# Patient Record
Sex: MALE | Race: WHITE | Employment: OTHER | URBAN - METROPOLITAN AREA
[De-identification: names, ages, dates, MRNs, and addresses within clinical notes are randomized per-mention and may not be internally consistent; named-entity substitution may affect disease eponyms.]

---

## 2018-01-23 PROBLEM — C20 RECTAL CANCER (HCC): Status: ACTIVE | Noted: 2018-01-23

## 2018-01-29 PROBLEM — K56.7 ILEUS (HCC): Status: ACTIVE | Noted: 2018-01-29

## 2018-03-16 ENCOUNTER — HOSPITAL ENCOUNTER (OUTPATIENT)
Dept: PREADMISSION TESTING | Age: 63
Discharge: HOME | End: 2018-03-16
Payer: MEDICARE

## 2018-03-16 VITALS
OXYGEN SATURATION: 100 % | HEIGHT: 74 IN | HEART RATE: 61 BPM | DIASTOLIC BLOOD PRESSURE: 67 MMHG | TEMPERATURE: 98.2 F | SYSTOLIC BLOOD PRESSURE: 127 MMHG | RESPIRATION RATE: 20 BRPM | BODY MASS INDEX: 23.1 KG/M2 | WEIGHT: 180 LBS

## 2018-03-16 DIAGNOSIS — Z01.818 PREOP TESTING: Primary | ICD-10-CM

## 2018-03-16 PROCEDURE — 87081 CULTURE SCREEN ONLY: CPT

## 2018-03-16 NOTE — PROGRESS NOTES
[facility] PRE-ADMISSION TESTING INSTRUCTIONS  Surgery Date 3-20-18      Arrival 8:30 a.m. The Preadmission Testing patient is instructed accordingly using the following criteria (check applicable):    ARRIVAL INSTRUCTIONS:  [x] Parking the day of Surgery is located in the Main Entrance lot. Upon entering the door, make an immediate right to the surgery reception desk    [x] Complimentary [address] Parking is available Monday through Friday 6 am to 6 pm    [x] Bring photo ID and insurance card    [] Bring in a copy of Living will or Durable Power of  papers. [] Please be sure to arrange transportation to and from the hospital    [] Please arrange for someone to be with you for the 24 hour period post procedure due to having anesthesia      GENERAL INSTRUCTIONS:    [x] Nothing by mouth after midnight, including gum, candy, mints or water    [x] You may brush your teeth, but do not swallow any water    [x] Take medications as instructed with 1-2 oz of water    [x] Stop herbal supplements and vitamins 5 days prior to procedure    [x] Follow preop dosing of blood thinners per physician instructions    [] Do not take insulin or oral diabetic medications    [] If diabetic and have low blood sugar or feel symptomatic, take 1-2oz apple juice or glucose tablets    [] Bring inhalers day of surgery    [] Bring C-PAP/ Bi-Pap day of surgery    [] Bring urine specimen day of surgery    [] Shower or bath with soap, lather and rinse well, AM of Surgery, no lotion, powders or creams to surgical site    [] Follow bowel prep as instructed per surgeon    [x] No tobacco products within 24 hours of surgery     [x] No alcohol or illegal drug use within 24 hours of surgery.     [x] Jewelry, body piercing's, eyeglasses, contact lenses and dentures are not permitted into surgery (bring cases)      [] Please do not wear any nail polish, make up or hair products on the day of surgery    [x] If not already done, you can expect a call from registration    [x] You can expect a call the business day prior to procedure to notify you of your arrival time    [x] If you receive a survey after surgery we would greatly appreciate your comments    [] Parent/guardian of a minor must accompany their child and remain on the premises  the entire time they are under our care     [] Pediatric patients may bring favorite toy, blanket or comfort item with them    [] A caregiver or family member must remain with the patient during their stay if they are mentally handicapped, have dementia, disoriented or unable to use a call light or would be a safety concern if left unattended    [x] Please notify surgeon if you develop any illness between now and time of surgery (cold, cough, sore throat, fever, nausea, vomiting) or any signs of infections  including skin, wounds, and dental.    [x] Other instructions: Wear comfortable clothing    EDUCATIONAL MATERIALS PROVIDED:    [x] PAT Preoperative Education Packet/Booklet     [x] Medication List    [] Fluoroscopy Information Pamphlet    [] Transfusion bracelet applied with instructions    [] Joint replacement video reviewed    [x] Shower with soap, lather and rinse well, and use CHG wipes provided the evening before surgery as instructed

## 2018-03-17 LAB — MRSA CULTURE ONLY: NORMAL

## 2018-03-19 NOTE — H&P
Date:   [facility]  Name: Shun Gonsalves             : 1955 Sex: M  Age: 57 yrs  Acct#:  [id number]           Patient was referred by Kary Mcneill MD.  Patient's primary care provider is Kary Mcneill MD.  CC:  Follow-up    HPI: Status post low anterior resection for rectal cancer. Seen by oncology. Further treatment to be determined based on genetic testing. Doing well. Gastrografin enema reviewed. Anastomosis without leak.     Meds Prior to Visit:  Erythromycin Base  250 mg  4 tablets @1p,2p and 11p day prior to surgery  Neomycin Sulfate  500 mg  2 by mouth 1pm,2pm ,11pm day prior to surgery  Lopressor     Hydrazine Sulfate     Aspir-81  81 mg      Allergies:  Penicillin, Cefaclor        Wt Prior: 187lb as of 17    Exam:  Heart :  RRR  Lungs CTA bilaterally  Abdomen: Soft and nondistended, right lower quadrant ostomy healthy         Assessment #1: Hx C20 Malignant neoplasm of rectum   Care Plan:              Comments       :  Schedule reversal of ileostomy        Seen by:

## 2018-03-20 ENCOUNTER — ANESTHESIA EVENT (OUTPATIENT)
Dept: OPERATING ROOM | Age: 63
DRG: 331 | End: 2018-03-20
Payer: MEDICARE

## 2018-03-20 ENCOUNTER — HOSPITAL ENCOUNTER (INPATIENT)
Age: 63
LOS: 2 days | Discharge: HOME | DRG: 331 | End: 2018-03-22
Attending: SURGERY | Admitting: SURGERY
Payer: MEDICARE

## 2018-03-20 ENCOUNTER — ANESTHESIA (OUTPATIENT)
Dept: OPERATING ROOM | Age: 63
DRG: 331 | End: 2018-03-20
Payer: MEDICARE

## 2018-03-20 VITALS
OXYGEN SATURATION: 97 % | DIASTOLIC BLOOD PRESSURE: 80 MMHG | SYSTOLIC BLOOD PRESSURE: 159 MMHG | TEMPERATURE: 95.4 F | RESPIRATION RATE: 5 BRPM

## 2018-03-20 DIAGNOSIS — C20 RECTAL CANCER (HCC): Primary | ICD-10-CM

## 2018-03-20 PROCEDURE — 2500000003 HC RX 250 WO HCPCS: Performed by: SURGERY

## 2018-03-20 PROCEDURE — 3600000014 HC SURGERY LEVEL 4 ADDTL 15MIN: Performed by: SURGERY

## 2018-03-20 PROCEDURE — 0DBB0ZZ EXCISION OF ILEUM, OPEN APPROACH: ICD-10-PCS | Performed by: SURGERY

## 2018-03-20 PROCEDURE — 2060000000 HC ICU INTERMEDIATE R&B

## 2018-03-20 PROCEDURE — 2580000003 HC RX 258: Performed by: NURSE ANESTHETIST, CERTIFIED REGISTERED

## 2018-03-20 PROCEDURE — 2500000003 HC RX 250 WO HCPCS: Performed by: STUDENT IN AN ORGANIZED HEALTH CARE EDUCATION/TRAINING PROGRAM

## 2018-03-20 PROCEDURE — 3600000004 HC SURGERY LEVEL 4 BASE: Performed by: SURGERY

## 2018-03-20 PROCEDURE — 3700000000 HC ANESTHESIA ATTENDED CARE: Performed by: SURGERY

## 2018-03-20 PROCEDURE — 2720000010 HC SURG SUPPLY STERILE: Performed by: SURGERY

## 2018-03-20 PROCEDURE — 2500000003 HC RX 250 WO HCPCS: Performed by: NURSE ANESTHETIST, CERTIFIED REGISTERED

## 2018-03-20 PROCEDURE — 2780000010 HC IMPLANT OTHER: Performed by: SURGERY

## 2018-03-20 PROCEDURE — 6360000002 HC RX W HCPCS: Performed by: NURSE ANESTHETIST, CERTIFIED REGISTERED

## 2018-03-20 PROCEDURE — 7100000001 HC PACU RECOVERY - ADDTL 15 MIN: Performed by: SURGERY

## 2018-03-20 PROCEDURE — 7100000000 HC PACU RECOVERY - FIRST 15 MIN: Performed by: SURGERY

## 2018-03-20 PROCEDURE — 6360000002 HC RX W HCPCS: Performed by: SURGERY

## 2018-03-20 PROCEDURE — 6360000002 HC RX W HCPCS: Performed by: ANESTHESIOLOGY

## 2018-03-20 PROCEDURE — 3700000001 HC ADD 15 MINUTES (ANESTHESIA): Performed by: SURGERY

## 2018-03-20 PROCEDURE — 88304 TISSUE EXAM BY PATHOLOGIST: CPT

## 2018-03-20 DEVICE — RELOAD STAPLER 55MM PROXIMATE TITAN: Type: IMPLANTABLE DEVICE | Status: FUNCTIONAL

## 2018-03-20 RX ORDER — SODIUM CHLORIDE, SODIUM LACTATE, POTASSIUM CHLORIDE, CALCIUM CHLORIDE 600; 310; 30; 20 MG/100ML; MG/100ML; MG/100ML; MG/100ML
INJECTION, SOLUTION INTRAVENOUS CONTINUOUS
Status: DISCONTINUED | OUTPATIENT
Start: 2018-03-20 | End: 2018-03-20

## 2018-03-20 RX ORDER — OXYCODONE HYDROCHLORIDE AND ACETAMINOPHEN 5; 325 MG/1; MG/1
2 TABLET ORAL EVERY 4 HOURS PRN
Status: DISCONTINUED | OUTPATIENT
Start: 2018-03-20 | End: 2018-03-22 | Stop reason: HOSPADM

## 2018-03-20 RX ORDER — HYDROMORPHONE HCL 110MG/55ML
PATIENT CONTROLLED ANALGESIA SYRINGE INTRAVENOUS
Status: DISPENSED
Start: 2018-03-20 | End: 2018-03-21

## 2018-03-20 RX ORDER — SODIUM CHLORIDE 0.9 % (FLUSH) 0.9 %
10 SYRINGE (ML) INJECTION EVERY 12 HOURS SCHEDULED
Status: DISCONTINUED | OUTPATIENT
Start: 2018-03-20 | End: 2018-03-22 | Stop reason: HOSPADM

## 2018-03-20 RX ORDER — LIDOCAINE HYDROCHLORIDE 20 MG/ML
INJECTION, SOLUTION EPIDURAL; INFILTRATION; INTRACAUDAL; PERINEURAL PRN
Status: DISCONTINUED | OUTPATIENT
Start: 2018-03-20 | End: 2018-03-20 | Stop reason: SDUPTHER

## 2018-03-20 RX ORDER — ONDANSETRON 2 MG/ML
4 INJECTION INTRAMUSCULAR; INTRAVENOUS
Status: DISCONTINUED | OUTPATIENT
Start: 2018-03-20 | End: 2018-03-20 | Stop reason: HOSPADM

## 2018-03-20 RX ORDER — ROCURONIUM BROMIDE 10 MG/ML
INJECTION, SOLUTION INTRAVENOUS PRN
Status: DISCONTINUED | OUTPATIENT
Start: 2018-03-20 | End: 2018-03-20 | Stop reason: SDUPTHER

## 2018-03-20 RX ORDER — NEOSTIGMINE METHYLSULFATE 1 MG/ML
INJECTION, SOLUTION INTRAVENOUS PRN
Status: DISCONTINUED | OUTPATIENT
Start: 2018-03-20 | End: 2018-03-20 | Stop reason: SDUPTHER

## 2018-03-20 RX ORDER — CIPROFLOXACIN 2 MG/ML
400 INJECTION, SOLUTION INTRAVENOUS
Status: COMPLETED | OUTPATIENT
Start: 2018-03-20 | End: 2018-03-20

## 2018-03-20 RX ORDER — LIDOCAINE HYDROCHLORIDE 20 MG/ML
INJECTION, SOLUTION EPIDURAL; INFILTRATION; INTRACAUDAL; PERINEURAL PRN
Status: DISCONTINUED | OUTPATIENT
Start: 2018-03-20 | End: 2018-03-20

## 2018-03-20 RX ORDER — MIDAZOLAM HYDROCHLORIDE 1 MG/ML
INJECTION INTRAMUSCULAR; INTRAVENOUS PRN
Status: DISCONTINUED | OUTPATIENT
Start: 2018-03-20 | End: 2018-03-20

## 2018-03-20 RX ORDER — FENTANYL CITRATE 50 UG/ML
INJECTION, SOLUTION INTRAMUSCULAR; INTRAVENOUS PRN
Status: DISCONTINUED | OUTPATIENT
Start: 2018-03-20 | End: 2018-03-20 | Stop reason: SDUPTHER

## 2018-03-20 RX ORDER — GLYCOPYRROLATE 0.2 MG/ML
INJECTION INTRAMUSCULAR; INTRAVENOUS PRN
Status: DISCONTINUED | OUTPATIENT
Start: 2018-03-20 | End: 2018-03-20 | Stop reason: SDUPTHER

## 2018-03-20 RX ORDER — ACETAMINOPHEN 325 MG/1
650 TABLET ORAL EVERY 4 HOURS PRN
Status: DISCONTINUED | OUTPATIENT
Start: 2018-03-20 | End: 2018-03-22 | Stop reason: HOSPADM

## 2018-03-20 RX ORDER — MIDAZOLAM HYDROCHLORIDE 1 MG/ML
INJECTION INTRAMUSCULAR; INTRAVENOUS PRN
Status: DISCONTINUED | OUTPATIENT
Start: 2018-03-20 | End: 2018-03-20 | Stop reason: SDUPTHER

## 2018-03-20 RX ORDER — FENTANYL CITRATE 50 UG/ML
INJECTION, SOLUTION INTRAMUSCULAR; INTRAVENOUS PRN
Status: DISCONTINUED | OUTPATIENT
Start: 2018-03-20 | End: 2018-03-20

## 2018-03-20 RX ORDER — SODIUM CHLORIDE 0.9 % (FLUSH) 0.9 %
10 SYRINGE (ML) INJECTION EVERY 12 HOURS SCHEDULED
Status: DISCONTINUED | OUTPATIENT
Start: 2018-03-20 | End: 2018-03-20 | Stop reason: HOSPADM

## 2018-03-20 RX ORDER — ONDANSETRON 2 MG/ML
INJECTION INTRAMUSCULAR; INTRAVENOUS PRN
Status: DISCONTINUED | OUTPATIENT
Start: 2018-03-20 | End: 2018-03-20 | Stop reason: SDUPTHER

## 2018-03-20 RX ORDER — SODIUM CHLORIDE 0.9 % (FLUSH) 0.9 %
10 SYRINGE (ML) INJECTION PRN
Status: DISCONTINUED | OUTPATIENT
Start: 2018-03-20 | End: 2018-03-22 | Stop reason: HOSPADM

## 2018-03-20 RX ORDER — ONDANSETRON 2 MG/ML
4 INJECTION INTRAMUSCULAR; INTRAVENOUS EVERY 6 HOURS PRN
Status: DISCONTINUED | OUTPATIENT
Start: 2018-03-20 | End: 2018-03-22 | Stop reason: HOSPADM

## 2018-03-20 RX ORDER — PROPOFOL 10 MG/ML
INJECTION, EMULSION INTRAVENOUS PRN
Status: DISCONTINUED | OUTPATIENT
Start: 2018-03-20 | End: 2018-03-20 | Stop reason: SDUPTHER

## 2018-03-20 RX ORDER — OXYCODONE HYDROCHLORIDE AND ACETAMINOPHEN 5; 325 MG/1; MG/1
1 TABLET ORAL EVERY 4 HOURS PRN
Status: DISCONTINUED | OUTPATIENT
Start: 2018-03-20 | End: 2018-03-22 | Stop reason: HOSPADM

## 2018-03-20 RX ORDER — HYDROMORPHONE HCL 110MG/55ML
0.25 PATIENT CONTROLLED ANALGESIA SYRINGE INTRAVENOUS
Status: DISCONTINUED | OUTPATIENT
Start: 2018-03-20 | End: 2018-03-21 | Stop reason: ALTCHOICE

## 2018-03-20 RX ORDER — DEXTROSE, SODIUM CHLORIDE, AND POTASSIUM CHLORIDE 5; .45; .15 G/100ML; G/100ML; G/100ML
INJECTION INTRAVENOUS CONTINUOUS
Status: DISCONTINUED | OUTPATIENT
Start: 2018-03-20 | End: 2018-03-22

## 2018-03-20 RX ORDER — HYDRALAZINE HYDROCHLORIDE 25 MG/1
25 TABLET, FILM COATED ORAL 3 TIMES DAILY
Status: DISCONTINUED | OUTPATIENT
Start: 2018-03-20 | End: 2018-03-22 | Stop reason: HOSPADM

## 2018-03-20 RX ORDER — SODIUM CHLORIDE, SODIUM LACTATE, POTASSIUM CHLORIDE, CALCIUM CHLORIDE 600; 310; 30; 20 MG/100ML; MG/100ML; MG/100ML; MG/100ML
INJECTION, SOLUTION INTRAVENOUS CONTINUOUS PRN
Status: DISCONTINUED | OUTPATIENT
Start: 2018-03-20 | End: 2018-03-20 | Stop reason: SDUPTHER

## 2018-03-20 RX ORDER — DEXAMETHASONE SODIUM PHOSPHATE 4 MG/ML
INJECTION, SOLUTION INTRA-ARTICULAR; INTRALESIONAL; INTRAMUSCULAR; INTRAVENOUS; SOFT TISSUE PRN
Status: DISCONTINUED | OUTPATIENT
Start: 2018-03-20 | End: 2018-03-20 | Stop reason: SDUPTHER

## 2018-03-20 RX ADMIN — CIPROFLOXACIN 400 MG: 2 INJECTION, SOLUTION INTRAVENOUS at 09:24

## 2018-03-20 RX ADMIN — ROCURONIUM BROMIDE 30 MG: 10 SOLUTION INTRAVENOUS at 10:25

## 2018-03-20 RX ADMIN — METRONIDAZOLE 500 MG: 500 INJECTION, SOLUTION INTRAVENOUS at 10:30

## 2018-03-20 RX ADMIN — FENTANYL CITRATE 100 MCG: 50 INJECTION, SOLUTION INTRAMUSCULAR; INTRAVENOUS at 10:25

## 2018-03-20 RX ADMIN — SODIUM CHLORIDE, POTASSIUM CHLORIDE, SODIUM LACTATE AND CALCIUM CHLORIDE: 600; 310; 30; 20 INJECTION, SOLUTION INTRAVENOUS at 11:00

## 2018-03-20 RX ADMIN — HYDROMORPHONE HYDROCHLORIDE 0.25 MG: 1 INJECTION, SOLUTION INTRAMUSCULAR; INTRAVENOUS; SUBCUTANEOUS at 12:53

## 2018-03-20 RX ADMIN — PROPOFOL 200 MG: 10 INJECTION, EMULSION INTRAVENOUS at 10:30

## 2018-03-20 RX ADMIN — DEXAMETHASONE SODIUM PHOSPHATE 8 MG: 4 INJECTION, SOLUTION INTRA-ARTICULAR; INTRALESIONAL; INTRAMUSCULAR; INTRAVENOUS; SOFT TISSUE at 10:30

## 2018-03-20 RX ADMIN — Medication 0.6 MG: at 11:39

## 2018-03-20 RX ADMIN — LIDOCAINE HYDROCHLORIDE 100 MG: 20 INJECTION, SOLUTION EPIDURAL; INFILTRATION; INTRACAUDAL; PERINEURAL at 10:25

## 2018-03-20 RX ADMIN — ONDANSETRON 4 MG: 2 INJECTION, SOLUTION INTRAMUSCULAR; INTRAVENOUS at 10:30

## 2018-03-20 RX ADMIN — PROPOFOL 100 MG: 10 INJECTION, EMULSION INTRAVENOUS at 11:23

## 2018-03-20 RX ADMIN — POTASSIUM CHLORIDE, DEXTROSE MONOHYDRATE AND SODIUM CHLORIDE: 150; 5; 450 INJECTION, SOLUTION INTRAVENOUS at 23:14

## 2018-03-20 RX ADMIN — HYDROMORPHONE HYDROCHLORIDE 0.25 MG: 1 INJECTION, SOLUTION INTRAMUSCULAR; INTRAVENOUS; SUBCUTANEOUS at 12:58

## 2018-03-20 RX ADMIN — POTASSIUM CHLORIDE, DEXTROSE MONOHYDRATE AND SODIUM CHLORIDE: 150; 5; 450 INJECTION, SOLUTION INTRAVENOUS at 13:07

## 2018-03-20 RX ADMIN — SODIUM CHLORIDE, POTASSIUM CHLORIDE, SODIUM LACTATE AND CALCIUM CHLORIDE: 600; 310; 30; 20 INJECTION, SOLUTION INTRAVENOUS at 10:15

## 2018-03-20 RX ADMIN — Medication 3 MG: at 11:39

## 2018-03-20 RX ADMIN — MIDAZOLAM HYDROCHLORIDE 2 MG: 1 INJECTION, SOLUTION INTRAMUSCULAR; INTRAVENOUS at 10:20

## 2018-03-20 RX ADMIN — ENOXAPARIN SODIUM 40 MG: 40 INJECTION, SOLUTION INTRAVENOUS; SUBCUTANEOUS at 09:24

## 2018-03-20 ASSESSMENT — PULMONARY FUNCTION TESTS
PIF_VALUE: 14
PIF_VALUE: 14
PIF_VALUE: 3
PIF_VALUE: 15
PIF_VALUE: 17
PIF_VALUE: 15
PIF_VALUE: 0
PIF_VALUE: 15
PIF_VALUE: 3
PIF_VALUE: 15
PIF_VALUE: 14
PIF_VALUE: 18
PIF_VALUE: 1
PIF_VALUE: 15
PIF_VALUE: 14
PIF_VALUE: 15
PIF_VALUE: 14
PIF_VALUE: 14
PIF_VALUE: 15
PIF_VALUE: 0
PIF_VALUE: 14
PIF_VALUE: 23
PIF_VALUE: 0
PIF_VALUE: 0
PIF_VALUE: 14
PIF_VALUE: 15
PIF_VALUE: 14
PIF_VALUE: 13
PIF_VALUE: 15
PIF_VALUE: 14
PIF_VALUE: 2
PIF_VALUE: 14
PIF_VALUE: 14
PIF_VALUE: 15
PIF_VALUE: 14
PIF_VALUE: 14
PIF_VALUE: 15
PIF_VALUE: 10
PIF_VALUE: 39
PIF_VALUE: 10
PIF_VALUE: 14
PIF_VALUE: 15
PIF_VALUE: 14
PIF_VALUE: 15
PIF_VALUE: 15
PIF_VALUE: 14
PIF_VALUE: 15
PIF_VALUE: 17
PIF_VALUE: 3
PIF_VALUE: 15
PIF_VALUE: 2
PIF_VALUE: 3
PIF_VALUE: 13
PIF_VALUE: 14
PIF_VALUE: 15
PIF_VALUE: 0
PIF_VALUE: 15
PIF_VALUE: 10
PIF_VALUE: 20
PIF_VALUE: 14
PIF_VALUE: 15
PIF_VALUE: 14
PIF_VALUE: 15
PIF_VALUE: 13
PIF_VALUE: 14
PIF_VALUE: 0
PIF_VALUE: 14
PIF_VALUE: 15
PIF_VALUE: 6
PIF_VALUE: 10
PIF_VALUE: 4
PIF_VALUE: 15
PIF_VALUE: 14
PIF_VALUE: 15
PIF_VALUE: 10
PIF_VALUE: 15
PIF_VALUE: 14
PIF_VALUE: 14
PIF_VALUE: 26
PIF_VALUE: 14
PIF_VALUE: 15
PIF_VALUE: 14

## 2018-03-20 ASSESSMENT — PAIN SCALES - GENERAL
PAINLEVEL_OUTOF10: 0
PAINLEVEL_OUTOF10: 0
PAINLEVEL_OUTOF10: 4
PAINLEVEL_OUTOF10: 0
PAINLEVEL_OUTOF10: 2
PAINLEVEL_OUTOF10: 3
PAINLEVEL_OUTOF10: 0
PAINLEVEL_OUTOF10: 3

## 2018-03-20 ASSESSMENT — PAIN DESCRIPTION - LOCATION
LOCATION: ABDOMEN

## 2018-03-20 ASSESSMENT — PAIN DESCRIPTION - PAIN TYPE
TYPE: SURGICAL PAIN

## 2018-03-20 ASSESSMENT — PAIN DESCRIPTION - PROGRESSION
CLINICAL_PROGRESSION: GRADUALLY IMPROVING
CLINICAL_PROGRESSION: GRADUALLY IMPROVING

## 2018-03-20 ASSESSMENT — PAIN DESCRIPTION - FREQUENCY
FREQUENCY: CONTINUOUS

## 2018-03-20 ASSESSMENT — PAIN DESCRIPTION - DESCRIPTORS
DESCRIPTORS: DISCOMFORT

## 2018-03-20 ASSESSMENT — PAIN DESCRIPTION - ORIENTATION
ORIENTATION: ANTERIOR

## 2018-03-20 ASSESSMENT — PAIN DESCRIPTION - ONSET
ONSET: GRADUAL

## 2018-03-20 ASSESSMENT — PAIN - FUNCTIONAL ASSESSMENT: PAIN_FUNCTIONAL_ASSESSMENT: 0-10

## 2018-03-20 NOTE — ANESTHESIA PRE PROCEDURE
02/02/2018    CO2 21 02/02/2018    BUN 56 02/14/2018    CREATININE 2.0 02/14/2018    GFRAA 41 02/14/2018    LABGLOM 34 02/14/2018    GLUCOSE 113 02/02/2018    GLUCOSE 106 03/24/2011    PROT 7.1 01/29/2018    CALCIUM 8.3 02/02/2018    BILITOT 0.8 01/29/2018    ALKPHOS 84 01/29/2018    AST 35 01/29/2018    ALT 43 01/29/2018       POC Tests: No results for input(s): POCGLU, POCNA, POCK, POCCL, POCBUN, POCHEMO, POCHCT in the last 72 hours. Coags:   Lab Results   Component Value Date    PROTIME 12.2 03/18/2011    INR 1.2 03/18/2011    APTT 29.7 03/18/2011       HCG (If Applicable): No results found for: PREGTESTUR, PREGSERUM, HCG, HCGQUANT     ABGs:   Lab Results   Component Value Date    Z4NJWGFU 95.3 01/10/2011        Type & Screen (If Applicable):  No results found for: [facility]    Anesthesia Evaluation  Patient summary reviewed and Nursing notes reviewed  Airway: Mallampati: II  TM distance: >3 FB   Neck ROM: full  Mouth opening: > = 3 FB Dental:      Comment: Only 2 teeth remaining- poor dentition    Pulmonary:Negative Pulmonary ROS and normal exam                              ROS comment: Former smoker- quit 7 years ago   Cardiovascular:  Exercise tolerance: good (>4 METS),   (+) hypertension:,       ECG reviewed                        Neuro/Psych:   Negative Neuro/Psych ROS              GI/Hepatic/Renal: Neg GI/Hepatic/Renal ROS            Endo/Other:                      ROS comment: Left lower extremity amputation Abdominal:           Vascular: negative vascular ROS. Anesthesia Plan      general     ASA 2       Induction: intravenous. MIPS: Postoperative opioids intended. Anesthetic plan and risks discussed with patient.       Plan discussed with CRNA and surgical team.    Attending anesthesiologist reviewed and agrees with Zuleima Mcneal MD   3/20/2018

## 2018-03-21 LAB
ANION GAP SERPL CALCULATED.3IONS-SCNC: 12 MMOL/L (ref 7–16)
BASOPHILS ABSOLUTE: 0.01 E9/L (ref 0–0.2)
BASOPHILS RELATIVE PERCENT: 0.1 % (ref 0–2)
BUN BLDV-MCNC: 19 MG/DL (ref 8–23)
CALCIUM SERPL-MCNC: 8.3 MG/DL (ref 8.6–10.2)
CHLORIDE BLD-SCNC: 103 MMOL/L (ref 98–107)
CO2: 23 MMOL/L (ref 22–29)
CREAT SERPL-MCNC: 1.1 MG/DL (ref 0.7–1.2)
EOSINOPHILS ABSOLUTE: 0 E9/L (ref 0.05–0.5)
EOSINOPHILS RELATIVE PERCENT: 0 % (ref 0–6)
GFR AFRICAN AMERICAN: >60
GFR NON-AFRICAN AMERICAN: >60 ML/MIN/1.73
GLUCOSE BLD-MCNC: 140 MG/DL (ref 74–109)
HCT VFR BLD CALC: 34.6 % (ref 37–54)
HEMOGLOBIN: 11.1 G/DL (ref 12.5–16.5)
IMMATURE GRANULOCYTES #: 0.05 E9/L
IMMATURE GRANULOCYTES %: 0.4 % (ref 0–5)
LYMPHOCYTES ABSOLUTE: 0.88 E9/L (ref 1.5–4)
LYMPHOCYTES RELATIVE PERCENT: 7.2 % (ref 20–42)
MCH RBC QN AUTO: 27.5 PG (ref 26–35)
MCHC RBC AUTO-ENTMCNC: 32.1 % (ref 32–34.5)
MCV RBC AUTO: 85.6 FL (ref 80–99.9)
MONOCYTES ABSOLUTE: 1.02 E9/L (ref 0.1–0.95)
MONOCYTES RELATIVE PERCENT: 8.3 % (ref 2–12)
NEUTROPHILS ABSOLUTE: 10.33 E9/L (ref 1.8–7.3)
NEUTROPHILS RELATIVE PERCENT: 84 % (ref 43–80)
PDW BLD-RTO: 13.5 FL (ref 11.5–15)
PLATELET # BLD: 207 E9/L (ref 130–450)
PMV BLD AUTO: 9.2 FL (ref 7–12)
POTASSIUM REFLEX MAGNESIUM: 4.1 MMOL/L (ref 3.5–5)
RBC # BLD: 4.04 E12/L (ref 3.8–5.8)
SODIUM BLD-SCNC: 138 MMOL/L (ref 132–146)
WBC # BLD: 12.3 E9/L (ref 4.5–11.5)

## 2018-03-21 PROCEDURE — 6370000000 HC RX 637 (ALT 250 FOR IP): Performed by: STUDENT IN AN ORGANIZED HEALTH CARE EDUCATION/TRAINING PROGRAM

## 2018-03-21 PROCEDURE — 2060000000 HC ICU INTERMEDIATE R&B

## 2018-03-21 PROCEDURE — 6360000002 HC RX W HCPCS: Performed by: STUDENT IN AN ORGANIZED HEALTH CARE EDUCATION/TRAINING PROGRAM

## 2018-03-21 PROCEDURE — 36415 COLL VENOUS BLD VENIPUNCTURE: CPT

## 2018-03-21 PROCEDURE — 80048 BASIC METABOLIC PNL TOTAL CA: CPT

## 2018-03-21 PROCEDURE — 85025 COMPLETE CBC W/AUTO DIFF WBC: CPT

## 2018-03-21 PROCEDURE — 2700000000 HC OXYGEN THERAPY PER DAY

## 2018-03-21 RX ORDER — MORPHINE SULFATE 4 MG/ML
4 INJECTION, SOLUTION INTRAMUSCULAR; INTRAVENOUS
Status: DISCONTINUED | OUTPATIENT
Start: 2018-03-21 | End: 2018-03-22 | Stop reason: HOSPADM

## 2018-03-21 RX ORDER — MORPHINE SULFATE 2 MG/ML
2 INJECTION, SOLUTION INTRAMUSCULAR; INTRAVENOUS
Status: DISCONTINUED | OUTPATIENT
Start: 2018-03-21 | End: 2018-03-22 | Stop reason: HOSPADM

## 2018-03-21 RX ADMIN — ENOXAPARIN SODIUM 40 MG: 40 INJECTION, SOLUTION INTRAVENOUS; SUBCUTANEOUS at 17:11

## 2018-03-21 RX ADMIN — METOPROLOL TARTRATE 75 MG: 25 TABLET ORAL at 20:25

## 2018-03-21 RX ADMIN — HYDRALAZINE HYDROCHLORIDE 25 MG: 25 TABLET, FILM COATED ORAL at 10:24

## 2018-03-21 RX ADMIN — HYDRALAZINE HYDROCHLORIDE 25 MG: 25 TABLET, FILM COATED ORAL at 20:25

## 2018-03-21 RX ADMIN — HYDRALAZINE HYDROCHLORIDE 25 MG: 25 TABLET, FILM COATED ORAL at 14:01

## 2018-03-21 ASSESSMENT — PAIN SCALES - GENERAL
PAINLEVEL_OUTOF10: 0

## 2018-03-21 NOTE — PROGRESS NOTES
GENERAL SURGERY  DAILY PROGRESS NOTE  3/21/2018    Subjective:  Some nausea but no emesis noted.  Mild abdominal pain    Objective:  /66   Pulse 50   Temp 97.6 °F (36.4 °C) (Oral)   Resp 16   Ht 6' 2\" (1.88 m)   Wt 169 lb 11.2 oz (77 kg)   SpO2 98%   BMI 21.79 kg/m²     General appearance: Laying in bed, no acute distress  Lungs: non-labored respirations  Heart: regular rate  Abdomen: soft, somewhat distended, normal post-op tenderness    Assessment/Plan:  57 y.o. male S/P ileostomy reversal    Continue NPO for now  Await bowel function  Pain control  Nausea control  Daily labs  IVFs  ambulate    Note signed electronically by Daron Mckeon M.D. at 11:09 AM on 3/21/2018    As above  Clears  Remove wound packing tomorrow  Await bowel function  Danica

## 2018-03-22 VITALS
OXYGEN SATURATION: 96 % | DIASTOLIC BLOOD PRESSURE: 70 MMHG | TEMPERATURE: 97.6 F | RESPIRATION RATE: 15 BRPM | HEART RATE: 55 BPM | WEIGHT: 169.2 LBS | HEIGHT: 74 IN | BODY MASS INDEX: 21.72 KG/M2 | SYSTOLIC BLOOD PRESSURE: 132 MMHG

## 2018-03-22 LAB
ANION GAP SERPL CALCULATED.3IONS-SCNC: 13 MMOL/L (ref 7–16)
BASOPHILS ABSOLUTE: 0.03 E9/L (ref 0–0.2)
BASOPHILS RELATIVE PERCENT: 0.3 % (ref 0–2)
BUN BLDV-MCNC: 13 MG/DL (ref 8–23)
CALCIUM SERPL-MCNC: 8.5 MG/DL (ref 8.6–10.2)
CHLORIDE BLD-SCNC: 102 MMOL/L (ref 98–107)
CO2: 24 MMOL/L (ref 22–29)
CREAT SERPL-MCNC: 1.1 MG/DL (ref 0.7–1.2)
EOSINOPHILS ABSOLUTE: 0 E9/L (ref 0.05–0.5)
EOSINOPHILS RELATIVE PERCENT: 0 % (ref 0–6)
GFR AFRICAN AMERICAN: >60
GFR NON-AFRICAN AMERICAN: >60 ML/MIN/1.73
GLUCOSE BLD-MCNC: 101 MG/DL (ref 74–109)
HCT VFR BLD CALC: 36.2 % (ref 37–54)
HEMOGLOBIN: 11.4 G/DL (ref 12.5–16.5)
IMMATURE GRANULOCYTES #: 0.02 E9/L
IMMATURE GRANULOCYTES %: 0.2 % (ref 0–5)
LYMPHOCYTES ABSOLUTE: 1.35 E9/L (ref 1.5–4)
LYMPHOCYTES RELATIVE PERCENT: 14.7 % (ref 20–42)
MCH RBC QN AUTO: 27.4 PG (ref 26–35)
MCHC RBC AUTO-ENTMCNC: 31.5 % (ref 32–34.5)
MCV RBC AUTO: 87 FL (ref 80–99.9)
MONOCYTES ABSOLUTE: 0.96 E9/L (ref 0.1–0.95)
MONOCYTES RELATIVE PERCENT: 10.5 % (ref 2–12)
NEUTROPHILS ABSOLUTE: 6.82 E9/L (ref 1.8–7.3)
NEUTROPHILS RELATIVE PERCENT: 74.3 % (ref 43–80)
PDW BLD-RTO: 13.9 FL (ref 11.5–15)
PLATELET # BLD: 207 E9/L (ref 130–450)
PMV BLD AUTO: 9.5 FL (ref 7–12)
POTASSIUM REFLEX MAGNESIUM: 3.6 MMOL/L (ref 3.5–5)
RBC # BLD: 4.16 E12/L (ref 3.8–5.8)
SODIUM BLD-SCNC: 139 MMOL/L (ref 132–146)
WBC # BLD: 9.2 E9/L (ref 4.5–11.5)

## 2018-03-22 PROCEDURE — 6360000002 HC RX W HCPCS: Performed by: STUDENT IN AN ORGANIZED HEALTH CARE EDUCATION/TRAINING PROGRAM

## 2018-03-22 PROCEDURE — 2580000003 HC RX 258: Performed by: STUDENT IN AN ORGANIZED HEALTH CARE EDUCATION/TRAINING PROGRAM

## 2018-03-22 PROCEDURE — 2700000000 HC OXYGEN THERAPY PER DAY

## 2018-03-22 PROCEDURE — 6370000000 HC RX 637 (ALT 250 FOR IP): Performed by: STUDENT IN AN ORGANIZED HEALTH CARE EDUCATION/TRAINING PROGRAM

## 2018-03-22 PROCEDURE — 85025 COMPLETE CBC W/AUTO DIFF WBC: CPT

## 2018-03-22 PROCEDURE — 80048 BASIC METABOLIC PNL TOTAL CA: CPT

## 2018-03-22 PROCEDURE — 36415 COLL VENOUS BLD VENIPUNCTURE: CPT

## 2018-03-22 PROCEDURE — 2500000003 HC RX 250 WO HCPCS: Performed by: STUDENT IN AN ORGANIZED HEALTH CARE EDUCATION/TRAINING PROGRAM

## 2018-03-22 RX ORDER — OXYCODONE HYDROCHLORIDE AND ACETAMINOPHEN 5; 325 MG/1; MG/1
1-2 TABLET ORAL EVERY 6 HOURS PRN
Qty: 30 TABLET | Refills: 0 | Status: SHIPPED | OUTPATIENT
Start: 2018-03-22 | End: 2018-03-29

## 2018-03-22 RX ADMIN — HYDRALAZINE HYDROCHLORIDE 25 MG: 25 TABLET, FILM COATED ORAL at 07:37

## 2018-03-22 RX ADMIN — Medication 10 ML: at 07:37

## 2018-03-22 RX ADMIN — POTASSIUM CHLORIDE, DEXTROSE MONOHYDRATE AND SODIUM CHLORIDE: 150; 5; 450 INJECTION, SOLUTION INTRAVENOUS at 07:37

## 2018-03-22 RX ADMIN — METOPROLOL TARTRATE 75 MG: 25 TABLET ORAL at 07:37

## 2018-03-22 RX ADMIN — HYDRALAZINE HYDROCHLORIDE 25 MG: 25 TABLET, FILM COATED ORAL at 14:27

## 2018-03-22 RX ADMIN — ENOXAPARIN SODIUM 40 MG: 40 INJECTION, SOLUTION INTRAVENOUS; SUBCUTANEOUS at 07:37

## 2018-03-22 ASSESSMENT — PAIN SCALES - GENERAL
PAINLEVEL_OUTOF10: 0
PAINLEVEL_OUTOF10: 0

## 2020-03-17 ENCOUNTER — HOSPITAL ENCOUNTER (OUTPATIENT)
Dept: ULTRASOUND IMAGING | Age: 65
Discharge: HOME | End: 2020-03-19
Payer: MEDICARE

## 2020-03-17 PROCEDURE — 88173 CYTOPATH EVAL FNA REPORT: CPT

## 2020-03-17 PROCEDURE — 10005 FNA BX W/US GDN 1ST LES: CPT

## 2020-03-17 PROCEDURE — 88305 TISSUE EXAM BY PATHOLOGIST: CPT

## 2020-03-17 NOTE — BRIEF OP NOTE
Brief Postoperative Note    Julious Freeze  YOB: 1955  [id number]    Pre-operative Diagnosis: mass    Post-operative Diagnosis: Same    Procedure: biopsy    Estimated Blood Loss: < 10 cc    Surgeon: Lauren ZALDIVAR     Complications: none    Specimens obtained: Yes, biopsy of mass    Findings: biopsy of a mass      Solis Boy, II   3/17/2020 11:51 AM

## 2020-03-17 NOTE — H&P
Interventional Radiology  Attending Pre-operative History and Physical    DIAGNOSIS:    Patient Active Problem List   Diagnosis    Retinal detachment of left eye with multiple breaks    [facility]    Ileus ([address]       CHIEF COMPLAINT: <principal problem not specified>          Current Outpatient Medications:     aspirin 81 MG EC tablet, Take 81 mg by mouth daily, Disp: , Rfl:     metoprolol tartrate (LOPRESSOR) 50 MG tablet, Take 75 mg by mouth 2 times daily Instructed to take morning of surgery with a sip of water, Disp: , Rfl:     hydrALAZINE (APRESOLINE) 25 MG tablet, Take 25 mg by mouth 3 times daily Instructed to take morning of surgery with a sip of water, Disp: , Rfl:     Allergies   Allergen Reactions    Cefepime Rash    Pcn [Penicillins] Rash       Past Medical History:   Diagnosis Date    Employs prosthetic leg     left    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     bilat cataracts w lens implants    EYE SURGERY Right ?    retinal detachment    PA REVISION OF ILEOSTOMY,COMPLICATED N/A 8/56/7825    ILEOSTOMY OPEN TAKEDOWN performed by Justice Collazo MD at [address] [place] [address] History   Problem Relation Age of Onset    Diabetes Mother     High Blood Pressure Mother     High Cholesterol Mother     Heart Attack Mother     Dementia Mother     Diabetes Father     Glaucoma Father     High Cholesterol Father     Pacemaker Father     Diabetes proceed      Abdomen:  normal      DATA:  CBC:   Lab Results   Component Value Date    WBC 9.2 03/22/2018    RBC 4.16 03/22/2018    HGB 11.4 03/22/2018    HCT 36.2 03/22/2018    MCV 87.0 03/22/2018    MCH 27.4 03/22/2018    MCHC 31.5 03/22/2018    RDW 13.9 03/22/2018     03/22/2018    MPV 9.5 03/22/2018     CBC with Differential:    Lab Results   Component Value Date    WBC 9.2 03/22/2018    RBC 4.16 03/22/2018    HGB 11.4 03/22/2018    HCT 36.2 03/22/2018     03/22/2018    MCV 87.0 03/22/2018    MCH 27.4 03/22/2018    MCHC 31.5 03/22/2018    RDW 13.9 03/22/2018    SEGSPCT 47 03/18/2011    METASPCT 2 03/18/2011    LYMPHOPCT 14.7 03/22/2018    MONOPCT 10.5 03/22/2018    BASOPCT 0.3 03/22/2018    MONOSABS 0.96 03/22/2018    LYMPHSABS 1.35 03/22/2018    EOSABS 0.00 03/22/2018    BASOSABS 0.03 03/22/2018     Platelets:    Lab Results   Component Value Date     03/22/2018     BUN/Creatinine:    Lab Results   Component Value Date    BUN 13 03/22/2018    CREATININE 1.1 03/22/2018       ASSESSMENT AND PLAN:  1. Left thyroid bx  2. Procedure options, risks and benefits reviewed with patient. Patient expresses understanding.     Electronically signed by Catalina Ash II, MD on 3/17/2020 at 11:51 AM

## 2020-12-19 ENCOUNTER — APPOINTMENT (OUTPATIENT)
Dept: CT IMAGING | Age: 65
End: 2020-12-19
Payer: MEDICARE

## 2020-12-19 ENCOUNTER — HOSPITAL ENCOUNTER (OUTPATIENT)
Age: 65
Setting detail: OBSERVATION
Discharge: HOME | End: 2020-12-21
Attending: EMERGENCY MEDICINE | Admitting: INTERNAL MEDICINE
Payer: MEDICARE

## 2020-12-19 PROBLEM — I48.92 ATRIAL FLUTTER WITH RAPID VENTRICULAR RESPONSE (HCC): Status: ACTIVE | Noted: 2020-12-19

## 2020-12-19 LAB
ALBUMIN SERPL-MCNC: 3.3 G/DL (ref 3.5–5.2)
ALP BLD-CCNC: 75 U/L (ref 40–129)
ALT SERPL-CCNC: 13 U/L (ref 0–40)
ANION GAP SERPL CALCULATED.3IONS-SCNC: 5 MMOL/L (ref 7–16)
APTT: 29.3 SEC (ref 24.5–35.1)
AST SERPL-CCNC: 13 U/L (ref 0–39)
BASOPHILS ABSOLUTE: 0.03 E9/L (ref 0–0.2)
BASOPHILS RELATIVE PERCENT: 0.5 % (ref 0–2)
BILIRUB SERPL-MCNC: 0.4 MG/DL (ref 0–1.2)
BUN BLDV-MCNC: 35 MG/DL (ref 8–23)
CALCIUM SERPL-MCNC: 8.4 MG/DL (ref 8.6–10.2)
CHLORIDE BLD-SCNC: 108 MMOL/L (ref 98–107)
CO2: 28 MMOL/L (ref 22–29)
CREAT SERPL-MCNC: 1.6 MG/DL (ref 0.7–1.2)
EOSINOPHILS ABSOLUTE: 0 E9/L (ref 0.05–0.5)
EOSINOPHILS RELATIVE PERCENT: 0 % (ref 0–6)
GFR AFRICAN AMERICAN: 52
GFR NON-AFRICAN AMERICAN: 43 ML/MIN/1.73
GLUCOSE BLD-MCNC: 103 MG/DL (ref 74–99)
HCT VFR BLD CALC: 30.3 % (ref 37–54)
HEMOGLOBIN: 9.4 G/DL (ref 12.5–16.5)
IMMATURE GRANULOCYTES #: 0.02 E9/L
IMMATURE GRANULOCYTES %: 0.3 % (ref 0–5)
INR BLD: 1
LYMPHOCYTES ABSOLUTE: 1.23 E9/L (ref 1.5–4)
LYMPHOCYTES RELATIVE PERCENT: 18.7 % (ref 20–42)
MAGNESIUM: 2.1 MG/DL (ref 1.6–2.6)
MCH RBC QN AUTO: 27.3 PG (ref 26–35)
MCHC RBC AUTO-ENTMCNC: 31 % (ref 32–34.5)
MCV RBC AUTO: 88.1 FL (ref 80–99.9)
MONOCYTES ABSOLUTE: 0.85 E9/L (ref 0.1–0.95)
MONOCYTES RELATIVE PERCENT: 12.9 % (ref 2–12)
NEUTROPHILS ABSOLUTE: 4.45 E9/L (ref 1.8–7.3)
NEUTROPHILS RELATIVE PERCENT: 67.6 % (ref 43–80)
PDW BLD-RTO: 13.4 FL (ref 11.5–15)
PLATELET # BLD: 154 E9/L (ref 130–450)
PMV BLD AUTO: 9.4 FL (ref 7–12)
POTASSIUM REFLEX MAGNESIUM: 3.2 MMOL/L (ref 3.5–5)
PRO-BNP: 5654 PG/ML (ref 0–125)
PROTHROMBIN TIME: 11.7 SEC (ref 9.3–12.4)
RBC # BLD: 3.44 E12/L (ref 3.8–5.8)
REASON FOR REJECTION: NORMAL
REJECTED TEST: NORMAL
SODIUM BLD-SCNC: 141 MMOL/L (ref 132–146)
TOTAL PROTEIN: 5.5 G/DL (ref 6.4–8.3)
TROPONIN: <0.01 NG/ML (ref 0–0.03)
TSH SERPL DL<=0.05 MIU/L-ACNC: 1.2 UIU/ML (ref 0.27–4.2)
WBC # BLD: 6.6 E9/L (ref 4.5–11.5)

## 2020-12-19 PROCEDURE — 93005 ELECTROCARDIOGRAM TRACING: CPT | Performed by: EMERGENCY MEDICINE

## 2020-12-19 PROCEDURE — 83735 ASSAY OF MAGNESIUM: CPT

## 2020-12-19 PROCEDURE — 6360000002 HC RX W HCPCS: Performed by: EMERGENCY MEDICINE

## 2020-12-19 PROCEDURE — G0378 HOSPITAL OBSERVATION PER HR: HCPCS

## 2020-12-19 PROCEDURE — 85730 THROMBOPLASTIN TIME PARTIAL: CPT

## 2020-12-19 PROCEDURE — 6360000004 HC RX CONTRAST MEDICATION: Performed by: RADIOLOGY

## 2020-12-19 PROCEDURE — 99285 EMERGENCY DEPT VISIT HI MDM: CPT

## 2020-12-19 PROCEDURE — 85025 COMPLETE CBC W/AUTO DIFF WBC: CPT

## 2020-12-19 PROCEDURE — 71275 CT ANGIOGRAPHY CHEST: CPT

## 2020-12-19 PROCEDURE — 2500000003 HC RX 250 WO HCPCS: Performed by: EMERGENCY MEDICINE

## 2020-12-19 PROCEDURE — 6370000000 HC RX 637 (ALT 250 FOR IP): Performed by: EMERGENCY MEDICINE

## 2020-12-19 PROCEDURE — 80053 COMPREHEN METABOLIC PANEL: CPT

## 2020-12-19 PROCEDURE — 84443 ASSAY THYROID STIM HORMONE: CPT

## 2020-12-19 PROCEDURE — 96361 HYDRATE IV INFUSION ADD-ON: CPT

## 2020-12-19 PROCEDURE — 96374 THER/PROPH/DIAG INJ IV PUSH: CPT

## 2020-12-19 PROCEDURE — 96375 TX/PRO/DX INJ NEW DRUG ADDON: CPT

## 2020-12-19 PROCEDURE — 2580000003 HC RX 258: Performed by: EMERGENCY MEDICINE

## 2020-12-19 PROCEDURE — 84484 ASSAY OF TROPONIN QUANT: CPT

## 2020-12-19 PROCEDURE — 85610 PROTHROMBIN TIME: CPT

## 2020-12-19 PROCEDURE — 2580000003 HC RX 258: Performed by: INTERNAL MEDICINE

## 2020-12-19 PROCEDURE — 83880 ASSAY OF NATRIURETIC PEPTIDE: CPT

## 2020-12-19 PROCEDURE — 96376 TX/PRO/DX INJ SAME DRUG ADON: CPT

## 2020-12-19 RX ORDER — SODIUM CHLORIDE 0.9 % (FLUSH) 0.9 %
10 SYRINGE (ML) INJECTION PRN
Status: DISCONTINUED | OUTPATIENT
Start: 2020-12-19 | End: 2020-12-21 | Stop reason: HOSPADM

## 2020-12-19 RX ORDER — METOPROLOL TARTRATE 5 MG/5ML
5 INJECTION INTRAVENOUS ONCE
Status: COMPLETED | OUTPATIENT
Start: 2020-12-19 | End: 2020-12-19

## 2020-12-19 RX ORDER — SODIUM CHLORIDE 0.9 % (FLUSH) 0.9 %
10 SYRINGE (ML) INJECTION EVERY 12 HOURS SCHEDULED
Status: DISCONTINUED | OUTPATIENT
Start: 2020-12-19 | End: 2020-12-21 | Stop reason: HOSPADM

## 2020-12-19 RX ORDER — 0.9 % SODIUM CHLORIDE 0.9 %
1000 INTRAVENOUS SOLUTION INTRAVENOUS ONCE
Status: COMPLETED | OUTPATIENT
Start: 2020-12-19 | End: 2020-12-19

## 2020-12-19 RX ORDER — POTASSIUM CHLORIDE 20 MEQ/1
40 TABLET, EXTENDED RELEASE ORAL ONCE
Status: COMPLETED | OUTPATIENT
Start: 2020-12-19 | End: 2020-12-19

## 2020-12-19 RX ORDER — DIGOXIN 0.25 MG/ML
125 INJECTION INTRAMUSCULAR; INTRAVENOUS ONCE
Status: COMPLETED | OUTPATIENT
Start: 2020-12-19 | End: 2020-12-19

## 2020-12-19 RX ORDER — DILTIAZEM HYDROCHLORIDE 5 MG/ML
10 INJECTION INTRAVENOUS ONCE
Status: COMPLETED | OUTPATIENT
Start: 2020-12-19 | End: 2020-12-19

## 2020-12-19 RX ADMIN — IOPAMIDOL 75 ML: 755 INJECTION, SOLUTION INTRAVENOUS at 20:10

## 2020-12-19 RX ADMIN — DEXTROSE MONOHYDRATE 5 MG/HR: 50 INJECTION, SOLUTION INTRAVENOUS at 23:08

## 2020-12-19 RX ADMIN — SODIUM CHLORIDE 1000 ML: 9 INJECTION, SOLUTION INTRAVENOUS at 18:30

## 2020-12-19 RX ADMIN — DIGOXIN 125 MCG: 0.25 INJECTION INTRAMUSCULAR; INTRAVENOUS at 23:07

## 2020-12-19 RX ADMIN — SODIUM CHLORIDE, PRESERVATIVE FREE 10 ML: 5 INJECTION INTRAVENOUS at 23:49

## 2020-12-19 RX ADMIN — POTASSIUM CHLORIDE 40 MEQ: 20 TABLET, EXTENDED RELEASE ORAL at 21:16

## 2020-12-19 RX ADMIN — METOPROLOL TARTRATE 5 MG: 1 INJECTION, SOLUTION INTRAVENOUS at 21:16

## 2020-12-19 RX ADMIN — DILTIAZEM HYDROCHLORIDE 10 MG: 5 INJECTION INTRAVENOUS at 22:03

## 2020-12-19 RX ADMIN — APIXABAN 5 MG: 5 TABLET, FILM COATED ORAL at 23:48

## 2020-12-19 NOTE — ED PROVIDER NOTES
HPI:  12/19/20, Time: 5:31 PM CASH Means is a 77 y.o. male presenting to the ED for tachycardia beginning yesterday. Symptoms are moderate in severity and constant since onset. No exacerbating or relieving factors. Denies prior history of similar episodes. States that he was checking his vital signs yesterday when he noted that his heart rate was in the 130s. He states he was asymptomatic at that time. He states that he checked his vital signs again today, he noticed that his heart rate was now in the 140s. He came to the ED for further evaluation. He denies chest pain, syncope, shortness of breath, abdominal pain, nausea, vomiting, diarrhea, and recent illness. He states he has a chronic cough occasionally productive of green sputum which is unchanged. He denies any known exposures to COVID-19. He denies any history of heart disease. He has a remote history of rectal cancer, he is currently in remission. No history of DVT/PE. No recent travel or immobilization or lower extremity swelling or tenderness. Patient is status post left lower extremity amputation due to a flesh eating bacterial infection. Review of Systems:   Pertinent positives and negatives are stated within HPI, all other systems reviewed and are negative.          --------------------------------------------- PAST HISTORY ---------------------------------------------  Past Medical History:  has a past medical history of Employs prosthetic leg, Flesh-eating bacteria ([facility] [address] Hypertension, and Rectal cancer ([facility] [address] Past Surgical History:  has a past surgical history that includes above knee amputation (2011); Colonoscopy (10/21/2011); Eye surgery (Left, 10/03/2016); eye surgery (Bilateral, 2002?); eye surgery (Right, ?); Colon surgery (01/23/2018); Abdomen surgery (03/20/2018); and pr revision of ileostomy,complicated (N/A, 2/39/9039). Social History:  reports that he quit smoking about 10 years ago. His smoking use included cigarettes. He started smoking about 46 years ago. He has a 70.00 pack-year smoking history. He has never used smokeless tobacco. He reports that he does not drink alcohol or use drugs. Family History: family history includes Dementia in his mother; Diabetes in his father, mother, sister, sister, and sister; Glaucoma in his father; Heart Attack in his brother and mother; High Blood Pressure in his mother; High Cholesterol in his father, mother, sister, sister, and sister; Pacemaker in his father; Thyroid Disease in his sister, sister, and sister. The patients home medications have been reviewed.     Allergies: Cefepime and Pcn [penicillins]    -------------------------------------------------- RESULTS -------------------------------------------------  All laboratory and radiology results have been personally reviewed by myself   LABS:  Results for orders placed or performed during the hospital encounter of 12/19/20   Brain Natriuretic Peptide   Result Value Ref Range    Pro-BNP 5,654 (H) 0 - 125 pg/mL   Protime-INR   Result Value Ref Range    Protime 11.7 9.3 - 12.4 sec    INR 1.0    APTT   Result Value Ref Range    aPTT 29.3 24.5 - 35.1 sec   Comprehensive Metabolic Panel w/ Reflex to MG   Result Value Ref Range    Sodium 141 132 - 146 mmol/L    Potassium reflex Magnesium 3.2 (L) 3.5 - 5.0 mmol/L    Chloride 108 (H) 98 - 107 mmol/L    CO2 28 22 - 29 mmol/L    Anion Gap 5 (L) 7 - 16 mmol/L    Glucose 103 (H) 74 - 99 mg/dL    BUN 35 (H) 8 - 23 mg/dL    CREATININE 1.6 (H) 0.7 - 1.2 mg/dL    GFR Non-African American 43 >=60 mL/min/1.73    GFR African American 52     Calcium 8.4 (L) 8.6 - 10.2 mg/dL    Total Protein 5.5 (L) 6.4 - 8.3 g/dL    Alb 3.3 (L) 3.5 - 5.2 g/dL    Total Bilirubin 0.4 0.0 - 1.2 mg/dL    Alkaline Phosphatase 75 40 - 129 U/L    ALT 13 0 - 40 U/L    AST 13 0 - 39 U/L   Troponin   Result Value Ref Range Troponin <0.01 0.00 - 0.03 ng/mL   SPECIMEN REJECTION   Result Value Ref Range    Rejected Test CMP, MG, TROP     Reason for Rejection see below    Magnesium   Result Value Ref Range    Magnesium 2.1 1.6 - 2.6 mg/dL   CBC Auto Differential   Result Value Ref Range    WBC 6.6 4.5 - 11.5 E9/L    RBC 3.44 (L) 3.80 - 5.80 E12/L    Hemoglobin 9.4 (L) 12.5 - 16.5 g/dL    Hematocrit 30.3 (L) 37.0 - 54.0 %    MCV 88.1 80.0 - 99.9 fL    MCH 27.3 26.0 - 35.0 pg    MCHC 31.0 (L) 32.0 - 34.5 %    RDW 13.4 11.5 - 15.0 fL    Platelets 580 789 - 637 E9/L    MPV 9.4 7.0 - 12.0 fL    Neutrophils % 67.6 43.0 - 80.0 %    Immature Granulocytes % 0.3 0.0 - 5.0 %    Lymphocytes % 18.7 (L) 20.0 - 42.0 %    Monocytes % 12.9 (H) 2.0 - 12.0 %    Eosinophils % 0.0 0.0 - 6.0 %    Basophils % 0.5 0.0 - 2.0 %    Neutrophils Absolute 4.45 1.80 - 7.30 E9/L    Immature Granulocytes # 0.02 E9/L    Lymphocytes Absolute 1.23 (L) 1.50 - 4.00 E9/L    Monocytes Absolute 0.85 0.10 - 0.95 E9/L    Eosinophils Absolute 0.00 (L) 0.05 - 0.50 E9/L    Basophils Absolute 0.03 0.00 - 0.20 E9/L   TSH without Reflex   Result Value Ref Range    TSH 1.200 0.270 - 4.200 uIU/mL   EKG 12 Lead   Result Value Ref Range    Ventricular Rate 143 BPM    Atrial Rate 143 BPM    P-R Interval 134 ms    QRS Duration 88 ms    Q-T Interval 268 ms    QTc Calculation (Bazett) 413 ms    P Axis -93 degrees    R Axis 47 degrees    T Axis -90 degrees       RADIOLOGY:  Interpreted by Radiologist.  CTA PULMONARY W CONTRAST   Final Result   No evidence of pulmonary embolism or acute pulmonary abnormality.          ------------------------- NURSING NOTES AND VITALS REVIEWED ---------------------------   The nursing notes within the ED encounter and vital signs as below have been reviewed.    BP (!) 133/92   Pulse 138   Temp 96.8 °F (36 °C) (Tympanic)   Resp 18   Ht 6' 2\" (1.88 m)   Wt 203 lb (92.1 kg)   SpO2 95%   BMI 26.06 kg/m²   Oxygen Saturation Interpretation: Normal ---------------------------------------------------PHYSICAL EXAM--------------------------------------      Constitutional/General: Alert and oriented x3, well appearing, non toxic in NAD  Head: Normocephalic and atraumatic  Eyes: PERRL, EOMI  Mouth: Oropharynx clear, handling secretions, no trismus  Neck: Supple, full ROM,   Pulmonary: Lungs clear to auscultation bilaterally, no wheezes, rales, or rhonchi. Not in respiratory distress  Cardiovascular:  Regular rhythm, tachycardic. no murmurs, gallops, or rubs. 2+ distal pulses  Abdomen: Soft, non tender, non distended,   Extremities: Moves all extremities x 4. Warm and well perfused. Amputation to LLE. No swelling or tenderness to RLE.   Skin: warm and dry without rash  Neurologic: GCS 15, no focal motor or sensory deficits   Psych: Normal Affect      ------------------------------ ED COURSE/MEDICAL DECISION MAKING----------------------  Medications   sodium chloride flush 0.9 % injection 10 mL (10 mLs Intravenous Given 12/19/20 2349)   sodium chloride flush 0.9 % injection 10 mL (has no administration in time range)   dilTIAZem 100 mg in dextrose 5 % 100 mL infusion (ADD-Durant) (10 mg/hr Intravenous Rate/Dose Change 12/19/20 2351)   0.9 % sodium chloride bolus (0 mLs Intravenous Stopped 12/19/20 1929)   iopamidol (ISOVUE-370) 76 % injection 75 mL (75 mLs Intravenous Given 12/19/20 2010)   potassium chloride (KLOR-CON M) extended release tablet 40 mEq (40 mEq Oral Given 12/19/20 2116)   metoprolol (LOPRESSOR) injection 5 mg (5 mg Intravenous Given 12/19/20 2116)   dilTIAZem injection 10 mg (10 mg Intravenous Given 12/19/20 2203)   apixaban (ELIQUIS) tablet 5 mg (5 mg Oral Given 12/19/20 2348)   digoxin (LANOXIN) injection 125 mcg (125 mcg Intravenous Given 12/19/20 2307)       Medical Decision Making/ED COURSE: Patient is a 49-year-old male presenting with tachycardia. In the ED, patient was hemodynamically stable though he was tachycardic with heart rates in the 140s. He was saturating well on room air. He was afebrile. On exam, patient was nontoxic. Patient was placed on the cardiac monitor. I interpreted findings. Rhythm -sinus. Labs and CTA chest obtained. Patient administered IVF. I reviewed and interpreted labs. Patient had mild hypokalemia of 3.2 which was replaced orally. Magnesium was normal.  Creatinine was elevated at 1.6 (1.1 on prior labs). BNP was elevated however patient had no evidence of overt fluid overload. Patient was also noted to be anemic with a hemoglobin of 9.4. No evidence of active bleeding. CTA chest showed no acute abnormalities. Patient was given IV Lopressor with mild improvement of his tachycardia. On the monitor, patient's rhythm was consistent with a flutter. He was then given a Cardizem bolus with some improvement of his heart rate. He remained persistently tachycardic, so Cardizem infusion was initiated. Cardiology was consulted who recommended also administering digoxin and Eliquis which were given in the ED. PCP was consulted. Patient will be admitted for further work-up and treatment of new onset a flutter with rapid rate. Patient remained hemodynamically stable throughout ED course. ED Course as of Dec 20 0002   Sat Dec 19, 2020   1734 EKG: This EKG is signed and interpreted by me. Rate: 143  Rhythm: Atrial flutter  Interpretation: New onset A.  Flutter with rapid rate, ST abnormality, normal QTc  Comparison: no previous EKG available      [JA] 2135 Patient's heart rate has improved after Lopressor, but he does remain persistently tachycardic. Cardizem bolus ordered. Discussed findings and plan with patient. He is agreeable to admission. Patient was noted to be anemic with a hemoglobin of 9.4 which is slightly worse when compared to prior labs. He is denying any active bleeding, dark or bloody stools, or hematemesis. [JA]   56 Hospitalist was consulted. Dr. Jamie Balderas accepted the patient for admission. Requested cardiology consult. [NE]   0104 Cardiology consulted. Dr. Rafi Mchugh recommends initiating Eliquis in the ED. States patient can receive dig as well. [JA]   1301 Patient persistently tachycardic in the 140s. Cardizem gtt ordered. [JA]      ED Course User Index  [JA] Connie Rowan MD       Critical Care:  Please note that the withdrawal or failure to initiate urgent interventions for this patient would likely result in a life threatening deterioration or permanent disability. Accordingly this patient received 38 minutes of critical care time, excluding separately billable procedures. Counseling: The emergency provider has spoken with the patient and discussed todays results, in addition to providing specific details for the plan of care and counseling regarding the diagnosis and prognosis. Questions are answered at this time and they are agreeable with the plan.      --------------------------------- IMPRESSION AND DISPOSITION ---------------------------------    IMPRESSION  1. Atrial flutter with rapid ventricular response ([address]    2. Hypokalemia    3. Anemia, unspecified type    4. COURTNEY (acute kidney injury) ([address]        DISPOSITION  Disposition: Admit to telemetry  Patient condition is stable      NOTE: This report was transcribed using voice recognition software.  Every effort was made to ensure accuracy; however, inadvertent computerized transcription errors may be present

## 2020-12-19 NOTE — ED NOTES
Answered phone call from lab, new green top ordered, needs redrawn.      Mariana Man RN  12/19/20 8017

## 2020-12-20 ENCOUNTER — ANESTHESIA (OUTPATIENT)
Dept: NON INVASIVE DIAGNOSTICS | Age: 65
End: 2020-12-20
Payer: MEDICARE

## 2020-12-20 ENCOUNTER — ANESTHESIA EVENT (OUTPATIENT)
Dept: NON INVASIVE DIAGNOSTICS | Age: 65
End: 2020-12-20
Payer: MEDICARE

## 2020-12-20 LAB
AMPHETAMINE SCREEN, URINE: NOT DETECTED
ANION GAP SERPL CALCULATED.3IONS-SCNC: 8 MMOL/L (ref 7–16)
BARBITURATE SCREEN URINE: NOT DETECTED
BENZODIAZEPINE SCREEN, URINE: NOT DETECTED
BUN BLDV-MCNC: 24 MG/DL (ref 8–23)
CALCIUM SERPL-MCNC: 8.3 MG/DL (ref 8.6–10.2)
CANNABINOID SCREEN URINE: NOT DETECTED
CHLORIDE BLD-SCNC: 108 MMOL/L (ref 98–107)
CHOLESTEROL, TOTAL: 114 MG/DL (ref 0–199)
CO2: 23 MMOL/L (ref 22–29)
COCAINE METABOLITE SCREEN URINE: NOT DETECTED
CREAT SERPL-MCNC: 1.1 MG/DL (ref 0.7–1.2)
EKG ATRIAL RATE: 143 BPM
EKG P AXIS: -93 DEGREES
EKG P-R INTERVAL: 134 MS
EKG Q-T INTERVAL: 268 MS
EKG QRS DURATION: 88 MS
EKG QTC CALCULATION (BAZETT): 413 MS
EKG R AXIS: 47 DEGREES
EKG T AXIS: -90 DEGREES
EKG VENTRICULAR RATE: 143 BPM
FENTANYL SCREEN, URINE: NOT DETECTED
GFR AFRICAN AMERICAN: >60
GFR NON-AFRICAN AMERICAN: >60 ML/MIN/1.73
GLUCOSE BLD-MCNC: 115 MG/DL (ref 74–99)
HBA1C MFR BLD: 5.6 % (ref 4–5.6)
HDLC SERPL-MCNC: 41 MG/DL
LDL CHOLESTEROL CALCULATED: 58 MG/DL (ref 0–99)
LV EF: 58 %
LVEF MODALITY: NORMAL
Lab: NORMAL
MAGNESIUM: 2.3 MG/DL (ref 1.6–2.6)
METHADONE SCREEN, URINE: NOT DETECTED
OPIATE SCREEN URINE: NOT DETECTED
OXYCODONE URINE: NOT DETECTED
PHENCYCLIDINE SCREEN URINE: NOT DETECTED
POTASSIUM REFLEX MAGNESIUM: 3.5 MMOL/L (ref 3.5–5)
SODIUM BLD-SCNC: 139 MMOL/L (ref 132–146)
TRIGL SERPL-MCNC: 76 MG/DL (ref 0–149)
VLDLC SERPL CALC-MCNC: 15 MG/DL

## 2020-12-20 PROCEDURE — 83735 ASSAY OF MAGNESIUM: CPT

## 2020-12-20 PROCEDURE — G0378 HOSPITAL OBSERVATION PER HR: HCPCS

## 2020-12-20 PROCEDURE — 6360000002 HC RX W HCPCS

## 2020-12-20 PROCEDURE — 6360000002 HC RX W HCPCS: Performed by: NURSE PRACTITIONER

## 2020-12-20 PROCEDURE — 96376 TX/PRO/DX INJ SAME DRUG ADON: CPT

## 2020-12-20 PROCEDURE — 2500000003 HC RX 250 WO HCPCS: Performed by: INTERNAL MEDICINE

## 2020-12-20 PROCEDURE — 6360000002 HC RX W HCPCS: Performed by: INTERNAL MEDICINE

## 2020-12-20 PROCEDURE — 93005 ELECTROCARDIOGRAM TRACING: CPT | Performed by: NURSE PRACTITIONER

## 2020-12-20 PROCEDURE — 6370000000 HC RX 637 (ALT 250 FOR IP): Performed by: INTERNAL MEDICINE

## 2020-12-20 PROCEDURE — 80061 LIPID PANEL: CPT

## 2020-12-20 PROCEDURE — 80048 BASIC METABOLIC PNL TOTAL CA: CPT

## 2020-12-20 PROCEDURE — 36415 COLL VENOUS BLD VENIPUNCTURE: CPT

## 2020-12-20 PROCEDURE — 93010 ELECTROCARDIOGRAM REPORT: CPT | Performed by: INTERNAL MEDICINE

## 2020-12-20 PROCEDURE — 83036 HEMOGLOBIN GLYCOSYLATED A1C: CPT

## 2020-12-20 PROCEDURE — 93306 TTE W/DOPPLER COMPLETE: CPT

## 2020-12-20 PROCEDURE — 80307 DRUG TEST PRSMV CHEM ANLYZR: CPT

## 2020-12-20 PROCEDURE — 2500000003 HC RX 250 WO HCPCS: Performed by: EMERGENCY MEDICINE

## 2020-12-20 RX ORDER — METOPROLOL TARTRATE 50 MG/1
100 TABLET, FILM COATED ORAL 2 TIMES DAILY
Status: DISCONTINUED | OUTPATIENT
Start: 2020-12-20 | End: 2020-12-21 | Stop reason: HOSPADM

## 2020-12-20 RX ORDER — DIGOXIN 0.25 MG/ML
500 INJECTION INTRAMUSCULAR; INTRAVENOUS ONCE
Status: COMPLETED | OUTPATIENT
Start: 2020-12-20 | End: 2020-12-20

## 2020-12-20 RX ORDER — DIGOXIN 0.25 MG/ML
250 INJECTION INTRAMUSCULAR; INTRAVENOUS EVERY 6 HOURS
Status: COMPLETED | OUTPATIENT
Start: 2020-12-20 | End: 2020-12-21

## 2020-12-20 RX ORDER — HYDRALAZINE HYDROCHLORIDE 25 MG/1
25 TABLET, FILM COATED ORAL 3 TIMES DAILY
Status: DISCONTINUED | OUTPATIENT
Start: 2020-12-20 | End: 2020-12-21 | Stop reason: HOSPADM

## 2020-12-20 RX ORDER — ASPIRIN 81 MG/1
81 TABLET ORAL DAILY
Status: DISCONTINUED | OUTPATIENT
Start: 2020-12-20 | End: 2020-12-20

## 2020-12-20 RX ORDER — DIGOXIN 0.25 MG/ML
250 INJECTION INTRAMUSCULAR; INTRAVENOUS ONCE
Status: DISCONTINUED | OUTPATIENT
Start: 2020-12-20 | End: 2020-12-20

## 2020-12-20 RX ORDER — POTASSIUM CHLORIDE 20 MEQ/1
20 TABLET, EXTENDED RELEASE ORAL ONCE
Status: COMPLETED | OUTPATIENT
Start: 2020-12-20 | End: 2020-12-20

## 2020-12-20 RX ORDER — METOPROLOL TARTRATE 5 MG/5ML
10 INJECTION INTRAVENOUS ONCE
Status: COMPLETED | OUTPATIENT
Start: 2020-12-20 | End: 2020-12-20

## 2020-12-20 RX ADMIN — DIGOXIN 250 MCG: 0.25 INJECTION INTRAMUSCULAR; INTRAVENOUS at 16:31

## 2020-12-20 RX ADMIN — HYDRALAZINE HYDROCHLORIDE 25 MG: 25 TABLET, FILM COATED ORAL at 08:28

## 2020-12-20 RX ADMIN — DEXTROSE MONOHYDRATE 15 MG/HR: 50 INJECTION, SOLUTION INTRAVENOUS at 07:19

## 2020-12-20 RX ADMIN — METOPROLOL TARTRATE 100 MG: 50 TABLET, FILM COATED ORAL at 20:18

## 2020-12-20 RX ADMIN — METOPROLOL TARTRATE 100 MG: 50 TABLET, FILM COATED ORAL at 08:28

## 2020-12-20 RX ADMIN — HYDRALAZINE HYDROCHLORIDE 25 MG: 25 TABLET, FILM COATED ORAL at 13:18

## 2020-12-20 RX ADMIN — METOPROLOL TARTRATE 10 MG: 5 INJECTION INTRAVENOUS at 02:06

## 2020-12-20 RX ADMIN — DIGOXIN 500 MCG: 0.25 INJECTION INTRAMUSCULAR; INTRAVENOUS at 11:17

## 2020-12-20 RX ADMIN — APIXABAN 5 MG: 5 TABLET, FILM COATED ORAL at 08:28

## 2020-12-20 RX ADMIN — APIXABAN 5 MG: 5 TABLET, FILM COATED ORAL at 20:18

## 2020-12-20 RX ADMIN — HYDRALAZINE HYDROCHLORIDE 25 MG: 25 TABLET, FILM COATED ORAL at 20:18

## 2020-12-20 RX ADMIN — POTASSIUM CHLORIDE 20 MEQ: 20 TABLET, EXTENDED RELEASE ORAL at 08:28

## 2020-12-20 RX ADMIN — DEXTROSE MONOHYDRATE 10 MG/HR: 50 INJECTION, SOLUTION INTRAVENOUS at 18:05

## 2020-12-20 RX ADMIN — ASPIRIN 81 MG: 81 TABLET, COATED ORAL at 08:28

## 2020-12-20 ASSESSMENT — PAIN SCALES - GENERAL
PAINLEVEL_OUTOF10: 0

## 2020-12-20 ASSESSMENT — LIFESTYLE VARIABLES: SMOKING_STATUS: 0

## 2020-12-20 NOTE — CONSULTS
I independently interviewed and examined the patient. I have reviewed the above documentation completed by the GAB. Please see my additional contributions to the HPI, physical exam, and assessment / medical decision making. Reason for consult: New onset atrial flutter/fibrillation    He was not previously known to [facility]) cardiology. Mr. 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 He usually checks his blood pressure every day and noted his heart rate was in the 140s to 150s yesterday with elevated blood pressure. This he noticed after coming home from shopping. He checked his blood pressures on Thursday and again on Friday and did not notice any high heart rate. He waited for couple of hours again checked at home his heart rate was still in the 150s. He denied any palpitations, skipped heartbeats, chest pain, dyspnea, dizziness, syncope or presyncope. No prior history of any strokes or history of atrial fibrillation. Patient came to the emergency room and had an EKG which showed atrial flutter with 2-1 block with heart rate in the 150s. He was given IV fluids and 10 mg IV Cardizem. He was also initiated on Cardizem drip with 10 mg and currently is getting 15 mg/h. He also received 10 mg of Lopressor last night and he remains in A. fib with fast heart rate. He was also given 125 mcg of IV digoxin in the ER. Currently he denies any chest pain, palpitations, shortness of breath, or dizziness lightheadedness.     Review of Systems:  Cardiac: As per HPI  General: No fever, chills  Pulmonary: As per HPI  GI: No nausea, vomiting Musculoskeletal: KHOURY x 4, no focal motor deficits  Skin: Intact, no rashes  Neuro/Psych: No headache or seizures    Physical Exam:  /84   Pulse 109   Temp 97.9 °F (36.6 °C) (Oral)   Resp 16   Ht 6' 2\" (1.88 m)   Wt 191 lb 3.2 oz (86.7 kg)   SpO2 95%   BMI 24.55 kg/m²   Appearance: Awake, alert, no acute respiratory distress  Skin: Intact, no rash  Head: Normocephalic, atraumatic  ENMT: MMM, no rhinorrhea  Neck: Supple, no carotid bruits  Lungs: Clear to auscultation bilaterally. No wheezes, rales, or rhonchi. Cardiac: Irregularly irregular rate and rhythm, +S1S2, no murmurs apparent  Abdomen: Soft, +bowel sounds  Extremities: Moves all extremities x 4, no lower extremity edema, has left lower extremity amputation. Neurologic: No focal motor deficits apparent, normal mood and affect    Telemetry findings reviewed: A. fib with RVR with heart rate in the 110s to 120s no new tachy/bradyarrhythmias overnight    EKG: Atrial flutter with 2:1 conduction ST-T changes suggestive lateral wall ischemia, abnormal EKG. Vitals and labs were reviewed: Blood pressure 132/84 with heart rate of 109. LabsBUNs/creatinine 35/1.6>> 24/1.1, proBNP 5654, troponin less than 0.01. Cholesterol 114 HDL 41, LDL 58, triglycerides 76. Liver function normal.  TSH 1.2.,  Urine drug screen negative H&H 9.4/30.3    CTA chest: No evidence of pulmonary embolism no acute pulmonary abnormality. Assessment:  · New onset A. fib with RVR  · [id number] score2  · History of rectal cancer s/p resection  · History of retinal detachment status post repair  · COURTNEY resolved  · Mild hypokalemia, improved      Plan:   1. Continue IV Cardizem at current dose. Metoprolol was increased 200 mg p.o. twice daily. We will give him one-time dose of digoxin 500 mcg IV x1 and repeat 250 mcg every 6 hours x2 for rate control.   2. Continue anticoagulation therapy with Eliquis 5 mg p.o. twice daily 3. Discussed with the patient about the risks and benefits of ОЛЬГА guided cardioversion to sinus rhythm. He is agreeable and we will schedule him for cardioversion in a.m.  Keep him n.p.o. after midnight  4. Continue hydralazine 25 mg 3 times a day for hypertension. 5. We will obtain echocardiogram to assess LV function and valvular status. 6. We will discontinue aspirin since there is no indication for aspirin therapy at this time. Thank you for consulting us and will be following with you for any further recommendations. Erick Olguin MD, Arabella Hughes.   [facility]) Cardiology

## 2020-12-20 NOTE — ANESTHESIA PRE PROCEDURE
Department of Anesthesiology  Preprocedure Note       Name:  Radha Beth   Age:  77 y.o.  :  1955                                          MRN:  [id number]         Date:  2020      Surgeon: * No surgeons listed *    Procedure: Procedure(s): ILEOSTOMY OPEN TAKEDOWN    Medications prior to admission:   Prior to Admission medications    Medication Sig Start Date End Date Taking? Authorizing Provider   aspirin 81 MG EC tablet Take 81 mg by mouth daily    Historical Provider, MD   metoprolol tartrate (LOPRESSOR) 50 MG tablet Take 75 mg by mouth 2 times daily Instructed to take morning of surgery with a sip of water    Historical Provider, MD   hydrALAZINE (APRESOLINE) 25 MG tablet Take 25 mg by mouth 3 times daily Instructed to take morning of surgery with a sip of water    Historical Provider, MD       Current medications:    No current facility-administered medications for this visit. No current outpatient medications on file.      Facility-Administered Medications Ordered in Other Visits   Medication Dose Route Frequency Provider Last Rate Last Admin    hydrALAZINE (APRESOLINE) tablet 25 mg  25 mg Oral TID Ofelia Randall MD   25 mg at 20 1318    metoprolol tartrate (LOPRESSOR) tablet 100 mg  100 mg Oral BID Tanya Dias MD   100 mg at 20 3509    apixaban (ELIQUIS) tablet 5 mg  5 mg Oral BID Ofelia Randall MD   5 mg at 20 4220    perflutren lipid microspheres (DEFINITY) injection 1.65 mg  1.5 mL Intravenous ONCE PRN Ofelia Randall MD        digoxin (LANOXIN) injection 250 mcg  250 mcg Intravenous Q6H Tanya Dias MD        sodium chloride flush 0.9 % injection 10 mL  10 mL Intravenous 2 times per day Ofelia Randall MD   10 mL at 20 2199    sodium chloride flush 0.9 % injection 10 mL  10 mL Intravenous PRN Ofelia Randall MD  dilTIAZem 100 mg in dextrose 5 % 100 mL infusion ([facility])  5 mg/hr Intravenous Continuous Jeimy Giron MD 10 mL/hr at 12/20/20 1520 10 mg/hr at 12/20/20 1520       Allergies: Allergies   Allergen Reactions    Cefepime Rash    Pcn [Penicillins] Rash       Problem List:    Patient Active Problem List   Diagnosis Code    Retinal detachment of left eye with multiple breaks H33.022    Rectal cancer ([address] C20    Ileus ([address] K56.7    Atrial flutter with rapid ventricular response ([address] I48.92       Past Medical History:        Diagnosis Date    Employs prosthetic leg     left    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 bilat cataracts w lens implants    EYE SURGERY Right ?    retinal detachment    [place] REVISION OF ILEOSTOMY,COMPLICATED N/A 9/06/2712    ILEOSTOMY OPEN TAKEDOWN performed by Richard Estrada MD at [address] [place] [address] History:    Social History     Tobacco Use    Smoking status: Former Smoker     Packs/day: 2.00     Years: 35.00     Pack years: 70.00     Types: Cigarettes     Start date: 1/29/1974     Quit date: 11/30/2010     Years since quitting: 10.0    Smokeless tobacco: Never Used   Substance Use Topics    Alcohol use: No                                Counseling given: Not Answered      Vital Signs (Current): There were no vitals filed for this visit. BP Readings from Last 3 Encounters:   12/20/20 (!) 139/95   03/22/18 132/70   03/20/18 (!) 159/80       NPO Status:                                                                                 BMI:   Wt Readings from Last 3 Encounters:   12/20/20 191 lb 3.2 oz (86.7 kg)   03/22/18 169 lb 3.2 oz (76.7 kg)   03/16/18 180 lb (81.6 kg)     There is no height or weight on file to calculate BMI.    CBC:   Lab Results   Component Value Date    WBC 6.6 12/19/2020    RBC 3.44 12/19/2020    HGB 9.4 12/19/2020    HCT 30.3 12/19/2020    MCV 88.1 12/19/2020    RDW 13.4 12/19/2020     12/19/2020       CMP:   Lab Results   Component Value Date     12/20/2020    K 3.5 12/20/2020     12/20/2020    CO2 23 12/20/2020    BUN 24 12/20/2020    CREATININE 1.1 12/20/2020    GFRAA >60 12/20/2020    LABGLOM >60 12/20/2020    GLUCOSE 115 12/20/2020    GLUCOSE 106 03/24/2011    PROT 5.5 12/19/2020    CALCIUM 8.3 12/20/2020    BILITOT 0.4 12/19/2020    ALKPHOS 75 12/19/2020    AST 13 12/19/2020    ALT 13 12/19/2020       POC Tests: No results for input(s): POCGLU, POCNA, POCK, POCCL, POCBUN, POCHEMO, POCHCT in the last 72 hours.     Coags:   Lab Results   Component Value Date    PROTIME 11.7 12/19/2020    PROTIME 12.2 03/18/2011    INR 1.0 12/19/2020    APTT 29.3 12/19/2020       HCG (If Applicable): No results found for: PREGTESTUR, PREGSERUM, HCG, HCGQUANT     ABGs:   Lab Results   Component Value Date    H5RKRKYA 95.3 01/10/2011        Type & Screen (If Applicable):  No results found [facility]: [facility]    Anesthesia Evaluation  Patient summary reviewed and Nursing notes reviewed no history of anesthetic complications:   Airway: Mallampati: II  TM distance: >3 FB   Neck ROM: full  Mouth opening: > = 3 FB Dental:      Comment: Only 2 teeth remaining- poor dentition    Pulmonary:Negative Pulmonary ROS breath sounds clear to auscultation      (-) not a current smoker ROS comment: Former smoker- quit 7 years ago   Cardiovascular:  Exercise tolerance: poor (<4 METS),   (+) hypertension:, dysrhythmias: atrial fibrillation,       ECG reviewed  Rhythm: irregular  Rate: normal           Beta Blocker:  Order written         Neuro/Psych:   Negative Neuro/Psych ROS              GI/Hepatic/Renal: Neg GI/Hepatic/Renal ROS           ROS comment: S/P ileostomy and takedown. .   Endo/Other:    (+) blood dyscrasia: anticoagulation therapy:., malignancy/cancer. ROS comment: Left lower extremity amputation Abdominal:           Vascular: negative vascular ROS. Anesthesia Plan      MAC     ASA 3     (Pt agrees to [facility] ATHENS and IV sedation.)  Induction: intravenous. Anesthetic plan and risks discussed with patient.       Plan discussed with CRNA and surgical team.    Attending anesthesiologist reviewed and agrees with Reena Ramírez MD   12/20/2020        Patient will need to be re-evaluated prior to surgery by DOS anesthesiologist.    Evert Carbajal MD           12/20/2020        4:08 PM

## 2020-12-20 NOTE — PROGRESS NOTES
[place] Spindle NP updated HR 73, possible Sinus rhythm . Due for digoxin. Hold digoxin get EKG.

## 2020-12-20 NOTE — PROGRESS NOTES
Dr. slaughter notified of EKG result. [facility] Kamara NP updated patient now sustaining in 130's again.  Okay for dose of IV digoxin

## 2020-12-20 NOTE — H&P
History & Physical        Reason for admission:    History Obtained From:  patient    HISTORY OF PRESENT ILLNESS:    The patient is a 77 y.o. male who presents to the hospital due to rapid heart beats. The pt. States he returned from shopping and he felt fine but decided to check his BP,he found that his heart rate is elevated in the 150,he assumed it is related to a lot of walking as he was shopping but with checking it over the following 2-3 hours it didn't improve so the pt. Came to the hospital.  He denies any CP,Palpitation or SOB. 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     bilat cataracts w lens implants    EYE SURGERY Right ?    retinal detachment    [place] REVISION OF ILEOSTOMY,COMPLICATED N/A 9/28/8743    ILEOSTOMY OPEN TAKEDOWN performed by Ny Arenas MD at [facility] OR       Medications Prior to Admission:    Medications Prior to Admission: aspirin 81 MG EC tablet, Take 81 mg by mouth daily  metoprolol tartrate (LOPRESSOR) 50 MG tablet, Take 75 mg by mouth 2 times daily Instructed to take morning of surgery with a sip of water  hydrALAZINE (APRESOLINE) 25 MG tablet, Take 25 mg by mouth 3 times daily Instructed to take morning of surgery with a sip of water    Allergies:  Cefepime and Pcn [penicillins]    Social History: TOBACCO:   reports that he quit smoking about 10 years ago. His smoking use included cigarettes. He started smoking about 46 years ago. He has a 70.00 pack-year smoking history. He has never used smokeless tobacco.  ETOH:   reports no history of alcohol use. Family History:       Problem Relation Age of Onset    Diabetes Mother     High Blood Pressure Mother     High Cholesterol Mother     Heart Attack Mother     Dementia Mother     Diabetes Father     Glaucoma Father     High Cholesterol Father    [facility] Pacemaker Father     Diabetes Sister     Thyroid Disease Sister     High Cholesterol Sister     Heart Attack Brother     Diabetes Sister     High Cholesterol Sister     Thyroid Disease Sister     Diabetes Sister     High Cholesterol Sister     Thyroid Disease Sister        REVIEW OF SYSTEMS:  CONSTITUTIONAL:  Neg   Recent weight changes,fatigue,fever,chills or night sweats  EYES:  Neg  blurriness,tearing,itching or acute change in vision  NOSE:  Neg  rhinorrhea,sneezing,itching,allergy or epistaxis  MOUTH/THROAT:  Neg  bleeding gums,hoarseness or sore throat. RESPIRATORY:   Neg SOB,wheeze,cough,sputum,hemoptysis or bronochitis  CARDIOVASCULAR   Neg : chest pain,palpitations,dyspnea on exertion,orthopnea,paroxysmal nocturnal dyspnea or edema  GASTROINTESTINAL:  Neg   Appetite changes,nausea,vomiting,or diarrhea,indigestion,dysphagia,change in bowel movements, or abdominal pain. NEUROLOGICAL:  Neg  Loss of Consciousness,memeory loss,forgetfulness,periods of confusion,decline in intellect,nervousness,insomina,aphasia or dysarthria. SKIN :  Neg  skin or hair changes,and has no itching,rashes,sores.     PHYSICAL EXAM:  /86   Pulse 137   Temp 97.2 °F (36.2 °C) (Axillary)   Resp 18   Ht 6' 2\" (1.88 m)   Wt 191 lb 3.2 oz (86.7 kg)   SpO2 97%   BMI 24.55 kg/m²   General appearance: alert, appears stated age, cooperative and no distress Head: Normocephalic, without obvious abnormality, atraumatic  Eyes: conjunctivae/corneas clear. PERRL, EOM's intact.    Ears: normal external ear canals both ears  Neck: no adenopathy, no carotid bruit, no JVD, supple, symmetrical, trachea midline and thyroid not enlarged, no tenderness/mass/nodules  Lungs: clear to auscultation bilaterally, symmetrical expansion  Heart: regular rate and rhythm, S1, S2 normal, no murmur, regular rate and rhythm ,no precordial heave  Abdomen:soft, non-tender; non-distended normal bowel sounds no masses, no organomegaly  Extremities:Pedal edema none  Homans sign is negative, no sign of DVT  Skin: Skin color, texture, turgor normal. No rashes or lesions  Neurologic:Mental status: Alert, oriented, thought content appropriate  Cranial nerves:II-XII Grossly intact  Sensory: normal  Motor:grossly normal    DATA:  Recent Labs     12/19/20 2118   WBC 6.6   HGB 9.4*   HCT 30.3*   MCV 88.1        Recent Labs     12/19/20 1905      K 3.2*   *   CO2 28   BUN 35*   CREATININE 1.6*   GLUCOSE 103*     Recent Labs     12/19/20 1905   AST 13   ALT 13   BILITOT 0.4   ALKPHOS 75     Recent Labs     12/19/20 1905   TROPONINI <0.01     Lab Results   Component Value Date    INR 1.0 12/19/2020    INR 1.2 03/18/2011    INR 1.3 02/19/2011    PROTIME 11.7 12/19/2020    PROTIME 12.2 03/18/2011    PROTIME 12.9 (H) 02/19/2011        U/A:    Lab Results   Component Value Date    COLORU DARK YELLOW 10/18/2016    PHUR 6.0 10/18/2016    LABCAST RARE 10/18/2016    WBCUA 0-1 10/18/2016    WBCUA NONE 03/18/2011    RBCUA NONE 10/18/2016    RBCUA NONE 03/18/2011    BACTERIA RARE 10/18/2016    CLARITYU Clear 10/18/2016    SPECGRAV >=1.030 10/18/2016    LEUKOCYTESUR Negative 10/18/2016    UROBILINOGEN 0.2 10/18/2016    BILIRUBINUR SMALL 10/18/2016    BILIRUBINUR NEGATIVE 03/18/2011    BLOODU Negative 10/18/2016    GLUCOSEU Negative 10/18/2016    GLUCOSEU NEGATIVE 03/18/2011

## 2020-12-20 NOTE — PROGRESS NOTES
Dr. Wandy Hernández notified of /109 and HR remaining in the 130s on Cardizem drip. Orders received.

## 2020-12-20 NOTE — CONSULTS
Inpatient Cardiology Consultation      Reason for Consult:  AFL RVR    Consulting Physician: Dr Caldera Or    Requesting Physician:  Dr Lizzie Rodriguez    Date of Consultation: 12/20/2020    HISTORY OF PRESENT ILLNESS: 76 yo  male not previously known to any cardiologist.  PMH: HTN, ex smoker 70 pack years quit in 2010, necrotizing fascitis s/p L AKA in 2012, rectal carcinoma s/p LAR with diverting ileostomy s/p takedown of ileostomy 3/2018, L retinal detachment s/p repair, Augustine wears hearing aides. Over last 24 hours prior to admission BP was elevated took it several time -200's patient became concerned and came to [facility] ED 12/19/2020 for evaluation of his elevated BP. No CP, dizziness, HA's, SOB, diarrhea, fever, or chills. Heddy  Upon arrival /96. EKG showed AFL 's. Na 141, K= 3.2 (suplemented)-->3.5, Bun/Cr 35/1.6 (Bun/Cr 13/1.1 on 3/2018), p-BNP 5654, troponin <0.01, T chol 114, HDL 41, LDL 58, triglycerides 76, Albumin 3.3, TSH 1.200, UDS negative, WBC 6.6, Hgb 9.4. CTA Chest no PE. Received 1 liter NSS, 10 mg IV Cardizem then placed on gtt titrated to 15 mg/hr, Lopressor 5 mg IV and 60 mEq potassium, 125 mcgs IV digoxin. 12/20/2020 @ 0215 10 mg IV Lopressor and Lopressor PO increased to 100 mg BID. Currently patient AFL /AF 's-110's. Patient is asymptomatic. Please note: past medical records were reviewed per electronic medical record (EMR) - see detailed reports under Past Medical/ Surgical History. Past Medical History:    1. 70 pack years quit in 2010  2. HTN  3. Augustine wears hearing aides  4. 2011 Lexiscan MPS: non ischemic. EF 54%  5. LLE necrotizing fascitis s/p L AKA  6. R inguinal hernia repair  7. 2016 L retinal detachment s/p repair  8.  Bilateral cataract surgeries  9. 1/2018 Rectal carcinoma s/p LAR with diverting loop ileostomy  10. 3/2018 Takedown of ileostomy  11. 3/2020 Thyroid biopsy     Medications Prior to admit:  Prior to Admission medications Medication Sig Start Date End Date Taking? Authorizing Provider   aspirin 81 MG EC tablet Take 81 mg by mouth daily   Yes Historical Provider, MD   metoprolol tartrate (LOPRESSOR) 50 MG tablet Take 75 mg by mouth 2 times daily Instructed to take morning of surgery with a sip of water   Yes Historical Provider, MD   hydrALAZINE (APRESOLINE) 25 MG tablet Take 25 mg by mouth 3 times daily Instructed to take morning of surgery with a sip of water   Yes Historical Provider, MD       Current Medications:    Current Facility-Administered Medications: hydrALAZINE (APRESOLINE) tablet 25 mg, 25 mg, Oral, TID  metoprolol tartrate (LOPRESSOR) tablet 100 mg, 100 mg, Oral, BID  apixaban (ELIQUIS) tablet 5 mg, 5 mg, Oral, BID  perflutren lipid microspheres (DEFINITY) injection 1.65 mg, 1.5 mL, Intravenous, ONCE PRN  digoxin (LANOXIN) injection 500 mcg, 500 mcg, Intravenous, Once  sodium chloride flush 0.9 % injection 10 mL, 10 mL, Intravenous, 2 times per day  sodium chloride flush 0.9 % injection 10 mL, 10 mL, Intravenous, PRN  dilTIAZem 100 mg in dextrose 5 % 100 mL infusion (ADD-[place]), 5 mg/hr, Intravenous, Continuous    Allergies:  Cefepime and Pcn [penicillins]: rash    Social History:    70 pack years quit in   Denies ETOH/Illicit drugs  Caffeine: Decaf Tea  Activity: Lives with his mother and sister in 2 story home. He stays on first floor. Drives occasionally. Does some laundry and washes dishes. No CP or SOB with ADL's  Code Status: full Code      Family History: Mother alive age 80 no reported CAD or DM  Father  age 80 from \"breathing problems. \" Tobacco abuse, DM. No reported CAD  Sister alive age 58 no CAD or DM  3 additional siblings no SCD, CAD or DM reported      REVIEW OF SYSTEMS:     · Constitutional: Denies fatigue, fevers, chills or night sweats  · Eyes: Denies visual changes or drainage  · ENT: Denies headaches or hearing loss. No mouth sores or sore throat.  No epistaxis · Cardiovascular: Denies chest pain, pressure or palpitations. No lower extremity swelling. · Respiratory: Denies HEARD, cough, orthopnea or PND. No hemoptysis   · Gastrointestinal: Denies hematemesis or anorexia. No hematochezia or melena    · Genitourinary: Denies urgency, dysuria or hematuria. · Musculoskeletal: Denies gait disturbance, weakness or joint complaints  · Integumentary: Denies rash, hives or pruritis   · Neurological: Denies dizziness, headaches or seizures. No numbness or tingling  · Psychiatric: Denies anxiety or depression. · Endocrine: Denies temperature intolerance. No recent weight change. .  · Hematologic/Lymphatic: Denies abnormal bruising or bleeding. No swollen lymph nodes    PHYSICAL EXAM:   /84   Pulse 109   Temp 97.9 °F (36.6 °C) (Oral)   Resp 16   Ht 6' 2\" (1.88 m)   Wt 191 lb 3.2 oz (86.7 kg)   SpO2 95%   BMI 24.55 kg/m²   CONST:  Well developed, thin  male  who appears older than his stated age. Awake, alert and cooperative. No apparent distress. HEENT:   Head- Normocephalic, atraumatic   Eyes- Conjunctivae pink, anicteric  Throat- Oral mucosa pink and moist  Neck-  No stridor, trachea midline, no jugular venous distention. No carotid bruit. CHEST: Chest symmetrical and non-tender to palpation. No accessory muscle use or intercostal retractions  RESPIRATORY: Lung sounds - clear throughout fields   CARDIOVASCULAR:     Heart Ausculation- IRRR. No murmur heard. PV: L AKA/No lower extremity edema. No varicosities. R pedal pulses palpable, no clubbing or cyanosis   ABDOMEN: Soft, non-tender to light palpation. Bowel sounds present. No palpable masses; no abdominal bruit  MS: Good muscle strength and tone. No atrophy or abnormal movements. : Deferred  SKIN: Warm and dry no statis dermatitis or ulcers   NEURO / PSYCH: Oriented to person, place and time. Speech clear and appropriate. Follows all commands.  Pleasant affect     DATA: ECG 12/19/2020: Atrial flutter with 2:1 conduction ST-T changes suggestive lateral wall ischemia, abnormal EKG  Tele strips: AF -110's    Diagnostic:    CTA Pulmonary W 12/19/2020: No evidence of pulmonary embolism or acute pulmonary abnormality. Labs:   CBC:   Recent Labs     12/19/20 2118   WBC 6.6   HGB 9.4*   HCT 30.3*        BMP:   Recent Labs     12/19/20 1905      K 3.2*   CO2 28   BUN 35*   CREATININE 1.6*   LABGLOM 43   CALCIUM 8.4*     Mag:   Recent Labs     12/19/20 1905   MG 2.1       TSH:   Recent Labs     12/19/20 1905   TSH 1.200     proBNP:   Recent Labs     12/19/20  1739   PROBNP 5,654*     PT/INR:   Recent Labs     12/19/20 1739   PROTIME 11.7   INR 1.0     APTT:  Recent Labs     12/19/20  1739   APTT 29.3     CARDIAC ENZYMES:  Recent Labs     12/19/20 1905   TROPONINI <0.01     LIVER PROFILE:  Recent Labs     12/19/20 1905   AST 13   ALT 13   LABALBU 3.3*   Electronically signed by Swathi Whipple. FAVIO Jeffries on 12/20/2020 at 10:13 AM     I independently interviewed and examined the patient. I have reviewed the above documentation completed by the GAB.  Please see my additional contributions to the HPI, physical exam, and assessment / medical decision making.     Reason for consult: New onset atrial flutter/fibrillation     He was not previously known to [facility]) cardiology.    Mr. Augusto Garay is a 41-year-old  male with history of hypertension, rectal cancer diagnosed March 2018 underwent anterior posterior resection, followed by colostomy and colostomy reversal in March 2020, history of left lower extremity amputation and 2011 secondary to fasciitis, history of retinal detachment status post repair, right inguinal hernia repair who presented to the hospital with tachycardia. He usually checks his blood pressure every day and noted his heart rate was in the 140s to 150s yesterday with elevated blood pressure. This he noticed after coming home from shopping. He checked his blood pressures on Thursday and again on Friday and did not notice any high heart rate. He waited for couple of hours again checked at home his heart rate was still in the 150s. He denied any palpitations, skipped heartbeats, chest pain, dyspnea, dizziness, syncope or presyncope. No prior history of any strokes or history of atrial fibrillation. Patient came to the emergency room and had an EKG which showed atrial flutter with 2-1 block with heart rate in the 150s. He was given IV fluids and 10 mg IV Cardizem. He was also initiated on Cardizem drip with 10 mg and currently is getting 15 mg/h. He also received 10 mg of Lopressor last night and he remains in A. fib with fast heart rate. He was also given 125 mcg of IV digoxin in the ER.   Currently he denies any chest pain, palpitations, shortness of breath, or dizziness lightheadedness.     Review of Systems:  Cardiac: As per HPI  General: No fever, chills  Pulmonary: As per HPI  GI: No nausea, vomiting  Musculoskeletal: KHOURY x 4, no focal motor deficits  Skin: Intact, no rashes  Neuro/Psych: No headache or seizures     Physical Exam:  /84   Pulse 109   Temp 97.9 °F (36.6 °C) (Oral)   Resp 16   Ht 6' 2\" (1.88 m)   Wt 191 lb 3.2 oz (86.7 kg)   SpO2 95%   BMI 24.55 kg/m²   Appearance: Awake, alert, no acute respiratory distress Skin: Intact, no rash  Head: Normocephalic, atraumatic  ENMT: MMM, no rhinorrhea  Neck: Supple, no carotid bruits  Lungs: Clear to auscultation bilaterally. No wheezes, rales, or rhonchi. Cardiac: Irregularly irregular rate and rhythm, +S1S2, no murmurs apparent  Abdomen: Soft, +bowel sounds  Extremities: Moves all extremities x 4, no lower extremity edema, has left lower extremity amputation. Neurologic: No focal motor deficits apparent, normal mood and affect     Telemetry findings reviewed: A. fib with RVR with heart rate in the 110s to 120s no new tachy/bradyarrhythmias overnight     EKG: Atrial flutter with 2:1 conduction ST-T changes suggestive lateral wall ischemia, abnormal EKG.    Vitals and labs were reviewed: Blood pressure 132/84 with heart rate of 109.     LabsBUNs/creatinine 35/1.6>> 24/1.1, proBNP 5654, troponin less than 0.01. Cholesterol 114 HDL 41, LDL 58, triglycerides 76. Liver function normal.  TSH 1.2.,  Urine drug screen negative H&H 9.4/30.3     CTA chest: No evidence of pulmonary embolism no acute pulmonary abnormality.     Assessment:  · New onset A. fib with RVR  · [id number] score2  · History of rectal cancer s/p resection  · History of retinal detachment status post repair  · COURTNEY resolved  · Mild hypokalemia, improved        Plan:   1. Continue IV Cardizem at current dose. Metoprolol was increased 200 mg p.o. twice daily. We will give him one-time dose of digoxin 500 mcg IV x1 and repeat 250 mcg every 6 hours x2 for rate control. 2. Continue anticoagulation therapy with Eliquis 5 mg p.o. twice daily  3. Discussed with the patient about the risks and benefits of ОЛЬГА guided cardioversion to sinus rhythm. He is agreeable and we will schedule him for cardioversion in a.m.  Keep him n.p.o. after midnight  4. Continue hydralazine 25 mg 3 times a day for hypertension. 5. We will obtain echocardiogram to assess LV function and valvular status. 6. We will discontinue aspirin since there is no indication for aspirin therapy at this time.     Thank you for consulting us and will be following with you for any further recommendations.           Mena Collado MD, Calixto Hung.   [facility]) Cardiology

## 2020-12-21 ENCOUNTER — APPOINTMENT (OUTPATIENT)
Dept: NON INVASIVE DIAGNOSTICS | Age: 65
End: 2020-12-21
Payer: MEDICARE

## 2020-12-21 VITALS
RESPIRATION RATE: 20 BRPM | SYSTOLIC BLOOD PRESSURE: 105 MMHG | DIASTOLIC BLOOD PRESSURE: 63 MMHG | OXYGEN SATURATION: 99 %

## 2020-12-21 VITALS
HEIGHT: 74 IN | TEMPERATURE: 97.4 F | HEART RATE: 72 BPM | BODY MASS INDEX: 24.43 KG/M2 | OXYGEN SATURATION: 95 % | DIASTOLIC BLOOD PRESSURE: 71 MMHG | RESPIRATION RATE: 18 BRPM | SYSTOLIC BLOOD PRESSURE: 134 MMHG | WEIGHT: 190.4 LBS

## 2020-12-21 LAB
ANION GAP SERPL CALCULATED.3IONS-SCNC: 8 MMOL/L (ref 7–16)
BUN BLDV-MCNC: 19 MG/DL (ref 8–23)
CALCIUM SERPL-MCNC: 8.5 MG/DL (ref 8.6–10.2)
CHLORIDE BLD-SCNC: 107 MMOL/L (ref 98–107)
CO2: 24 MMOL/L (ref 22–29)
CREAT SERPL-MCNC: 1.2 MG/DL (ref 0.7–1.2)
EKG ATRIAL RATE: 250 BPM
EKG ATRIAL RATE: 64 BPM
EKG P AXIS: 60 DEGREES
EKG P-R INTERVAL: 196 MS
EKG Q-T INTERVAL: 338 MS
EKG Q-T INTERVAL: 442 MS
EKG QRS DURATION: 82 MS
EKG QRS DURATION: 86 MS
EKG QTC CALCULATION (BAZETT): 455 MS
EKG QTC CALCULATION (BAZETT): 481 MS
EKG R AXIS: 29 DEGREES
EKG R AXIS: 30 DEGREES
EKG T AXIS: 16 DEGREES
EKG T AXIS: 20 DEGREES
EKG VENTRICULAR RATE: 122 BPM
EKG VENTRICULAR RATE: 64 BPM
GFR AFRICAN AMERICAN: >60
GFR NON-AFRICAN AMERICAN: >60 ML/MIN/1.73
GLUCOSE BLD-MCNC: 104 MG/DL (ref 74–99)
HCT VFR BLD CALC: 43.7 % (ref 37–54)
HEMOGLOBIN: 13.9 G/DL (ref 12.5–16.5)
LV EF: 63 %
LVEF MODALITY: NORMAL
MCH RBC QN AUTO: 27.5 PG (ref 26–35)
MCHC RBC AUTO-ENTMCNC: 31.8 % (ref 32–34.5)
MCV RBC AUTO: 86.4 FL (ref 80–99.9)
PDW BLD-RTO: 13.2 FL (ref 11.5–15)
PLATELET # BLD: 228 E9/L (ref 130–450)
PMV BLD AUTO: 9.2 FL (ref 7–12)
POTASSIUM SERPL-SCNC: 3.9 MMOL/L (ref 3.5–5)
RBC # BLD: 5.06 E12/L (ref 3.8–5.8)
SODIUM BLD-SCNC: 139 MMOL/L (ref 132–146)
WBC # BLD: 9.1 E9/L (ref 4.5–11.5)

## 2020-12-21 PROCEDURE — 93312 ECHO TRANSESOPHAGEAL: CPT

## 2020-12-21 PROCEDURE — 36415 COLL VENOUS BLD VENIPUNCTURE: CPT

## 2020-12-21 PROCEDURE — 2580000003 HC RX 258: Performed by: INTERNAL MEDICINE

## 2020-12-21 PROCEDURE — 93010 ELECTROCARDIOGRAM REPORT: CPT | Performed by: INTERNAL MEDICINE

## 2020-12-21 PROCEDURE — 2500000003 HC RX 250 WO HCPCS: Performed by: EMERGENCY MEDICINE

## 2020-12-21 PROCEDURE — 85027 COMPLETE CBC AUTOMATED: CPT

## 2020-12-21 PROCEDURE — 93325 DOPPLER ECHO COLOR FLOW MAPG: CPT

## 2020-12-21 PROCEDURE — 93320 DOPPLER ECHO COMPLETE: CPT | Performed by: INTERNAL MEDICINE

## 2020-12-21 PROCEDURE — G0378 HOSPITAL OBSERVATION PER HR: HCPCS

## 2020-12-21 PROCEDURE — 93325 DOPPLER ECHO COLOR FLOW MAPG: CPT | Performed by: INTERNAL MEDICINE

## 2020-12-21 PROCEDURE — 3700000001 HC ADD 15 MINUTES (ANESTHESIA)

## 2020-12-21 PROCEDURE — 7100000011 HC PHASE II RECOVERY - ADDTL 15 MIN

## 2020-12-21 PROCEDURE — 6360000002 HC RX W HCPCS: Performed by: NURSE ANESTHETIST, CERTIFIED REGISTERED

## 2020-12-21 PROCEDURE — 93312 ECHO TRANSESOPHAGEAL: CPT | Performed by: INTERNAL MEDICINE

## 2020-12-21 PROCEDURE — 93005 ELECTROCARDIOGRAM TRACING: CPT | Performed by: INTERNAL MEDICINE

## 2020-12-21 PROCEDURE — 3700000000 HC ANESTHESIA ATTENDED CARE

## 2020-12-21 PROCEDURE — 6370000000 HC RX 637 (ALT 250 FOR IP): Performed by: INTERNAL MEDICINE

## 2020-12-21 PROCEDURE — 6360000002 HC RX W HCPCS: Performed by: INTERNAL MEDICINE

## 2020-12-21 PROCEDURE — 92960 CARDIOVERSION ELECTRIC EXT: CPT

## 2020-12-21 PROCEDURE — 7100000010 HC PHASE II RECOVERY - FIRST 15 MIN

## 2020-12-21 PROCEDURE — 96376 TX/PRO/DX INJ SAME DRUG ADON: CPT

## 2020-12-21 PROCEDURE — 93320 DOPPLER ECHO COMPLETE: CPT

## 2020-12-21 PROCEDURE — 92960 CARDIOVERSION ELECTRIC EXT: CPT | Performed by: INTERNAL MEDICINE

## 2020-12-21 PROCEDURE — 99214 OFFICE O/P EST MOD 30 MIN: CPT | Performed by: INTERNAL MEDICINE

## 2020-12-21 PROCEDURE — 80048 BASIC METABOLIC PNL TOTAL CA: CPT

## 2020-12-21 RX ORDER — PROPOFOL 10 MG/ML
INJECTION, EMULSION INTRAVENOUS PRN
Status: DISCONTINUED | OUTPATIENT
Start: 2020-12-21 | End: 2020-12-21 | Stop reason: SDUPTHER

## 2020-12-21 RX ORDER — METOPROLOL TARTRATE 100 MG/1
100 TABLET ORAL 2 TIMES DAILY
Qty: 60 TABLET | Refills: 3 | Status: SHIPPED | OUTPATIENT
Start: 2020-12-21 | End: 2021-04-30 | Stop reason: SDUPTHER

## 2020-12-21 RX ORDER — SODIUM CHLORIDE 0.9 % (FLUSH) 0.9 %
10 SYRINGE (ML) INJECTION EVERY 12 HOURS SCHEDULED
Status: DISCONTINUED | OUTPATIENT
Start: 2020-12-21 | End: 2020-12-21 | Stop reason: SDUPTHER

## 2020-12-21 RX ORDER — SODIUM CHLORIDE 9 MG/ML
INJECTION, SOLUTION INTRAVENOUS CONTINUOUS
Status: DISCONTINUED | OUTPATIENT
Start: 2020-12-21 | End: 2020-12-21

## 2020-12-21 RX ORDER — SODIUM CHLORIDE 0.9 % (FLUSH) 0.9 %
10 SYRINGE (ML) INJECTION PRN
Status: DISCONTINUED | OUTPATIENT
Start: 2020-12-21 | End: 2020-12-21 | Stop reason: SDUPTHER

## 2020-12-21 RX ADMIN — HYDRALAZINE HYDROCHLORIDE 25 MG: 25 TABLET, FILM COATED ORAL at 13:02

## 2020-12-21 RX ADMIN — METOPROLOL TARTRATE 100 MG: 50 TABLET, FILM COATED ORAL at 13:02

## 2020-12-21 RX ADMIN — PROPOFOL 500 MG: 10 INJECTION, EMULSION INTRAVENOUS at 10:14

## 2020-12-21 RX ADMIN — PROPOFOL 100 MG: 10 INJECTION, EMULSION INTRAVENOUS at 10:33

## 2020-12-21 RX ADMIN — SODIUM CHLORIDE: 9 INJECTION, SOLUTION INTRAVENOUS at 10:14

## 2020-12-21 RX ADMIN — DIGOXIN 250 MCG: 0.25 INJECTION INTRAMUSCULAR; INTRAVENOUS at 00:32

## 2020-12-21 RX ADMIN — DEXTROSE MONOHYDRATE 10 MG/HR: 50 INJECTION, SOLUTION INTRAVENOUS at 01:43

## 2020-12-21 RX ADMIN — Medication 10 ML: at 13:04

## 2020-12-21 RX ADMIN — APIXABAN 5 MG: 5 TABLET, FILM COATED ORAL at 13:03

## 2020-12-21 ASSESSMENT — PAIN SCALES - GENERAL
PAINLEVEL_OUTOF10: 0

## 2020-12-21 NOTE — PROGRESS NOTES
Department of Internal Lisa Dent M.D.  Progress Note      SUBJECTIVE: He has noticed a rapid heartbeat off and on; he does get short of breath with this.;  Presently he is feeling better    OBJECTIVE abdomen soft and nontender    Medications    Current Facility-Administered Medications: hydrALAZINE (APRESOLINE) tablet 25 mg, 25 mg, Oral, TID  metoprolol tartrate (LOPRESSOR) tablet 100 mg, 100 mg, Oral, BID  apixaban (ELIQUIS) tablet 5 mg, 5 mg, Oral, BID  perflutren lipid microspheres (DEFINITY) injection 1.65 mg, 1.5 mL, Intravenous, ONCE PRN  sodium chloride flush 0.9 % injection 10 mL, 10 mL, Intravenous, 2 times per day  sodium chloride flush 0.9 % injection 10 mL, 10 mL, Intravenous, PRN  dilTIAZem 100 mg in dextrose 5 % 100 mL infusion (ADD-[place]), 5 mg/hr, Intravenous, Continuous  Physical    VITALS:  /75   Pulse 73   Temp 97.4 °F (36.3 °C) (Oral)   Resp 16   Ht 6' 2\" (1.88 m)   Wt 190 lb 6.4 oz (86.4 kg)   SpO2 95%   BMI 24.45 kg/m²   24HR INTAKE/OUTPUT:      Intake/Output Summary (Last 24 hours) at 12/21/2020 0721  Last data filed at 12/20/2020 1744  Gross per 24 hour   Intake    Output 1000 ml   Net -1000 ml     LUNGS:  No increased work of breathing, good air exchange, clear to auscultation bilaterally, no crackles or wheezing  CARDIOVASCULAR: S1-S2 regular regular rhythm  Data    CBC with Differential:    Lab Results   Component Value Date    WBC 6.6 12/19/2020    RBC 3.44 12/19/2020    HGB 9.4 12/19/2020    HCT 30.3 12/19/2020     12/19/2020    MCV 88.1 12/19/2020    MCH 27.3 12/19/2020    MCHC 31.0 12/19/2020    RDW 13.4 12/19/2020    SEGSPCT 47 03/18/2011    METASPCT 2 03/18/2011    LYMPHOPCT 18.7 12/19/2020    MONOPCT 12.9 12/19/2020    BASOPCT 0.5 12/19/2020    MONOSABS 0.85 12/19/2020    LYMPHSABS 1.23 12/19/2020    EOSABS 0.00 12/19/2020    BASOSABS 0.03 12/19/2020     CMP:    Lab Results   Component Value Date     12/20/2020 K 3.5 12/20/2020     12/20/2020    CO2 23 12/20/2020    BUN 24 12/20/2020    CREATININE 1.1 12/20/2020    GFRAA >60 12/20/2020    LABGLOM >60 12/20/2020    GLUCOSE 115 12/20/2020    GLUCOSE 106 03/24/2011    PROT 5.5 12/19/2020    LABALBU 3.3 12/19/2020    LABALBU 2.4 03/20/2011    CALCIUM 8.3 12/20/2020    BILITOT 0.4 12/19/2020    ALKPHOS 75 12/19/2020    AST 13 12/19/2020    ALT 13 12/19/2020     PT/INR:    Lab Results   Component Value Date    PROTIME 11.7 12/19/2020    PROTIME 12.2 03/18/2011    INR 1.0 12/19/2020       ASSESSMENT AND PLAN      Active Problems:    Atrial flutter with rapid ventricular response ([address]  Plan: Converted but still atrial fibrillation  Sensorineural hearing loss  Most likely hypertension      Electronically signed by Sun Lewis MD on 12/21/2020 at 7:21 AM

## 2020-12-21 NOTE — PROGRESS NOTES
INPATIENT CARDIOLOGY FOLLOW-UP    Name: Attila Choudhary    Age: 77 y.o. Date of Admission: 12/19/2020  5:14 PM    Date of Service: 12/21/2020    Chief Complaint: Follow-up for New onset atrial flutter/fibrillation    Interim History:  No new overnight cardiac complaints. Currently with no complaints of CP, SOB, palpitations, dizziness, or lightheadedness. AF on telemetry. 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    [facility]    Ileus ([address]    Atrial flutter with rapid ventricular response (HCC)       Allergies:   Allergies   Allergen Reactions    Cefepime Rash    Pcn [Penicillins] Rash       Current Medications:  Current Facility-Administered Medications   Medication Dose Route Frequency Provider Last Rate Last Admin    0.9 % sodium chloride infusion   Intravenous Continuous Marium Adams MD   Stopped at 12/21/20 1043    sodium chloride flush 0.9 % injection 10 mL  10 mL Intravenous 2 times per day Marium Adams MD        sodium chloride flush 0.9 % injection 10 mL  10 mL Intravenous PRN Marium Adams MD        hydrALAZINE (APRESOLINE) tablet 25 mg  25 mg Oral TID Elizabeth Nascimento MD   25 mg at 12/20/20 2018    metoprolol tartrate (LOPRESSOR) tablet 100 mg  100 mg Oral BID Marium Adams MD   100 mg at 12/20/20 2018    apixaban (ELIQUIS) tablet 5 mg  5 mg Oral BID Elizabeth Nascimento MD   5 mg at 12/20/20 2018    perflutren lipid microspheres (DEFINITY) injection 1.65 mg  1.5 mL Intravenous ONCE PRN Elizabeth Nascimento MD    MPV 9.4     Lab Results   Component Value Date    CKTOTAL 12 (L) 03/18/2011    CKMB <0.2 03/18/2011    TROPONINI <0.01 12/19/2020    TROPONINI <0.01 03/18/2011    TROPONINI 0.02 01/03/2011     Lab Results   Component Value Date    INR 1.0 12/19/2020    INR 1.2 03/18/2011    INR 1.3 02/19/2011    PROTIME 11.7 12/19/2020    PROTIME 12.2 03/18/2011    PROTIME 12.9 (H) 02/19/2011     Lab Results   Component Value Date    TSH 1.200 12/19/2020     Lab Results   Component Value Date    LABA1C 5.6 12/20/2020     No results found for: EAG  Lab Results   Component Value Date    CHOL 114 12/20/2020     Lab Results   Component Value Date    TRIG 76 12/20/2020     Lab Results   Component Value Date    HDL 41 12/20/2020     Lab Results   Component Value Date    LDLCALC 58 12/20/2020     Lab Results   Component Value Date    LABVLDL 15 12/20/2020     No results found for: CHOLHDLRATIO    Cardiac Tests:  Telemetry findings reviewed: A. fib with RVR with heart rate in the 110s to 120s no new tachy/bradyarrhythmias overnight     EKG: Atrial flutter with 2:1 conduction ST-T changes suggestive lateral wall ischemia, abnormal EKG. EKG post cardioversion: Normal sinus rhythm, normal EKG.    Vitals and labs were reviewed: Blood pressure 121/70 with heart rate of 62.     LabsBUNs/creatinine 35/1.6>> 24/1.1, proBNP 5654, troponin less than 0.01. Cholesterol 114 HDL 41, LDL 58, triglycerides 76. Liver function normal.  TSH 1.2.,  Urine drug screen negative H&H 9.4/30.3     CTA chest: No evidence of pulmonary embolism no acute pulmonary abnormality. TTE12/20/2020:  Normal left ventricle size and systolic function. Ejection fraction is visually estimated at 55-60%. Atrial fibrillation may   affect the evaluation of LV systolic function. Normal left ventricle wall thickness. No wall motion abnormalities. Normal diastolic function (E/e' < 11). The left atrium is moderately dilated.

## 2020-12-21 NOTE — PROCEDURES
Preprocedure diagnosis:  New onset atrial fibrillation  Procedures, cardioversion elective arrhythmia external    Preprocedure diagnosis: A. Fib/flutter    Prior tests anticoagulated    Primary procedure  Written informed consent was obtained, the ОЛЬГА was performed under stable fasting condition,Definity contrast was used and EZEKIEL thrombus was noted,  self-adhesive anterior-posterior defibrillation pads were applied, the defibrillator was programmed to deliver energy in a synchronized fashion with a EKG. The patient was set up for monitoring of surface EKG and pulse oximetry continuously. Blood pressure was monitored and with automatic cuff measurements. Supplemental oxygen was administered. The procedure was performed under anesthesia by anesthesiology. After ensuring adequate anesthesia, DC CV was performed by delivering 200 J of direct current delivered in a synchronized fashion. Result: Successful cardioversion to sinus rhythm, confirmed on 12-lead EKG. Complication: None    Patient was monitored until awake and vitals remained stable. Jessie Garner M.D.   [id number] [facility] cardiology

## 2020-12-21 NOTE — CARE COORDINATION
Introduced my self and provided explanation of CM role to patient. Patient is awake, alert, and aware of current diagnosis and treatment plan including post cardioversion care, initiation of po Eliquis. Patient resides at home, sharing residence with his mother and his sister. He uses a straight cane and a left AK prosthetic device. Patient is established with a pcp. He states he pays out of pocket for prescriptions. I presented patient with a  30 day free supply coupon to use on first fill of drug at retail pharmacy. Explained that he can call manufacture to enroll in assistance programs if eligible. Patient voiced understanding and was thankful for the information. Explained ELOS of <24 hours; patient voiced understanding and agreement. Will follow along with  and assist with discharge planning as necessary. Suze Colby.  Dior, MSN, RN  [facility]  [phone]

## 2020-12-22 NOTE — ANESTHESIA POSTPROCEDURE EVALUATION
Department of Anesthesiology  Postprocedure Note    Patient: Keagan Mcbride  MRN: [id number]  YOB: 1955  Date of evaluation: 12/21/2020  Time:  8:15 PM     Procedure Summary     Date: 12/21/20 Room / Location: [facility] Echocardiography    Anesthesia Start: 1014 Anesthesia Stop: 1058    Procedures:       TRANSESOPHAGEAL ECHO      Procedure Not Yet Scheduled Diagnosis:     Scheduled Providers:  Responsible Provider: Radha De Paz MD    Anesthesia Type: MAC ASA Status: 3          Anesthesia Type: MAC    Jolene Phase I: Jolene Score: 9    Jolene Phase II: Jolene Score: 8    Last vitals: Reviewed and per EMR flowsheets.        Anesthesia Post Evaluation    Patient location during evaluation: PACU  Level of consciousness: awake  Airway patency: patent  Nausea & Vomiting: no nausea and no vomiting  Complications: no  Cardiovascular status: hemodynamically stable  Respiratory status: acceptable

## 2020-12-24 NOTE — DISCHARGE SUMMARY
Discharge Summary     Patient ID:  Danny Santiago  [id number]  77 y.o. 1955 male  Sheldon Muñoz MD        Admit date: 12/19/2020    Discharge date and time: December 21, 2020      Activity level: ambulatory  Disposition:to home  Condition on Discharge:good    Admit Diagnoses: atrial fibrillation with rapid ventricular response    Discharge Diagnoses: atrial fibrillation rapid ventricular response converted to sinus rhythm    Consults:  IP CONSULT TO PRIMARY CARE PROVIDER  IP CONSULT TO CARDIOLOGY  IP CONSULT TO SOCIAL WORK    Procedures: cardioversion    Hospital Course: 40-year-old presented to the hospital after feeling his heart was beating very fast it did not improve and he presented to the hospital in atrial fibrillation with rapid ventricular response; he was placed on IV Cardizem and eventually went to cardioversion where he stayed in sinus rhythm. He was discharged on anticoagulation and medication      LABS:  Recent Labs     12/21/20  1353      K 3.9      CO2 24   BUN 19   CREATININE 1.2   GLUCOSE 104*   CALCIUM 8.5*       Recent Labs     12/21/20  1353   WBC 9.1   RBC 5.06   HGB 13.9   HCT 43.7   MCV 86.4   MCH 27.5   MCHC 31.8*   RDW 13.2      MPV 9.2       No results for input(s): GLUMET in the last 72 hours.       Patient Instructions:   Discharge Medication List as of 12/21/2020  5:04 PM      START taking these medications    Details   apixaban (ELIQUIS) 5 MG TABS tablet Take 1 tablet by mouth 2 times daily, Disp-60 tablet, R-5Normal         CONTINUE these medications which have CHANGED    Details   metoprolol tartrate (LOPRESSOR) 100 MG tablet Take 1 tablet by mouth 2 times daily, Disp-60 tablet, R-3Normal         CONTINUE these medications which have NOT CHANGED    Details   hydrALAZINE (APRESOLINE) 25 MG tablet Take 25 mg by mouth 3 times daily Instructed to take morning of surgery with a sip of waterHistorical Med         STOP taking these medications aspirin 81 MG EC tablet Comments:   Reason for Stopping:                   Signed:  Electronically signed by Sheldon Muñoz MD on 12/24/2020 at 8:42 AM

## 2020-12-29 ENCOUNTER — TELEPHONE (OUTPATIENT)
Dept: ADMINISTRATIVE | Age: 65
End: 2020-12-29

## 2020-12-29 NOTE — TELEPHONE ENCOUNTER
Patients sister Suha Snider called to set up a hospital f/u with Dr. Gabriele Youssef, he was discharged 12/22 and Suha Snider can be reached at [phone].

## 2020-12-29 NOTE — TELEPHONE ENCOUNTER
Per Dr. Ian Christy note from 12/21/20, patient needs (1) month follow-up. Left message for patient's sister to call the office.

## 2021-01-28 ENCOUNTER — OFFICE VISIT (OUTPATIENT)
Dept: CARDIOLOGY CLINIC | Age: 66
End: 2021-01-28
Payer: MEDICARE

## 2021-01-28 VITALS
SYSTOLIC BLOOD PRESSURE: 122 MMHG | HEIGHT: 74 IN | WEIGHT: 198.9 LBS | BODY MASS INDEX: 25.53 KG/M2 | RESPIRATION RATE: 18 BRPM | DIASTOLIC BLOOD PRESSURE: 82 MMHG | HEART RATE: 58 BPM

## 2021-01-28 DIAGNOSIS — I48.92 ATRIAL FLUTTER WITH RAPID VENTRICULAR RESPONSE (HCC): Primary | ICD-10-CM

## 2021-01-28 PROCEDURE — 4040F PNEUMOC VAC/ADMIN/RCVD: CPT | Performed by: INTERNAL MEDICINE

## 2021-01-28 PROCEDURE — G8484 FLU IMMUNIZE NO ADMIN: HCPCS | Performed by: INTERNAL MEDICINE

## 2021-01-28 PROCEDURE — 99214 OFFICE O/P EST MOD 30 MIN: CPT | Performed by: INTERNAL MEDICINE

## 2021-01-28 PROCEDURE — 1036F TOBACCO NON-USER: CPT | Performed by: INTERNAL MEDICINE

## 2021-01-28 PROCEDURE — 3017F COLORECTAL CA SCREEN DOC REV: CPT | Performed by: INTERNAL MEDICINE

## 2021-01-28 PROCEDURE — G8417 CALC BMI ABV UP PARAM F/U: HCPCS | Performed by: INTERNAL MEDICINE

## 2021-01-28 PROCEDURE — 93000 ELECTROCARDIOGRAM COMPLETE: CPT | Performed by: INTERNAL MEDICINE

## 2021-01-28 PROCEDURE — 1123F ACP DISCUSS/DSCN MKR DOCD: CPT | Performed by: INTERNAL MEDICINE

## 2021-01-28 PROCEDURE — G8427 DOCREV CUR MEDS BY ELIG CLIN: HCPCS | Performed by: INTERNAL MEDICINE

## 2021-01-28 RX ORDER — HYDRALAZINE HYDROCHLORIDE 50 MG/1
TABLET, FILM COATED ORAL
COMMUNITY
Start: 2020-12-18 | End: 2021-04-30 | Stop reason: SDUPTHER

## 2021-01-28 NOTE — PROGRESS NOTES
OUTPATIENT CARDIOLOGY FOLLOW-UP    Name: Leigh Ann Prado    Age: 77 y.o. Primary Care Physician: Judith Jensen MD    Date of Service: 1/28/2021    Chief Complaint:   Chief Complaint   Patient presents with    Follow-Up from [address] [place] [address]       Interim History:   Mr. Marylin Jain is a 64-year-old  male with history of hypertension, rectal cancer diagnosed March 2018 underwent anterior posterior resection, followed by colostomy and colostomy reversal in March 2020, history of left lower extremity amputation and 2011 secondary to fasciitis, history of retinal detachment status post repair, right inguinal hernia repair who presented to the hospital with tachycardia. He was seen as a new consult on 12/20/2020 for new onset atrial fibrillation RVR. He underwent ОЛЬГА guided cardioversion on 12/20/2020 and was successfully converted to sinus rhythm. He was initiated on Eliquis for anticoagulation and metoprolol for rate control and was discharged home. Is here for follow-up visit. He denies any further hospitalizations or ER visits after he was discharged. He feels much better denies any chest pain, dyspnea, palpitations, syncope or presyncope. However, he told me that he cannot afford Eliquis 5 mg twice daily due to self-pay. He told me he does not have prescription coverage even though he has Medicare. He did not apply for part D. Otherwise, he is tolerating Eliquis without any bleeding complications. He is compliant with medications, as well as salt and fluid intake. He does not take any over-the-counter arthritis medications. No new cardiac complaints since last cardiology evaluation. He denies recent chest pain, SOB, palpitations, lightheadedness, dizziness, syncope, PND, or orthopnea. SR on EKG.     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    history of / at left leg / had AKA    Hypertension     Rectal cancer ([facility] [address] 12/2017    rectal       Past Surgical History:  Past Surgical History:   Procedure Laterality Date    ABDOMEN SURGERY  03/20/2018    ileostomy open take down    ABOVE KNEE AMPUTATION  2011    left; hx flesh eating bacteria    COLON SURGERY  01/23/2018    laprascopic low anterior colon resection  take down splenic fexure   ileostomy    COLONOSCOPY  10/21/2011    EYE SURGERY Left 10/03/2016    pars plana vitrectomy, retinal detachment repair, laser gas/fluid exchange    EYE SURGERY Bilateral 2002?     bilat cataracts w lens implants    EYE SURGERY Right ?    retinal detachment    ND REVISION OF ILEOSTOMY,COMPLICATED N/A 2/95/8336    ILEOSTOMY OPEN TAKEDOWN performed by Alexa Orourke MD at [address] [place] [address] History:  Family History   Problem Relation Age of Onset    Diabetes Mother     High Blood Pressure Mother     High Cholesterol Mother     Heart Attack Mother     Dementia Mother     Diabetes Father     Glaucoma Father     High Cholesterol Father    [facility] Pacemaker Father     Diabetes Sister     Thyroid Disease Sister     High Cholesterol Sister     Heart Attack Brother     Diabetes Sister     High Cholesterol Sister     Thyroid Disease Sister     Diabetes Sister     High Cholesterol Sister     Thyroid Disease Sister        Social History:  Social History     Socioeconomic History    Marital status: Single     Spouse name: Not on file    Number of children: Not on file    Years of education: Not on file    Highest education level: Not on file   Occupational History    Not on file   Social Needs    Financial resource strain: Not on file    Food insecurity     Worry: Not on file     Inability: Not on file    Transportation needs     Medical: Not on file     Non-medical: Not on file   Tobacco Use    Smoking status: Former Smoker     Packs/day: 2.00     Years: 35.00     Pack years: 70.00     Types: Cigarettes     Start date: 1/29/1974     Quit date: 11/30/2010     Years since quitting: 10.1    Smokeless tobacco: Never Used   Substance and Sexual Activity    Alcohol use: No    Drug use: No    Sexual activity: Not on file   Lifestyle    Physical activity     Days per week: Not on file     Minutes per session: Not on file    Stress: Not on file   Relationships    Social connections     Talks on phone: Not on file     Gets together: Not on file     Attends [employer] service: Not on file     Active member of club or organization: Not on file     Attends meetings of clubs or organizations: Not on file     Relationship status: Not on file    Intimate partner violence     Fear of current or ex partner: Not on file     Emotionally abused: Not on file     Physically abused: Not on file     Forced sexual activity: Not on file   Other Topics Concern    Not on file   Social History Narrative    Not on file       Allergies: Allergies   Allergen Reactions    Cefepime Rash    Pcn [Penicillins] Rash       Current Medications:  Current Outpatient Medications   Medication Sig Dispense Refill    apixaban (ELIQUIS) 5 MG TABS tablet Take 1 tablet by mouth 2 times daily 60 tablet 5    metoprolol tartrate (LOPRESSOR) 100 MG tablet Take 1 tablet by mouth 2 times daily 60 tablet 3    hydrALAZINE (APRESOLINE) 50 MG tablet take 1 tablet by mouth three times a day       No current facility-administered medications for this visit.         Physical Exam:  /82   Pulse 58   Resp 18   Ht 6' 2\" (1.88 m)   Wt 198 lb 14.4 oz (90.2 kg)   BMI 25.54 kg/m²   Wt Readings from Last 3 Encounters:   01/28/21 198 lb 14.4 oz (90.2 kg)   12/21/20 190 lb 6.4 oz (86.4 kg)   03/22/18 169 lb 3.2 oz (76.7 kg)     Appearance: Awake, alert and oriented x 3, no acute respiratory distress  Skin: Intact, no rash  Head: Normocephalic, atraumatic  Eyes: EOMI, no conjunctival erythema  ENMT: No pharyngeal erythema, MMM, no rhinorrhea  Neck: Supple, no elevated JVP, no carotid bruits  Lungs: Clear to auscultation bilaterally. No wheezes, rales, or rhonchi.   Cardiac: Regular rate and rhythm, +S1S2, no murmurs apparent  Abdomen: Soft, nontender, +bowel sounds  Extremities: Moves all extremities x 4, no lower extremity edema  Neurologic: No focal motor deficits apparent, normal mood and affect, alert and oriented x 3  Peripheral Pulses: Intact posterior tibial pulses bilaterally    Laboratory Tests:  Lab Results   Component Value Date    CREATININE 1.2 12/21/2020    BUN 19 12/21/2020     12/21/2020    K 3.9 12/21/2020     12/21/2020    CO2 24 12/21/2020     Lab Results   Component Value Date    MG 2.3 12/20/2020     Lab Results   Component Value Date    WBC 9.1 12/21/2020    HGB 13.9 12/21/2020    HCT 43.7 12/21/2020    MCV 86.4 12/21/2020     12/21/2020     Lab Results   Component Value Date    ALT 13 12/19/2020    AST 13 12/19/2020    ALKPHOS 75 12/19/2020    BILITOT 0.4 12/19/2020     Lab Results   Component Value Date    CKTOTAL 12 (L) 03/18/2011    CKMB <0.2 03/18/2011    TROPONINI <0.01 12/19/2020    TROPONINI <0.01 03/18/2011    TROPONINI 0.02 01/03/2011     Lab Results   Component Value Date    INR 1.0 12/19/2020    INR 1.2 03/18/2011    INR 1.3 02/19/2011    PROTIME 11.7 12/19/2020    PROTIME 12.2 03/18/2011    PROTIME 12.9 (H) 02/19/2011     Lab Results   Component Value Date    TSH 1.200 12/19/2020     Lab Results   Component Value Date    LABA1C 5.6 12/20/2020     No results found for: EAG  Lab Results   Component Value Date    CHOL 114 12/20/2020     Lab Results   Component Value Date    TRIG 76 12/20/2020     Lab Results   Component Value Date HDL 41 12/20/2020     Lab Results   Component Value Date    LDLCALC 58 12/20/2020     Lab Results   Component Value Date    LABVLDL 15 12/20/2020     No results found for: [facility]    Cardiac Tests:  ECG: Sinus bradycardia otherwise normal EKG. TTE12/20/2020:  Normal left ventricle size and systolic function.   Ejection fraction is visually estimated at 55-60%. Atrial fibrillation may   affect the evaluation of LV systolic function.   Normal left ventricle wall thickness.   No wall motion abnormalities.   Normal diastolic function (E/e' < 40).  The left atrium is moderately dilated.   Mildly dilated right ventricle.   Right ventricle global systolic function is normal . TAPSE 21 mm.   Physiologic and/or trace mitral regurgitation is present.   No hemodynamically significant aortic stenosis is present.   Unable to estimate PA systolic pressure.   Trace pericardial effusion.     ОЛЬГА: Normal LV function, definite echo contrast was used to visualize left atrial appendage. No intracardiac thrombus and no left atrial appendage thrombus noted. Stress test:        Cardiac catheterization:     The ASCVD Risk score (Claven ., et al., 2013) failed to calculate for the following reasons: The valid total cholesterol range is 130 to 320 mg/dL        ASSESSMENT:  · New onset A. fib with RVR, underwent successful ОЛЬГА guided cardioversion to normal sinus rhythm. · [id number] score2  · History of rectal cancer s/p resection  · History of retinal detachment status post repair  · COURTNEY resolved  · Mild hypokalemia, improved  · Left lower extremity above-knee amputation  · ROBERTH  . Plan:   · Continue Eliquis 5 mg p.o. twice daily for anticoagulation. He is also concerned about cost issues with Eliquis. He was advised to discuss with Dr. Adalgisa Downey about switching to warfarin. However he, he needs frequent blood work with warfarin and he needs to follow-up with him for anticoagulation.   · Continue metoprolol 100 mg twice daily for rate control  · Continue hydralazine 50 mg 3 times a day for high blood pressure  · Follow-up with me in 3 months. The patient's current medication list, allergies, problem list and results of all previously ordered testing were reviewed at today's visit.   Severa Angelica, MD  [facility]) Cardiology

## 2021-01-28 NOTE — PATIENT INSTRUCTIONS
· Continue Eliquis 5 mg p.o. twice daily for anticoagulation. He is also concerned about cost issues with Eliquis. He was advised to discuss with Dr. Chris Nj about switching to warfarin. However he, he needs frequent blood work with warfarin and he needs to follow-up with him for anticoagulation. · Continue metoprolol 100 mg twice daily for rate control  · Continue hydralazine 50 mg 3 times a day for high blood pressure  · Follow-up with me in 3 months.

## 2021-04-30 ENCOUNTER — OFFICE VISIT (OUTPATIENT)
Dept: CARDIOLOGY CLINIC | Age: 66
End: 2021-04-30
Payer: MEDICARE

## 2021-04-30 VITALS
RESPIRATION RATE: 16 BRPM | HEART RATE: 53 BPM | WEIGHT: 200.2 LBS | OXYGEN SATURATION: 96 % | DIASTOLIC BLOOD PRESSURE: 82 MMHG | BODY MASS INDEX: 25.69 KG/M2 | SYSTOLIC BLOOD PRESSURE: 142 MMHG | HEIGHT: 74 IN

## 2021-04-30 DIAGNOSIS — I48.92 ATRIAL FLUTTER WITH RAPID VENTRICULAR RESPONSE (HCC): Primary | ICD-10-CM

## 2021-04-30 PROCEDURE — 93000 ELECTROCARDIOGRAM COMPLETE: CPT | Performed by: INTERNAL MEDICINE

## 2021-04-30 PROCEDURE — 3017F COLORECTAL CA SCREEN DOC REV: CPT | Performed by: INTERNAL MEDICINE

## 2021-04-30 PROCEDURE — 99214 OFFICE O/P EST MOD 30 MIN: CPT | Performed by: INTERNAL MEDICINE

## 2021-04-30 PROCEDURE — G8417 CALC BMI ABV UP PARAM F/U: HCPCS | Performed by: INTERNAL MEDICINE

## 2021-04-30 PROCEDURE — 1123F ACP DISCUSS/DSCN MKR DOCD: CPT | Performed by: INTERNAL MEDICINE

## 2021-04-30 PROCEDURE — 4040F PNEUMOC VAC/ADMIN/RCVD: CPT | Performed by: INTERNAL MEDICINE

## 2021-04-30 PROCEDURE — G8427 DOCREV CUR MEDS BY ELIG CLIN: HCPCS | Performed by: INTERNAL MEDICINE

## 2021-04-30 PROCEDURE — 1036F TOBACCO NON-USER: CPT | Performed by: INTERNAL MEDICINE

## 2021-04-30 RX ORDER — METOPROLOL TARTRATE 100 MG/1
100 TABLET ORAL 2 TIMES DAILY
Qty: 180 TABLET | Refills: 3 | Status: ON HOLD
Start: 2021-04-30 | End: 2022-04-15 | Stop reason: HOSPADM

## 2021-04-30 RX ORDER — AMLODIPINE BESYLATE 5 MG
5 TABLET ORAL DAILY
Qty: 30 TABLET | Refills: 6
Start: 2021-04-30 | End: 2021-04-30 | Stop reason: SDUPTHER

## 2021-04-30 RX ORDER — HYDRALAZINE HYDROCHLORIDE 50 MG/1
TABLET, FILM COATED ORAL
Qty: 270 TABLET | Refills: 3 | Status: ON HOLD
Start: 2021-04-30 | End: 2022-04-15 | Stop reason: HOSPADM

## 2021-04-30 NOTE — PATIENT INSTRUCTIONS
· Continue Eliquis 5 mg p.o. twice daily for anticoagulation. · Continue metoprolol 100 mg twice daily for rate control  · Continue hydralazine 50 mg 3 times a day for high blood pressure  · His blood pressure is not well controlled. Discussed with him about starting on amlodipine 5 mg p.o. daily and he is willing to start on that. He was also recommended to follow low-salt diet and restrict daily salt intake to less than 2 g. He was recommended to monitor his blood pressures daily and call me with the readings in 1 week. · Follow-up with Dr. Clover Oppenheim  · Follow-up with me in 6 months.

## 2021-04-30 NOTE — PROGRESS NOTES
OUTPATIENT CARDIOLOGY FOLLOW-UP    Name: Orestes Gann    Age: 77 y.o. Primary Care Physician: Tulio Mcgraw MD    Date of Service: 4/30/2021    Chief Complaint:   Chief Complaint   Patient presents with    Atrial Flutter       Interim History:   Mr. Kevin Bal is a 78-year-old  male with history of hypertension, rectal cancer diagnosed March 2018 underwent anterior posterior resection, followed by colostomy and colostomy reversal in March 2020, history of left lower extremity amputation and 2011 secondary to fasciitis, history of retinal detachment status post repair, right inguinal hernia repair who presented to the hospital with tachycardia. He was seen as a new consult on 12/20/2020 for new onset atrial fibrillation RVR. He underwent ОЛЬГА guided cardioversion on 12/20/2020 and was successfully converted to sinus rhythm. He was initiated on Eliquis for anticoagulation and metoprolol for rate control and was discharged home. He is here for follow-up visit. He feels much better denies any chest pain, dyspnea, palpitations, syncope or presyncope. However, he told me that he cannot afford Eliquis 5 mg twice daily due to self-pay. He told me he does not have prescription coverage even though he has Medicare. He did not apply for part D. Otherwise, he is tolerating Eliquis without any bleeding complications. He is compliant with medications, as well as salt and fluid intake. He does not take any over-the-counter arthritis medications. He was seen in the office on 1/28/2021, since his last visit, he has not any further hospitalizations or ER visits. Home blood pressure ranges from 1 20-1 40s. No new cardiac complaints since last cardiology evaluation. He denies recent chest pain, SOB, palpitations, lightheadedness, dizziness, syncope, PND, or orthopnea. SR on EKG.     Review of Systems:   Cardiac: As per HPI  General: No fever, chills  Pulmonary: As per HPI  HEENT: No visual Not on file   Social Needs    Financial resource strain: Not on file    Food insecurity     Worry: Not on file     Inability: Not on file    Transportation needs     Medical: Not on file     Non-medical: Not on file   Tobacco Use    Smoking status: Former Smoker     Packs/day: 2.00     Years: 35.00     Pack years: 70.00     Types: Cigarettes     Start date: 1/29/1974     Quit date: 11/30/2010     Years since quitting: 10.4    Smokeless tobacco: Never Used   Substance and Sexual Activity    Alcohol use: No    Drug use: No    Sexual activity: Not on file   Lifestyle    Physical activity     Days per week: Not on file     Minutes per session: Not on file    Stress: Not on file   Relationships    Social connections     Talks on phone: Not on file     Gets together: Not on file     Attends Christian service: Not on file     Active member of club or organization: Not on file     Attends meetings of clubs or organizations: Not on file     Relationship status: Not on file    Intimate partner violence     Fear of current or ex partner: Not on file     Emotionally abused: Not on file     Physically abused: Not on file     Forced sexual activity: Not on file   Other Topics Concern    Not on file   Social History Narrative    Not on file       Allergies: Allergies   Allergen Reactions    Cefepime Rash    Pcn [Penicillins] Rash       Current Medications:  Current Outpatient Medications   Medication Sig Dispense Refill    hydrALAZINE (APRESOLINE) 50 MG tablet take 1 tablet by mouth three times a day      apixaban (ELIQUIS) 5 MG TABS tablet Take 1 tablet by mouth 2 times daily 60 tablet 5    metoprolol tartrate (LOPRESSOR) 100 MG tablet Take 1 tablet by mouth 2 times daily 60 tablet 3     No current facility-administered medications for this visit.         Physical Exam:  BP (!) 168/84   Pulse 53   Resp 16   Ht 6' 2\" (1.88 m)   Wt 200 lb 3.2 oz (90.8 kg)   SpO2 96%   BMI 25.70 kg/m²   Wt Readings from Last 3 Encounters:   04/30/21 200 lb 3.2 oz (90.8 kg)   01/28/21 198 lb 14.4 oz (90.2 kg)   12/21/20 190 lb 6.4 oz (86.4 kg)     Appearance: Awake, alert and oriented x 3, no acute respiratory distress  Skin: Intact, no rash  Head: Normocephalic, atraumatic  Eyes: EOMI, no conjunctival erythema  ENMT: No pharyngeal erythema, MMM, no rhinorrhea  Neck: Supple, no elevated JVP, no carotid bruits  Lungs: Clear to auscultation bilaterally. No wheezes, rales, or rhonchi.   Cardiac: Regular rate and rhythm, +S1S2, no murmurs apparent  Abdomen: Soft, nontender, +bowel sounds  Extremities: Moves all extremities x 4, no lower extremity edema, has left AKA  Neurologic: No focal motor deficits apparent, normal mood and affect, alert and oriented x 3  Peripheral Pulses: Intact posterior tibial pulses bilaterally    Laboratory Tests:  Lab Results   Component Value Date    CREATININE 1.2 12/21/2020    BUN 19 12/21/2020     12/21/2020    K 3.9 12/21/2020     12/21/2020    CO2 24 12/21/2020     Lab Results   Component Value Date    MG 2.3 12/20/2020     Lab Results   Component Value Date    WBC 9.1 12/21/2020    HGB 13.9 12/21/2020    HCT 43.7 12/21/2020    MCV 86.4 12/21/2020     12/21/2020     Lab Results   Component Value Date    ALT 13 12/19/2020    AST 13 12/19/2020    ALKPHOS 75 12/19/2020    BILITOT 0.4 12/19/2020     Lab Results   Component Value Date    CKTOTAL 12 (L) 03/18/2011    CKMB <0.2 03/18/2011    TROPONINI <0.01 12/19/2020    TROPONINI <0.01 03/18/2011    TROPONINI 0.02 01/03/2011     Lab Results   Component Value Date    INR 1.0 12/19/2020    INR 1.2 03/18/2011    INR 1.3 02/19/2011    PROTIME 11.7 12/19/2020    PROTIME 12.2 03/18/2011    PROTIME 12.9 (H) 02/19/2011     Lab Results   Component Value Date    TSH 1.200 12/19/2020     Lab Results   Component Value Date    LABA1C 5.6 12/20/2020     No results found for: EAG  Lab Results   Component Value Date    CHOL 114 12/20/2020     Lab Results blood pressure  · His blood pressure is not well controlled. Discussed with him about starting on amlodipine 5 mg p.o. daily and he is willing to start on that. He was also recommended to follow low-salt diet and restrict daily salt intake to less than 2 g. He was recommended to monitor his blood pressures daily and call me with the readings in 1 week. · Follow-up with Dr. Jessica Coats  · Follow-up with me in 6 months. The patient's current medication list, allergies, problem list and results of all previously ordered testing were reviewed at today's visit.   Mayra Hunt MD  [facility]) Cardiology

## 2021-05-03 ENCOUNTER — IMMUNIZATION (OUTPATIENT)
Dept: PRIMARY CARE CLINIC | Age: 66
End: 2021-05-03
Payer: MEDICARE

## 2021-05-03 PROCEDURE — 0012A COVID-19, MODERNA VACCINE 100MCG/0.5ML DOSE: CPT | Performed by: PHYSICIAN ASSISTANT

## 2021-05-03 PROCEDURE — 91301 COVID-19, MODERNA VACCINE 100MCG/0.5ML DOSE: CPT | Performed by: PHYSICIAN ASSISTANT

## 2021-05-03 RX ORDER — AMLODIPINE BESYLATE 5 MG/1
5 TABLET ORAL DAILY
Qty: 30 TABLET | Refills: 6 | Status: SHIPPED
Start: 2021-05-03 | End: 2022-01-21 | Stop reason: SDUPTHER

## 2021-05-27 ENCOUNTER — TELEPHONE (OUTPATIENT)
Dept: CARDIOLOGY CLINIC | Age: 66
End: 2021-05-27

## 2022-01-21 ENCOUNTER — OFFICE VISIT (OUTPATIENT)
Dept: CARDIOLOGY CLINIC | Age: 67
End: 2022-01-21
Payer: MEDICARE

## 2022-01-21 VITALS
BODY MASS INDEX: 24.31 KG/M2 | SYSTOLIC BLOOD PRESSURE: 122 MMHG | HEIGHT: 74 IN | HEART RATE: 100 BPM | DIASTOLIC BLOOD PRESSURE: 64 MMHG | WEIGHT: 189.4 LBS | RESPIRATION RATE: 16 BRPM

## 2022-01-21 DIAGNOSIS — I48.92 ATRIAL FLUTTER WITH RAPID VENTRICULAR RESPONSE (HCC): Primary | ICD-10-CM

## 2022-01-21 PROCEDURE — 93000 ELECTROCARDIOGRAM COMPLETE: CPT | Performed by: INTERNAL MEDICINE

## 2022-01-21 PROCEDURE — 99214 OFFICE O/P EST MOD 30 MIN: CPT | Performed by: INTERNAL MEDICINE

## 2022-01-21 RX ORDER — AMLODIPINE BESYLATE 5 MG/1
5 TABLET ORAL DAILY
Qty: 90 TABLET | Refills: 3 | Status: SHIPPED
Start: 2022-01-21 | End: 2022-05-06 | Stop reason: ALTCHOICE

## 2022-01-21 NOTE — PATIENT INSTRUCTIONS
· Continue Eliquis 5 mg p.o. twice daily for anticoagulation. · Continue metoprolol 100 mg twice daily for rate control  · Continue hydralazine 50 mg 3 times a day for high blood pressure  · Follow low-salt diet and restrict daily salt intake to less than 2 g.  · Follow-up with me in 6 months.

## 2022-01-21 NOTE — PROGRESS NOTES
OUTPATIENT CARDIOLOGY FOLLOW-UP    Name: Cornelia Nuñez    Age: 74 y.o. Primary Care Physician: Tammy Holguin MD    Date of Service: 1/21/2022    Chief Complaint:   Chief Complaint   Patient presents with    6 Month Follow-Up    Shortness of Breath       Interim History:   Mr. Kalani Cleaning is a 58-year-old  male with history of hypertension, rectal cancer diagnosed March 2018 underwent anterior posterior resection, followed by colostomy and colostomy reversal in March 2020, history of left lower extremity amputation and 2011 secondary to fasciitis, history of retinal detachment status post repair, right inguinal hernia repair who presented to the hospital with tachycardia. He was seen as a new consult on 12/20/2020 for new onset atrial fibrillation RVR. He underwent ОЛЬГА guided cardioversion on 12/20/2020 and was successfully converted to sinus rhythm. He was initiated on Eliquis for anticoagulation and metoprolol for rate control and was discharged home. He has  part D and that is covering his prescriptions. Otherwise, he is tolerating Eliquis without any bleeding complications. He is compliant with medications, as well as salt and fluid intake. He does not take any over-the-counter arthritis medications. He was seen in the office on 4/30/2021, since his last visit, he has not any further hospitalizations or ER visits. No new cardiac complaints since last cardiology evaluation. He denies recent chest pain, SOB, palpitations, lightheadedness, dizziness, syncope, PND, or orthopnea. SR on EKG.     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     bilat cataracts w lens implants    EYE SURGERY Right ?    retinal detachment    MT REVISION OF ILEOSTOMY,COMPLICATED N/A     ILEOSTOMY OPEN TAKEDOWN performed by Karlee Venegas MD at [address] [place] [address] History:  Family History   Problem Relation Age of Onset    Diabetes Mother     High Blood Pressure Mother     High Cholesterol Mother     Heart Attack Mother     Dementia Mother     Diabetes Father     Glaucoma Father     High Cholesterol Father    Salol Jocelin Pacemaker Father     Diabetes Sister     Thyroid Disease Sister     High Cholesterol Sister     Heart Attack Brother     Diabetes Sister     High Cholesterol Sister     Thyroid Disease Sister     Diabetes Sister     High Cholesterol Sister     Thyroid Disease Sister        Social History:  Social History     Socioeconomic History    Marital status: Single     Spouse name: Not on file    Number of children: Not on file    Years of education: Not on file    Highest education level: Not on file   Occupational History    Not on file   Tobacco Use    Smoking status: Former Smoker     Packs/day: 2.00     Years: 35.00     Pack years: 70.00     Types: Cigarettes     Start date: 1974     Quit date: 2010     Years since quittin.1    Smokeless tobacco: Never Used   Vaping Use    Vaping Use: Never used   Substance and Sexual Activity    Alcohol use: No    Drug use: No    Sexual activity: Not on file   Other Topics Concern    Not on file   Social History Narrative    Not on file     Social Determinants of Health     Financial Resource Strain:     Difficulty of Paying Living Expenses: Not on file   Food Insecurity:     Worried About Running Out of Food in the Last Year: Not on file    Donny of Food in the Last Year: Not on file   Transportation Needs:     Lack of Transportation (Medical): Not on file    Lack of Transportation (Non-Medical): Not on file   Physical Activity:     Days of Exercise per Week: Not on file    Minutes of Exercise per Session: Not on file   Stress:     Feeling of Stress : Not on file   Social Connections:     Frequency of Communication with Friends and Family: Not on file    Frequency of Social Gatherings with Friends and Family: Not on file    Attends Sabianism Services: Not on file    Active Member of [address] [place] [address] or Organizations: Not on file    Attends Club or Organization Meetings: Not on file    Marital Status: Not on file   Intimate Partner Violence:     Fear of Current or Ex-Partner: Not on file    Emotionally Abused: Not on file    Physically Abused: Not on file    Sexually Abused: Not on file   Housing Stability:     Unable to Pay for Housing in the Last Year: Not on file    Number of [place] in the Last Year: Not on file    Unstable Housing in the Last Year: Not on file       Allergies:   Allergies   Allergen Reactions    Cefepime Rash    Pcn [Penicillins] Rash       Current Medications:  Current Outpatient Medications   Medication Sig Dispense Refill    amLODIPine (NORVASC) 5 MG tablet Take 1 tablet by mouth daily 90 tablet 3    metoprolol (LOPRESSOR) 100 MG tablet Take 1 tablet by mouth 2 times daily 180 tablet 3    hydrALAZINE (APRESOLINE) 50 MG tablet take 1 tablet by mouth three times a day 270 tablet 3    apixaban (ELIQUIS) 5 MG TABS tablet Take 1 tablet by mouth 2 times daily 60 tablet 5     No current facility-administered medications for this visit. Physical Exam:  /64   Pulse 100   Resp 16   Ht 6' 2\" (1.88 m)   Wt 189 lb 6.4 oz (85.9 kg)   BMI 24.32 kg/m²   Wt Readings from Last 3 Encounters:   01/21/22 189 lb 6.4 oz (85.9 kg)   04/30/21 200 lb 3.2 oz (90.8 kg)   01/28/21 198 lb 14.4 oz (90.2 kg)     Appearance: Awake, alert and oriented x 3, no acute respiratory distress  Skin: Intact, no rash  Head: Normocephalic, atraumatic  Eyes: EOMI, no conjunctival erythema  ENMT: No pharyngeal erythema, MMM, no rhinorrhea  Neck: Supple, no elevated JVP, no carotid bruits  Lungs: Clear to auscultation bilaterally. No wheezes, rales, or rhonchi.   Cardiac: Regular rate and rhythm, +S1S2, no murmurs apparent  Abdomen: Soft, nontender, +bowel sounds  Extremities: Moves all extremities x 4, no lower extremity edema, has left AKA  Neurologic: No focal motor deficits apparent, normal mood and affect, alert and oriented x 3  Peripheral Pulses: Intact posterior tibial pulses bilaterally    Laboratory Tests:  Lab Results   Component Value Date    CREATININE 1.2 12/21/2020    BUN 19 12/21/2020     12/21/2020    K 3.9 12/21/2020     12/21/2020    CO2 24 12/21/2020     Lab Results   Component Value Date    MG 2.3 12/20/2020     Lab Results   Component Value Date    WBC 9.1 12/21/2020    HGB 13.9 12/21/2020    HCT 43.7 12/21/2020    MCV 86.4 12/21/2020     12/21/2020     Lab Results   Component Value Date    ALT 13 12/19/2020    AST 13 12/19/2020    ALKPHOS 75 12/19/2020    BILITOT 0.4 12/19/2020     Lab Results   Component Value Date    CKTOTAL 12 (L) 03/18/2011    CKMB <0.2 03/18/2011    TROPONINI <0.01 12/19/2020    TROPONINI <0.01 03/18/2011    TROPONINI 0.02 01/03/2011     Lab Results   Component Value Date    INR 1.0 12/19/2020    INR 1.2 03/18/2011    INR 1.3 02/19/2011    PROTIME 11.7 12/19/2020 PROTIME 12.2 03/18/2011    PROTIME 12.9 (H) 02/19/2011     Lab Results   Component Value Date    TSH 1.200 12/19/2020     Lab Results   Component Value Date    LABA1C 5.6 12/20/2020     No results found for: EAG  Lab Results   Component Value Date    CHOL 114 12/20/2020     Lab Results   Component Value Date    TRIG 76 12/20/2020     Lab Results   Component Value Date    HDL 41 12/20/2020     Lab Results   Component Value Date    LDLCALC 58 12/20/2020     Lab Results   Component Value Date    LABVLDL 15 12/20/2020     No results found for: CHOLHDLRATIO    Cardiac Tests:  ECG: Normal sinus rhythm with frequent premature atrial complexes. no changes compared to prior EKG. TTE-12/20/2020:  Normal left ventricle size and systolic function.   Ejection fraction is visually estimated at 55-60%. Atrial fibrillation may   affect the evaluation of LV systolic function.   Normal left ventricle wall thickness.   No wall motion abnormalities.   Normal diastolic function (E/e' < 29).  The left atrium is moderately dilated.   Mildly dilated right ventricle.   Right ventricle global systolic function is normal . TAPSE 21 mm.   Physiologic and/or trace mitral regurgitation is present.   No hemodynamically significant aortic stenosis is present.   Unable to estimate PA systolic pressure.   Trace pericardial effusion.     ОЛЬГА-: Normal LV function, definite echo contrast was used to visualize left atrial appendage. No intracardiac thrombus and no left atrial appendage thrombus noted. Stress test:        Cardiac catheterization:     The ASCVD Risk score (Yovani Hare, et al., 2013) failed to calculate for the following reasons: The valid total cholesterol range is 130 to 320 mg/dL        ASSESSMENT:  · New onset A. fib with RVR, underwent successful ОЛЬГА guided cardioversion to normal sinus rhythm. He remains in sinus rhythm with a frequent premature atrial complexes.   · [id number] score-2  · Hypertension, well controlled  · History of rectal cancer s/p resection  · History of retinal detachment status post repair  · COURTNEY resolved  · Mild hypokalemia, improved  · Left lower extremity above-knee amputation  · ROBERTH  . Plan:   · Continue Eliquis 5 mg p.o. twice daily for anticoagulation. · Continue metoprolol 100 mg twice daily for rate control  · Continue hydralazine 50 mg 3 times a day for high blood pressure  · Follow low-salt diet and restrict daily salt intake to less than 2 g.  · Follow-up with me in 6 months. The patient's current medication list, allergies, problem list and results of all previously ordered testing were reviewed at today's visit.   Miki Roa MD  [facility]) Cardiology

## 2022-01-24 ENCOUNTER — APPOINTMENT (OUTPATIENT)
Dept: CT IMAGING | Age: 67
DRG: 379 | End: 2022-01-24
Payer: MEDICARE

## 2022-01-24 ENCOUNTER — HOSPITAL ENCOUNTER (INPATIENT)
Age: 67
LOS: 3 days | Discharge: HOME | DRG: 379 | End: 2022-01-27
Attending: EMERGENCY MEDICINE | Admitting: INTERNAL MEDICINE
Payer: MEDICARE

## 2022-01-24 DIAGNOSIS — K62.5 HEMORRHAGE OF RECTUM AND ANUS: Primary | ICD-10-CM

## 2022-01-24 PROBLEM — K92.2 GI BLEED: Status: ACTIVE | Noted: 2022-01-24

## 2022-01-24 LAB
ABO/RH: NORMAL
ALBUMIN SERPL-MCNC: 3.6 G/DL (ref 3.5–5.2)
ALP BLD-CCNC: 62 U/L (ref 40–129)
ALT SERPL-CCNC: 13 U/L (ref 0–40)
ANION GAP SERPL CALCULATED.3IONS-SCNC: 9 MMOL/L (ref 7–16)
ANTIBODY SCREEN: NORMAL
APTT: 26.3 SEC (ref 24.5–35.1)
AST SERPL-CCNC: 18 U/L (ref 0–39)
BASOPHILS ABSOLUTE: 0.04 E9/L (ref 0–0.2)
BASOPHILS RELATIVE PERCENT: 0.3 % (ref 0–2)
BILIRUB SERPL-MCNC: 0.5 MG/DL (ref 0–1.2)
BUN BLDV-MCNC: 31 MG/DL (ref 6–23)
CALCIUM SERPL-MCNC: 8.1 MG/DL (ref 8.6–10.2)
CHLORIDE BLD-SCNC: 104 MMOL/L (ref 98–107)
CO2: 25 MMOL/L (ref 22–29)
CREAT SERPL-MCNC: 1.4 MG/DL (ref 0.7–1.2)
EOSINOPHILS ABSOLUTE: 0 E9/L (ref 0.05–0.5)
EOSINOPHILS RELATIVE PERCENT: 0 % (ref 0–6)
GFR AFRICAN AMERICAN: >60
GFR NON-AFRICAN AMERICAN: 50 ML/MIN/1.73
GLUCOSE BLD-MCNC: 126 MG/DL (ref 74–99)
HCT VFR BLD CALC: 36.2 % (ref 37–54)
HEMOGLOBIN: 11.4 G/DL (ref 12.5–16.5)
IMMATURE GRANULOCYTES #: 0.04 E9/L
IMMATURE GRANULOCYTES %: 0.3 % (ref 0–5)
INR BLD: 1.1
LACTIC ACID: 1.6 MMOL/L (ref 0.5–2.2)
LIPASE: 38 U/L (ref 13–60)
LYMPHOCYTES ABSOLUTE: 1.05 E9/L (ref 1.5–4)
LYMPHOCYTES RELATIVE PERCENT: 8.8 % (ref 20–42)
MCH RBC QN AUTO: 27.2 PG (ref 26–35)
MCHC RBC AUTO-ENTMCNC: 31.5 % (ref 32–34.5)
MCV RBC AUTO: 86.4 FL (ref 80–99.9)
MONOCYTES ABSOLUTE: 0.88 E9/L (ref 0.1–0.95)
MONOCYTES RELATIVE PERCENT: 7.4 % (ref 2–12)
NEUTROPHILS ABSOLUTE: 9.86 E9/L (ref 1.8–7.3)
NEUTROPHILS RELATIVE PERCENT: 83.2 % (ref 43–80)
PDW BLD-RTO: 13.1 FL (ref 11.5–15)
PLATELET # BLD: 208 E9/L (ref 130–450)
PMV BLD AUTO: 8.8 FL (ref 7–12)
POTASSIUM SERPL-SCNC: 3.6 MMOL/L (ref 3.5–5)
PROTHROMBIN TIME: 12.6 SEC (ref 9.3–12.4)
RBC # BLD: 4.19 E12/L (ref 3.8–5.8)
SODIUM BLD-SCNC: 138 MMOL/L (ref 132–146)
TOTAL PROTEIN: 5.7 G/DL (ref 6.4–8.3)
WBC # BLD: 11.9 E9/L (ref 4.5–11.5)

## 2022-01-24 PROCEDURE — 2580000003 HC RX 258: Performed by: EMERGENCY MEDICINE

## 2022-01-24 PROCEDURE — 85610 PROTHROMBIN TIME: CPT

## 2022-01-24 PROCEDURE — 99283 EMERGENCY DEPT VISIT LOW MDM: CPT

## 2022-01-24 PROCEDURE — 74177 CT ABD & PELVIS W/CONTRAST: CPT

## 2022-01-24 PROCEDURE — C9113 INJ PANTOPRAZOLE SODIUM, VIA: HCPCS | Performed by: EMERGENCY MEDICINE

## 2022-01-24 PROCEDURE — 80053 COMPREHEN METABOLIC PANEL: CPT

## 2022-01-24 PROCEDURE — 86900 BLOOD TYPING SEROLOGIC ABO: CPT

## 2022-01-24 PROCEDURE — 6360000002 HC RX W HCPCS: Performed by: EMERGENCY MEDICINE

## 2022-01-24 PROCEDURE — 83605 ASSAY OF LACTIC ACID: CPT

## 2022-01-24 PROCEDURE — 85730 THROMBOPLASTIN TIME PARTIAL: CPT

## 2022-01-24 PROCEDURE — 6360000004 HC RX CONTRAST MEDICATION: Performed by: RADIOLOGY

## 2022-01-24 PROCEDURE — 86850 RBC ANTIBODY SCREEN: CPT

## 2022-01-24 PROCEDURE — 1200000000 HC SEMI PRIVATE

## 2022-01-24 PROCEDURE — 86901 BLOOD TYPING SEROLOGIC RH(D): CPT

## 2022-01-24 PROCEDURE — 83690 ASSAY OF LIPASE: CPT

## 2022-01-24 PROCEDURE — 85025 COMPLETE CBC W/AUTO DIFF WBC: CPT

## 2022-01-24 PROCEDURE — 93005 ELECTROCARDIOGRAM TRACING: CPT | Performed by: EMERGENCY MEDICINE

## 2022-01-24 RX ORDER — 0.9 % SODIUM CHLORIDE 0.9 %
1000 INTRAVENOUS SOLUTION INTRAVENOUS ONCE
Status: COMPLETED | OUTPATIENT
Start: 2022-01-24 | End: 2022-01-25

## 2022-01-24 RX ORDER — PANTOPRAZOLE SODIUM 40 MG/10ML
40 INJECTION, POWDER, LYOPHILIZED, FOR SOLUTION INTRAVENOUS ONCE
Status: COMPLETED | OUTPATIENT
Start: 2022-01-24 | End: 2022-01-24

## 2022-01-24 RX ADMIN — IOPAMIDOL 75 ML: 755 INJECTION, SOLUTION INTRAVENOUS at 22:46

## 2022-01-24 RX ADMIN — SODIUM CHLORIDE 1000 ML: 9 INJECTION, SOLUTION INTRAVENOUS at 23:36

## 2022-01-24 RX ADMIN — PANTOPRAZOLE SODIUM 40 MG: 40 INJECTION, POWDER, FOR SOLUTION INTRAVENOUS at 23:34

## 2022-01-24 NOTE — ED NOTES
FIRST PROVIDER CONTACT ASSESSMENT NOTE      Department of Emergency Medicine   1/24/22  6:59 PM EST    Chief Complaint: Diarrhea and Rectal Bleeding (on elliquis )      History of Present Illness:   Reji Wilkinson is a 74 y.o. male who presents to the ED for    Medical History:  has a past medical history of Employs prosthetic leg, Flesh-eating bacteria ([facility] [address] Hypertension, and Rectal cancer ([facility] [address] Surgical History:  has a past surgical history that includes above knee amputation (2011); Colonoscopy (10/21/2011); Eye surgery (Left, 10/03/2016); eye surgery (Bilateral, 2002?); eye surgery (Right, ?); Colon surgery (01/23/2018); Abdomen surgery (03/20/2018); and pr revision of ileostomy,complicated (N/A, 0/70/8915). Social History:  reports that he quit smoking about 11 years ago. His smoking use included cigarettes. He started smoking about 48 years ago. He has a 70.00 pack-year smoking history. He has never used smokeless tobacco. He reports that he does not drink alcohol and does not use drugs. Family History: family history includes Dementia in his mother; Diabetes in his father, mother, sister, sister, and sister; Glaucoma in his father; Heart Attack in his brother and mother; High Blood Pressure in his mother; High Cholesterol in his father, mother, sister, sister, and sister; Pacemaker in his father; Thyroid Disease in his sister, sister, and sister. *ALLERGIES*     Cefepime and Pcn [penicillins]     Physical Exam:      VS:  There were no vitals taken for this visit.      Initial Plan of Care:  Initiate Treatment-Testing, Proceed toTreatment Area When Bed Available for ED Attending/MLP to Continue Care    -----------------END OF FIRST PROVIDER CONTACT ASSESSMENT NOTE--------------  Electronically signed by CRISTI Irvin CNP   DD: 1/24/22             CRISTI Cortez CNP  01/24/22 9581

## 2022-01-25 PROBLEM — K92.2 GASTROINTESTINAL BLEED: Status: ACTIVE | Noted: 2022-01-25

## 2022-01-25 LAB
EKG ATRIAL RATE: 93 BPM
EKG Q-T INTERVAL: 412 MS
EKG QRS DURATION: 78 MS
EKG QTC CALCULATION (BAZETT): 493 MS
EKG R AXIS: 60 DEGREES
EKG T AXIS: 35 DEGREES
EKG VENTRICULAR RATE: 86 BPM

## 2022-01-25 PROCEDURE — 2580000003 HC RX 258: Performed by: INTERNAL MEDICINE

## 2022-01-25 PROCEDURE — 6370000000 HC RX 637 (ALT 250 FOR IP): Performed by: INTERNAL MEDICINE

## 2022-01-25 PROCEDURE — 1200000000 HC SEMI PRIVATE

## 2022-01-25 RX ORDER — ACETAMINOPHEN 325 MG/1
650 TABLET ORAL EVERY 6 HOURS PRN
Status: DISCONTINUED | OUTPATIENT
Start: 2022-01-25 | End: 2022-01-27 | Stop reason: HOSPADM

## 2022-01-25 RX ORDER — AMLODIPINE BESYLATE 5 MG/1
5 TABLET ORAL DAILY
Status: DISCONTINUED | OUTPATIENT
Start: 2022-01-25 | End: 2022-01-27 | Stop reason: HOSPADM

## 2022-01-25 RX ORDER — SODIUM CHLORIDE 9 MG/ML
INJECTION, SOLUTION INTRAVENOUS CONTINUOUS
Status: DISCONTINUED | OUTPATIENT
Start: 2022-01-25 | End: 2022-01-26

## 2022-01-25 RX ORDER — METOPROLOL TARTRATE 50 MG/1
100 TABLET, FILM COATED ORAL 2 TIMES DAILY
Status: DISCONTINUED | OUTPATIENT
Start: 2022-01-25 | End: 2022-01-27 | Stop reason: HOSPADM

## 2022-01-25 RX ORDER — ONDANSETRON 2 MG/ML
4 INJECTION INTRAMUSCULAR; INTRAVENOUS EVERY 6 HOURS PRN
Status: DISCONTINUED | OUTPATIENT
Start: 2022-01-25 | End: 2022-01-27 | Stop reason: HOSPADM

## 2022-01-25 RX ORDER — SODIUM CHLORIDE 0.9 % (FLUSH) 0.9 %
5-40 SYRINGE (ML) INJECTION PRN
Status: DISCONTINUED | OUTPATIENT
Start: 2022-01-25 | End: 2022-01-27 | Stop reason: HOSPADM

## 2022-01-25 RX ORDER — SODIUM CHLORIDE 0.9 % (FLUSH) 0.9 %
5-40 SYRINGE (ML) INJECTION EVERY 12 HOURS SCHEDULED
Status: DISCONTINUED | OUTPATIENT
Start: 2022-01-25 | End: 2022-01-27 | Stop reason: HOSPADM

## 2022-01-25 RX ORDER — ACETAMINOPHEN 650 MG/1
650 SUPPOSITORY RECTAL EVERY 6 HOURS PRN
Status: DISCONTINUED | OUTPATIENT
Start: 2022-01-25 | End: 2022-01-27 | Stop reason: HOSPADM

## 2022-01-25 RX ORDER — SODIUM CHLORIDE 9 MG/ML
25 INJECTION, SOLUTION INTRAVENOUS PRN
Status: DISCONTINUED | OUTPATIENT
Start: 2022-01-25 | End: 2022-01-27 | Stop reason: HOSPADM

## 2022-01-25 RX ORDER — ONDANSETRON 4 MG/1
4 TABLET, ORALLY DISINTEGRATING ORAL EVERY 8 HOURS PRN
Status: DISCONTINUED | OUTPATIENT
Start: 2022-01-25 | End: 2022-01-27 | Stop reason: HOSPADM

## 2022-01-25 RX ORDER — HYDRALAZINE HYDROCHLORIDE 25 MG/1
25 TABLET, FILM COATED ORAL EVERY 8 HOURS SCHEDULED
Status: DISCONTINUED | OUTPATIENT
Start: 2022-01-25 | End: 2022-01-27 | Stop reason: HOSPADM

## 2022-01-25 RX ADMIN — SODIUM CHLORIDE, PRESERVATIVE FREE 10 ML: 5 INJECTION INTRAVENOUS at 08:18

## 2022-01-25 RX ADMIN — HYDRALAZINE HYDROCHLORIDE 25 MG: 25 TABLET, FILM COATED ORAL at 22:08

## 2022-01-25 RX ADMIN — AMLODIPINE BESYLATE 5 MG: 5 TABLET ORAL at 08:18

## 2022-01-25 RX ADMIN — HYDRALAZINE HYDROCHLORIDE 25 MG: 25 TABLET, FILM COATED ORAL at 08:18

## 2022-01-25 RX ADMIN — METOPROLOL TARTRATE 100 MG: 50 TABLET, FILM COATED ORAL at 08:17

## 2022-01-25 RX ADMIN — HYDRALAZINE HYDROCHLORIDE 25 MG: 25 TABLET, FILM COATED ORAL at 14:45

## 2022-01-25 RX ADMIN — SODIUM CHLORIDE: 9 INJECTION, SOLUTION INTRAVENOUS at 08:18

## 2022-01-25 RX ADMIN — METOPROLOL TARTRATE 100 MG: 50 TABLET, FILM COATED ORAL at 22:09

## 2022-01-25 ASSESSMENT — PAIN SCALES - GENERAL
PAINLEVEL_OUTOF10: 0
PAINLEVEL_OUTOF10: 0

## 2022-01-25 NOTE — ED PROVIDER NOTES
HPI:  1/24/22, Time: 10:39 PM EST Theophilus Hatchet is a 74 y.o. male presenting to the ED for rectal bleeding beginning today. Symptoms have been moderate in severity and intermittent with no exacerbating or alleviating factors. Symptoms he had 4 episodes of diarrhea with bright red blood mixed in with his stool. He had no associated symptoms of shortness of breath or syncope. He has chronic shortness of breath which is unchanged from baseline. He denies abdominal pain, nausea, vomiting, hematemesis, chest pain, cough, and fevers. Patient has a history of rectal cancer status post LAR by Dr. Alex Doyle in 2018. He is scheduled to undergo a follow-up colonoscopy in March. Patient also follows with Dr. Vic Adler. He is currently in remission. Patient is on Eliquis for history of A. fib. Last took his Eliquis earlier today. Review of Systems:   Pertinent positives and negatives are stated within HPI, all other systems reviewed and are negative.          --------------------------------------------- PAST HISTORY ---------------------------------------------  Past Medical History:  has a past medical history of Employs prosthetic leg, Flesh-eating bacteria ([facility] [address] Hypertension, and Rectal cancer ([facility] [address] Past Surgical History:  has a past surgical history that includes above knee amputation (2011); Colonoscopy (10/21/2011); Eye surgery (Left, 10/03/2016); eye surgery (Bilateral, 2002?); eye surgery (Right, ?); Colon surgery (01/23/2018); Abdomen surgery (03/20/2018); and pr revision of ileostomy,complicated (N/A, 2/29/7276). Social History:  reports that he quit smoking about 11 years ago. His smoking use included cigarettes. He started smoking about 48 years ago. He has a 70.00 pack-year smoking history. He has never used smokeless tobacco. He reports that he does not drink alcohol and does not use drugs.     Family History: family history includes Dementia in his mother; Diabetes in his father, mother, sister, sister, and sister; Glaucoma in his father; Heart Attack in his brother and mother; High Blood Pressure in his mother; High Cholesterol in his father, mother, sister, sister, and sister; Pacemaker in his father; Thyroid Disease in his sister, sister, and sister. The patients home medications have been reviewed.     Allergies: Cefepime and Pcn [penicillins]    -------------------------------------------------- RESULTS -------------------------------------------------  All laboratory and radiology results have been personally reviewed by myself   LABS:  Results for orders placed or performed during the hospital encounter of 01/24/22   Comprehensive Metabolic Panel   Result Value Ref Range    Sodium 138 132 - 146 mmol/L    Potassium 3.6 3.5 - 5.0 mmol/L    Chloride 104 98 - 107 mmol/L    CO2 25 22 - 29 mmol/L    Anion Gap 9 7 - 16 mmol/L    Glucose 126 (H) 74 - 99 mg/dL    BUN 31 (H) 6 - 23 mg/dL    CREATININE 1.4 (H) 0.7 - 1.2 mg/dL    GFR Non-African American 50 >=60 mL/min/1.73    GFR African American >60     Calcium 8.1 (L) 8.6 - 10.2 mg/dL    Total Protein 5.7 (L) 6.4 - 8.3 g/dL    Albumin 3.6 3.5 - 5.2 g/dL    Total Bilirubin 0.5 0.0 - 1.2 mg/dL    Alkaline Phosphatase 62 40 - 129 U/L    ALT 13 0 - 40 U/L    AST 18 0 - 39 U/L   CBC Auto Differential   Result Value Ref Range    WBC 11.9 (H) 4.5 - 11.5 E9/L    RBC 4.19 3.80 - 5.80 E12/L    Hemoglobin 11.4 (L) 12.5 - 16.5 g/dL    Hematocrit 36.2 (L) 37.0 - 54.0 %    MCV 86.4 80.0 - 99.9 fL    MCH 27.2 26.0 - 35.0 pg    MCHC 31.5 (L) 32.0 - 34.5 %    RDW 13.1 11.5 - 15.0 fL    Platelets 899 870 - 652 E9/L    MPV 8.8 7.0 - 12.0 fL    Neutrophils % 83.2 (H) 43.0 - 80.0 %    Immature Granulocytes % 0.3 0.0 - 5.0 %    Lymphocytes % 8.8 (L) 20.0 - 42.0 %    Monocytes % 7.4 2.0 - 12.0 %    Eosinophils % 0.0 0.0 - 6.0 %    Basophils % 0.3 0.0 - 2.0 %    Neutrophils Absolute 9.86 (H) 1.80 - 7.30 E9/L    Immature Granulocytes # 0.04 E9/L    Lymphocytes Absolute 1.05 (L) 1.50 - 4.00 E9/L    Monocytes Absolute 0.88 0.10 - 0.95 E9/L    Eosinophils Absolute 0.00 (L) 0.05 - 0.50 E9/L    Basophils Absolute 0.04 0.00 - 0.20 E9/L   Lipase   Result Value Ref Range    Lipase 38 13 - 60 U/L   Protime-INR   Result Value Ref Range    Protime 12.6 (H) 9.3 - 12.4 sec    INR 1.1    Lactic Acid, Plasma   Result Value Ref Range    Lactic Acid 1.6 0.5 - 2.2 mmol/L   APTT   Result Value Ref Range    aPTT 26.3 24.5 - 35.1 sec   EKG 12 Lead   Result Value Ref Range    Ventricular Rate 86 BPM    Atrial Rate 93 BPM    QRS Duration 78 ms    Q-T Interval 412 ms    QTc Calculation (Bazett) 493 ms    R Axis 60 degrees    T Axis 35 degrees   TYPE AND SCREEN   Result Value Ref Range    ABO/Rh A NEG     Antibody Screen NEG        RADIOLOGY:  Interpreted by Radiologist.  CT ABDOMEN PELVIS W IV CONTRAST Additional Contrast? None   Final Result   Status post prior bowel resection and anastomoses. No evidence of   obstruction. No definite abnormal bowel wall thickening. Bilateral hyperattenuating renal lesions, indeterminate. Recommend further   evaluation with nonemergent renal ultrasound. 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             ------------------------- NURSING NOTES AND VITALS REVIEWED ---------------------------   The nursing notes within the ED encounter and vital signs as below have been reviewed.    /67   Pulse 84   Temp 98.2 °F (36.8 °C) (Oral)   Resp 16   Ht 6' 2\" (1.88 m)   Wt 189 lb (85.7 kg)   SpO2 97%   BMI 24.27 kg/m²   Oxygen Saturation Interpretation: Normal      ---------------------------------------------------PHYSICAL EXAM--------------------------------------      Constitutional/General: Alert and oriented x3, well appearing, non toxic in NAD  Head: Normocephalic and atraumatic  Eyes: EOMI  Mouth: Oropharynx clear, handling secretions, no trismus  Neck: Supple, full ROM,   Pulmonary: Lungs clear to auscultation bilaterally, no wheezes, rales, or rhonchi. Not in respiratory distress  Cardiovascular:  Regular rate, irregular rhythm, no murmurs, gallops, or rubs. 2+ distal pulses  Abdomen: Soft, non tender, non distended,   Rectal: Moderate amount of gross bright blood. Extremities: Moves all extremities x 4. Warm and well perfused. LLE amputation-stump is warm and well-perfused. Skin: warm and dry without rash  Neurologic: GCS 15, no focal motor or sensory deficits   Psych: Normal Affect. Behavior normal.      ------------------------------ ED COURSE/MEDICAL DECISION MAKING----------------------  Medications   pantoprazole (PROTONIX) injection 40 mg (has no administration in time range)   0.9 % sodium chloride bolus (has no administration in time range)   iopamidol (ISOVUE-370) 76 % injection 75 mL (75 mLs IntraVENous Given 1/24/22 2246)       Medical Decision Making/ED COURSE:   Patient is a 47-year-old male with history of rectal cancer remotely and A. fib on Eliquis presenting with rectal bleeding. In the ED, patient was hemodynamically stable and afebrile. On exam, patient had bright red blood per rectum on examination. He had no abdominal tenderness. Patient was placed on the cardiac monitor. I interpreted findings. Rhythm - a. fib. Labs and CT AP obtained. Patient administered IVF and protonix. I reviewed and interpreted labs. Labs were significant for a hemoglobin of 11.4 (13.9 one year ago). Mild creatinine elevation is baseline. Labs otherwise reassuring. Type and screen was sent. Abdominal CT was ordered to evaluate for recurrent disease given history of rectal cancer. No acute findings. Plan to admit for further work-up and monitoring. Patient remained hemodynamically stable throughout ED course. ED Course as of 01/24/22 2334   Mon Jan 24, 2022   2300 EKG: This EKG is signed and interpreted by me.     Rate: 86  Rhythm: Atrial fibrillation  Interpretation: Atrial fibrillation, QTC is mildly prolonged at 493, no acute ST or T wave changes  Comparison: changes compared to previous EKG-QTC mildly more prolonged   [JA]   2312 Hospitalist was consulted. Dr. Keisha Cuba accepted the patient for admission. [JA]      ED Course User Index  [JA] Morro Young MD           Counseling: The emergency provider has spoken with the patient and family and discussed todays results, in addition to providing specific details for the plan of care and counseling regarding the diagnosis and prognosis. Questions are answered at this time and they are agreeable with the plan.      --------------------------------- IMPRESSION AND DISPOSITION ---------------------------------    IMPRESSION  1. Hemorrhage of rectum and anus        DISPOSITION  Disposition: Admit to telemetry  Patient condition is stable      NOTE: This report was transcribed using voice recognition software.  Every effort was made to ensure accuracy; however, inadvertent computerized transcription errors may be present    IMorro MD, am the primary provider of this record       Morro Young MD  01/24/22 9211

## 2022-01-25 NOTE — PROGRESS NOTES
P Quality Flow/Interdisciplinary Rounds Progress Note        Quality Flow Rounds held on January 25, 2022    Disciplines Attending:  Bedside Nurse, ,  and Nursing Unit [address] was admitted on 1/24/2022 10:16 PM    Anticipated Discharge Date:  Expected Discharge Date: 01/27/22    Disposition:    Jose Miguel Score:  Jose Miguel Scale Score: 19    Readmission Risk              Risk of Unplanned Readmission:  15           Discussed patient goal for the day, patient clinical progression, and barriers to discharge.   The following Goal(s) of the Day/Commitment(s) have been identified:  Discharge [address] DESTINY Yan  January 25, 2022

## 2022-01-25 NOTE — PROGRESS NOTES
Zara served Dr Rodrigo Bright via answering service regarding admission orders. Awaiting return call at this time.

## 2022-01-25 NOTE — H&P
[address] [place] [address]                              HISTORY AND PHYSICAL    PATIENT NAME: Lorna Sargent                :        1955  MED REC NO:   [id number]                            ROOM:       [id number]  ACCOUNT NO:   [de-identified]                           ADMIT DATE: 2022  PROVIDER:     Arnaud Bennett MD    DATE OF SERVICE:  2022. CHIEF COMPLAINT:  Rectal bleeding. HISTORY OF PRESENTING ILLNESS:  A 67-year-old male, on Eliquis, noticed  he had four bouts of bloody diarrhea. For this reason, this got him  concerned. He did not have too many symptoms or being lightheaded or  dizzy. Brought into the emergency room, noted his blood count dropped  and was put in the hospital per emergency room. PAST MEDICAL HISTORY:  Significant for a Strep fasciitis that resulted  in his left leg to be amputated. He has had previous rectal carcinoma  and he has an ileostomy that was revised and things were working well. He was apparently scheduled for colonoscopy in March of this year for  followup for this rectal cancer. In the past, he has had bouts of  chronic obstructive pulmonary disease exacerbation, but nothing real  significant. PAST SURGICAL HISTORY:  Includes eye surgery on the right. He is known  to have hypertension also. SOCIAL HISTORY:  Quit smoking approximately 11 years ago. He smoked for  48 years. Does not use alcohol. FAMILY HISTORY:  Significant for dementia in mother and father. Diabetes in father, mother, and sister. Severe glaucoma in father. Had  heart disease in brother and sister. High blood pressure in his mother  and high cholesterol and thyroid disease. REVIEW OF SYSTEMS:  He denies eating nuts or seeds. He cannot remember  what he ate the day before, that is the bout of diarrhea developed.     PHYSICAL EXAMINATION:  GENERAL:  He is a tired looking male, in

## 2022-01-25 NOTE — CONSULTS
GENERAL SURGERY  CONSULT NOTE  1/25/2022    Physician Consulted: Dr. Juan Manuel Crocker  Reason for Consult: rectal bleeding   Referring Physician: Dr. Joey Wise is a 74 y.o. male with hx of vasculopathy s/p L AKA (2011) and rectal cancer s/p LAR with Dr. Juan Manuel Crocker in 2018. Presented following one episode of bright red blood per rectum and loose stools yesterday. Has had no bleeding or BM since. Has never had this in the past. He denies abdominal pain, rectal pain, fevers, constipation, change in stool calibre, weight loss. Last colonoscopy was done 3 years ago and was unremarkable, he has another schedule for March 2022. Has been on eliquis for hx aflutter, Dr. Bronwyn Garcia held it when he heard about rectal bleeding yesterday. Afebrile, hemodynamically stable. Hb 11.4  CT was unremarkable. 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 bilat cataracts w lens implants    EYE SURGERY Right ?    retinal detachment    AR REVISION OF ILEOSTOMY,COMPLICATED N/A 2/21/9694    ILEOSTOMY OPEN TAKEDOWN performed by Victor Manuel Diaz MD at [facility] OR       Medications Prior to Admission:    Prior to Admission medications    Medication Sig Start Date End Date Taking?  Authorizing Provider   amLODIPine (NORVASC) 5 MG tablet Take 1 tablet by mouth daily 1/21/22  Yes Natalie Mcmahon MD   metoprolol (LOPRESSOR) 100 MG tablet Take 1 tablet by mouth 2 times daily 21  Yes Pura Fabian MD   hydrALAZINE (APRESOLINE) 50 MG tablet take 1 tablet by mouth three times a day 21  Yes Pura Fabian MD   apixaban Ruchi Border) 5 MG TABS tablet Take 1 tablet by mouth 2 times daily 20  Yes Amara Wilks MD       Allergies   Allergen Reactions    Cefepime Rash    Pcn [Penicillins] Rash       Family History   Problem Relation Age of Onset    Diabetes Mother     High Blood Pressure Mother     High Cholesterol Mother     Heart Attack Mother     Dementia Mother     Diabetes Father     Glaucoma Father     High Cholesterol Father     Pacemaker Father     Diabetes Sister     Thyroid Disease Sister     High Cholesterol Sister     Heart Attack Brother     Diabetes Sister     High Cholesterol Sister     Thyroid Disease Sister     Diabetes Sister     High Cholesterol Sister     Thyroid Disease Sister        Social History     Tobacco Use    Smoking status: Former Smoker     Packs/day: 2.00     Years: 35.00     Pack years: 70.00     Types: Cigarettes     Start date: 1974     Quit date: 2010     Years since quittin.1    Smokeless tobacco: Never Used   Vaping Use    Vaping Use: Never used   Substance Use Topics    Alcohol use: No    Drug use: No         Review of Systems   General ROS: negative  Hematological and Lymphatic ROS: negative  Respiratory ROS: no cough, shortness of breath, or wheezing  Cardiovascular ROS: no chest pain or dyspnea on exertion  Gastrointestinal ROS: positive for - rectal bleeding   Genito-Urinary ROS: no dysuria, trouble voiding, or hematuria  Musculoskeletal ROS: negative      PHYSICAL EXAM:    Vitals:    22 0750   BP: 132/73   Pulse: 88   Resp: 16   Temp: 98.1 °F (36.7 °C)   SpO2: 99%       General Appearance:  well developed, well nourished  Skin:  Skin color, texture, turgor normal. No rashes or lesions.   Head/face:  NCAT  Eyes:  No gross abnormalities. Lungs:  No increased work of breathing on room air   Heart:  Heart regular rate and rhythm  Abdomen:  Soft, non tender, non distended, no appreciable masses   Extremities: pulses present in all extremities  Male Rectal:  No zeeshan blood. No masses palpable       LABS:    CBC  Recent Labs     01/24/22 2052   WBC 11.9*   HGB 11.4*   HCT 36.2*        BMP  Recent Labs     01/24/22 2052      K 3.6      CO2 25   BUN 31*   CREATININE 1.4*   CALCIUM 8.1*     Liver Function  Recent Labs     01/24/22 2052   LIPASE 38   BILITOT 0.5   AST 18   ALT 13   ALKPHOS 62   PROT 5.7*   LABALBU 3.6     No results for input(s): LACTATE in the last 72 hours. Recent Labs     01/24/22 2052   INR 1.1       RADIOLOGY    CT ABDOMEN PELVIS W IV CONTRAST Additional Contrast? None    Result Date: 1/24/2022  EXAMINATION: CT OF THE ABDOMEN AND PELVIS WITH CONTRAST 1/24/2022 10:48 pm TECHNIQUE: CT of the abdomen and pelvis was performed with the administration of intravenous contrast. Multiplanar reformatted images are provided for review. Dose modulation, iterative reconstruction, and/or weight based adjustment of the mA/kV was utilized to reduce the radiation dose to as low as reasonably achievable. COMPARISON: None. HISTORY: ORDERING SYSTEM PROVIDED HISTORY: rectal bleeding, h/o of rectal cancer TECHNOLOGIST PROVIDED HISTORY: Additional Contrast?->None Reason for exam:->rectal bleeding, h/o of rectal cancer Decision Support Exception - unselect if not a suspected or confirmed emergency medical condition->Emergency Medical Condition (MA) FINDINGS: Lower Chest:  Visualized portion of the lower chest demonstrates no acute abnormality. Organs: The liver, gallbladder, spleen, pancreas, and adrenals are within normal limits. There are bilateral hypoattenuating renal lesions. There is a 1.6 cm right interpolar lesion, with internal attenuation measuring approximately 49 Hounsfield units.   A similar lesion in the left inferior renal pole measures 2.0 cm with internal attenuation of 67 Hounsfield units. No hydronephrosis. GI/Bowel: There is no evidence of bowel obstruction. No evidence of abnormal bowel wall thickening or distension. There are postsurgical changes at the rectum and left upper quadrant. Pelvis: The urinary bladder is partially filled. The prostate is enlarged. Peritoneum/Retroperitoneum: No evidence of ascites or free air. No evidence of retroperitoneal lymphadenopathy. Aorta is normal in caliber without acute abnormality. There is abrupt non opacification of the left external iliac artery at its origin. Bones/Soft Tissues:  No acute abnormality of the visualized osseous structures. Status post prior bowel resection and anastomoses. No evidence of obstruction. No definite abnormal bowel wall thickening. Bilateral hyperattenuating renal lesions, indeterminate. Recommend further evaluation with nonemergent renal ultrasound. 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          ASSESSMENT:  74 y.o. male with hx of LAR in 2018 for rectal cancer with new painless bright red rectal bleeding    PLAN:  - monitor H/H, transfuse as needed- Hb is 11.4 which is around his baseline   - [id number] [address] for diet from our perspective   - Hold eliquis for time being   - No plans for scopes at this time, will monitor for resolution and consider scope if he rebleeds    Discussed with Dr. Jeremiah Griffin     Electronically signed by Franki Benedict MD on 1/25/22 at 11:30 AM EST

## 2022-01-25 NOTE — CARE COORDINATION
Met with patient about diagnosis and discharge plan of care. Pt admit for rectal bleed, has hx of rectal ca and pt is also on Eliquis at home. Monitor labs. No scoping at this time. Pt lives with his mother and sister in 2 story home. Bed and bath are on 1st floor. Has cane and walker at home. Pt is left AKA. No home care. PCP is Dr James Martins. Will continue to follow.  Plan is home with no needs-mesfin

## 2022-01-26 LAB
ALBUMIN SERPL-MCNC: 3.5 G/DL (ref 3.5–5.2)
ALP BLD-CCNC: 61 U/L (ref 40–129)
ALT SERPL-CCNC: 14 U/L (ref 0–40)
ANION GAP SERPL CALCULATED.3IONS-SCNC: 9 MMOL/L (ref 7–16)
AST SERPL-CCNC: 20 U/L (ref 0–39)
BASOPHILS ABSOLUTE: 0.04 E9/L (ref 0–0.2)
BASOPHILS RELATIVE PERCENT: 0.7 % (ref 0–2)
BILIRUB SERPL-MCNC: 0.5 MG/DL (ref 0–1.2)
BUN BLDV-MCNC: 21 MG/DL (ref 6–23)
CALCIUM SERPL-MCNC: 8.1 MG/DL (ref 8.6–10.2)
CHLORIDE BLD-SCNC: 105 MMOL/L (ref 98–107)
CO2: 24 MMOL/L (ref 22–29)
CREAT SERPL-MCNC: 1.3 MG/DL (ref 0.7–1.2)
EOSINOPHILS ABSOLUTE: 0.17 E9/L (ref 0.05–0.5)
EOSINOPHILS RELATIVE PERCENT: 2.9 % (ref 0–6)
GFR AFRICAN AMERICAN: >60
GFR NON-AFRICAN AMERICAN: 55 ML/MIN/1.73
GLUCOSE BLD-MCNC: 93 MG/DL (ref 74–99)
HCT VFR BLD CALC: 29.9 % (ref 37–54)
HEMOGLOBIN: 9.3 G/DL (ref 12.5–16.5)
IMMATURE GRANULOCYTES #: 0.01 E9/L
IMMATURE GRANULOCYTES %: 0.2 % (ref 0–5)
LYMPHOCYTES ABSOLUTE: 1.57 E9/L (ref 1.5–4)
LYMPHOCYTES RELATIVE PERCENT: 26.5 % (ref 20–42)
MCH RBC QN AUTO: 27 PG (ref 26–35)
MCHC RBC AUTO-ENTMCNC: 31.1 % (ref 32–34.5)
MCV RBC AUTO: 86.7 FL (ref 80–99.9)
MONOCYTES ABSOLUTE: 0.65 E9/L (ref 0.1–0.95)
MONOCYTES RELATIVE PERCENT: 11 % (ref 2–12)
NEUTROPHILS ABSOLUTE: 3.48 E9/L (ref 1.8–7.3)
NEUTROPHILS RELATIVE PERCENT: 58.7 % (ref 43–80)
PDW BLD-RTO: 13 FL (ref 11.5–15)
PLATELET # BLD: 176 E9/L (ref 130–450)
PMV BLD AUTO: 9.2 FL (ref 7–12)
POTASSIUM REFLEX MAGNESIUM: 3.8 MMOL/L (ref 3.5–5)
RBC # BLD: 3.45 E12/L (ref 3.8–5.8)
SODIUM BLD-SCNC: 138 MMOL/L (ref 132–146)
TOTAL PROTEIN: 4.9 G/DL (ref 6.4–8.3)
WBC # BLD: 5.9 E9/L (ref 4.5–11.5)

## 2022-01-26 PROCEDURE — 2580000003 HC RX 258: Performed by: INTERNAL MEDICINE

## 2022-01-26 PROCEDURE — 80053 COMPREHEN METABOLIC PANEL: CPT

## 2022-01-26 PROCEDURE — 6370000000 HC RX 637 (ALT 250 FOR IP): Performed by: INTERNAL MEDICINE

## 2022-01-26 PROCEDURE — 36415 COLL VENOUS BLD VENIPUNCTURE: CPT

## 2022-01-26 PROCEDURE — 85025 COMPLETE CBC W/AUTO DIFF WBC: CPT

## 2022-01-26 PROCEDURE — 1200000000 HC SEMI PRIVATE

## 2022-01-26 RX ADMIN — SODIUM CHLORIDE, PRESERVATIVE FREE 10 ML: 5 INJECTION INTRAVENOUS at 08:08

## 2022-01-26 RX ADMIN — SODIUM CHLORIDE, PRESERVATIVE FREE 10 ML: 5 INJECTION INTRAVENOUS at 22:16

## 2022-01-26 RX ADMIN — AMLODIPINE BESYLATE 5 MG: 5 TABLET ORAL at 08:08

## 2022-01-26 RX ADMIN — METOPROLOL TARTRATE 100 MG: 50 TABLET, FILM COATED ORAL at 22:15

## 2022-01-26 RX ADMIN — HYDRALAZINE HYDROCHLORIDE 25 MG: 25 TABLET, FILM COATED ORAL at 06:22

## 2022-01-26 RX ADMIN — HYDRALAZINE HYDROCHLORIDE 25 MG: 25 TABLET, FILM COATED ORAL at 13:59

## 2022-01-26 RX ADMIN — METOPROLOL TARTRATE 100 MG: 50 TABLET, FILM COATED ORAL at 08:08

## 2022-01-26 ASSESSMENT — PAIN SCALES - GENERAL: PAINLEVEL_OUTOF10: 0

## 2022-01-26 NOTE — PROGRESS NOTES
Attending Physician Progress Note     Current Meds:  amLODIPine (NORVASC) tablet 5 mg, Daily  hydrALAZINE (APRESOLINE) tablet 25 mg, 3 times per day  metoprolol tartrate (LOPRESSOR) tablet 100 mg, BID  sodium chloride flush 0.9 % injection 5-40 mL, 2 times per day  sodium chloride flush 0.9 % injection 5-40 mL, PRN  0.9 % sodium chloride infusion, PRN  ondansetron (ZOFRAN-ODT) disintegrating tablet 4 mg, Q8H PRN   Or  ondansetron (ZOFRAN) injection 4 mg, Q6H PRN  acetaminophen (TYLENOL) tablet 650 mg, Q6H PRN   Or  acetaminophen (TYLENOL) suppository 650 mg, Q6H PRN         Vitals/Labs:    Patient Vitals for the past 24 hrs:   BP Temp Temp src Pulse Resp SpO2   01/25/22 2208 130/84 -- -- 68 -- --   01/25/22 2001 134/70 98.1 °F (36.7 °C) -- 67 16 97 %   01/25/22 0750 132/73 98.1 °F (36.7 °C) Oral 88 16 99 %        No intake/output data recorded. I/O this shift:  In: -   Out: 625 [Urine:625]    Recent Labs     01/24/22 2052 01/26/22  0443   WBC 11.9* 5.9   HGB 11.4* 9.3*   HCT 36.2* 29.9*    176     Recent Labs     01/24/22 2052   CREATININE 1.4*   BUN 31*      K 3.6      CO2 25     Recent Labs     01/24/22 2052   AST 18   ALT 13   BILITOT 0.5   ALKPHOS 62     Recent Labs     01/24/22 2052   LIPASE 38     No results for input(s): LACTATE in the last 72 hours. Recent Labs     01/24/22 2052   INR 1.1       Patient is feeling fine  He thinks the bleeding has stopped  Nurse reports one bloody stool on evenings  Hemoglobin down to 9.3 from 11.4    Physical Exam:    Abdomen:    soft and non distended.    Non-tender    Impression:  Rectal bleeding-probably diverticular  Personal history of colorectal cancer    Plan:  Ok to continue diet  Stop fluids  Monitor hemoglobin  Discharge once no further bleeding and stable hemoglobin  If bleeding continues, colonoscopy as inpatient        Electronically by Feliz Bansal MD, MD on 1/26/2022 at 6:17 AM

## 2022-01-26 NOTE — PROGRESS NOTES
Department of Internal [facility] Cal[facility]illo M.D.  Progress Note      SUBJECTIVE: Had a bout of fecal incontinence that was all blood yesterday he has history is limited due to his cognitive dysfunction    OBJECTIVE abdomen remains soft and nontender    Medications    Current Facility-Administered Medications: amLODIPine (NORVASC) tablet 5 mg, 5 mg, Oral, Daily  hydrALAZINE (APRESOLINE) tablet 25 mg, 25 mg, Oral, 3 times per day  metoprolol tartrate (LOPRESSOR) tablet 100 mg, 100 mg, Oral, BID  sodium chloride flush 0.9 % injection 5-40 mL, 5-40 mL, IntraVENous, 2 times per day  sodium chloride flush 0.9 % injection 5-40 mL, 5-40 mL, IntraVENous, PRN  0.9 % sodium chloride infusion, 25 mL, IntraVENous, PRN  ondansetron (ZOFRAN-ODT) disintegrating tablet 4 mg, 4 mg, Oral, Q8H PRN **OR** ondansetron (ZOFRAN) injection 4 mg, 4 mg, IntraVENous, Q6H PRN  acetaminophen (TYLENOL) tablet 650 mg, 650 mg, Oral, Q6H PRN **OR** acetaminophen (TYLENOL) suppository 650 mg, 650 mg, Rectal, Q6H PRN  Physical    VITALS:  /73   Pulse 77   Temp 98.1 °F (36.7 °C)   Resp 16   Ht 6' 2\" (1.88 m)   Wt 189 lb (85.7 kg)   SpO2 100%   BMI 24.27 kg/m²   24HR INTAKE/OUTPUT:    Intake/Output Summary (Last 24 hours) at 1/26/2022 0750  Last data filed at 1/25/2022 2239  Gross per 24 hour   Intake --   Output 625 ml   Net -625 ml     LUNGS:  No increased work of breathing, good air exchange, clear to auscultation bilaterally, no crackles or wheezing  CARDIOVASCULAR:  Normal apical impulse, regular rate and rhythm, normal S1 and S2, no S3 or S4, and no murmur noted  Data    CBC with Differential:    Lab Results   Component Value Date    WBC 5.9 01/26/2022    RBC 3.45 01/26/2022    HGB 9.3 01/26/2022    HCT 29.9 01/26/2022     01/26/2022    MCV 86.7 01/26/2022    MCH 27.0 01/26/2022    MCHC 31.1 01/26/2022    RDW 13.0 01/26/2022    SEGSPCT 47 03/18/2011    METASPCT 2 03/18/2011    LYMPHOPCT 26.5 01/26/2022    MONOPCT 11.0 01/26/2022    BASOPCT 0.7 01/26/2022    MONOSABS 0.65 01/26/2022    LYMPHSABS 1.57 01/26/2022    EOSABS 0.17 01/26/2022    BASOSABS 0.04 01/26/2022     CMP:    Lab Results   Component Value Date     01/26/2022    K 3.8 01/26/2022     01/26/2022    CO2 24 01/26/2022    BUN 21 01/26/2022    CREATININE 1.3 01/26/2022    GFRAA >60 01/26/2022    LABGLOM 55 01/26/2022    GLUCOSE 93 01/26/2022    GLUCOSE 106 03/24/2011    PROT 4.9 01/26/2022    LABALBU 3.5 01/26/2022    LABALBU 2.4 03/20/2011    CALCIUM 8.1 01/26/2022    BILITOT 0.5 01/26/2022    ALKPHOS 61 01/26/2022    AST 20 01/26/2022    ALT 14 01/26/2022     PT/INR:    Lab Results   Component Value Date    PROTIME 12.6 01/24/2022    PROTIME 12.2 03/18/2011    INR 1.1 01/24/2022       ASSESSMENT AND PLAN      Active Problems:    GI bleed  Plan: Observe another day  Suspected diverticular bleed with history of rectal carcinoma on Eliquis          Electronically signed by Jenn Rice MD on 1/26/2022 at 7:50 AM

## 2022-01-26 NOTE — PROGRESS NOTES
Called Dr. Nicolette Quick per RN [facility]  Reason  Patient had a Large Bloody Bowel movement

## 2022-01-27 VITALS
OXYGEN SATURATION: 98 % | DIASTOLIC BLOOD PRESSURE: 76 MMHG | BODY MASS INDEX: 24.26 KG/M2 | WEIGHT: 189 LBS | HEART RATE: 64 BPM | TEMPERATURE: 98 F | HEIGHT: 74 IN | SYSTOLIC BLOOD PRESSURE: 131 MMHG | RESPIRATION RATE: 16 BRPM

## 2022-01-27 LAB
BASOPHILS ABSOLUTE: 0.03 E9/L (ref 0–0.2)
BASOPHILS RELATIVE PERCENT: 0.5 % (ref 0–2)
EOSINOPHILS ABSOLUTE: 0 E9/L (ref 0.05–0.5)
EOSINOPHILS RELATIVE PERCENT: 0 % (ref 0–6)
HCT VFR BLD CALC: 29.9 % (ref 37–54)
HCT VFR BLD CALC: 31.1 % (ref 37–54)
HEMOGLOBIN: 9.6 G/DL (ref 12.5–16.5)
HEMOGLOBIN: 9.9 G/DL (ref 12.5–16.5)
IMMATURE GRANULOCYTES #: 0.01 E9/L
IMMATURE GRANULOCYTES %: 0.2 % (ref 0–5)
LYMPHOCYTES ABSOLUTE: 1.26 E9/L (ref 1.5–4)
LYMPHOCYTES RELATIVE PERCENT: 21 % (ref 20–42)
MCH RBC QN AUTO: 27.5 PG (ref 26–35)
MCHC RBC AUTO-ENTMCNC: 32.1 % (ref 32–34.5)
MCV RBC AUTO: 85.7 FL (ref 80–99.9)
MONOCYTES ABSOLUTE: 0.71 E9/L (ref 0.1–0.95)
MONOCYTES RELATIVE PERCENT: 11.9 % (ref 2–12)
NEUTROPHILS ABSOLUTE: 3.98 E9/L (ref 1.8–7.3)
NEUTROPHILS RELATIVE PERCENT: 66.4 % (ref 43–80)
PDW BLD-RTO: 13 FL (ref 11.5–15)
PLATELET # BLD: 183 E9/L (ref 130–450)
PMV BLD AUTO: 8.9 FL (ref 7–12)
RBC # BLD: 3.49 E12/L (ref 3.8–5.8)
WBC # BLD: 6 E9/L (ref 4.5–11.5)

## 2022-01-27 PROCEDURE — 6370000000 HC RX 637 (ALT 250 FOR IP): Performed by: INTERNAL MEDICINE

## 2022-01-27 PROCEDURE — 36415 COLL VENOUS BLD VENIPUNCTURE: CPT

## 2022-01-27 PROCEDURE — 85025 COMPLETE CBC W/AUTO DIFF WBC: CPT

## 2022-01-27 PROCEDURE — 85018 HEMOGLOBIN: CPT

## 2022-01-27 PROCEDURE — 85014 HEMATOCRIT: CPT

## 2022-01-27 PROCEDURE — 2580000003 HC RX 258: Performed by: INTERNAL MEDICINE

## 2022-01-27 RX ADMIN — SODIUM CHLORIDE, PRESERVATIVE FREE 10 ML: 5 INJECTION INTRAVENOUS at 08:09

## 2022-01-27 RX ADMIN — AMLODIPINE BESYLATE 5 MG: 5 TABLET ORAL at 08:09

## 2022-01-27 RX ADMIN — METOPROLOL TARTRATE 100 MG: 50 TABLET, FILM COATED ORAL at 08:09

## 2022-01-27 NOTE — PROGRESS NOTES
Paged surgical resident to notify that patient had two med sized bloody BMs this AM. Check H&H prior to DC per Dr. Kori Da Silva.

## 2022-01-27 NOTE — DISCHARGE SUMMARY
Discharge Summary     Patient ID:  Sarthak Cordova  [id number]  74 y.o. 1955 male  Richard Greenberg MD        Admit date: 1/24/2022    Discharge date and time:  1/27/2022  8:41 AM      Activity level: Up as tolerated  Disposition: To home  Condition on Discharge: Fair    Admit Diagnoses: Gastrointestinal bleed    Discharge Diagnoses: Gastrointestinal bleed suspected diverticulitis, exasperated by Eliquis    Consults:  IP CONSULT TO PRIMARY CARE PROVIDER  IP CONSULT TO GENERAL SURGERY    Procedures:     Hospital Course: 77-year-old male awakened with 1-4 bloody bowel movements then brought into the hospital his blood count remained stable he is Eliquis was stopped and he continued to have some bloody bowel movements is elected to do an outpatient colonoscopy and he was cautioned not to eat nuts seeds or pop corn      LABS:  Recent Labs     01/24/22 2052 01/26/22  0443    138   K 3.6 3.8    105   CO2 25 24   BUN 31* 21   CREATININE 1.4* 1.3*   GLUCOSE 126* 93   CALCIUM 8.1* 8.1*       Recent Labs     01/24/22 2052 01/26/22  0443 01/27/22  0300   WBC 11.9* 5.9 6.0   RBC 4.19 3.45* 3.49*   HGB 11.4* 9.3* 9.6*   HCT 36.2* 29.9* 29.9*   MCV 86.4 86.7 85.7   MCH 27.2 27.0 27.5   MCHC 31.5* 31.1* 32.1   RDW 13.1 13.0 13.0    176 183   MPV 8.8 9.2 8.9       No results for input(s): GLUMET in the last 72 hours.       Patient Instructions:   Current Discharge Medication List      CONTINUE these medications which have NOT CHANGED    Details   amLODIPine (NORVASC) 5 MG tablet Take 1 tablet by mouth daily  Qty: 90 tablet, Refills: 3      metoprolol (LOPRESSOR) 100 MG tablet Take 1 tablet by mouth 2 times daily  Qty: 180 tablet, Refills: 3      hydrALAZINE (APRESOLINE) 50 MG tablet take 1 tablet by mouth three times a day  Qty: 270 tablet, Refills: 3         STOP taking these medications       apixaban (ELIQUIS) 5 MG TABS tablet Comments:   Reason for Stopping: Signed:  Electronically signed by Becky Magaña MD on 1/27/2022 at 8:41 AM

## 2022-01-27 NOTE — PROGRESS NOTES
Progress Note     Current Meds:  amLODIPine (NORVASC) tablet 5 mg, Daily  hydrALAZINE (APRESOLINE) tablet 25 mg, 3 times per day  metoprolol tartrate (LOPRESSOR) tablet 100 mg, BID  sodium chloride flush 0.9 % injection 5-40 mL, 2 times per day  sodium chloride flush 0.9 % injection 5-40 mL, PRN  0.9 % sodium chloride infusion, PRN  ondansetron (ZOFRAN-ODT) disintegrating tablet 4 mg, Q8H PRN   Or  ondansetron (ZOFRAN) injection 4 mg, Q6H PRN  acetaminophen (TYLENOL) tablet 650 mg, Q6H PRN   Or  acetaminophen (TYLENOL) suppository 650 mg, Q6H PRN         Vitals/Labs:    Patient Vitals for the past 24 hrs:   BP Temp Temp src Pulse Resp SpO2   01/27/22 0545 104/62 -- -- -- -- --   01/27/22 0416 106/65 97.9 °F (36.6 °C) Oral 64 16 92 %   01/26/22 2027 127/62 98.1 °F (36.7 °C) Oral 76 16 96 %   01/26/22 1912 (!) 115/58 97.9 °F (36.6 °C) Oral 82 16 94 %   01/26/22 0743 124/73 98 °F (36.7 °C) Oral 77 16 100 %   01/26/22 0622 139/80 -- -- -- -- --        I/O last 3 completed shifts: In: 1200 [P.O.:1200]  Out: 1725 [Urine:1725]  No intake/output data recorded. Recent Labs     01/24/22 2052 01/26/22 0443 01/27/22  0300   WBC 11.9* 5.9 6.0   HGB 11.4* 9.3* 9.6*   HCT 36.2* 29.9* 29.9*    176 183     Recent Labs     01/24/22 2052 01/26/22 0443   CREATININE 1.4* 1.3*   BUN 31* 21    138   K 3.6 3.8    105   CO2 25 24     Recent Labs     01/24/22 2052 01/26/22 0443   AST 18 20   ALT 13 14   BILITOT 0.5 0.5   ALKPHOS 62 61     Recent Labs     01/24/22 2052   LIPASE 38     No results for input(s): LACTATE in the last 72 hours. Recent Labs     01/24/22 2052   INR 1.1       Patient is feeling fine  No bleeding overnight   Denies pain     Physical Exam:    Abdomen:    soft and non distended.    Non-tender    Impression:  Rectal bleeding-probably diverticular  Personal history of colorectal cancer    Plan:  Ok to continue diet  Monitor hemoglobin  Discharge once no further bleeding and stable hemoglobin  If bleeding continues, colonoscopy as inpatient    Electronically by Criselda Sanchez MD, MD on 1/27/2022 at 6:00 AM     Seen/examined  One stool with blood yesterday  Hb unchanged  Should be able to discharge later today- follow up in office for colonoscopy  Danica

## 2022-02-10 ENCOUNTER — PREP FOR PROCEDURE (OUTPATIENT)
Dept: SURGERY | Age: 67
End: 2022-02-10

## 2022-02-10 RX ORDER — SODIUM CHLORIDE 9 MG/ML
INJECTION, SOLUTION INTRAVENOUS CONTINUOUS
Status: CANCELLED | OUTPATIENT
Start: 2022-02-10

## 2022-02-10 NOTE — PROGRESS NOTES
[facility] PRE-ADMISSION TESTING INSTRUCTIONS    The Preadmission Testing patient is instructed accordingly using the following criteria (check applicable):    ARRIVAL INSTRUCTIONS:  [x] Parking the day of Surgery is located in the Main Entrance lot. Upon entering the door, make an immediate right to the surgery reception desk    [x] Bring photo ID and insurance card    [] Bring in a copy of Living will or Durable Power of  papers. [x] Please be sure to arrange for responsible adult to provide transportation to and from the hospital    [x] Please arrange for responsible adult to be with you for the 24 hour period post procedure due to having anesthesia      GENERAL INSTRUCTIONS:    [x] Nothing by mouth after midnight, including gum, candy, mints or water    [x] You may brush your teeth, but do not swallow any water    [x] Take medications as instructed with 1-2 oz of water    [] Stop herbal supplements and vitamins 5 days prior to procedure    [] Follow preop dosing of blood thinners per physician instructions    [] Take 1/2 dose of evening insulin, but no insulin after midnight    [] No oral diabetic medications after midnight    [] If diabetic and have low blood sugar or feel symptomatic, take 1-2oz apple juice only    [] Bring inhalers day of surgery    [] Bring C-PAP/ Bi-Pap day of surgery    [] Bring urine specimen day of surgery    [x] Shower or bath with soap, lather and rinse well, AM of Surgery, no lotion, powders or creams to surgical site    [x] Follow bowel prep as instructed per surgeon    [] No tobacco products within 24 hours of surgery     [] No alcohol or illegal drug use within 24 hours of surgery.     [x] Jewelry, body piercing's, eyeglasses, contact lenses and dentures are not permitted into surgery (bring cases)      [] Please do not wear any nail polish, make up or hair products on the day of surgery    [x] You can expect a call the business day prior to procedure to notify you if your arrival time changes    [x] If you receive a survey after surgery we would greatly appreciate your comments    [] Parent/guardian of a minor must accompany their child and remain on the premises  the entire time they are under our care     [] Pediatric patients may bring favorite toy, blanket or comfort item with them    [] A caregiver or family member must remain with the patient during their stay if they are mentally handicapped, have dementia, disoriented or unable to use a call light or would be a safety concern if left unattended    [x] Please notify surgeon if you develop any illness between now and time of surgery (cold, cough, sore throat, fever, nausea, vomiting) or any signs of infections  including skin, wounds, and dental.    []  The Outpatient Pharmacy is available to fill your prescription here on your day of surgery, ask your preop nurse for details    [] Other instructions    EDUCATIONAL MATERIALS PROVIDED:    [] PAT Preoperative Education Packet/Booklet     [] Medication List    [] Transfusion bracelet applied with instructions    [] Shower with soap, lather and rinse well, and use CHG wipes provided the evening before surgery as instructed    [] Incentive spirometer with instructions        Have you been tested for COVID  No           Have you been told you were positive for COVID No  Have you had any known exposure to someone that is positive for COVID No  Do you have a cough                   No              Do you have shortness of breath No                 Do you have a sore throat            No                Are you having chills                    No                Are you having muscle aches. No                    Please come to the hospital wearing a mask and have your significant other wear a mask as well. Both of you should check your temperature before leaving to come here,  if it is 100 or higher please call [phone] for instruction.

## 2022-02-10 NOTE — H&P (VIEW-ONLY)
Date:   [facility]  Name: Hanny Atkins             : 1955 Sex: M  Age: 74 yrs  Acct#:  [id number]           Patient was referred by Juvencio Roman MD.  Patient's primary care provider is Juvencio Roman MD.  CC:  Rectal bleeding    HPI: A 61-year-old male known to me. In 2018 he underwent a laparoscopic low anterior resection for rectal cancer. He had a follow-up colonoscopy in 2019. He was recently admitted to the hospital with rectal bleeding which stopped spontaneously. Meds Prior to Visit:  Gavilyte-N With Flavor Pack  420 gm  take as directed  Erythromycin Base  250 mg  4 tablets @1p,2p and 11p day prior to surgery  Neomycin Sulfate  500 mg  2 by mouth 1pm,2pm ,11pm day prior to surgery  Hydrazine Sulfate     Metoprolol Succinate ER  25 mg   Lopressor     Aspir-81  81 mg      Allergies:  Penicillin, Cefaclor        Wt Prior: 189lb as of 19    Exam:  Constitution:  Non-toxic, no acute distress  Heart :  RRR  Lungs CTA bilaterally  Abdomen: Soft and nondistended, no hernias or masses  Extremeties: No rash, cyanosis, or jaundice         Assessment #1: Hx K62.5 Hemorrhage of anus and rectum   Care Plan:              Comments       :  Colonoscopy scheduled     Assessment #2: Hx Z85.038 Personal history of other malignant neoplasm of large intestine   Care Plan:                      Time spent reviewing records, discussing findings, answering questions, reviewing laboratory studies, radiologic exams, and other diagnostics, and reviewing the diagnostic and therapeutic plan: 20 minutes    Voice recognition software was used in the preparation of this document. Despite all efforts to prevent them, transcription errors may have occurred.   Seen by:

## 2022-02-14 ENCOUNTER — HOSPITAL ENCOUNTER (OUTPATIENT)
Age: 67
Setting detail: OUTPATIENT SURGERY
Discharge: HOME | End: 2022-02-14
Attending: SURGERY | Admitting: SURGERY
Payer: MEDICARE

## 2022-02-14 ENCOUNTER — ANESTHESIA EVENT (OUTPATIENT)
Dept: ENDOSCOPY | Age: 67
End: 2022-02-14
Payer: MEDICARE

## 2022-02-14 ENCOUNTER — ANESTHESIA (OUTPATIENT)
Dept: ENDOSCOPY | Age: 67
End: 2022-02-14
Payer: MEDICARE

## 2022-02-14 VITALS
BODY MASS INDEX: 24 KG/M2 | OXYGEN SATURATION: 96 % | DIASTOLIC BLOOD PRESSURE: 55 MMHG | HEIGHT: 74 IN | WEIGHT: 187 LBS | SYSTOLIC BLOOD PRESSURE: 107 MMHG | TEMPERATURE: 97.6 F | HEART RATE: 52 BPM | RESPIRATION RATE: 14 BRPM

## 2022-02-14 VITALS
SYSTOLIC BLOOD PRESSURE: 87 MMHG | DIASTOLIC BLOOD PRESSURE: 50 MMHG | OXYGEN SATURATION: 99 % | RESPIRATION RATE: 19 BRPM

## 2022-02-14 PROCEDURE — 3700000001 HC ADD 15 MINUTES (ANESTHESIA): Performed by: SURGERY

## 2022-02-14 PROCEDURE — 3609010600 HC COLONOSCOPY POLYPECTOMY SNARE/COLD BIOPSY: Performed by: SURGERY

## 2022-02-14 PROCEDURE — 2580000003 HC RX 258: Performed by: SURGERY

## 2022-02-14 PROCEDURE — 2709999900 HC NON-CHARGEABLE SUPPLY: Performed by: SURGERY

## 2022-02-14 PROCEDURE — 7100000011 HC PHASE II RECOVERY - ADDTL 15 MIN: Performed by: SURGERY

## 2022-02-14 PROCEDURE — 7100000010 HC PHASE II RECOVERY - FIRST 15 MIN: Performed by: SURGERY

## 2022-02-14 PROCEDURE — 88305 TISSUE EXAM BY PATHOLOGIST: CPT

## 2022-02-14 PROCEDURE — 3700000000 HC ANESTHESIA ATTENDED CARE: Performed by: SURGERY

## 2022-02-14 PROCEDURE — 6360000002 HC RX W HCPCS: Performed by: NURSE ANESTHETIST, CERTIFIED REGISTERED

## 2022-02-14 RX ORDER — PHENYLEPHRINE HYDROCHLORIDE 10 MG/ML
INJECTION INTRAVENOUS PRN
Status: DISCONTINUED | OUTPATIENT
Start: 2022-02-14 | End: 2022-02-14 | Stop reason: SDUPTHER

## 2022-02-14 RX ORDER — SODIUM CHLORIDE 9 MG/ML
INJECTION, SOLUTION INTRAVENOUS CONTINUOUS
Status: DISCONTINUED | OUTPATIENT
Start: 2022-02-14 | End: 2022-02-14 | Stop reason: HOSPADM

## 2022-02-14 RX ORDER — PROPOFOL 10 MG/ML
INJECTION, EMULSION INTRAVENOUS PRN
Status: DISCONTINUED | OUTPATIENT
Start: 2022-02-14 | End: 2022-02-14 | Stop reason: SDUPTHER

## 2022-02-14 RX ADMIN — PROPOFOL 250 MG: 10 INJECTION, EMULSION INTRAVENOUS at 06:54

## 2022-02-14 RX ADMIN — PHENYLEPHRINE HYDROCHLORIDE 50 MCG: 10 INJECTION INTRAVENOUS at 06:58

## 2022-02-14 RX ADMIN — PHENYLEPHRINE HYDROCHLORIDE 50 MCG: 10 INJECTION INTRAVENOUS at 07:11

## 2022-02-14 RX ADMIN — SODIUM CHLORIDE: 9 INJECTION, SOLUTION INTRAVENOUS at 06:45

## 2022-02-14 ASSESSMENT — PAIN - FUNCTIONAL ASSESSMENT: PAIN_FUNCTIONAL_ASSESSMENT: 0-10

## 2022-02-14 ASSESSMENT — LIFESTYLE VARIABLES: SMOKING_STATUS: 0

## 2022-02-14 ASSESSMENT — PAIN SCALES - GENERAL: PAINLEVEL_OUTOF10: 0

## 2022-02-14 NOTE — PROGRESS NOTES
Patient has not passed any gas yet, but spoke with Dr. Rafy Cesar and he was fine with patient leaving without passing any gas.

## 2022-02-14 NOTE — INTERVAL H&P NOTE
Update History & Physical    The patient's History and Physical of February 2, 2022 was reviewed with the patient and I examined the patient. There was no change. The surgical site was confirmed by the patient and me. Plan: The risks, benefits, expected outcome, and alternative to the recommended procedure have been discussed with the patient. Patient understands and wants to proceed with the procedure.      Electronically signed by Juanita Ashley MD on 2/14/2022 at 6:16 AM

## 2022-02-14 NOTE — ANESTHESIA POSTPROCEDURE EVALUATION
Department of Anesthesiology  Postprocedure Note    Patient: Timo Del Toro  MRN: [id number]  YOB: 1955  Date of evaluation: 2/14/2022  Time:  7:20 AM     Procedure Summary     Date: 02/14/22 Room / Location: [address] [place] [address]    Anesthesia Start: 0360 Anesthesia Stop: 0720    Procedure: COLONOSCOPY POLYPECTOMY SNARE/COLD BIOPSY (N/A ) Diagnosis: (RECTAL BLEEDING)    Surgeons: Britney Carrizales MD Responsible Provider: Ras Minor MD    Anesthesia Type: MAC ASA Status: 3          Anesthesia Type: MAC    Jolene Phase I: Jolene Score: 10    Jolene Phase II:      Last vitals: Reviewed and per EMR flowsheets.        Anesthesia Post Evaluation    Patient location during evaluation: PACU  Patient participation: complete - patient participated  Level of consciousness: awake  Airway patency: patent  Nausea & Vomiting: no nausea and no vomiting  Complications: no  Cardiovascular status: hemodynamically stable  Respiratory status: acceptable  Hydration status: euvolemic

## 2022-02-14 NOTE — PROGRESS NOTES
Went over discharge instructions with patient and sister. Both verbalized understanding. Doctor came and spoke with patient before discharge. Patient is still working on trying to pass air. Patient is up and to the bathroom.

## 2022-02-14 NOTE — ANESTHESIA PRE PROCEDURE
Department of Anesthesiology  Preprocedure Note       Name:  Cornelia Nuñez   Age:  74 y.o.  :  1955                                          MRN:  [id number]         Date:  2022      Surgeon: Obey Leal):  Lisa Yung MD    Procedure: Procedure(s):  COLONOSCOPY DIAGNOSTIC    Medications prior to admission:   Prior to Admission medications    Medication Sig Start Date End Date Taking? Authorizing Provider   amLODIPine (NORVASC) 5 MG tablet Take 1 tablet by mouth daily 22   Zach Ochoa MD   metoprolol (LOPRESSOR) 100 MG tablet Take 1 tablet by mouth 2 times daily 21   Zach Ochoa MD   hydrALAZINE (APRESOLINE) 50 MG tablet take 1 tablet by mouth three times a day 21   Zach Ochoa MD       Current medications:    No current facility-administered medications for this visit. No current outpatient medications on file. Facility-Administered Medications Ordered in Other Visits   Medication Dose Route Frequency Provider Last Rate Last Admin    0.9 % sodium chloride infusion   IntraVENous Continuous Lisa Yung MD           Allergies:     Allergies   Allergen Reactions    Cefepime Rash    Pcn [Penicillins] Rash       Problem List:    Patient Active Problem List   Diagnosis Code    Retinal detachment of left eye with multiple breaks H33.022    Rectal cancer ([address] C20    Ileus ([address] K56.7    Atrial flutter with rapid ventricular response (HCC) I48.92    GI bleed K92.2    Gastrointestinal bleed K92.2       Past Medical History:        Diagnosis Date    Employs prosthetic leg     left    Hx of AKA (above knee amputation), left ([address]     Hypertension     Necrotizing fasciitis ([address]     Left leg    Rectal bleeding     For Colonoscopy 22    Rectal cancer ([address] 2017    rectal       Past Surgical History:        Procedure Laterality Date    ABDOMEN SURGERY  2018    ileostomy open take down    ABOVE KNEE AMPUTATION      left; hx flesh eating bacteria    COLON SURGERY  2018    laprascopic low anterior colon resection  take down splenic fexure   ileostomy    COLONOSCOPY  10/21/2011    EYE SURGERY Left 10/03/2016    pars plana vitrectomy, retinal detachment repair, laser gas/fluid exchange    EYE SURGERY Bilateral 2002? bilat cataracts w lens implants    EYE SURGERY Right ?    retinal detachment    OK REVISION OF ILEOSTOMY,COMPLICATED N/A     ILEOSTOMY OPEN TAKEDOWN performed by Juanita Ashley MD at [address] [place] [address] History:    Social History     Tobacco Use    Smoking status: Former Smoker     Packs/day: 2.00     Years: 35.00     Pack years: 70.00     Types: Cigarettes     Start date: 1974     Quit date: 2010     Years since quittin.2    Smokeless tobacco: Never Used   Substance Use Topics    Alcohol use: No                                Counseling given: Not Answered      Vital Signs (Current): There were no vitals filed for this visit.                                            BP Readings from Last 3 Encounters:   22 116/61   22 131/76   22 122/64       NPO Status:                                                                                 BMI:   Wt Readings from Last 3 Encounters:   22 187 lb (84.8 kg)   22 189 lb (85.7 kg)   22 189 lb 6.4 oz (85.9 kg)     There is no height or weight on file to calculate BMI.    CBC:   Lab Results   Component Value Date    WBC 6.0 2022    RBC 3.49 2022    HGB 9.9 2022    HCT 31.1 2022    MCV 85.7 2022    RDW 13.0 2022     2022       CMP:   Lab Results   Component Value Date     2022    K 3.8 2022     2022    CO2 24 2022    BUN 21 2022    CREATININE 1.3 2022    GFRAA >60 2022    LABGLOM 55 2022    GLUCOSE 93 2022    GLUCOSE 106 2011    PROT 4.9 2022    CALCIUM 8.1 2022    BILITOT 0.5 2022 ALKPHOS 61 01/26/2022    AST 20 01/26/2022    ALT 14 01/26/2022       POC Tests: No results for input(s): POCGLU, POCNA, POCK, POCCL, POCBUN, POCHEMO, POCHCT in the last 72 hours. Coags:   Lab Results   Component Value Date    PROTIME 12.6 01/24/2022    PROTIME 12.2 03/18/2011    INR 1.1 01/24/2022    APTT 26.3 01/24/2022       HCG (If Applicable): No results found for: PREGTESTUR, PREGSERUM, HCG, HCGQUANT     ABGs:   Lab Results   Component Value Date    V2NHFQQQ 95.3 01/10/2011        Type & Screen (If Applicable):  No results found for: LABABO, [address]    Anesthesia Evaluation  Patient summary reviewed and Nursing notes reviewed no history of anesthetic complications:   Airway: Mallampati: II  TM distance: >3 FB   Neck ROM: full  Mouth opening: > = 3 FB Dental:      Comment: Only 2 teeth remaining- poor dentition    Pulmonary: breath sounds clear to auscultation      (-) not a current smoker                          ROS comment: Former smoker- quit 7 years ago   Cardiovascular:  Exercise tolerance: no interval change,   (+) hypertension: moderate, dysrhythmias: atrial fibrillation,       ECG reviewed  Rhythm: irregular  Rate: normal           Beta Blocker:  Order written         Neuro/Psych:   Negative Neuro/Psych ROS              GI/Hepatic/Renal:   (+) bowel prep,          ROS comment: S/P ileostomy and takedown. .   Endo/Other:    (+) blood dyscrasia: anticoagulation therapy:., malignancy/cancer. ROS comment: Left lower extremity amputation Abdominal:             Vascular: negative vascular ROS. Other Findings:               Anesthesia Plan      MAC     ASA 3     (Pt agrees to [facility] ATHENS and IV sedation.)  Induction: intravenous. MIPS: Prophylactic antiemetics administered. Anesthetic plan and risks discussed with patient and sibling. Plan discussed with CRNA. Chart reviewed . Patient assessed before induction. I agree with the above note.   CRISTI Iniguez - BRODIE Salinas MD   2/14/2022          Brenden Salinas MD           2/14/2022        6:20 AM

## 2022-02-14 NOTE — OP NOTE
Ronald Turcios  YOB: 1955  [id number]    Pre-operative Diagnosis: rectal bleeding, personal history of colorectal cancer    Post-operative Diagnosis:    Widely patent anastomosis. Large polyp in the cecum partially resected    Procedure: Colonoscopy with snare polypectomy     Anesthesia: LMAC    Surgeon: Kailey Rosenthal MD    Assistant: None    Estimated Blood Loss: none    Complications: none    Specimens: Cecal polyp    Procedure:   Pt was taken to the endoscopy suite and placed on the table in a left lateral decubitus position. LMAC anesthesia was administered. A digital rectal examination revealed no gross blood, normal tone, no mass was palpated. A lubricated colonoscope was inserted and advanced to the cecum without difficulty. The ileocecal valve and appendiceal orifice were identified and photographed. Prep was good. Cecum, ascending colon, hepatic flexure, transverse colon, splenic flexure, descending colon, sigmoid colon were all extensively inspected. There was a large sessile polyp in the cecum. Several passes were made with a snare forceps and as much of the polyp was removed as it was safely possible. All the tissue was suctioned into the scope and sent for specimen. There was still some residual polyp left in situ. The anastomosis was widely patent at about 5 cm. No other pathology was noted the colon was deflated. The scope was removed. The patient tolerated the procedure well. Impression: Widely patent anastomosis.   Large polyp in the cecum      Plan:   Check pathology  Further recommendations pending pathology; serial scopes with piecemeal resection versus surgical removal    Fracisco Huerta MD, MD,  2/14/2022, 6:16 AM

## 2022-03-15 ENCOUNTER — HOSPITAL ENCOUNTER (OUTPATIENT)
Dept: PREADMISSION TESTING | Age: 67
Discharge: HOME | End: 2022-03-15
Payer: MEDICARE

## 2022-03-15 ENCOUNTER — PREP FOR PROCEDURE (OUTPATIENT)
Dept: SURGERY | Age: 67
End: 2022-03-15

## 2022-03-15 VITALS
HEIGHT: 74 IN | OXYGEN SATURATION: 97 % | HEART RATE: 130 BPM | RESPIRATION RATE: 20 BRPM | DIASTOLIC BLOOD PRESSURE: 94 MMHG | SYSTOLIC BLOOD PRESSURE: 127 MMHG | WEIGHT: 190 LBS | BODY MASS INDEX: 24.38 KG/M2 | TEMPERATURE: 98.7 F

## 2022-03-15 LAB
ABO/RH: NORMAL
ANION GAP SERPL CALCULATED.3IONS-SCNC: 9 MMOL/L (ref 7–16)
ANTIBODY SCREEN: NORMAL
BUN BLDV-MCNC: 21 MG/DL (ref 6–23)
CALCIUM SERPL-MCNC: 8.5 MG/DL (ref 8.6–10.2)
CHLORIDE BLD-SCNC: 102 MMOL/L (ref 98–107)
CO2: 24 MMOL/L (ref 22–29)
CREAT SERPL-MCNC: 1.5 MG/DL (ref 0.7–1.2)
GFR AFRICAN AMERICAN: 56
GFR NON-AFRICAN AMERICAN: 46 ML/MIN/1.73
GLUCOSE BLD-MCNC: 108 MG/DL (ref 74–99)
HCT VFR BLD CALC: 32.4 % (ref 37–54)
HEMOGLOBIN: 10.1 G/DL (ref 12.5–16.5)
MCH RBC QN AUTO: 25.4 PG (ref 26–35)
MCHC RBC AUTO-ENTMCNC: 31.2 % (ref 32–34.5)
MCV RBC AUTO: 81.4 FL (ref 80–99.9)
PDW BLD-RTO: 12.9 FL (ref 11.5–15)
PLATELET # BLD: 239 E9/L (ref 130–450)
PMV BLD AUTO: 9.1 FL (ref 7–12)
POTASSIUM SERPL-SCNC: 3.7 MMOL/L (ref 3.5–5)
RBC # BLD: 3.98 E12/L (ref 3.8–5.8)
SODIUM BLD-SCNC: 135 MMOL/L (ref 132–146)
WBC # BLD: 7.5 E9/L (ref 4.5–11.5)

## 2022-03-15 PROCEDURE — 87081 CULTURE SCREEN ONLY: CPT

## 2022-03-15 PROCEDURE — 86901 BLOOD TYPING SEROLOGIC RH(D): CPT

## 2022-03-15 PROCEDURE — 86900 BLOOD TYPING SEROLOGIC ABO: CPT

## 2022-03-15 PROCEDURE — 36415 COLL VENOUS BLD VENIPUNCTURE: CPT

## 2022-03-15 PROCEDURE — 80048 BASIC METABOLIC PNL TOTAL CA: CPT

## 2022-03-15 PROCEDURE — 86850 RBC ANTIBODY SCREEN: CPT

## 2022-03-15 PROCEDURE — 85027 COMPLETE CBC AUTOMATED: CPT

## 2022-03-15 RX ORDER — SODIUM CHLORIDE, SODIUM LACTATE, POTASSIUM CHLORIDE, CALCIUM CHLORIDE 600; 310; 30; 20 MG/100ML; MG/100ML; MG/100ML; MG/100ML
INJECTION, SOLUTION INTRAVENOUS CONTINUOUS
Status: CANCELLED | OUTPATIENT
Start: 2022-03-15

## 2022-03-15 RX ORDER — SODIUM CHLORIDE 0.9 % (FLUSH) 0.9 %
5-40 SYRINGE (ML) INJECTION PRN
Status: CANCELLED | OUTPATIENT
Start: 2022-03-15

## 2022-03-15 RX ORDER — SODIUM CHLORIDE 0.9 % (FLUSH) 0.9 %
5-40 SYRINGE (ML) INJECTION EVERY 12 HOURS SCHEDULED
Status: CANCELLED | OUTPATIENT
Start: 2022-03-15

## 2022-03-15 NOTE — H&P
Date:   [facility]  Name: Timothy Cadena             : 1955 Sex: M  Age: 74 yrs  Acct#:  [id number]           Patient was referred by Eugenio Greenwood MD.  Patient's primary care provider is Eugenio Greenwood MD.  CC:  Follow-up    HPI: 68-year-old male known to me. Recently hospitalized with GI bleeding. Found to have a very large polyp in his cecum. Too large to safely remove. 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 Discussed different options with the patient for treatment. Meds Prior to Visit:  Hydrazine Sulfate     Metoprolol Succinate ER  25 mg   Gavilyte-N With Flavor Pack  420 gm  take as directed  Erythromycin Base  250 mg  4 tablets @1p,2p and 11p day prior to surgery  Neomycin Sulfate  500 mg  2 by mouth 1pm,2pm ,11pm day prior to surgery     Allergies:  Penicillin, Cefaclor        Wt Prior: 189lb as of 19    Exam:  Constitution:  Non-toxic, no acute distress  Heart :  RRR  Lungs CTA bilaterally  Abdomen: Soft, nontender, nondistended  Extremeties: No rash, cyanosis, or jaundice         Assessment #1: Hx D12.0 Benign neoplasm of cecum   Care Plan:              Comments       :  Laparoscopic right colectomy          Time spent reviewing records, discussing findings, answering questions, reviewing laboratory studies, radiologic exams, and other diagnostics, and reviewing the diagnostic and therapeutic plan: 20 minutes    Voice recognition software was used in the preparation of this document. Despite all efforts to prevent them, transcription errors may have occurred.   Seen by:

## 2022-03-15 NOTE — H&P (VIEW-ONLY)
Date:   [facility]  Name: Tess Strange             : 1955 Sex: M  Age: 74 yrs  Acct#:  [id number]           Patient was referred by Jolynn Duncan MD.  Patient's primary care provider is Jolynn Duncan MD.  CC:  Follow-up    HPI: 57-year-old male known to me. Recently hospitalized with GI bleeding. Found to have a very large polyp in his cecum. Too large to safely remove. 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 Discussed different options with the patient for treatment. Meds Prior to Visit:  Hydrazine Sulfate     Metoprolol Succinate ER  25 mg   Gavilyte-N With Flavor Pack  420 gm  take as directed  Erythromycin Base  250 mg  4 tablets @1p,2p and 11p day prior to surgery  Neomycin Sulfate  500 mg  2 by mouth 1pm,2pm ,11pm day prior to surgery     Allergies:  Penicillin, Cefaclor        Wt Prior: 189lb as of 19    Exam:  Constitution:  Non-toxic, no acute distress  Heart :  RRR  Lungs CTA bilaterally  Abdomen: Soft, nontender, nondistended  Extremeties: No rash, cyanosis, or jaundice         Assessment #1: Hx D12.0 Benign neoplasm of cecum   Care Plan:              Comments       :  Laparoscopic right colectomy          Time spent reviewing records, discussing findings, answering questions, reviewing laboratory studies, radiologic exams, and other diagnostics, and reviewing the diagnostic and therapeutic plan: 20 minutes    Voice recognition software was used in the preparation of this document. Despite all efforts to prevent them, transcription errors may have occurred.   Seen by:

## 2022-03-15 NOTE — PROGRESS NOTES
[facility] PRE-ADMISSION TESTING INSTRUCTIONS    The Preadmission Testing patient is instructed accordingly using the following criteria (check applicable):    ARRIVAL INSTRUCTIONS:  [x] Parking the day of Surgery is located in the Main Entrance lot. Upon entering the door, make an immediate right to the surgery reception desk    [x] Bring photo ID and insurance card    [] Bring in a copy of Living will or Durable Power of  papers. [x] Please be sure to arrange for responsible adult to provide transportation to and from the hospital    [x] Please arrange for responsible adult to be with you for the 24 hour period post procedure due to having anesthesia      GENERAL INSTRUCTIONS:    [x] Nothing by mouth after midnight, including gum, candy, mints or water    [x] You may brush your teeth, but do not swallow any water    [x] Take medications as instructed with 1-2 oz of water    [x] Stop herbal supplements and vitamins 5 days prior to procedure    [x] Follow preop dosing of blood thinners per physician instructions    [] Take 1/2 dose of evening insulin, but no insulin after midnight    [] No oral diabetic medications after midnight    [] If diabetic and have low blood sugar or feel symptomatic, take 1-2oz apple juice only    [] Bring inhalers day of surgery    [] Bring C-PAP/ Bi-Pap day of surgery    [] Bring urine specimen day of surgery    [] Shower or bath with soap, lather and rinse well, AM of Surgery, no lotion, powders or creams to surgical site    [x] Follow bowel prep as instructed per surgeon    [x] No tobacco products within 24 hours of surgery     [x] No alcohol or illegal drug use within 24 hours of surgery.     [x] Jewelry, body piercing's, eyeglasses, contact lenses and dentures are not permitted into surgery (bring cases)      [] Please do not wear any nail polish, make up or hair products on the day of surgery    [x] You can expect a call the business day prior to

## 2022-03-15 NOTE — PROGRESS NOTES
Updated Dr Tracy Dominguez regarding  patients cardiac history, and of heart rate elevated inPAT at 130. Kenneth Choco Patient denies any chest pain or syncope, just slight SOB with a long walk getting his mail. Patient follows with Dr Rolly José, and recent visit was 1-2022 , noted in [address] Dr Tracy Dominguez stated anesthesia will evaluate the day of surgery, and to make sure he takes his metoprolol the morning of surgery.

## 2022-03-16 LAB — MRSA CULTURE ONLY: NORMAL

## 2022-03-21 ENCOUNTER — ANESTHESIA EVENT (OUTPATIENT)
Dept: OPERATING ROOM | Age: 67
DRG: 330 | End: 2022-03-21
Payer: MEDICARE

## 2022-03-22 ENCOUNTER — ANESTHESIA (OUTPATIENT)
Dept: OPERATING ROOM | Age: 67
DRG: 330 | End: 2022-03-22
Payer: MEDICARE

## 2022-03-22 ENCOUNTER — HOSPITAL ENCOUNTER (INPATIENT)
Age: 67
LOS: 14 days | Discharge: NURSING, REHAB OR LONG-TERM CARE | DRG: 330 | End: 2022-04-05
Attending: SURGERY | Admitting: SURGERY
Payer: MEDICARE

## 2022-03-22 VITALS — OXYGEN SATURATION: 100 % | SYSTOLIC BLOOD PRESSURE: 141 MMHG | TEMPERATURE: 95.2 F | DIASTOLIC BLOOD PRESSURE: 91 MMHG

## 2022-03-22 PROBLEM — Z90.49 S/P RIGHT HEMICOLECTOMY: Status: ACTIVE | Noted: 2022-03-22

## 2022-03-22 PROBLEM — D12.0 BENIGN NEOPLASM OF CECUM: Status: ACTIVE | Noted: 2022-03-22

## 2022-03-22 LAB
ABO/RH: NORMAL
ANTIBODY SCREEN: NORMAL
HCT VFR BLD CALC: 26.3 % (ref 37–54)
HEMOGLOBIN: 8.2 G/DL (ref 12.5–16.5)

## 2022-03-22 PROCEDURE — 86901 BLOOD TYPING SEROLOGIC RH(D): CPT

## 2022-03-22 PROCEDURE — 2580000003 HC RX 258

## 2022-03-22 PROCEDURE — 36415 COLL VENOUS BLD VENIPUNCTURE: CPT

## 2022-03-22 PROCEDURE — 3600000003 HC SURGERY LEVEL 3 BASE: Performed by: SURGERY

## 2022-03-22 PROCEDURE — 85014 HEMATOCRIT: CPT

## 2022-03-22 PROCEDURE — 3700000001 HC ADD 15 MINUTES (ANESTHESIA): Performed by: SURGERY

## 2022-03-22 PROCEDURE — 5A2204Z RESTORATION OF CARDIAC RHYTHM, SINGLE: ICD-10-PCS | Performed by: SURGERY

## 2022-03-22 PROCEDURE — 2700000000 HC OXYGEN THERAPY PER DAY

## 2022-03-22 PROCEDURE — 86850 RBC ANTIBODY SCREEN: CPT

## 2022-03-22 PROCEDURE — 2500000003 HC RX 250 WO HCPCS: Performed by: NURSE ANESTHETIST, CERTIFIED REGISTERED

## 2022-03-22 PROCEDURE — 3700000000 HC ANESTHESIA ATTENDED CARE: Performed by: SURGERY

## 2022-03-22 PROCEDURE — 2709999900 HC NON-CHARGEABLE SUPPLY: Performed by: SURGERY

## 2022-03-22 PROCEDURE — 7100000000 HC PACU RECOVERY - FIRST 15 MIN: Performed by: SURGERY

## 2022-03-22 PROCEDURE — 2500000003 HC RX 250 WO HCPCS

## 2022-03-22 PROCEDURE — 6360000002 HC RX W HCPCS: Performed by: ANESTHESIOLOGY

## 2022-03-22 PROCEDURE — 6360000002 HC RX W HCPCS: Performed by: NURSE ANESTHETIST, CERTIFIED REGISTERED

## 2022-03-22 PROCEDURE — 6360000002 HC RX W HCPCS

## 2022-03-22 PROCEDURE — 3600000013 HC SURGERY LEVEL 3 ADDTL 15MIN: Performed by: SURGERY

## 2022-03-22 PROCEDURE — 86923 COMPATIBILITY TEST ELECTRIC: CPT

## 2022-03-22 PROCEDURE — 7100000001 HC PACU RECOVERY - ADDTL 15 MIN: Performed by: SURGERY

## 2022-03-22 PROCEDURE — 2580000003 HC RX 258: Performed by: SURGERY

## 2022-03-22 PROCEDURE — 0DTF4ZZ RESECTION OF RIGHT LARGE INTESTINE, PERCUTANEOUS ENDOSCOPIC APPROACH: ICD-10-PCS | Performed by: SURGERY

## 2022-03-22 PROCEDURE — 6370000000 HC RX 637 (ALT 250 FOR IP): Performed by: SURGERY

## 2022-03-22 PROCEDURE — 88307 TISSUE EXAM BY PATHOLOGIST: CPT

## 2022-03-22 PROCEDURE — 6360000002 HC RX W HCPCS: Performed by: SURGERY

## 2022-03-22 PROCEDURE — 2500000003 HC RX 250 WO HCPCS: Performed by: SURGERY

## 2022-03-22 PROCEDURE — 88309 TISSUE EXAM BY PATHOLOGIST: CPT

## 2022-03-22 PROCEDURE — B246ZZ4 ULTRASONOGRAPHY OF RIGHT AND LEFT HEART, TRANSESOPHAGEAL: ICD-10-PCS | Performed by: SURGERY

## 2022-03-22 PROCEDURE — P9016 RBC LEUKOCYTES REDUCED: HCPCS

## 2022-03-22 PROCEDURE — 85018 HEMOGLOBIN: CPT

## 2022-03-22 PROCEDURE — 86900 BLOOD TYPING SEROLOGIC ABO: CPT

## 2022-03-22 PROCEDURE — 1200000000 HC SEMI PRIVATE

## 2022-03-22 PROCEDURE — 2720000010 HC SURG SUPPLY STERILE: Performed by: SURGERY

## 2022-03-22 RX ORDER — LIDOCAINE HYDROCHLORIDE 20 MG/ML
INJECTION, SOLUTION EPIDURAL; INFILTRATION; INTRACAUDAL; PERINEURAL PRN
Status: DISCONTINUED | OUTPATIENT
Start: 2022-03-22 | End: 2022-03-22 | Stop reason: SDUPTHER

## 2022-03-22 RX ORDER — OXYCODONE HYDROCHLORIDE 5 MG/1
5 TABLET ORAL EVERY 4 HOURS PRN
Status: DISCONTINUED | OUTPATIENT
Start: 2022-03-22 | End: 2022-03-28

## 2022-03-22 RX ORDER — FENTANYL CITRATE 50 UG/ML
INJECTION, SOLUTION INTRAMUSCULAR; INTRAVENOUS PRN
Status: DISCONTINUED | OUTPATIENT
Start: 2022-03-22 | End: 2022-03-22 | Stop reason: SDUPTHER

## 2022-03-22 RX ORDER — SODIUM CHLORIDE 0.9 % (FLUSH) 0.9 %
5-40 SYRINGE (ML) INJECTION PRN
Status: DISCONTINUED | OUTPATIENT
Start: 2022-03-22 | End: 2022-03-22 | Stop reason: HOSPADM

## 2022-03-22 RX ORDER — SODIUM CHLORIDE 9 MG/ML
25 INJECTION, SOLUTION INTRAVENOUS PRN
Status: DISCONTINUED | OUTPATIENT
Start: 2022-03-22 | End: 2022-03-22 | Stop reason: HOSPADM

## 2022-03-22 RX ORDER — SODIUM CHLORIDE, SODIUM LACTATE, POTASSIUM CHLORIDE, CALCIUM CHLORIDE 600; 310; 30; 20 MG/100ML; MG/100ML; MG/100ML; MG/100ML
INJECTION, SOLUTION INTRAVENOUS CONTINUOUS
Status: DISCONTINUED | OUTPATIENT
Start: 2022-03-22 | End: 2022-03-22

## 2022-03-22 RX ORDER — SODIUM CHLORIDE, SODIUM LACTATE, POTASSIUM CHLORIDE, CALCIUM CHLORIDE 600; 310; 30; 20 MG/100ML; MG/100ML; MG/100ML; MG/100ML
INJECTION, SOLUTION INTRAVENOUS CONTINUOUS PRN
Status: DISCONTINUED | OUTPATIENT
Start: 2022-03-22 | End: 2022-03-22 | Stop reason: SDUPTHER

## 2022-03-22 RX ORDER — ACETAMINOPHEN 325 MG/1
650 TABLET ORAL EVERY 6 HOURS
Status: DISCONTINUED | OUTPATIENT
Start: 2022-03-22 | End: 2022-04-01

## 2022-03-22 RX ORDER — PANTOPRAZOLE SODIUM 40 MG/1
40 TABLET, DELAYED RELEASE ORAL DAILY
Status: DISCONTINUED | OUTPATIENT
Start: 2022-03-22 | End: 2022-03-24

## 2022-03-22 RX ORDER — DEXAMETHASONE SODIUM PHOSPHATE 4 MG/ML
INJECTION, SOLUTION INTRA-ARTICULAR; INTRALESIONAL; INTRAMUSCULAR; INTRAVENOUS; SOFT TISSUE PRN
Status: DISCONTINUED | OUTPATIENT
Start: 2022-03-22 | End: 2022-03-22 | Stop reason: SDUPTHER

## 2022-03-22 RX ORDER — GLYCOPYRROLATE 1 MG/5 ML
SYRINGE (ML) INTRAVENOUS PRN
Status: DISCONTINUED | OUTPATIENT
Start: 2022-03-22 | End: 2022-03-22 | Stop reason: SDUPTHER

## 2022-03-22 RX ORDER — CLINDAMYCIN PHOSPHATE 900 MG/50ML
900 INJECTION INTRAVENOUS ONCE
Status: COMPLETED | OUTPATIENT
Start: 2022-03-22 | End: 2022-03-22

## 2022-03-22 RX ORDER — SODIUM CHLORIDE 0.9 % (FLUSH) 0.9 %
5-40 SYRINGE (ML) INJECTION PRN
Status: DISCONTINUED | OUTPATIENT
Start: 2022-03-22 | End: 2022-04-05 | Stop reason: HOSPADM

## 2022-03-22 RX ORDER — PHENYLEPHRINE HCL IN 0.9% NACL 1 MG/10 ML
SYRINGE (ML) INTRAVENOUS PRN
Status: DISCONTINUED | OUTPATIENT
Start: 2022-03-22 | End: 2022-03-22 | Stop reason: SDUPTHER

## 2022-03-22 RX ORDER — SODIUM CHLORIDE, SODIUM LACTATE, POTASSIUM CHLORIDE, CALCIUM CHLORIDE 600; 310; 30; 20 MG/100ML; MG/100ML; MG/100ML; MG/100ML
INJECTION, SOLUTION INTRAVENOUS CONTINUOUS
Status: DISCONTINUED | OUTPATIENT
Start: 2022-03-22 | End: 2022-03-25

## 2022-03-22 RX ORDER — SUCCINYLCHOLINE/SOD CL,ISO/PF 200MG/10ML
SYRINGE (ML) INTRAVENOUS PRN
Status: DISCONTINUED | OUTPATIENT
Start: 2022-03-22 | End: 2022-03-22 | Stop reason: SDUPTHER

## 2022-03-22 RX ORDER — MEPERIDINE HYDROCHLORIDE 25 MG/ML
12.5 INJECTION INTRAMUSCULAR; INTRAVENOUS; SUBCUTANEOUS EVERY 5 MIN PRN
Status: DISCONTINUED | OUTPATIENT
Start: 2022-03-22 | End: 2022-03-22 | Stop reason: HOSPADM

## 2022-03-22 RX ORDER — PROPOFOL 10 MG/ML
INJECTION, EMULSION INTRAVENOUS PRN
Status: DISCONTINUED | OUTPATIENT
Start: 2022-03-22 | End: 2022-03-22 | Stop reason: SDUPTHER

## 2022-03-22 RX ORDER — SODIUM CHLORIDE 9 MG/ML
25 INJECTION, SOLUTION INTRAVENOUS PRN
Status: DISCONTINUED | OUTPATIENT
Start: 2022-03-22 | End: 2022-04-05 | Stop reason: HOSPADM

## 2022-03-22 RX ORDER — OXYCODONE HYDROCHLORIDE 5 MG/1
10 TABLET ORAL EVERY 4 HOURS PRN
Status: DISCONTINUED | OUTPATIENT
Start: 2022-03-22 | End: 2022-03-25

## 2022-03-22 RX ORDER — ONDANSETRON 2 MG/ML
4 INJECTION INTRAMUSCULAR; INTRAVENOUS EVERY 6 HOURS PRN
Status: DISCONTINUED | OUTPATIENT
Start: 2022-03-22 | End: 2022-03-28 | Stop reason: ALTCHOICE

## 2022-03-22 RX ORDER — ONDANSETRON 2 MG/ML
INJECTION INTRAMUSCULAR; INTRAVENOUS PRN
Status: DISCONTINUED | OUTPATIENT
Start: 2022-03-22 | End: 2022-03-22 | Stop reason: SDUPTHER

## 2022-03-22 RX ORDER — SODIUM CHLORIDE 0.9 % (FLUSH) 0.9 %
5-40 SYRINGE (ML) INJECTION EVERY 12 HOURS SCHEDULED
Status: DISCONTINUED | OUTPATIENT
Start: 2022-03-22 | End: 2022-03-22 | Stop reason: HOSPADM

## 2022-03-22 RX ORDER — ROCURONIUM BROMIDE 10 MG/ML
INJECTION, SOLUTION INTRAVENOUS PRN
Status: DISCONTINUED | OUTPATIENT
Start: 2022-03-22 | End: 2022-03-22 | Stop reason: SDUPTHER

## 2022-03-22 RX ORDER — ONDANSETRON 2 MG/ML
4 INJECTION INTRAMUSCULAR; INTRAVENOUS
Status: DISCONTINUED | OUTPATIENT
Start: 2022-03-22 | End: 2022-03-22 | Stop reason: HOSPADM

## 2022-03-22 RX ORDER — SODIUM CHLORIDE, SODIUM LACTATE, POTASSIUM CHLORIDE, AND CALCIUM CHLORIDE .6; .31; .03; .02 G/100ML; G/100ML; G/100ML; G/100ML
500 INJECTION, SOLUTION INTRAVENOUS ONCE
Status: COMPLETED | OUTPATIENT
Start: 2022-03-22 | End: 2022-03-22

## 2022-03-22 RX ORDER — ONDANSETRON 4 MG/1
4 TABLET, ORALLY DISINTEGRATING ORAL EVERY 8 HOURS PRN
Status: DISCONTINUED | OUTPATIENT
Start: 2022-03-22 | End: 2022-03-28 | Stop reason: ALTCHOICE

## 2022-03-22 RX ORDER — NEOSTIGMINE METHYLSULFATE 1 MG/ML
INJECTION, SOLUTION INTRAVENOUS PRN
Status: DISCONTINUED | OUTPATIENT
Start: 2022-03-22 | End: 2022-03-22 | Stop reason: SDUPTHER

## 2022-03-22 RX ORDER — SODIUM CHLORIDE 0.9 % (FLUSH) 0.9 %
5-40 SYRINGE (ML) INJECTION EVERY 12 HOURS SCHEDULED
Status: DISCONTINUED | OUTPATIENT
Start: 2022-03-22 | End: 2022-04-05 | Stop reason: HOSPADM

## 2022-03-22 RX ADMIN — ACETAMINOPHEN 650 MG: 325 TABLET ORAL at 20:31

## 2022-03-22 RX ADMIN — SODIUM CHLORIDE, POTASSIUM CHLORIDE, SODIUM LACTATE AND CALCIUM CHLORIDE: 600; 310; 30; 20 INJECTION, SOLUTION INTRAVENOUS at 07:20

## 2022-03-22 RX ADMIN — HYDROMORPHONE HYDROCHLORIDE 0.5 MG: 1 INJECTION, SOLUTION INTRAMUSCULAR; INTRAVENOUS; SUBCUTANEOUS at 09:46

## 2022-03-22 RX ADMIN — Medication 120 MG: at 07:31

## 2022-03-22 RX ADMIN — SODIUM CHLORIDE, POTASSIUM CHLORIDE, SODIUM LACTATE AND CALCIUM CHLORIDE: 600; 310; 30; 20 INJECTION, SOLUTION INTRAVENOUS at 20:33

## 2022-03-22 RX ADMIN — ONDANSETRON 4 MG: 2 INJECTION INTRAMUSCULAR; INTRAVENOUS at 17:00

## 2022-03-22 RX ADMIN — FENTANYL CITRATE 25 MCG: 50 INJECTION, SOLUTION INTRAMUSCULAR; INTRAVENOUS at 08:58

## 2022-03-22 RX ADMIN — Medication 3 MG: at 08:52

## 2022-03-22 RX ADMIN — Medication 100 MCG: at 07:36

## 2022-03-22 RX ADMIN — PANTOPRAZOLE SODIUM 40 MG: 40 TABLET, DELAYED RELEASE ORAL at 11:02

## 2022-03-22 RX ADMIN — ROCURONIUM BROMIDE 10 MG: 10 INJECTION, SOLUTION INTRAVENOUS at 07:31

## 2022-03-22 RX ADMIN — LIDOCAINE HYDROCHLORIDE 100 MG: 20 INJECTION, SOLUTION EPIDURAL; INFILTRATION; INTRACAUDAL; PERINEURAL at 07:31

## 2022-03-22 RX ADMIN — Medication 0.6 MG: at 08:52

## 2022-03-22 RX ADMIN — SODIUM CHLORIDE, POTASSIUM CHLORIDE, SODIUM LACTATE AND CALCIUM CHLORIDE: 600; 310; 30; 20 INJECTION, SOLUTION INTRAVENOUS at 10:45

## 2022-03-22 RX ADMIN — ROCURONIUM BROMIDE 20 MG: 10 INJECTION, SOLUTION INTRAVENOUS at 08:04

## 2022-03-22 RX ADMIN — ONDANSETRON 4 MG: 2 INJECTION INTRAMUSCULAR; INTRAVENOUS at 08:52

## 2022-03-22 RX ADMIN — DEXAMETHASONE SODIUM PHOSPHATE 10 MG: 4 INJECTION, SOLUTION INTRAMUSCULAR; INTRAVENOUS at 07:45

## 2022-03-22 RX ADMIN — SODIUM CHLORIDE, POTASSIUM CHLORIDE, SODIUM LACTATE AND CALCIUM CHLORIDE 500 ML: 600; 310; 30; 20 INJECTION, SOLUTION INTRAVENOUS at 22:01

## 2022-03-22 RX ADMIN — PROPOFOL 170 MG: 10 INJECTION, EMULSION INTRAVENOUS at 07:31

## 2022-03-22 RX ADMIN — ROCURONIUM BROMIDE 20 MG: 10 INJECTION, SOLUTION INTRAVENOUS at 07:42

## 2022-03-22 RX ADMIN — CLINDAMYCIN IN 5 PERCENT DEXTROSE 900 MG: 18 INJECTION, SOLUTION INTRAVENOUS at 07:22

## 2022-03-22 RX ADMIN — ENOXAPARIN SODIUM 40 MG: 100 INJECTION SUBCUTANEOUS at 11:02

## 2022-03-22 RX ADMIN — ACETAMINOPHEN 650 MG: 325 TABLET ORAL at 11:02

## 2022-03-22 RX ADMIN — FENTANYL CITRATE 100 MCG: 50 INJECTION, SOLUTION INTRAMUSCULAR; INTRAVENOUS at 07:31

## 2022-03-22 ASSESSMENT — PULMONARY FUNCTION TESTS
PIF_VALUE: 13
PIF_VALUE: 22
PIF_VALUE: 7
PIF_VALUE: 19
PIF_VALUE: 13
PIF_VALUE: 0
PIF_VALUE: 13
PIF_VALUE: 22
PIF_VALUE: 13
PIF_VALUE: 7
PIF_VALUE: 15
PIF_VALUE: 7
PIF_VALUE: 6
PIF_VALUE: 13
PIF_VALUE: 21
PIF_VALUE: 19
PIF_VALUE: 22
PIF_VALUE: 20
PIF_VALUE: 18
PIF_VALUE: 13
PIF_VALUE: 22
PIF_VALUE: 13
PIF_VALUE: 19
PIF_VALUE: 19
PIF_VALUE: 1
PIF_VALUE: 13
PIF_VALUE: 12
PIF_VALUE: 16
PIF_VALUE: 19
PIF_VALUE: 0
PIF_VALUE: 19
PIF_VALUE: 13
PIF_VALUE: 13
PIF_VALUE: 14
PIF_VALUE: 22
PIF_VALUE: 19
PIF_VALUE: 0
PIF_VALUE: 19
PIF_VALUE: 14
PIF_VALUE: 22
PIF_VALUE: 13
PIF_VALUE: 23
PIF_VALUE: 15
PIF_VALUE: 2
PIF_VALUE: 1
PIF_VALUE: 12
PIF_VALUE: 22
PIF_VALUE: 22
PIF_VALUE: 5
PIF_VALUE: 19
PIF_VALUE: 19
PIF_VALUE: 21
PIF_VALUE: 0
PIF_VALUE: 8
PIF_VALUE: 19
PIF_VALUE: 23
PIF_VALUE: 13
PIF_VALUE: 22
PIF_VALUE: 19
PIF_VALUE: 19
PIF_VALUE: 1
PIF_VALUE: 19
PIF_VALUE: 20
PIF_VALUE: 13
PIF_VALUE: 15
PIF_VALUE: 22
PIF_VALUE: 19
PIF_VALUE: 16
PIF_VALUE: 13
PIF_VALUE: 20
PIF_VALUE: 14
PIF_VALUE: 19
PIF_VALUE: 19
PIF_VALUE: 13
PIF_VALUE: 13
PIF_VALUE: 19
PIF_VALUE: 1
PIF_VALUE: 6
PIF_VALUE: 22
PIF_VALUE: 13
PIF_VALUE: 12
PIF_VALUE: 23
PIF_VALUE: 20
PIF_VALUE: 19
PIF_VALUE: 22
PIF_VALUE: 21
PIF_VALUE: 23
PIF_VALUE: 22
PIF_VALUE: 19
PIF_VALUE: 19
PIF_VALUE: 7
PIF_VALUE: 13
PIF_VALUE: 23
PIF_VALUE: 16
PIF_VALUE: 13
PIF_VALUE: 13
PIF_VALUE: 21

## 2022-03-22 ASSESSMENT — PAIN DESCRIPTION - LOCATION: LOCATION: ABDOMEN

## 2022-03-22 ASSESSMENT — PAIN SCALES - GENERAL
PAINLEVEL_OUTOF10: 0
PAINLEVEL_OUTOF10: 0
PAINLEVEL_OUTOF10: 7
PAINLEVEL_OUTOF10: 2
PAINLEVEL_OUTOF10: 0

## 2022-03-22 ASSESSMENT — PAIN DESCRIPTION - PAIN TYPE: TYPE: SURGICAL PAIN

## 2022-03-22 ASSESSMENT — PAIN - FUNCTIONAL ASSESSMENT: PAIN_FUNCTIONAL_ASSESSMENT: 0-10

## 2022-03-22 ASSESSMENT — LIFESTYLE VARIABLES: SMOKING_STATUS: 0

## 2022-03-22 NOTE — INTERVAL H&P NOTE
Update History & Physical    The patient's History and Physical of February 28, 2022 was reviewed with the patient and I examined the patient. There was no change. The surgical site was confirmed by the patient and me. Plan: The risks, benefits, expected outcome, and alternative to the recommended procedure have been discussed with the patient. Patient understands and wants to proceed with the procedure.      Electronically signed by Jareth Olivera MD on 3/22/2022 at 6:09 AM

## 2022-03-22 NOTE — OP NOTE
Operative Note      Patient: Celina Tomlin YOB: 1955  MRN: [id number]    Date of Procedure: 3/22/2022    Pre-Op Diagnosis: BENIGN NEOPLASM OF CECUM    Post-Op Diagnosis: Same       Procedure(s):  LAPAROSCOPIC RIGHT HEMICOLECTOMY    Surgeon(s):  Ny Arenas MD    Assistant:   Resident: Nolberto Sharma MD    Anesthesia: General    Estimated Blood Loss (mL): less than 50     Complications: None    Specimens:   ID Type Source Tests Collected by Time Destination   A : Right Colon Tissue Tissue SURGICAL PATHOLOGY Ny Arenas MD 3/22/2022 9818        Implants:  * No implants in log *      Drains:   Urethral Catheter Non-latex 16 fr (Active)       Findings: Uncomplicated laparoscopic right hemicolectomy with stapled anastomosis    HISTORY: Celina Tomlin is a 74 y.o. male with recent admission for GI bleeding was found to have a very large cecal polyp that was unable to be safely removed pathology showed tubulovillous adenoma without dysplasia after further discussion right hemicolectomy was recommended. The risks benefits and alternatives of the procedure were discussed with the patient who stated understanding and agreed to proceed. DESCRIPTION OF PROCEDURE: The patient was brought to the operating room and positioned supine on the OR table. Sequential compression devices were placed on the patient's lower extremities and functioning. Preoperative antibiotics were administered. Anesthesia was obtained without complication as per the anesthesia record. Immediately prior to the procedure a time-out was called and the surgical checklist was reviewed and agreed upon by all present. The patient was prepped and draped in the usual sterile fashion and the procedure went forth with strict aseptic technique under maximal barrier precautions. Peritoneal access was obtained with a varies needle through a supraumbilical 5 mm incision.   Intraperitoneal placement was confirmed by saline drop test. The abdomen was insufflated to 15mmHg. The abdomen examined and there is noted to be minimal adhesions throughout the abdomen. A second 5 mm laparoscopic trocar was placed suprapubically under direct visualization followed by a left lower quadrant 5 mm laparoscopic trocar.      The ileocolic pedicle was identified and elevated with a blunt grasper. Inferior to the pedicle, a window was made in the mesocolon using the Ligasure. Blunt dissection was used to develop the plane between the mesocolon and Gerota's fascia progressing cranially to the level of the hepatic flexure and transverse colon. The duodenum was visualized throughout dissection and care was take to elevate the mesocolon away from the duodenum, leaving its retroperitoneal attachments intact. Next, the ileocolic pedicle was isolated and divided using the Ligasure. The divided pedicle was observed and hemostasis was confirmed. Anterior cecal adhesions along with the white line of Toldt was divided with LigaSure, from caudally to cranially and then from cranially to about care home up the colon. The transverse colon was then retracted caudally. The omentum was divided from the mid transverse colon towards the hepatic flexure, opening the lesser sac and minimizing the flexure caudally. The transverse colon and the hepatic flexures were bluntly mobilized from the retroperitoneal attachments until the previous dissection plane was reached. Again, care was taken to keep the dissection safely away from the duodenum. The mesentery of the right colon was divided using the LigaSure device. The middle colic artery was identified at the medial extent of the mesenteric division and preserved. The remainder of the lateral attachments were taken down from cranial to caudal completing our dissection. This was done with a combination of blunt and sharp dissection along with the LigaSure device.     The lateral peritoneal attachments of the distal ileum and the appendix were identified and divided using a LigaSure, and blunt dissection was used to mobilize its mesentery from the retroperitoneal attachments.      With the right colon mobilized, instruments were removed and insufflation was evacuated. The supraumbilical port site incision was extended for several centimeters with a #15 blade. Electrocautery dissection was used to deepen the incision to the level of the fascia and peritoneum, which were divided over the 10mm trocar, and the trocar was removed. A wound protector was introduced. The right colon was grasped through the incision and extracorporealized, along with the hepatic flexure and terminal ileum and previous distal ileal anastomosis. A window was made in the mesentery proximal to the prior ileal anastomosis with electrocautery, and the ileum was divided with a GOOD 75mm blue staple load. The transverse colon was divided just lateral to the middle colic artery using another GOOD 75mm blue staple load. The ileal mesentery was divided using the Ligasure, and the specimen was removed. There was bleeding near the base of the mesentery which was controlled using figure-of-eight silk suture.     The transverse colon staple line was opened at a tinea, and the antimesenteric border of the terminal ileal staple line was opened. A GOOD 75mm blue staple load was used to create a side to side ileocolic anastomosis. The common enterotomy was approximated with Allis clamps and closed with a GOOD-75 blue load stapler. Hemostasis was confirmed. Additional 3-0 silk simple sutures were used to reinforce the distal and proximal margins of the stapled anastomosis. The anastomosis was reduced into the peritoneal cavity, and the wound protector was removed.     The fascia at the supraumbilical midline incision was approximated with #1 PDS in an figure-of-eight fashion. The skin at the laparoscopic incision sites were closed using 4-0 Vicryl in a figure-of-eight fashion. The fascia at the superior midline incision was closed using 4-0 Vicryl in a running subcuticular fashion after the subcutaneous tissue was irrigated. The abdomen was cleaned and dried, and skin glue was applied to the incisions. Needle, sponge, and instrument counts were reported as correct x2. The patient tolerated the procedure well without complications. Dr. Chayo Ornelas was present and scrubbed throughout the case.     Electronically signed by Chino Tinajero MD on 3/22/2022 at 9:15 AM

## 2022-03-22 NOTE — ANESTHESIA PRE PROCEDURE
Department of Anesthesiology  Preprocedure Note       Name:  Argelia Gunderson. Age:  74 y.o.  :  1955                                          MRN:  [id number]         Date:  3/22/2022      Surgeon: Amy Morrison):  Bharti Clarke MD    Procedure: Procedure(s):  LAPAROSCOPIC RIGHT HEMICOLECTOMY    Medications prior to admission:   Prior to Admission medications    Medication Sig Start Date End Date Taking? Authorizing Provider   apixaban (ELIQUIS) 5 MG TABS tablet Take 5 mg by mouth 2 times daily    Historical Provider, MD   amLODIPine (NORVASC) 5 MG tablet Take 1 tablet by mouth daily 22   Nikhil Mcmillan MD   metoprolol (LOPRESSOR) 100 MG tablet Take 1 tablet by mouth 2 times daily 21   Nikhil Mcmillan MD   hydrALAZINE (APRESOLINE) 50 MG tablet take 1 tablet by mouth three times a day 21   Nikhil Mcmillan MD       Current medications:    No current outpatient medications on file. No current facility-administered medications for this visit. Allergies:     Allergies   Allergen Reactions    Cefepime Rash    Pcn [Penicillins] Rash       Problem List:    Patient Active Problem List   Diagnosis Code    Retinal detachment of left eye with multiple breaks H33.022    Rectal cancer ([address] C20    Ileus ([address] K56.7    Atrial flutter with rapid ventricular response (HCC) I48.92    GI bleed K92.2    Gastrointestinal bleed K92.2       Past Medical History:        Diagnosis Date    Employs prosthetic leg     left    History of atrial fibrillation     Native (hard of hearing)     uses bilat hearing aides    Hx of AKA (above knee amputation), left ([address]     Hx of necrotizing fasciitis     left leg    Hypertension     Rectal bleeding     Rectal cancer ([address] 2017    rectal       Past Surgical History:        Procedure Laterality Date    ABDOMEN SURGERY  2018    ileostomy open take down    ABOVE KNEE AMPUTATION      left; hx flesh eating bacteria    CARDIOVERSION 2020    COLON SURGERY  2018    laprascopic low anterior colon resection  take down splenic fexure   ileostomy    COLONOSCOPY  10/21/2011    COLONOSCOPY N/A 2022    COLONOSCOPY POLYPECTOMY SNARE/COLD BIOPSY performed by David Ruiz MD at [address] Left 10/03/2016    pars plana vitrectomy, retinal detachment repair, laser gas/fluid exchange    EYE SURGERY Bilateral 2002? bilat cataracts w lens implants    EYE SURGERY Right ?    retinal detachment    SC REVISION OF ILEOSTOMY,COMPLICATED N/A     ILEOSTOMY OPEN TAKEDOWN performed by David Ruiz MD at [address] [place] [address] History:    Social History     Tobacco Use    Smoking status: Former Smoker     Packs/day: 2.00     Years: 35.00     Pack years: 70.00     Types: Cigarettes     Start date: 1974     Quit date: 2010     Years since quittin.3    Smokeless tobacco: Never Used   Substance Use Topics    Alcohol use: No                                Counseling given: Not Answered      Vital Signs (Current): There were no vitals filed for this visit.                                            BP Readings from Last 3 Encounters:   03/15/22 (!) 127/94   22 (!) 107/55   22 (!) 87/50       NPO Status:                                                                                 BMI:   Wt Readings from Last 3 Encounters:   03/15/22 190 lb (86.2 kg)   22 187 lb (84.8 kg)   22 189 lb (85.7 kg)     There is no height or weight on file to calculate BMI.    CBC:   Lab Results   Component Value Date    WBC 7.5 03/15/2022    RBC 3.98 03/15/2022    HGB 10.1 03/15/2022    HCT 32.4 03/15/2022    MCV 81.4 03/15/2022    RDW 12.9 03/15/2022     03/15/2022       CMP:   Lab Results   Component Value Date     03/15/2022    K 3.7 03/15/2022    K 3.8 2022     03/15/2022    CO2 24 03/15/2022    BUN 21 03/15/2022    CREATININE 1.5 03/15/2022    GFRAA 56 03/15/2022    LABGLOM 46 03/15/2022    GLUCOSE 108 03/15/2022    GLUCOSE 106 03/24/2011    PROT 4.9 01/26/2022    CALCIUM 8.5 03/15/2022    BILITOT 0.5 01/26/2022    ALKPHOS 61 01/26/2022    AST 20 01/26/2022    ALT 14 01/26/2022       POC Tests: No results for input(s): POCGLU, POCNA, POCK, POCCL, POCBUN, POCHEMO, POCHCT in the last 72 hours. Coags:   Lab Results   Component Value Date    PROTIME 12.6 01/24/2022    PROTIME 12.2 03/18/2011    INR 1.1 01/24/2022    APTT 26.3 01/24/2022       HCG (If Applicable): No results found for: PREGTESTUR, PREGSERUM, HCG, HCGQUANT     ABGs:   Lab Results   Component Value Date    Q0LAUPTX 95.3 01/10/2011        Type & Screen (If Applicable):  No results found for: LABABO, [address]    Anesthesia Evaluation  Patient summary reviewed and Nursing notes reviewed no history of anesthetic complications:   Airway: Mallampati: II  TM distance: >3 FB   Neck ROM: full  Mouth opening: > = 3 FB Dental:      Comment: Only 2 teeth remaining- poor dentition    Pulmonary: breath sounds clear to auscultation      (-) not a current smoker                          ROS comment: Former smoker- quit 7 years ago   Cardiovascular:  Exercise tolerance: no interval change,   (+) hypertension: moderate, dysrhythmias: atrial fibrillation,       ECG reviewed  Rhythm: irregular  Rate: normal           Beta Blocker:  Order written         Neuro/Psych:   Negative Neuro/Psych ROS              GI/Hepatic/Renal:   (+) bowel prep,          ROS comment: S/P ileostomy and takedown. .   Endo/Other:    (+) blood dyscrasia: anticoagulation therapy:., malignancy/cancer. ROS comment: Left lower extremity amputation Abdominal:             Vascular: negative vascular ROS. Other Findings:               Anesthesia Plan      general     ASA 3       Induction: intravenous. MIPS: Postoperative opioids intended and Prophylactic antiemetics administered. Anesthetic plan and risks discussed with patient and sibling.       Plan discussed with CRNA. Chart reviewed . Patient assessed before induction. I agree with the above note.   CRISTI Jean - CRNA          Skyla Mensah MD           3/22/2022        6:26 AM

## 2022-03-22 NOTE — PROGRESS NOTES
Report called to floor RN  Patient is resting comfortably without c/o pain  Incision to abdomen CDI  Valles draining clear urine  VSS

## 2022-03-23 ENCOUNTER — APPOINTMENT (OUTPATIENT)
Dept: ULTRASOUND IMAGING | Age: 67
DRG: 330 | End: 2022-03-23
Attending: SURGERY
Payer: MEDICARE

## 2022-03-23 LAB
ALBUMIN SERPL-MCNC: 3.3 G/DL (ref 3.5–5.2)
ALP BLD-CCNC: 80 U/L (ref 40–129)
ALT SERPL-CCNC: 23 U/L (ref 0–40)
ANION GAP SERPL CALCULATED.3IONS-SCNC: 11 MMOL/L (ref 7–16)
ANION GAP SERPL CALCULATED.3IONS-SCNC: 11 MMOL/L (ref 7–16)
AST SERPL-CCNC: 20 U/L (ref 0–39)
BASOPHILS ABSOLUTE: 0.03 E9/L (ref 0–0.2)
BASOPHILS RELATIVE PERCENT: 0.3 % (ref 0–2)
BILIRUB SERPL-MCNC: 0.7 MG/DL (ref 0–1.2)
BLOOD BANK DISPENSE STATUS: NORMAL
BLOOD BANK PRODUCT CODE: NORMAL
BPU ID: NORMAL
BUN BLDV-MCNC: 38 MG/DL (ref 6–23)
BUN BLDV-MCNC: 42 MG/DL (ref 6–23)
CALCIUM SERPL-MCNC: 7.9 MG/DL (ref 8.6–10.2)
CALCIUM SERPL-MCNC: 8 MG/DL (ref 8.6–10.2)
CHLORIDE BLD-SCNC: 100 MMOL/L (ref 98–107)
CHLORIDE BLD-SCNC: 99 MMOL/L (ref 98–107)
CHLORIDE URINE RANDOM: 42 MMOL/L
CO2: 22 MMOL/L (ref 22–29)
CO2: 23 MMOL/L (ref 22–29)
CREAT SERPL-MCNC: 2.3 MG/DL (ref 0.7–1.2)
CREAT SERPL-MCNC: 2.8 MG/DL (ref 0.7–1.2)
CREATININE URINE: 163 MG/DL (ref 40–278)
CREATININE URINE: 271 MG/DL (ref 40–278)
DESCRIPTION BLOOD BANK: NORMAL
EOSINOPHILS ABSOLUTE: 0 E9/L (ref 0.05–0.5)
EOSINOPHILS RELATIVE PERCENT: 0 % (ref 0–6)
GFR AFRICAN AMERICAN: 27
GFR AFRICAN AMERICAN: 34
GFR NON-AFRICAN AMERICAN: 23 ML/MIN/1.73
GFR NON-AFRICAN AMERICAN: 28 ML/MIN/1.73
GLUCOSE BLD-MCNC: 115 MG/DL (ref 74–99)
GLUCOSE BLD-MCNC: 162 MG/DL (ref 74–99)
HCT VFR BLD CALC: 20.9 % (ref 37–54)
HCT VFR BLD CALC: 24.9 % (ref 37–54)
HCT VFR BLD CALC: 25 % (ref 37–54)
HEMOGLOBIN: 6.5 G/DL (ref 12.5–16.5)
HEMOGLOBIN: 7.7 G/DL (ref 12.5–16.5)
HEMOGLOBIN: 7.9 G/DL (ref 12.5–16.5)
IMMATURE GRANULOCYTES #: 0.05 E9/L
IMMATURE GRANULOCYTES %: 0.4 % (ref 0–5)
LYMPHOCYTES ABSOLUTE: 0.89 E9/L (ref 1.5–4)
LYMPHOCYTES RELATIVE PERCENT: 7.9 % (ref 20–42)
MCH RBC QN AUTO: 24.8 PG (ref 26–35)
MCHC RBC AUTO-ENTMCNC: 30.8 % (ref 32–34.5)
MCV RBC AUTO: 80.6 FL (ref 80–99.9)
MONOCYTES ABSOLUTE: 1.08 E9/L (ref 0.1–0.95)
MONOCYTES RELATIVE PERCENT: 9.6 % (ref 2–12)
NEUTROPHILS ABSOLUTE: 9.2 E9/L (ref 1.8–7.3)
NEUTROPHILS RELATIVE PERCENT: 81.8 % (ref 43–80)
PDW BLD-RTO: 13.2 FL (ref 11.5–15)
PLATELET # BLD: 271 E9/L (ref 130–450)
PMV BLD AUTO: 9.1 FL (ref 7–12)
POTASSIUM SERPL-SCNC: 3.9 MMOL/L (ref 3.5–5)
POTASSIUM SERPL-SCNC: 4.7 MMOL/L (ref 3.5–5)
PROTEIN PROTEIN: 13 MG/DL (ref 0–12)
PROTEIN PROTEIN: 79 MG/DL (ref 0–12)
PROTEIN/CREAT RATIO: 0.1
PROTEIN/CREAT RATIO: 0.1 (ref 0–0.2)
PROTEIN/CREAT RATIO: 0.3
PROTEIN/CREAT RATIO: 0.3 (ref 0–0.2)
RBC # BLD: 3.1 E12/L (ref 3.8–5.8)
SODIUM BLD-SCNC: 133 MMOL/L (ref 132–146)
SODIUM BLD-SCNC: 133 MMOL/L (ref 132–146)
SODIUM URINE: 48 MMOL/L
TOTAL PROTEIN: 5 G/DL (ref 6.4–8.3)
WBC # BLD: 11.3 E9/L (ref 4.5–11.5)

## 2022-03-23 PROCEDURE — 84156 ASSAY OF PROTEIN URINE: CPT

## 2022-03-23 PROCEDURE — 80053 COMPREHEN METABOLIC PANEL: CPT

## 2022-03-23 PROCEDURE — 85014 HEMATOCRIT: CPT

## 2022-03-23 PROCEDURE — 36430 TRANSFUSION BLD/BLD COMPNT: CPT

## 2022-03-23 PROCEDURE — 80048 BASIC METABOLIC PNL TOTAL CA: CPT

## 2022-03-23 PROCEDURE — 82570 ASSAY OF URINE CREATININE: CPT

## 2022-03-23 PROCEDURE — 85025 COMPLETE CBC W/AUTO DIFF WBC: CPT

## 2022-03-23 PROCEDURE — 85018 HEMOGLOBIN: CPT

## 2022-03-23 PROCEDURE — 2580000003 HC RX 258: Performed by: SURGERY

## 2022-03-23 PROCEDURE — 82436 ASSAY OF URINE CHLORIDE: CPT

## 2022-03-23 PROCEDURE — 1200000000 HC SEMI PRIVATE

## 2022-03-23 PROCEDURE — 93005 ELECTROCARDIOGRAM TRACING: CPT | Performed by: FAMILY MEDICINE

## 2022-03-23 PROCEDURE — 2580000003 HC RX 258: Performed by: FAMILY MEDICINE

## 2022-03-23 PROCEDURE — 6360000002 HC RX W HCPCS: Performed by: SURGERY

## 2022-03-23 PROCEDURE — 84300 ASSAY OF URINE SODIUM: CPT

## 2022-03-23 PROCEDURE — 76770 US EXAM ABDO BACK WALL COMP: CPT

## 2022-03-23 PROCEDURE — 6370000000 HC RX 637 (ALT 250 FOR IP): Performed by: SURGERY

## 2022-03-23 PROCEDURE — 2700000000 HC OXYGEN THERAPY PER DAY

## 2022-03-23 PROCEDURE — 2580000003 HC RX 258: Performed by: INTERNAL MEDICINE

## 2022-03-23 PROCEDURE — 36415 COLL VENOUS BLD VENIPUNCTURE: CPT

## 2022-03-23 RX ORDER — SODIUM CHLORIDE, SODIUM LACTATE, POTASSIUM CHLORIDE, AND CALCIUM CHLORIDE .6; .31; .03; .02 G/100ML; G/100ML; G/100ML; G/100ML
1000 INJECTION, SOLUTION INTRAVENOUS ONCE
Status: COMPLETED | OUTPATIENT
Start: 2022-03-23 | End: 2022-03-23

## 2022-03-23 RX ORDER — SODIUM CHLORIDE, SODIUM LACTATE, POTASSIUM CHLORIDE, CALCIUM CHLORIDE 600; 310; 30; 20 MG/100ML; MG/100ML; MG/100ML; MG/100ML
500 INJECTION, SOLUTION INTRAVENOUS ONCE
Status: COMPLETED | OUTPATIENT
Start: 2022-03-23 | End: 2022-03-23

## 2022-03-23 RX ORDER — SODIUM CHLORIDE 9 MG/ML
INJECTION, SOLUTION INTRAVENOUS PRN
Status: DISCONTINUED | OUTPATIENT
Start: 2022-03-23 | End: 2022-03-24

## 2022-03-23 RX ADMIN — ONDANSETRON 4 MG: 2 INJECTION INTRAMUSCULAR; INTRAVENOUS at 17:52

## 2022-03-23 RX ADMIN — ACETAMINOPHEN 650 MG: 325 TABLET ORAL at 03:56

## 2022-03-23 RX ADMIN — ACETAMINOPHEN 650 MG: 325 TABLET ORAL at 16:10

## 2022-03-23 RX ADMIN — SODIUM CHLORIDE, POTASSIUM CHLORIDE, SODIUM LACTATE AND CALCIUM CHLORIDE: 600; 310; 30; 20 INJECTION, SOLUTION INTRAVENOUS at 16:39

## 2022-03-23 RX ADMIN — PANTOPRAZOLE SODIUM 40 MG: 40 TABLET, DELAYED RELEASE ORAL at 08:23

## 2022-03-23 RX ADMIN — SODIUM CHLORIDE, POTASSIUM CHLORIDE, SODIUM LACTATE AND CALCIUM CHLORIDE 500 ML: 600; 310; 30; 20 INJECTION, SOLUTION INTRAVENOUS at 05:05

## 2022-03-23 RX ADMIN — SODIUM CHLORIDE, POTASSIUM CHLORIDE, SODIUM LACTATE AND CALCIUM CHLORIDE 1000 ML: 600; 310; 30; 20 INJECTION, SOLUTION INTRAVENOUS at 06:06

## 2022-03-23 ASSESSMENT — PAIN SCALES - GENERAL
PAINLEVEL_OUTOF10: 0
PAINLEVEL_OUTOF10: 0
PAINLEVEL_OUTOF10: 4
PAINLEVEL_OUTOF10: 0
PAINLEVEL_OUTOF10: 1
PAINLEVEL_OUTOF10: 1

## 2022-03-23 ASSESSMENT — PAIN DESCRIPTION - LOCATION: LOCATION: ABDOMEN

## 2022-03-23 ASSESSMENT — PAIN DESCRIPTION - ORIENTATION: ORIENTATION: MID;LEFT

## 2022-03-23 ASSESSMENT — PAIN DESCRIPTION - FREQUENCY: FREQUENCY: INTERMITTENT

## 2022-03-23 ASSESSMENT — PAIN DESCRIPTION - DESCRIPTORS: DESCRIPTORS: ACHING;SORE

## 2022-03-23 ASSESSMENT — PAIN DESCRIPTION - PROGRESSION: CLINICAL_PROGRESSION: GRADUALLY IMPROVING

## 2022-03-23 ASSESSMENT — PAIN DESCRIPTION - ONSET: ONSET: ON-GOING

## 2022-03-23 ASSESSMENT — PAIN DESCRIPTION - PAIN TYPE: TYPE: ACUTE PAIN;SURGICAL PAIN

## 2022-03-23 ASSESSMENT — PAIN - FUNCTIONAL ASSESSMENT: PAIN_FUNCTIONAL_ASSESSMENT: PREVENTS OR INTERFERES SOME ACTIVE ACTIVITIES AND ADLS

## 2022-03-23 NOTE — PROGRESS NOTES
Anisa Huitron MD Consultation Note      CHIEF COMPLAINT:  Hypotension after surgery yesterday    History Obtained From:  Patient    HISTORY OF PRESENT ILLNESS:    The patient is a 74 y.o. 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 He was given 2 boluses of 500 cc RL and IV at 100 cc/her overnight. His Hgb has decreased from preop value of 10.1 down to 8.2 last night, then 7.7 earlier this morning. His creatinine increased from preop  Value of 1.5 up to 2.8 this morning. Patient states he feels a little weak and some mild right-sided abdominal pain, but otherwise, is feeling ok. No chest pain, no shortness of breath, no palpitations. No nausea, no vomiting. Urine output has been decreased also. Past Medical History:     has a past medical history of Employs prosthetic leg, History of atrial fibrillation, [place] (hard of hearing), AKA (above knee amputation), left ([address] necrotizing fasciitis (2011), Hypertension, Rectal bleeding, and Rectal cancer ([facility] (12/2017). Past Surgical History:     has a past surgical history that includes above knee amputation (2011); Colonoscopy (10/21/2011); Eye surgery (Left, 10/03/2016); eye surgery (Bilateral, 2002?); eye surgery (Right, ?); Colon surgery (01/23/2018); Abdomen surgery (03/20/2018); pr revision of ileostomy,complicated (N/A, 9/00/3162); Colonoscopy (N/A, 2/14/2022); Cardioversion (12/2020); and hemicolectomy (Right, 3/22/2022).     Medications Prior to Admission:    Medications Prior to Admission: apixaban (ELIQUIS) 5 MG TABS tablet, Take 5 mg by mouth 2 times daily  amLODIPine (NORVASC) 5 MG tablet, Take 1 tablet by mouth daily  metoprolol (LOPRESSOR) 100 MG tablet, Take 1 tablet by mouth 2 times daily  hydrALAZINE (APRESOLINE) 50 MG tablet, take 1 tablet by mouth three times a day    Allergies:  Cefepime and Pcn [penicillins]    Social History:    reports that he quit smoking about 11 years ago. His smoking use included cigarettes. He started smoking about 48 years ago. He has a 70.00 pack-year smoking history. He has never used smokeless tobacco. He reports that he does not drink alcohol and does not use drugs.     Family History:       Problem Relation Age of Onset    Diabetes Mother     High Blood Pressure Mother     High Cholesterol Mother     Heart Attack Mother     Dementia Mother     Diabetes Father     Glaucoma Father     High Cholesterol Father    Dossie Angela Pacemaker Father     Diabetes Sister     Thyroid Disease Sister     High Cholesterol Sister     Heart Attack Brother     Diabetes Sister     High Cholesterol Sister     Thyroid Disease Sister     Diabetes Sister     High Cholesterol Sister     Thyroid Disease Sister        REVIEW OF SYSTEMS:  CONSTITUTIONAL:  negative for  fevers, chills, anorexia and weight loss  RESPIRATORY:  negative for  dry cough, dyspnea and chest pain  CARDIOVASCULAR:  negative for  chest pain, syncope  GASTROINTESTINAL:  negative for nausea, vomiting and diarrhea  GENITOURINARY:  negative for frequency, dysuria and hematuria  MUSCULOSKELETAL:  negative for  myalgias and arthralgias  NEUROLOGICAL:  negative for headaches, dizziness, dysphagia, weakness, numbness and syncope    PHYSICAL EXAM:  Vitals:  /82   Pulse 123   Temp 97.3 °F (36.3 °C)   Resp 16   Ht 6' 2\" (1.88 m)   Wt 190 lb (86.2 kg)   SpO2 91%   BMI 24.39 kg/m²   CONSTITUTIONAL:  awake, alert, cooperative, no apparent distress, and appears stated age  EYES:  Lids and lashes normal, pupils equal, round and reactive to light, extra ocular muscles intact, sclera clear, conjunctiva normal  ENT:  Normocephalic, without obvious abnormality, atraumatic, sinuses nontender on palpation, external ears without lesions, oral pharynx with moist mucus membranes, tonsils without erythema or exudates, gums normal and good dentition. NECK:  Supple, symmetrical, trachea midline, no adenopathy, thyroid symmetric, not enlarged and no tenderness, skin normal  HEMATOLOGIC/LYMPHATICS:  no cervical lymphadenopathy  BACK:  Symmetric, no curvature, spinous processes are non-tender on palpation, paraspinous muscles are non-tender on palpation, no costal vertebral tenderness  LUNGS:  No increased work of breathing, good air exchange, clear to auscultation bilaterally, no crackles or wheezing  CARDIOVASCULAR:  RRR with rapid rate, no murmurs, no gallops, no rubs  ABDOMEN:  Mildly distended, but soft and with mild right-sided tenderness, no HSM, no masses, no guarding, no rebound tenderness. CHEST/BREASTS:  No chest tenderness  GENITAL/URINARY:  deferred  MUSCULOSKELETAL:  No edema of right leg, amputation site left thigh clean . NEUROLOGIC:  Cr ns 2-12 intact bilaterally.   Motor and sensory exams normal.    SKIN:  no bruising or bleeding and no rashes    DATA:  EKG:    CBC with Differential:    Lab Results   Component Value Date    WBC 11.3 03/23/2022    RBC 3.10 03/23/2022    HGB 7.7 03/23/2022    HCT 25.0 03/23/2022     03/23/2022    MCV 80.6 03/23/2022    MCH 24.8 03/23/2022    MCHC 30.8 03/23/2022    RDW 13.2 03/23/2022    SEGSPCT 47 03/18/2011    METASPCT 2 03/18/2011    LYMPHOPCT 7.9 03/23/2022    MONOPCT 9.6 03/23/2022    BASOPCT 0.3 03/23/2022    MONOSABS 1.08 03/23/2022    LYMPHSABS 0.89 03/23/2022    EOSABS 0.00 03/23/2022    BASOSABS 0.03 03/23/2022     CMP:    Lab Results   Component Value Date     03/23/2022    K 4.7 03/23/2022    K 3.8 01/26/2022     03/23/2022    CO2 22 03/23/2022    BUN 38 03/23/2022    CREATININE 2.8 03/23/2022    GFRAA 27 03/23/2022    LABGLOM 23 03/23/2022    GLUCOSE 162 03/23/2022    GLUCOSE 106 03/24/2011    PROT 5.0 03/23/2022    LABALBU 3.3 03/23/2022    LABALBU 2.4 03/20/2011    CALCIUM 8.0 03/23/2022    BILITOT

## 2022-03-23 NOTE — PROGRESS NOTES
Spoke with Dr. Alysha Tam and received telephone orders for urine sodium, chloride, protein/creatnine, increase LR 150cc/hr, and renal ultrasound. Dr. Alysha Tam will evaluated pt this am. Pt aware and voiced an understanding.

## 2022-03-23 NOTE — PROGRESS NOTES
GENERAL SURGERY  DAILY PROGRESS NOTE  3/23/2022    No chief complaint on file. Subjective:  Pt states he feels good. Denies any pain. Has not had any flatus or BMs. Tolerating clears.     Objective:  /82   Pulse 123   Temp 97.3 °F (36.3 °C)   Resp 16   Ht 6' 2\" (1.88 m)   Wt 190 lb (86.2 kg)   SpO2 91%   BMI 24.39 kg/m²     GENERAL:  Laying in bed, awake, alert, cooperative, no apparent distress  HEAD: Normocephalic, atraumatic  EYES: No sclera icterus, pupils equal  LUNGS:  No increased work of breathing on room air  CARDIOVASCULAR:  Tachycardic and normotensive  ABDOMEN:  Soft, appropriate TTP around incisions, incisions c/d/i, non-distended  EXTREMITIES: No edema or swelling  SKIN: Warm and dry    Assessment/Plan:  74 y.o. male s/p laparoscopic right hemicolectomy 3/22    - Continue CLD  - Continue IVFs  - Tachycardia likely related to dehydration, will give a bolus  - Pain control PRN  - Low UOP with increased creatinine from baseline, will continue soto and monitor UOP  - Await return of bowel function  - PT/OT, ambulate  - Incentive spirometer    Electronically signed by Ihsan Cross MD on 3/23/2022 at 5:19 AM    Seen/examined  Events noted  Suspect pt was dry on admission from bowel prep  Some COURTNEY from dehydration and post op hypotension  Needs more volume  Appreciate help from primary and renal  JSGadyMD

## 2022-03-23 NOTE — ANESTHESIA POSTPROCEDURE EVALUATION
Department of Anesthesiology  Postprocedure Note    Patient: Ronda Zaragoza MRN: [id number]  YOB: 1955  Date of evaluation: 3/23/2022  Time:  7:32 AM     Procedure Summary     Date: 03/22/22 Room / Location: [address]    Anesthesia Start: 0720 Anesthesia Stop: 9749    Procedure: LAPAROSCOPIC RIGHT HEMICOLECTOMY (Right Abdomen) Diagnosis: (BENIGN NEOPLASM OF CECUM)    Surgeons: Kristal William MD Responsible Provider: Jose L Zaidi MD    Anesthesia Type: general ASA Status: 3          Anesthesia Type: general    Jolene Phase I: Jolene Score: 9    Jolene Phase II:      Last vitals: Reviewed and per EMR flowsheets.        Anesthesia Post Evaluation    Patient location during evaluation: PACU  Patient participation: complete - patient participated  Level of consciousness: awake and alert  Airway patency: patent  Nausea & Vomiting: no nausea and no vomiting  Complications: no  Cardiovascular status: hemodynamically stable  Respiratory status: acceptable  Hydration status: stable

## 2022-03-23 NOTE — PLAN OF CARE
Problem: Skin Integrity:  Goal: Will show no infection signs and symptoms  Description: Will show no infection signs and symptoms  Outcome: Met This Shift  Goal: Absence of new skin breakdown  Description: Absence of new skin breakdown  Outcome: Met This Shift     Problem: Falls - Risk of:  Goal: Will remain free from falls  Description: Will remain free from falls  Outcome: Met This Shift  Goal: Absence of physical injury  Description: Absence of physical injury  Outcome: Met This Shift     Problem: Nausea/Vomiting:  Goal: Absence of nausea/vomiting  Description: Absence of nausea/vomiting  Outcome: Met This Shift  Goal: Able to eat  Description: Able to eat  Outcome: Met This Shift     Problem: Nausea/Vomiting:  Goal: Able to drink  Description: Able to drink  Outcome: Ongoing  Goal: Ability to achieve adequate nutritional intake will improve  Description: Ability to achieve adequate nutritional intake will improve  Outcome: Ongoing

## 2022-03-23 NOTE — PROGRESS NOTES
Blood pressure improving. Valles has decreased urinary output. Creat increased to 2.8. Pt has had IVF infusing at 100cc over night and 500cc LR bolus x2. Nurse spoke to Dr. Efren Bai about decreased urine output and creat. Telephone orders for LR 1000cc bolus over 60 minutes and Nephrology consult to [facility]. Pt aware and voiced an understanding.

## 2022-03-23 NOTE — PROGRESS NOTES
Nurse spoke with answering service to consult . Spoke with Dr. Jaqueline Flores and updated him on pt condition and he agreed with orders given by Dr. Eden Huff.

## 2022-03-23 NOTE — CARE COORDINATION
Case Management: Social Work Case Management Initial Assessment  [place]         Met with:patient  Information verified: address, contacts, phone number, , insurance Yes  PCP: Wilson Arias MD  Pharmacy:   [facility] [address] [place] [id number]  Phone: [phone] Fax: [phone]         Discharge Planning  Patient Status Order: Inpatient Date: 3-23-22  Readmission within last 30 days:  yes  Current Residence: Private Residence  Living Arrangements:  Family Members  (mom & sister Vida Kay)  Home Access: 2-story  Entrance Stairs-Number of Steps: n/a  Support Systems:  Family Members  Current Services PTA: None Supplier: n/a  Patient able to perform ADL's: yes  DME used to aid ambulation prior to admission: left prosthetic leg, quad cane, wheeled walker    Potential Assistance Needed:  N/A  Potential Assistance Purchasing Medications:  No  Does patient want to participate in local refill/meds to beds program?: Yes    Patient agreeable to home care: - if needed  Type of Home Care Services:  None  Patient expects to be discharged to:  home    Prior SNF/Rehab Placement and Facility: yes  Agreeable to SNF/Rehab: No    Choices given to patient: discussed briefly    Expected Discharge date:  uncertain  Follow Up Appointment: Best Day/ Time:      Social Work Assessment/Plan: Reviewed chart notes. Status post right hemicolectomy, benign neoplasm of cecum, post op day 1. Presently hypotensive. Nephrology consulted due to creatinine level per RN. Social service met with Thais Vidal, advised him about social service &  roles, as well as discussed discharge planning. Thais Vidal resides in a 2-story home with his 80year old mother & sister Vida Kay. He has been in rehab in the past and is familiar with [facility] as his mother has used it. Upon discharge from [facility] plans to return home. If HHC is recommended he would like to discuss recommendations with his sister Summer Freeman who is an RN. Cl Neena has no present needs at this time. Social service to follow.     Electronically signed by TAMI Pathak on 3/23/22 at 2:25 PM EDT

## 2022-03-23 NOTE — FLOWSHEET NOTE
03/22/22 1930 03/22/22 2015   Vital Signs   Pulse 151 130   Heart Rate Source Monitor Monitor   Resp 18  --    BP (!) 76/60 87/65   BP Location Left upper arm Left upper arm   MAP (mmHg) 67 73   Patient Position Semi fowlers Semi fowlers       Pt A&Ox4 and able to follow commands, currently resting in bed watching television. Only c/o tiredness. Does not appear in any obvious distress. Spoke with Dr. Ayo Ortiz and received telephone orders for LR 500cc bolus. Stat H&H and consult PCP which is Dr. Aneta Cottrell. Nurse notified pt and he voiced an understanding.

## 2022-03-24 LAB
ALBUMIN SERPL-MCNC: 3.1 G/DL (ref 3.5–5.2)
ALP BLD-CCNC: 73 U/L (ref 40–129)
ALT SERPL-CCNC: 21 U/L (ref 0–40)
ANION GAP SERPL CALCULATED.3IONS-SCNC: 10 MMOL/L (ref 7–16)
ANION GAP SERPL CALCULATED.3IONS-SCNC: 7 MMOL/L (ref 7–16)
AST SERPL-CCNC: 24 U/L (ref 0–39)
BASOPHILS ABSOLUTE: 0.01 E9/L (ref 0–0.2)
BASOPHILS RELATIVE PERCENT: 0.1 % (ref 0–2)
BILIRUB SERPL-MCNC: 1 MG/DL (ref 0–1.2)
BUN BLDV-MCNC: 29 MG/DL (ref 6–23)
BUN BLDV-MCNC: 37 MG/DL (ref 6–23)
CALCIUM SERPL-MCNC: 7.8 MG/DL (ref 8.6–10.2)
CALCIUM SERPL-MCNC: 8.2 MG/DL (ref 8.6–10.2)
CHLORIDE BLD-SCNC: 100 MMOL/L (ref 98–107)
CHLORIDE BLD-SCNC: 103 MMOL/L (ref 98–107)
CO2: 24 MMOL/L (ref 22–29)
CO2: 25 MMOL/L (ref 22–29)
CREAT SERPL-MCNC: 1.6 MG/DL (ref 0.7–1.2)
CREAT SERPL-MCNC: 1.9 MG/DL (ref 0.7–1.2)
EOSINOPHILS ABSOLUTE: 0 E9/L (ref 0.05–0.5)
EOSINOPHILS RELATIVE PERCENT: 0 % (ref 0–6)
FERRITIN: 65 NG/ML
GFR AFRICAN AMERICAN: 43
GFR AFRICAN AMERICAN: 52
GFR NON-AFRICAN AMERICAN: 35 ML/MIN/1.73
GFR NON-AFRICAN AMERICAN: 43 ML/MIN/1.73
GLUCOSE BLD-MCNC: 109 MG/DL (ref 74–99)
GLUCOSE BLD-MCNC: 128 MG/DL (ref 74–99)
HCT VFR BLD CALC: 23.6 % (ref 37–54)
HCT VFR BLD CALC: 23.8 % (ref 37–54)
HEMOGLOBIN: 7.6 G/DL (ref 12.5–16.5)
HEMOGLOBIN: 7.6 G/DL (ref 12.5–16.5)
IMMATURE GRANULOCYTES #: 0.09 E9/L
IMMATURE GRANULOCYTES %: 0.7 % (ref 0–5)
IRON SATURATION: 8 % (ref 20–55)
IRON: 23 MCG/DL (ref 59–158)
LYMPHOCYTES ABSOLUTE: 0.34 E9/L (ref 1.5–4)
LYMPHOCYTES RELATIVE PERCENT: 2.6 % (ref 20–42)
MAGNESIUM: 1.9 MG/DL (ref 1.6–2.6)
MCH RBC QN AUTO: 25.8 PG (ref 26–35)
MCHC RBC AUTO-ENTMCNC: 32.2 % (ref 32–34.5)
MCV RBC AUTO: 80 FL (ref 80–99.9)
MONOCYTES ABSOLUTE: 1.18 E9/L (ref 0.1–0.95)
MONOCYTES RELATIVE PERCENT: 9.2 % (ref 2–12)
NEUTROPHILS ABSOLUTE: 11.26 E9/L (ref 1.8–7.3)
NEUTROPHILS RELATIVE PERCENT: 87.4 % (ref 43–80)
PDW BLD-RTO: 13.6 FL (ref 11.5–15)
PHOSPHORUS: 2.2 MG/DL (ref 2.5–4.5)
PLATELET # BLD: 223 E9/L (ref 130–450)
PMV BLD AUTO: 9.5 FL (ref 7–12)
POLYCHROMASIA: ABNORMAL
POTASSIUM REFLEX MAGNESIUM: 4.1 MMOL/L (ref 3.5–5)
POTASSIUM SERPL-SCNC: 3.9 MMOL/L (ref 3.5–5)
RBC # BLD: 2.95 E12/L (ref 3.8–5.8)
SODIUM BLD-SCNC: 134 MMOL/L (ref 132–146)
SODIUM BLD-SCNC: 135 MMOL/L (ref 132–146)
TOTAL IRON BINDING CAPACITY: 281 MCG/DL (ref 250–450)
TOTAL PROTEIN: 5.2 G/DL (ref 6.4–8.3)
WBC # BLD: 12.9 E9/L (ref 4.5–11.5)

## 2022-03-24 PROCEDURE — 6370000000 HC RX 637 (ALT 250 FOR IP): Performed by: SURGERY

## 2022-03-24 PROCEDURE — 2580000003 HC RX 258: Performed by: SURGERY

## 2022-03-24 PROCEDURE — 6370000000 HC RX 637 (ALT 250 FOR IP): Performed by: INTERNAL MEDICINE

## 2022-03-24 PROCEDURE — 1200000000 HC SEMI PRIVATE

## 2022-03-24 PROCEDURE — 6360000002 HC RX W HCPCS: Performed by: SURGERY

## 2022-03-24 PROCEDURE — C9113 INJ PANTOPRAZOLE SODIUM, VIA: HCPCS | Performed by: SURGERY

## 2022-03-24 PROCEDURE — 83735 ASSAY OF MAGNESIUM: CPT

## 2022-03-24 PROCEDURE — 2700000000 HC OXYGEN THERAPY PER DAY

## 2022-03-24 PROCEDURE — 85025 COMPLETE CBC W/AUTO DIFF WBC: CPT

## 2022-03-24 PROCEDURE — 2580000003 HC RX 258: Performed by: STUDENT IN AN ORGANIZED HEALTH CARE EDUCATION/TRAINING PROGRAM

## 2022-03-24 PROCEDURE — 83550 IRON BINDING TEST: CPT

## 2022-03-24 PROCEDURE — 97530 THERAPEUTIC ACTIVITIES: CPT

## 2022-03-24 PROCEDURE — 83540 ASSAY OF IRON: CPT

## 2022-03-24 PROCEDURE — 97165 OT EVAL LOW COMPLEX 30 MIN: CPT | Performed by: OCCUPATIONAL THERAPIST

## 2022-03-24 PROCEDURE — 80048 BASIC METABOLIC PNL TOTAL CA: CPT

## 2022-03-24 PROCEDURE — 6360000002 HC RX W HCPCS: Performed by: INTERNAL MEDICINE

## 2022-03-24 PROCEDURE — 36415 COLL VENOUS BLD VENIPUNCTURE: CPT

## 2022-03-24 PROCEDURE — A4216 STERILE WATER/SALINE, 10 ML: HCPCS | Performed by: SURGERY

## 2022-03-24 PROCEDURE — 85018 HEMOGLOBIN: CPT

## 2022-03-24 PROCEDURE — 2580000003 HC RX 258: Performed by: INTERNAL MEDICINE

## 2022-03-24 PROCEDURE — 84100 ASSAY OF PHOSPHORUS: CPT

## 2022-03-24 PROCEDURE — 82728 ASSAY OF FERRITIN: CPT

## 2022-03-24 PROCEDURE — 80053 COMPREHEN METABOLIC PANEL: CPT

## 2022-03-24 PROCEDURE — 97161 PT EVAL LOW COMPLEX 20 MIN: CPT

## 2022-03-24 PROCEDURE — 85014 HEMATOCRIT: CPT

## 2022-03-24 RX ORDER — METOPROLOL TARTRATE 50 MG/1
100 TABLET, FILM COATED ORAL 2 TIMES DAILY
Status: DISCONTINUED | OUTPATIENT
Start: 2022-03-24 | End: 2022-04-05 | Stop reason: HOSPADM

## 2022-03-24 RX ORDER — DIPHENHYDRAMINE HCL 25 MG
25 TABLET ORAL 3 TIMES DAILY
Status: DISCONTINUED | OUTPATIENT
Start: 2022-03-24 | End: 2022-03-29

## 2022-03-24 RX ORDER — SODIUM CHLORIDE, SODIUM LACTATE, POTASSIUM CHLORIDE, AND CALCIUM CHLORIDE .6; .31; .03; .02 G/100ML; G/100ML; G/100ML; G/100ML
1000 INJECTION, SOLUTION INTRAVENOUS ONCE
Status: COMPLETED | OUTPATIENT
Start: 2022-03-24 | End: 2022-03-24

## 2022-03-24 RX ADMIN — DIPHENHYDRAMINE HCL 25 MG: 25 TABLET ORAL at 09:51

## 2022-03-24 RX ADMIN — ACETAMINOPHEN 650 MG: 325 TABLET ORAL at 03:35

## 2022-03-24 RX ADMIN — DIPHENHYDRAMINE HCL 25 MG: 25 TABLET ORAL at 15:38

## 2022-03-24 RX ADMIN — SODIUM CHLORIDE 40 MG: 9 INJECTION, SOLUTION INTRAMUSCULAR; INTRAVENOUS; SUBCUTANEOUS at 08:15

## 2022-03-24 RX ADMIN — METOPROLOL TARTRATE 100 MG: 50 TABLET, FILM COATED ORAL at 19:55

## 2022-03-24 RX ADMIN — SODIUM CHLORIDE, POTASSIUM CHLORIDE, SODIUM LACTATE AND CALCIUM CHLORIDE: 600; 310; 30; 20 INJECTION, SOLUTION INTRAVENOUS at 18:06

## 2022-03-24 RX ADMIN — SODIUM CHLORIDE 100 MG: 9 INJECTION, SOLUTION INTRAVENOUS at 15:35

## 2022-03-24 RX ADMIN — Medication 250 MG: at 13:29

## 2022-03-24 RX ADMIN — SODIUM CHLORIDE, PRESERVATIVE FREE 10 ML: 5 INJECTION INTRAVENOUS at 08:16

## 2022-03-24 RX ADMIN — SODIUM CHLORIDE, POTASSIUM CHLORIDE, SODIUM LACTATE AND CALCIUM CHLORIDE 1000 ML: 600; 310; 30; 20 INJECTION, SOLUTION INTRAVENOUS at 06:16

## 2022-03-24 RX ADMIN — Medication 250 MG: at 18:04

## 2022-03-24 RX ADMIN — ENOXAPARIN SODIUM 40 MG: 100 INJECTION SUBCUTANEOUS at 08:15

## 2022-03-24 RX ADMIN — ONDANSETRON 4 MG: 2 INJECTION INTRAMUSCULAR; INTRAVENOUS at 09:51

## 2022-03-24 RX ADMIN — METOPROLOL TARTRATE 100 MG: 50 TABLET, FILM COATED ORAL at 09:51

## 2022-03-24 RX ADMIN — ACETAMINOPHEN 650 MG: 325 TABLET ORAL at 21:41

## 2022-03-24 RX ADMIN — DIPHENHYDRAMINE HCL 25 MG: 25 TABLET ORAL at 21:41

## 2022-03-24 RX ADMIN — SODIUM CHLORIDE 25 MG: 9 INJECTION, SOLUTION INTRAVENOUS at 14:44

## 2022-03-24 RX ADMIN — SODIUM CHLORIDE, POTASSIUM CHLORIDE, SODIUM LACTATE AND CALCIUM CHLORIDE: 600; 310; 30; 20 INJECTION, SOLUTION INTRAVENOUS at 08:02

## 2022-03-24 RX ADMIN — Medication 250 MG: at 21:41

## 2022-03-24 RX ADMIN — ONDANSETRON 4 MG: 2 INJECTION INTRAMUSCULAR; INTRAVENOUS at 03:33

## 2022-03-24 ASSESSMENT — PAIN SCALES - GENERAL
PAINLEVEL_OUTOF10: 0

## 2022-03-24 NOTE — PROGRESS NOTES
Patient up to chair this AM.  He was given and educated on the use of incentive spirometer. Mild expiratory wheezing heard upon auscultation. Patient administered IV zofran x1 d/t still feeling  nauseated. Also observed rash on patients trunk in which PO benadryl was given per order. Will continue to monitor.

## 2022-03-24 NOTE — PROGRESS NOTES
Occupational Therapy  OCCUPATIONAL THERAPY INITIAL EVALUATION     [address] [place], [place]       Date:3/24/2022  Patient Name: Ruben Herrera.   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]  S/P right hemicolectomy [Z90.49]    Procedure: Right hemicolectomy 3/22/22     Pertinent Medical History:   Past Medical History:   Diagnosis Date    Employs prosthetic leg     left    History of atrial fibrillation     [place] (hard of hearing)     uses bilat hearing aides    Hx of AKA (above knee amputation), [facility] [address]     Hx of necrotizing fasciitis     left leg    Hypertension     Rectal bleeding     Rectal cancer ([facility] [address] 2017    rectal        Precautions:  fall risk, alarm, O2 2L    Assessment of current deficits   [x] Functional mobility  [x]ADLs  [x] Strength               []Cognition   [x] Functional transfers   [x] IADLs         [x] Safety Awareness   [x]Endurance   [] Fine Coordination              [x] Balance      [] Vision/perception   []Sensation    []Gross Motor Coordination  [] ROM  [] Delirium                   [] Motor Control     OT PLAN OF CARE   OT POC based on physician orders, patient diagnosis and results of clinical assessment    Frequency/Duration 2-5 days/wk for 10-14 days PRN   Specific OT Treatment Interventions to include:   * Instruction/training on adapted ADL techniques and AE recommendations to increase functional independence within precautions       * Training on energy conservation strategies, correct breathing pattern and techniques to improve independence/tolerance for self-care routine  * Functional transfer/mobility training/DME recommendations for increased independence, safety, and fall prevention  * Patient/Family education to increase follow through with safety techniques and functional independence  * Recommendation of environmental modifications for increased safety with functional transfers/mobility and ADLs  * Therapeutic exercise to improve motor endurance, ROM, and functional strength for ADLs/functional transfers  * Therapeutic activities to facilitate/challenge dynamic balance, stand tolerance for increased safety and independence with ADLs    Recommended Adaptive Equipment: TBD     Home Living: Pt lives with mother and sister in a 2 story home with laundry in the basement, pt states the family only uses one floor for living space. Bathroom setup: walk in shower with 3-4\" ledge   Equipment Owned: walker, quad cane, L leg prosthetic     Prior Level of Function: independent with ADLs, independent with IADLs.  Completed functional mobility with cane    Pain Level: no pain reported    Cognition: A&O: alert, following multi step directions   Problem solving:  Fair +   Sequencing: fair +   Memory: fair +   Judgement/safety:  Fair +     Functional Assessment: AM-PAC Daily Activity Raw Score: 17/24   Initial Eval Status  Date: 3/24/22 Treatment session:  STGs=LTGS  Timeframe 10-14 days     Feeding  independent     Grooming  SBA-set up  Independent    UB Dressing SBA   independent    LB Dressing Mod A  Assist with RLE donning socks  Initiated but unable to jamey LLE prosthetic d/t respiratory status  Mod I with use of AD as needed   Bathing Min A    Independent    Toileting Min A  independent   Bed Mobility  Supine to sit: SBA  independent   Functional Transfers Min A  Sit <> stand from bed surface to attempt to jamey prosthetic    SBA  Stand pivot transfer to chair only d/t unable to jamey prosthetic fully  independent   Functional Mobility NT stand pivot transfer for chair only d/t unable to fully jamey prostetic  Mod I with AD as needed with good tolerance   Balance Sitting:        Static: fair +       Dynamic: fair +    Standing: fair without prosthetic donned  Sitting:        Static: good       Dynamic: good    Standing: good   Activity Tolerance fair  Pt limited by SOB and increased HR with light activity    At rest O2 stats 90%    Sitting EOB 92% O2 stats and HR increased to 160 bpm-nursing notified    96% O2 stats HR decreased to 127% sitting in chair  good during ADL activity. Pt will verbalize and demonstrate good understanding of ECWS techniques      Strength ROM Additional Info:    RUE  WFL WFL good  and FMC/dexterity noted during ADL tasks     LUE WFL WFL good  and FMC/dexterity noted during ADL tasks         Hearing: [place]-hearing aides on tray   Vision: WFL   Sensation:  No c/o numbness or tingling   Tone: WFL   Edema: none     Comments: Upon arrival, patient supine in bed. At end of session, patient up in chair with call light and phone within reach, all lines and tubes intact. Overall patient demonstrated decreased independence and safety during completion of ADL/functional transfer/mobility tasks. Pt would benefit from continued skilled OT to increase safety and independence with completion of ADL/IADL tasks for functional independence and quality of life. Rehab Potential: Good for established goals     Patient/Family Goal: To get home. Patient and/or family were instructed on functional diagnosis, prognosis/goals and OT plan of care. Pt verbalized understanding.       Eval Complexity: Low    Time In: 9:35  Time Out: 9:54  Total Treatment Time: 0    Min Units   OT Eval Low 97165  x  1   OT Eval Medium 08468      OT Eval High 63227       OT Re-Eval [id number]       Therapeutic Ex 30923       Therapeutic Activities 17262       ADL/Self Care 44325       Orthotic Management 42447       Neuro Re-Ed 73612       Non-Billable Time          Evaluation time includes thorough review of current medical information, gathering information on past medical history/social history and prior level of function, completion of standardized testing/informal observation of tasks, assessment of data, and development of POC/Goals    [employer] ANDERSON-OLGA, OTR/L #[id number]

## 2022-03-24 NOTE — PLAN OF CARE
Problem: Skin Integrity:  Goal: Absence of new skin breakdown  Description: Absence of new skin breakdown  Outcome: Met This Shift     Problem: Falls - Risk of:  Goal: Will remain free from falls  Description: Will remain free from falls  Outcome: Met This Shift     Problem: Falls - Risk of:  Goal: Absence of physical injury  Description: Absence of physical injury  Outcome: Met This Shift     Problem: Nausea/Vomiting:  Goal: Able to drink  Description: Able to drink  Outcome: Met This Shift     Problem: Nausea/Vomiting:  Goal: Able to eat  Description: Able to eat  Outcome: Met This Shift     Problem: Pain:  Goal: Pain level will decrease  Description: Pain level will decrease  Outcome: Met This Shift

## 2022-03-24 NOTE — PROGRESS NOTES
GENERAL SURGERY  DAILY PROGRESS NOTE  3/24/2022    No chief complaint on file. Subjective:  Pt states he is doing okay. Pain is controlled. Tachycardic and confusion overnight. Has some nausea.     Objective:  BP (!) 172/95   Pulse 134   Temp 97.9 °F (36.6 °C)   Resp 20   Ht 6' 2\" (1.88 m)   Wt 190 lb (86.2 kg)   SpO2 97%   BMI 24.39 kg/m²     GENERAL:  Laying in bed, awake, alert, cooperative, no apparent distress  HEAD: Normocephalic, atraumatic  EYES: No sclera icterus, pupils equal  LUNGS:  Somewhat labored breathing on room air  CARDIOVASCULAR:  Tachycardic and hypertensive  ABDOMEN:  Soft, appropriate TTP around incisions, incisions c/d/i, non-distended  EXTREMITIES: No edema or swelling  SKIN: Warm and dry    Assessment/Plan:  74 y.o. male s/p laparoscopic right hemicolectomy 3/22    - Continue CLD  - Continue IVFs  - Tachycardia likely related to dehydration, FeNa showing 0.5% pre-renal azotemia, will give another bolus  - Pain control PRN  - Better UOP with improved creatinine, continue soto and monitor UOP  - Await return of bowel function  - PT/OT, ambulate  - Incentive spirometer    Electronically signed by Suha Das MD on 3/24/2022 at 4:48 AM

## 2022-03-24 NOTE — PROGRESS NOTES
Patient up to chair for most of the day. He felt better being up out of bed. He did not feel he needed to take scheduled tylenol. Patient states he doesn't have much of an appetite at this time. No flatus/BM. Will continue to monitor.

## 2022-03-24 NOTE — PROGRESS NOTES
Department of Internal Jonn Fairbanks M.D. Progress Note  Subjective, he denies complaints says nausea is not present but he is not drinking water.   I urged him to sip on water and see if the bowel function will open up he denies any passing gas confusion is related to his cognitive dysfunction  OBJECTIVE abdomen has no bowel sounds and  postop incisions  He is starting to swell up      Medications    Current Facility-Administered Medications: pantoprazole (PROTONIX) 40 mg in sodium chloride (PF) 10 mL injection, 40 mg, IntraVENous, Daily  metoprolol tartrate (LOPRESSOR) tablet 100 mg, 100 mg, Oral, BID  diphenhydrAMINE (BENADRYL) tablet 25 mg, 25 mg, Oral, TID  sodium chloride flush 0.9 % injection 5-40 mL, 5-40 mL, IntraVENous, 2 times per day  sodium chloride flush 0.9 % injection 5-40 mL, 5-40 mL, IntraVENous, PRN  0.9 % sodium chloride infusion, 25 mL, IntraVENous, PRN  enoxaparin (LOVENOX) injection 40 mg, 40 mg, SubCUTAneous, Daily  ondansetron (ZOFRAN-ODT) disintegrating tablet 4 mg, 4 mg, Oral, Q8H PRN **OR** ondansetron (ZOFRAN) injection 4 mg, 4 mg, IntraVENous, Q6H PRN  lactated ringers infusion, , IntraVENous, Continuous  acetaminophen (TYLENOL) tablet 650 mg, 650 mg, Oral, Q6H  oxyCODONE (ROXICODONE) immediate release tablet 5 mg, 5 mg, Oral, Q4H PRN **OR** oxyCODONE (ROXICODONE) immediate release tablet 10 mg, 10 mg, Oral, Q4H PRN  HYDROmorphone (DILAUDID) injection 0.5 mg, 0.5 mg, IntraVENous, Q3H PRN  Physical    VITALS:  BP (!) 159/90   Pulse 126   Temp 97.7 °F (36.5 °C) (Oral)   Resp 18   Ht 6' 2\" (1.88 m)   Wt 190 lb (86.2 kg)   SpO2 94%   BMI 24.39 kg/m²   24HR INTAKE/OUTPUT:    Intake/Output Summary (Last 24 hours) at 3/24/2022 0827  Last data filed at 3/24/2022 0754  Gross per 24 hour   Intake 560 ml   Output 1950 ml   Net -1390 ml     LUNGS: Decreased breath sounds all over  CARDIOVASCULAR: S1-S2 tachycardia  Data    CBC with Differential: Lab Results   Component Value Date    WBC 12.9 03/24/2022    RBC 2.95 03/24/2022    HGB 7.6 03/24/2022    HCT 23.6 03/24/2022     03/24/2022    MCV 80.0 03/24/2022    MCH 25.8 03/24/2022    MCHC 32.2 03/24/2022    RDW 13.6 03/24/2022    SEGSPCT 47 03/18/2011    METASPCT 2 03/18/2011    LYMPHOPCT 2.6 03/24/2022    MONOPCT 9.2 03/24/2022    BASOPCT 0.1 03/24/2022    MONOSABS 1.18 03/24/2022    LYMPHSABS 0.34 03/24/2022    EOSABS 0.00 03/24/2022    BASOSABS 0.01 03/24/2022     CMP:    Lab Results   Component Value Date     03/24/2022    K 3.9 03/24/2022    K 3.8 01/26/2022     03/24/2022    CO2 25 03/24/2022    BUN 37 03/24/2022    CREATININE 1.9 03/24/2022    GFRAA 43 03/24/2022    LABGLOM 35 03/24/2022    GLUCOSE 128 03/24/2022    GLUCOSE 106 03/24/2011    PROT 5.2 03/24/2022    LABALBU 3.1 03/24/2022    LABALBU 2.4 03/20/2011    CALCIUM 8.2 03/24/2022    BILITOT 1.0 03/24/2022    ALKPHOS 73 03/24/2022    AST 24 03/24/2022    ALT 21 03/24/2022     PT/INR:    Lab Results   Component Value Date    PROTIME 12.6 01/24/2022    PROTIME 12.2 03/18/2011    INR 1.1 01/24/2022       ASSESSMENT AND PLAN      Principal Problem:  Villous adenoma of colon  Active Problems:    S/P right hemicolectomy  Plan: Recovering  Volume depletion corrected postop tachycardia secondary to withdrawal of beta-blockade will restart metoprolol  Anemia postop  Mild cognitive dysfunction  Ileus postop        Electronically signed by Sheila Sellers MD on 3/24/2022 at 8:45 AM

## 2022-03-24 NOTE — PROGRESS NOTES
Pt feeling nauseous and currently dry heaving with <20cc of gastric fluid. Spoke with Angela Odom and obtained telephone orders to insert NG tube and connect to [facility]. Pt declines at this time because he no longer feels nauseous.

## 2022-03-24 NOTE — PROGRESS NOTES
Educated patient on the importance of Incentive Spirometer, Patient returned demonstration of 750, tolerated fairly well.

## 2022-03-24 NOTE — PROGRESS NOTES
Physical Therapy    Facility/Department: [facility]  Initial Assessment    NAME: Mely Fuller : 1955  MRN: [id number]    Date of Service: 3/24/2022       REQUIRES PT FOLLOW UP: Yes       Patient Diagnosis(es): There were no encounter diagnoses. has a past medical history of Employs prosthetic leg, History of atrial fibrillation, [place] (hard of hearing), Hx of AKA (above knee amputation), left ([address] Hx of necrotizing fasciitis, Hypertension, Rectal bleeding, and Rectal cancer ([facility] [address] has a past surgical history that includes above knee amputation (); Colonoscopy (10/21/2011); Eye surgery (Left, 10/03/2016); eye surgery (Bilateral, ?); eye surgery (Right, ?); Colon surgery (2018); Abdomen surgery (2018); pr revision of ileostomy,complicated (N/A, 3/94/9907); Colonoscopy (N/A, 2022); Cardioversion (2020); and hemicolectomy (Right, 3/22/2022). Evaluating Therapist: Maulik Gray, PT     Referring Provider:  Vesna Hernandez MD    PT order : PT eval and treat     Room #:  717   DIAGNOSIS:  Benign neoplasm of cecum s/p L hemicolectomy 3/23/2022   Additional Pertinent History: L AKA   PRECAUTIONS:  Falls , splinting , [place]     Social:  Pt lives with  Mother and sister   in a  2  floor plan  With first floor set up , 3 steps with 1 HR to enter    Prior to admission pt walked with  QC and L prosthesis      Initial Evaluation  Date: 3/24/2022  Treatment      Short Term/ Long Term   Goals   Was pt agreeable to Eval/treatment?  Yes      Does pt have pain?  abd , incisional       Bed Mobility  Rolling: SBA  Supine to sit: min assist   Sit to supine:  NT   Scooting:  SBA    independent    Transfers Sit to stand:  CGA/min assist   Stand to sit:  SBA/CGA   Stand pivot:  Min assist    independent    Ambulation   NT    100  feet with  QC  with  Independent        Stair negotiation: ascended and descended NT    4  steps with  1  rail with  SBA    LE ROM  Grossly WFL      LE strength  4- to 4/ 5      AM- PAC RAW score  14/ 24            Pt is alert and Oriented x  3      Balance:  CGA/min assist in limited stand, fall risk   Endurance: decreased   Bed/Chair alarm:  No      ASSESSMENT  Pt displays functional ability as noted in the objective portion of this evaluation. Conditions Requiring Skilled Therapeutic Intervention:    [x]Decreased strength     []Decreased ROM  [x]Decreased functional mobility  [x]Decreased balance   [x]Decreased endurance   [x]Decreased posture  []Decreased sensation  []Decreased coordination   []Decreased vision  []Decreased safety awareness   [x]Increased pain       Treatment/Education:    Pt in bed upon arrival ; agreeable to PT. Instructed in splinting for comfort with mobility. Mobility as above. Pt able to don prosthetic  liner and sock independently , but unable to get the prosthesis to lock in . Pt became fatigued with attempts to don prosthesis. Reports SOB . Pt transferred bed to chair scoot pivot sit min assist  O2@ 2 LNC. Pulse ox at rest 92%, removed O2 and pulse ox decreased to 90% with minimal mobility. Re-applied O2 for further mobility. Pt SOB and had audible wheezing with ADLs/mobility. Pulse ox 92%. RN aware. Pt educated on fall risk,  Safe and proper technique with mobility         Patient response to education:   Pt verbalized understanding Pt demonstrated skill Pt requires further education in this area   x  with cues  x       Comments:  Pt left  In chair after session, with call light in reach. Rehab potential is Good for reaching above PT goals. Pts/ family goals   1. None stated      Patient and or family understand(s) diagnosis, prognosis, and plan of care. - yes      PLAN  PT care will be provided in accordance with the objectives noted above. Whenever appropriate, clear delegation orders will be provided for nursing staff.   Exercises and functional mobility practice will be used as well as appropriate assistive devices or modalities to obtain goals. Patient and family education will also be administered as needed. PLAN OF CARE:    Current Treatment Recommendations     [x] Strengthening to improve independence with functional mobility   [] ROM to improve independence with functional mobility   [x] Balance Training to improve static/dynamic balance and to reduce fall risk  [x] Endurance Training to improve activity tolerance during functional mobility   [x] Transfer Training to improve safety and independence with all functional transfers   [x] Gait Training to improve gait mechanics, endurance and assess need for appropriate assistive device  [x] Stair Training in preparation for safe discharge home and/or into the community   [x] Positioning to prevent skin breakdown and contractures  [x] Safety and Education Training   [x] Patient/Caregiver Education   [] HEP  [] Other     Frequency of treatments will be 2-5x/week x 7-10 days. Time in: 0930   Time out:  0958       Evaluation Time includes thorough review of current medical information, gathering information on past medical history/social history and prior level of function, completion of standardized testing/informal observation of tasks, assessment of data and education on plan of care and goals.     CPT codes:  [x] Low Complexity PT evaluation 91581  [] Moderate Complexity PT evaluation 29735  [] High Complexity PT evaluation 12463  [] PT Re-evaluation 90508  [] Gait training 06022  minutes  [x] Therapeutic activities 65081  minutes  [] Therapeutic exercises 69221 8 minutes  [] Neuromuscular reeducation 30273 minutes       Monty 18 number:  PT 3811

## 2022-03-24 NOTE — PROGRESS NOTES
Patient's MEWS score is a 5 after collecting vital signs. His heart rate is sustaining in the 130's and patient is tachypnic. He is asymptomatic at this time and is resting in bed. Perfect serve phone call placed to Dr. Joan Farrell regarding MEWS. On-call physician called back to the floor and suggested RN give 2100 dose of lopressor now to see if that will lower patient's HR.

## 2022-03-24 NOTE — CARE COORDINATION
CASE MANAGEMENT. ... Chart reviewed. POD #2 Right hemicolectomy. Met with patient and his sister, Don Cook, at the bedside. Confirmed dc plan is home where he lives with his mom and other sister. He is interested in [facility]. Discussed choices. Referral called to Destiny with [facility]. Will need HHC order once needs finalized. Mr Shameka France is wearing o2 2lnc which is new for him. Nursing aware to wean as tolerated and assess for home o2 when appropriate. Patient requesting [facility]. Rae with [facility] notified of poss need. She will follow, but will need qualifying dx. In the meantime, PT/OT evals pending. On iv protonix qd and ivf. Tolerating clear liquids. Will cont to follow and assist with needs accordingly.

## 2022-03-24 NOTE — HOME CARE
[facility] referral received, awaiting final orders. Spoke with patient's sister Alesha Broussard and verified demographics. Will follow. Juan Manuel Juarez LPN [facility].

## 2022-03-24 NOTE — CONSULTS
[facility] MD Mary Kate Sutherland, MD Rommel Smart, MD Kirill Davila, MD Renan Simmons, CNP   Mich Garcia, CHARMAINE  Consultation  3/24/2022    Thank you for consult  Full note dictated, to follow  Briefly, 74 y.o. 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 Postoperatively tachycardic, hypotensive treated with IV bolus. A/R:  1. Acute kidney injury, hemodynamically mediated, least in part secondary to volume contraction, anemia, increase insensible losses; the final common pathway was decreased effective circulating volume. Status post IV fluid boluses, increase in IV fluid rate, azotemia is improving, as is his urine output. 2.  Anemia, severe, normocytic but MCV is very low.   Severely iron deficient    Continue LR drip  Encourage oral intake  IV Ferrlecit while here  Continue supportive care  Follow labs, Salvador Fowler MD, MD

## 2022-03-24 NOTE — PROGRESS NOTES
Physician Progress Note      PATIENT:               Ryan Rahman  CSN #:                  [id number]  :                       1955  ADMIT DATE:       3/22/2022 5:36 AM  [address] [place] DATE:  RESPONDING  PROVIDER #:        Zachery Leyden MD          QUERY TEXT:    Dear Attending Physician,    Pt admitted for benign neoplasm of the cecum s/p laparoscopic right   hemicolectomy on 3/22 and has \" blood loss anemia from surgery\" documented per   PCP 3/23 . Per Labs - H/H 3/15 10.1/32.4  Pre admission , 3/22 8.2/26.3 HR   120  /90,  H/H 7.7/25.0  BP 94/58 Post op, 3/23 6.5/20.9 /65If   possible, please document in progress notes and discharge summary further   specificity regarding the \"acuity and type \"of Anemia:    The medical record reflects the following:  Risk Factors: s/p right hemicolectomy sec to benign neoplasm of cecum  Clinical Indicators: Per Surgery 3/23,\". .Continue IVFs - Tachycardia likely   related to dehydration, will give a bolus. .Suspect pt was dry on admission   from bowel prep Some COURTNEY from dehydration and post op hypotension. .Needs more   volume. .\"  Per PCP 3/23,\". . right hemicolectomy yesterday and had hypotension   postoperatively. He was given 2 boluses of 500 cc RL and IV at 100 cc/her   overnight. His Hgb has decreased from preop value of 10.1 down to 8.2 last   night, then 7.7 earlier this morning . Darlynn Magic Hypotension due to blood loss anemia   from surgery . Darlynn Magic Acute kidney injury secondary to hypotension. .\"  H/H 3/15   10.1/32.4  Pre admission , 3/22 8.2/26.3   /90,  H/H 7.7/25.0  BP   94/58 Post op, 3/23 6.5/20.9 /65  Treatment: Monitor H/H, BP, IV fluid boluses, transfuse if Hgb <7    Thank you,  Katey Colon RN CCDS  Clinical Documentation Improvement Specialist  [phone]  Options provided:  -- Anemia due to postoperative blood loss  -- Other - I will add my own diagnosis  -- Disagree - Not applicable / Not valid  -- Disagree - Clinically unable to determine / Unknown  -- Refer to Clinical Documentation Reviewer    PROVIDER RESPONSE TEXT:    This patient has anemia due to postoperative blood loss.     Query created by: Miguel Ángel Tobar on 3/23/2022 3:43 PM      Electronically signed by:  Tank Chand MD 3/24/2022 5:27 AM

## 2022-03-25 LAB
ALBUMIN SERPL-MCNC: 2.7 G/DL (ref 3.5–5.2)
ALP BLD-CCNC: 66 U/L (ref 40–129)
ALT SERPL-CCNC: 16 U/L (ref 0–40)
ANION GAP SERPL CALCULATED.3IONS-SCNC: 8 MMOL/L (ref 7–16)
ANISOCYTOSIS: ABNORMAL
ANISOCYTOSIS: ABNORMAL
AST SERPL-CCNC: 18 U/L (ref 0–39)
BASOPHILS ABSOLUTE: 0.01 E9/L (ref 0–0.2)
BASOPHILS ABSOLUTE: 0.01 E9/L (ref 0–0.2)
BASOPHILS RELATIVE PERCENT: 0.1 % (ref 0–2)
BASOPHILS RELATIVE PERCENT: 0.1 % (ref 0–2)
BILIRUB SERPL-MCNC: 0.8 MG/DL (ref 0–1.2)
BUN BLDV-MCNC: 27 MG/DL (ref 6–23)
BURR CELLS: ABNORMAL
CALCIUM SERPL-MCNC: 7.9 MG/DL (ref 8.6–10.2)
CHLORIDE BLD-SCNC: 102 MMOL/L (ref 98–107)
CO2: 25 MMOL/L (ref 22–29)
CREAT SERPL-MCNC: 1.6 MG/DL (ref 0.7–1.2)
EKG ATRIAL RATE: 125 BPM
EKG P AXIS: 31 DEGREES
EKG P-R INTERVAL: 258 MS
EKG Q-T INTERVAL: 278 MS
EKG QRS DURATION: 82 MS
EKG QTC CALCULATION (BAZETT): 401 MS
EKG R AXIS: 40 DEGREES
EKG T AXIS: -103 DEGREES
EKG VENTRICULAR RATE: 125 BPM
EOSINOPHILS ABSOLUTE: 0 E9/L (ref 0.05–0.5)
EOSINOPHILS ABSOLUTE: 0 E9/L (ref 0.05–0.5)
EOSINOPHILS RELATIVE PERCENT: 0 % (ref 0–6)
EOSINOPHILS RELATIVE PERCENT: 0 % (ref 0–6)
GFR AFRICAN AMERICAN: 52
GFR NON-AFRICAN AMERICAN: 43 ML/MIN/1.73
GLUCOSE BLD-MCNC: 101 MG/DL (ref 74–99)
HCT VFR BLD CALC: 23.3 % (ref 37–54)
HCT VFR BLD CALC: 24 % (ref 37–54)
HEMOGLOBIN: 7.3 G/DL (ref 12.5–16.5)
HEMOGLOBIN: 7.6 G/DL (ref 12.5–16.5)
HYPOCHROMIA: ABNORMAL
HYPOCHROMIA: ABNORMAL
IMMATURE GRANULOCYTES #: 0.03 E9/L
IMMATURE GRANULOCYTES #: 0.05 E9/L
IMMATURE GRANULOCYTES %: 0.3 % (ref 0–5)
IMMATURE GRANULOCYTES %: 0.4 % (ref 0–5)
LYMPHOCYTES ABSOLUTE: 0.33 E9/L (ref 1.5–4)
LYMPHOCYTES ABSOLUTE: 0.33 E9/L (ref 1.5–4)
LYMPHOCYTES RELATIVE PERCENT: 2.8 % (ref 20–42)
LYMPHOCYTES RELATIVE PERCENT: 3.3 % (ref 20–42)
MAGNESIUM: 1.8 MG/DL (ref 1.6–2.6)
MCH RBC QN AUTO: 25.7 PG (ref 26–35)
MCH RBC QN AUTO: 25.8 PG (ref 26–35)
MCHC RBC AUTO-ENTMCNC: 31.3 % (ref 32–34.5)
MCHC RBC AUTO-ENTMCNC: 31.7 % (ref 32–34.5)
MCV RBC AUTO: 81.4 FL (ref 80–99.9)
MCV RBC AUTO: 82 FL (ref 80–99.9)
MONOCYTES ABSOLUTE: 0.8 E9/L (ref 0.1–0.95)
MONOCYTES ABSOLUTE: 0.96 E9/L (ref 0.1–0.95)
MONOCYTES RELATIVE PERCENT: 7.9 % (ref 2–12)
MONOCYTES RELATIVE PERCENT: 8.2 % (ref 2–12)
NEUTROPHILS ABSOLUTE: 10.38 E9/L (ref 1.8–7.3)
NEUTROPHILS ABSOLUTE: 8.98 E9/L (ref 1.8–7.3)
NEUTROPHILS RELATIVE PERCENT: 88.4 % (ref 43–80)
NEUTROPHILS RELATIVE PERCENT: 88.5 % (ref 43–80)
OVALOCYTES: ABNORMAL
PDW BLD-RTO: 13.8 FL (ref 11.5–15)
PDW BLD-RTO: 14 FL (ref 11.5–15)
PHOSPHORUS: 2.1 MG/DL (ref 2.5–4.5)
PLATELET # BLD: 193 E9/L (ref 130–450)
PLATELET # BLD: 204 E9/L (ref 130–450)
PMV BLD AUTO: 9.3 FL (ref 7–12)
PMV BLD AUTO: 9.8 FL (ref 7–12)
POIKILOCYTES: ABNORMAL
POLYCHROMASIA: ABNORMAL
POTASSIUM SERPL-SCNC: 4.1 MMOL/L (ref 3.5–5)
RBC # BLD: 2.84 E12/L (ref 3.8–5.8)
RBC # BLD: 2.95 E12/L (ref 3.8–5.8)
SODIUM BLD-SCNC: 135 MMOL/L (ref 132–146)
TOTAL PROTEIN: 4.8 G/DL (ref 6.4–8.3)
TSH SERPL DL<=0.05 MIU/L-ACNC: 1.05 UIU/ML (ref 0.27–4.2)
WBC # BLD: 10.2 E9/L (ref 4.5–11.5)
WBC # BLD: 11.7 E9/L (ref 4.5–11.5)

## 2022-03-25 PROCEDURE — 6360000002 HC RX W HCPCS: Performed by: SURGERY

## 2022-03-25 PROCEDURE — 84100 ASSAY OF PHOSPHORUS: CPT

## 2022-03-25 PROCEDURE — 6370000000 HC RX 637 (ALT 250 FOR IP): Performed by: NURSE PRACTITIONER

## 2022-03-25 PROCEDURE — 84443 ASSAY THYROID STIM HORMONE: CPT

## 2022-03-25 PROCEDURE — 80053 COMPREHEN METABOLIC PANEL: CPT

## 2022-03-25 PROCEDURE — 36415 COLL VENOUS BLD VENIPUNCTURE: CPT

## 2022-03-25 PROCEDURE — 6370000000 HC RX 637 (ALT 250 FOR IP): Performed by: INTERNAL MEDICINE

## 2022-03-25 PROCEDURE — 99222 1ST HOSP IP/OBS MODERATE 55: CPT | Performed by: INTERNAL MEDICINE

## 2022-03-25 PROCEDURE — 1200000000 HC SEMI PRIVATE

## 2022-03-25 PROCEDURE — 2580000003 HC RX 258: Performed by: INTERNAL MEDICINE

## 2022-03-25 PROCEDURE — 83735 ASSAY OF MAGNESIUM: CPT

## 2022-03-25 PROCEDURE — 6370000000 HC RX 637 (ALT 250 FOR IP): Performed by: SURGERY

## 2022-03-25 PROCEDURE — 85025 COMPLETE CBC W/AUTO DIFF WBC: CPT

## 2022-03-25 PROCEDURE — APPSS60 APP SPLIT SHARED TIME 46-60 MINUTES: Performed by: NURSE PRACTITIONER

## 2022-03-25 PROCEDURE — 6360000002 HC RX W HCPCS: Performed by: INTERNAL MEDICINE

## 2022-03-25 PROCEDURE — 2700000000 HC OXYGEN THERAPY PER DAY

## 2022-03-25 PROCEDURE — 2580000003 HC RX 258: Performed by: SURGERY

## 2022-03-25 RX ORDER — FUROSEMIDE 10 MG/ML
40 INJECTION INTRAMUSCULAR; INTRAVENOUS ONCE
Status: COMPLETED | OUTPATIENT
Start: 2022-03-25 | End: 2022-03-25

## 2022-03-25 RX ADMIN — POTASSIUM & SODIUM PHOSPHATES POWDER PACK 280-160-250 MG 250 MG: 280-160-250 PACK at 17:10

## 2022-03-25 RX ADMIN — FUROSEMIDE 40 MG: 10 INJECTION, SOLUTION INTRAMUSCULAR; INTRAVENOUS at 12:17

## 2022-03-25 RX ADMIN — ACETAMINOPHEN 650 MG: 325 TABLET ORAL at 17:10

## 2022-03-25 RX ADMIN — POTASSIUM & SODIUM PHOSPHATES POWDER PACK 280-160-250 MG 250 MG: 280-160-250 PACK at 12:16

## 2022-03-25 RX ADMIN — ENOXAPARIN SODIUM 40 MG: 100 INJECTION SUBCUTANEOUS at 09:22

## 2022-03-25 RX ADMIN — SODIUM CHLORIDE, POTASSIUM CHLORIDE, SODIUM LACTATE AND CALCIUM CHLORIDE: 600; 310; 30; 20 INJECTION, SOLUTION INTRAVENOUS at 02:53

## 2022-03-25 RX ADMIN — POTASSIUM & SODIUM PHOSPHATES POWDER PACK 280-160-250 MG 250 MG: 280-160-250 PACK at 22:06

## 2022-03-25 RX ADMIN — METOPROLOL TARTRATE 100 MG: 50 TABLET, FILM COATED ORAL at 09:22

## 2022-03-25 RX ADMIN — METOPROLOL TARTRATE 100 MG: 50 TABLET, FILM COATED ORAL at 22:06

## 2022-03-25 RX ADMIN — SODIUM CHLORIDE, PRESERVATIVE FREE 10 ML: 5 INJECTION INTRAVENOUS at 22:08

## 2022-03-25 RX ADMIN — ACETAMINOPHEN 650 MG: 325 TABLET ORAL at 09:23

## 2022-03-25 RX ADMIN — DIPHENHYDRAMINE HCL 25 MG: 25 TABLET ORAL at 14:44

## 2022-03-25 RX ADMIN — DILTIAZEM HYDROCHLORIDE 60 MG: 30 TABLET, FILM COATED ORAL at 14:44

## 2022-03-25 RX ADMIN — SODIUM CHLORIDE 125 MG: 900 INJECTION INTRAVENOUS at 14:49

## 2022-03-25 RX ADMIN — DILTIAZEM HYDROCHLORIDE 60 MG: 30 TABLET, FILM COATED ORAL at 22:06

## 2022-03-25 RX ADMIN — Medication 250 MG: at 09:20

## 2022-03-25 RX ADMIN — DIPHENHYDRAMINE HCL 25 MG: 25 TABLET ORAL at 22:06

## 2022-03-25 ASSESSMENT — PAIN SCALES - GENERAL
PAINLEVEL_OUTOF10: 0
PAINLEVEL_OUTOF10: 5
PAINLEVEL_OUTOF10: 5

## 2022-03-25 NOTE — PLAN OF CARE
Problem: Skin Integrity:  Goal: Will show no infection signs and symptoms  Description: Will show no infection signs and symptoms  Outcome: Met This Shift  Goal: Absence of new skin breakdown  Description: Absence of new skin breakdown  3/25/2022 0041 by Estela Renae RN  Outcome: Met This Shift  3/24/2022 1830 by Marcia aWgner RN  Outcome: Met This Shift     Problem: Falls - Risk of:  Goal: Will remain free from falls  Description: Will remain free from falls  3/25/2022 0041 by Estela Renae RN  Outcome: Met This Shift  3/24/2022 1830 by Marcia Wagner RN  Outcome: Met This Shift  Goal: Absence of physical injury  Description: Absence of physical injury  3/25/2022 0041 by Estela Renae RN  Outcome: Met This Shift  3/24/2022 1830 by Marcia Wagner RN  Outcome: Met This Shift     Problem: Nausea/Vomiting:  Goal: Able to drink  Description: Able to drink  3/25/2022 0041 by Estela Renae RN  Outcome: Met This Shift  3/24/2022 1830 by Marcia Wagner RN  Outcome: Met This Shift     Problem: Pain:  Goal: Pain level will decrease  Description: Pain level will decrease  3/25/2022 0041 by Estela Renae RN  Outcome: Met This Shift  3/24/2022 1830 by Marcia Wagner RN  Outcome: Met This Shift  Goal: Control of acute pain  Description: Control of acute pain  Outcome: Met This Shift  Goal: Control of chronic pain  Description: Control of chronic pain  Outcome: Met This Shift     Problem: Nausea/Vomiting:  Goal: Absence of nausea/vomiting  Description: Absence of nausea/vomiting  Outcome: Ongoing  Goal: Able to eat  Description: Able to eat  3/25/2022 0041 by Estela Renae RN  Outcome: Ongoing  3/24/2022 1830 by Marcia Wagner RN  Outcome: Met This Shift  Goal: Ability to achieve adequate nutritional intake will improve  Description: Ability to achieve adequate nutritional intake will improve  Outcome: Ongoing

## 2022-03-25 NOTE — CARE COORDINATION
CASE MANAGEMENT. ... POD #3 Right hemicolectomy. PT 14/OT 17. Met with Mr Lena Art at the bedside to discuss therapy scores and  to readdress dc plans. He declines [facility]. Remains interested in home with [facility] as planned. He is resting quietly in bed on room air. [address] DME is following if home o2 is needed. Mr Lena Art states that he is  + for flatus, but - for bm. Was started on low fiber diet. Arrythmias noted on EKG this am. Cardio saw and adjusted meds. Will cont to follow along and assist with needs accordingly.

## 2022-03-25 NOTE — HOME CARE
Patient will need home health orders prior to discharge. Please contact [facility] at [phone] if discharge over the weekend to advise.  Thank you, [facility]

## 2022-03-25 NOTE — CONSULTS
Inpatient Cardiology Consultation      Reason for Consult: WPW    Consulting Physician: Dr. Carrington Downey    Requesting Physician:  Dr. Araceli Malik    Date of Consultation: 3/25/2022    HISTORY OF PRESENT ILLNESS:   Mr. Radha Pineda is a 69-year-old frail  male who is known to Dr. Darling Pardo; was recently seen in outpatient on 1/21/2022 for a 6-month follow-up for HEARD, AF, hypertension. Patient was continued on Eliquis 5 mg twice daily, metoprolol 100 mg twice daily and hydralazine 50 mg 3 times daily. [facility] on 3/22/2022 for an elective laparoscopic right hemicolectomy with general surgery for a benign neoplasm of cecum. Patient was noted to be tachycardic and was thought to be likely related to dehydration he was given an IV fluid bolus. He became hypotensive and was found to have anemia (6.6). He was given another liter of IV fluids. He was noted to have a postop ileus. His beta-blocker was resumed in the setting of tachycardia. Nephrology was consulted for COURTNEY was continued on LR IV drip and was ordered an IV Ferrlecit. · 3/25/2022 patient is currently sitting up and eating breakfast, ileus has resolved and cardiology was consulted for possible WPW. · Labs today: BUN 27 creatinine 1.6 (down from 2.3 on 3/23/2022), magnesium 1.8, albumin 2.7, WBC 10.2, H/H 7.3/23.3 (trending down from 7.6 on 3/24/2022), platelet count 557. · Ultrasound retroperitoneal: 3/23/2022: No hydronephrosis. Please note: past medical records were reviewed per electronic medical record (EMR) - see detailed reports under Past Medical/ Surgical History. Past Medical History:    1. Hypertension  2. History of rectal cancer status post resection  3. History of retinal detachment status post repair  4. Hard of hearing  5. 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 6. PAF status post successful ОЛЬГА/DCCV (12/21/2020) to normal sinus rhythm.   [id number] score at least 2.  7. On chronic anticoagulation, Eliquis 5 mg twice daily. 8. History of hemicolectomy  9. History of ileostomy  10. Pharmacological stress test: 1/2/2011: LVEF 54%, with no evidence of stress-induced ischemia. 11. TTE: 12/20/2020 (Dr. Danni High), LVEF 55-60%, no wall motion abnormality, mildly dilated right ventricle, trace MR, trace pericardial effusion, TAPSE 21 mm. 15. Former smoker: Quit 2010, 70 pack years      Medications Prior to admit:  Prior to Admission medications    Medication Sig Start Date End Date Taking?  Authorizing Provider   apixaban (ELIQUIS) 5 MG TABS tablet Take 5 mg by mouth 2 times daily    Historical Provider, MD   amLODIPine (NORVASC) 5 MG tablet Take 1 tablet by mouth daily 1/21/22   Robert Espinal MD   metoprolol (LOPRESSOR) 100 MG tablet Take 1 tablet by mouth 2 times daily 4/30/21   Robert Espinal MD   hydrALAZINE (APRESOLINE) 50 MG tablet take 1 tablet by mouth three times a day 4/30/21   Robert Espinal MD       Current Medications:    Current Facility-Administered Medications: metoprolol tartrate (LOPRESSOR) tablet 100 mg, 100 mg, Oral, BID  diphenhydrAMINE (BENADRYL) tablet 25 mg, 25 mg, Oral, TID  potassium & sodium phosphates (PHOS-NAK) 280-160-250 MG packet 250 mg, 1 packet, Oral, 4x Daily  [COMPLETED] ferric gluconate (FERRLECIT) 25 mg in sodium chloride 0.9 % 50 mL (test dose) IVPB, 25 mg, IntraVENous, Once **FOLLOWED BY** [COMPLETED] ferric gluconate (FERRLECIT) 100 mg in sodium chloride 0.9 % 100 mL IVPB, 100 mg, IntraVENous, Once **FOLLOWED BY** ferric gluconate (FERRLECIT) 125 mg in sodium chloride 0.9 % 100 mL IVPB, 125 mg, IntraVENous, Daily  sodium chloride flush 0.9 % injection 5-40 mL, 5-40 mL, IntraVENous, 2 times per day  sodium chloride flush 0.9 % injection 5-40 mL, 5-40 mL, IntraVENous, PRN  0.9 % sodium chloride infusion, 25 mL, IntraVENous, PRN  enoxaparin (LOVENOX) injection 40 mg, 40 mg, SubCUTAneous, Daily  ondansetron (ZOFRAN-ODT) disintegrating tablet 4 mg, 4 mg, Oral, Q8H PRN **OR** ondansetron (ZOFRAN) injection 4 mg, 4 mg, IntraVENous, Q6H PRN  acetaminophen (TYLENOL) tablet 650 mg, 650 mg, Oral, Q6H  oxyCODONE (ROXICODONE) immediate release tablet 5 mg, 5 mg, Oral, Q4H PRN **OR** [DISCONTINUED] oxyCODONE (ROXICODONE) immediate release tablet 10 mg, 10 mg, Oral, Q4H PRN    Allergies:  Cefepime and Pcn [penicillins]    Social History: Former smoker: Quit 2010, 70 pack years. Denies alcohol and illicit drug use. Family History: noncontributory due to advanced age. REVIEW OF SYSTEMS:     · Constitutional: +fatigue. Denies fevers, chills or night sweats  · Eyes: Denies visual changes or drainage  · ENT: Denies headaches or hearing loss. No mouth sores or sore throat. No epistaxis   · Cardiovascular: See HPI. No lower extremity swelling. · Respiratory: Denies HEARD, cough, orthopnea or PND. No hemoptysis   · Gastrointestinal: Denies hematemesis or anorexia. No hematochezia or melena    · Genitourinary: Denies urgency, dysuria or hematuria. · Musculoskeletal: See HPI. · Integumentary: Denies rash, hives or pruritis   · Neurological: Denies dizziness, headaches or seizures. No numbness or tingling  · Psychiatric: Denies anxiety or depression. · Endocrine: Denies temperature intolerance. No recent weight change. .  · Hematologic/Lymphatic: Denies abnormal bruising or bleeding. No swollen lymph nodes    PHYSICAL EXAM:   /76   Pulse 131   Temp 98.8 °F (37.1 °C) (Oral)   Resp 18   Ht 6' 2\" (1.88 m)   Wt 190 lb (86.2 kg)   SpO2 95%   BMI 24.39 kg/m²   CONST:  Well developed, well nourished elderly male who appears of stated age. Awake, alert and cooperative. No apparent distress. HEENT:   Head- Normocephalic, atraumatic   Eyes- Conjunctivae pink, anicteric  Throat- Oral mucosa pink and moist  Neck-  No stridor, trachea midline, no jugular venous distention. No carotid bruit. CHEST: Chest symmetrical and non-tender to palpation.  No accessory muscle use or intercostal retractions  RESPIRATORY: Lung sounds - clear throughout fields. On RA. CARDIOVASCULAR:     Heart Inspection- shows no noted pulsations  Heart Palpation- no heaves or thrills; PMI is non-displaced   Heart Ausculation- Regular rate and rhythm, no murmur. No s3, s4 or rub   PV: No lower extremity edema. No varicosities. Pedal pulses palpable, no clubbing or cyanosis   ABDOMEN: Soft, non-tender to light palpation. Bowel sounds present. No palpable masses no organomegaly; no abdominal bruit  MS: Good muscle strength and tone. No atrophy or abnormal movements. : Deferred  SKIN: Warm and dry no statis dermatitis or ulcers   NEURO / PSYCH: Oriented to person, place and time. Speech clear and appropriate. Follows all commands. +[place]. DATA:    EC2022: Junctional tachycardia with nonspecific ST-T wave changes, rate 125 bpm.  EC2022: Narrow complex tachycardia, junctional tachycardia versus atrial tachycardia versus atrial flutter (as per Dr. Chon Ordaz).   Tele strips: ST.   Diagnostic:      Intake/Output Summary (Last 24 hours) at 3/25/2022 0850  Last data filed at 3/25/2022 0604  Gross per 24 hour   Intake --   Output 1400 ml   Net -1400 ml       Labs:   CBC:   Recent Labs     22  0309   WBC 11.7* 10.2   HGB 7.6* 7.3*   HCT 24.0* 23.3*    193     BMP:   Recent Labs     222 22  0309    135   K 4.1 4.1   CO2 24 25   BUN 29* 27*   CREATININE 1.6* 1.6*   LABGLOM 43 43   CALCIUM 7.8* 7.9*     Mag:   Recent Labs     22  0500 22  0309   MG 1.9 1.8     Phos:   Recent Labs     22  0500 22  0309   PHOS 2.2* 2.1*     TFT:   Lab Results   Component Value Date    TSH 1.200 2020      HgA1c:   Lab Results   Component Value Date    LABA1C 5.6 2020     FASTING LIPID PANEL:  Lab Results   Component Value Date    CHOL 114 2020    HDL 41 2020    LDLCALC 58 2020    TRIG 76 2020     LIVER PROFILE:  Recent Labs 03/24/22  0500 03/25/22  0309   AST 24 18   ALT 21 16   LABALBU 3.1* 2.7*       Ultrasound retroperitoneal: 2/23/2022:  Findings suggest medical renal disease, no hydronephrosis. Pharmacological stress test: 1/2/2011: LVEF 54%, with no evidence of stress-induced ischemia. TTE: 12/20/2020 (Dr. Rommel Bettencourt), LVEF 55-60%, no wall motion abnormality, mildly dilated right   ventricle, trace MR, trace pericardial effusion, TAPSE 21 mm. Assessment/Plan:  1. Narrow complex tachycardia, likely junctional tachycardia versus atrial tachycardia versus atrial flutter. No evidence of WPW. 2. PAF with history of successful DCCV (2020). 3. On chronic anticoagulation, Eliquis. Currently on hold for procedure. 4. Hypertension: Controlled  5. History of rectal cancer status post resection  6. History of retinal detachment status post repair  7. COURTNEY: Creatinine 2.3>>> 1.6 (today)  8. Acute on chronic anemia: 7.3 (today)  9. History of left above-knee amputation /uses prosthetic /uses a quad cane. 10. Hard of hearing  11. Hypoalbuminemia    · Continue beta-blocker 100 mg twice daily  · Start Cardizem 60 mg every 8 hours --> recommend changing to one-time dosing prior to discharge. · Continue Eliquis when and if okay with surgery  · No advanced cardiac testing needed or planned. · Follow-up with Dr. Rommel Bettencourt upon discharge  · Cardiology will see as needed, please call if needed. The above assessment and plan was made in collaboration with Dr. Mandie Wilks. Electronically signed by CRISTI Bunch CNP on 3/25/22 at 10:20 AM EDT    Addendum:  Chelsea Boswell MD     Reason for consult: Tachycardia- Poss WPW     Patient previously known to: Dr. Rommel Bettencourt     History of Present illness:  74year old pt with PAF, [facility] for an elective laparoscopic right hemicolectomy with general surgery for a benign neoplasm of cecum.   Patient was noted to be tachycardic and was thought to be likely related to dehydration he was given an IV fluid bolus. He became hypotensive and was found to have anemia (6.6). He was given another liter of IV fluids. He was noted to have a postop ileus. His beta-blocker was resumed in the setting of tachycardia. Nephrology was consulted for COURTNEY was continued on LR IV drip and was ordered an IV Ferrlecit. Cardiology consulted for tachycardia. Denies CP or SOB, NO orthopnea.      Review of systems:  Review of 10 systems negative except as mentioned in the HPI     Medical History: Reviewed     Surgical history: Reviewed     FamilyHistory: Reviewed     Allergies:  Reviewed     Social Hx: Reviewed.     Scheduled Meds:   dilTIAZem  60 mg Oral 3 times per day    potassium & sodium phosphates  1 packet Oral 4x Daily    metoprolol tartrate  100 mg Oral BID    diphenhydrAMINE  25 mg Oral TID    ferric gluconate (FERRLECIT) IVPB  125 mg IntraVENous Daily    sodium chloride flush  5-40 mL IntraVENous 2 times per day    enoxaparin  40 mg SubCUTAneous Daily    acetaminophen  650 mg Oral Q6H     Continuous Infusions:   sodium chloride       PRN Meds:.sodium chloride flush, sodium chloride, ondansetron **OR** ondansetron, oxyCODONE **OR** [DISCONTINUED] oxyCODONE       Labs/imaging studies: Reviewed.     Above GAB exam, assessment reviewed and reflect my work. I personally saw, examined, and evaluated the patient today. I spent more than 50% of total consult time today  I personally reviewed the medications, rhythm strips, pertinent labs and test reports. I directly participated in the medical-decision making, ordering pertinent tests and medication adjustment.     Physical exam:          Vitals:     03/25/22 0815   BP: 122/76   Pulse: 131   Resp: 18   Temp: 98.8 °F (37.1 °C)   SpO2: 95%         In general, this is a well developed, well nourished who appears stated age.  awake, alert, cooperative, no apparent distress     HEENT: eyes -conjunctivae pink,   Neck-  no stridor, no carotid bruit. no jugular venous distention   RESPIRATORY: Chest symmetrical and non-tender to palpation. No accessory muscles use. Lung auscultation - few rhonchi  CARDIOVASCULAR:     Heart Palpation - no palpable thrills  Heart Ausculation - Regular rate and rhythm, 1/6 systolic murmur, No s3 or rub. No lower extremity edema, Distal pulses palpable, no cyanosis   ABDOMEN: Soft, nontender,  Bowel sounds present. MS: n/a. : Deferred  Rectal Exam: Deferred  SKIN: warm and dry  NEURO / PSYCH: oriented to person, place           Impression/Recommendations:     Narrow complex tachycardia - No WPW;  Likely junctional tachcyardia vs. Atrial Tachycardia orA Flutter - Continue BB, Add PO Cardizem; Monitor BP and HR      PAF - Resume OAC when H/H stable and OK from surgery     S/p Hemicolectomy for cecal malignancy     HTN - Monitor Bp     CKD - Monitor renal Fn                     Cardiology will follow as needed during weekend  Above d/w him and his wife, all questions answered.     Thank you for the consult.           Beryl Bell MD  3/25/2022  [facility]) Cardiology

## 2022-03-25 NOTE — PROGRESS NOTES
Department of Internal Mickey Huerta M.D. Progress Note      SUBJECTIVE: He feels tired and I am wondering if Benadryl is playing a part.   He feels like eating now as bowels have opened up    OBJECTIVE abdomen is softer a diffuse erythematous macular rash is present all over the body    Medications    Current Facility-Administered Medications: metoprolol tartrate (LOPRESSOR) tablet 100 mg, 100 mg, Oral, BID  diphenhydrAMINE (BENADRYL) tablet 25 mg, 25 mg, Oral, TID  potassium & sodium phosphates (PHOS-NAK) 280-160-250 MG packet 250 mg, 1 packet, Oral, 4x Daily  [COMPLETED] ferric gluconate (FERRLECIT) 25 mg in sodium chloride 0.9 % 50 mL (test dose) IVPB, 25 mg, IntraVENous, Once **FOLLOWED BY** [COMPLETED] ferric gluconate (FERRLECIT) 100 mg in sodium chloride 0.9 % 100 mL IVPB, 100 mg, IntraVENous, Once **FOLLOWED BY** ferric gluconate (FERRLECIT) 125 mg in sodium chloride 0.9 % 100 mL IVPB, 125 mg, IntraVENous, Daily  sodium chloride flush 0.9 % injection 5-40 mL, 5-40 mL, IntraVENous, 2 times per day  sodium chloride flush 0.9 % injection 5-40 mL, 5-40 mL, IntraVENous, PRN  0.9 % sodium chloride infusion, 25 mL, IntraVENous, PRN  enoxaparin (LOVENOX) injection 40 mg, 40 mg, SubCUTAneous, Daily  ondansetron (ZOFRAN-ODT) disintegrating tablet 4 mg, 4 mg, Oral, Q8H PRN **OR** ondansetron (ZOFRAN) injection 4 mg, 4 mg, IntraVENous, Q6H PRN  acetaminophen (TYLENOL) tablet 650 mg, 650 mg, Oral, Q6H  oxyCODONE (ROXICODONE) immediate release tablet 5 mg, 5 mg, Oral, Q4H PRN **OR** [DISCONTINUED] oxyCODONE (ROXICODONE) immediate release tablet 10 mg, 10 mg, Oral, Q4H PRN  Physical    VITALS:  /77   Pulse 130   Temp 98.4 °F (36.9 °C) (Oral)   Resp 18   Ht 6' 2\" (1.88 m)   Wt 190 lb (86.2 kg)   SpO2 90%   BMI 24.39 kg/m²   24HR INTAKE/OUTPUT:    Intake/Output Summary (Last 24 hours) at 3/25/2022 0755  Last data filed at 3/25/2022 0604  Gross per 24 hour   Intake --   Output 1400 ml   Net -1400 ml     LUNGS: Decreased breath sounds in the bases  CARDIOVASCULAR: S1-S2 tachycardia  Data    CBC with Differential:    Lab Results   Component Value Date    WBC 10.2 03/25/2022    RBC 2.84 03/25/2022    HGB 7.3 03/25/2022    HCT 23.3 03/25/2022     03/25/2022    MCV 82.0 03/25/2022    MCH 25.7 03/25/2022    MCHC 31.3 03/25/2022    RDW 14.0 03/25/2022    SEGSPCT 47 03/18/2011    METASPCT 2 03/18/2011    LYMPHOPCT 3.3 03/25/2022    MONOPCT 7.9 03/25/2022    BASOPCT 0.1 03/25/2022    MONOSABS 0.80 03/25/2022    LYMPHSABS 0.33 03/25/2022    EOSABS 0.00 03/25/2022    BASOSABS 0.01 03/25/2022     CMP:    Lab Results   Component Value Date     03/25/2022    K 4.1 03/25/2022    K 4.1 03/24/2022     03/25/2022    CO2 25 03/25/2022    BUN 27 03/25/2022    CREATININE 1.6 03/25/2022    GFRAA 52 03/25/2022    LABGLOM 43 03/25/2022    GLUCOSE 101 03/25/2022    GLUCOSE 106 03/24/2011    PROT 4.8 03/25/2022    LABALBU 2.7 03/25/2022    LABALBU 2.4 03/20/2011    CALCIUM 7.9 03/25/2022    BILITOT 0.8 03/25/2022    ALKPHOS 66 03/25/2022    AST 18 03/25/2022    ALT 16 03/25/2022     PT/INR:    Lab Results   Component Value Date    PROTIME 12.6 01/24/2022    PROTIME 12.2 03/18/2011    INR 1.1 01/24/2022       ASSESSMENT AND PLAN      Principal Problem:    Benign neoplasm of cecum  Plan: Awaiting pathology  Active Problems:    S/P right hemicolectomy    Ileus has resolved  Tachycardia is a junctional accelerated rhythm but I cannot tell SC interval this possibly could be a preexcitation syndrome  Dermatitis suspiciously looking like a drug reaction we will stop some drugs but I wonder if IV iron contributed        Electronically signed by Marli Wong MD on 3/25/2022 at 7:55 AM

## 2022-03-25 NOTE — PROGRESS NOTES
[facility] MD Emily Mcnulty, MD Judy Jameson, MD Reg Briggs, ALEXIA Albarado, CHARMAINE  Progress Note    3/25/2022    SUBJECTIVE:   3/25: Since early afternoon. Feeling better. Denies abdominal pain though still distended. Had a BM, and distention has markedly improved since then. Denies dyspnea at rest on nasal cannula. Has developed lower extremity and dependent swelling denies cp/palp. Diet has been advanced, and he has anorexia, though has so far tolerated some food    PROBLEM LIST:    Principal Problem:    Benign neoplasm of cecum  Active Problems:    S/P right hemicolectomy    PSVT (paroxysmal supraventricular tachycardia) ([address]    Primary hypertension  Resolved Problems:    * No resolved hospital problems. *         DIET:    ADULT DIET;  Regular; Low Fiber     MEDS (scheduled):    dilTIAZem  60 mg Oral 3 times per day    potassium & sodium phosphates  1 packet Oral 4x Daily    metoprolol tartrate  100 mg Oral BID    diphenhydrAMINE  25 mg Oral TID    ferric gluconate (FERRLECIT) IVPB  125 mg IntraVENous Daily    sodium chloride flush  5-40 mL IntraVENous 2 times per day    enoxaparin  40 mg SubCUTAneous Daily    acetaminophen  650 mg Oral Q6H       MEDS (infusions):   sodium chloride         MEDS (prn):  sodium chloride flush, sodium chloride, ondansetron **OR** ondansetron, oxyCODONE **OR** [DISCONTINUED] oxyCODONE    PHYSICAL EXAM:     Patient Vitals for the past 24 hrs:   BP Temp Temp src Pulse Resp SpO2   03/25/22 1618 121/71 98.6 °F (37 °C) Oral 131 16 100 %   03/25/22 1158 113/73 97.9 °F (36.6 °C) -- 128 16 100 %   03/25/22 0815 122/76 98.8 °F (37.1 °C) Oral 131 18 95 %   03/25/22 0410 129/77 98.4 °F (36.9 °C) Oral 130 18 90 %   03/25/22 0000 111/72 98 °F (36.7 °C) Oral 133 20 96 %   03/24/22 2211 -- -- -- -- 21 --   03/24/22 2141 -- -- -- -- 22 --   03/24/22 0389 129/77 97.6 °F (36.4 °C) Oral 135 20 92 %   03/24/22 1943 135/79 98.1 °F (36.7 °C) Oral 137 21 97 %   @      Intake/Output Summary (Last 24 hours) at 3/25/2022 1826  Last data filed at 3/25/2022 0604  Gross per 24 hour   Intake --   Output 800 ml   Net -800 ml         Wt Readings from Last 3 Encounters:   03/22/22 190 lb (86.2 kg)   03/15/22 190 lb (86.2 kg)   02/14/22 187 lb (84.8 kg)       Constitutional:  in no acute distress  HEENT: NC/AT, EOMI, sclera and conjunctiva are clear and anicteric, mucus membranes moist  Neck: Trachea midline, no JVD  Cardiovascular: S1, S2 regular rhythm, no murmur,or rub  Respiratory: Decreased air entry bilaterally at the bases, crackles, no wheeze  Gastrointestinal:  Soft, diffusely mildly tender, mildly distended with tympany, no guarding or referred pain, NABS  Ext: 1+ dependent and distal lower extremity edema, feet warm  Skin: dry, no rash  Neuro: awake, alert, interactive moves all 4 extremities      DATA:    Recent Labs     03/24/22  0500 03/24/22 2212 03/25/22  0309   WBC 12.9* 11.7* 10.2   HGB 7.6* 7.6* 7.3*   HCT 23.6* 24.0* 23.3*   MCV 80.0 81.4 82.0    204 193     Recent Labs     03/23/22  0350 03/23/22  1945 03/24/22  0500 03/24/22 2212 03/25/22  0309      < > 135 134 135   K 4.7   < > 3.9 4.1 4.1      < > 100 103 102   CO2 22   < > 25 24 25   MG  --   --  1.9  --  1.8   PHOS  --   --  2.2*  --  2.1*   BUN 38*   < > 37* 29* 27*   CREATININE 2.8*   < > 1.9* 1.6* 1.6*   ALT 23  --  21  --  16   AST 20  --  24  --  18   BILITOT 0.7  --  1.0  --  0.8   ALKPHOS 80  --  73  --  66    < > = values in this interval not displayed. Lab Results   Component Value Date    LABPROT 0.1 03/23/2022    LABPROT 0.1 03/23/2022    LABPROT 0.3 (H) 03/23/2022    LABPROT 0.3 03/23/2022       ASSESSMENT     74 y.o. 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 Postoperatively tachycardic, hypotensive treated with IV bolus.     1.   Acute kidney injury, hemodynamically mediated, least in part secondary to volume contraction, anemia, increase insensible losses; the final common pathway was decreased effective circulating volume. Status post IV fluid boluses, increase in IV fluid rate, azotemia is improving, as is his urine output.     2. Anemia, severe, normocytic but MCV is very low. Severely iron deficient    3.   Hypophosphatemia, status post supplementation yesterday orally     Edematous, with pulmonary edema mildly dyspneic requiring nasal cannula for comfor  Azotemia improving, the creatinine not yet back to baseline    RECOMMENDATIONS  Stop LR drip  IV Lasix x1  Consider repeating later in the day as warranted  Supplement phosphorus p.o. with Phos NAK to minimize administration IV fluid  Encourage oral intake as able  IV Ferrlecit while here  Continue supportive care  Follow labs, UO      Electronically signed by Katharine Ballesteros MD on 3/25/2022

## 2022-03-25 NOTE — PROGRESS NOTES
Physician on-call for Dr. Angela Rivera re-called by RN. Vital signs redone after patient received nighttime dose of lopressor and HR remains in the 130's. Physician asked RN to increase continuous fluids to 125/ hr and draw a CBC and BMP. RN to update treatment team as needed with patient's status.

## 2022-03-25 NOTE — PROGRESS NOTES
GENERAL SURGERY  DAILY PROGRESS NOTE  3/25/2022    No chief complaint on file. Subjective:  Pt states he has no pain. Had a large BM in bed. Tolerating diet.     Objective:  /77   Pulse 130   Temp 98.4 °F (36.9 °C) (Oral)   Resp 18   Ht 6' 2\" (1.88 m)   Wt 190 lb (86.2 kg)   SpO2 90%   BMI 24.39 kg/m²     GENERAL:  Laying in bed, awake, alert, cooperative, no apparent distress  HEAD: Normocephalic, atraumatic  EYES: No sclera icterus, pupils equal  LUNGS: On 2L NC  CARDIOVASCULAR:  Tachycardic and normotensive  ABDOMEN:  Soft, appropriate TTP around incisions, incisions c/d/i, non-distended  EXTREMITIES: No edema or swelling  SKIN: Warm and dry    Assessment/Plan:  74 y.o. male s/p laparoscopic right hemicolectomy 3/22    - Okay for low fiber diet  - Will discuss with Nephrology about stopping fluids, possibly giving lasix and and then removal of soto  - Tachycardia possibly pain related?   - Pain control PRN  - Great UOP with stable creatinine  - Monitor bowel function  - PT/OT, ambulate  - Incentive spirometer    Electronically signed by Paola Batres MD on 3/25/2022 at 4:54 AM   Electronically signed by Alli Jaramillo MD on 3/25/22 at 8:10 AM EDT    Seen/examined  Agree with above  Making good progress  Cr back to baseline  Would like to stop fluids, remove soto and give lasix- will discuss with Dr Gale Katz

## 2022-03-26 LAB
ALBUMIN SERPL-MCNC: 2.8 G/DL (ref 3.5–5.2)
ALP BLD-CCNC: 109 U/L (ref 40–129)
ALT SERPL-CCNC: 20 U/L (ref 0–40)
ANION GAP SERPL CALCULATED.3IONS-SCNC: 13 MMOL/L (ref 7–16)
ANISOCYTOSIS: ABNORMAL
AST SERPL-CCNC: 23 U/L (ref 0–39)
BASOPHILS ABSOLUTE: 0.02 E9/L (ref 0–0.2)
BASOPHILS RELATIVE PERCENT: 0.1 % (ref 0–2)
BILIRUB SERPL-MCNC: 1.2 MG/DL (ref 0–1.2)
BUN BLDV-MCNC: 32 MG/DL (ref 6–23)
CALCIUM SERPL-MCNC: 7.9 MG/DL (ref 8.6–10.2)
CHLORIDE BLD-SCNC: 100 MMOL/L (ref 98–107)
CO2: 25 MMOL/L (ref 22–29)
CREAT SERPL-MCNC: 1.6 MG/DL (ref 0.7–1.2)
EOSINOPHILS ABSOLUTE: 0 E9/L (ref 0.05–0.5)
EOSINOPHILS RELATIVE PERCENT: 0 % (ref 0–6)
GFR AFRICAN AMERICAN: 52
GFR NON-AFRICAN AMERICAN: 43 ML/MIN/1.73
GLUCOSE BLD-MCNC: 124 MG/DL (ref 74–99)
HCT VFR BLD CALC: 26.9 % (ref 37–54)
HEMOGLOBIN: 8.4 G/DL (ref 12.5–16.5)
HYPOCHROMIA: ABNORMAL
IMMATURE GRANULOCYTES #: 0.09 E9/L
IMMATURE GRANULOCYTES %: 0.5 % (ref 0–5)
LYMPHOCYTES ABSOLUTE: 0.61 E9/L (ref 1.5–4)
LYMPHOCYTES RELATIVE PERCENT: 3.7 % (ref 20–42)
MCH RBC QN AUTO: 25.9 PG (ref 26–35)
MCHC RBC AUTO-ENTMCNC: 31.2 % (ref 32–34.5)
MCV RBC AUTO: 83 FL (ref 80–99.9)
MONOCYTES ABSOLUTE: 0.63 E9/L (ref 0.1–0.95)
MONOCYTES RELATIVE PERCENT: 3.8 % (ref 2–12)
NEUTROPHILS ABSOLUTE: 15.16 E9/L (ref 1.8–7.3)
NEUTROPHILS RELATIVE PERCENT: 91.9 % (ref 43–80)
PDW BLD-RTO: 14.6 FL (ref 11.5–15)
PHOSPHORUS: 3.5 MG/DL (ref 2.5–4.5)
PLATELET # BLD: 234 E9/L (ref 130–450)
PMV BLD AUTO: 9.7 FL (ref 7–12)
POLYCHROMASIA: ABNORMAL
POTASSIUM SERPL-SCNC: 4.2 MMOL/L (ref 3.5–5)
RBC # BLD: 3.24 E12/L (ref 3.8–5.8)
SODIUM BLD-SCNC: 138 MMOL/L (ref 132–146)
TOTAL PROTEIN: 5.1 G/DL (ref 6.4–8.3)
WBC # BLD: 16.5 E9/L (ref 4.5–11.5)

## 2022-03-26 PROCEDURE — 6360000002 HC RX W HCPCS: Performed by: INTERNAL MEDICINE

## 2022-03-26 PROCEDURE — 36415 COLL VENOUS BLD VENIPUNCTURE: CPT

## 2022-03-26 PROCEDURE — 85025 COMPLETE CBC W/AUTO DIFF WBC: CPT

## 2022-03-26 PROCEDURE — 6360000002 HC RX W HCPCS: Performed by: SURGERY

## 2022-03-26 PROCEDURE — 51798 US URINE CAPACITY MEASURE: CPT

## 2022-03-26 PROCEDURE — 2580000003 HC RX 258: Performed by: INTERNAL MEDICINE

## 2022-03-26 PROCEDURE — 84100 ASSAY OF PHOSPHORUS: CPT

## 2022-03-26 PROCEDURE — 80053 COMPREHEN METABOLIC PANEL: CPT

## 2022-03-26 PROCEDURE — 2700000000 HC OXYGEN THERAPY PER DAY

## 2022-03-26 PROCEDURE — 6370000000 HC RX 637 (ALT 250 FOR IP): Performed by: NURSE PRACTITIONER

## 2022-03-26 PROCEDURE — 6370000000 HC RX 637 (ALT 250 FOR IP): Performed by: SURGERY

## 2022-03-26 PROCEDURE — 1200000000 HC SEMI PRIVATE

## 2022-03-26 PROCEDURE — 2580000003 HC RX 258: Performed by: SURGERY

## 2022-03-26 PROCEDURE — 6370000000 HC RX 637 (ALT 250 FOR IP): Performed by: INTERNAL MEDICINE

## 2022-03-26 RX ORDER — FUROSEMIDE 10 MG/ML
40 INJECTION INTRAMUSCULAR; INTRAVENOUS ONCE
Status: COMPLETED | OUTPATIENT
Start: 2022-03-26 | End: 2022-03-26

## 2022-03-26 RX ORDER — SODIUM CHLORIDE, SODIUM LACTATE, POTASSIUM CHLORIDE, AND CALCIUM CHLORIDE .6; .31; .03; .02 G/100ML; G/100ML; G/100ML; G/100ML
500 INJECTION, SOLUTION INTRAVENOUS ONCE
Status: DISCONTINUED | OUTPATIENT
Start: 2022-03-26 | End: 2022-04-01

## 2022-03-26 RX ADMIN — DILTIAZEM HYDROCHLORIDE 60 MG: 30 TABLET, FILM COATED ORAL at 23:08

## 2022-03-26 RX ADMIN — ENOXAPARIN SODIUM 40 MG: 100 INJECTION SUBCUTANEOUS at 11:07

## 2022-03-26 RX ADMIN — DIPHENHYDRAMINE HCL 25 MG: 25 TABLET ORAL at 21:18

## 2022-03-26 RX ADMIN — DIPHENHYDRAMINE HCL 25 MG: 25 TABLET ORAL at 11:06

## 2022-03-26 RX ADMIN — DIPHENHYDRAMINE HCL 25 MG: 25 TABLET ORAL at 13:35

## 2022-03-26 RX ADMIN — DILTIAZEM HYDROCHLORIDE 60 MG: 30 TABLET, FILM COATED ORAL at 13:35

## 2022-03-26 RX ADMIN — DILTIAZEM HYDROCHLORIDE 60 MG: 30 TABLET, FILM COATED ORAL at 06:23

## 2022-03-26 RX ADMIN — SODIUM CHLORIDE, PRESERVATIVE FREE 10 ML: 5 INJECTION INTRAVENOUS at 21:18

## 2022-03-26 RX ADMIN — ACETAMINOPHEN 650 MG: 325 TABLET ORAL at 18:49

## 2022-03-26 RX ADMIN — METOPROLOL TARTRATE 100 MG: 50 TABLET, FILM COATED ORAL at 11:06

## 2022-03-26 RX ADMIN — METOPROLOL TARTRATE 100 MG: 50 TABLET, FILM COATED ORAL at 21:18

## 2022-03-26 RX ADMIN — ACETAMINOPHEN 650 MG: 325 TABLET ORAL at 23:08

## 2022-03-26 RX ADMIN — SODIUM CHLORIDE, PRESERVATIVE FREE 10 ML: 5 INJECTION INTRAVENOUS at 11:12

## 2022-03-26 RX ADMIN — ACETAMINOPHEN 650 MG: 325 TABLET ORAL at 11:07

## 2022-03-26 RX ADMIN — FUROSEMIDE 40 MG: 10 INJECTION, SOLUTION INTRAMUSCULAR; INTRAVENOUS at 11:07

## 2022-03-26 RX ADMIN — SODIUM CHLORIDE 125 MG: 900 INJECTION INTRAVENOUS at 11:13

## 2022-03-26 ASSESSMENT — PAIN SCALES - GENERAL
PAINLEVEL_OUTOF10: 0
PAINLEVEL_OUTOF10: 2
PAINLEVEL_OUTOF10: 0

## 2022-03-26 NOTE — PROGRESS NOTES
Social work please call Torsten Nancyjanine (sister) [phone] to discuss possible d/c to [facility]

## 2022-03-26 NOTE — PLAN OF CARE
Problem: Skin Integrity:  Goal: Will show no infection signs and symptoms  Description: Will show no infection signs and symptoms  Outcome: Met This Shift     Problem: Falls - Risk of:  Goal: Will remain free from falls  Description: Will remain free from falls  Outcome: Met This Shift     Problem: Nausea/Vomiting:  Goal: Absence of nausea/vomiting  Description: Absence of nausea/vomiting  Outcome: Met This Shift

## 2022-03-26 NOTE — PROGRESS NOTES
[facility] Roberto A. Birdena Hashimoto, MD Noe Robson, MD Ryne Dupont, MD Yazmin Randolph, MD Velvet Carroll, CNP   Chhaya Albarado, CHARMAINE  Progress Note    3/26/2022    SUBJECTIVE:   3/25: Since early afternoon. Feeling better. Denies abdominal pain though still distended. Had a BM, and distention has markedly improved since then. Denies dyspnea at rest on nasal cannula. Has developed lower extremity and dependent swelling denies cp/palp. Diet has been advanced, and he has anorexia, though has so far tolerated some food  3/26: \"Sleepy. \"  Denies dyspnea though still on nasal cannula. Ate breakfast today without event. Swelling improved compared to yesterday    PROBLEM LIST:    Principal Problem:    Benign neoplasm of cecum  Active Problems:    S/P right hemicolectomy    PSVT (paroxysmal supraventricular tachycardia) (HCC)    Primary hypertension  Resolved Problems:    * No resolved hospital problems. *         DIET:    ADULT DIET;  Regular; Low Fiber     MEDS (scheduled):    dilTIAZem  60 mg Oral 3 times per day    potassium & sodium phosphates  1 packet Oral 4x Daily    metoprolol tartrate  100 mg Oral BID    diphenhydrAMINE  25 mg Oral TID    ferric gluconate (FERRLECIT) IVPB  125 mg IntraVENous Daily    sodium chloride flush  5-40 mL IntraVENous 2 times per day    enoxaparin  40 mg SubCUTAneous Daily    acetaminophen  650 mg Oral Q6H       MEDS (infusions):   sodium chloride         MEDS (prn):  sodium chloride flush, sodium chloride, ondansetron **OR** ondansetron, oxyCODONE **OR** [DISCONTINUED] oxyCODONE    PHYSICAL EXAM:     Patient Vitals for the past 24 hrs:   BP Temp Temp src Pulse Resp SpO2   03/26/22 0745 95/63 98.6 °F (37 °C) Oral 126 16 99 %   03/26/22 0615 107/70 -- -- 132 16 --   03/26/22 0100 120/69 97.6 °F (36.4 °C) Oral 137 18 --   03/25/22 2145 (!) 103/58 97.4 °F (36.3 °C) Oral 134 16 95 %   03/25/22 1618 121/71 98.6 °F (37 °C) Oral 131 16 100 %   03/25/22 1158 113/73 97.9 °F (36.6 °C) -- 128 16 100 %   @      Intake/Output Summary (Last 24 hours) at 3/26/2022 0949  Last data filed at 3/25/2022 1833  Gross per 24 hour   Intake --   Output 2000 ml   Net -2000 ml         Wt Readings from Last 3 Encounters:   03/22/22 190 lb (86.2 kg)   03/15/22 190 lb (86.2 kg)   02/14/22 187 lb (84.8 kg)       Constitutional:  in no acute distress  HEENT: NC/AT, EOMI, sclera and conjunctiva are clear and anicteric, mucus membranes moist  Neck: Trachea midline, no JVD  Cardiovascular: S1, S2 regular rhythm, no murmur,or rub  Respiratory: Crackles at bases improved since yesterday  Gastrointestinal:  Soft, diffusely mildly tender, mildly distended with tympany, no guarding or referred pain, NABS  Ext: 1/2+ dependent and distal lower extremity edema, feet warm  Skin: dry, no rash  Neuro: awake, alert, interactive moves all 4 extremities      DATA:    Recent Labs     03/24/22 2212 03/25/22  0309 03/26/22  0413   WBC 11.7* 10.2 16.5*   HGB 7.6* 7.3* 8.4*   HCT 24.0* 23.3* 26.9*   MCV 81.4 82.0 83.0    193 234     Recent Labs     03/24/22  0500 03/24/22  0500 03/24/22 2212 03/25/22  0309 03/26/22  0413      < > 134 135 138   K 3.9  --  4.1 4.1 4.2      < > 103 102 100   CO2 25   < > 24 25 25   MG 1.9  --   --  1.8  --    PHOS 2.2*  --   --  2.1* 3.5   BUN 37*   < > 29* 27* 32*   CREATININE 1.9*   < > 1.6* 1.6* 1.6*   ALT 21  --   --  16 20   AST 24  --   --  18 23   BILITOT 1.0  --   --  0.8 1.2   ALKPHOS 73  --   --  66 109    < > = values in this interval not displayed. Lab Results   Component Value Date    LABPROT 0.1 03/23/2022    LABPROT 0.1 03/23/2022    LABPROT 0.3 (H) 03/23/2022    LABPROT 0.3 03/23/2022       ASSESSMENT     74 y.o. 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 Postoperatively tachycardic, hypotensive treated with IV bolus.     1.   Acute kidney injury, hemodynamically mediated, least in part secondary to volume contraction, anemia, increase insensible losses; the final common pathway was decreased effective circulating volume. Status post IV fluid boluses, increase in IV fluid rate, azotemia is improving, as is his urine output.     2. Anemia, severe, normocytic but MCV is very low. Severely iron deficient    3. Hypophosphatemia, status post supplementation yesterday orally. Normalized with supplement     Edema and pulmonary edema improved  Azotemia improved, creatinine stable not at baseline yet.     RECOMMENDATIONS  IV Lasix x1 again this morning  Okay to remove Valles from renal standpoint  Encourage oral intake as able  IV Ferrlecit while here  Continue supportive care  Follow labs, UO      Electronically signed by Marta Werner MD on 3/26/2022

## 2022-03-26 NOTE — PROGRESS NOTES
Seen/examined  No events  Tolerating diet  No emesis  Mild rash diffusely  Afebrile  Abdomen soft and nondistended  Incisions clean  White cell count elevated today  Path negative for malignancy  Plan per consultants at this point-hopefully can remove Valles today  Monitor labs  May need Neisha Mayo MD

## 2022-03-26 NOTE — PROGRESS NOTES
Admit Date: 3/22/2022    Subjective:     Patient states he is feeling better. He ate a small amount for breakfast.     Scheduled Meds:   furosemide  40 mg IntraVENous Once    dilTIAZem  60 mg Oral 3 times per day    potassium & sodium phosphates  1 packet Oral 4x Daily    metoprolol tartrate  100 mg Oral BID    diphenhydrAMINE  25 mg Oral TID    ferric gluconate (FERRLECIT) IVPB  125 mg IntraVENous Daily    sodium chloride flush  5-40 mL IntraVENous 2 times per day    enoxaparin  40 mg SubCUTAneous Daily    acetaminophen  650 mg Oral Q6H     Continuous Infusions:   sodium chloride       PRN Meds:sodium chloride flush, sodium chloride, ondansetron **OR** ondansetron, oxyCODONE **OR** [DISCONTINUED] oxyCODONE      Objective:     I/O last 3 completed shifts:  In: -   Out: 2800 [Urine:2800]  No intake/output data recorded. BP 95/63   Pulse 126   Temp 98.6 °F (37 °C) (Oral)   Resp 16   Ht 6' 2\" (1.88 m)   Wt 190 lb (86.2 kg)   SpO2 99%   BMI 24.39 kg/m²     LUNGS:  No increased work of breathing, good air exchange, clear to auscultation bilaterally, no crackles or wheezing  CARDIOVASCULAR:  RRR with no murmurs, no gallops, no rubs  ABDOMEN:  Flat, soft, with mild right-sided tenderness. Skin of right abdomen with erythema.     MUSCULOSKELETAL:  No cyanosis, no clubbing, no edema of right ankle    Data Review  CBC:   Recent Labs     03/24/22 2212 03/25/22  0309 03/26/22  0413   WBC 11.7* 10.2 16.5*   HGB 7.6* 7.3* 8.4*    193 234     BMP:    Recent Labs     03/24/22 2212 03/25/22  0309 03/26/22  0413    135 138   K 4.1 4.1 4.2    102 100   CO2 24 25 25   BUN 29* 27* 32*   CREATININE 1.6* 1.6* 1.6*   GLUCOSE 109* 101* 124*     Coagulation:   Lab Results   Component Value Date    INR 1.1 01/24/2022    APTT 26.3 01/24/2022     Cardiac markers:   Lab Results   Component Value Date    CKMB <0.2 03/18/2011     Lab Results   Component Value Date    LABPROT 0.1 03/23/2022    LABPROT 0.1 03/23/2022    LABALBU 2.8 (L) 03/26/2022     Lab Results   Component Value Date    ALT 20 03/26/2022    AST 23 03/26/2022    ALKPHOS 109 03/26/2022    BILITOT 1.2 03/26/2022         Assessment:     Patient Active Problem List    Diagnosis Date Noted    PSVT (paroxysmal supraventricular tachycardia) ([address]     Primary hypertension     Benign neoplasm of cecum 03/22/2022    S/P right hemicolectomy 03/22/2022    Gastrointestinal bleed 01/25/2022    GI bleed 01/24/2022    Atrial flutter with rapid ventricular response ([address] 12/19/2020    Ileus ([address] 01/29/2018    Rectal cancer ([address] 01/23/2018    Retinal detachment of left eye with multiple breaks 10/03/2016           COURTNEY with improving creatinine        Anemia from acute blood loss and iron deficiency        Ileus improving    Plan:     Continue iron replacement. Appreciate input from Dr. Alysha Tam. Remove Valles catheter today. Follow progress of anemia and renal function and hopefully be able to discharge soon.

## 2022-03-27 ENCOUNTER — APPOINTMENT (OUTPATIENT)
Dept: GENERAL RADIOLOGY | Age: 67
DRG: 330 | End: 2022-03-27
Attending: SURGERY
Payer: MEDICARE

## 2022-03-27 LAB
ALBUMIN SERPL-MCNC: 2.7 G/DL (ref 3.5–5.2)
ALP BLD-CCNC: 116 U/L (ref 40–129)
ALT SERPL-CCNC: 14 U/L (ref 0–40)
ANION GAP SERPL CALCULATED.3IONS-SCNC: 12 MMOL/L (ref 7–16)
ANION GAP SERPL CALCULATED.3IONS-SCNC: 12 MMOL/L (ref 7–16)
ANISOCYTOSIS: ABNORMAL
AST SERPL-CCNC: 13 U/L (ref 0–39)
BACTERIA: ABNORMAL /HPF
BASOPHILS ABSOLUTE: 0.04 E9/L (ref 0–0.2)
BASOPHILS RELATIVE PERCENT: 0.2 % (ref 0–2)
BILIRUB SERPL-MCNC: 1.6 MG/DL (ref 0–1.2)
BILIRUBIN URINE: NEGATIVE
BLOOD, URINE: ABNORMAL
BUN BLDV-MCNC: 45 MG/DL (ref 6–23)
BUN BLDV-MCNC: 46 MG/DL (ref 6–23)
C DIFF TOXIN/ANTIGEN: NORMAL
CALCIUM SERPL-MCNC: 8.3 MG/DL (ref 8.6–10.2)
CALCIUM SERPL-MCNC: 8.4 MG/DL (ref 8.6–10.2)
CHLORIDE BLD-SCNC: 101 MMOL/L (ref 98–107)
CHLORIDE BLD-SCNC: 99 MMOL/L (ref 98–107)
CLARITY: CLEAR
CO2: 26 MMOL/L (ref 22–29)
CO2: 26 MMOL/L (ref 22–29)
COLOR: YELLOW
CREAT SERPL-MCNC: 2 MG/DL (ref 0.7–1.2)
CREAT SERPL-MCNC: 2.2 MG/DL (ref 0.7–1.2)
EOSINOPHILS ABSOLUTE: 0.09 E9/L (ref 0.05–0.5)
EOSINOPHILS RELATIVE PERCENT: 0.4 % (ref 0–6)
GFR AFRICAN AMERICAN: 36
GFR AFRICAN AMERICAN: 40
GFR NON-AFRICAN AMERICAN: 30 ML/MIN/1.73
GFR NON-AFRICAN AMERICAN: 33 ML/MIN/1.73
GLUCOSE BLD-MCNC: 101 MG/DL (ref 74–99)
GLUCOSE BLD-MCNC: 104 MG/DL (ref 74–99)
GLUCOSE URINE: NEGATIVE MG/DL
HCT VFR BLD CALC: 26.8 % (ref 37–54)
HEMOGLOBIN: 8 G/DL (ref 12.5–16.5)
HYPOCHROMIA: ABNORMAL
IMMATURE GRANULOCYTES #: 0.62 E9/L
IMMATURE GRANULOCYTES %: 2.8 % (ref 0–5)
KETONES, URINE: 15 MG/DL
LEUKOCYTE ESTERASE, URINE: NEGATIVE
LYMPHOCYTES ABSOLUTE: 1.86 E9/L (ref 1.5–4)
LYMPHOCYTES RELATIVE PERCENT: 8.3 % (ref 20–42)
MAGNESIUM: 2 MG/DL (ref 1.6–2.6)
MCH RBC QN AUTO: 25.6 PG (ref 26–35)
MCHC RBC AUTO-ENTMCNC: 29.9 % (ref 32–34.5)
MCV RBC AUTO: 85.9 FL (ref 80–99.9)
MONOCYTES ABSOLUTE: 1.33 E9/L (ref 0.1–0.95)
MONOCYTES RELATIVE PERCENT: 6 % (ref 2–12)
NEUTROPHILS ABSOLUTE: 18.35 E9/L (ref 1.8–7.3)
NEUTROPHILS RELATIVE PERCENT: 82.3 % (ref 43–80)
NITRITE, URINE: NEGATIVE
OVALOCYTES: ABNORMAL
PDW BLD-RTO: 15.9 FL (ref 11.5–15)
PH UA: 5 (ref 5–9)
PHOSPHORUS: 3.6 MG/DL (ref 2.5–4.5)
PLATELET # BLD: 262 E9/L (ref 130–450)
PMV BLD AUTO: 10.3 FL (ref 7–12)
POIKILOCYTES: ABNORMAL
POLYCHROMASIA: ABNORMAL
POTASSIUM SERPL-SCNC: 3.4 MMOL/L (ref 3.5–5)
POTASSIUM SERPL-SCNC: 3.5 MMOL/L (ref 3.5–5)
PROTEIN UA: NEGATIVE MG/DL
RBC # BLD: 3.12 E12/L (ref 3.8–5.8)
RBC UA: ABNORMAL /HPF (ref 0–2)
SODIUM BLD-SCNC: 137 MMOL/L (ref 132–146)
SODIUM BLD-SCNC: 139 MMOL/L (ref 132–146)
SPECIFIC GRAVITY UA: >=1.03 (ref 1–1.03)
TOTAL PROTEIN: 5.1 G/DL (ref 6.4–8.3)
UROBILINOGEN, URINE: 0.2 E.U./DL
WBC # BLD: 22.3 E9/L (ref 4.5–11.5)
WBC UA: ABNORMAL /HPF (ref 0–5)

## 2022-03-27 PROCEDURE — 80048 BASIC METABOLIC PNL TOTAL CA: CPT

## 2022-03-27 PROCEDURE — 6370000000 HC RX 637 (ALT 250 FOR IP): Performed by: SURGERY

## 2022-03-27 PROCEDURE — 6370000000 HC RX 637 (ALT 250 FOR IP): Performed by: INTERNAL MEDICINE

## 2022-03-27 PROCEDURE — 71046 X-RAY EXAM CHEST 2 VIEWS: CPT

## 2022-03-27 PROCEDURE — 85025 COMPLETE CBC W/AUTO DIFF WBC: CPT

## 2022-03-27 PROCEDURE — 81001 URINALYSIS AUTO W/SCOPE: CPT

## 2022-03-27 PROCEDURE — 1200000000 HC SEMI PRIVATE

## 2022-03-27 PROCEDURE — 2700000000 HC OXYGEN THERAPY PER DAY

## 2022-03-27 PROCEDURE — 87088 URINE BACTERIA CULTURE: CPT

## 2022-03-27 PROCEDURE — 87077 CULTURE AEROBIC IDENTIFY: CPT

## 2022-03-27 PROCEDURE — 87205 SMEAR GRAM STAIN: CPT

## 2022-03-27 PROCEDURE — 36415 COLL VENOUS BLD VENIPUNCTURE: CPT

## 2022-03-27 PROCEDURE — 6370000000 HC RX 637 (ALT 250 FOR IP): Performed by: NURSE PRACTITIONER

## 2022-03-27 PROCEDURE — 87186 SC STD MICRODIL/AGAR DIL: CPT

## 2022-03-27 PROCEDURE — 83735 ASSAY OF MAGNESIUM: CPT

## 2022-03-27 PROCEDURE — 6360000002 HC RX W HCPCS: Performed by: INTERNAL MEDICINE

## 2022-03-27 PROCEDURE — 87449 NOS EACH ORGANISM AG IA: CPT

## 2022-03-27 PROCEDURE — 84100 ASSAY OF PHOSPHORUS: CPT

## 2022-03-27 PROCEDURE — 2580000003 HC RX 258: Performed by: INTERNAL MEDICINE

## 2022-03-27 PROCEDURE — 80053 COMPREHEN METABOLIC PANEL: CPT

## 2022-03-27 PROCEDURE — 6360000002 HC RX W HCPCS: Performed by: SURGERY

## 2022-03-27 PROCEDURE — 87324 CLOSTRIDIUM AG IA: CPT

## 2022-03-27 PROCEDURE — 99233 SBSQ HOSP IP/OBS HIGH 50: CPT | Performed by: INTERNAL MEDICINE

## 2022-03-27 RX ORDER — SODIUM CHLORIDE 9 MG/ML
INJECTION, SOLUTION INTRAVENOUS CONTINUOUS
Status: ACTIVE | OUTPATIENT
Start: 2022-03-27 | End: 2022-03-28

## 2022-03-27 RX ORDER — DIGOXIN 0.25 MG/ML
125 INJECTION INTRAMUSCULAR; INTRAVENOUS ONCE
Status: COMPLETED | OUTPATIENT
Start: 2022-03-27 | End: 2022-03-27

## 2022-03-27 RX ADMIN — METOPROLOL TARTRATE 100 MG: 50 TABLET, FILM COATED ORAL at 10:30

## 2022-03-27 RX ADMIN — ACETAMINOPHEN 650 MG: 325 TABLET ORAL at 21:00

## 2022-03-27 RX ADMIN — METOPROLOL TARTRATE 100 MG: 50 TABLET, FILM COATED ORAL at 20:59

## 2022-03-27 RX ADMIN — ENOXAPARIN SODIUM 40 MG: 100 INJECTION SUBCUTANEOUS at 08:05

## 2022-03-27 RX ADMIN — DILTIAZEM HYDROCHLORIDE 60 MG: 30 TABLET, FILM COATED ORAL at 06:07

## 2022-03-27 RX ADMIN — DILTIAZEM HYDROCHLORIDE 60 MG: 30 TABLET, FILM COATED ORAL at 21:00

## 2022-03-27 RX ADMIN — ACETAMINOPHEN 650 MG: 325 TABLET ORAL at 04:12

## 2022-03-27 RX ADMIN — DIPHENHYDRAMINE HCL 25 MG: 25 TABLET ORAL at 15:11

## 2022-03-27 RX ADMIN — SODIUM CHLORIDE: 9 INJECTION, SOLUTION INTRAVENOUS at 10:23

## 2022-03-27 RX ADMIN — SODIUM CHLORIDE 125 MG: 900 INJECTION INTRAVENOUS at 10:26

## 2022-03-27 RX ADMIN — SODIUM CHLORIDE: 9 INJECTION, SOLUTION INTRAVENOUS at 20:54

## 2022-03-27 RX ADMIN — ACETAMINOPHEN 650 MG: 325 TABLET ORAL at 10:30

## 2022-03-27 RX ADMIN — DIPHENHYDRAMINE HCL 25 MG: 25 TABLET ORAL at 08:05

## 2022-03-27 RX ADMIN — ACETAMINOPHEN 650 MG: 325 TABLET ORAL at 16:33

## 2022-03-27 RX ADMIN — DIPHENHYDRAMINE HCL 25 MG: 25 TABLET ORAL at 21:00

## 2022-03-27 RX ADMIN — DIGOXIN 125 MCG: 0.25 INJECTION INTRAMUSCULAR; INTRAVENOUS at 10:31

## 2022-03-27 ASSESSMENT — PAIN SCALES - GENERAL
PAINLEVEL_OUTOF10: 0
PAINLEVEL_OUTOF10: 0
PAINLEVEL_OUTOF10: 3
PAINLEVEL_OUTOF10: 0
PAINLEVEL_OUTOF10: 0

## 2022-03-27 NOTE — CONSULTS
[address] [place] [address] [facility]  Consultation Note     Admit Date: 3/22/2022  5:36 AM    Reason for Consult:   Is cellulitis of left abdomen present? Evaluation of cause of leukocytosis    Attending Physician:  Kalpesh Dickey MD    HISTORY OF PRESENT ILLNESS:             The history is obtained from extensive review of available past medical records. The patient is a 74 y.o. male who is previously known to the ID service. The patient was admitted on 3/22/2022 electively because of benign neoplasm of the cecum. He underwent a laparoscopic right hemicolectomy. Cefoxitin was ordered but discontinued. He did receive Clindamycin preoperatively. Developed hypotension and COURTNEY associated to volume contraction and azotemia. Seen by nephrology. Pathology was negative for malignancy. Urine output decreased and became tachycardic. He was given 500 lactated Ringer's bolus on 3/26/2022. He developed loose stools. He developed a rash. He was given Benadryl. He is receiving IV iron infusions. C. difficile toxin assay was negative. WBC has increased to 22.3. There are no eosinophils. Patient does admit to having some itching. This has improved. Denies any pain. Past Medical History:        Diagnosis Date    Employs prosthetic leg     left    History of atrial fibrillation     [place] (hard of hearing)     uses bilat hearing aides    Hx of AKA (above knee amputation), left ([address]     Hx of necrotizing fasciitis 2011    left leg    Hypertension     Rectal bleeding     Rectal cancer ([facility] 12/2017    rectal     January 2011. Admitted to to [facility] with necrotizing fasciitis of the left leg. He underwent a guillotine amputation, followed by an above-the-knee amputation. Seen by ID. Treated with Ertapenem, followed by Ertapenem. Antibiotics were changed over to Clindamycin and Cefazolin he broke out in a rash.   Antibiotics were reverted to Daptomycin and Ertapenem and he completed antibiotic during that admission. He was readmitted in February for anemia and blood transfusions. Seen by ID. He did not warrant antibiotics were these were discontinued altogether. The PICC was pulled. Past Surgical History:        Procedure Laterality Date    ABDOMEN SURGERY  03/20/2018    ileostomy open take down    ABOVE KNEE AMPUTATION  2011    left; hx flesh eating bacteria    CARDIOVERSION  12/2020    COLON SURGERY  01/23/2018    laprascopic low anterior colon resection  take down splenic fexure   ileostomy    COLONOSCOPY  10/21/2011    COLONOSCOPY N/A 2/14/2022    COLONOSCOPY POLYPECTOMY SNARE/COLD BIOPSY performed by Violetta Pathak MD at [address] Left 10/03/2016    pars plana vitrectomy, retinal detachment repair, laser gas/fluid exchange    EYE SURGERY Bilateral 2002?     bilat cataracts w lens implants    EYE SURGERY Right ?    retinal detachment    HEMICOLECTOMY Right 3/22/2022    LAPAROSCOPIC RIGHT HEMICOLECTOMY performed by Violetta Pathak MD at [address] OF ILEOSTOMY,COMPLICATED N/A 3/08/6017    ILEOSTOMY OPEN TAKEDOWN performed by [facility]ta [facility]ak MD at [facility] OR     Current Medications:   Scheduled Meds:   lactated ringers bolus  500 mL IntraVENous Once    dilTIAZem  60 mg Oral 3 times per day    metoprolol tartrate  100 mg Oral BID    diphenhydrAMINE  25 mg Oral TID    ferric gluconate (FERRLECIT) IVPB  125 mg IntraVENous Daily    sodium chloride flush  5-40 mL IntraVENous 2 times per day    enoxaparin  40 mg SubCUTAneous Daily    acetaminophen  650 mg Oral Q6H     Continuous Infusions:   sodium chloride 100 mL/hr at 03/27/22 1023    sodium chloride       PRN Meds:sodium chloride flush, sodium chloride, ondansetron **OR** ondansetron, oxyCODONE **OR** [DISCONTINUED] oxyCODONE    Allergies:  Cefepime and Pcn [penicillins]    Social History:   Social History     Socioeconomic History    Marital status: Single     Spouse name: Not on file  Number of children: Not on file    Years of education: Not on file    Highest education level: Not on file   Occupational History    Not on file   Tobacco Use    Smoking status: Former Smoker     Packs/day: 2.00     Years: 35.00     Pack years: 70.00     Types: Cigarettes     Start date: 1974     Quit date: 2010     Years since quittin.3    Smokeless tobacco: Never Used   Vaping Use    Vaping Use: Never used   Substance and Sexual Activity    Alcohol use: No    Drug use: No    Sexual activity: Not on file   Other Topics Concern    Not on file   Social History Narrative    Not on file     Social Determinants of Health     Financial Resource Strain:     Difficulty of Paying Living Expenses: Not on file   Food Insecurity:     Worried About Running Out of Food in the Last Year: Not on file    Donny of Food in the Last Year: Not on file   Transportation Needs:     Lack of Transportation (Medical): Not on file    Lack of Transportation (Non-Medical):  Not on file   Physical Activity:     Days of Exercise per Week: Not on file    Minutes of Exercise per Session: Not on file   Stress:     Feeling of Stress : Not on file   Social Connections:     Frequency of Communication with Friends and Family: Not on file    Frequency of Social Gatherings with Friends and Family: Not on file    Attends Episcopal Services: Not on file    Active Member of [address] [place] [address] or Organizations: Not on file    Attends Club or Organization Meetings: Not on file    Marital Status: Not on file   Intimate Partner Violence:     Fear of Current or Ex-Partner: Not on file    Emotionally Abused: Not on file    Physically Abused: Not on file    Sexually Abused: Not on file   Housing Stability:     Unable to Pay for Housing in the Last Year: Not on file    Number of [place] in the Last Year: Not on file    Unstable Housing in the Last Year: Not on file      Pets: None  Travel: No  The patient lives at home with his mother and sister. He retired from working at [employer] History:       Problem Relation Age of Onset    Diabetes Mother     High Blood Pressure Mother     High Cholesterol Mother     Heart Attack Mother     Dementia Mother     Diabetes Father     Glaucoma Father     High Cholesterol Father     Pacemaker Father     Diabetes Sister     Thyroid Disease Sister     High Cholesterol Sister     Heart Attack Brother     Diabetes Sister     High Cholesterol Sister     Thyroid Disease Sister     Diabetes Sister     High Cholesterol Sister     Thyroid Disease Sister    . Otherwise non-pertinent to the chief complaint. REVIEW OF SYSTEMS:    Constitutional: Negative for fevers, chills, diaphoresis  Neurologic: Negative   Psychiatric: Negative  Rheumatologic: Negative   Endocrine: Negative  Hematologic: Negative  Immunologic: Negative  ENT: Negative  Respiratory: Negative   Cardiovascular: Negative  GI: Negative  : Negative  Musculoskeletal: He lost his left leg to necrotizing fasciitis  Skin: As in the HPI    PHYSICAL EXAM:    Vitals:   BP (!) 94/55   Pulse 128   Temp 97.9 °F (36.6 °C) (Oral)   Resp 20   Ht 6' 2\" (1.88 m)   Wt 190 lb (86.2 kg)   SpO2 96%   BMI 24.39 kg/m²   Constitutional: The patient is awake, alert, and oriented. He is hard of hearing sitting up in bed. Visitors present. Skin: Warm and dry. There is a diffuse, confluent, blanching rash present on proximal lower extremities and lower abdomen and flanks. There is no cellulitis. No open wounds. HEENT: Eyes show round, and reactive pupils. No jaundice. Moist mucous membranes, no ulcerations, no thrush. Neck: Supple to movements. No lymphadenopathy. Chest: No use of accessory muscles to breathe. Symmetrical expansion. Auscultation reveals no wheezing, crackles, or rhonchi. Cardiovascular: S1 and S2 are rhythmic and regular. No murmurs appreciated. Abdomen: Positive bowel sounds to auscultation. NEGATIVE 03/18/2011    BLOODU Negative 10/18/2016    GLUCOSEU Negative 10/18/2016    GLUCOSEU NEGATIVE 03/18/2011    AMORPHOUS MODERATE 03/18/2011       Lab Results   Component Value Date    HCO3 26.6 01/10/2011    BE 3.6 01/10/2011    O2SAT 99.1 01/03/2011    PH 7.507 01/10/2011    THGB 12.6 01/03/2011    PCO2 34.4 01/10/2011    PO2 71.3 01/10/2011     Radiology:  Noted    Microbiology:  Pending  No results for input(s): BC in the last 72 hours. No results for input(s): ORG in the last 72 hours. No results for input(s): Starlene Ewings in the last 72 hours. No results for input(s): STREPNEUMAGU in the last 72 hours. No results for input(s): LP1UAG in the last 72 hours. No results for input(s): ASO in the last 72 hours. No results for input(s): CULTRESP in the last 72 hours. No results for input(s): PROCAL in the last 72 hours. Assessment:  · Status post laparoscopic right hemicolectomy for a cecum neoplasm  · Adverse drug reaction. The patient has a rash. He received Clindamycin preoperatively. He had a rash when he was on Cefazolin Clindamycin in 2011. The rash may have been caused by other medications as  · No evidence of  · Leukocytosis associated to the above  · Antibiotic associated diarrhea. No evidence of C. difficile    Plan:    · Antibiotics are not warranted I will add Clindamycin to his list of allergy  · Check cultures, baseline ESR, CRP  · Consider stopping all nonessential medication  · Will follow with you    Thank you for having us see this patient in consultation. I will be discussing this case with the treating physicians.     Holger Bojorquez MD  10:54 AM  3/27/2022

## 2022-03-27 NOTE — PROGRESS NOTES
Seen/examined  Urine output has been marginal  Creatinine bumped up a little bit today  Patient denies any abdominal pain  Tolerating food  Having loose stools  Afebrile  Heart rate remains elevated  WBC up again 22K  Abdomen extremely soft, nondistended, nontender  Incisions intact  Check C. difficile, chest x-ray, urine culture  Doubt intra-abdominal process  Lee Mccracken MD

## 2022-03-27 NOTE — PROGRESS NOTES
Spoke to RN on 7 about patient having a rash all over body possibly from receiving iron supplement IV. The patient was ordered benadryl for the reaction but iron iv is still ordered.

## 2022-03-27 NOTE — PROGRESS NOTES
Consult to Dr. Gibson Bloch covering through perfect [facility]. Discussed Mews score, poor output, tachycardia, labs, and bladder scan results. Recommended 500 LR bolus. Notify Cardiology in the A.[facility] in regards to the tachycardia and the beta blocker.

## 2022-03-27 NOTE — PLAN OF CARE
Problem: Skin Integrity:  Goal: Will show no infection signs and symptoms  Description: Will show no infection signs and symptoms  3/26/2022 2357 by Jose Muller RN  Outcome: Met This Shift  3/26/2022 1415 by Kae Douglas RN  Outcome: Met This Shift  Goal: Absence of new skin breakdown  Description: Absence of new skin breakdown  Outcome: Met This Shift     Problem: Falls - Risk of:  Goal: Will remain free from falls  Description: Will remain free from falls  3/26/2022 2357 by Jose Muller RN  Outcome: Met This Shift  3/26/2022 1415 by Kae Douglas RN  Outcome: Met This Shift  Goal: Absence of physical injury  Description: Absence of physical injury  Outcome: Met This Shift     Problem: Nausea/Vomiting:  Goal: Absence of nausea/vomiting  Description: Absence of nausea/vomiting  3/26/2022 2357 by Jose Muller RN  Outcome: Met This Shift  3/26/2022 1415 by Kae Douglas RN  Outcome: Met This Shift  Goal: Able to drink  Description: Able to drink  Outcome: Met This Shift  Goal: Able to eat  Description: Able to eat  Outcome: Met This Shift  Goal: Ability to achieve adequate nutritional intake will improve  Description: Ability to achieve adequate nutritional intake will improve  Outcome: Met This Shift     Problem: Pain:  Goal: Pain level will decrease  Description: Pain level will decrease  Outcome: Met This Shift  Goal: Control of acute pain  Description: Control of acute pain  Outcome: Met This Shift  Goal: Control of chronic pain  Description: Control of chronic pain  Outcome: Met This Shift

## 2022-03-27 NOTE — PROGRESS NOTES
[facility]        CARDIOLOGY                 INPATIENT PROGRESS NOTE          PATIENT SEEN IN FOLLOW UP FOR: SVT      Hospital Day: 6     Miguelangel Che. is a 74 year old patient known to Dr. Nell Keene: Denies any Cp or SOB, No palpitation. No orthopnea    ROS: Review of rest of 10 systems negative except as mentioned above    OBJECTIVE: No acute distress. See Assessment     Diagnostics:       Telemetry: Reviewed  -narrow complex tachycardia, rate 120s         Intake/Output Summary (Last 24 hours) at 3/27/2022 0949  Last data filed at 3/27/2022 0328  Gross per 24 hour   Intake 190 ml   Output 200 ml   Net -10 ml       Labs:   CBC:   Recent Labs     03/26/22  0413 03/27/22  0320   WBC 16.5* 22.3*   HGB 8.4* 8.0*   HCT 26.9* 26.8*    262     BMP:   Recent Labs     03/26/22  0413 03/27/22  0320    137   K 4.2 3.4*   CO2 25 26   BUN 32* 45*   CREATININE 1.6* 2.2*   LABGLOM 43 30   CALCIUM 7.9* 8.4*     Mag:   Recent Labs     03/25/22  0309 03/27/22  0320   MG 1.8 2.0     Phos:   Recent Labs     03/26/22  0413 03/27/22  0320   PHOS 3.5 3.6     TSH:   Recent Labs     03/25/22  1013   TSH 1.050     HgA1c:     BNP: No results for input(s): BNP in the last 72 hours. PT/INR: No results for input(s): PROTIME, INR in the last 72 hours. APTT:No results for input(s): APTT in the last 72 hours. CARDIAC ENZYMES:No results for input(s): CKTOTAL, CKMB, CKMBINDEX, TROPONINI in the last 72 hours.   FASTING LIPID PANEL:  Lab Results   Component Value Date    CHOL 114 12/20/2020    HDL 41 12/20/2020    LDLCALC 58 12/20/2020    TRIG 76 12/20/2020     LIVER PROFILE:  Recent Labs     03/26/22  0413 03/27/22  0320   AST 23 13   ALT 20 14   LABALBU 2.8* 2.7*       Current Inpatient Medications:   lactated ringers bolus  500 mL IntraVENous Once    dilTIAZem  60 mg Oral 3 times per day    metoprolol tartrate  100 mg Oral BID    diphenhydrAMINE  25 mg Oral TID    ferric gluconate (FERRLECIT) IVPB  125 mg IntraVENous Daily    sodium chloride flush  5-40 mL IntraVENous 2 times per day    enoxaparin  40 mg SubCUTAneous Daily    acetaminophen  650 mg Oral Q6H       IV Infusions (if any):   sodium chloride           PHYSICAL EXAM:     CONSTITUTIONAL:   /67   Pulse 126   Temp 97.8 °F (36.6 °C) (Oral)   Resp 20   Ht 6' 2\" (1.88 m)   Wt 190 lb (86.2 kg)   SpO2 96%   BMI 24.39 kg/m²   Pulse  Av.3  Min: 120  Max: 694  Systolic (04CKU), PLK:313 , Min:91 , RZV:615    Diastolic (47KXB), BMN:63, Min:52, Max:67        In general, this is a well developed, well nourished who appears stated age. awake, alert, cooperative, no apparent distress     HEENT: eyes -conjunctivae pink,   Neck-  no stridor, no carotid bruit. no jugular venous distention   RESPIRATORY: Chest symmetrical and non-tender to palpation.  No accessory muscles use.   Lung auscultation - few rhonchi  CARDIOVASCULAR:     Heart Palpation - no palpable thrills  Heart Ausculation - Regular rate and rhythm, 1/6 systolic murmur, No s3 or rub. No lower extremity edema, Distal pulses palpable, no cyanosis   ABDOMEN: Soft, nontender,  Bowel sounds present. MS: n/a. : Deferred  Rectal Exam: Deferred  SKIN: warm and dry  NEURO / PSYCH: oriented to person, place           Impression/Recommendations:     Narrow complex tachycardia - No WPW; Likely junctional tachcyardia vs. Atrial Tachycardia or A Flutter - Continue BB, Cardizem; Give iv Digoxin - Monitor BP and HR      Mild Hypotension - Start iv fluds    PAF - Resume OAC when H/H stable and OK from surgery     S/p Hemicolectomy for cecal malignancy     HTN - Monitor BP     CKD - Monitor renal Fn - Ct 1.6 -- > 2.2; Start fluids                Above d/w him all questions answered.       Electronically signed by Lloyd Moreno MD on 3/27/2022 at 9:49 AM  [facility]) Cardiology

## 2022-03-27 NOTE — PROGRESS NOTES
--   03/27/22 1015 (!) 94/55 97.9 °F (36.6 °C) Oral 128 20 -- -- --   03/27/22 0900 107/67 97.8 °F (36.6 °C) Oral 130 20 96 % -- --   03/27/22 0745 94/60 98.4 °F (36.9 °C) Oral 126 20 91 % 6' 2\" (1.88 m) 190 lb (86.2 kg)   03/27/22 0733 99/60 98.8 °F (37.1 °C) Oral 121 16 91 % -- --   03/27/22 0600 (!) 100/58 98.1 °F (36.7 °C) Oral 123 16 92 % -- --   03/27/22 0245 (!) 104/52 98.2 °F (36.8 °C) Oral 120 16 93 % -- --   03/27/22 0030 105/62 98.1 °F (36.7 °C) Oral 127 16 92 % -- --   03/26/22 2200 (!) 104/58 99.1 °F (37.3 °C) Oral 127 16 93 % -- --   03/26/22 1945 106/65 98.1 °F (36.7 °C) Oral 128 16 -- -- --   03/26/22 1600 91/65 97.5 °F (36.4 °C) Oral 125 16 93 % -- --   @      Intake/Output Summary (Last 24 hours) at 3/27/2022 1316  Last data filed at 3/27/2022 1226  Gross per 24 hour   Intake --   Output 475 ml   Net -475 ml         Wt Readings from Last 3 Encounters:   03/27/22 190 lb (86.2 kg)   03/15/22 190 lb (86.2 kg)   02/14/22 187 lb (84.8 kg)       Constitutional:  in no acute distress  HEENT: NC/AT, EOMI, sclera and conjunctiva are clear and anicteric, mucus membranes moist  Neck: Trachea midline, no JVD  Cardiovascular: S1, S2 regular rhythm, no murmur,or rub  Respiratory: Crackles at bases improved since yesterday  Gastrointestinal:  Soft, diffusely mildly tender, mildly distended with tympany, no guarding or referred pain, NABS  Ext: 1/2+ dependent and distal lower extremity edema, feet warm  Skin: dry, no rash  Neuro: awake, alert, interactive moves all 4 extremities      DATA:    Recent Labs     03/25/22  0309 03/26/22  0413 03/27/22  0320   WBC 10.2 16.5* 22.3*   HGB 7.3* 8.4* 8.0*   HCT 23.3* 26.9* 26.8*   MCV 82.0 83.0 85.9    234 262     Recent Labs     03/25/22  0309 03/26/22  0413 03/27/22  0320    138 137   K 4.1 4.2 3.4*    100 99   CO2 25 25 26   MG 1.8  --  2.0   PHOS 2.1* 3.5 3.6   BUN 27* 32* 45*   CREATININE 1.6* 1.6* 2.2*   ALT 16 20 14   AST 18 23 13   BILITOT 0.8 1.2 1.6*   ALKPHOS 66 109 116       Lab Results   Component Value Date    LABPROT 0.1 03/23/2022    LABPROT 0.1 03/23/2022    LABPROT 0.3 (H) 03/23/2022    LABPROT 0.3 03/23/2022       ASSESSMENT     74 y.o. 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 Postoperatively tachycardic, hypotensive treated with IV bolus.     1. Acute kidney injury, hemodynamically mediated, least in part secondary to volume contraction, anemia, increase insensible losses; the final common pathway was decreased effective circulating volume. Status post IV fluid boluses, increase in IV fluid rate, azotemia is improving, as is his urine output.     2. Anemia, severe, normocytic but MCV is very low. Severely iron deficient    3. Hypophosphatemia, status post supplementation yesterday orally. Normalized with supplement    4. Rash looks like a drug rash. May be due to the iron. Could be due to antimicrobial therapy. Improved since yesterday.     Edema and pulmonary edema improved  Azotemia improved, creatinine stable x2 days, increase since yesterday probably due to hypotension, intravascular volume contraction.   Given the rash, consider AIN  The rash looks a little better    RECOMMENDATIONS  Continue IV fluid  Encourage oral intake as able  IV iron has been stopped  Check urine eosinophils  Repeat BMP this afternoon  Continue supportive care  Follow labs, UO      Electronically signed by Sarah Hendricks MD on 3/27/2022

## 2022-03-27 NOTE — PROGRESS NOTES
Admit Date: 3/22/2022    Subjective:     Patient states he has had some diarrhea the last 2 days. He states he had 2 formed bowel movements this morning. He states he has a little bit of cough. No complaints of abdominal pain. Scheduled Meds:   digoxin  125 mcg IntraVENous Once    lactated ringers bolus  500 mL IntraVENous Once    dilTIAZem  60 mg Oral 3 times per day    metoprolol tartrate  100 mg Oral BID    diphenhydrAMINE  25 mg Oral TID    ferric gluconate (FERRLECIT) IVPB  125 mg IntraVENous Daily    sodium chloride flush  5-40 mL IntraVENous 2 times per day    enoxaparin  40 mg SubCUTAneous Daily    acetaminophen  650 mg Oral Q6H     Continuous Infusions:   sodium chloride      sodium chloride       PRN Meds:sodium chloride flush, sodium chloride, ondansetron **OR** ondansetron, oxyCODONE **OR** [DISCONTINUED] oxyCODONE      Objective:     I/O last 3 completed shifts: In: 200 [P.O.:420; I.V.:10]  Out: 1050 [Urine:1050]  No intake/output data recorded. /67   Pulse 130   Temp 97.8 °F (36.6 °C) (Oral)   Resp 20   Ht 6' 2\" (1.88 m)   Wt 190 lb (86.2 kg)   SpO2 96%   BMI 24.39 kg/m²     LUNGS:  No increased work of breathing, good air exchange, clear to auscultation bilaterally, no crackles or wheezing  CARDIOVASCULAR:  RRR with no murmurs, no gallops, no rubs  ABDOMEN:  Flat, soft, with mild right-sided tenderness  MUSCULOSKELETAL:  No cyanosis, no edema, no clubbing of right leg. Amputation site of left thigh with no erythema. SKIN:  Left side of abdomen with erythema and blanching of skin: entire left abdomen extending down to left hip and proximal thigh.       Data Review  CBC:   Recent Labs     03/25/22  0309 03/26/22 0413 03/27/22  0320   WBC 10.2 16.5* 22.3*   HGB 7.3* 8.4* 8.0*    234 262     BMP:    Recent Labs     03/25/22  0309 03/26/22  0413 03/27/22  0320    138 137   K 4.1 4.2 3.4*    100 99   CO2 25 25 26   BUN 27* 32* 45*   CREATININE 1.6* 1.6* 2.2*   GLUCOSE 101* 124* 101*     Coagulation:   Lab Results   Component Value Date    INR 1.1 01/24/2022    APTT 26.3 01/24/2022     Cardiac markers:   Lab Results   Component Value Date    CKMB <0.2 03/18/2011     Lab Results   Component Value Date    LABPROT 0.1 03/23/2022    LABPROT 0.1 03/23/2022    LABALBU 2.7 (L) 03/27/2022     Lab Results   Component Value Date    ALT 14 03/27/2022    AST 13 03/27/2022    ALKPHOS 116 03/27/2022    BILITOT 1.6 (H) 03/27/2022         Assessment:     Patient Active Problem List    Diagnosis Date Noted    PSVT (paroxysmal supraventricular tachycardia) ([address]     Primary hypertension     Benign neoplasm of cecum 03/22/2022    S/P right hemicolectomy 03/22/2022    Gastrointestinal bleed 01/25/2022    GI bleed 01/24/2022    Atrial flutter with rapid ventricular response ([address] 12/19/2020    Ileus ([address] 01/29/2018    Rectal cancer ([address] 01/23/2018    Retinal detachment of left eye with multiple breaks 10/03/2016           COURTNEY with increase of creatinine today        Anemia from acute blood loss and iron deficiency        Ileus improved        Recurrent tachycardia        Leukocytosis of uncertain etiology: possible cellulitis of left abdomen    Plan:     Cardiologist to advise regarding treatment of tachycardia. Consult ID to advise if antibiotic treatment of possible cellulitis is indicated. Stool for C dif has been ordered. Follow anemia, renal function.

## 2022-03-28 LAB
ALBUMIN SERPL-MCNC: 2.6 G/DL (ref 3.5–5.2)
ALP BLD-CCNC: 135 U/L (ref 40–129)
ALT SERPL-CCNC: 14 U/L (ref 0–40)
ANION GAP SERPL CALCULATED.3IONS-SCNC: 11 MMOL/L (ref 7–16)
ANION GAP SERPL CALCULATED.3IONS-SCNC: 9 MMOL/L (ref 7–16)
ANISOCYTOSIS: ABNORMAL
AST SERPL-CCNC: 15 U/L (ref 0–39)
BASOPHILS ABSOLUTE: 0 E9/L (ref 0–0.2)
BASOPHILS RELATIVE PERCENT: 0 % (ref 0–2)
BILIRUB SERPL-MCNC: 1.4 MG/DL (ref 0–1.2)
BUN BLDV-MCNC: 40 MG/DL (ref 6–23)
CALCIUM SERPL-MCNC: 7.9 MG/DL (ref 8.6–10.2)
CHLORIDE BLD-SCNC: 104 MMOL/L (ref 98–107)
CHLORIDE BLD-SCNC: 107 MMOL/L (ref 98–107)
CO2: 24 MMOL/L (ref 22–29)
CO2: 25 MMOL/L (ref 22–29)
CREAT SERPL-MCNC: 1.6 MG/DL (ref 0.7–1.2)
EKG ATRIAL RATE: 256 BPM
EKG P AXIS: 77 DEGREES
EKG Q-T INTERVAL: 406 MS
EKG QRS DURATION: 86 MS
EKG QTC CALCULATION (BAZETT): 592 MS
EKG R AXIS: 21 DEGREES
EKG T AXIS: -112 DEGREES
EKG VENTRICULAR RATE: 128 BPM
EOSINOPHILS ABSOLUTE: 0.33 E9/L (ref 0.05–0.5)
EOSINOPHILS RELATIVE PERCENT: 1.7 % (ref 0–6)
GFR AFRICAN AMERICAN: 52
GFR NON-AFRICAN AMERICAN: 43 ML/MIN/1.73
GLUCOSE BLD-MCNC: 102 MG/DL (ref 74–99)
HCT VFR BLD CALC: 30.8 % (ref 37–54)
HEMOGLOBIN: 9.4 G/DL (ref 12.5–16.5)
HYPOCHROMIA: ABNORMAL
LYMPHOCYTES ABSOLUTE: 1.35 E9/L (ref 1.5–4)
LYMPHOCYTES RELATIVE PERCENT: 7.1 % (ref 20–42)
MAGNESIUM: 2 MG/DL (ref 1.6–2.6)
MCH RBC QN AUTO: 26.6 PG (ref 26–35)
MCHC RBC AUTO-ENTMCNC: 30.5 % (ref 32–34.5)
MCV RBC AUTO: 87 FL (ref 80–99.9)
METAMYELOCYTES RELATIVE PERCENT: 0.9 % (ref 0–1)
METER GLUCOSE: 119 MG/DL (ref 74–99)
MONOCYTES ABSOLUTE: 0.77 E9/L (ref 0.1–0.95)
MONOCYTES RELATIVE PERCENT: 4.4 % (ref 2–12)
MYELOCYTE PERCENT: 1.8 % (ref 0–0)
NEUTROPHILS ABSOLUTE: 16.41 E9/L (ref 1.8–7.3)
NEUTROPHILS RELATIVE PERCENT: 82.3 % (ref 43–80)
NUCLEATED RED BLOOD CELLS: 0.9 /100 WBC
PDW BLD-RTO: 17.2 FL (ref 11.5–15)
PHOSPHORUS: 2.9 MG/DL (ref 2.5–4.5)
PLATELET # BLD: 270 E9/L (ref 130–450)
PMV BLD AUTO: 10 FL (ref 7–12)
POLYCHROMASIA: ABNORMAL
POTASSIUM SERPL-SCNC: 3.2 MMOL/L (ref 3.5–5)
POTASSIUM SERPL-SCNC: 4 MMOL/L (ref 3.5–5)
PROMYELOCYTES PERCENT: 1.8 % (ref 0–0)
RBC # BLD: 3.54 E12/L (ref 3.8–5.8)
SODIUM BLD-SCNC: 140 MMOL/L (ref 132–146)
SODIUM BLD-SCNC: 140 MMOL/L (ref 132–146)
TOTAL PROTEIN: 5 G/DL (ref 6.4–8.3)
WBC # BLD: 19.3 E9/L (ref 4.5–11.5)

## 2022-03-28 PROCEDURE — 6370000000 HC RX 637 (ALT 250 FOR IP): Performed by: INTERNAL MEDICINE

## 2022-03-28 PROCEDURE — 2060000000 HC ICU INTERMEDIATE R&B

## 2022-03-28 PROCEDURE — 93005 ELECTROCARDIOGRAM TRACING: CPT | Performed by: INTERNAL MEDICINE

## 2022-03-28 PROCEDURE — 2580000003 HC RX 258: Performed by: SURGERY

## 2022-03-28 PROCEDURE — 93010 ELECTROCARDIOGRAM REPORT: CPT | Performed by: INTERNAL MEDICINE

## 2022-03-28 PROCEDURE — 36415 COLL VENOUS BLD VENIPUNCTURE: CPT

## 2022-03-28 PROCEDURE — 2700000000 HC OXYGEN THERAPY PER DAY

## 2022-03-28 PROCEDURE — 6360000002 HC RX W HCPCS: Performed by: SURGERY

## 2022-03-28 PROCEDURE — 6370000000 HC RX 637 (ALT 250 FOR IP): Performed by: NURSE PRACTITIONER

## 2022-03-28 PROCEDURE — 6370000000 HC RX 637 (ALT 250 FOR IP): Performed by: SURGERY

## 2022-03-28 PROCEDURE — 2580000003 HC RX 258: Performed by: INTERNAL MEDICINE

## 2022-03-28 PROCEDURE — 85025 COMPLETE CBC W/AUTO DIFF WBC: CPT

## 2022-03-28 PROCEDURE — 6360000002 HC RX W HCPCS: Performed by: INTERNAL MEDICINE

## 2022-03-28 PROCEDURE — 80051 ELECTROLYTE PANEL: CPT

## 2022-03-28 PROCEDURE — 80053 COMPREHEN METABOLIC PANEL: CPT

## 2022-03-28 PROCEDURE — 84100 ASSAY OF PHOSPHORUS: CPT

## 2022-03-28 PROCEDURE — 83735 ASSAY OF MAGNESIUM: CPT

## 2022-03-28 PROCEDURE — 82962 GLUCOSE BLOOD TEST: CPT

## 2022-03-28 PROCEDURE — 99233 SBSQ HOSP IP/OBS HIGH 50: CPT | Performed by: INTERNAL MEDICINE

## 2022-03-28 RX ORDER — ADENOSINE 3 MG/ML
6 INJECTION, SOLUTION INTRAVENOUS ONCE
Status: COMPLETED | OUTPATIENT
Start: 2022-03-28 | End: 2022-03-28

## 2022-03-28 RX ORDER — POTASSIUM CHLORIDE 7.45 MG/ML
10 INJECTION INTRAVENOUS
Status: COMPLETED | OUTPATIENT
Start: 2022-03-28 | End: 2022-03-28

## 2022-03-28 RX ORDER — ADENOSINE 3 MG/ML
6 INJECTION, SOLUTION INTRAVENOUS ONCE
Status: DISCONTINUED | OUTPATIENT
Start: 2022-03-28 | End: 2022-03-28 | Stop reason: SDUPTHER

## 2022-03-28 RX ORDER — POTASSIUM CHLORIDE 20 MEQ/1
20 TABLET, EXTENDED RELEASE ORAL 2 TIMES DAILY WITH MEALS
Status: COMPLETED | OUTPATIENT
Start: 2022-03-28 | End: 2022-03-28

## 2022-03-28 RX ORDER — DIGOXIN 250 MCG
250 TABLET ORAL DAILY
Status: DISCONTINUED | OUTPATIENT
Start: 2022-03-28 | End: 2022-03-31

## 2022-03-28 RX ADMIN — DEXTROSE 0.5 MG/MIN: 5 SOLUTION INTRAVENOUS at 21:58

## 2022-03-28 RX ADMIN — DIPHENHYDRAMINE HCL 25 MG: 25 TABLET ORAL at 09:26

## 2022-03-28 RX ADMIN — DEXTROSE MONOHYDRATE 150 MG: 50 INJECTION, SOLUTION INTRAVENOUS at 15:02

## 2022-03-28 RX ADMIN — SODIUM CHLORIDE, PRESERVATIVE FREE 10 ML: 5 INJECTION INTRAVENOUS at 15:47

## 2022-03-28 RX ADMIN — METOPROLOL TARTRATE 100 MG: 50 TABLET, FILM COATED ORAL at 09:26

## 2022-03-28 RX ADMIN — ACETAMINOPHEN 650 MG: 325 TABLET ORAL at 09:26

## 2022-03-28 RX ADMIN — SODIUM CHLORIDE: 9 INJECTION, SOLUTION INTRAVENOUS at 07:49

## 2022-03-28 RX ADMIN — ACETAMINOPHEN 650 MG: 325 TABLET ORAL at 20:32

## 2022-03-28 RX ADMIN — POTASSIUM CHLORIDE 10 MEQ: 7.45 INJECTION INTRAVENOUS at 17:08

## 2022-03-28 RX ADMIN — ACETAMINOPHEN 650 MG: 325 TABLET ORAL at 17:07

## 2022-03-28 RX ADMIN — POTASSIUM CHLORIDE 20 MEQ: 20 TABLET, EXTENDED RELEASE ORAL at 17:07

## 2022-03-28 RX ADMIN — DIGOXIN 250 MCG: 0.25 TABLET ORAL at 09:25

## 2022-03-28 RX ADMIN — DEXTROSE 1 MG/MIN: 5 SOLUTION INTRAVENOUS at 16:18

## 2022-03-28 RX ADMIN — POTASSIUM CHLORIDE 20 MEQ: 20 TABLET, EXTENDED RELEASE ORAL at 09:25

## 2022-03-28 RX ADMIN — APIXABAN 5 MG: 5 TABLET, FILM COATED ORAL at 20:32

## 2022-03-28 RX ADMIN — DIPHENHYDRAMINE HCL 25 MG: 25 TABLET ORAL at 14:19

## 2022-03-28 RX ADMIN — ADENOSINE 6 MG: 3 INJECTION INTRAVENOUS at 11:00

## 2022-03-28 RX ADMIN — ENOXAPARIN SODIUM 40 MG: 100 INJECTION SUBCUTANEOUS at 09:25

## 2022-03-28 RX ADMIN — POTASSIUM CHLORIDE 10 MEQ: 7.45 INJECTION INTRAVENOUS at 14:22

## 2022-03-28 RX ADMIN — POTASSIUM CHLORIDE 10 MEQ: 7.45 INJECTION INTRAVENOUS at 18:49

## 2022-03-28 RX ADMIN — DILTIAZEM HYDROCHLORIDE 60 MG: 30 TABLET, FILM COATED ORAL at 05:50

## 2022-03-28 RX ADMIN — SODIUM CHLORIDE, PRESERVATIVE FREE 10 ML: 5 INJECTION INTRAVENOUS at 20:32

## 2022-03-28 RX ADMIN — ACETAMINOPHEN 650 MG: 325 TABLET ORAL at 03:57

## 2022-03-28 RX ADMIN — METOPROLOL TARTRATE 100 MG: 50 TABLET, FILM COATED ORAL at 20:32

## 2022-03-28 RX ADMIN — DEXTROSE MONOHYDRATE 150 MG: 50 INJECTION, SOLUTION INTRAVENOUS at 22:26

## 2022-03-28 RX ADMIN — SODIUM CHLORIDE, PRESERVATIVE FREE 5 ML: 5 INJECTION INTRAVENOUS at 09:25

## 2022-03-28 RX ADMIN — DIPHENHYDRAMINE HCL 25 MG: 25 TABLET ORAL at 20:33

## 2022-03-28 RX ADMIN — POTASSIUM CHLORIDE 10 MEQ: 7.45 INJECTION INTRAVENOUS at 13:19

## 2022-03-28 ASSESSMENT — PAIN SCALES - GENERAL
PAINLEVEL_OUTOF10: 5
PAINLEVEL_OUTOF10: 3
PAINLEVEL_OUTOF10: 0

## 2022-03-28 NOTE — PLAN OF CARE
Problem: Skin Integrity:  Goal: Will show no infection signs and symptoms  Description: Will show no infection signs and symptoms  Outcome: Ongoing  Goal: Absence of new skin breakdown  Description: Absence of new skin breakdown  Outcome: Ongoing     Problem: Falls - Risk of:  Goal: Will remain free from falls  Description: Will remain free from falls  Outcome: Ongoing  Goal: Absence of physical injury  Description: Absence of physical injury  Outcome: Ongoing     Problem: Pain:  Description: Pain management should include both nonpharmacologic and pharmacologic interventions.   Goal: Control of acute pain  Description: Control of acute pain  Outcome: Ongoing

## 2022-03-28 NOTE — PROGRESS NOTES
I independently interviewed and examined the patient. I have reviewed the above documentation completed by the GAB. Please see my additional contributions to the HPI, physical exam, and assessment / medical decision making. Patient denies chest pain, dyspnea on exertion, palpitations, lightheadedness, dizziness, syncope, PND, or orthopnea. Review of Systems:  Cardiac: As per HPI  General: No fever, chills  Pulmonary: As per HPI  GI: No nausea, vomiting  Musculoskeletal: KHOURY x 4, no focal motor deficits  Skin: Intact, no rashes  Neuro/Psych: No headache or seizures    Physical Exam:  /77   Pulse 126   Temp 97.7 °F (36.5 °C) (Oral)   Resp 16   Ht 6' 2\" (1.88 m)   Wt 194 lb 8 oz (88.2 kg)   SpO2 93%   BMI 24.97 kg/m²   Appearance: Awake, alert, no acute respiratory distress  Skin: Intact, no rash  Head: Normocephalic, atraumatic  ENMT: MMM, no rhinorrhea  Neck: Supple, no carotid bruits  Lungs: Clear to auscultation bilaterally. No wheezes, rales, or rhonchi. Cardiac: Regular rate and rhythm, tachycardic +S1S2, no murmurs apparent  Abdomen: Soft, +bowel sounds  Extremities: Moves all extremities x 4, no lower extremity edema  Neurologic: No focal motor deficits apparent, normal mood and affect    Telemetry findings reviewed: Atrial flutter/fibrillation at rate 120s, no new tachy/bradyarrhythmias overnight. Patient was given 6 mg of adenosine to see underlying rhythm, noticed atrial flutter/fibrillation. EKG: Aflutter with 2:1 conduction, NSST, abnormal EKG. Labs and vitals were reviewed: BUN creat 46/2.0>>40/1.6, H/H 8/26.8>>9.4/30.8,  WBC 19.3    1. Pharmacological stress test: 1/2/2011: LVEF 54%, with no evidence of stress-induced ischemia. 2. TTE: 12/20/2020 (Dr. Brielle Stein), LVEF 55-60%, no wall motion abnormality, mildly dilated right ventricle, trace MR, trace pericardial effusion, TAPSE 21 mm.        Assessment:  · PAF and now with persistent AF/flutter with RVR, rate not well controlled on Cardizem and metoprolol. Blood pressures are soft patient received 2 dose of IV digoxin. I would not recommend digoxin due to underlying renal failure. · [id number] Vasc score-2  · Status post hemicolectomy for cecal malignancy  · History of hypertension now blood pressure is soft  · COURTNEY creatinine still high at 1.6  · Left lower extremity above-knee amputation  · Hard of hearing        Plan:   · Hold digoxin due to underlying renal failure creatinine still 1.6. I would recommend starting on amiodarone IV for rate control if we can start on anticoagulation therapy with Eliquis. Blood pressures are still soft and I do not think we can increase Cardizem. · Surgery input was noted and they are okay to start on Eliquis. · Management of renal failure as per nephrology service      D/w pharmacist Gilmar about stopping Zofran due to prolonged Qtc and change it to Tigan. K was low will give him IV K 10 meq X 4 and check lytes at 4 PM.      Shameka Farah MD, [facility].   [facility]) Cardiology

## 2022-03-28 NOTE — PROGRESS NOTES
[address] [place] [address] [facility]  [facility]  Progress Note    SUBJECTIVE:  CC: rash     Patient is sitting up in bed, eating lunch. - appetite is good  No fevers. Denies nausea, vomiting, diarrhea. Rash is not itching today     Review of systems:  As stated above in the chief complaint, otherwise negative. Medications:  Scheduled Meds:   digoxin  250 mcg Oral Daily    potassium chloride  20 mEq Oral BID WC    lactated ringers bolus  500 mL IntraVENous Once    dilTIAZem  60 mg Oral 3 times per day    metoprolol tartrate  100 mg Oral BID    diphenhydrAMINE  25 mg Oral TID    sodium chloride flush  5-40 mL IntraVENous 2 times per day    enoxaparin  40 mg SubCUTAneous Daily    acetaminophen  650 mg Oral Q6H     Continuous Infusions:   sodium chloride       PRN Meds:sodium chloride flush, sodium chloride, ondansetron **OR** ondansetron    OBJECTIVE:  /77   Pulse 126   Temp 97.7 °F (36.5 °C) (Oral)   Resp 16   Ht 6' 2\" (1.88 m)   Wt 194 lb 8 oz (88.2 kg)   SpO2 93%   BMI 24.97 kg/m²   Temp  Av.6 °F (36.4 °C)  Min: 97.4 °F (36.3 °C)  Max: 97.7 °F (36.5 °C)  Constitutional: The patient is awake, alert, and oriented. Sitting up in bed, eating lunch   Skin: Warm and dry. Diffuse rash to lower extremities, trunk, flanks, and upper extremities, patient says itching is improved   HEENT: Round and reactive pupils. Moist mucous membranes. No ulcerations or thrush. Neck: Supple to movements. Chest: No use of accessory muscles to breathe. Symmetrical expansion. No wheezing, crackles or rhonchi. Cardiovascular: S1 and S2 are rhythmic and regular. No murmurs appreciated. Abdomen: Positive bowel sounds to auscultation. Benign to palpation. No masses felt. No hepatosplenomegaly. Extremities: No edema.  Left AKA stump   Lines: peripheral    Laboratory and Tests Review:  Lab Results   Component Value Date    WBC 19.3 (H) 2022    WBC 22.3 (H) 2022    WBC 16.5 (H) 2022 HGB 9.4 (L) 03/28/2022    HCT 30.8 (L) 03/28/2022    MCV 87.0 03/28/2022     03/28/2022     Lab Results   Component Value Date    NEUTROABS 16.41 (H) 03/28/2022    NEUTROABS 18.35 (H) 03/27/2022    NEUTROABS 15.16 (H) 03/26/2022     No results found for: CRP  Lab Results   Component Value Date    ALT 14 03/28/2022    AST 15 03/28/2022    ALKPHOS 135 (H) 03/28/2022    BILITOT 1.4 (H) 03/28/2022     Lab Results   Component Value Date     03/28/2022    K 3.2 03/28/2022    K 4.1 03/24/2022     03/28/2022    CO2 25 03/28/2022    BUN 40 03/28/2022    CREATININE 1.6 03/28/2022    CREATININE 2.0 03/27/2022    CREATININE 2.2 03/27/2022    GFRAA 52 03/28/2022    LABGLOM 43 03/28/2022    GLUCOSE 102 03/28/2022    GLUCOSE 106 03/24/2011    PROT 5.0 03/28/2022    LABALBU 2.6 03/28/2022    LABALBU 2.4 03/20/2011    CALCIUM 7.9 03/28/2022    BILITOT 1.4 03/28/2022    ALKPHOS 135 03/28/2022    AST 15 03/28/2022    ALT 14 03/28/2022     Lab Results   Component Value Date    CRP 29.2 (H) 01/03/2011     Lab Results   Component Value Date    SEDRATE 45 (H) 01/03/2011     Radiology:  Noted     Microbiology:   Urine culture: pending  Stool for C. Difficile: negative     ASSESSMENT:  · Status post laparoscopic right hemicolectomy for a cecum neoplasm  · Adverse drug reaction. The patient has a rash. He received Clindamycin preoperatively. He had a rash when he was on Cefazolin Clindamycin in 2011. The rash may have been caused by other medications as well. · No evidence of infection   · Leukocytosis associated to the above, improving   · Antibiotic associated diarrhea. No evidence of C. difficile    PLAN:  · Antibiotics are not warranted. · Consider stopping all nonessential medications   · Labs reviewed    Gilson Schwalbe, APRN - CNP  12:21 PM  3/28/2022     Patient seen and examined. I had a face to face encounter with the patient. Agree with exam.  Assessment and plan as outlined above and directed by me. Addition and corrections were done as deemed appropriate. My exam and plan include: No new complaints today. Rash is improving. Patient can be discharged from ID standpoint.     My Page MD  3/28/2022  2:36 PM

## 2022-03-28 NOTE — PROGRESS NOTES
[facility] MD Zina Swann MD Lus Mantle, MD Lehman Mulligan, MD Bigg Mccarthy, CNP   Chhaya Albarado, CHARMAINE  Progress Note    3/28/2022    SUBJECTIVE:   3/25: Since early afternoon. Feeling better. Denies abdominal pain though still distended. Had a BM, and distention has markedly improved since then. Denies dyspnea at rest on nasal cannula. Has developed lower extremity and dependent swelling denies cp/palp. Diet has been advanced, and he has anorexia, though has so far tolerated some food  3/26: \"Sleepy. \"  Denies dyspnea though still on nasal cannula. Ate breakfast today without event. Swelling improved compared to yesterday  3/27: Feeling better today. Notes that he is eating better. Hypotensive last evening received LR bolus. On NS drip again. Diuretics on hold. Still has the rash, though he still not aware of it  3/28: Denies complaint. Feeling better yesterday. Appetite has improved. Developed atrial fibrillation/flutter heart rates in the 120s received digoxin, now on amio drip    PROBLEM LIST:    Principal Problem:    Benign neoplasm of cecum  Active Problems:    S/P right hemicolectomy    PSVT (paroxysmal supraventricular tachycardia) (HCC)    Primary hypertension  Resolved Problems:    * No resolved hospital problems. *         DIET:    ADULT DIET;  Regular; Low Fiber     MEDS (scheduled):    [Held by provider] digoxin  250 mcg Oral Daily    potassium chloride  20 mEq Oral BID WC    apixaban  5 mg Oral BID    potassium chloride  10 mEq IntraVENous Q1H    lactated ringers bolus  500 mL IntraVENous Once    [Held by provider] dilTIAZem  60 mg Oral 3 times per day    metoprolol tartrate  100 mg Oral BID    diphenhydrAMINE  25 mg Oral TID    sodium chloride flush  5-40 mL IntraVENous 2 times per day    acetaminophen  650 mg Oral Q6H       MEDS (infusions):   amiodarone 1 mg/min (03/28/22 1618)    Followed by   Mandie longo      sodium chloride         MEDS (prn):  trimethobenzamide, sodium chloride flush, sodium chloride    PHYSICAL EXAM:     Patient Vitals for the past 24 hrs:   BP Temp Temp src Pulse Resp SpO2 Weight   03/28/22 1456 122/81 99.3 °F (37.4 °C) -- 129 20 99 % --   03/28/22 1100 110/77 -- -- 126 -- -- --   03/28/22 1051 108/71 -- -- 127 -- -- --   03/28/22 0745 106/69 97.7 °F (36.5 °C) Oral 129 16 93 % --   03/28/22 0549 117/71 -- -- 126 -- -- --   03/28/22 0206 -- -- -- -- -- -- 194 lb 8 oz (88.2 kg)   03/27/22 2057 109/63 97.4 °F (36.3 °C) Oral 126 18 96 % --   @      Intake/Output Summary (Last 24 hours) at 3/28/2022 1635  Last data filed at 3/28/2022 0925  Gross per 24 hour   Intake 5 ml   Output 600 ml   Net -595 ml         Wt Readings from Last 3 Encounters:   03/28/22 194 lb 8 oz (88.2 kg)   03/15/22 190 lb (86.2 kg)   02/14/22 187 lb (84.8 kg)       Constitutional:  in no acute distress  HEENT: NC/AT, EOMI, sclera and conjunctiva are clear and anicteric, mucus membranes moist  Neck: Trachea midline, no JVD  Cardiovascular: S1, S2 regular rhythm, no murmur,or rub  Respiratory: Crackles at bases improved since yesterday  Gastrointestinal:  Soft, diffusely mildly tender, mildly distended with tympany, no guarding or referred pain, NABS  Ext: 1/2+ dependent and distal lower extremity edema, feet warm  Skin: dry, no rash  Neuro: awake, alert, interactive moves all 4 extremities      DATA:    Recent Labs     03/26/22  0413 03/27/22  0320 03/28/22  0338   WBC 16.5* 22.3* 19.3*   HGB 8.4* 8.0* 9.4*   HCT 26.9* 26.8* 30.8*   MCV 83.0 85.9 87.0    262 270     Recent Labs     03/26/22  0413 03/26/22  0413 03/27/22  0320 03/27/22  1643 03/28/22  0338      < > 137 139 140   K 4.2   < > 3.4* 3.5 3.2*      < > 99 101 104   CO2 25   < > 26 26 25   MG  --   --  2.0  --  2.0   PHOS 3.5  --  3.6  --  2.9   BUN 32*   < > 45* 46* 40*   CREATININE 1.6*   < > 2.2* 2.0* 1.6*   ALT 20  --  14  --  14   AST 23  --  13  -- 15   BILITOT 1.2  --  1.6*  --  1.4*   ALKPHOS 109  --  116  --  135*    < > = values in this interval not displayed. Lab Results   Component Value Date    LABPROT 0.1 03/23/2022    LABPROT 0.1 03/23/2022    LABPROT 0.3 (H) 03/23/2022    LABPROT 0.3 03/23/2022       ASSESSMENT     74 y.o. 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 Postoperatively tachycardic, hypotensive treated with IV bolus.     1. Acute kidney injury, hemodynamically mediated, least in part secondary to volume contraction, anemia, increase insensible losses; the final common pathway was decreased effective circulating volume. Status post IV fluid boluses, increase in IV fluid rate, azotemia is improving, as is his urine output.     2. Anemia, severe, normocytic but MCV is very low. Severely iron deficient    3. Hypophosphatemia, status post supplementation yesterday orally. Normalized with supplement    4. Rash looks like a drug rash. May be due to the iron. Could be due to antimicrobial therapy. Improved since yesterday.     Edema and pulmonary edema improved  Azotemia improved, creatinine stable x2 days, increase since yesterday probably due to hypotension, intravascular volume contraction, A. fib with RVR.   Given the rash, consider AIN, though I doubt it  The rash looks a little better    RECOMMENDATIONS  Encourage oral intake as able  Await urine eosinophils  Continue supportive care  Follow labs, UO      Electronically signed by Joselito Alvarez MD on 3/28/2022

## 2022-03-28 NOTE — PROGRESS NOTES
Department of Internal Ailyn Rice M.D.  Progress Note      SUBJECTIVE: He states he is getting better and getting more of an appetite I asked him to try getting up and walk today with his prosthetic    OBJECTIVE abdomen is soft with active bowel sounds does have diffuse erythema    Medications    Current Facility-Administered Medications: digoxin (LANOXIN) tablet 250 mcg, 250 mcg, Oral, Daily  potassium chloride (KLOR-CON M) extended release tablet 20 mEq, 20 mEq, Oral, BID WC  0.9 % sodium chloride infusion, , IntraVENous, Continuous  lactated ringers bolus, 500 mL, IntraVENous, Once  dilTIAZem (CARDIZEM) tablet 60 mg, 60 mg, Oral, 3 times per day  metoprolol tartrate (LOPRESSOR) tablet 100 mg, 100 mg, Oral, BID  diphenhydrAMINE (BENADRYL) tablet 25 mg, 25 mg, Oral, TID  sodium chloride flush 0.9 % injection 5-40 mL, 5-40 mL, IntraVENous, 2 times per day  sodium chloride flush 0.9 % injection 5-40 mL, 5-40 mL, IntraVENous, PRN  0.9 % sodium chloride infusion, 25 mL, IntraVENous, PRN  enoxaparin (LOVENOX) injection 40 mg, 40 mg, SubCUTAneous, Daily  ondansetron (ZOFRAN-ODT) disintegrating tablet 4 mg, 4 mg, Oral, Q8H PRN **OR** ondansetron (ZOFRAN) injection 4 mg, 4 mg, IntraVENous, Q6H PRN  acetaminophen (TYLENOL) tablet 650 mg, 650 mg, Oral, Q6H  Physical    VITALS:  /69   Pulse 129   Temp 97.7 °F (36.5 °C) (Oral)   Resp 16   Ht 6' 2\" (1.88 m)   Wt 194 lb 8 oz (88.2 kg)   SpO2 93%   BMI 24.97 kg/m²   24HR INTAKE/OUTPUT:    Intake/Output Summary (Last 24 hours) at 3/28/2022 2167  Last data filed at 3/28/2022 0120  Gross per 24 hour   Intake 240 ml   Output 875 ml   Net -635 ml     LUNGS: Decreased breath sounds in bases  CARDIOVASCULAR: S1-S2 slightly irregular rhythm tachycardia  Data    CBC with Differential:    Lab Results   Component Value Date    WBC 19.3 03/28/2022    RBC 3.54 03/28/2022    HGB 9.4 03/28/2022    HCT 30.8 03/28/2022     03/28/2022    MCV 87.0 03/28/2022    MCH 26.6 03/28/2022    MCHC 30.5 03/28/2022    RDW 17.2 03/28/2022    NRBC 0.9 03/28/2022    SEGSPCT 47 03/18/2011    METASPCT 0.9 03/28/2022    LYMPHOPCT 7.1 03/28/2022    PROMYELOPCT 1.8 03/28/2022    MONOPCT 4.4 03/28/2022    MYELOPCT 1.8 03/28/2022    BASOPCT 0.0 03/28/2022    MONOSABS 0.77 03/28/2022    LYMPHSABS 1.35 03/28/2022    EOSABS 0.33 03/28/2022    BASOSABS 0.00 03/28/2022     CMP:    Lab Results   Component Value Date     03/28/2022    K 3.2 03/28/2022    K 4.1 03/24/2022     03/28/2022    CO2 25 03/28/2022    BUN 40 03/28/2022    CREATININE 1.6 03/28/2022    GFRAA 52 03/28/2022    LABGLOM 43 03/28/2022    GLUCOSE 102 03/28/2022    GLUCOSE 106 03/24/2011    PROT 5.0 03/28/2022    LABALBU 2.6 03/28/2022    LABALBU 2.4 03/20/2011    CALCIUM 7.9 03/28/2022    BILITOT 1.4 03/28/2022    ALKPHOS 135 03/28/2022    AST 15 03/28/2022    ALT 14 03/28/2022     PT/INR:    Lab Results   Component Value Date    PROTIME 12.6 01/24/2022    PROTIME 12.2 03/18/2011    INR 1.1 01/24/2022       ASSESSMENT AND PLAN        Active Problems:    S/P right hemicolectomy  Plan: Starting to recover  Heart rhythm starting to act like atrial flutter we will add digoxin  Hypokalemia mild  Renal insufficiency which could be related to diastolic heart failure as well as volume depletion and malnutrition  Dermatitis secondary to medication unknown cause    Electronically signed by Giulia Fitzgerald MD on 3/28/2022 at 8:22 AM

## 2022-03-28 NOTE — PROGRESS NOTES
Physical Therapy    Facility/Department: [address] ORTHO SURGERY    NAME: Jeff Barnard. : 1955  MRN: [id number]    Date of Service: 3/28/2022               Patient Diagnosis(es): There were no encounter diagnoses. has a past medical history of Employs prosthetic leg, History of atrial fibrillation, [place] (hard of hearing), Hx of AKA (above knee amputation), left ([facility] [address] Hx of necrotizing fasciitis, Hypertension, Rectal bleeding, and Rectal cancer ([facility] [address] has a past surgical history that includes above knee amputation (); Colonoscopy (10/21/2011); Eye surgery (Left, 10/03/2016); eye surgery (Bilateral, ?); eye surgery (Right, ?); Colon surgery (2018); Abdomen surgery (2018); pr revision of ileostomy,complicated (N/A, ); Colonoscopy (N/A, 2022); Cardioversion (2020); and hemicolectomy (Right, 3/22/2022). 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 Nursing reports pt has elevated heart rate issues.     Trung Reid, [address]

## 2022-03-28 NOTE — PROGRESS NOTES
GENERAL SURGERY  DAILY PROGRESS NOTE  3/28/2022    CC: S/p lap right hemicolectomy    Subjective:  Patient with no acute events overnight. Abdomen remains benign. White blood cell count is downtrending currently 19 K. Creatinine is also downtrending. Patient complaining of no pain. Tolerating diet. No nausea no vomiting.     Objective:  /71   Pulse 126   Temp 97.4 °F (36.3 °C) (Oral)   Resp 18   Ht 6' 2\" (1.88 m)   Wt 194 lb 8 oz (88.2 kg)   SpO2 96%   BMI 24.97 kg/m²     GENERAL:  Laying in bed, awake, alert, cooperative, no apparent distress  LUNGS:  No increased work of breathing  CARDIOVASCULAR:  RR  ABDOMEN:  Soft, non-tender, non-distended  EXTREMITIES: No edema or swelling      Assessment/Plan:  74 y.o. male s/p laparoscopic right hemicolectomy 3/22    Patient seen and examined  Antibiotics per ID  Low suspicion for intra-abdominal process  Appreciate nephrology recommendations  Appreciate infectious disease recommendation  Okay to continue diet  PT/OT, ambulate and up out of bed as able  Incentive spirometer  Pain control as needed  OK for eliquis    Electronically signed by Moise Wiley DO on 3/28/2022 at 7:34 AM     Seen/examined  Ok to resume eliquis  Cardiology input appreciated  Discharge planning  Danica

## 2022-03-29 ENCOUNTER — ANESTHESIA EVENT (OUTPATIENT)
Dept: NON INVASIVE DIAGNOSTICS | Age: 67
DRG: 330 | End: 2022-03-29
Payer: MEDICARE

## 2022-03-29 LAB
ALBUMIN SERPL-MCNC: 2.7 G/DL (ref 3.5–5.2)
ALP BLD-CCNC: 101 U/L (ref 40–129)
ALT SERPL-CCNC: 15 U/L (ref 0–40)
ANION GAP SERPL CALCULATED.3IONS-SCNC: 9 MMOL/L (ref 7–16)
ANISOCYTOSIS: ABNORMAL
AST SERPL-CCNC: 21 U/L (ref 0–39)
BASOPHILS ABSOLUTE: 0 E9/L (ref 0–0.2)
BASOPHILS RELATIVE PERCENT: 0 % (ref 0–2)
BILIRUB SERPL-MCNC: 1.4 MG/DL (ref 0–1.2)
BUN BLDV-MCNC: 34 MG/DL (ref 6–23)
CALCIUM SERPL-MCNC: 7.9 MG/DL (ref 8.6–10.2)
CHLORIDE BLD-SCNC: 108 MMOL/L (ref 98–107)
CO2: 25 MMOL/L (ref 22–29)
CREAT SERPL-MCNC: 1.4 MG/DL (ref 0.7–1.2)
EOSINOPHILS ABSOLUTE: 1.06 E9/L (ref 0.05–0.5)
EOSINOPHILS RELATIVE PERCENT: 6.1 % (ref 0–6)
GFR AFRICAN AMERICAN: >60
GFR NON-AFRICAN AMERICAN: 50 ML/MIN/1.73
GLUCOSE BLD-MCNC: 117 MG/DL (ref 74–99)
HCT VFR BLD CALC: 31.7 % (ref 37–54)
HEMOGLOBIN: 9.2 G/DL (ref 12.5–16.5)
HYPOCHROMIA: ABNORMAL
LYMPHOCYTES ABSOLUTE: 2.08 E9/L (ref 1.5–4)
LYMPHOCYTES RELATIVE PERCENT: 12.2 % (ref 20–42)
MAGNESIUM: 1.9 MG/DL (ref 1.6–2.6)
MCH RBC QN AUTO: 25.9 PG (ref 26–35)
MCHC RBC AUTO-ENTMCNC: 29 % (ref 32–34.5)
MCV RBC AUTO: 89.3 FL (ref 80–99.9)
METAMYELOCYTES RELATIVE PERCENT: 1.7 % (ref 0–1)
MONOCYTES ABSOLUTE: 1.9 E9/L (ref 0.1–0.95)
MONOCYTES RELATIVE PERCENT: 11.3 % (ref 2–12)
NEUTROPHILS ABSOLUTE: 12.11 E9/L (ref 1.8–7.3)
NEUTROPHILS RELATIVE PERCENT: 68.7 % (ref 43–80)
NUCLEATED RED BLOOD CELLS: 0.9 /100 WBC
PDW BLD-RTO: 18.8 FL (ref 11.5–15)
PHOSPHORUS: 2.4 MG/DL (ref 2.5–4.5)
PLATELET # BLD: 241 E9/L (ref 130–450)
PMV BLD AUTO: 10.3 FL (ref 7–12)
POLYCHROMASIA: ABNORMAL
POTASSIUM SERPL-SCNC: 3.8 MMOL/L (ref 3.5–5)
RBC # BLD: 3.55 E12/L (ref 3.8–5.8)
SODIUM BLD-SCNC: 142 MMOL/L (ref 132–146)
TOTAL PROTEIN: 5 G/DL (ref 6.4–8.3)
WBC # BLD: 17.3 E9/L (ref 4.5–11.5)

## 2022-03-29 PROCEDURE — 2580000003 HC RX 258: Performed by: SURGERY

## 2022-03-29 PROCEDURE — 93005 ELECTROCARDIOGRAM TRACING: CPT | Performed by: INTERNAL MEDICINE

## 2022-03-29 PROCEDURE — 97535 SELF CARE MNGMENT TRAINING: CPT

## 2022-03-29 PROCEDURE — 6370000000 HC RX 637 (ALT 250 FOR IP): Performed by: NURSE PRACTITIONER

## 2022-03-29 PROCEDURE — 6360000002 HC RX W HCPCS: Performed by: INTERNAL MEDICINE

## 2022-03-29 PROCEDURE — 80053 COMPREHEN METABOLIC PANEL: CPT

## 2022-03-29 PROCEDURE — 97530 THERAPEUTIC ACTIVITIES: CPT

## 2022-03-29 PROCEDURE — 6370000000 HC RX 637 (ALT 250 FOR IP): Performed by: INTERNAL MEDICINE

## 2022-03-29 PROCEDURE — 2060000000 HC ICU INTERMEDIATE R&B

## 2022-03-29 PROCEDURE — 2580000003 HC RX 258: Performed by: INTERNAL MEDICINE

## 2022-03-29 PROCEDURE — 85025 COMPLETE CBC W/AUTO DIFF WBC: CPT

## 2022-03-29 PROCEDURE — 36415 COLL VENOUS BLD VENIPUNCTURE: CPT

## 2022-03-29 PROCEDURE — 6370000000 HC RX 637 (ALT 250 FOR IP): Performed by: SURGERY

## 2022-03-29 PROCEDURE — 83735 ASSAY OF MAGNESIUM: CPT

## 2022-03-29 PROCEDURE — 84100 ASSAY OF PHOSPHORUS: CPT

## 2022-03-29 PROCEDURE — 99233 SBSQ HOSP IP/OBS HIGH 50: CPT | Performed by: INTERNAL MEDICINE

## 2022-03-29 RX ORDER — SODIUM CHLORIDE 0.9 % (FLUSH) 0.9 %
5-40 SYRINGE (ML) INJECTION EVERY 12 HOURS SCHEDULED
Status: DISCONTINUED | OUTPATIENT
Start: 2022-03-29 | End: 2022-04-05 | Stop reason: HOSPADM

## 2022-03-29 RX ORDER — SODIUM CHLORIDE 9 MG/ML
INJECTION, SOLUTION INTRAVENOUS CONTINUOUS
Status: DISCONTINUED | OUTPATIENT
Start: 2022-03-29 | End: 2022-03-31

## 2022-03-29 RX ORDER — SODIUM CHLORIDE 0.9 % (FLUSH) 0.9 %
5-40 SYRINGE (ML) INJECTION PRN
Status: DISCONTINUED | OUTPATIENT
Start: 2022-03-29 | End: 2022-04-05 | Stop reason: HOSPADM

## 2022-03-29 RX ORDER — LANOLIN ALCOHOL/MO/W.PET/CERES
CREAM (GRAM) TOPICAL 2 TIMES DAILY
Status: DISCONTINUED | OUTPATIENT
Start: 2022-03-29 | End: 2022-04-05 | Stop reason: HOSPADM

## 2022-03-29 RX ORDER — SODIUM CHLORIDE 9 MG/ML
25 INJECTION, SOLUTION INTRAVENOUS PRN
Status: DISCONTINUED | OUTPATIENT
Start: 2022-03-30 | End: 2022-04-05 | Stop reason: HOSPADM

## 2022-03-29 RX ADMIN — APIXABAN 5 MG: 5 TABLET, FILM COATED ORAL at 08:11

## 2022-03-29 RX ADMIN — ACETAMINOPHEN 650 MG: 325 TABLET ORAL at 20:18

## 2022-03-29 RX ADMIN — SODIUM CHLORIDE: 9 INJECTION, SOLUTION INTRAVENOUS at 12:46

## 2022-03-29 RX ADMIN — DIPHENHYDRAMINE HCL 25 MG: 25 TABLET ORAL at 08:11

## 2022-03-29 RX ADMIN — Medication: at 20:18

## 2022-03-29 RX ADMIN — DEXTROSE 0.5 MG/MIN: 5 SOLUTION INTRAVENOUS at 17:42

## 2022-03-29 RX ADMIN — METOPROLOL TARTRATE 100 MG: 50 TABLET, FILM COATED ORAL at 08:11

## 2022-03-29 RX ADMIN — ACETAMINOPHEN 650 MG: 325 TABLET ORAL at 08:11

## 2022-03-29 RX ADMIN — SODIUM CHLORIDE, PRESERVATIVE FREE 10 ML: 5 INJECTION INTRAVENOUS at 18:14

## 2022-03-29 RX ADMIN — METOPROLOL TARTRATE 100 MG: 50 TABLET, FILM COATED ORAL at 20:18

## 2022-03-29 RX ADMIN — ACETAMINOPHEN 650 MG: 325 TABLET ORAL at 03:45

## 2022-03-29 RX ADMIN — ACETAMINOPHEN 650 MG: 325 TABLET ORAL at 17:35

## 2022-03-29 RX ADMIN — SODIUM CHLORIDE, PRESERVATIVE FREE 10 ML: 5 INJECTION INTRAVENOUS at 08:13

## 2022-03-29 RX ADMIN — SODIUM CHLORIDE, PRESERVATIVE FREE 10 ML: 5 INJECTION INTRAVENOUS at 20:19

## 2022-03-29 RX ADMIN — Medication: at 14:05

## 2022-03-29 RX ADMIN — APIXABAN 5 MG: 5 TABLET, FILM COATED ORAL at 20:18

## 2022-03-29 ASSESSMENT — LIFESTYLE VARIABLES: SMOKING_STATUS: 0

## 2022-03-29 ASSESSMENT — PAIN DESCRIPTION - DESCRIPTORS
DESCRIPTORS: ACHING
DESCRIPTORS: ACHING;DISCOMFORT

## 2022-03-29 ASSESSMENT — PAIN DESCRIPTION - PAIN TYPE: TYPE: SURGICAL PAIN

## 2022-03-29 ASSESSMENT — PAIN SCALES - GENERAL
PAINLEVEL_OUTOF10: 3
PAINLEVEL_OUTOF10: 5
PAINLEVEL_OUTOF10: 0

## 2022-03-29 ASSESSMENT — PAIN DESCRIPTION - FREQUENCY: FREQUENCY: INTERMITTENT

## 2022-03-29 ASSESSMENT — PAIN DESCRIPTION - PROGRESSION: CLINICAL_PROGRESSION: GRADUALLY WORSENING

## 2022-03-29 ASSESSMENT — PAIN DESCRIPTION - ONSET: ONSET: ON-GOING

## 2022-03-29 ASSESSMENT — PAIN DESCRIPTION - LOCATION
LOCATION: GENERALIZED
LOCATION: GENERALIZED

## 2022-03-29 NOTE — PROGRESS NOTES
NEUTROABS 12.11 (H) 03/29/2022    NEUTROABS 16.41 (H) 03/28/2022    NEUTROABS 18.35 (H) 03/27/2022     No results found for: CRPHS  Lab Results   Component Value Date    ALT 15 03/29/2022    AST 21 03/29/2022    ALKPHOS 101 03/29/2022    BILITOT 1.4 (H) 03/29/2022     Lab Results   Component Value Date     03/29/2022    K 3.8 03/29/2022    K 4.1 03/24/2022     03/29/2022    CO2 25 03/29/2022    BUN 34 03/29/2022    CREATININE 1.4 03/29/2022    CREATININE 1.6 03/28/2022    CREATININE 2.0 03/27/2022    GFRAA >60 03/29/2022    LABGLOM 50 03/29/2022    GLUCOSE 117 03/29/2022    GLUCOSE 106 03/24/2011    PROT 5.0 03/29/2022    LABALBU 2.7 03/29/2022    LABALBU 2.4 03/20/2011    CALCIUM 7.9 03/29/2022    BILITOT 1.4 03/29/2022    ALKPHOS 101 03/29/2022    AST 21 03/29/2022    ALT 15 03/29/2022     Lab Results   Component Value Date    CRP 29.2 (H) 01/03/2011     Lab Results   Component Value Date    SEDRATE 45 (H) 01/03/2011     Radiology:  Noted     Microbiology:   Urine culture: 75,000 GNR  Stool for C. difficile: negative     ASSESSMENT:  · Status post laparoscopic right hemicolectomy for a cecum neoplasm  · Adverse drug reaction. The patient has a rash. He received Clindamycin preoperatively. He had a rash when he was on Cefazolin Clindamycin in 2011. The rash may have been caused by other medications as well  · No evidence of infection   · Leukocytosis associated to the above, improving   · Antibiotic associated diarrhea. No evidence of C. difficile  · Asymptomatic bacteriuria    PLAN:  · Antibiotics are not warranted.    · Consider stopping all nonessential medications     Faviola Toth MD  11:04 AM  3/29/2022

## 2022-03-29 NOTE — PLAN OF CARE
Problem: Skin Integrity:  Goal: Will show no infection signs and symptoms  Description: Will show no infection signs and symptoms  Outcome: Met This Shift  Goal: Absence of new skin breakdown  Description: Absence of new skin breakdown  Outcome: Met This Shift     Problem: Falls - Risk of:  Goal: Will remain free from falls  Description: Will remain free from falls  Outcome: Met This Shift  Goal: Absence of physical injury  Description: Absence of physical injury  Outcome: Met This Shift     Problem: Nausea/Vomiting:  Goal: Absence of nausea/vomiting  Description: Absence of nausea/vomiting  Outcome: Met This Shift  Goal: Able to drink  Description: Able to drink  Outcome: Met This Shift  Goal: Able to eat  Description: Able to eat  Outcome: Met This Shift  Goal: Ability to achieve adequate nutritional intake will improve  Description: Ability to achieve adequate nutritional intake will improve  Outcome: Met This Shift     Problem: Pain:  Goal: Pain level will decrease  Description: Pain level will decrease  Outcome: Met This Shift  Goal: Control of acute pain  Description: Control of acute pain  Outcome: Met This Shift  Goal: Control of chronic pain  Description: Control of chronic pain  Outcome: Met This Shift

## 2022-03-29 NOTE — PROGRESS NOTES
Department of Internal Lala Jacobsen M.D. Progress Note      SUBJECTIVE: He maintains he is eating better now, he did not get out of bed yesterday and I urged him to get moving today with the limits of the IV.   Amiodarone    OBJECTIVE abdomen slightly distended but soft  Diffuse erythroderma all over body with extremely dry skin    Medications    Current Facility-Administered Medications: Hydrocerin cream CREA, , Topical, BID  [Held by provider] digoxin (LANOXIN) tablet 250 mcg, 250 mcg, Oral, Daily  apixaban (ELIQUIS) tablet 5 mg, 5 mg, Oral, BID  [COMPLETED] amiodarone (CORDARONE) 150 mg in dextrose 5 % 100 mL bolus, 150 mg, IntraVENous, Once **FOLLOWED BY** [] amiodarone (CORDARONE) 450 mg in dextrose 5 % 250 mL infusion, 1 mg/min, IntraVENous, Continuous **FOLLOWED BY** amiodarone (CORDARONE) 450 mg in dextrose 5 % 250 mL infusion, 0.5 mg/min, IntraVENous, Continuous  trimethobenzamide (TIGAN) injection 200 mg, 200 mg, IntraMUSCular, Q6H PRN  white petrolatum ointment, , Topical, BID PRN  lactated ringers bolus, 500 mL, IntraVENous, Once  [Held by provider] dilTIAZem (CARDIZEM) tablet 60 mg, 60 mg, Oral, 3 times per day  metoprolol tartrate (LOPRESSOR) tablet 100 mg, 100 mg, Oral, BID  sodium chloride flush 0.9 % injection 5-40 mL, 5-40 mL, IntraVENous, 2 times per day  sodium chloride flush 0.9 % injection 5-40 mL, 5-40 mL, IntraVENous, PRN  0.9 % sodium chloride infusion, 25 mL, IntraVENous, PRN  acetaminophen (TYLENOL) tablet 650 mg, 650 mg, Oral, Q6H  Physical    VITALS:  /87   Pulse 127   Temp 98.1 °F (36.7 °C)   Resp 20   Ht 6' 2\" (1.88 m)   Wt 185 lb (83.9 kg)   SpO2 95%   BMI 23.75 kg/m²   24HR INTAKE/OUTPUT:    Intake/Output Summary (Last 24 hours) at 3/29/2022 0825  Last data filed at 3/28/2022 2100  Gross per 24 hour   Intake 5 ml   Output 200 ml   Net -195 ml     LUNGS: Decreased breath sounds throughout  CARDIOVASCULAR: S1-S2 tachycardia  Data CBC with Differential:    Lab Results   Component Value Date    WBC 17.3 03/29/2022    RBC 3.55 03/29/2022    HGB 9.2 03/29/2022    HCT 31.7 03/29/2022     03/29/2022    MCV 89.3 03/29/2022    MCH 25.9 03/29/2022    MCHC 29.0 03/29/2022    RDW 18.8 03/29/2022    NRBC 0.9 03/29/2022    SEGSPCT 47 03/18/2011    METASPCT 1.7 03/29/2022    LYMPHOPCT 12.2 03/29/2022    PROMYELOPCT 1.8 03/28/2022    MONOPCT 11.3 03/29/2022    MYELOPCT 1.8 03/28/2022    BASOPCT 0.0 03/29/2022    MONOSABS 1.90 03/29/2022    LYMPHSABS 2.08 03/29/2022    EOSABS 1.06 03/29/2022    BASOSABS 0.00 03/29/2022     CMP:    Lab Results   Component Value Date     03/29/2022    K 3.8 03/29/2022    K 4.1 03/24/2022     03/29/2022    CO2 25 03/29/2022    BUN 34 03/29/2022    CREATININE 1.4 03/29/2022    GFRAA >60 03/29/2022    LABGLOM 50 03/29/2022    GLUCOSE 117 03/29/2022    GLUCOSE 106 03/24/2011    PROT 5.0 03/29/2022    LABALBU 2.7 03/29/2022    LABALBU 2.4 03/20/2011    CALCIUM 7.9 03/29/2022    BILITOT 1.4 03/29/2022    ALKPHOS 101 03/29/2022    AST 21 03/29/2022    ALT 15 03/29/2022     PT/INR:    Lab Results   Component Value Date    PROTIME 12.6 01/24/2022    PROTIME 12.2 03/18/2011    INR 1.1 01/24/2022       ASSESSMENT AND PLAN      Principal Problem:  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   Diastolic heart failure with volume contraction resulting in renal insufficiency which seems to be resolving  Unspecified dermatitis possibly from iron antimicrobial or bed linens chemical  Mild cognitive dysfunction          Electronically signed by Bobby Randolph MD on 3/29/2022 at 8:25 AM

## 2022-03-29 NOTE — ANESTHESIA PRE PROCEDURE
IntraVENous Continuous Lana Rader MD 16.7 mL/hr at 03/29/22 1742 0.5 mg/min at 03/29/22 1742    trimethobenzamide (TIGAN) injection 200 mg  200 mg IntraMUSCular Q6H PRN Lana Rader MD        white petrolatum ointment   Topical BID PRN Linda Geiger MD        lactated ringers bolus  500 mL IntraVENous Once Migdalia Riley MD   Held at 03/27/22 3476    [Held by provider] dilTIAZem (CARDIZEM) tablet 60 mg  60 mg Oral 3 times per day Amena Slava. Ginder, APN   60 mg at 03/28/22 0550    metoprolol tartrate (LOPRESSOR) tablet 100 mg  100 mg Oral BID Amena Slava. Sharona APN   100 mg at 03/29/22 0811    sodium chloride flush 0.9 % injection 5-40 mL  5-40 mL IntraVENous 2 times per day Sharmin Andino MD   10 mL at 03/29/22 0813    sodium chloride flush 0.9 % injection 5-40 mL  5-40 mL IntraVENous PRN Sharmin Andino MD   10 mL at 03/28/22 1547    0.9 % sodium chloride infusion  25 mL IntraVENous PRN Sharmin Andino MD        acetaminophen (TYLENOL) tablet 650 mg  650 mg Oral Q6H Sharmin Andino MD   650 mg at 03/29/22 1735       Allergies:     Allergies   Allergen Reactions    Cefepime Rash    Clindamycin/Lincomycin Rash    Pcn [Penicillins] Rash       Problem List:    Patient Active Problem List   Diagnosis Code    Retinal detachment of left eye with multiple breaks H33.022    Rectal cancer ([facility] [address] C20    Ileus ([facility] [address] K56.7    Atrial flutter with rapid ventricular response ([facility]) I48.92    GI bleed K92.2    Gastrointestinal bleed K92.2    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    S/P right hemicolectomy Z90.49    PSVT (paroxysmal supraventricular tachycardia) ([facility]) I47.1    Primary hypertension I10       Past Medical History:        Diagnosis Date    Employs prosthetic leg     left    History of atrial fibrillation     [place] (hard of hearing)     uses bilat hearing aides    Hx of AKA (above knee amputation), left (HCC)     Hx of necrotizing fasciitis 2011    left leg    Hypertension     Rectal bleeding     Rectal cancer ([facility] [address] 2017    rectal       Past Surgical History:        Procedure Laterality Date    ABDOMEN SURGERY  2018    ileostomy open take down    ABOVE KNEE AMPUTATION      left; hx flesh eating bacteria    CARDIOVERSION  2020    COLON SURGERY  2018    laprascopic low anterior colon resection  take down splenic fexure   ileostomy    COLONOSCOPY  10/21/2011    COLONOSCOPY N/A 2022    COLONOSCOPY POLYPECTOMY SNARE/COLD BIOPSY performed by César Santiago MD at [address] Left 10/03/2016    pars plana vitrectomy, retinal detachment repair, laser gas/fluid exchange    EYE SURGERY Bilateral ? bilat cataracts w lens implants    EYE SURGERY Right ?    retinal detachment    HEMICOLECTOMY Right 3/22/2022    LAPAROSCOPIC RIGHT HEMICOLECTOMY performed by César Santiago MD at [address] OF ILEOSTOMY,COMPLICATED N/A     ILEOSTOMY OPEN TAKEDOWN performed by César Santiago MD at [address] [address] History:    Social History     Tobacco Use    Smoking status: Former Smoker     Packs/day: 2.00     Years: 35.00     Pack years: 70.00     Types: Cigarettes     Start date: 1974     Quit date: 2010     Years since quittin.3    Smokeless tobacco: Never Used   Substance Use Topics    Alcohol use: No                                Counseling given: Not Answered      Vital Signs (Current): There were no vitals filed for this visit.                                            BP Readings from Last 3 Encounters:   22 (!) 141/98   22 (!) 141/91   03/15/22 (!) 127/94       NPO Status:                                                                                 BMI:   Wt Readings from Last 3 Encounters:   22 185 lb (83.9 kg)   03/15/22 190 lb (86.2 kg)   22 187 lb (84.8 kg)     There is no height or weight on file to calculate BMI.    CBC:   Lab Results   Component Value Date    WBC 17.3 2022    RBC 3.55 03/29/2022    HGB 9.2 03/29/2022    HCT 31.7 03/29/2022    MCV 89.3 03/29/2022    RDW 18.8 03/29/2022     03/29/2022       CMP:   Lab Results   Component Value Date     03/29/2022    K 3.8 03/29/2022    K 4.1 03/24/2022     03/29/2022    CO2 25 03/29/2022    BUN 34 03/29/2022    CREATININE 1.4 03/29/2022    GFRAA >60 03/29/2022    LABGLOM 50 03/29/2022    GLUCOSE 117 03/29/2022    GLUCOSE 106 03/24/2011    PROT 5.0 03/29/2022    CALCIUM 7.9 03/29/2022    BILITOT 1.4 03/29/2022    ALKPHOS 101 03/29/2022    AST 21 03/29/2022    ALT 15 03/29/2022       POC Tests: No results for input(s): POCGLU, POCNA, POCK, POCCL, POCBUN, POCHEMO, POCHCT in the last 72 hours. Coags:   Lab Results   Component Value Date    PROTIME 12.6 01/24/2022    PROTIME 12.2 03/18/2011    INR 1.1 01/24/2022    APTT 26.3 01/24/2022       HCG (If Applicable): No results found for: PREGTESTUR, PREGSERUM, HCG, HCGQUANT     ABGs:   Lab Results   Component Value Date    D8GRHORC 95.3 01/10/2011        Type & Screen (If Applicable):  No results found for: LABABO, [address]    Anesthesia Evaluation  Patient summary reviewed and Nursing notes reviewed no history of anesthetic complications:   Airway: Mallampati: II  TM distance: >3 FB   Neck ROM: full  Mouth opening: > = 3 FB Dental:    (+) poor dentition  Comment: Only 2 teeth remaining- poor dentition    Pulmonary: breath sounds clear to auscultation      (-) not a current smoker                          ROS comment: Former smoker- quit 7 years ago   Cardiovascular:  Exercise tolerance: no interval change,   (+) hypertension: moderate, dysrhythmias: atrial fibrillation and SVT,       ECG reviewed  Rhythm: irregular  Rate: normal           Beta Blocker:  Order written         Neuro/Psych:   Negative Neuro/Psych ROS              GI/Hepatic/Renal:        (-) bowel prep      ROS comment: S/P ileostomy and takedown. .   Endo/Other:    (+) blood dyscrasia: anticoagulation therapy:., malignancy/cancer. ROS comment: Left lower extremity amputation Abdominal:             Vascular: negative vascular ROS. Other Findings:             Anesthesia Plan      MAC     ASA 4       Induction: intravenous. MIPS: Prophylactic antiemetics administered. Anesthetic plan and risks discussed with patient. Eileen Pinon MD           3/29/2022        7:48 PM    (Final assessment and plan per day of surgery team.)      Chart reviewed, patient seen and interviewed. Agree with note above.   CRISTI Arias - CRNA

## 2022-03-29 NOTE — CARE COORDINATION
3/29/2022  Social Work Discharge Planning:Pt is from home with his mother and sister and has a left AKA with prosthetic. Has all needed DME. [facility] is following and order is in. Pt is on 2l 02 here and uses none at home. Wean o2. [facility] DME is following.  Electronically signed by TAMI Álvarez on 3/29/2022 at 9:42 AM

## 2022-03-29 NOTE — PROGRESS NOTES
Occupational Therapy  OT BEDSIDE TREATMENT NOTE      Date:3/29/2022  Patient Name: Isabel Prado. MRN: [id number]  : 1955  Room: [address] [place] [address]     Evaluating OT: Rosa KEE OTR/L #[id number]     Referring Provider: Kati Coffey MD  Specific Provider Orders/Date: eval and treat 3/23/2022     Diagnosis:  Benign neoplasm of cecum [D12.0]  S/P right hemicolectomy [Z90.49]    Procedure: Right hemicolectomy 3/22/22      Pertinent Medical History:   Past Medical History        Past Medical History:   Diagnosis Date    Employs prosthetic leg       left    History of atrial fibrillation      [place] (hard of hearing)       uses bilat hearing aides    Hx of AKA (above knee amputation), left ([facility]      Hx of necrotizing fasciitis      left leg    Hypertension      Rectal bleeding      Rectal cancer ([facility] [address] 2017     rectal            Precautions:  fall risk, alarm, O2 2L, L prosthesis     Assessment of current deficits   [x]? Functional mobility             [x]?ADLs           [x]? Strength                  []?Cognition   [x]? Functional transfers           [x]? IADLs         [x]? Safety Awareness   [x]? Endurance   []? Fine Coordination              [x]? Balance      []? Vision/perception   []? Sensation     []? Gross Motor Coordination  []? ROM           []?  Delirium                   []? Motor Control      OT PLAN OF CARE   OT POC based on physician orders, patient diagnosis and results of clinical assessment     Frequency/Duration 2-5 days/wk for 10-14 days PRN   Specific OT Treatment Interventions to include:   * Instruction/training on adapted ADL techniques and AE recommendations to increase functional independence within precautions       * Training on energy conservation strategies, correct breathing pattern and techniques to improve independence/tolerance for self-care routine  * Functional transfer/mobility training/DME recommendations for increased independence, safety, and fall prevention  * Patient/Family education to increase follow through with safety techniques and functional independence  * Recommendation of environmental modifications for increased safety with functional transfers/mobility and ADLs  * Therapeutic exercise to improve motor endurance, ROM, and functional strength for ADLs/functional transfers  * Therapeutic activities to facilitate/challenge dynamic balance, stand tolerance for increased safety and independence with ADLs     Recommended Adaptive Equipment: TBD     Home Living: Pt lives with mother and sister in a 2 story home with laundry in the basement, pt states the family only uses one floor for living space. Bathroom setup: walk in shower with 3-4\" ledge   Equipment Owned: walker, quad cane, L leg prosthetic      Prior Level of Function: independent with ADLs, independent with IADLs. Completed functional mobility with cane     Pain Level: no pain reported     Cognition: A&O: alert, following multi step directions              Problem solving:  Fair +              Sequencing: fair +              Memory: fair +              Judgement/safety:  Fair +                Functional Assessment: AM-PAC Daily Activity Raw Score: 16/24    Initial Eval Status  Date: 3/24/22 Treatment session: 3/29/22 STGs=LTGS  Timeframe 10-14 days      Feeding  independent       Grooming  SBA-set up   Independent    UB Dressing SBA    independent    LB Dressing Mod A  Assist with RLE donning socks  Initiated but unable to jamey LLE prosthetic d/t respiratory status Mod A.    Assist required to don liner for prosthesis and for standing balance when donning prosthesis.   Mod I with use of AD as needed   Bathing Min A     Independent    Toileting Min A Max A, patient with external cath and incontinent of bowel, assist required for hygiene while standing.  independent   Bed Mobility  Supine to sit: SBA Supine to sit SBA  independent   Functional Transfers Min A  Sit <> stand from bed surface to attempt to jamey prosthetic     SBA  Stand pivot transfer to chair only d/t unable to jamey prosthetic fully Sit <> stand min A  independent   Functional Mobility NT stand pivot transfer for chair only d/t unable to fully jamey prostetic In room using [employer] min A  Mod I with AD as needed with good tolerance   Balance Sitting:        Static: fair +       Dynamic: fair +     Standing: fair without prosthetic donned  Standing balance min A with WW while donning prosthesis, CGA during ADL Sitting:        Static: good       Dynamic: good     Standing: good   Activity Tolerance fair  Pt limited by SOB and increased HR with light activity     At rest O2 stats 90%     Sitting EOB 92% O2 stats and HR increased to 160 bpm-nursing notified     96% O2 stats HR decreased to 127% sitting in chair Fair-, limited by SOB and HR, O2 sat 91-99 on 3L O2, HR 120s with activity. Rest breaks required with minimal exertion.  good during ADL activity. Pt will verbalize and demonstrate good understanding of ECWS techniques       Comments:  Patient cleared with nursing and agreeable to therapy. Patient with good participation and motivated despite being quick to fatigue. Prosthesis able to be fully donned this date. Patient in chair with call light in reach and alarm on at the end of the session    Education/treatment: ADL and functional transfer/activity performed to increase safety and independence during self care tasks. Education provided on safety awareness, adl reeducation, functional transfer training, breathing technique    · Pt has made progress towards set goals.      Time In: 8:57  Time Out: 9:37     Min Units   Therapeutic Ex [id number]     Therapeutic Activities 52841 10 2   ADL/Self Care 25303 15 1   Orthotic Management [id number]     Neuro Re-Ed [id number]     Non-Billable Time     TOTAL TIMED TREATMENT 40 [address] [place]/L [address]

## 2022-03-29 NOTE — PROGRESS NOTES
3/29/2022  2:14 PM      Nutrition Assessment     Type and Reason for Visit: Initial (LOS)    Nutrition Recommendations/Plan: Continue current diet, as tolerated    Nutrition Assessment:  Pt currently s/p 3/22 Rt. hemicolectomy 2/2 tubular adenoma. Progressed to Low fiber diet and PO improving. Plan for ОЛЬГА and cardioversion 3/30. Will continue to monitor pt PO tolerance      Nutrition Related Findings: Lt AKA, AMS, taut abd +BS/+BM, -I/O 5.4L, +1 edema      Current Nutrition Therapies:    ADULT DIET; Regular; Low Fiber  Diet NPO Exceptions are: Sips of Water with Meds  Diet NPO Exceptions are: Sips of Water with Meds    Anthropometric Measures:  · Height: 6' 2\" (188 cm)  · Current Body Wt: 185 lb (83.9 kg) (3/29 bedscale)   · BMI: 23.7    Nutrition Interventions:   Food and/or Nutrient Delivery:  Continue Current Diet  Nutrition Education/Counseling:  No recommendation at this time   Coordination of Nutrition Care:  Continue to monitor while inpatient    Goals:  PO >75% at meals       Nutrition Monitoring and Evaluation:   Behavioral-Environmental Outcomes:  None Identified   Food/Nutrient Intake Outcomes:  Food and Nutrient Intake  Physical Signs/Symptoms Outcomes:  GI Status,Biochemical Data,Fluid Status or Edema,Nutrition Focused Physical Findings,Skin,Weight     Discharge Planning:     Too soon to determine     Electronically signed by Minoo Clark RD, CNSC, LD on 3/29/22 at 2:14 PM EDT    Contact: [phone]

## 2022-03-29 NOTE — PROGRESS NOTES
[address] FOLLOW-UP    Name: Trang Osuna. Age: 74 y.o. Date of Admission: 3/22/2022  5:36 AM    Date of Service: 3/29/2022    Chief Complaint: Follow-up for SVT  Interim History:  No new overnight cardiac complaints. Currently with no complaints of CP, SOB, palpitations, dizziness, or lightheadedness. No bleeding complications. Atrial flutter on telemetry. 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    Rectal cancer ([address]    Ileus ([address]    Atrial flutter with rapid ventricular response (HCC)    GI bleed    Gastrointestinal bleed    Benign neoplasm of cecum    S/P right hemicolectomy    PSVT (paroxysmal supraventricular tachycardia) ([address]    Primary hypertension       Allergies:   Allergies   Allergen Reactions    Cefepime Rash    Clindamycin/Lincomycin Rash    Pcn [Penicillins] Rash       Current Medications:  Current Facility-Administered Medications   Medication Dose Route Frequency Provider Last Rate Last Admin    Hydrocerin cream CREA   Topical BID Matt Reed MD        0.9 % sodium chloride infusion   IntraVENous Continuous Ruby Randall MD        sodium chloride flush 0.9 % injection 5-40 mL  5-40 mL IntraVENous 2 times per day Ruby Randall MD        sodium chloride flush 0.9 % injection 5-40 mL  5-40 mL IntraVENous PRN Ruby Randall MD        [START ON 3/30/2022] 0.9 % sodium chloride infusion  25 mL IntraVENous PRN Ruby Randall MD        [Held by provider] digoxin Gianfranco Ege) tablet 250 mcg  250 mcg Oral Daily Matt Reed MD   250 mcg at 03/28/22 0925    apixaban (ELIQUIS) tablet 5 mg  5 mg Oral BID Latoya Abraham MD   5 mg at 03/29/22 7506    amiodarone (CORDARONE) 450 mg in dextrose 5 % 250 mL infusion  0.5 mg/min IntraVENous Continuous Latoya Abraham MD 16.7 mL/hr at 03/28/22 2158 0.5 mg/min at 03/28/22 2158    trimethobenzamide (TIGAN) injection 200 mg  200 mg IntraMUSCular Q6H PRN Latoya Abraham MD        white petrolatum ointment   Topical BID PRN Reed Norton MD        lactated ringers bolus  500 mL IntraVENous Once Brenda Dobson MD   Held at 03/27/22 3184    [Held by provider] dilTIAZem (CARDIZEM) tablet 60 mg  60 mg Oral 3 times per day [facility] F[facility]   60 mg at 03/28/22 0550    metoprolol tartrate (LOPRESSOR) tablet 100 mg  100 mg Oral BID [facility] Spyra. FAVIO Tabor   100 mg at 03/29/22 0811    sodium chloride flush 0.9 % injection 5-40 mL  5-40 mL IntraVENous 2 times per day Alli Jaramillo MD   10 mL at 03/29/22 0813    sodium chloride flush 0.9 % injection 5-40 mL  5-40 mL IntraVENous PRN Alli Jaramillo MD   10 mL at 03/28/22 1547    0.9 % sodium chloride infusion  25 mL IntraVENous PRN Alli Jaramillo MD        acetaminophen (TYLENOL) tablet 650 mg  650 mg Oral Q6H Alli Jaramillo MD   650 mg at 03/29/22 8481      sodium chloride      [START ON 3/30/2022] sodium chloride      amiodarone 0.5 mg/min (03/28/22 2158)    sodium chloride         Physical Exam:  /87   Pulse 127   Temp 98.1 °F (36.7 °C)   Resp 20   Ht 6' 2\" (1.88 m)   Wt 185 lb (83.9 kg)   SpO2 95%   BMI 23.75 kg/m²   Wt Readings from Last 3 Encounters:   03/29/22 185 lb (83.9 kg)   03/15/22 190 lb (86.2 kg)   02/14/22 187 lb (84.8 kg)     Appearance: Awake, alert, no acute respiratory distress  Skin: Intact, no rash  Head: Normocephalic, atraumatic  Eyes: EOMI, no conjunctival erythema  ENMT: No pharyngeal erythema, MMM, no rhinorrhea  Neck: Supple, no elevated JVP, no carotid bruits  Lungs: Clear to auscultation bilaterally.  No wheezes, rales, or rhonchi. Cardiac: Irregularly irregular rate and rhythm, tachycardic +S1S2, no murmurs apparent  Abdomen: Soft, nontender, +bowel sounds  Extremities: Moves all extremities x 4, no lower extremity edema  Neurologic: No focal motor deficits apparent, normal mood and affect  Peripheral Pulses: Intact posterior tibial pulses bilaterally    Intake/Output:    Intake/Output Summary (Last 24 hours) at 3/29/2022 1155  Last data filed at 3/28/2022 2100  Gross per 24 hour   Intake --   Output 200 ml   Net -200 ml     No intake/output data recorded. Laboratory Tests:  Recent Labs     03/27/22  1643 03/27/22  1643 03/28/22  0338 03/28/22  2122 03/29/22  0303      < > 140 140 142   K 3.5   < > 3.2* 4.0 3.8      < > 104 107 108*   CO2 26   < > 25 24 25   BUN 46*  --  40*  --  34*   CREATININE 2.0*  --  1.6*  --  1.4*   GLUCOSE 104*  --  102*  --  117*   CALCIUM 8.3*  --  7.9*  --  7.9*    < > = values in this interval not displayed.      Lab Results   Component Value Date    MG 1.9 03/29/2022     Recent Labs     03/27/22 0320 03/28/22 0338 03/29/22  0303   ALKPHOS 116 135* 101   ALT 14 14 15   AST 13 15 21   PROT 5.1* 5.0* 5.0*   BILITOT 1.6* 1.4* 1.4*   LABALBU 2.7* 2.6* 2.7*     Recent Labs     03/27/22 0320 03/28/22 0338 03/29/22  0303   WBC 22.3* 19.3* 17.3*   RBC 3.12* 3.54* 3.55*   HGB 8.0* 9.4* 9.2*   HCT 26.8* 30.8* 31.7*   MCV 85.9 87.0 89.3   MCH 25.6* 26.6 25.9*   MCHC 29.9* 30.5* 29.0*   RDW 15.9* 17.2* 18.8*    270 241   MPV 10.3 10.0 10.3     Lab Results   Component Value Date    CKTOTAL 12 (L) 03/18/2011    CKMB <0.2 03/18/2011    TROPONINI <0.01 12/19/2020    TROPONINI <0.01 03/18/2011    TROPONINI 0.02 01/03/2011     Lab Results   Component Value Date    INR 1.1 01/24/2022    INR 1.0 12/19/2020    INR 1.2 03/18/2011    PROTIME 12.6 (H) 01/24/2022    PROTIME 11.7 12/19/2020    PROTIME 12.2 03/18/2011     Lab Results   Component Value Date    TSH 1.050 03/25/2022     Lab Results Component Value Date    LABA1C 5.6 12/20/2020     No results found for: EAG  Lab Results   Component Value Date    CHOL 114 12/20/2020     Lab Results   Component Value Date    TRIG 76 12/20/2020     Lab Results   Component Value Date    HDL 41 12/20/2020     Lab Results   Component Value Date    LDLCALC 58 12/20/2020     Lab Results   Component Value Date    LABVLDL 15 12/20/2020     No results found for: [facility]    Cardiac Tests:  Telemetry findings reviewed: Atrial flutter/fibrillation at rate 120s, no new tachy/bradyarrhythmias overnight. Patient was given 6 mg of adenosine to see underlying rhythm, noticed atrial flutter/fibrillation. EKG: Aflutter with 2:1 conduction, NSST, abnormal EKG. Labs and vitals were reviewed: BUN creat 46/2.0>>40/1.6>> 34/1.4, H/H 8/26.8>>9.4/30.8>> 9.2/31.7,  WBC 19.3>> 17.3    1. Pharmacological stress test: 1/2/2011: LVEF 54%, with no evidence of stress-induced ischemia. 2. TTE: 12/20/2020 (Dr. Shannan Santos), LVEF 55-60%, no wall motion abnormality, mildly dilated right ventricle, trace MR, trace pericardial effusion, TAPSE 21 mm. Assessment:  · PAF and now with persistent AF/flutter with RVR, rate not well controlled and metoprolol. On IV amiodarone since yesterday without success. Cardizem was held due to soft blood pressures  · [id number] Vasc score-2  · Status post hemicolectomy for cecal malignancy  · History of hypertension now blood pressure is soft>> improved chronic blood pressure 127/87  · COURTNEY creatinine still high at 1.6>> 1.4  · Left lower extremity above-knee amputation  · Hard of hearing  · Hypokalemia, improved. Plan:   · Continue IV amiodarone, Lopressor 100 twice daily. Keep him n.p.o. after midnight and schedule him for a ОЛЬГА guided cardioversion in a.m. if okay with surgical service. This was discussed with the patient and he is agreeable to proceed in a.m.   · Continue Eliquis 5 mg p.o. twice daily for anticoagulation  · Management of renal failure as per nephrology service  · Check EKG to assess QT interval.    Dulce Raymundo MD., [facility]   [facility] between [facility]) Cardiology

## 2022-03-29 NOTE — PROGRESS NOTES
[facility] MD Karon Motley MD Rosi Kerbs, MD Octaviano Square, MD Fabrizio Delgado, CNP   Chhaya Albarado, CHARMAINE  Progress Note    3/29/2022    SUBJECTIVE:   3/25: Since early afternoon. Feeling better. Denies abdominal pain though still distended. Had a BM, and distention has markedly improved since then. Denies dyspnea at rest on nasal cannula. Has developed lower extremity and dependent swelling denies cp/palp. Diet has been advanced, and he has anorexia, though has so far tolerated some food  3/26: \"Sleepy. \"  Denies dyspnea though still on nasal cannula. Ate breakfast today without event. Swelling improved compared to yesterday  3/27: Feeling better today. Notes that he is eating better. Hypotensive last evening received LR bolus. On NS drip again. Diuretics on hold. Still has the rash, though he still not aware of it  3/28: Denies complaint. Feeling better yesterday. Appetite has improved. Developed atrial fibrillation/flutter heart rates in the 120s received digoxin, now on amio drip  3/29: Feeling little better. Ongoing poor intake of food and fluid. Remains tachycardic. No pain. No swelling. ROS otherwise unremarkable. PROBLEM LIST:    Principal Problem:    Benign neoplasm of cecum  Active Problems:    S/P right hemicolectomy    PSVT (paroxysmal supraventricular tachycardia) (HCC)    Primary hypertension  Resolved Problems:    * No resolved hospital problems. *         DIET:    ADULT DIET; Regular; Low Fiber  Diet NPO Exceptions are: Sips of Water with Meds  Diet NPO Exceptions are: Sips of Water with Meds     MEDS (scheduled):     Hydrocerin   Topical BID    sodium chloride flush  5-40 mL IntraVENous 2 times per day    [Held by provider] digoxin  250 mcg Oral Daily    apixaban  5 mg Oral BID    lactated ringers bolus  500 mL IntraVENous Once    [Held by provider] dilTIAZem  60 mg Oral 3 times per day    metoprolol tartrate  100 mg Oral BID    sodium chloride flush  5-40 mL IntraVENous 2 times per day    acetaminophen  650 mg Oral Q6H       MEDS (infusions):   sodium chloride 75 mL/hr at 03/29/22 1246    [START ON 3/30/2022] sodium chloride      amiodarone 0.5 mg/min (03/28/22 2158)    sodium chloride         MEDS (prn):  sodium chloride flush, [START ON 3/30/2022] sodium chloride, trimethobenzamide, white petrolatum, sodium chloride flush, sodium chloride    PHYSICAL EXAM:     Patient Vitals for the past 24 hrs:   BP Temp Temp src Pulse Resp SpO2 Height Weight   03/29/22 1407 -- -- -- -- -- -- 6' 2\" (1.88 m) --   03/29/22 1357 (!) 126/98 98.6 °F (37 °C) -- 121 -- 100 % -- --   03/29/22 0708 127/87 98.1 °F (36.7 °C) Oral 127 20 95 % -- --   03/29/22 0424 -- -- -- -- -- -- -- 185 lb (83.9 kg)   03/29/22 0245 114/72 97.9 °F (36.6 °C) Oral 120 18 94 % -- --   03/28/22 2100 111/67 97.5 °F (36.4 °C) Oral 127 18 97 % -- --   03/28/22 1714 -- -- -- 122 -- -- -- --   @      Intake/Output Summary (Last 24 hours) at 3/29/2022 1559  Last data filed at 3/28/2022 2100  Gross per 24 hour   Intake --   Output 200 ml   Net -200 ml         Wt Readings from Last 3 Encounters:   03/29/22 185 lb (83.9 kg)   03/15/22 190 lb (86.2 kg)   02/14/22 187 lb (84.8 kg)       Constitutional:  in no acute distress  HEENT: NC/AT, EOMI, sclera and conjunctiva are clear and anicteric, mucus membranes moist  Neck: Trachea midline, no JVD  Cardiovascular: S1, S2 regular rhythm, no murmur,or rub  Respiratory: Crackles at bases improved since yesterday  Gastrointestinal:  Soft, diffusely mildly tender, mildly distended with tympany, no guarding or referred pain, NABS  Ext: 1/2+ dependent and distal lower extremity edema, feet warm  Skin: dry, no rash  Neuro: awake, alert, interactive moves all 4 extremities      DATA:    Recent Labs     03/27/22  0320 03/28/22  0338 03/29/22  0303   WBC 22.3* 19.3* 17.3*   HGB 8.0* 9.4* 9.2*   HCT 26.8* 30.8* 31.7*   MCV 85.9 87.0 89.3   PLT 262 270 241     Recent Labs     03/27/22  0320 03/27/22  0320 03/27/22  1643 03/27/22  1643 03/28/22  0338 03/28/22 2122 03/29/22  0303      < > 139   < > 140 140 142   K 3.4*   < > 3.5   < > 3.2* 4.0 3.8   CL 99   < > 101   < > 104 107 108*   CO2 26   < > 26   < > 25 24 25   MG 2.0  --   --   --  2.0  --  1.9   PHOS 3.6  --   --   --  2.9  --  2.4*   BUN 45*   < > 46*  --  40*  --  34*   CREATININE 2.2*   < > 2.0*  --  1.6*  --  1.4*   ALT 14  --   --   --  14  --  15   AST 13  --   --   --  15  --  21   BILITOT 1.6*  --   --   --  1.4*  --  1.4*   ALKPHOS 116  --   --   --  135*  --  101    < > = values in this interval not displayed. Lab Results   Component Value Date    LABPROT 0.1 03/23/2022    LABPROT 0.1 03/23/2022    LABPROT 0.3 (H) 03/23/2022    LABPROT 0.3 03/23/2022       ASSESSMENT     74 y.o. 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 Postoperatively tachycardic, hypotensive treated with IV bolus.     1. Acute kidney injury, hemodynamically mediated, least in part secondary to volume contraction, anemia, increase insensible losses; the final common pathway was decreased effective circulating volume. Status post IV fluid boluses, increase in IV fluid rate, azotemia is improving, as is his urine output.     2. Anemia, severe, normocytic but MCV is very low. Severely iron deficient    3. Hypophosphatemia, status post supplementation yesterday orally. Normalized with supplement    4. Rash looks like a drug rash. May be due to the iron. Could be due to antimicrobial therapy. Improved since yesterday.     Edema and pulmonary edema improved  Azotemia improving. The transient increase in azotemia was due to hypotension, intravascular volume contraction, A. fib with RVR.   Given the rash, consider AIN, though I doubt it  The rash has almost resolved    RECOMMENDATIONS  Continue NS at 75 cc an hour  Encourage oral intake as able  Continue supportive care  Follow labs, UO      Electronically signed by Maximiliano Root MD on 3/29/2022

## 2022-03-29 NOTE — PROGRESS NOTES
GENERAL SURGERY  DAILY PROGRESS NOTE  3/29/2022    CC: S/p lap right hemicolectomy    Subjective:  Patient with no acute events overnight. Continues on amiodarone drip. Tolerating diet, pain well controlled, no n/v. No BM yesterday.     Objective:  /87   Pulse 127   Temp 98.1 °F (36.7 °C)   Resp 20   Ht 6' 2\" (1.88 m)   Wt 185 lb (83.9 kg)   SpO2 95%   BMI 23.75 kg/m²     GENERAL:  Laying in bed, awake, alert, cooperative, no apparent distress  LUNGS:  No increased work of breathing on room air  CARDIOVASCULAR:  RR  ABDOMEN:  Soft, non-tender, non-distended, incisions clean  EXTREMITIES: No edema or swelling    Recent Labs     03/29/22  0303 03/28/22  0338 03/27/22  0320   WBC 17.3* 19.3* 22.3*   HGB 9.2* 9.4* 8.0*   HCT 31.7* 30.8* 26.8*   MCV 89.3 87.0 85.9    270 262      Lab Results   Component Value Date     03/29/2022    K 3.8 03/29/2022    K 4.1 03/24/2022     03/29/2022    CO2 25 03/29/2022    BUN 34 03/29/2022    CREATININE 1.4 03/29/2022    GLUCOSE 117 03/29/2022    GLUCOSE 106 03/24/2011    CALCIUM 7.9 03/29/2022         Assessment/Plan:  74 y.o. male s/p laparoscopic right hemicolectomy 3/22 for tubulovillous adenoma    Continue diet as tolerated  Appreciate all consultant input  Continues on amiodarone drip, per cardiology  Off antibiotics, per ID  Continue eliquis  PT/OT, ambulate and up out of bed as able    Electronically signed by Davian Vale MD on 3/29/2022 at 7:56 AM

## 2022-03-30 ENCOUNTER — ANESTHESIA (OUTPATIENT)
Dept: NON INVASIVE DIAGNOSTICS | Age: 67
DRG: 330 | End: 2022-03-30
Payer: MEDICARE

## 2022-03-30 ENCOUNTER — APPOINTMENT (OUTPATIENT)
Dept: NON INVASIVE DIAGNOSTICS | Age: 67
DRG: 330 | End: 2022-03-30
Attending: SURGERY
Payer: MEDICARE

## 2022-03-30 VITALS
DIASTOLIC BLOOD PRESSURE: 63 MMHG | SYSTOLIC BLOOD PRESSURE: 103 MMHG | RESPIRATION RATE: 14 BRPM | OXYGEN SATURATION: 100 %

## 2022-03-30 LAB
ALBUMIN SERPL-MCNC: 2.7 G/DL (ref 3.5–5.2)
ALP BLD-CCNC: 131 U/L (ref 40–129)
ALT SERPL-CCNC: 30 U/L (ref 0–40)
ANION GAP SERPL CALCULATED.3IONS-SCNC: 10 MMOL/L (ref 7–16)
ANISOCYTOSIS: ABNORMAL
AST SERPL-CCNC: 50 U/L (ref 0–39)
BASOPHILS ABSOLUTE: 0 E9/L (ref 0–0.2)
BASOPHILS RELATIVE PERCENT: 0 % (ref 0–2)
BILIRUB SERPL-MCNC: 1.8 MG/DL (ref 0–1.2)
BUN BLDV-MCNC: 31 MG/DL (ref 6–23)
CALCIUM SERPL-MCNC: 8 MG/DL (ref 8.6–10.2)
CHLORIDE BLD-SCNC: 108 MMOL/L (ref 98–107)
CO2: 24 MMOL/L (ref 22–29)
CREAT SERPL-MCNC: 1.3 MG/DL (ref 0.7–1.2)
EKG ATRIAL RATE: 120 BPM
EKG ATRIAL RATE: 64 BPM
EKG P AXIS: 38 DEGREES
EKG P AXIS: 87 DEGREES
EKG P-R INTERVAL: 220 MS
EKG P-R INTERVAL: 268 MS
EKG Q-T INTERVAL: 236 MS
EKG Q-T INTERVAL: 458 MS
EKG QRS DURATION: 80 MS
EKG QRS DURATION: 86 MS
EKG QTC CALCULATION (BAZETT): 333 MS
EKG QTC CALCULATION (BAZETT): 472 MS
EKG R AXIS: 44 DEGREES
EKG R AXIS: 46 DEGREES
EKG T AXIS: -114 DEGREES
EKG T AXIS: 30 DEGREES
EKG VENTRICULAR RATE: 120 BPM
EKG VENTRICULAR RATE: 64 BPM
EOSINOPHIL, URINE: 0 % (ref 0–1)
EOSINOPHILS ABSOLUTE: 0.46 E9/L (ref 0.05–0.5)
EOSINOPHILS RELATIVE PERCENT: 2.6 % (ref 0–6)
GFR AFRICAN AMERICAN: >60
GFR NON-AFRICAN AMERICAN: 55 ML/MIN/1.73
GLUCOSE BLD-MCNC: 113 MG/DL (ref 74–99)
HCT VFR BLD CALC: 34.5 % (ref 37–54)
HEMOGLOBIN: 10.3 G/DL (ref 12.5–16.5)
LV EF: 58 %
LVEF MODALITY: NORMAL
LYMPHOCYTES ABSOLUTE: 3.15 E9/L (ref 1.5–4)
LYMPHOCYTES RELATIVE PERCENT: 18.1 % (ref 20–42)
MAGNESIUM: 2 MG/DL (ref 1.6–2.6)
MCH RBC QN AUTO: 26.9 PG (ref 26–35)
MCHC RBC AUTO-ENTMCNC: 29.9 % (ref 32–34.5)
MCV RBC AUTO: 90.1 FL (ref 80–99.9)
METAMYELOCYTES RELATIVE PERCENT: 0.9 % (ref 0–1)
MONOCYTES ABSOLUTE: 1.05 E9/L (ref 0.1–0.95)
MONOCYTES RELATIVE PERCENT: 6 % (ref 2–12)
NEUTROPHILS ABSOLUTE: 12.78 E9/L (ref 1.8–7.3)
NEUTROPHILS RELATIVE PERCENT: 72.4 % (ref 43–80)
NUCLEATED RED BLOOD CELLS: 0 /100 WBC
ORGANISM: ABNORMAL
PDW BLD-RTO: 20.9 FL (ref 11.5–15)
PHOSPHORUS: 2.6 MG/DL (ref 2.5–4.5)
PLATELET # BLD: 225 E9/L (ref 130–450)
PMV BLD AUTO: 9.8 FL (ref 7–12)
POLYCHROMASIA: ABNORMAL
POTASSIUM SERPL-SCNC: 4.2 MMOL/L (ref 3.5–5)
RBC # BLD: 3.83 E12/L (ref 3.8–5.8)
SODIUM BLD-SCNC: 142 MMOL/L (ref 132–146)
TOTAL PROTEIN: 5.4 G/DL (ref 6.4–8.3)
URINE CULTURE, ROUTINE: ABNORMAL
WBC # BLD: 17.5 E9/L (ref 4.5–11.5)

## 2022-03-30 PROCEDURE — 6370000000 HC RX 637 (ALT 250 FOR IP): Performed by: INTERNAL MEDICINE

## 2022-03-30 PROCEDURE — 7100000011 HC PHASE II RECOVERY - ADDTL 15 MIN

## 2022-03-30 PROCEDURE — 6370000000 HC RX 637 (ALT 250 FOR IP): Performed by: NURSE PRACTITIONER

## 2022-03-30 PROCEDURE — 36415 COLL VENOUS BLD VENIPUNCTURE: CPT

## 2022-03-30 PROCEDURE — 6370000000 HC RX 637 (ALT 250 FOR IP): Performed by: SURGERY

## 2022-03-30 PROCEDURE — 85025 COMPLETE CBC W/AUTO DIFF WBC: CPT

## 2022-03-30 PROCEDURE — 2580000003 HC RX 258: Performed by: INTERNAL MEDICINE

## 2022-03-30 PROCEDURE — 6360000002 HC RX W HCPCS: Performed by: INTERNAL MEDICINE

## 2022-03-30 PROCEDURE — 93320 DOPPLER ECHO COMPLETE: CPT

## 2022-03-30 PROCEDURE — 2580000003 HC RX 258: Performed by: SURGERY

## 2022-03-30 PROCEDURE — 6360000002 HC RX W HCPCS: Performed by: NURSE ANESTHETIST, CERTIFIED REGISTERED

## 2022-03-30 PROCEDURE — 3700000001 HC ADD 15 MINUTES (ANESTHESIA)

## 2022-03-30 PROCEDURE — 80053 COMPREHEN METABOLIC PANEL: CPT

## 2022-03-30 PROCEDURE — 92960 CARDIOVERSION ELECTRIC EXT: CPT

## 2022-03-30 PROCEDURE — 6360000002 HC RX W HCPCS

## 2022-03-30 PROCEDURE — 93325 DOPPLER ECHO COLOR FLOW MAPG: CPT | Performed by: INTERNAL MEDICINE

## 2022-03-30 PROCEDURE — 93320 DOPPLER ECHO COMPLETE: CPT | Performed by: INTERNAL MEDICINE

## 2022-03-30 PROCEDURE — 99233 SBSQ HOSP IP/OBS HIGH 50: CPT | Performed by: INTERNAL MEDICINE

## 2022-03-30 PROCEDURE — C9113 INJ PANTOPRAZOLE SODIUM, VIA: HCPCS

## 2022-03-30 PROCEDURE — 7100000010 HC PHASE II RECOVERY - FIRST 15 MIN

## 2022-03-30 PROCEDURE — 3700000000 HC ANESTHESIA ATTENDED CARE

## 2022-03-30 PROCEDURE — 2580000003 HC RX 258: Performed by: NURSE ANESTHETIST, CERTIFIED REGISTERED

## 2022-03-30 PROCEDURE — 93010 ELECTROCARDIOGRAM REPORT: CPT | Performed by: INTERNAL MEDICINE

## 2022-03-30 PROCEDURE — 92960 CARDIOVERSION ELECTRIC EXT: CPT | Performed by: INTERNAL MEDICINE

## 2022-03-30 PROCEDURE — 84100 ASSAY OF PHOSPHORUS: CPT

## 2022-03-30 PROCEDURE — 93312 ECHO TRANSESOPHAGEAL: CPT

## 2022-03-30 PROCEDURE — 2060000000 HC ICU INTERMEDIATE R&B

## 2022-03-30 PROCEDURE — 93312 ECHO TRANSESOPHAGEAL: CPT | Performed by: INTERNAL MEDICINE

## 2022-03-30 PROCEDURE — C9113 INJ PANTOPRAZOLE SODIUM, VIA: HCPCS | Performed by: INTERNAL MEDICINE

## 2022-03-30 PROCEDURE — 93325 DOPPLER ECHO COLOR FLOW MAPG: CPT

## 2022-03-30 PROCEDURE — 93005 ELECTROCARDIOGRAM TRACING: CPT | Performed by: INTERNAL MEDICINE

## 2022-03-30 PROCEDURE — 2500000003 HC RX 250 WO HCPCS: Performed by: NURSE ANESTHETIST, CERTIFIED REGISTERED

## 2022-03-30 PROCEDURE — 83735 ASSAY OF MAGNESIUM: CPT

## 2022-03-30 PROCEDURE — 2700000000 HC OXYGEN THERAPY PER DAY

## 2022-03-30 RX ORDER — 0.9 % SODIUM CHLORIDE 0.9 %
1000 INTRAVENOUS SOLUTION INTRAVENOUS ONCE
Status: DISCONTINUED | OUTPATIENT
Start: 2022-03-30 | End: 2022-04-05 | Stop reason: HOSPADM

## 2022-03-30 RX ORDER — LIDOCAINE HYDROCHLORIDE 10 MG/ML
INJECTION, SOLUTION INFILTRATION; PERINEURAL PRN
Status: DISCONTINUED | OUTPATIENT
Start: 2022-03-30 | End: 2022-03-30 | Stop reason: SDUPTHER

## 2022-03-30 RX ORDER — GLYCOPYRROLATE 0.2 MG/ML
INJECTION INTRAMUSCULAR; INTRAVENOUS PRN
Status: DISCONTINUED | OUTPATIENT
Start: 2022-03-30 | End: 2022-03-30 | Stop reason: SDUPTHER

## 2022-03-30 RX ORDER — SODIUM CHLORIDE 9 MG/ML
INJECTION, SOLUTION INTRAVENOUS CONTINUOUS PRN
Status: DISCONTINUED | OUTPATIENT
Start: 2022-03-30 | End: 2022-03-30 | Stop reason: SDUPTHER

## 2022-03-30 RX ORDER — PANTOPRAZOLE SODIUM 40 MG/10ML
INJECTION, POWDER, LYOPHILIZED, FOR SOLUTION INTRAVENOUS
Status: DISPENSED
Start: 2022-03-30 | End: 2022-03-31

## 2022-03-30 RX ORDER — PROPOFOL 10 MG/ML
INJECTION, EMULSION INTRAVENOUS PRN
Status: DISCONTINUED | OUTPATIENT
Start: 2022-03-30 | End: 2022-03-30 | Stop reason: SDUPTHER

## 2022-03-30 RX ADMIN — SODIUM CHLORIDE, PRESERVATIVE FREE 10 ML: 5 INJECTION INTRAVENOUS at 21:23

## 2022-03-30 RX ADMIN — GLYCOPYRROLATE 0.2 MG: 0.2 INJECTION INTRAMUSCULAR; INTRAVENOUS at 10:11

## 2022-03-30 RX ADMIN — ACETAMINOPHEN 650 MG: 325 TABLET ORAL at 04:10

## 2022-03-30 RX ADMIN — PHENYLEPHRINE HYDROCHLORIDE 200 MCG: 10 INJECTION INTRAVENOUS at 10:04

## 2022-03-30 RX ADMIN — PHENYLEPHRINE HYDROCHLORIDE 200 MCG: 10 INJECTION INTRAVENOUS at 10:00

## 2022-03-30 RX ADMIN — SODIUM CHLORIDE: 9 INJECTION, SOLUTION INTRAVENOUS at 09:34

## 2022-03-30 RX ADMIN — PHENYLEPHRINE HYDROCHLORIDE 200 MCG: 10 INJECTION INTRAVENOUS at 10:16

## 2022-03-30 RX ADMIN — SODIUM CHLORIDE, PRESERVATIVE FREE 10 ML: 5 INJECTION INTRAVENOUS at 07:56

## 2022-03-30 RX ADMIN — APIXABAN 5 MG: 5 TABLET, FILM COATED ORAL at 07:55

## 2022-03-30 RX ADMIN — APIXABAN 5 MG: 5 TABLET, FILM COATED ORAL at 21:23

## 2022-03-30 RX ADMIN — PROPOFOL 130 MG: 10 INJECTION, EMULSION INTRAVENOUS at 09:45

## 2022-03-30 RX ADMIN — ACETAMINOPHEN 650 MG: 325 TABLET ORAL at 08:04

## 2022-03-30 RX ADMIN — ACETAMINOPHEN 650 MG: 325 TABLET ORAL at 16:42

## 2022-03-30 RX ADMIN — ACETAMINOPHEN 650 MG: 325 TABLET ORAL at 21:23

## 2022-03-30 RX ADMIN — GLYCOPYRROLATE 0.2 MG: 0.2 INJECTION INTRAMUSCULAR; INTRAVENOUS at 10:04

## 2022-03-30 RX ADMIN — PHENYLEPHRINE HYDROCHLORIDE 200 MCG: 10 INJECTION INTRAVENOUS at 10:11

## 2022-03-30 RX ADMIN — METOPROLOL TARTRATE 100 MG: 50 TABLET, FILM COATED ORAL at 07:55

## 2022-03-30 RX ADMIN — Medication: at 07:55

## 2022-03-30 RX ADMIN — Medication 10 ML: at 07:55

## 2022-03-30 RX ADMIN — PHENYLEPHRINE HYDROCHLORIDE 200 MCG: 10 INJECTION INTRAVENOUS at 09:54

## 2022-03-30 RX ADMIN — Medication: at 21:24

## 2022-03-30 RX ADMIN — SODIUM CHLORIDE: 9 INJECTION, SOLUTION INTRAVENOUS at 04:12

## 2022-03-30 RX ADMIN — SODIUM CHLORIDE: 9 INJECTION, SOLUTION INTRAVENOUS at 11:31

## 2022-03-30 RX ADMIN — SODIUM CHLORIDE, PRESERVATIVE FREE 40 MG: 5 INJECTION INTRAVENOUS at 16:57

## 2022-03-30 RX ADMIN — PROPOFOL 300 MG: 10 INJECTION, EMULSION INTRAVENOUS at 09:48

## 2022-03-30 RX ADMIN — TRIMETHOBENZAMIDE HYDROCHLORIDE 200 MG: 100 INJECTION INTRAMUSCULAR at 12:57

## 2022-03-30 RX ADMIN — Medication 10 ML: at 21:23

## 2022-03-30 RX ADMIN — METOPROLOL TARTRATE 100 MG: 50 TABLET, FILM COATED ORAL at 21:24

## 2022-03-30 RX ADMIN — LIDOCAINE HYDROCHLORIDE 20 MG: 10 INJECTION, SOLUTION INFILTRATION; PERINEURAL at 09:45

## 2022-03-30 ASSESSMENT — PAIN SCALES - GENERAL
PAINLEVEL_OUTOF10: 0
PAINLEVEL_OUTOF10: 2
PAINLEVEL_OUTOF10: 0

## 2022-03-30 NOTE — PROGRESS NOTES
Department of Internal Lala Jacobsen M.D.  Progress Note      SUBJECTIVE: He denies any complaints but he does not remember getting out of bed yesterday    OBJECTIVE abdomen appears to be soft and nontender  Skin still has erythroderma    Medications    Current Facility-Administered Medications: Hydrocerin cream CREA, , Topical, BID  0.9 % sodium chloride infusion, , IntraVENous, Continuous  sodium chloride flush 0.9 % injection 5-40 mL, 5-40 mL, IntraVENous, 2 times per day  sodium chloride flush 0.9 % injection 5-40 mL, 5-40 mL, IntraVENous, PRN  0.9 % sodium chloride infusion, 25 mL, IntraVENous, PRN  [Held by provider] digoxin (LANOXIN) tablet 250 mcg, 250 mcg, Oral, Daily  apixaban (ELIQUIS) tablet 5 mg, 5 mg, Oral, BID  [COMPLETED] amiodarone (CORDARONE) 150 mg in dextrose 5 % 100 mL bolus, 150 mg, IntraVENous, Once **FOLLOWED BY** [] amiodarone (CORDARONE) 450 mg in dextrose 5 % 250 mL infusion, 1 mg/min, IntraVENous, Continuous **FOLLOWED BY** amiodarone (CORDARONE) 450 mg in dextrose 5 % 250 mL infusion, 0.5 mg/min, IntraVENous, Continuous  trimethobenzamide (TIGAN) injection 200 mg, 200 mg, IntraMUSCular, Q6H PRN  white petrolatum ointment, , Topical, BID PRN  lactated ringers bolus, 500 mL, IntraVENous, Once  [Held by provider] dilTIAZem (CARDIZEM) tablet 60 mg, 60 mg, Oral, 3 times per day  metoprolol tartrate (LOPRESSOR) tablet 100 mg, 100 mg, Oral, BID  sodium chloride flush 0.9 % injection 5-40 mL, 5-40 mL, IntraVENous, 2 times per day  sodium chloride flush 0.9 % injection 5-40 mL, 5-40 mL, IntraVENous, PRN  0.9 % sodium chloride infusion, 25 mL, IntraVENous, PRN  acetaminophen (TYLENOL) tablet 650 mg, 650 mg, Oral, Q6H  Physical    VITALS:  /73   Pulse 121   Temp 97.5 °F (36.4 °C) (Oral)   Resp 18   Ht 6' 2\" (1.88 m)   Wt 185 lb (83.9 kg)   SpO2 99%   BMI 23.75 kg/m²   24HR INTAKE/OUTPUT:    Intake/Output Summary (Last 24 hours) at 3/30/2022 0740  Last data filed at 3/30/2022 0447  Gross per 24 hour   Intake --   Output 550 ml   Net -550 ml     LUNGS: Decreased breath sounds throughout  CARDIOVASCULAR: S1-S2 tachycardia  Data    CBC with Differential:    Lab Results   Component Value Date    WBC 17.5 03/30/2022    RBC 3.83 03/30/2022    HGB 10.3 03/30/2022    HCT 34.5 03/30/2022     03/30/2022    MCV 90.1 03/30/2022    MCH 26.9 03/30/2022    MCHC 29.9 03/30/2022    RDW 20.9 03/30/2022    NRBC 0.0 03/30/2022    SEGSPCT 47 03/18/2011    METASPCT 0.9 03/30/2022    LYMPHOPCT 18.1 03/30/2022    PROMYELOPCT 1.8 03/28/2022    MONOPCT 6.0 03/30/2022    MYELOPCT 1.8 03/28/2022    BASOPCT 0.0 03/30/2022    MONOSABS 1.05 03/30/2022    LYMPHSABS 3.15 03/30/2022    EOSABS 0.46 03/30/2022    BASOSABS 0.00 03/30/2022     CMP:    Lab Results   Component Value Date     03/30/2022    K 4.2 03/30/2022    K 4.1 03/24/2022     03/30/2022    CO2 24 03/30/2022    BUN 31 03/30/2022    CREATININE 1.3 03/30/2022    GFRAA >60 03/30/2022    LABGLOM 55 03/30/2022    GLUCOSE 113 03/30/2022    GLUCOSE 106 03/24/2011    PROT 5.4 03/30/2022    LABALBU 2.7 03/30/2022    LABALBU 2.4 03/20/2011    CALCIUM 8.0 03/30/2022    BILITOT 1.8 03/30/2022    ALKPHOS 131 03/30/2022    AST 50 03/30/2022    ALT 30 03/30/2022     PT/INR:    Lab Results   Component Value Date    PROTIME 12.6 01/24/2022    PROTIME 12.2 03/18/2011    INR 1.1 01/24/2022       ASSESSMENT AND PLAN      Principal Problem:  Tubulovillous adenoma of the cecum  Atrial flutter causing diastolic heart failure  Renal insufficiency from volume contraction and diastolic heart failure  Ileus appears to be resolved with mild malnutrition  Mild cognitive dysfunction    Plan on transesophageal echocardiogram with direct-current cardioversion today  Debility of condition  Chemical dermatitis suspecting detergent from hospital    Electronically signed by Deedee Burgos MD on 3/30/2022 at 7:40 AM

## 2022-03-30 NOTE — CARE COORDINATION
3/30/2022  Social Work Discharge Planning:ОЛЬГА this morning. Room air. [facility] is following and order is in. From home with his mother and sister. Electronically signed by TAMI Mariano on 3/30/2022 at 9:39 AM    3/30/2022  Social Work Discharge Planning:Per nurse, Pts daughter would like Pt to go to [address] if able at discharge. SW informed that we will need therapy evals to see if Pt qualifies.  Electronically signed by TAMI Mariano on 3/30/2022 at 2:33 PM

## 2022-03-30 NOTE — PROGRESS NOTES
[address] [place] [address] [facility]  Progress Note    SUBJECTIVE:  CC: rash     The patient had a successful cardioversion today. No new complaints. Denies urinary symptoms. No fever. Review of systems:  As stated above in the chief complaint, otherwise negative. Medications:  Scheduled Meds:   Hydrocerin   Topical BID    sodium chloride flush  5-40 mL IntraVENous 2 times per day    [Held by provider] digoxin  250 mcg Oral Daily    apixaban  5 mg Oral BID    lactated ringers bolus  500 mL IntraVENous Once    [Held by provider] dilTIAZem  60 mg Oral 3 times per day    metoprolol tartrate  100 mg Oral BID    sodium chloride flush  5-40 mL IntraVENous 2 times per day    acetaminophen  650 mg Oral Q6H     Continuous Infusions:   sodium chloride 75 mL/hr at 22 0412    sodium chloride      sodium chloride       PRN Meds:sodium chloride flush, sodium chloride, trimethobenzamide, white petrolatum, sodium chloride flush, sodium chloride    OBJECTIVE:  /86   Pulse 128   Temp 97.4 °F (36.3 °C) (Oral)   Resp 18   Ht 6' 2\" (1.88 m)   Wt 185 lb (83.9 kg)   SpO2 99%   BMI 23.75 kg/m²   Temp  Av.9 °F (36.6 °C)  Min: 97.4 °F (36.3 °C)  Max: 98.6 °F (37 °C)  Constitutional: The patient is sitting up in a chair. No distress. Skin: Warm and dry. Diffuse rash to lower extremities, trunk, flanks, and upper extremities, patient says itching is improved   HEENT: Round and reactive pupils. Moist mucous membranes. No ulcerations or thrush. Neck: Supple to movements. Chest: No wheezing or rhonchi  Cardiovascular: Heart sounds rhythmic and regular. Abdomen: Positive bowel sounds to auscultation. Benign to palpation. Extremities: No edema. Left AKA with prosthesis.   Lines: peripheral    Laboratory and Tests Review:  Lab Results   Component Value Date    WBC 17.5 (H) 2022    WBC 17.3 (H) 2022    WBC 19.3 (H) 2022    HGB 10.3 (L) 2022    HCT 34.5 (L) 03/30/2022    MCV 90.1 03/30/2022     03/30/2022     Lab Results   Component Value Date    NEUTROABS 12.78 (H) 03/30/2022    NEUTROABS 12.11 (H) 03/29/2022    NEUTROABS 16.41 (H) 03/28/2022     No results found for: CRPHS  Lab Results   Component Value Date    ALT 30 03/30/2022    AST 50 (H) 03/30/2022    ALKPHOS 131 (H) 03/30/2022    BILITOT 1.8 (H) 03/30/2022     Lab Results   Component Value Date     03/30/2022    K 4.2 03/30/2022    K 4.1 03/24/2022     03/30/2022    CO2 24 03/30/2022    BUN 31 03/30/2022    CREATININE 1.3 03/30/2022    CREATININE 1.4 03/29/2022    CREATININE 1.6 03/28/2022    GFRAA >60 03/30/2022    LABGLOM 55 03/30/2022    GLUCOSE 113 03/30/2022    GLUCOSE 106 03/24/2011    PROT 5.4 03/30/2022    LABALBU 2.7 03/30/2022    LABALBU 2.4 03/20/2011    CALCIUM 8.0 03/30/2022    BILITOT 1.8 03/30/2022    ALKPHOS 131 03/30/2022    AST 50 03/30/2022    ALT 30 03/30/2022     Lab Results   Component Value Date    CRP 29.2 (H) 01/03/2011     Lab Results   Component Value Date    SEDRATE 45 (H) 01/03/2011     Radiology:  Noted     Microbiology:   Urine culture: 75,000 GNR  Stool for C. difficile: negative     ASSESSMENT:  · Status post laparoscopic right hemicolectomy for a cecum neoplasm  · Adverse drug reaction. The patient has a rash. He received Clindamycin preoperatively. He had a rash when he was on Cefazolin Clindamycin in 2011. The rash may have been caused by other medications as well  · No evidence of infection   · Leukocytosis  · Asymptomatic bacteriuria  · Status post cardioversion    PLAN:  · Antibiotics are not warranted.      Spoke with nursing    Dominique Hodges MD  10:01 AM  3/30/2022

## 2022-03-30 NOTE — PROGRESS NOTES
Physical Therapy    Pt NA for Rx this AM, off the floor at a procedure. Will follow on another date/time.   Josefa Hauser PTA [id number]

## 2022-03-30 NOTE — PROGRESS NOTES
GENERAL SURGERY  DAILY PROGRESS NOTE  3/30/2022    CC: S/p lap right hemicolectomy    Subjective:  No overnight events. Still in AF on amiodarone drip. No nausea or vomiting, tolerating diet, +BMs    Objective:  /64   Pulse 69   Temp 97.4 °F (36.3 °C) (Oral)   Resp 18   Ht 6' 2\" (1.88 m)   Wt 185 lb (83.9 kg)   SpO2 99%   BMI 23.75 kg/m²     GENERAL:  Laying in bed, awake, alert, cooperative, no apparent distress  LUNGS:  No increased work of breathing on room air  CARDIOVASCULAR:  Rapid rate on monitor  ABDOMEN:  Soft, non-tender, non-distended, incisions clean  EXTREMITIES: No edema or swelling    Recent Labs     03/30/22  0333 03/29/22  0303 03/28/22  0338   WBC 17.5* 17.3* 19.3*   HGB 10.3* 9.2* 9.4*   HCT 34.5* 31.7* 30.8*   MCV 90.1 89.3 87.0    241 270      Lab Results   Component Value Date     03/30/2022    K 4.2 03/30/2022    K 4.1 03/24/2022     03/30/2022    CO2 24 03/30/2022    BUN 31 03/30/2022    CREATININE 1.3 03/30/2022    GLUCOSE 113 03/30/2022    GLUCOSE 106 03/24/2011    CALCIUM 8.0 03/30/2022         Assessment/Plan:  74 y.o. 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     Electronically signed by Malachi Barker MD on 3/30/2022 at 11:03 AM

## 2022-03-30 NOTE — PROGRESS NOTES
[address] [place] [address] FOLLOW-UP    Name: Anisa Briscoe. Age: 74 y.o. Date of Admission: 3/22/2022  5:36 AM    Date of Service: 3/30/2022    Chief Complaint: Follow-up for SVT  Interim History:  No new overnight cardiac complaints. Currently with no complaints of CP, SOB, palpitations, dizziness, or lightheadedness. Has extensive erythematous rash predominantly over the extremities without fever or chills. Patient for ОЛЬГА guided cardioversion this morning at 10:30. No bleeding complications. Atrial flutter on telemetry. 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    Rectal cancer ([address]    Ileus ([address]    Atrial flutter with rapid ventricular response (HCC)    GI bleed    Gastrointestinal bleed    Benign neoplasm of cecum    S/P right hemicolectomy    PSVT (paroxysmal supraventricular tachycardia) ([address]    Primary hypertension       Allergies:   Allergies   Allergen Reactions    Cefepime Rash    Clindamycin/Lincomycin Rash    Pcn [Penicillins] Rash       Current Medications:  Current Facility-Administered Medications   Medication Dose Route Frequency Provider Last Rate Last Admin    Hydrocerin cream CREA   Topical BID Jailyn Pierce MD   Given at 03/30/22 0755    0.9 % sodium chloride infusion   IntraVENous Continuous Migel Amezcua MD 75 mL/hr at 03/30/22 0412 New Bag at 03/30/22 0412    sodium chloride flush 0.9 % injection 5-40 mL  5-40 mL IntraVENous 2 times per day Migel Amezcua MD   10 mL at 03/30/22 0755    sodium chloride flush 0.9 % injection 5-40 mL  5-40 mL IntraVENous PRN Cody Johnson MD   10 mL at 03/29/22 1814    0.9 % sodium chloride infusion  25 mL IntraVENous PRN Cody Johnson MD        [Held by provider] digoxin (LANOXIN) tablet 250 mcg  250 mcg Oral Daily Hugo Hernandez MD   250 mcg at 03/28/22 4144    apixaban (ELIQUIS) tablet 5 mg  5 mg Oral BID Cody Johnson MD   5 mg at 03/30/22 0755    trimethobenzamide (TIGAN) injection 200 mg  200 mg IntraMUSCular Q6H PRN Cody Johnson MD        white petrolatum ointment   Topical BID PRN Vanessa Bush MD        lactated ringers bolus  500 mL IntraVENous Once Myah Linares MD   Held at 03/27/22 3637    [Held by provider] dilTIAZem (CARDIZEM) tablet 60 mg  60 mg Oral 3 times per day FAVIO Torres   60 mg at 03/28/22 0550    metoprolol tartrate (LOPRESSOR) tablet 100 mg  100 mg Oral BID FAVIO Torres   100 mg at 03/30/22 0755    sodium chloride flush 0.9 % injection 5-40 mL  5-40 mL IntraVENous 2 times per day Nehemias Real MD   10 mL at 03/30/22 0756    sodium chloride flush 0.9 % injection 5-40 mL  5-40 mL IntraVENous PRN Nehemias Real MD   10 mL at 03/28/22 1547    0.9 % sodium chloride infusion  25 mL IntraVENous PRN Nehemias Real MD        acetaminophen (TYLENOL) tablet 650 mg  650 mg Oral Q6H Nehemias Real MD   650 mg at 03/30/22 0804      sodium chloride 75 mL/hr at 03/30/22 0412    sodium chloride      sodium chloride         Physical Exam:  /86   Pulse 128   Temp 97.4 °F (36.3 °C) (Oral)   Resp 18   Ht 6' 2\" (1.88 m)   Wt 185 lb (83.9 kg)   SpO2 99%   BMI 23.75 kg/m²   Wt Readings from Last 3 Encounters:   03/29/22 185 lb (83.9 kg)   03/15/22 190 lb (86.2 kg)   02/14/22 187 lb (84.8 kg)     Appearance: Awake, alert, no acute respiratory distress  Skin: Intact, no rash  Head: Normocephalic, atraumatic  Eyes: EOMI, no conjunctival erythema  ENMT: No pharyngeal erythema, MMM, no rhinorrhea  Neck: Supple, no elevated JVP, no carotid bruits  Lungs: Clear to Component Value Date    TSH 1.050 03/25/2022     Lab Results   Component Value Date    LABA1C 5.6 12/20/2020     No results found for: EAG  Lab Results   Component Value Date    CHOL 114 12/20/2020     Lab Results   Component Value Date    TRIG 76 12/20/2020     Lab Results   Component Value Date    HDL 41 12/20/2020     Lab Results   Component Value Date    LDLCALC 58 12/20/2020     Lab Results   Component Value Date    LABVLDL 15 12/20/2020     No results found for: [facility]    Cardiac Tests:  Telemetry findings reviewed: Atrial flutter/fibrillation at rate 120s, no new tachy/bradyarrhythmias overnight. Patient was given 6 mg of adenosine to see underlying rhythm, noticed atrial flutter/fibrillation. EKG: Aflutter with 2:1 conduction, NSST, abnormal EKG. Labs and vitals were reviewed: BUN creat 46/2.0>>40/1.6>> 34/1.4, H/H 8/26.8>>9.4/30.8>> 9.2/31.7,  WBC 19.3>> 17.3    1. Pharmacological stress test: 1/2/2011: LVEF 54%, with no evidence of stress-induced ischemia. 2. TTE: 12/20/2020 (Dr. Sumit Alvares), LVEF 55-60%, no wall motion abnormality, mildly dilated right ventricle, trace MR, trace pericardial effusion, TAPSE 21 mm. Assessment:  · PAF and now with persistent AF/flutter with RVR, rate not well controlled and metoprolol. On IV amiodarone since yesterday without success. Cardizem was held due to soft blood pressures  · [id number] Vasc score-2  · Status post hemicolectomy for cecal malignancy  · History of hypertension now blood pressure is soft>> improved chronic blood pressure 127/87  · COURTNEY creatinine still high at 1.6>> 1.4>>1.3  · Left lower extremity above-knee amputation  · Hard of hearing  · Hypokalemia, improved. · Generalized erythematous rash, etiology unclear, drug rash or infection.    · Hemodynamically stable, /86  · Mild anemia, Hb 10.3        Plan:   ·  Keep him n.p.o. after midnight and schedule him for a ОЛЬГА guided cardioversion this AM.  · Will stop amiodarone due to extensive rash and amiodarone is not helping to control or convert rhythm. Continue metoprolol 100 mg po BID  · Continue Eliquis 5 mg p.o. twice daily for anticoagulation  · Management of renal failure as per nephrology service  · Repeat EKG showed QTc of 333 ms    Francia Yuen MD., [facility].   [facility]) Cardiology

## 2022-03-30 NOTE — PROCEDURES
Preprocedure diagnosis:  Paroxysmal atrial flutter  Procedures, cardioversion elective arrhythmia external    Preprocedure diagnosis: A. Fib/flutter    Prior tests anticoagulated    Primary procedure  Written informed consent was obtained, the ОЛЬГА was performed under stable fasting condition, self-adhesive anterior-posterior defibrillation pads were applied, the defibrillator was programmed to deliver energy in a synchronized fashion with a EKG. The patient was set up for monitoring of surface EKG and pulse oximetry continuously. Blood pressure was monitored and with automatic cuff measurements. Supplemental oxygen was administered. The procedure was performed under anesthesia by anesthesiology. After ensuring adequate anesthesia, DC CV was performed by delivering 200 J of direct current delivered in a synchronized fashion. Result: Successful cardioversion to sinus rhythm, confirmed on 12-lead EKG. Complication: None    Patient was monitored until awake and vitals remained stable. Erick Olguin M.D.   [facility] cardiology

## 2022-03-30 NOTE — PLAN OF CARE
Problem: Skin Integrity:  Goal: Will show no infection signs and symptoms  Description: Will show no infection signs and symptoms  3/30/2022 1448 by Gladis Dutta RN  Outcome: Met This Shift  3/30/2022 0651 by Cee Montanez  Outcome: Met This Shift  Goal: Absence of new skin breakdown  Description: Absence of new skin breakdown  3/30/2022 1448 by Gladis Dutta RN  Outcome: Met This Shift  3/30/2022 0651 by Cee Montanez  Outcome: Met This Shift     Problem: Falls - Risk of:  Goal: Will remain free from falls  Description: Will remain free from falls  3/30/2022 1448 by Gladis Dutta RN  Outcome: Met This Shift  3/30/2022 0651 by Cee Montanez  Outcome: Met This Shift  Goal: Absence of physical injury  Description: Absence of physical injury  3/30/2022 1448 by Gladis Dutta RN  Outcome: Met This Shift  3/30/2022 0651 by Cee Montanez  Outcome: Met This Shift     Problem: Nausea/Vomiting:  Goal: Absence of nausea/vomiting  Description: Absence of nausea/vomiting  3/30/2022 1448 by Gladis Dutta RN  Outcome: Ongoing  3/30/2022 0651 by Cee Montanez  Outcome: Met This Shift  Goal: Able to drink  Description: Able to drink  3/30/2022 0651 by Cee Montanez  Outcome: Met This Shift  Goal: Able to eat  Description: Able to eat  3/30/2022 0651 by Cee Montanez  Outcome: Met This Shift  Goal: Ability to achieve adequate nutritional intake will improve  Description: Ability to achieve adequate nutritional intake will improve  3/30/2022 0651 by Cee Montanez  Outcome: Met This Shift

## 2022-03-30 NOTE — PROGRESS NOTES
[facility] MD Donta Patterson, MD Maria Ines Quach, MD Lázaro Cano, MD Michelle Mcelroy, CNP   Chhaya Albarado, CHARMAINE  Progress Note    3/30/2022    SUBJECTIVE:   3/25: Since early afternoon. Feeling better. Denies abdominal pain though still distended. Had a BM, and distention has markedly improved since then. Denies dyspnea at rest on nasal cannula. Has developed lower extremity and dependent swelling denies cp/palp. Diet has been advanced, and he has anorexia, though has so far tolerated some food  3/26: \"Sleepy. \"  Denies dyspnea though still on nasal cannula. Ate breakfast today without event. Swelling improved compared to yesterday  3/27: Feeling better today. Notes that he is eating better. Hypotensive last evening received LR bolus. On NS drip again. Diuretics on hold. Still has the rash, though he still not aware of it  3/28: Denies complaint. Feeling better yesterday. Appetite has improved. Developed atrial fibrillation/flutter heart rates in the 120s received digoxin, now on amio drip  3/29: Feeling little better. Ongoing poor intake of food and fluid. Remains tachycardic. No pain. No swelling. ROS otherwise unremarkable. 3/30: Feeling better. Denies complaint. Status post ОЛЬГА/DC cardioversion, currently in NSR    PROBLEM LIST:    Principal Problem:    Benign neoplasm of cecum  Active Problems:    S/P right hemicolectomy    PSVT (paroxysmal supraventricular tachycardia) (HCC)    Primary hypertension  Resolved Problems:    * No resolved hospital problems. *         DIET:    ADULT DIET;  Regular; Low Fiber     MEDS (scheduled):    sodium chloride  1,000 mL IntraVENous Once    Hydrocerin   Topical BID    sodium chloride flush  5-40 mL IntraVENous 2 times per day    [Held by provider] digoxin  250 mcg Oral Daily    apixaban  5 mg Oral BID    lactated ringers bolus  500 mL IntraVENous Once    [Held by provider] dilTIAZem  60 mg Oral 3 times per day    metoprolol tartrate  100 mg Oral BID    sodium chloride flush  5-40 mL IntraVENous 2 times per day    acetaminophen  650 mg Oral Q6H       MEDS (infusions):   sodium chloride 75 mL/hr at 03/30/22 1131    sodium chloride      sodium chloride         MEDS (prn):  sodium chloride flush, sodium chloride, trimethobenzamide, white petrolatum, sodium chloride flush, sodium chloride    PHYSICAL EXAM:     Patient Vitals for the past 24 hrs:   BP Temp Temp src Pulse Resp SpO2 Height   03/30/22 1115 123/66 97.8 °F (36.6 °C) Oral 68 18 97 % --   03/30/22 1051 109/64 -- -- 69 18 99 % --   03/30/22 1035 104/62 -- -- 69 17 100 % --   03/30/22 1021 103/63 -- -- 65 14 100 % --   03/30/22 1020 103/63 -- -- 65 13 100 % --   03/30/22 0745 125/86 97.4 °F (36.3 °C) Oral 128 18 99 % --   03/30/22 0130 110/73 97.5 °F (36.4 °C) Oral 121 18 99 % --   03/29/22 2200 138/79 97.5 °F (36.4 °C) Oral 125 18 99 % --   03/29/22 1734 (!) 141/98 98.6 °F (37 °C) Oral 125 -- 97 % --   03/29/22 1407 -- -- -- -- -- -- 6' 2\" (1.88 m)   03/29/22 1357 (!) 126/98 98.6 °F (37 °C) -- 121 -- 100 % --   @      Intake/Output Summary (Last 24 hours) at 3/30/2022 1231  Last data filed at 3/30/2022 1020  Gross per 24 hour   Intake 2782.52 ml   Output 650 ml   Net 2132.52 ml         Wt Readings from Last 3 Encounters:   03/29/22 185 lb (83.9 kg)   03/15/22 190 lb (86.2 kg)   02/14/22 187 lb (84.8 kg)       Constitutional:  in no acute distress  HEENT: NC/AT, EOMI, sclera and conjunctiva are clear and anicteric, mucus membranes moist  Neck: Trachea midline, no JVD  Cardiovascular: S1, S2 regular rhythm, no murmur,or rub  Respiratory: Crackles at bases improved since yesterday  Gastrointestinal:  Soft, diffusely mildly tender, mildly distended with tympany, no guarding or referred pain, NABS  Ext: 1/2+ dependent and distal lower extremity edema, feet warm  Skin: dry, no rash  Neuro: awake, alert, interactive moves all 4 extremities      DATA:    Recent Labs     03/28/22 0338 03/29/22 0303 03/30/22 0333   WBC 19.3* 17.3* 17.5*   HGB 9.4* 9.2* 10.3*   HCT 30.8* 31.7* 34.5*   MCV 87.0 89.3 90.1    241 225     Recent Labs     03/28/22 0338 03/28/22 0338 03/28/22 2122 03/29/22 0303 03/30/22 0333      < > 140 142 142   K 3.2*   < > 4.0 3.8 4.2      < > 107 108* 108*   CO2 25   < > 24 25 24   MG 2.0  --   --  1.9 2.0   PHOS 2.9  --   --  2.4* 2.6   BUN 40*  --   --  34* 31*   CREATININE 1.6*  --   --  1.4* 1.3*   ALT 14  --   --  15 30   AST 15  --   --  21 50*   BILITOT 1.4*  --   --  1.4* 1.8*   ALKPHOS 135*  --   --  101 131*    < > = values in this interval not displayed. Lab Results   Component Value Date    LABPROT 0.1 03/23/2022    LABPROT 0.1 03/23/2022    LABPROT 0.3 (H) 03/23/2022    LABPROT 0.3 03/23/2022       ASSESSMENT     74 y.o. 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 Postoperatively tachycardic, hypotensive treated with IV bolus.     1. Acute kidney injury, hemodynamically mediated, least in part secondary to volume contraction, anemia, increase insensible losses; the final common pathway was decreased effective circulating volume. Status post IV fluid boluses, increase in IV fluid rate, azotemia is improving, as is his urine output.     2. Anemia, severe, normocytic but MCV is very low. Severely iron deficient    3. Hypophosphatemia, status post supplementation yesterday orally. Normalized with supplement    4. Rash looks like a drug rash. May be due to the iron. Could be due to antimicrobial therapy. Improved since yesterday.     Edema and pulmonary edema improved  Azotemia improving. The transient increase in azotemia was due to hypotension, intravascular volume contraction, A. fib with RVR.   Given the rash, consider AIN, though I doubt it  The rash has almost resolved    RECOMMENDATIONS  Continue NS at 75 cc an hour till eating p.o.  Encourage oral intake as able  Continue supportive care  Follow labs, UO  Once eating and drinking adequately, okay for discharge from renal standpoint    Electronically signed by Bayron Welch MD on 3/30/2022

## 2022-03-31 LAB
ALBUMIN SERPL-MCNC: 2.8 G/DL (ref 3.5–5.2)
ALP BLD-CCNC: 105 U/L (ref 40–129)
ALT SERPL-CCNC: 39 U/L (ref 0–40)
ANION GAP SERPL CALCULATED.3IONS-SCNC: 11 MMOL/L (ref 7–16)
ANISOCYTOSIS: ABNORMAL
AST SERPL-CCNC: 43 U/L (ref 0–39)
BASOPHILS ABSOLUTE: 0 E9/L (ref 0–0.2)
BASOPHILS RELATIVE PERCENT: 0 % (ref 0–2)
BILIRUB SERPL-MCNC: 1.4 MG/DL (ref 0–1.2)
BUN BLDV-MCNC: 24 MG/DL (ref 6–23)
CALCIUM SERPL-MCNC: 7.5 MG/DL (ref 8.6–10.2)
CHLORIDE BLD-SCNC: 109 MMOL/L (ref 98–107)
CO2: 22 MMOL/L (ref 22–29)
CREAT SERPL-MCNC: 1.2 MG/DL (ref 0.7–1.2)
EOSINOPHILS ABSOLUTE: 0.25 E9/L (ref 0.05–0.5)
EOSINOPHILS RELATIVE PERCENT: 1.8 % (ref 0–6)
GFR AFRICAN AMERICAN: >60
GFR NON-AFRICAN AMERICAN: >60 ML/MIN/1.73
GLUCOSE BLD-MCNC: 98 MG/DL (ref 74–99)
HCT VFR BLD CALC: 34.4 % (ref 37–54)
HEMOGLOBIN: 10.3 G/DL (ref 12.5–16.5)
LYMPHOCYTES ABSOLUTE: 1.52 E9/L (ref 1.5–4)
LYMPHOCYTES RELATIVE PERCENT: 10.5 % (ref 20–42)
MCH RBC QN AUTO: 27.5 PG (ref 26–35)
MCHC RBC AUTO-ENTMCNC: 29.9 % (ref 32–34.5)
MCV RBC AUTO: 91.7 FL (ref 80–99.9)
MONOCYTES ABSOLUTE: 0.55 E9/L (ref 0.1–0.95)
MONOCYTES RELATIVE PERCENT: 4.4 % (ref 2–12)
NEUTROPHILS ABSOLUTE: 11.45 E9/L (ref 1.8–7.3)
NEUTROPHILS RELATIVE PERCENT: 83.3 % (ref 43–80)
NUCLEATED RED BLOOD CELLS: 0 /100 WBC
PDW BLD-RTO: 22.2 FL (ref 11.5–15)
PLATELET # BLD: 227 E9/L (ref 130–450)
PMV BLD AUTO: 10.2 FL (ref 7–12)
POLYCHROMASIA: ABNORMAL
POTASSIUM SERPL-SCNC: 3.8 MMOL/L (ref 3.5–5)
RBC # BLD: 3.75 E12/L (ref 3.8–5.8)
SODIUM BLD-SCNC: 142 MMOL/L (ref 132–146)
TOTAL PROTEIN: 5 G/DL (ref 6.4–8.3)
WBC # BLD: 13.8 E9/L (ref 4.5–11.5)

## 2022-03-31 PROCEDURE — 99232 SBSQ HOSP IP/OBS MODERATE 35: CPT | Performed by: INTERNAL MEDICINE

## 2022-03-31 PROCEDURE — 36415 COLL VENOUS BLD VENIPUNCTURE: CPT

## 2022-03-31 PROCEDURE — 2060000000 HC ICU INTERMEDIATE R&B

## 2022-03-31 PROCEDURE — 6360000002 HC RX W HCPCS

## 2022-03-31 PROCEDURE — 6370000000 HC RX 637 (ALT 250 FOR IP): Performed by: INTERNAL MEDICINE

## 2022-03-31 PROCEDURE — 2500000003 HC RX 250 WO HCPCS

## 2022-03-31 PROCEDURE — 2580000003 HC RX 258: Performed by: SURGERY

## 2022-03-31 PROCEDURE — 6370000000 HC RX 637 (ALT 250 FOR IP): Performed by: SURGERY

## 2022-03-31 PROCEDURE — 6370000000 HC RX 637 (ALT 250 FOR IP): Performed by: NURSE PRACTITIONER

## 2022-03-31 PROCEDURE — 97530 THERAPEUTIC ACTIVITIES: CPT

## 2022-03-31 PROCEDURE — 2580000003 HC RX 258: Performed by: INTERNAL MEDICINE

## 2022-03-31 PROCEDURE — 97535 SELF CARE MNGMENT TRAINING: CPT

## 2022-03-31 PROCEDURE — 85025 COMPLETE CBC W/AUTO DIFF WBC: CPT

## 2022-03-31 PROCEDURE — 80053 COMPREHEN METABOLIC PANEL: CPT

## 2022-03-31 RX ORDER — PANTOPRAZOLE SODIUM 40 MG/1
40 TABLET, DELAYED RELEASE ORAL
Status: DISCONTINUED | OUTPATIENT
Start: 2022-04-01 | End: 2022-03-31

## 2022-03-31 RX ORDER — PANTOPRAZOLE SODIUM 40 MG/1
40 TABLET, DELAYED RELEASE ORAL
Status: DISCONTINUED | OUTPATIENT
Start: 2022-03-31 | End: 2022-04-05 | Stop reason: HOSPADM

## 2022-03-31 RX ORDER — CALCIUM CARBONATE 200(500)MG
500 TABLET,CHEWABLE ORAL EVERY 6 HOURS PRN
Status: DISCONTINUED | OUTPATIENT
Start: 2022-03-31 | End: 2022-04-05 | Stop reason: HOSPADM

## 2022-03-31 RX ADMIN — ACETAMINOPHEN 650 MG: 325 TABLET ORAL at 22:34

## 2022-03-31 RX ADMIN — SODIUM CHLORIDE, PRESERVATIVE FREE 10 ML: 5 INJECTION INTRAVENOUS at 09:00

## 2022-03-31 RX ADMIN — PETROLATUM: 420 OINTMENT TOPICAL at 20:55

## 2022-03-31 RX ADMIN — CALCIUM CARBONATE 500 MG: 500 TABLET, CHEWABLE ORAL at 16:09

## 2022-03-31 RX ADMIN — ACETAMINOPHEN 650 MG: 325 TABLET ORAL at 08:41

## 2022-03-31 RX ADMIN — CALCIUM CARBONATE 500 MG: 500 TABLET, CHEWABLE ORAL at 10:52

## 2022-03-31 RX ADMIN — Medication: at 09:00

## 2022-03-31 RX ADMIN — PANTOPRAZOLE SODIUM 40 MG: 40 TABLET, DELAYED RELEASE ORAL at 10:52

## 2022-03-31 RX ADMIN — APIXABAN 5 MG: 5 TABLET, FILM COATED ORAL at 08:41

## 2022-03-31 RX ADMIN — ACETAMINOPHEN 650 MG: 325 TABLET ORAL at 16:09

## 2022-03-31 RX ADMIN — APIXABAN 5 MG: 5 TABLET, FILM COATED ORAL at 20:52

## 2022-03-31 RX ADMIN — Medication: at 20:52

## 2022-03-31 RX ADMIN — METOPROLOL TARTRATE 100 MG: 50 TABLET, FILM COATED ORAL at 08:41

## 2022-03-31 RX ADMIN — ACETAMINOPHEN 650 MG: 325 TABLET ORAL at 03:27

## 2022-03-31 RX ADMIN — Medication 10 ML: at 20:53

## 2022-03-31 RX ADMIN — METOPROLOL TARTRATE 100 MG: 50 TABLET, FILM COATED ORAL at 20:52

## 2022-03-31 ASSESSMENT — PAIN SCALES - GENERAL
PAINLEVEL_OUTOF10: 0

## 2022-03-31 NOTE — FLOWSHEET NOTE
Inpatient Wound Care (Initial consult) 411A    Admit Date: 3/22/2022  5:36 AM    Reason for consult:  Rash to abdomen, redness coccyx    Wound history:  Per Surgery note    Pre-Op Diagnosis: BENIGN NEOPLASM OF CECUM     Post-Op Diagnosis: Same       Procedure(s):  LAPAROSCOPIC RIGHT HEMICOLECTOMY    Findings:       03/31/22 1340   Skin Integrity   Skin Integrity   (dry, flaky rash)   Location right leg   Skin Integrity Site 2   Skin Integrity Location 2   (redness,dry flaky)   Location 2   (dry skin)       Impression:  Skin assessment complete: see above documentation    Plan: Hydocerin Cream  TAPS  Heel protectors  Patient will need continued preventative care.       Natalie Serrano RN 3/31/2022 3:26 PM

## 2022-03-31 NOTE — PROGRESS NOTES
[address] [place] [address] [facility]  Progress Note    SUBJECTIVE:  CC: rash     No new complaints today. No pain. No itching. Review of systems:  As stated above in the chief complaint, otherwise negative. Medications:  Scheduled Meds:   sodium chloride  1,000 mL IntraVENous Once    Hydrocerin   Topical BID    sodium chloride flush  5-40 mL IntraVENous 2 times per day    [Held by provider] digoxin  250 mcg Oral Daily    apixaban  5 mg Oral BID    lactated ringers bolus  500 mL IntraVENous Once    [Held by provider] dilTIAZem  60 mg Oral 3 times per day    metoprolol tartrate  100 mg Oral BID    sodium chloride flush  5-40 mL IntraVENous 2 times per day    acetaminophen  650 mg Oral Q6H     Continuous Infusions:   sodium chloride 75 mL/hr at 22 183    sodium chloride      sodium chloride       PRN Meds:sodium chloride flush, sodium chloride, trimethobenzamide, white petrolatum, sodium chloride flush, sodium chloride    OBJECTIVE:  BP (!) 140/71   Pulse 78   Temp 97.9 °F (36.6 °C) (Oral)   Resp 18   Ht 6' 2\" (1.88 m)   Wt 192 lb 3.2 oz (87.2 kg)   SpO2 97%   BMI 24.68 kg/m²   Temp  Av.8 °F (36.6 °C)  Min: 97.3 °F (36.3 °C)  Max: 98.2 °F (36.8 °C)  Constitutional: The patient is sitting up in a chair. No distress. Skin: Warm and dry. Diffuse rash to lower extremities, trunk, flanks, and upper extremities, patient says itching is improved   HEENT: Round and reactive pupils. Moist mucous membranes. No ulcerations or thrush. Neck: Supple to movements. Chest: No wheezing or rhonchi  Cardiovascular: Heart sounds rhythmic and regular. Abdomen: Positive bowel sounds to auscultation. Benign to palpation. Extremities: No edema. Left AKA with prosthesis.   Lines: peripheral    Laboratory and Tests Review:  Lab Results   Component Value Date    WBC 13.8 (H) 2022    WBC 17.5 (H) 2022    WBC 17.3 (H) 2022    HGB 10.3 (L) 2022    HCT 34.4 (L) 03/31/2022    MCV 91.7 03/31/2022     03/31/2022     Lab Results   Component Value Date    NEUTROABS 11.45 (H) 03/31/2022    NEUTROABS 12.78 (H) 03/30/2022    NEUTROABS 12.11 (H) 03/29/2022     No results found for: CRPHS  Lab Results   Component Value Date    ALT 39 03/31/2022    AST 43 (H) 03/31/2022    ALKPHOS 105 03/31/2022    BILITOT 1.4 (H) 03/31/2022     Lab Results   Component Value Date     03/31/2022    K 3.8 03/31/2022    K 4.1 03/24/2022     03/31/2022    CO2 22 03/31/2022    BUN 24 03/31/2022    CREATININE 1.2 03/31/2022    CREATININE 1.3 03/30/2022    CREATININE 1.4 03/29/2022    GFRAA >60 03/31/2022    LABGLOM >60 03/31/2022    GLUCOSE 98 03/31/2022    GLUCOSE 106 03/24/2011    PROT 5.0 03/31/2022    LABALBU 2.8 03/31/2022    LABALBU 2.4 03/20/2011    CALCIUM 7.5 03/31/2022    BILITOT 1.4 03/31/2022    ALKPHOS 105 03/31/2022    AST 43 03/31/2022    ALT 39 03/31/2022     Lab Results   Component Value Date    CRP 29.2 (H) 01/03/2011     Lab Results   Component Value Date    SEDRATE 45 (H) 01/03/2011     Radiology:  Noted     Microbiology:   Urine culture: 75,000 GNR  Stool for C. difficile: negative     ASSESSMENT:  · Status post laparoscopic right hemicolectomy for a cecum neoplasm  · Adverse drug reaction. The patient has a rash. He received Clindamycin preoperatively. He had a rash when he was on Cefazolin Clindamycin in 2011.  The rash may have been caused by other medications as well  · No evidence of infection   · Leukocytosis  · Asymptomatic bacteriuria  · Status post cardioversion    PLAN:  · Antibiotics are not warranted  · The patient can be discharged from [address] [place] [address] with Yuni Babb MD  9:56 AM  3/31/2022

## 2022-03-31 NOTE — PROGRESS NOTES
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]  S/P right hemicolectomy [Z90.49]    Procedure: Right hemicolectomy 3/22/22      Pertinent Medical History:   Past Medical History           Past Medical History:   Diagnosis Date    Employs prosthetic leg       left    History of atrial fibrillation      [place] ([place] hearing)       uses bilat hearing aides    Hx of AKA (above knee amputation), left ([facility] [address]      Hx of necrotizing fasciitis      left leg    Hypertension      Rectal bleeding      Rectal cancer ([facility] [address] 2017     rectal            Precautions:  fall risk, O2, L prosthesis (AKA)     Assessment of current deficits   [x]? ? Functional mobility             [x]? ?ADLs           [x]? ? Strength                  []? ? Cognition   [x]? ? Functional transfers           [x]? ? IADLs         [x]? ? Safety Awareness   [x]? ?Endurance   []? ? Fine Coordination              [x]? ? Balance      []? ? Vision/perception   []? ? Sensation     []? ?Gross Motor Coordination  []?? ROM           []? ? Delirium                   []? ? Motor Control      OT PLAN OF CARE   OT POC based on physician orders, patient diagnosis and results of clinical assessment     Frequency/Duration 2-5 days/wk for 10-14 days PRN   Specific OT Treatment Interventions to include:   * Instruction/training on adapted ADL techniques and AE recommendations to increase functional independence within precautions       * Training on energy conservation strategies, correct breathing pattern and techniques to improve independence/tolerance for self-care routine  * Functional transfer/mobility training/DME recommendations for increased independence, safety, and fall prevention  * Patient/Family education to increase follow through with safety techniques and functional independence  * Recommendation of environmental modifications for increased safety with functional transfers/mobility and ADLs  * Therapeutic exercise to improve motor endurance, ROM, and functional strength for ADLs/functional transfers  * Therapeutic activities to facilitate/challenge dynamic balance, stand tolerance for increased safety and independence with ADLs     Recommended Adaptive Equipment: TBD     Home Living: Pt lives with mother and sister in a 2 story home with laundry in the basement, pt states the family only uses one floor for living space. Bathroom setup: walk in shower with 3-4\" ledge   Equipment Owned: walker, quad cane, L leg prosthetic      Prior Level of Function: independent with ADLs, independent with IADLs. Completed functional mobility with cane     Pain Level:  Pt did not report pain during this session.       Cognition: Awake and alert. Min cues for safety.                 Functional Assessment: AM-PAC Daily Activity Raw Score: 16/24    Initial Eval Status  Date: 3/24/22 Treatment session: 3/31/22 STGs=LTGS  Timeframe 10-14 days      Feeding  independent       Grooming  SBA-set up   Independent    UB Dressing SBA    independent    LB Dressing Mod A  Assist with RLE donning socks  Initiated but unable to jamey LLE prosthetic d/t respiratory status Assist to thread clothing over R LE while seated on the side of the bed. Mod I with use of AD as needed   Bathing Min A     Independent    Toileting Min A Pt reports he uses BSC at night at home. Min A pivot without prothesis to SPT bed <>BSC. Assist for thorough hygiene after bowel movement.     independent   Bed Mobility  Supine to sit: SBA Supine <>  sit SBA  independent   Functional Transfers Min A  Sit <> stand from bed surface to attempt to jamey prosthetic     SBA  Stand pivot transfer to chair only d/t unable to jamey

## 2022-03-31 NOTE — PLAN OF CARE
Problem: Falls - Risk of:  Goal: Will remain free from falls  Description: Will remain free from falls  3/30/2022 2214 by Evelyne Tian RN  Outcome: Met This Shift  3/30/2022 1448 by Rupal Acosta RN  Outcome: Met This Shift  Goal: Absence of physical injury  Description: Absence of physical injury  3/30/2022 2214 by Eevlyne Tian RN  Outcome: Met This Shift  3/30/2022 1448 by Rupal Acosta RN  Outcome: Met This Shift     Problem: Skin Integrity:  Goal: Will show no infection signs and symptoms  Description: Will show no infection signs and symptoms  3/30/2022 2214 by Evelyne Tian RN  Outcome: Ongoing  3/30/2022 1448 by Rupal Acosta RN  Outcome: Met This Shift  Goal: Absence of new skin breakdown  Description: Absence of new skin breakdown  3/30/2022 2214 by Evelyne Tian RN  Outcome: Ongoing  3/30/2022 1448 by Rupal Acosta RN  Outcome: Met This Shift     Problem: Nausea/Vomiting:  Goal: Absence of nausea/vomiting  Description: Absence of nausea/vomiting  3/30/2022 1448 by Rupal Acosta RN  Outcome: Ongoing

## 2022-03-31 NOTE — PROGRESS NOTES
Physical Therapy    Facility/Department: [facility]  Treatment note    NAME: Isabel Prado. : 1955  MRN: [id number]    Date of Service: 3/31/2022               Patient Diagnosis(es): There were no encounter diagnoses. has a past medical history of Employs prosthetic leg, History of atrial fibrillation, [place] (hard of hearing), Hx of AKA (above knee amputation), left ([facility] [address] Hx of necrotizing fasciitis, Hypertension, Rectal bleeding, and Rectal cancer ([facility] [address] has a past surgical history that includes above knee amputation (); Colonoscopy (10/21/2011); Eye surgery (Left, 10/03/2016); eye surgery (Bilateral, ?); eye surgery (Right, ?); Colon surgery (2018); Abdomen surgery (2018); pr revision of ileostomy,complicated (N/A, ); Colonoscopy (N/A, 2022); Cardioversion (2020); and hemicolectomy (Right, 3/22/2022). Evaluating Therapist: Toyin Almeida PT     Referring Provider:  Kati Coffey MD    PT order : PT eval and treat     Room #:  411   DIAGNOSIS:  Benign neoplasm of cecum s/p L hemicolectomy 3/23/2022   Additional Pertinent History: L AKA   PRECAUTIONS:  Falls , splinting , [place]     Social:  Pt lives with  Mother and sister   in a  2  floor plan  With first floor set up , 3 steps with 1 HR to enter    Prior to admission pt walked with  QC and L prosthesis      Initial Evaluation  Date: 3/24/2022  Treatment  3/31/2022    Short Term/ Long Term   Goals   Was pt agreeable to Eval/treatment?  Yes  yes    Does pt have pain?  abd , incisional   No complaints    Bed Mobility  Rolling: SBA  Supine to sit: min assist   Sit to supine:  NT   Scooting:  SBA  Rolling: SBA  Supine ollie sit: SBA  Scooting: SBA seated to EOB  independent    Transfers Sit to stand:  CGA/min assist   Stand to sit:  SBA/CGA   Stand pivot:  Min assist  Sit <> stand: CGA  Stand Pivot: CGA  independent    Ambulation   NT  15 feet x 1 using WW for support CGA for balance  100  feet with  QC with  Independent        Stair negotiation: ascended and descended NT  NT  4  steps with  1  rail with  SBA    LE ROM  Grossly WFL      LE strength  4- to 4/ 5      AM- PAC RAW score  14/ 24  16/24            Pt is alert and able to follow instruction  Balance: fair minus dynamic using [employer] for support    Pt performed therapeutic exercise of the following: NT    Patient education  Pt was educated on UE usage to assist with transfers, gait promoting posture and staying within [employer] base of support    Patient response to education:   Pt verbalized understanding Pt demonstrated skill Pt requires further education in this area   yes With prompt for transfer safety yes     ASSESSMENT:   Comments: Pt found in bed, agreed to Rx, states willing to get OOB. Pt assisted self to EOB, sat EOB SBA, was able to jamey L LE AKA prosthesis without assistance. Pivot performed bed to chair, Pt states fatigued, needs to rest briefly. After this gait performed a short distance in the room, annette slow, inconsistent and laboring. Pt unsteady throughout, required light hands on assist for balance and safety. Pt short of breath, fatigued after activity. 02 saturation 91% after activity on room air. Noted L LE AKA prosthesis IR with sitting, Pt states this normal. Noted good alignment during gait performed. Treatment: Pt practiced and was instructed in the following treatment: transfer safety, gait mechanics. Pt was left upright in a recliner chair with call light in reach. Chair/bed alarm: chair alarm active    Time in 1030   Time out 1053   Total Treatment Time 23 minutes   CPT codes:     Therapeutic activities [id number] 23 minutes   Therapeutic exercises 02717 0 minutes       Pt is making fair progress toward established Physical Therapy goals. Continue with physical therapy current plan of care.     Leon Riddle PTA   License Number: [id number]

## 2022-03-31 NOTE — DISCHARGE INSTR - COC
Continuity of Care Form    Patient Name: Lynn Duong :  1955  MRN:  [id number]    Admit date:  3/22/2022  Discharge date:  22    Code Status Order: Full Code   Advance Directives:      Admitting Physician:  Jose Ortiz MD  PCP: Glen Woodall MD    Discharging Nurse: Ramon Garcia RN   [address] Unit/Room#: 9391/3104-W  Discharging Unit Phone Number: [phone]    Emergency Contact:   Extended Emergency Contact Information  Primary Emergency Contact: Wilma [place]  Address: [facility], [address] [facility] Mohini Burows of [address] Phone: [phone]  Mobile Phone: [phone]  Relation: Brother/Sister  Secondary Emergency Contact: Ace Adler  Address: [address] [facility], [address] [place] Burows of [address] Phone: [phone]  Mobile Phone: [phone]  Relation: Brother/Sister    Past Surgical History:  Past Surgical History:   Procedure Laterality Date    ABDOMEN SURGERY  2018    ileostomy open take down    ABOVE KNEE AMPUTATION      left; hx flesh eating bacteria    CARDIOVERSION  2020    COLON SURGERY  2018    laprascopic low anterior colon resection  take down splenic fexure   ileostomy    COLONOSCOPY  10/21/2011    COLONOSCOPY N/A 2022    COLONOSCOPY POLYPECTOMY SNARE/COLD BIOPSY performed by Jose Ortiz MD at [address] [facility] 63 Left 10/03/2016    pars plana vitrectomy, retinal detachment repair, laser gas/fluid exchange    EYE SURGERY Bilateral ?     bilat cataracts w lens implants    EYE SURGERY Right ?    retinal detachment    HEMICOLECTOMY Right 3/22/2022    LAPAROSCOPIC RIGHT HEMICOLECTOMY performed by Jose Ortiz MD at [address] N/A     ILEOSTOMY OPEN TAKEDOWN performed by Jose Ortiz MD at [facility] History:   Immunization History   Administered Date(s) Administered    COVID-19, Fany Romano, Primary or Immunocompromised, PF, 100mcg/0.5mL 04/02/2021, 05/03/2021    Influenza Virus Vaccine 10/05/2017       Active Problems:  Patient Active Problem List   Diagnosis Code    Retinal detachment of left eye with multiple breaks H33.022    Rectal cancer ([facility]) C20    Ileus ([facility] K56.7    Atrial flutter with rapid ventricular response ([facility]) I48.92    GI bleed K92.2    Gastrointestinal bleed K92.2    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    S/P right hemicolectomy Z90.49    PSVT (paroxysmal supraventricular tachycardia) ([facility]) I47.1    Primary hypertension I10       Isolation/Infection:   Isolation            No Isolation          Patient Infection Status       Infection Onset Added Last Indicated Last Indicated By Review Planned Expiration Resolved Resolved By    None active    Resolved    C-diff Rule Out 03/27/22 03/27/22 03/27/22 CLOSTRIDIUM DIFFICILE EIA (Ordered)   03/27/22 Rule-Out Test Resulted            Nurse Assessment:  Last Vital Signs: BP (!) 140/71   Pulse 78   Temp 97.9 °F (36.6 °C) (Oral)   Resp 18   Ht 6' 2\" (1.88 m)   Wt 192 lb 3.2 oz (87.2 kg)   SpO2 97%   BMI 24.68 kg/m²     Last documented pain score (0-10 scale): Pain Level: 0  Last Weight:   Wt Readings from Last 1 Encounters:   03/31/22 192 lb 3.2 oz (87.2 kg)     Mental Status:  oriented and alert    IV Access:  - None    Nursing Mobility/ADLs:  Walking   Assisted  Transfer  Assisted  Bathing  Assisted  Dressing  Assisted  Toileting  Assisted  Feeding  Assisted  Med Admin  Assisted  Med Delivery   whole    Wound Care Documentation and Therapy:  Incision 03/20/18 Abdomen (Active)   Number of days: 1472        Elimination:  Continence: Bowel: Yes  Bladder:  Urinary Catheter:  Insertion Date: 04/02/22  Colostomy/Ileostomy/Ileal Conduit: No       Date of Last BM: 04/04/22    Intake/Output Summary (Last 24 hours) at 3/31/2022 1348  Last data filed at 3/31/2022 1327  Gross per 24 hour   Intake 1901 ml   Output 650 ml   Net 1251 ml     I/O last 3 completed shifts: In: 3688.5 [P.O.:380; I.V.:3308.5]  Out: 1000 [Urine:1000]    Safety Concerns: At Risk for Falls    Impairments/Disabilities:      Hearing    Nutrition Therapy:  Current Nutrition Therapy:   - Oral Diet:  General    Routes of Feeding: Oral  Liquids: Thin Liquids  Daily Fluid Restriction: no  Last Modified Barium Swallow with Video (Video Swallowing Test): not done    Treatments at the Time of Hospital Discharge:   Respiratory Treatments: 0  Oxygen Therapy:  is not on home oxygen therapy. Ventilator:    - No ventilator support    Rehab Therapies: Physical Therapy and Occupational Therapy  Weight Bearing Status/Restrictions: No weight bearing restrictions  Other Medical Equipment (for information only, NOT a DME order):  Josefina Sandifer. Leg   Other Treatments: ***    Patient's personal belongings (please select all that are sent with patient):  Glasses, Hearing Aides bilateral    RN SIGNATURE: Kelly De Santiago RN    CASE MANAGEMENT/SOCIAL WORK SECTION    Inpatient Status Date: 3/22/2022    Readmission Risk Assessment Score:  Readmission Risk              Risk of Unplanned Readmission:  21           Discharging to Facility/ Agency   Name: Madeline Crain [address] [place] [address]  Phone:[phone]  Fax:[phone]    Dialysis Facility (if applicable)   Name:  Address:  Dialysis Schedule:  Phone:  Fax:    / signature: Electronically signed by TAMI Newsome on 3/31/2022 at 1:49 PM      PHYSICIAN SECTION    Prognosis: Good    Condition at Discharge: Stable    Rehab Potential (if transferring to Rehab): Good    Recommended Labs or Other Treatments After Discharge: local wound care    Physician Certification: I certify the above information and transfer of Jeff Goel  is necessary for the continuing treatment of the diagnosis listed and that he requires skilled snf for less 30 days.      Update Admission H&P: No change in H&P    PHYSICIAN SIGNATURE:  Electronically signed by Danyell Valentine DO on 4/5/22 at 10:15 AM EDTElectronically signed by Glen Woodall MD on 4/5/2022 at 7:56 AM

## 2022-03-31 NOTE — PROGRESS NOTES
Department of Internal Eduin Kendrick M.D. Progress Note      SUBJECTIVE: He cannot remember he was nauseated last night at dinner.   Otherwise he was encouraged to get out of bed today    OBJECTIVE erythroderma he is desquamating now  Right leg is not swollen  Abdomen is soft and nontender    Medications    Current Facility-Administered Medications: 0.9 % sodium chloride bolus, 1,000 mL, IntraVENous, Once  Hydrocerin cream CREA, , Topical, BID  0.9 % sodium chloride infusion, , IntraVENous, Continuous  sodium chloride flush 0.9 % injection 5-40 mL, 5-40 mL, IntraVENous, 2 times per day  sodium chloride flush 0.9 % injection 5-40 mL, 5-40 mL, IntraVENous, PRN  0.9 % sodium chloride infusion, 25 mL, IntraVENous, PRN  [Held by provider] digoxin (LANOXIN) tablet 250 mcg, 250 mcg, Oral, Daily  apixaban (ELIQUIS) tablet 5 mg, 5 mg, Oral, BID  trimethobenzamide (TIGAN) injection 200 mg, 200 mg, IntraMUSCular, Q6H PRN  white petrolatum ointment, , Topical, BID PRN  lactated ringers bolus, 500 mL, IntraVENous, Once  [Held by provider] dilTIAZem (CARDIZEM) tablet 60 mg, 60 mg, Oral, 3 times per day  metoprolol tartrate (LOPRESSOR) tablet 100 mg, 100 mg, Oral, BID  sodium chloride flush 0.9 % injection 5-40 mL, 5-40 mL, IntraVENous, 2 times per day  sodium chloride flush 0.9 % injection 5-40 mL, 5-40 mL, IntraVENous, PRN  0.9 % sodium chloride infusion, 25 mL, IntraVENous, PRN  acetaminophen (TYLENOL) tablet 650 mg, 650 mg, Oral, Q6H  Physical    VITALS:  /71   Pulse 74   Temp 97.8 °F (36.6 °C) (Oral)   Resp 16   Ht 6' 2\" (1.88 m)   Wt 192 lb 3.2 oz (87.2 kg)   SpO2 97%   BMI 24.68 kg/m²   24HR INTAKE/OUTPUT:    Intake/Output Summary (Last 24 hours) at 3/31/2022 0727  Last data filed at 3/30/2022 1832  Gross per 24 hour   Intake 3688.52 ml   Output 450 ml   Net 3238.52 ml     LUNGS: Decreased breath sounds in the bases  CARDIOVASCULAR: S1-S2 irregular rhythm  Data    CBC with Differential:    Lab Results   Component Value Date    WBC 17.5 03/30/2022    RBC 3.83 03/30/2022    HGB 10.3 03/30/2022    HCT 34.5 03/30/2022     03/30/2022    MCV 90.1 03/30/2022    MCH 26.9 03/30/2022    MCHC 29.9 03/30/2022    RDW 20.9 03/30/2022    NRBC 0.0 03/30/2022    SEGSPCT 47 03/18/2011    METASPCT 0.9 03/30/2022    LYMPHOPCT 18.1 03/30/2022    PROMYELOPCT 1.8 03/28/2022    MONOPCT 6.0 03/30/2022    MYELOPCT 1.8 03/28/2022    BASOPCT 0.0 03/30/2022    MONOSABS 1.05 03/30/2022    LYMPHSABS 3.15 03/30/2022    EOSABS 0.46 03/30/2022    BASOSABS 0.00 03/30/2022     CMP:    Lab Results   Component Value Date     03/30/2022    K 4.2 03/30/2022    K 4.1 03/24/2022     03/30/2022    CO2 24 03/30/2022    BUN 31 03/30/2022    CREATININE 1.3 03/30/2022    GFRAA >60 03/30/2022    LABGLOM 55 03/30/2022    GLUCOSE 113 03/30/2022    GLUCOSE 106 03/24/2011    PROT 5.4 03/30/2022    LABALBU 2.7 03/30/2022    LABALBU 2.4 03/20/2011    CALCIUM 8.0 03/30/2022    BILITOT 1.8 03/30/2022    ALKPHOS 131 03/30/2022    AST 50 03/30/2022    ALT 30 03/30/2022     PT/INR:    Lab Results   Component Value Date    PROTIME 12.6 01/24/2022    PROTIME 12.2 03/18/2011    INR 1.1 01/24/2022       ASSESSMENT AND PLAN      Principal Problem:  Tubulovillous adenoma of the colon removed  Atrial flutter converted to sinus rhythm with premature atrial contractions  Gastritis  Chemical dermatitis  Renal insufficiency resolved  Mild malnutrition  Cognitive dysfunction  Plan need to mobilize him and evaluate oral intake today        Electronically signed by Wendy Corona MD on 3/31/2022 at 7:27 AM

## 2022-03-31 NOTE — PROGRESS NOTES
[address] [place] [address] FOLLOW-UP    Name: Paulino Tariq. Age: 74 y.o. Date of Admission: 3/22/2022  5:36 AM    Date of Service: 3/31/2022    Chief Complaint: Follow-up for SVT  Interim History:  No new overnight cardiac complaints. Currently with no complaints of CP, SOB, palpitations, dizziness, or lightheadedness. Has extensive erythematous rash predominantly over the extremities without fever or chills. Patient underwent successful ОЛЬГА guided cardioversion on 3/30/2022 and remains in sinus rhythm. No bleeding complications. Normal sinus rhythm heart rate in the 70s on telemetry. 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    Rectal cancer ([address]    Ileus ([address]    Atrial flutter with rapid ventricular response ([facility])    GI bleed    Gastrointestinal bleed    Benign neoplasm of cecum    S/P right hemicolectomy    PSVT (paroxysmal supraventricular tachycardia) ([address]    Primary hypertension       Allergies:   Allergies   Allergen Reactions    Cefepime Rash    Clindamycin/Lincomycin Rash    Pcn [Penicillins] Rash       Current Medications:  Current Facility-Administered Medications   Medication Dose Route Frequency Provider Last Rate Last Admin    calcium carbonate (TUMS) chewable tablet 500 mg  500 mg Oral Q6H PRN Osiris Lopez MD   500 mg at 03/31/22 1052    pantoprazole (PROTONIX) tablet 40 mg  40 mg Oral QAM AC Osiris Lopez MD   40 mg at 03/31/22 1052    0.9 % sodium chloride bolus  1,000 mL IntraVENous Once Vani Sterling MD        Hydrocerin cream CREA   Topical BID William Abbott Minal Godinez MD   Given at 03/31/22 0900    sodium chloride flush 0.9 % injection 5-40 mL  5-40 mL IntraVENous 2 times per day Merry Forrester MD   10 mL at 03/30/22 2123    sodium chloride flush 0.9 % injection 5-40 mL  5-40 mL IntraVENous PRN Merry Forrester MD   10 mL at 03/29/22 1814    0.9 % sodium chloride infusion  25 mL IntraVENous PRN Merry Forrester MD        [Held by provider] digoxin (LANOXIN) tablet 250 mcg  250 mcg Oral Daily Attila Barcenas MD   250 mcg at 03/28/22 1673    apixaban (ELIQUIS) tablet 5 mg  5 mg Oral BID Merry Forrester MD   5 mg at 03/31/22 7223    trimethobenzamide (TIGAN) injection 200 mg  200 mg IntraMUSCular Q6H PRN Merry Forrester MD   200 mg at 03/30/22 1257    white petrolatum ointment   Topical BID PRN Maximiliano Brady MD        lactated ringers bolus  500 mL IntraVENous Once Divine Garza MD   Held at 03/27/22 8881    [Held by provider] dilTIAZem (CARDIZEM) tablet 60 mg  60 mg Oral 3 times per day Elicia Macias. Adander APN   60 mg at 03/28/22 0550    metoprolol tartrate (LOPRESSOR) tablet 100 mg  100 mg Oral BID Elicia Macias.  Ginder, APN   100 mg at 03/31/22 0841    sodium chloride flush 0.9 % injection 5-40 mL  5-40 mL IntraVENous 2 times per day Sita Treadwell MD   10 mL at 03/31/22 0900    sodium chloride flush 0.9 % injection 5-40 mL  5-40 mL IntraVENous PRN Sita Treadwell MD   10 mL at 03/28/22 1547    0.9 % sodium chloride infusion  25 mL IntraVENous PRN Sita Treadwell MD        acetaminophen (TYLENOL) tablet 650 mg  650 mg Oral Q6H Sita Treadwell MD   650 mg at 03/31/22 0841      sodium chloride      sodium chloride         Physical Exam:  BP (!) 144/77   Pulse 78   Temp 97.5 °F (36.4 °C) (Oral)   Resp 18   Ht 6' 2\" (1.88 m)   Wt 192 lb 3.2 oz (87.2 kg)   SpO2 98%   BMI 24.68 kg/m²   Wt Readings from Last 3 Encounters:   03/31/22 192 lb 3.2 oz (87.2 kg)   03/15/22 190 lb (86.2 kg)   02/14/22 187 lb (84.8 kg)     Appearance: Awake, alert, no acute respiratory distress  Skin: Intact, no rash  Head: Normocephalic, atraumatic  Eyes: EOMI, no conjunctival erythema  ENMT: No pharyngeal erythema, MMM, no rhinorrhea  Neck: Supple, no elevated JVP, no carotid bruits  Lungs: Clear to auscultation bilaterally. No wheezes, rales, or rhonchi. Cardiac: Regular rate and rhythm, tachycardic +S1S2, no murmurs apparent  Abdomen: Soft, nontender, +bowel sounds  Extremities: Moves all extremities x 4, no lower extremity edema  Neurologic: No focal motor deficits apparent, normal mood and affect  Peripheral Pulses: Intact posterior tibial pulses bilaterally    Intake/Output:    Intake/Output Summary (Last 24 hours) at 3/31/2022 1515  Last data filed at 3/31/2022 1327  Gross per 24 hour   Intake 1530 ml   Output 450 ml   Net 1080 ml     I/O this shift: In: 418 [P.O.:100;  I.V.:895]  Out: 300 [Urine:300]    Laboratory Tests:  Recent Labs     03/29/22 0303 03/30/22 0333 03/31/22  0739    142 142   K 3.8 4.2 3.8   * 108* 109*   CO2 25 24 22   BUN 34* 31* 24*   CREATININE 1.4* 1.3* 1.2   GLUCOSE 117* 113* 98   CALCIUM 7.9* 8.0* 7.5*     Lab Results   Component Value Date    MG 2.0 03/30/2022     Recent Labs     03/29/22 0303 03/30/22 0333 03/31/22  0739   ALKPHOS 101 131* 105   ALT 15 30 39   AST 21 50* 43*   PROT 5.0* 5.4* 5.0*   BILITOT 1.4* 1.8* 1.4*   LABALBU 2.7* 2.7* 2.8*     Recent Labs     03/29/22 0303 03/30/22 0333 03/31/22  0739   WBC 17.3* 17.5* 13.8*   RBC 3.55* 3.83 3.75*   HGB 9.2* 10.3* 10.3*   HCT 31.7* 34.5* 34.4*   MCV 89.3 90.1 91.7   MCH 25.9* 26.9 27.5   MCHC 29.0* 29.9* 29.9*   RDW 18.8* 20.9* 22.2*    225 227   MPV 10.3 9.8 10.2     Lab Results   Component Value Date    CKTOTAL 12 (L) 03/18/2011    CKMB <0.2 03/18/2011    TROPONINI <0.01 12/19/2020    TROPONINI <0.01 03/18/2011    TROPONINI 0.02 01/03/2011     Lab Results   Component Value Date    INR 1.1 01/24/2022    INR 1.0 12/19/2020    INR 1.2 03/18/2011    PROTIME 12.6 (H) 01/24/2022    PROTIME 11.7 12/19/2020    PROTIME 12.2 03/18/2011     Lab Results   Component Value Date    TSH 1.050 03/25/2022     Lab Results   Component Value Date    LABA1C 5.6 12/20/2020     No results found for: EAG  Lab Results   Component Value Date    CHOL 114 12/20/2020     Lab Results   Component Value Date    TRIG 76 12/20/2020     Lab Results   Component Value Date    HDL 41 12/20/2020     Lab Results   Component Value Date    LDLCALC 58 12/20/2020     Lab Results   Component Value Date    LABVLDL 15 12/20/2020     No results found for: CHOLHDLRATIO    Cardiac Tests:  Telemetry findings reviewed: Normal sinus rhythm with frequent premature atrial complexes with heart rate in the 70s. EKG: Aflutter with 2:1 conduction, NSST, abnormal EKG. Labs and vitals were reviewed: Blood pressure 144/77 heart rate 78 sats 98%. Labs/BUN creat 46/2.0>>40/1.6>> 34/1.4>> 24/1.2, H/H 8/26.8>>9.4/30.8>> 9.2/31.7,  WBC 19.3>> 17.3    1. Pharmacological stress test: 1/2/2011: LVEF 54%, with no evidence of stress-induced ischemia. 2. TTE: 12/20/2020 (Dr. Rafi Mchugh), LVEF 55-60%, no wall motion abnormality, mildly dilated right ventricle, trace MR, trace pericardial effusion, TAPSE 21 mm. Assessment:  · PAF and now with persistent AF/flutter with RVR, underwent successful ОЛЬГА guided cardioversion on 3/30/2022, and remains in sinus rhythm. · [id number] Vasc score-2  · Status post hemicolectomy for cecal malignancy  · History of hypertension now blood pressure is soft>> improved chronic blood pressure 127/87  · COURTNEY creatinine 1.6>> 1.4>>1.3>> 1.2, resolved  · Left lower extremity above-knee amputation  · Hard of hearing  · Hypokalemia, improved. · Generalized erythematous rash, etiology unclear, drug rash or infection. Discussed with Dr. Tomy Hines and he thinks it is mostly from contact dermatitis from laundry detergent.   · Hemodynamically stable, /77  · Mild anemia, Hb 10.3>> 10.3        Plan:   · Patient remains in sinus rhythm, continue metoprolol 100 mg p.o. twice daily  · Continue Eliquis 5 mg p.o. twice daily for anticoagulation  · Rest as per primary service. · He is stable from the cardiology standpoint, will sign off, please call if having further questions or concerns. · Follow-up with cardiology in 1 month. Gregor Ahn MD., Rolando Villasenor.   [facility]) Cardiology

## 2022-03-31 NOTE — CARE COORDINATION
3/31/2022  Social Work Discharge Planning:Pt is from home with his mother and sister;however, they would like Pt to go to [facility] if appropriate. Will need therapy evals for referral to be made. If Pt goes to [address] no precert will be needed-click six. [facility] is currently  following and order is in,. Notify them if Pt goes to a [facility]. Electronically signed by TAMI Wing on 3/31/2022 at 9:28 AM       3/31/2022  Social Work Discharge Planning:AMPAC is 16/24. Referral was made to [facility]. Waiting reply. Electronically signed by TAMI Wing on 3/31/2022 at 1:18 PM    3/31/2022  Social Work Discharge Planning:[facility] able to accept Pt . Still need OT eval. Because of AMPAC score Pt will need a precert. They will initiate after OT eval. Will need a COVID test on day of discharge. N-17 generated, 7000 and transport form is done. Electronically signed by TAMI Wing on 3/31/2022 at 1:47 PM

## 2022-03-31 NOTE — PROGRESS NOTES
GENERAL SURGERY  DAILY PROGRESS NOTE  3/31/2022        Subjective:  Patient with no acute issues this morning. No acute issues overnight. Patient tolerating diet with no abdominal pain. Having bowel movements. No nausea no vomiting.     Objective:  BP (!) 144/77   Pulse 78   Temp 97.5 °F (36.4 °C) (Oral)   Resp 18   Ht 6' 2\" (1.88 m)   Wt 192 lb 3.2 oz (87.2 kg)   SpO2 98%   BMI 24.68 kg/m²     GENERAL:  Laying in bed, awake, alert, cooperative, no apparent distress  LUNGS:  No increased work of breathing  CARDIOVASCULAR:    ABDOMEN:  Soft, non-tender, non-distended incisions are dry clean and intact        Assessment/Plan:  74 y.o. male S/p laparoscopic right hemicolectomy for tubulovillous adenoma on 3/22    Patient is doing well  Continue diet as tolerated  Okay to continue Eliquis  PT/OT,ambulate   Plan is discharge Home today versus [facility] placement pending     Electronically signed by Anoop Diaz DO on 3/31/2022 at 7:05 PM

## 2022-03-31 NOTE — PROGRESS NOTES
[facility] MD Eduardo Muñoz MD Lendon Canning, MD Lindsey Ramirez, MD Alvarado Sood, CNP   Chhaya Albarado, CHARMAINE  Progress Note    3/31/2022    SUBJECTIVE:   3/25: Since early afternoon. Feeling better. Denies abdominal pain though still distended. Had a BM, and distention has markedly improved since then. Denies dyspnea at rest on nasal cannula. Has developed lower extremity and dependent swelling denies cp/palp. Diet has been advanced, and he has anorexia, though has so far tolerated some food  3/26: \"Sleepy. \"  Denies dyspnea though still on nasal cannula. Ate breakfast today without event. Swelling improved compared to yesterday  3/27: Feeling better today. Notes that he is eating better. Hypotensive last evening received LR bolus. On NS drip again. Diuretics on hold. Still has the rash, though he still not aware of it  3/28: Denies complaint. Feeling better yesterday. Appetite has improved. Developed atrial fibrillation/flutter heart rates in the 120s received digoxin, now on amio drip  3/29: Feeling little better. Ongoing poor intake of food and fluid. Remains tachycardic. No pain. No swelling. ROS otherwise unremarkable. 3/30: Feeling better. Denies complaint. Status post ОЛЬГА/DC cardioversion, currently in NSR  3/31: No new complaint or issue. Darlynn Magic Appetite good. No dyspnea. PROBLEM LIST:    Principal Problem:    Benign neoplasm of cecum  Active Problems:    S/P right hemicolectomy    PSVT (paroxysmal supraventricular tachycardia) (HCC)    Primary hypertension  Resolved Problems:    * No resolved hospital problems. *         DIET:    ADULT DIET;  Regular; Low Fiber     MEDS (scheduled):    pantoprazole  40 mg Oral QAM AC    sodium chloride  1,000 mL IntraVENous Once    Hydrocerin   Topical BID    sodium chloride flush  5-40 mL IntraVENous 2 times per day    [Held by provider] digoxin  250 mcg Oral Daily    apixaban  5 mg Oral BID    lactated ringers bolus  500 mL IntraVENous Once    [Held by provider] dilTIAZem  60 mg Oral 3 times per day    metoprolol tartrate  100 mg Oral BID    sodium chloride flush  5-40 mL IntraVENous 2 times per day    acetaminophen  650 mg Oral Q6H       MEDS (infusions):   sodium chloride 75 mL/hr at 03/30/22 1832    sodium chloride      sodium chloride         MEDS (prn):  calcium carbonate, sodium chloride flush, sodium chloride, trimethobenzamide, white petrolatum, sodium chloride flush, sodium chloride    PHYSICAL EXAM:     Patient Vitals for the past 24 hrs:   BP Temp Temp src Pulse Resp SpO2 Weight   03/31/22 0745 (!) 140/71 97.9 °F (36.6 °C) Oral 78 18 97 % --   03/31/22 0549 -- -- -- -- -- -- 192 lb 3.2 oz (87.2 kg)   03/31/22 0315 133/71 97.8 °F (36.6 °C) Oral 74 16 97 % --   03/30/22 1930 (!) 124/58 98.2 °F (36.8 °C) Oral 89 16 95 % --   03/30/22 1530 (!) 150/64 97.3 °F (36.3 °C) Oral 77 18 99 % --   @      Intake/Output Summary (Last 24 hours) at 3/31/2022 1317  Last data filed at 3/31/2022 0837  Gross per 24 hour   Intake 1496 ml   Output 650 ml   Net 846 ml         Wt Readings from Last 3 Encounters:   03/31/22 192 lb 3.2 oz (87.2 kg)   03/15/22 190 lb (86.2 kg)   02/14/22 187 lb (84.8 kg)       Constitutional:  in no acute distress  HEENT: NC/AT, EOMI, sclera and conjunctiva are clear and anicteric, mucus membranes moist  Neck: Trachea midline, no JVD  Cardiovascular: S1, S2 regular rhythm, no murmur,or rub  Respiratory: Crackles at bases improved since yesterday  Gastrointestinal:  Soft, diffusely mildly tender, mildly distended with tympany, no guarding or referred pain, NABS  Ext: 1/2+ dependent and distal lower extremity edema, feet warm  Skin: dry, no rash  Neuro: awake, alert, interactive moves all 4 extremities      DATA:    Recent Labs     03/29/22  0303 03/30/22  0333 03/31/22  0739   WBC 17.3* 17.5* 13.8*   HGB 9.2* 10.3* 10.3*   HCT 31.7* 34.5* 34.4*   MCV 89.3 90.1 91.7    225 227 Recent Labs     03/29/22  0303 03/30/22  0333 03/31/22  0739    142 142   K 3.8 4.2 3.8   * 108* 109*   CO2 25 24 22   MG 1.9 2.0  --    PHOS 2.4* 2.6  --    BUN 34* 31* 24*   CREATININE 1.4* 1.3* 1.2   ALT 15 30 39   AST 21 50* 43*   BILITOT 1.4* 1.8* 1.4*   ALKPHOS 101 131* 105       Lab Results   Component Value Date    LABPROT 0.1 03/23/2022    LABPROT 0.1 03/23/2022    LABPROT 0.3 (H) 03/23/2022    LABPROT 0.3 03/23/2022       ASSESSMENT     74 y.o. 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 Postoperatively tachycardic, hypotensive treated with IV bolus.     1. Acute kidney injury, hemodynamically mediated, least in part secondary to volume contraction, anemia, increase insensible losses; the final common pathway was decreased effective circulating volume. Status post IV fluid boluses, increase in IV fluid rate, azotemia is improving, as is his urine output.     2. Anemia, severe, normocytic but MCV is very low. Severely iron deficient    3. Hypophosphatemia, status post supplementation yesterday orally. Normalized with supplement    4. Rash looks like a drug rash. May be due to the iron. Could be due to antimicrobial therapy. Improved since yesterday.     Edema and pulmonary edema improved  Azotemia improving. The transient increase in azotemia was due to hypotension, intravascular volume contraction, A. fib with RVR.   Given the rash, consider AIN, though I doubt it  The rash has almost resolved    RECOMMENDATIONS  Stop IV fluid  Encourage oral intake as able  Continue supportive care  Follow labs, UO  okay for discharge from renal standpoint    Electronically signed by Gurjit Medrano MD on 3/31/2022

## 2022-04-01 ENCOUNTER — APPOINTMENT (OUTPATIENT)
Dept: GENERAL RADIOLOGY | Age: 67
DRG: 330 | End: 2022-04-01
Attending: SURGERY
Payer: MEDICARE

## 2022-04-01 ENCOUNTER — APPOINTMENT (OUTPATIENT)
Dept: CT IMAGING | Age: 67
DRG: 330 | End: 2022-04-01
Attending: SURGERY
Payer: MEDICARE

## 2022-04-01 LAB
ALBUMIN SERPL-MCNC: 2.9 G/DL (ref 3.5–5.2)
ALP BLD-CCNC: 101 U/L (ref 40–129)
ALT SERPL-CCNC: 30 U/L (ref 0–40)
ANION GAP SERPL CALCULATED.3IONS-SCNC: 12 MMOL/L (ref 7–16)
ANISOCYTOSIS: ABNORMAL
AST SERPL-CCNC: 30 U/L (ref 0–39)
BASOPHILS ABSOLUTE: 0.03 E9/L (ref 0–0.2)
BASOPHILS RELATIVE PERCENT: 0.3 % (ref 0–2)
BILIRUB SERPL-MCNC: 1.3 MG/DL (ref 0–1.2)
BUN BLDV-MCNC: 22 MG/DL (ref 6–23)
CALCIUM SERPL-MCNC: 8.4 MG/DL (ref 8.6–10.2)
CHLORIDE BLD-SCNC: 108 MMOL/L (ref 98–107)
CO2: 23 MMOL/L (ref 22–29)
CREAT SERPL-MCNC: 1.4 MG/DL (ref 0.7–1.2)
EKG ATRIAL RATE: 91 BPM
EKG P AXIS: 43 DEGREES
EKG P-R INTERVAL: 188 MS
EKG Q-T INTERVAL: 384 MS
EKG QRS DURATION: 86 MS
EKG QTC CALCULATION (BAZETT): 472 MS
EKG R AXIS: 38 DEGREES
EKG T AXIS: 37 DEGREES
EKG VENTRICULAR RATE: 91 BPM
EOSINOPHILS ABSOLUTE: 0 E9/L (ref 0.05–0.5)
EOSINOPHILS RELATIVE PERCENT: 0 % (ref 0–6)
GFR AFRICAN AMERICAN: >60
GFR NON-AFRICAN AMERICAN: 50 ML/MIN/1.73
GLUCOSE BLD-MCNC: 106 MG/DL (ref 74–99)
HCT VFR BLD CALC: 33.9 % (ref 37–54)
HEMOGLOBIN: 10 G/DL (ref 12.5–16.5)
IMMATURE GRANULOCYTES #: 0.34 E9/L
IMMATURE GRANULOCYTES %: 3 % (ref 0–5)
LYMPHOCYTES ABSOLUTE: 1.84 E9/L (ref 1.5–4)
LYMPHOCYTES RELATIVE PERCENT: 16.2 % (ref 20–42)
MAGNESIUM: 2.2 MG/DL (ref 1.6–2.6)
MCH RBC QN AUTO: 26.8 PG (ref 26–35)
MCHC RBC AUTO-ENTMCNC: 29.5 % (ref 32–34.5)
MCV RBC AUTO: 90.9 FL (ref 80–99.9)
MONOCYTES ABSOLUTE: 0.59 E9/L (ref 0.1–0.95)
MONOCYTES RELATIVE PERCENT: 5.2 % (ref 2–12)
NEUTROPHILS ABSOLUTE: 8.59 E9/L (ref 1.8–7.3)
NEUTROPHILS RELATIVE PERCENT: 75.3 % (ref 43–80)
PDW BLD-RTO: 22.8 FL (ref 11.5–15)
PHOSPHORUS: 3.1 MG/DL (ref 2.5–4.5)
PLATELET # BLD: 250 E9/L (ref 130–450)
PMV BLD AUTO: 10 FL (ref 7–12)
POLYCHROMASIA: ABNORMAL
POTASSIUM SERPL-SCNC: 3.6 MMOL/L (ref 3.5–5)
RBC # BLD: 3.73 E12/L (ref 3.8–5.8)
SODIUM BLD-SCNC: 143 MMOL/L (ref 132–146)
TOTAL PROTEIN: 6 G/DL (ref 6.4–8.3)
WBC # BLD: 11.4 E9/L (ref 4.5–11.5)

## 2022-04-01 PROCEDURE — 6360000004 HC RX CONTRAST MEDICATION: Performed by: RADIOLOGY

## 2022-04-01 PROCEDURE — 93005 ELECTROCARDIOGRAM TRACING: CPT | Performed by: INTERNAL MEDICINE

## 2022-04-01 PROCEDURE — 6360000002 HC RX W HCPCS: Performed by: INTERNAL MEDICINE

## 2022-04-01 PROCEDURE — 93010 ELECTROCARDIOGRAM REPORT: CPT | Performed by: INTERNAL MEDICINE

## 2022-04-01 PROCEDURE — 85025 COMPLETE CBC W/AUTO DIFF WBC: CPT

## 2022-04-01 PROCEDURE — 99233 SBSQ HOSP IP/OBS HIGH 50: CPT | Performed by: INTERNAL MEDICINE

## 2022-04-01 PROCEDURE — 6370000000 HC RX 637 (ALT 250 FOR IP): Performed by: NURSE PRACTITIONER

## 2022-04-01 PROCEDURE — 74178 CT ABD&PLV WO CNTR FLWD CNTR: CPT

## 2022-04-01 PROCEDURE — 74018 RADEX ABDOMEN 1 VIEW: CPT

## 2022-04-01 PROCEDURE — 83735 ASSAY OF MAGNESIUM: CPT

## 2022-04-01 PROCEDURE — 6370000000 HC RX 637 (ALT 250 FOR IP): Performed by: INTERNAL MEDICINE

## 2022-04-01 PROCEDURE — 2580000003 HC RX 258: Performed by: INTERNAL MEDICINE

## 2022-04-01 PROCEDURE — 6370000000 HC RX 637 (ALT 250 FOR IP): Performed by: SURGERY

## 2022-04-01 PROCEDURE — 80053 COMPREHEN METABOLIC PANEL: CPT

## 2022-04-01 PROCEDURE — 2060000000 HC ICU INTERMEDIATE R&B

## 2022-04-01 PROCEDURE — 84100 ASSAY OF PHOSPHORUS: CPT

## 2022-04-01 PROCEDURE — 36415 COLL VENOUS BLD VENIPUNCTURE: CPT

## 2022-04-01 RX ORDER — AMIODARONE HYDROCHLORIDE 200 MG/1
400 TABLET ORAL 2 TIMES DAILY
Status: DISCONTINUED | OUTPATIENT
Start: 2022-04-01 | End: 2022-04-05 | Stop reason: HOSPADM

## 2022-04-01 RX ORDER — AMIODARONE HYDROCHLORIDE 200 MG/1
200 TABLET ORAL DAILY
Status: DISCONTINUED | OUTPATIENT
Start: 2022-04-11 | End: 2022-04-05 | Stop reason: HOSPADM

## 2022-04-01 RX ORDER — SODIUM CHLORIDE 9 MG/ML
INJECTION, SOLUTION INTRAVENOUS CONTINUOUS
Status: DISCONTINUED | OUTPATIENT
Start: 2022-04-01 | End: 2022-04-02

## 2022-04-01 RX ADMIN — Medication 10 ML: at 10:00

## 2022-04-01 RX ADMIN — Medication: at 09:59

## 2022-04-01 RX ADMIN — PETROLATUM: 420 OINTMENT TOPICAL at 09:59

## 2022-04-01 RX ADMIN — SODIUM CHLORIDE, PRESERVATIVE FREE 10 ML: 5 INJECTION INTRAVENOUS at 10:00

## 2022-04-01 RX ADMIN — Medication: at 21:39

## 2022-04-01 RX ADMIN — PETROLATUM: 420 OINTMENT TOPICAL at 21:40

## 2022-04-01 RX ADMIN — METOPROLOL TARTRATE 100 MG: 50 TABLET, FILM COATED ORAL at 09:57

## 2022-04-01 RX ADMIN — Medication 10 ML: at 21:40

## 2022-04-01 RX ADMIN — DEXTROSE MONOHYDRATE 150 MG: 50 INJECTION, SOLUTION INTRAVENOUS at 11:37

## 2022-04-01 RX ADMIN — TRIMETHOBENZAMIDE HYDROCHLORIDE 200 MG: 100 INJECTION INTRAMUSCULAR at 06:11

## 2022-04-01 RX ADMIN — METOPROLOL TARTRATE 100 MG: 50 TABLET, FILM COATED ORAL at 21:39

## 2022-04-01 RX ADMIN — AMIODARONE HYDROCHLORIDE 400 MG: 200 TABLET ORAL at 11:54

## 2022-04-01 RX ADMIN — ACETAMINOPHEN 650 MG: 325 TABLET ORAL at 04:20

## 2022-04-01 RX ADMIN — APIXABAN 5 MG: 5 TABLET, FILM COATED ORAL at 09:57

## 2022-04-01 RX ADMIN — DIATRIZOATE MEGLUMINE AND DIATRIZOATE SODIUM 120 ML: 660; 100 LIQUID ORAL; RECTAL at 20:41

## 2022-04-01 RX ADMIN — SODIUM CHLORIDE: 9 INJECTION, SOLUTION INTRAVENOUS at 13:10

## 2022-04-01 RX ADMIN — IOPAMIDOL 75 ML: 755 INJECTION, SOLUTION INTRAVENOUS at 20:41

## 2022-04-01 RX ADMIN — AMIODARONE HYDROCHLORIDE 400 MG: 200 TABLET ORAL at 21:39

## 2022-04-01 ASSESSMENT — PAIN SCALES - GENERAL
PAINLEVEL_OUTOF10: 0

## 2022-04-01 NOTE — PROGRESS NOTES
GENERAL SURGERY  DAILY PROGRESS NOTE  4/1/2022        Subjective:  Patient with no acute issues this morning. Complaining of some substernal burning pain however states he has been tolerating his diet and having bowel movements along with passing gas.   Nontender to palpation in the area    Objective:  BP (!) 153/71   Pulse 99   Temp 97.7 °F (36.5 °C) (Oral)   Resp 18   Ht 6' 2\" (1.88 m)   Wt 192 lb 8 oz (87.3 kg)   SpO2 94%   BMI 24.72 kg/m²     GENERAL:  Laying in bed, awake, alert, cooperative, no apparent distress  LUNGS:  No increased work of breathing  CARDIOVASCULAR:    ABDOMEN:  Soft, non-tender, non-distended incisions are dry clean and intact      Assessment/Plan:  74 y.o. male S/p laparoscopic right hemicolectomy for tubulovillous adenoma on 3/22    Patient is doing well  Continue diet as tolerated  Okay to continue Eliquis  PT/OT,ambulate   ppi  Plan is discharge Home today versus [facility]-- placement pending     Electronically signed by Elvia Arrington DO on 4/1/2022 at 10:16 AM

## 2022-04-01 NOTE — PROGRESS NOTES
[address] [place] [address] FOLLOW-UP    Name: Stefanie Soto. Age: 74 y.o. Date of Admission: 3/22/2022  5:36 AM    Date of Service: 4/1/2022    Chief Complaint: Follow-up for AF  Interim History:  No new overnight cardiac complaints. Currently with no complaints of CP, SOB, palpitations, dizziness, or lightheadedness. Has extensive erythematous rash predominantly over the extremities without fever or chills. Patient underwent successful ОЛЬГА guided cardioversion on 3/30/2022 and remains in sinus rhythm. No bleeding complications. Since last night, he has been going in and out of AF. Today, he is more nauseated but able to take medications. Has difficulty voiding. PAF on telemetry. 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    Rectal cancer ([address]    Ileus ([address]    Atrial flutter with rapid ventricular response (HCC)    GI bleed    Gastrointestinal bleed    Benign neoplasm of cecum    S/P right hemicolectomy    PSVT (paroxysmal supraventricular tachycardia) ([address]    Primary hypertension       Allergies:   Allergies   Allergen Reactions    Cefepime Rash    Clindamycin/Lincomycin Rash    Pcn [Penicillins] Rash       Current Medications:  Current Facility-Administered Medications   Medication Dose Route Frequency Provider Last Rate Last Admin    calcium carbonate (TUMS) chewable tablet 500 mg  500 mg Oral Q6H PRN Wendy Corona MD   500 mg at 03/31/22 1609    pantoprazole (PROTONIX) tablet 40 mg  40 mg Oral QAM AC Wendy Corona MD   40 mg at 03/31/22 1052    white petrolatum ointment Topical BID Zohra García MD   Given at 04/01/22 0959    0.9 % sodium chloride bolus  1,000 mL IntraVENous Once Ginna Kaur MD        Hydrocerin cream CREA   Topical BID Cayetano Joseph MD   Given at 04/01/22 1632    sodium chloride flush 0.9 % injection 5-40 mL  5-40 mL IntraVENous 2 times per day Ginna Kaur MD   10 mL at 04/01/22 1000    sodium chloride flush 0.9 % injection 5-40 mL  5-40 mL IntraVENous PRN Ginna Kaur MD   10 mL at 04/01/22 1000    0.9 % sodium chloride infusion  25 mL IntraVENous PRN Ginna Kaur MD        apixaban [facility]) tablet 5 mg  5 mg Oral BID Ginna Kaur MD   5 mg at 04/01/22 0957    trimethobenzamide (TIGAN) injection 200 mg  200 mg IntraMUSCular Q6H PRN Ginna Kaur MD   200 mg at 04/01/22 0611    metoprolol tartrate (LOPRESSOR) tablet 100 mg  100 mg Oral BID FAVIO Cordoba   100 mg at 04/01/22 0957    sodium chloride flush 0.9 % injection 5-40 mL  5-40 mL IntraVENous 2 times per day Yulissa Shirley MD   10 mL at 03/31/22 0900    sodium chloride flush 0.9 % injection 5-40 mL  5-40 mL IntraVENous PRN Yulissa Shirley MD   10 mL at 03/28/22 1547    0.9 % sodium chloride infusion  25 mL IntraVENous PRN Yulissa Shirley MD          sodium chloride      sodium chloride         Physical Exam:  BP (!) 153/71   Pulse 99   Temp 97.7 °F (36.5 °C) (Oral)   Resp 18   Ht 6' 2\" (1.88 m)   Wt 192 lb 8 oz (87.3 kg)   SpO2 94%   BMI 24.72 kg/m²   Wt Readings from Last 3 Encounters:   04/01/22 192 lb 8 oz (87.3 kg)   03/15/22 190 lb (86.2 kg)   02/14/22 187 lb (84.8 kg)     Appearance: Awake, alert, no acute respiratory distress  Skin: Intact, no rash  Head: Normocephalic, atraumatic  Eyes: EOMI, no conjunctival erythema  ENMT: No pharyngeal erythema, MMM, no rhinorrhea  Neck: Supple, no elevated JVP, no carotid bruits  Lungs: Clear to auscultation bilaterally. No wheezes, rales, or rhonchi.   Cardiac: Irregularly irregular rate and rhythm, tachycardic +S1S2, no murmurs apparent  Abdomen: Soft, nontender, +bowel sounds  Extremities: Moves all extremities x 4, no lower extremity edema  Neurologic: No focal motor deficits apparent, normal mood and affect  Peripheral Pulses: Intact posterior tibial pulses bilaterally    Intake/Output:    Intake/Output Summary (Last 24 hours) at 4/1/2022 1017  Last data filed at 3/31/2022 1731  Gross per 24 hour   Intake 695 ml   Output 110 ml   Net 585 ml     No intake/output data recorded.     Laboratory Tests:  Recent Labs     03/30/22 0333 03/31/22  0739 04/01/22  0400    142 143   K 4.2 3.8 3.6   * 109* 108*   CO2 24 22 23   BUN 31* 24* 22   CREATININE 1.3* 1.2 1.4*   GLUCOSE 113* 98 106*   CALCIUM 8.0* 7.5* 8.4*     Lab Results   Component Value Date    MG 2.2 04/01/2022     Recent Labs     03/30/22 0333 03/31/22  0739 04/01/22  0400   ALKPHOS 131* 105 101   ALT 30 39 30   AST 50* 43* 30   PROT 5.4* 5.0* 6.0*   BILITOT 1.8* 1.4* 1.3*   LABALBU 2.7* 2.8* 2.9*     Recent Labs     03/30/22 0333 03/31/22  0739 04/01/22  0400   WBC 17.5* 13.8* 11.4   RBC 3.83 3.75* 3.73*   HGB 10.3* 10.3* 10.0*   HCT 34.5* 34.4* 33.9*   MCV 90.1 91.7 90.9   MCH 26.9 27.5 26.8   MCHC 29.9* 29.9* 29.5*   RDW 20.9* 22.2* 22.8*    227 250   MPV 9.8 10.2 10.0     Lab Results   Component Value Date    CKTOTAL 12 (L) 03/18/2011    CKMB <0.2 03/18/2011    TROPONINI <0.01 12/19/2020    TROPONINI <0.01 03/18/2011    TROPONINI 0.02 01/03/2011     Lab Results   Component Value Date    INR 1.1 01/24/2022    INR 1.0 12/19/2020    INR 1.2 03/18/2011    PROTIME 12.6 (H) 01/24/2022    PROTIME 11.7 12/19/2020    PROTIME 12.2 03/18/2011     Lab Results   Component Value Date    TSH 1.050 03/25/2022     Lab Results   Component Value Date    LABA1C 5.6 12/20/2020     No results found for: EAG  Lab Results   Component Value Date    CHOL 114 12/20/2020     Lab Results   Component Value Date    TRIG 76 12/20/2020     Lab Results   Component Value Date    HDL 41 12/20/2020     Lab Results   Component Value Date    LDLCALC 58 12/20/2020     Lab Results   Component Value Date    LABVLDL 15 12/20/2020     No results found for: CHOLHDLRATIO    Cardiac Tests:  Telemetry findings reviewed: Normal sinus rhythm with frequent premature atrial complexes with heart rate in the 70s. EKG: Aflutter with 2:1 conduction, NSST, abnormal EKG. Labs and vitals were reviewed: Blood pressure 144/77 heart rate 78 sats 98%. Labs/BUN creat 46/2.0>>40/1.6>> 34/1.4>> 24/1.2, H/H 8/26.8>>9.4/30.8>> 9.2/31.7,  WBC 19.3>> 17.3    1. Pharmacological stress test: 1/2/2011: LVEF 54%, with no evidence of stress-induced ischemia. 2. TTE: 12/20/2020 (Dr. Chandrika Lee), LVEF 55-60%, no wall motion abnormality, mildly dilated right ventricle, trace MR, trace pericardial effusion, TAPSE 21 mm. Assessment:  · PAF and now with persistent AF/flutter with RVR, underwent successful ОЛЬГА guided cardioversion on 3/30/2022, and remains in sinus rhythm. He is back in AF with intermittent PAF  · [id number] Vasc score-2  · Status post hemicolectomy for cecal malignancy  · History of hypertension now blood pressure is soft>> improved chronic blood pressure 127/87  · COURTNEY creatinine 1.6>> 1.4>>1.3>> 1.2, resolved  · Left lower extremity above-knee amputation  · Hard of hearing  · Hypokalemia, improved. · Generalized erythematous rash, etiology unclear, drug rash or infection. Discussed with Dr. Otilia Bennett and he thinks it is mostly from contact dermatitis from laundry detergent. · Hemodynamically stable, /77  · Mild anemia, Hb 10.3>> 10.3  · Evaluation of nausea as per primary        Plan:   · Options for ADD are limited due to elevated creatinine,  Best option is amiodarone, will give him  150 mg IV bolus and then oral loading dose 400 mg po x 10 days and then 200 mg po daily. QTc 384 ms on today's EKG.    · Patient remains in sinus rhythm, continue metoprolol 100 mg p.o. twice daily  · Continue Eliquis 5 mg p.o. twice daily for anticoagulation  · Rest as per primary service. · Will arrange 14 day Zio patch  monitor upon discharge to assess AF [place]  · Further recommendations pending above. Emily Calhoun MD., Cl Willis.   [facility]) Cardiology

## 2022-04-01 NOTE — PROGRESS NOTES
[facility] NP of cardio notified of patient going in and out of afib overnight, current afib and rate

## 2022-04-01 NOTE — CARE COORDINATION
PT am-pac 16. Plan [employer] snf on discharge- precert initiated 5/71-BIRU pending. KUB pending. Will need COVID test on day of discharge. Notify [facility] if insurance approves for snf. ALAN in epic, HENS, transport form on chart.  Will follow Annmarie Small, RN case manager

## 2022-04-01 NOTE — PROGRESS NOTES
Joon José paged regarding patient's KUB results and patient experiencing bleeding from midline incision with apparent herniation during coughing.

## 2022-04-01 NOTE — PROGRESS NOTES
paged regarding order clarification for CT, and update on patient. Updated on patient status, bleeding during coughing episodes after drinking. OK for oral contrast if patient can tolerate, can place NG tube if needed to receive oral contrast by CT nurse. To apply abdominal binder for bracing during coughing, CT order changed. Dr. Isra Vargas notified of bleeding from abdomen during coughing to hold eliquis.      Dr. Siria Estrada notified of increased bleeding from incision from last page, asked for orders for dressing/is he ok with abdominal binder with dressing

## 2022-04-01 NOTE — PROGRESS NOTES
Department of Internal Evelin Chery M.D.  Progress Note      SUBJECTIVE: He is hard of hearing but says he has had dry heaves overnight he does not feel like the bowels are moving or he is passing gas    OBJECTIVE faint bowel sounds he is tender in the right lower quadrant  The rash is starting to desquamate    Medications    Current Facility-Administered Medications: calcium carbonate (TUMS) chewable tablet 500 mg, 500 mg, Oral, Q6H PRN  pantoprazole (PROTONIX) tablet 40 mg, 40 mg, Oral, QAM AC  white petrolatum ointment, , Topical, BID  0.9 % sodium chloride bolus, 1,000 mL, IntraVENous, Once  Hydrocerin cream CREA, , Topical, BID  sodium chloride flush 0.9 % injection 5-40 mL, 5-40 mL, IntraVENous, 2 times per day  sodium chloride flush 0.9 % injection 5-40 mL, 5-40 mL, IntraVENous, PRN  0.9 % sodium chloride infusion, 25 mL, IntraVENous, PRN  apixaban (ELIQUIS) tablet 5 mg, 5 mg, Oral, BID  trimethobenzamide (TIGAN) injection 200 mg, 200 mg, IntraMUSCular, Q6H PRN  metoprolol tartrate (LOPRESSOR) tablet 100 mg, 100 mg, Oral, BID  sodium chloride flush 0.9 % injection 5-40 mL, 5-40 mL, IntraVENous, 2 times per day  sodium chloride flush 0.9 % injection 5-40 mL, 5-40 mL, IntraVENous, PRN  0.9 % sodium chloride infusion, 25 mL, IntraVENous, PRN  Physical    VITALS:  BP (!) 157/81   Pulse 75   Temp 97.6 °F (36.4 °C) (Oral)   Resp 18   Ht 6' 2\" (1.88 m)   Wt 192 lb 8 oz (87.3 kg)   SpO2 95%   BMI 24.72 kg/m²   24HR INTAKE/OUTPUT:    Intake/Output Summary (Last 24 hours) at 4/1/2022 0746  Last data filed at 3/31/2022 1731  Gross per 24 hour   Intake 1285 ml   Output 410 ml   Net 875 ml     LUNGS: Decreased breath sounds throughout  CARDIOVASCULAR: S1-S2 irregular rhythm  Data    CBC with Differential:    Lab Results   Component Value Date    WBC 11.4 04/01/2022    RBC 3.73 04/01/2022    HGB 10.0 04/01/2022    HCT 33.9 04/01/2022     04/01/2022    MCV 90.9 04/01/2022 MCH 26.8 04/01/2022    MCHC 29.5 04/01/2022    RDW 22.8 04/01/2022    NRBC 0.0 03/31/2022    SEGSPCT 47 03/18/2011    METASPCT 0.9 03/30/2022    LYMPHOPCT 16.2 04/01/2022    PROMYELOPCT 1.8 03/28/2022    MONOPCT 5.2 04/01/2022    MYELOPCT 1.8 03/28/2022    BASOPCT 0.3 04/01/2022    MONOSABS 0.59 04/01/2022    LYMPHSABS 1.84 04/01/2022    EOSABS 0.00 04/01/2022    BASOSABS 0.03 04/01/2022     CMP:    Lab Results   Component Value Date     04/01/2022    K 3.6 04/01/2022    K 4.1 03/24/2022     04/01/2022    CO2 23 04/01/2022    BUN 22 04/01/2022    CREATININE 1.4 04/01/2022    GFRAA >60 04/01/2022    LABGLOM 50 04/01/2022    GLUCOSE 106 04/01/2022    GLUCOSE 106 03/24/2011    PROT 6.0 04/01/2022    LABALBU 2.9 04/01/2022    LABALBU 2.4 03/20/2011    CALCIUM 8.4 04/01/2022    BILITOT 1.3 04/01/2022    ALKPHOS 101 04/01/2022    AST 30 04/01/2022    ALT 30 04/01/2022     PT/INR:    Lab Results   Component Value Date    PROTIME 12.6 01/24/2022    PROTIME 12.2 03/18/2011    INR 1.1 01/24/2022       ASSESSMENT AND PLAN      Principal Problem:  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

## 2022-04-01 NOTE — PROGRESS NOTES
[facility] MD Kim Mena, MD Merced Mccain, MD Aki Puga, MD [place] Shine, ALEXIA Albarado, CHARMAINE  Progress Note    4/1/2022    SUBJECTIVE:   3/25: Since early afternoon. Feeling better. Denies abdominal pain though still distended. Had a BM, and distention has markedly improved since then. Denies dyspnea at rest on nasal cannula. Has developed lower extremity and dependent swelling denies cp/palp. Diet has been advanced, and he has anorexia, though has so far tolerated some food  3/26: \"Sleepy. \"  Denies dyspnea though still on nasal cannula. Ate breakfast today without event. Swelling improved compared to yesterday  3/27: Feeling better today. Notes that he is eating better. Hypotensive last evening received LR bolus. On NS drip again. Diuretics on hold. Still has the rash, though he still not aware of it  3/28: Denies complaint. Feeling better yesterday. Appetite has improved. Developed atrial fibrillation/flutter heart rates in the 120s received digoxin, now on amio drip  3/29: Feeling little better. Ongoing poor intake of food and fluid. Remains tachycardic. No pain. No swelling. ROS otherwise unremarkable. 3/30: Feeling better. Denies complaint. Status post ОЛЬГА/DC cardioversion, currently in NSR  3/31: No new complaint or issue. [place] Appetite good. No dyspnea. 4/1 stable vitals on RA no reported n/v/d/cp/sob    PROBLEM LIST:    Principal Problem:    Benign neoplasm of cecum  Active Problems:    S/P right hemicolectomy    PSVT (paroxysmal supraventricular tachycardia) (HCC)    Primary hypertension  Resolved Problems:    * No resolved hospital problems.  *         DIET:    Diet NPO Exceptions are: Sips of Water with Meds     MEDS (scheduled):    amiodarone  400 mg Oral BID    Followed by   Luz Elena Styles ON 4/11/2022] amiodarone  200 mg Oral Daily    pantoprazole  40 mg Oral QAM AC    white petrolatum   Topical BID    sodium chloride  1,000 mL IntraVENous Once    Hydrocerin   Topical BID    sodium chloride flush  5-40 mL IntraVENous 2 times per day    [Held by provider] apixaban  5 mg Oral BID    metoprolol tartrate  100 mg Oral BID    sodium chloride flush  5-40 mL IntraVENous 2 times per day       MEDS (infusions):   sodium chloride 75 mL/hr at 04/01/22 1310    sodium chloride      sodium chloride         MEDS (prn):  calcium carbonate, sodium chloride flush, sodium chloride, trimethobenzamide, sodium chloride flush, sodium chloride    PHYSICAL EXAM:     Patient Vitals for the past 24 hrs:   BP Temp Temp src Pulse Resp SpO2 Weight   04/01/22 1130 (!) 148/86 -- -- 74 -- -- --   04/01/22 0900 (!) 153/71 97.7 °F (36.5 °C) Oral 99 18 94 % --   04/01/22 0549 -- -- -- -- -- -- 192 lb 8 oz (87.3 kg)   04/01/22 0000 (!) 157/81 97.6 °F (36.4 °C) Oral 75 18 95 % --   03/31/22 2030 (!) 178/66 97.5 °F (36.4 °C) Oral 95 18 96 % --   03/31/22 1508 (!) 144/77 97.5 °F (36.4 °C) Oral 78 -- 98 % --   @      Intake/Output Summary (Last 24 hours) at 4/1/2022 1450  Last data filed at 3/31/2022 1731  Gross per 24 hour   Intake 160 ml   Output 110 ml   Net 50 ml         Wt Readings from Last 3 Encounters:   04/01/22 192 lb 8 oz (87.3 kg)   03/15/22 190 lb (86.2 kg)   02/14/22 187 lb (84.8 kg)       Constitutional:  in no acute distress  HEENT: NC/AT, EOMI, sclera and conjunctiva are clear and anicteric, mucus membranes moist  Neck: Trachea midline, no JVD  Cardiovascular: S1, S2 regular rhythm, no murmur,or rub  Respiratory: Crackles at bases improved since yesterday  Gastrointestinal:  Soft, diffusely mildly tender, mildly distended with tympany, no guarding or referred pain, NABS  Ext: 1/2+ dependent and distal lower extremity edema, feet warm  Skin: dry, no rash  Neuro: awake, alert, interactive moves all 4 extremities      DATA:    Recent Labs     03/30/22  0333 03/31/22  0739 04/01/22  0400   WBC 17.5* 13.8* 11.4   HGB 10.3* 10.3* 10.0*   HCT 34.5* 34.4* 33.9*   MCV 90.1 91.7 90.9    227 250     Recent Labs     03/30/22  0333 03/31/22  0739 04/01/22  0400    142 143   K 4.2 3.8 3.6   * 109* 108*   CO2 24 22 23   MG 2.0  --  2.2   PHOS 2.6  --  3.1   BUN 31* 24* 22   CREATININE 1.3* 1.2 1.4*   ALT 30 39 30   AST 50* 43* 30   BILITOT 1.8* 1.4* 1.3*   ALKPHOS 131* 105 101       Lab Results   Component Value Date    LABPROT 0.1 03/23/2022    LABPROT 0.1 03/23/2022    LABPROT 0.3 (H) 03/23/2022    LABPROT 0.3 03/23/2022       ASSESSMENT     74 y.o. 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 Postoperatively tachycardic, hypotensive treated with IV bolus.     1. Acute kidney injury, hemodynamically mediated, least in part secondary to volume contraction, anemia, increase insensible losses; the final common pathway was decreased effective circulating volume. Status post IV fluid boluses, increase in IV fluid rate, azotemia is improving, as is his urine output.     2. Anemia, severe, normocytic but MCV is very low. Severely iron deficient    3. Hypophosphatemia, status post supplementation yesterday orally. Normalized with supplement    4. Rash looks like a drug rash. May be due to the iron. Could be due to antimicrobial therapy. Improved since yesterday.     Edema and pulmonary edema improved  Azotemia improving. The transient increase in azotemia was due to hypotension, intravascular volume contraction, A. fib with RVR.   Given the rash, consider AIN, though I doubt it      RECOMMENDATIONS    Encourage oral intake as able  Continue supportive care  Follow labs, UO    Electronically signed by Louise Bolton MD on 4/1/2022

## 2022-04-02 LAB
ALBUMIN SERPL-MCNC: 2.9 G/DL (ref 3.5–5.2)
ALP BLD-CCNC: 94 U/L (ref 40–129)
ALT SERPL-CCNC: 26 U/L (ref 0–40)
ANION GAP SERPL CALCULATED.3IONS-SCNC: 15 MMOL/L (ref 7–16)
ANISOCYTOSIS: ABNORMAL
AST SERPL-CCNC: 25 U/L (ref 0–39)
BACTERIA: ABNORMAL /HPF
BASOPHILS ABSOLUTE: 0.03 E9/L (ref 0–0.2)
BASOPHILS RELATIVE PERCENT: 0.2 % (ref 0–2)
BILIRUB SERPL-MCNC: 1.1 MG/DL (ref 0–1.2)
BILIRUBIN URINE: NEGATIVE
BLOOD, URINE: ABNORMAL
BUN BLDV-MCNC: 30 MG/DL (ref 6–23)
CALCIUM SERPL-MCNC: 8.2 MG/DL (ref 8.6–10.2)
CHLORIDE BLD-SCNC: 111 MMOL/L (ref 98–107)
CLARITY: CLEAR
CO2: 21 MMOL/L (ref 22–29)
COLOR: YELLOW
CREAT SERPL-MCNC: 2.7 MG/DL (ref 0.7–1.2)
EOSINOPHILS ABSOLUTE: 0 E9/L (ref 0.05–0.5)
EOSINOPHILS RELATIVE PERCENT: 0 % (ref 0–6)
GFR AFRICAN AMERICAN: 29
GFR NON-AFRICAN AMERICAN: 24 ML/MIN/1.73
GLUCOSE BLD-MCNC: 113 MG/DL (ref 74–99)
GLUCOSE URINE: NEGATIVE MG/DL
HCT VFR BLD CALC: 35.8 % (ref 37–54)
HEMOGLOBIN: 10.7 G/DL (ref 12.5–16.5)
HYPOCHROMIA: ABNORMAL
IMMATURE GRANULOCYTES #: 0.21 E9/L
IMMATURE GRANULOCYTES %: 1.4 % (ref 0–5)
KETONES, URINE: NEGATIVE MG/DL
LEUKOCYTE ESTERASE, URINE: NEGATIVE
LYMPHOCYTES ABSOLUTE: 1.29 E9/L (ref 1.5–4)
LYMPHOCYTES RELATIVE PERCENT: 8.5 % (ref 20–42)
MCH RBC QN AUTO: 26.9 PG (ref 26–35)
MCHC RBC AUTO-ENTMCNC: 29.9 % (ref 32–34.5)
MCV RBC AUTO: 89.9 FL (ref 80–99.9)
MONOCYTES ABSOLUTE: 0.92 E9/L (ref 0.1–0.95)
MONOCYTES RELATIVE PERCENT: 6.1 % (ref 2–12)
NEUTROPHILS ABSOLUTE: 12.71 E9/L (ref 1.8–7.3)
NEUTROPHILS RELATIVE PERCENT: 83.8 % (ref 43–80)
NITRITE, URINE: NEGATIVE
OVALOCYTES: ABNORMAL
PDW BLD-RTO: 23.6 FL (ref 11.5–15)
PH UA: 5 (ref 5–9)
PLATELET # BLD: 290 E9/L (ref 130–450)
PMV BLD AUTO: 10 FL (ref 7–12)
POIKILOCYTES: ABNORMAL
POLYCHROMASIA: ABNORMAL
POTASSIUM SERPL-SCNC: 4.4 MMOL/L (ref 3.5–5)
PROTEIN UA: NEGATIVE MG/DL
RBC # BLD: 3.98 E12/L (ref 3.8–5.8)
RBC UA: ABNORMAL /HPF (ref 0–2)
SCHISTOCYTES: ABNORMAL
SODIUM BLD-SCNC: 147 MMOL/L (ref 132–146)
SPECIFIC GRAVITY UA: 1.01 (ref 1–1.03)
TOTAL PROTEIN: 6.2 G/DL (ref 6.4–8.3)
UROBILINOGEN, URINE: 0.2 E.U./DL
WBC # BLD: 15.2 E9/L (ref 4.5–11.5)
WBC UA: ABNORMAL /HPF (ref 0–5)

## 2022-04-02 PROCEDURE — 2580000003 HC RX 258: Performed by: INTERNAL MEDICINE

## 2022-04-02 PROCEDURE — 83935 ASSAY OF URINE OSMOLALITY: CPT

## 2022-04-02 PROCEDURE — 6370000000 HC RX 637 (ALT 250 FOR IP): Performed by: NURSE PRACTITIONER

## 2022-04-02 PROCEDURE — 51798 US URINE CAPACITY MEASURE: CPT

## 2022-04-02 PROCEDURE — 2700000000 HC OXYGEN THERAPY PER DAY

## 2022-04-02 PROCEDURE — 84300 ASSAY OF URINE SODIUM: CPT

## 2022-04-02 PROCEDURE — 51702 INSERT TEMP BLADDER CATH: CPT

## 2022-04-02 PROCEDURE — 6370000000 HC RX 637 (ALT 250 FOR IP): Performed by: INTERNAL MEDICINE

## 2022-04-02 PROCEDURE — 81001 URINALYSIS AUTO W/SCOPE: CPT

## 2022-04-02 PROCEDURE — 82436 ASSAY OF URINE CHLORIDE: CPT

## 2022-04-02 PROCEDURE — 2580000003 HC RX 258: Performed by: FAMILY MEDICINE

## 2022-04-02 PROCEDURE — 36415 COLL VENOUS BLD VENIPUNCTURE: CPT

## 2022-04-02 PROCEDURE — 6370000000 HC RX 637 (ALT 250 FOR IP): Performed by: UROLOGY

## 2022-04-02 PROCEDURE — 2060000000 HC ICU INTERMEDIATE R&B

## 2022-04-02 PROCEDURE — 80053 COMPREHEN METABOLIC PANEL: CPT

## 2022-04-02 PROCEDURE — 85025 COMPLETE CBC W/AUTO DIFF WBC: CPT

## 2022-04-02 PROCEDURE — 99233 SBSQ HOSP IP/OBS HIGH 50: CPT | Performed by: INTERNAL MEDICINE

## 2022-04-02 PROCEDURE — 82570 ASSAY OF URINE CREATININE: CPT

## 2022-04-02 RX ORDER — TAMSULOSIN HYDROCHLORIDE 0.4 MG/1
0.4 CAPSULE ORAL NIGHTLY
Status: DISCONTINUED | OUTPATIENT
Start: 2022-04-02 | End: 2022-04-05 | Stop reason: HOSPADM

## 2022-04-02 RX ORDER — DEXTROSE AND SODIUM CHLORIDE 5; .45 G/100ML; G/100ML
INJECTION, SOLUTION INTRAVENOUS CONTINUOUS
Status: DISCONTINUED | OUTPATIENT
Start: 2022-04-02 | End: 2022-04-04

## 2022-04-02 RX ADMIN — METOPROLOL TARTRATE 100 MG: 50 TABLET, FILM COATED ORAL at 20:41

## 2022-04-02 RX ADMIN — DEXTROSE AND SODIUM CHLORIDE: 5; 450 INJECTION, SOLUTION INTRAVENOUS at 09:47

## 2022-04-02 RX ADMIN — PETROLATUM: 420 OINTMENT TOPICAL at 10:45

## 2022-04-02 RX ADMIN — PANTOPRAZOLE SODIUM 40 MG: 40 TABLET, DELAYED RELEASE ORAL at 10:44

## 2022-04-02 RX ADMIN — PETROLATUM: 420 OINTMENT TOPICAL at 20:42

## 2022-04-02 RX ADMIN — Medication: at 10:45

## 2022-04-02 RX ADMIN — Medication: at 20:41

## 2022-04-02 RX ADMIN — AMIODARONE HYDROCHLORIDE 400 MG: 200 TABLET ORAL at 20:41

## 2022-04-02 RX ADMIN — TAMSULOSIN HYDROCHLORIDE 0.4 MG: 0.4 CAPSULE ORAL at 20:41

## 2022-04-02 RX ADMIN — Medication 10 ML: at 20:40

## 2022-04-02 RX ADMIN — METOPROLOL TARTRATE 100 MG: 50 TABLET, FILM COATED ORAL at 10:44

## 2022-04-02 RX ADMIN — DEXTROSE AND SODIUM CHLORIDE: 5; 450 INJECTION, SOLUTION INTRAVENOUS at 18:01

## 2022-04-02 RX ADMIN — AMIODARONE HYDROCHLORIDE 400 MG: 200 TABLET ORAL at 10:44

## 2022-04-02 ASSESSMENT — PAIN SCALES - GENERAL
PAINLEVEL_OUTOF10: 0
PAINLEVEL_OUTOF10: 0

## 2022-04-02 NOTE — PROGRESS NOTES
Paged Dr Heidi Boykin about pt incision site and  AM Cr. New orders received. Will pass on report to the on-coming nurse. no dizziness/no chills/no decreased eating/drinking

## 2022-04-02 NOTE — PLAN OF CARE
Problem: Falls - Risk of:  Goal: Will remain free from falls  Description: Will remain free from falls  Outcome: Met This Shift  Goal: Absence of physical injury  Description: Absence of physical injury  Outcome: Met This Shift     Problem: Nausea/Vomiting:  Goal: Absence of nausea/vomiting  Description: Absence of nausea/vomiting  Outcome: Met This Shift  Goal: Able to drink  Description: Able to drink  Outcome: Met This Shift     Problem: Skin Integrity:  Goal: Absence of new skin breakdown  Description: Absence of new skin breakdown  Outcome: Met This Shift     Problem: Skin Integrity:  Goal: Absence of new skin breakdown  Description: Absence of new skin breakdown  Outcome: Met This Shift

## 2022-04-02 NOTE — PROGRESS NOTES
Paged Dr Queenie Huang about pt  change  In  [place],and CT scan results. Dr Queenie Huang states he will come up to see the pt shortly.

## 2022-04-02 NOTE — ANESTHESIA POSTPROCEDURE EVALUATION
Department of Anesthesiology  Postprocedure Note    Patient: Julius Cortez MRN: [id number]  YOB: 1955  Date of evaluation: 4/2/2022  Time:  9:46 AM     Procedure Summary     Date: 03/30/22 Room / Location: [facility] Echocardiography    Anesthesia Start: 0935 Anesthesia Stop: 1022    Procedure: TRANSESOPHAGEAL ECHO Diagnosis:     Scheduled Providers:  Responsible Provider: Shera Siemens., MD    Anesthesia Type: MAC ASA Status: 4          Anesthesia Type: MAC    Jolene Phase I: Jolene Score: 9    Jolene Phase II: Jolene Score: 6    Last vitals: Reviewed and per EMR flowsheets.        Anesthesia Post Evaluation    Patient location during evaluation: PACU  Patient participation: complete - patient participated  Level of consciousness: awake and alert  Airway patency: patent  Nausea & Vomiting: no vomiting and no nausea  Complications: no  Cardiovascular status: blood pressure returned to baseline  Respiratory status: acceptable  Hydration status: euvolemic

## 2022-04-02 NOTE — PLAN OF CARE
Problem: Skin Integrity:  Goal: Will show no infection signs and symptoms  Description: Will show no infection signs and symptoms  Outcome: Met This Shift     Problem: Falls - Risk of:  Goal: Will remain free from falls  Description: Will remain free from falls  4/2/2022 1611 by Marium Lu RN  Outcome: Met This Shift     Problem: Nausea/Vomiting:  Goal: Absence of nausea/vomiting  Description: Absence of nausea/vomiting  4/2/2022 1611 by Marium Lu RN  Outcome: Ongoing

## 2022-04-02 NOTE — PROGRESS NOTES
Seen/examined  Slightly confused  Denies any pain  Having some drainage from his midline incision  Positive BM  Afebrile  Abdomen soft  Incision with some serosanguineous drainage, fascia feels intact  CT reviewed-some fluid in the right upper quadrant, this does not appear to be an abscess more like seroma/hematoma in the right abdomen along the surgical bed. Fascia intact at the midline incision.   Distended bladder with hydronephrosis and enlarged prostate  Valles catheter ordered  Check urine culture  Continue supportive care  Patricia Walton MD

## 2022-04-02 NOTE — PROGRESS NOTES
[facility] MD Sudhir Valentin, MD Sanjeev Olson, MD Raghav Cooney, MD Sunitha Richardson, CNP   Chhaya Albarado, CHARMAINE  Progress Note    4/2/2022    SUBJECTIVE:   3/25: Since early afternoon. Feeling better. Denies abdominal pain though still distended. Had a BM, and distention has markedly improved since then. Denies dyspnea at rest on nasal cannula. Has developed lower extremity and dependent swelling denies cp/palp. Diet has been advanced, and he has anorexia, though has so far tolerated some food  3/26: \"Sleepy. \"  Denies dyspnea though still on nasal cannula. Ate breakfast today without event. Swelling improved compared to yesterday  3/27: Feeling better today. Notes that he is eating better. Hypotensive last evening received LR bolus. On NS drip again. Diuretics on hold. Still has the rash, though he still not aware of it  3/28: Denies complaint. Feeling better yesterday. Appetite has improved. Developed atrial fibrillation/flutter heart rates in the 120s received digoxin, now on amio drip  3/29: Feeling little better. Ongoing poor intake of food and fluid. Remains tachycardic. No pain. No swelling. ROS otherwise unremarkable. 3/30: Feeling better. Denies complaint. Status post ОЛЬГА/DC cardioversion, currently in NSR  3/31: No new complaint or issue. [place] Appetite good. No dyspnea. 4/1 stable vitals on RA no reported n/v/d/cp/sob  4/2 : cr up to 2.7 today which appear to postrenal . He had soto placed by dr Hanny Najera . I did see him in his room , he is more or less euvolemic to mildly hyper volemic clinically   Stable BP , on RA . PROBLEM LIST:    Principal Problem:    Benign neoplasm of cecum  Active Problems:    S/P right hemicolectomy    PSVT (paroxysmal supraventricular tachycardia) (HCC)    Primary hypertension  Resolved Problems:    * No resolved hospital problems.  *         DIET:    Diet NPO Exceptions are: Sips of Water with Meds MEDS (scheduled):    tamsulosin  0.4 mg Oral Nightly    amiodarone  400 mg Oral BID    Followed by   Kristal Peng ON 4/11/2022] amiodarone  200 mg Oral Daily    pantoprazole  40 mg Oral QAM AC    white petrolatum   Topical BID    sodium chloride  1,000 mL IntraVENous Once    Hydrocerin   Topical BID    sodium chloride flush  5-40 mL IntraVENous 2 times per day    [Held by provider] apixaban  5 mg Oral BID    metoprolol tartrate  100 mg Oral BID    sodium chloride flush  5-40 mL IntraVENous 2 times per day       MEDS (infusions):   dextrose 5 % and 0.45 % NaCl 100 mL/hr at 04/02/22 1801    sodium chloride      sodium chloride         MEDS (prn):  diatrizoate meglumine-sodium, calcium carbonate, sodium chloride flush, sodium chloride, trimethobenzamide, sodium chloride flush, sodium chloride    PHYSICAL EXAM:     Patient Vitals for the past 24 hrs:   BP Temp Temp src Pulse Resp SpO2 Weight   04/02/22 1353 (!) 140/69 98.9 °F (37.2 °C) -- 95 18 98 % --   04/02/22 0735 (!) 157/74 97.6 °F (36.4 °C) Oral 81 18 100 % --   04/02/22 0614 -- -- -- -- -- -- 192 lb 3.2 oz (87.2 kg)   04/02/22 0600 (!) 169/91 -- -- 75 18 100 % --   04/02/22 0515 -- -- -- -- -- 98 % --   04/02/22 0030 (!) 143/77 97.5 °F (36.4 °C) Oral 81 18 97 % --   04/01/22 2130 (!) 146/96 97.4 °F (36.3 °C) Oral 104 18 99 % --   @      Intake/Output Summary (Last 24 hours) at 4/2/2022 1954  Last data filed at 4/2/2022 1631  Gross per 24 hour   Intake 0 ml   Output 3800 ml   Net -3800 ml         Wt Readings from Last 3 Encounters:   04/02/22 192 lb 3.2 oz (87.2 kg)   03/15/22 190 lb (86.2 kg)   02/14/22 187 lb (84.8 kg)       Constitutional:  in no acute distress  HEENT: NC/AT, EOMI, sclera and conjunctiva are clear and anicteric, mucus membranes moist  Neck: Trachea midline, no JVD  Cardiovascular: S1, S2 regular rhythm, no murmur,or rub  Respiratory: Crackles at bases improved since yesterday  Gastrointestinal:  Soft, diffusely mildly tender, mildly distended with tympany, no guarding or referred pain, NABS  Ext: 1/2+ dependent and distal lower extremity edema, feet warm  Skin: dry, no rash  Neuro: awake, alert, interactive moves all 4 extremities      DATA:    Recent Labs     03/31/22  0739 04/01/22  0400 04/02/22  0345   WBC 13.8* 11.4 15.2*   HGB 10.3* 10.0* 10.7*   HCT 34.4* 33.9* 35.8*   MCV 91.7 90.9 89.9    250 290     Recent Labs     03/31/22  0739 04/01/22  0400 04/02/22  0345    143 147*   K 3.8 3.6 4.4   * 108* 111*   CO2 22 23 21*   MG  --  2.2  --    PHOS  --  3.1  --    BUN 24* 22 30*   CREATININE 1.2 1.4* 2.7*   ALT 39 30 26   AST 43* 30 25   BILITOT 1.4* 1.3* 1.1   ALKPHOS 105 101 94       Lab Results   Component Value Date    LABPROT 0.1 03/23/2022    LABPROT 0.1 03/23/2022    LABPROT 0.3 (H) 03/23/2022    LABPROT 0.3 03/23/2022       ASSESSMENT     74 y.o. 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 Postoperatively tachycardic, hypotensive treated with IV bolus.     1. Acute kidney injury, hemodynamically mediated, least in part secondary to volume contraction, anemia, increase insensible losses; the final common pathway was decreased effective circulating volume. Status post IV fluid boluses, increase in IV fluid rate, azotemia is improving, as is his urine output.     2. Anemia, severe, normocytic but MCV is very low. Severely iron deficient    3. Hypophosphatemia, status post supplementation yesterday orally. Normalized with supplement    4. Rash looks like a drug rash. May be due to the iron. Could be due to antimicrobial therapy. Improved since yesterday.       Cr up to 2.7   Distended bladder   S/p soto   High UO and his serum Na is now up to 147     RECOMMENDATIONS    -continue current iv fluids in form of 1/2 NS D5w at 100 cc/hr   -check urine lytes  -watch for postobstructive diuresis .    -hold diuretics   -bmp , phosph , mg in am       Electronically signed by Veronica Clancy MD on 4/2/2022

## 2022-04-02 NOTE — PROGRESS NOTES
Went to pt room. Pt was complaining of SOB. Did some breathing exercise with patent. Applied 2L  of  O2. Pt is satisfied and resting comfortable . Will continue to monitor and assess pt.

## 2022-04-02 NOTE — PROGRESS NOTES
[address] [place] [address] FOLLOW-UP    Name: Yasmeen Langford. Age: 74 y.o. Date of Admission: 3/22/2022  5:36 AM    Date of Service: 4/2/2022    Chief Complaint: Follow-up for SVT  Interim History:  No new overnight cardiac complaints. Currently with no complaints of CP, SOB, palpitations, dizziness, or lightheadedness. .  Patient underwent successful ОЛЬГА guided cardioversion on 3/30/2022 and remains in sinus rhythm with intermittent episodes of AF. Has been having bleeding from his abdominal incision site without drop in his Hb,  CT abd showed no abscess or hematoma as per surgery. Normal sinus rhythm heart rate in the 80s on telemetry with intermittent episodes of atrial fibrillation. 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    Rectal cancer ([address]    Ileus ([address]    Atrial flutter with rapid ventricular response (HCC)    GI bleed    Gastrointestinal bleed    Benign neoplasm of cecum    S/P right hemicolectomy    PSVT (paroxysmal supraventricular tachycardia) ([address]    Primary hypertension       Allergies:   Allergies   Allergen Reactions    Cefepime Rash    Clindamycin/Lincomycin Rash    Pcn [Penicillins] Rash       Current Medications:  Current Facility-Administered Medications   Medication Dose Route Frequency Provider Last Rate Last Admin    dextrose 5 % and 0.45 % sodium chloride infusion   IntraVENous Continuous Kary Harris  mL/hr at 04/02/22 0947 New Bag at 04/02/22 0947    amiodarone (CORDARONE) tablet 400 mg  400 mg Oral BID Britton Chavez MD   400 mg at 04/01/22 4150 Followed by   Tiffanie Crowe ON 4/11/2022] amiodarone (CORDARONE) tablet 200 mg  200 mg Oral Daily Crissy Frankel MD        diatrizoate meglumine-sodium (GASTROGRAFIN) 66-10 % solution 120 mL  120 mL Oral ONCE PRN Rock Mantle, DO   120 mL at 04/01/22 2041    calcium carbonate (TUMS) chewable tablet 500 mg  500 mg Oral Q6H PRN Rekha Oneil MD   500 mg at 03/31/22 1609    pantoprazole (PROTONIX) tablet 40 mg  40 mg Oral QAM AC Rekha Oneil MD   40 mg at 03/31/22 1052    white petrolatum ointment   Topical BID Fior Barrett MD   Given at 04/01/22 2140    0.9 % sodium chloride bolus  1,000 mL IntraVENous Once Crissy Frankel MD        Hydrocerin cream CREA   Topical BID Rekha Oneil MD   Given at 04/01/22 2139    sodium chloride flush 0.9 % injection 5-40 mL  5-40 mL IntraVENous 2 times per day Crissy Frankel MD   10 mL at 04/01/22 2140    sodium chloride flush 0.9 % injection 5-40 mL  5-40 mL IntraVENous PRN Crissy Frankel MD   10 mL at 04/01/22 1000    0.9 % sodium chloride infusion  25 mL IntraVENous PRN Crissy Frankel MD        [Held by provider] apixaban Ruth Ann Diaz) tablet 5 mg  5 mg Oral BID Crissy Frankel MD   5 mg at 04/01/22 0957    trimethobenzamide (TIGAN) injection 200 mg  200 mg IntraMUSCular Q6H PRN Crissy Frankel MD   200 mg at 04/01/22 0611    metoprolol tartrate (LOPRESSOR) tablet 100 mg  100 mg Oral BID Lurdes Crocker.  Ginder, APN   100 mg at 04/01/22 2139    sodium chloride flush 0.9 % injection 5-40 mL  5-40 mL IntraVENous 2 times per day Fatuma Asencio MD   10 mL at 03/31/22 0900    sodium chloride flush 0.9 % injection 5-40 mL  5-40 mL IntraVENous PRN Fatuma Asencio MD   10 mL at 03/28/22 1547    0.9 % sodium chloride infusion  25 mL IntraVENous PRN Fatuma Asencio MD          dextrose 5 % and 0.45 % NaCl 100 mL/hr at 04/02/22 0947    sodium chloride      sodium chloride         Physical Exam:  BP (!) 157/74   Pulse 81   Temp 97.6 °F (36.4 °C) (Oral) Resp 18   Ht 6' 2\" (1.88 m)   Wt 192 lb 3.2 oz (87.2 kg)   SpO2 100%   BMI 24.68 kg/m²   Wt Readings from Last 3 Encounters:   04/02/22 192 lb 3.2 oz (87.2 kg)   03/15/22 190 lb (86.2 kg)   02/14/22 187 lb (84.8 kg)     Appearance: Awake, alert, no acute respiratory distress  Skin: Intact, no rash  Head: Normocephalic, atraumatic  Eyes: EOMI, no conjunctival erythema  ENMT: No pharyngeal erythema, MMM, no rhinorrhea  Neck: Supple, no elevated JVP, no carotid bruits  Lungs: Clear to auscultation bilaterally. No wheezes, rales, or rhonchi. Cardiac: Regular rate and rhythm, tachycardic +S1S2, no murmurs apparent  Abdomen: Soft, nontender, +bowel sounds, surgical sites looks clean with intermittent bloody discharge with cough. Extremities: Moves all extremities x 4, no lower extremity edema, rash over the right LLE is improving. Neurologic: No focal motor deficits apparent, normal mood and affect  Peripheral Pulses: Intact posterior tibial pulses bilaterally    Intake/Output:  No intake or output data in the 24 hours ending 04/02/22 1003  No intake/output data recorded.     Laboratory Tests:  Recent Labs     03/31/22  0739 04/01/22  0400 04/02/22  0345    143 147*   K 3.8 3.6 4.4   * 108* 111*   CO2 22 23 21*   BUN 24* 22 30*   CREATININE 1.2 1.4* 2.7*   GLUCOSE 98 106* 113*   CALCIUM 7.5* 8.4* 8.2*     Lab Results   Component Value Date    MG 2.2 04/01/2022     Recent Labs     03/31/22  0739 04/01/22  0400 04/02/22  0345   ALKPHOS 105 101 94   ALT 39 30 26   AST 43* 30 25   PROT 5.0* 6.0* 6.2*   BILITOT 1.4* 1.3* 1.1   LABALBU 2.8* 2.9* 2.9*     Recent Labs     03/31/22  0739 04/01/22  0400 04/02/22  0345   WBC 13.8* 11.4 15.2*   RBC 3.75* 3.73* 3.98   HGB 10.3* 10.0* 10.7*   HCT 34.4* 33.9* 35.8*   MCV 91.7 90.9 89.9   MCH 27.5 26.8 26.9   MCHC 29.9* 29.5* 29.9*   RDW 22.2* 22.8* 23.6*    250 290   MPV 10.2 10.0 10.0     Lab Results   Component Value Date    CKTOTAL 12 (L) 03/18/2011    CKMB <0.2 03/18/2011    TROPONINI <0.01 12/19/2020    TROPONINI <0.01 03/18/2011    TROPONINI 0.02 01/03/2011     Lab Results   Component Value Date    INR 1.1 01/24/2022    INR 1.0 12/19/2020    INR 1.2 03/18/2011    PROTIME 12.6 (H) 01/24/2022    PROTIME 11.7 12/19/2020    PROTIME 12.2 03/18/2011     Lab Results   Component Value Date    TSH 1.050 03/25/2022     Lab Results   Component Value Date    LABA1C 5.6 12/20/2020     No results found for: EAG  Lab Results   Component Value Date    CHOL 114 12/20/2020     Lab Results   Component Value Date    TRIG 76 12/20/2020     Lab Results   Component Value Date    HDL 41 12/20/2020     Lab Results   Component Value Date    LDLCALC 58 12/20/2020     Lab Results   Component Value Date    LABVLDL 15 12/20/2020     No results found for: CHOLHDLRATIO    Cardiac Tests:  Telemetry findings reviewed: Normal sinus rhythm with intermittent episodes of AF and non sustained VT or aberrant conduction. Heart rate in the 80s. EKG: Aflutter with 2:1 conduction, NSST, abnormal EKG. Labs and vitals were reviewed: Blood pressure 157/74 heart rate 81sats 100%on 2L    Labs/BUN creat 46/2.0>>40/1.6>> 34/1.4>> 24/1.2>>30/2.7 , H/H 8/26.8>>9.4/30.8>> 9.2/31.7>>10.7/35.8,  WBC 19.3>> 17.3>>11.4>>15.2    1. Pharmacological stress test: 1/2/2011: LVEF 54%, with no evidence of stress-induced ischemia. 2. TTE: 12/20/2020 (Dr. Rommel Bettencourt), LVEF 55-60%, no wall motion abnormality, mildly dilated right ventricle, trace MR, trace pericardial effusion, TAPSE 21 mm.        Assessment:  · PAF and now with persistent AF/flutter with RVR, underwent successful ОЛЬГА guided cardioversion on 3/30/2022, and now, with intermittent episodes of AF, currently in NSR with frequent PACs  · [id number] Vasc score-2  · Status post hemicolectomy for cecal malignancy  · History of hypertension now blood pressure is soft>> improved chronic blood pressure 127/87  · COURTNEY creatinine 1.6>> 1.4>>1.3>> 1.2>>2.7  · Left lower extremity above-knee amputation  · Hard of hearing  · Hypokalemia, improved. · Generalized erythematous rash, etiology unclear, drug rash or infection. Discussed with Dr. Hetal Dolan and he thinks it is mostly from contact dermatitis from laundry detergent>> improving. .  · Hemodynamically stable, /74  · Mild anemia, Hb 10.3>> 10.3        Plan:   · Continue metoprolol 100 mg p.o. twice daily and amiodarone loading dose for rate and rhythm control  · Hold Eliquis due to abdominal incision bleeding  · Rest as per primary service. · Patient NPO due aspiration risk and ileus, continue IVF due to dehydration and hypernatremia  Secondary to decreased free water intake. · Will follow. Emily Calhoun MD., Cl Willis.   [facility]) Cardiology

## 2022-04-02 NOTE — PROGRESS NOTES
Progress Note  Date:2022       RNMM:[id number]  Patient Name:Miguelangel Butler. YOB: 1955     Age:66 y.o. Subjective    Subjective afebrile, bp stable, confused this am, positive fluid collection and hydronephrosis right side  Creatinine up to 2.7, taking clear fluids minimally,  Confusion better, denies sob, no vomiting, no chest pain  Monitor with controlled afib  Review of Systems  Objective         Vitals Last 24 Hours:  TEMPERATURE:  Temp  Av.5 °F (36.4 °C)  Min: 97.4 °F (36.3 °C)  Max: 97.7 °F (36.5 °C)  RESPIRATIONS RANGE: Resp  Av  Min: 18  Max: 18  PULSE OXIMETRY RANGE: SpO2  Av.7 %  Min: 94 %  Max: 100 %  PULSE RANGE: Pulse  Av.3  Min: 74  Max: 104  BLOOD PRESSURE RANGE: Systolic (01PLR), GHC:154 , Min:143 , ALVARADO:450   ; Diastolic (32JGT), GND:94, Min:71, Max:96    I/O (24Hr): No intake or output data in the 24 hours ending 22 0747  Objective  Labs/Imaging/Diagnostics    Labs:  CBC:Recent Labs     22  0739 22  0400 22  0345   WBC 13.8* 11.4 15.2*   RBC 3.75* 3.73* 3.98   HGB 10.3* 10.0* 10.7*   HCT 34.4* 33.9* 35.8*   MCV 91.7 90.9 89.9   RDW 22.2* 22.8* 23.6*    250 290     CHEMISTRIES:  Recent Labs     22  0739 22  0400 22  0345    143 147*   K 3.8 3.6 4.4   * 108* 111*   CO2 22 23 21*   BUN 24* 22 30*   CREATININE 1.2 1.4* 2.7*   GLUCOSE 98 106* 113*   PHOS  --  3.1  --    MG  --  2.2  --      PT/INR:No results for input(s): PROTIME, INR in the last 72 hours. APTT:No results for input(s): APTT in the last 72 hours.   LIVER PROFILE:  Recent Labs     22  0739 22  0400 22  0345   AST 43* 30 25   ALT 39 30 26   BILITOT 1.4* 1.3* 1.1   ALKPHOS 105 101 94       Imaging Last 24 Hours:  CT ABDOMEN PELVIS W WO CONTRAST Additional Contrast? Oral    Result Date: 2022  EXAMINATION: CT OF THE ABDOMEN AND PELVIS WITH AND WITHOUT CONTRAST 2022 8:45 pm TECHNIQUE: CT of the abdomen and pelvis was performed with and without the administration of intravenous contrast.  Multiplanar reformatted images are provided for review. Dose modulation, iterative reconstruction, and/or weight based adjustment of the mA/kV was utilized to reduce the radiation dose to as low as reasonably achievable. COMPARISON: CT of the abdomen and pelvis, 01/24/2022. HISTORY: ORDERING SYSTEM PROVIDED HISTORY: SBO TECHNOLOGIST PROVIDED HISTORY: If patient can tolerate oral contrast. Reason for exam:->SBO Additional Contrast?->Oral FINDINGS: Lower Chest: The heart is normal in size. Small bilateral pleural effusions left greater than right. Mild dependent atelectasis in the lower lobes. Organs: Liver: Unremarkable. Gallbladder: Unremarkable. Pancreas: Moderately atrophied. Otherwise, unremarkable. Spleen:  Unremarkable. Adrenals: Unremarkable. Kidneys: Mild bilateral hydroureteronephrosis. No obstructing calculus is seen. A 1 mm nonobstructive intrarenal calculus is seen within an upper pole calyx of the right kidney. Multiple simple and small cysts are seen in the kidneys bilaterally. GI/Bowel: Status post right hemicolectomy. There is mild distension of small bowel loops without evidence of wall thickening or tapering Pelvis: The urinary bladder is distended. Small locule of gas within it is likely the result of recent instrumentation. The prostate gland is moderately enlarged measuring 6.2 x 5.8 x 7.1 cm. The prostate gland is somewhat asymmetrically larger towards the right and protrudes into the bladder base. Peritoneum/Retroperitoneum: Within the right paracolic gutter, there is a 7.8 x 3.1 x 10.0 cm enhancing fluid collection, which extends to the site of the right hemicolectomy sutures. It may represent an abscess. Bones/Soft Tissues: The visualized bones are intact without fracture or focal lesion. Prominent loss of disc height at L5-S1.   Facet hypertrophic changes are noted at L5-S1, worse on right.  Severe right neural foraminal stenosis at L5-S1     1. Within the right paracolic gutter extending to the site of the right hemicolectomy, there is a 7.8 x 3.1 x 10.0 cm fluid collection with rim enhancement. It may represent ABSCESS. 2. There is mild dilation of small bowel loops without evidence1 of obstruction. Findings likely represent ILEUS. 3. MILD HYDROURETERONEPHROSIS likely related to a distended urinary bladder. 4. Prominently ENLARGED PROSTATE. Asymmetrically prominent prostate gland on right exerts mass effect on the bladder base. Findings may due BPH, although a neoplastic etiology be excluded. RECOMMENDATIONS: Unavailable     XR ABDOMEN (KUB) (SINGLE AP VIEW)    Result Date: 4/1/2022  EXAMINATION: ONE SUPINE XRAY VIEW(S) OF THE ABDOMEN 4/1/2022 10:24 am COMPARISON: 01/29/2018, CT of 01/24/2022 HISTORY: ORDERING SYSTEM PROVIDED HISTORY: ileus TECHNOLOGIST PROVIDED HISTORY: Reason for exam:->ileus FINDINGS: Multiple dilated small bowel loops are identified in the central abdomen just to the left of midline with apparent fold/wall edema. Maximum diameter in the left lower quadrant is 4.1 cm. There is mild gaseous distention of the stomach and the transverse colon. No free intraperitoneal air. Left lung bases show subsegmental atelectasis and possible effusion. Right lung base appears clear. No organomegaly. Postop changes are noted in the right abdomen. Osseous structures are stable. No gas is noted in the rectal vault. Findings compatible with small-bowel obstruction. There is bowel wall edema. No free air detected. Consider CT of the abdomen and pelvis if clinically indicated.      Awake, alert  On o2, pale weak  Heart irr/irr  Lungs decreased bs, no rales  Abdomen obese, decreased bs, sensitive throughout, bandaged  Rt leg with edema  Assessment//Plan           Hospital Problems           Last Modified POA    * (Principal) Benign neoplasm of cecum 3/22/2022 Yes    S/P right

## 2022-04-02 NOTE — PROGRESS NOTES
Patient bladder scanned, total of 143cc in bladder at this time. Obey Pinto of cardiology notified of this.

## 2022-04-02 NOTE — CONSULTS
INPATIENT CONSULTATION RECORD FOR  4/2/2022      [facility]  [address], [address] [facility]  [phone]        REASON FOR CONSULTATION: Acute urinary retention/BPH/Mild bilateral hydronephrosis      HISTORY OF PRESENT ILLNESS:      The patient is a 74 y.o. male patient who is recuperating from a recent laparoscopic right hemicolectomy by Dr. Natalia Hansen on 3/22/2022. Bladder scan residual yesterday was 143 cc. Repeat bladder scan today was over a liter. A Valles was just placed for 1700 cc residual.  He feels better now. He has no prior urologic history. He denies flank or abdominal pain. He has never seen a urologist to his knowledge. He is to go to [facility] for rehab soon. Serum creatinine is 2.7. He is being followed by Dr. Luis Jones from nephrology. CT scan yesterday shows fluid collection within the right paracolic gutter following recent hemicolectomy and postsurgical changes along with bladder distention, and enlarged prostate gland and mild bilateral hydronephrosis.   He denies previous hematuria, dysuria, UTIs, incontinence      Past Medical History:   Diagnosis Date    Employs prosthetic leg     left    History of atrial fibrillation     [place] (hard of hearing)     uses bilat hearing aides    Hx of AKA (above knee amputation), left ([address]     Hx of necrotizing fasciitis 2011    left leg    Hypertension     Rectal bleeding     Rectal cancer ([address] 12/2017    rectal         Past Surgical History:   Procedure Laterality Date    ABDOMEN SURGERY  03/20/2018    ileostomy open take down    ABOVE KNEE AMPUTATION  2011    left; hx flesh eating bacteria    CARDIOVERSION  12/2020    COLON SURGERY  01/23/2018    laprascopic low anterior colon resection  take down splenic fexure   ileostomy    COLONOSCOPY  10/21/2011    COLONOSCOPY N/A 2/14/2022    COLONOSCOPY POLYPECTOMY SNARE/COLD BIOPSY performed by Vensa Hernandez MD at [address] Left 10/03/2016 pars plana vitrectomy, retinal detachment repair, laser gas/fluid exchange    EYE SURGERY Bilateral 2002? bilat cataracts w lens implants    EYE SURGERY Right ?    retinal detachment    HEMICOLECTOMY Right 3/22/2022    LAPAROSCOPIC RIGHT HEMICOLECTOMY performed by Deborah España MD at [facility] N/A 6/46/1219    ILEOSTOMY OPEN TAKEDOWN performed by Deborah España MD at [facility] OR       Medications Prior to Admission:    Medications Prior to Admission: apixaban (ELIQUIS) 5 MG TABS tablet, Take 5 mg by mouth 2 times daily  amLODIPine (NORVASC) 5 MG tablet, Take 1 tablet by mouth daily  metoprolol (LOPRESSOR) 100 MG tablet, Take 1 tablet by mouth 2 times daily  hydrALAZINE (APRESOLINE) 50 MG tablet, take 1 tablet by mouth three times a day    Allergies:    Cefepime, Clindamycin/lincomycin, and Pcn [penicillins]    Social History:   He  reports that he quit smoking about 11 years ago. His smoking use included cigarettes. He started smoking about 48 years ago. He has a 70.00 pack-year smoking history. He has never used smokeless tobacco. He reports that he does not drink alcohol and does not use drugs. He is single and has no children. He is retired    Family History:   Non-contributory to this Urological problem  family history includes Dementia in his mother; Diabetes in his father, mother, sister, sister, and sister; Glaucoma in his father; Heart Attack in his brother and mother; High Blood Pressure in his mother; High Cholesterol in his father, mother, sister, sister, and sister; Pacemaker in his father; Thyroid Disease in his sister, sister, and sister.     REVIEW OF SYSTEMS:  Respiratory: denies any symptoms of a productive cough, shortness of breath, or hemoptysis  Cardiovascular: denies chest pain, dyspnea, or cardiac murmur  Gastrointestinal: negative for abdominal pain, diarrhea, or constipation  Dermatologic: denies any rashes, skin lesion(s), or pruritis  Neurological: negative for headaches, seizures, and tremors  Endocrine: negative for diabetic symptoms including polydipsia and polyuria or thyroid dysfunction  Ocular: denies retinopathy or glaucoma  ENT: denies sinusitis or epistaxis  Constitutional: denies nausea, vomiting, fever, or chills  Psychiatric: denies anxiety or depression  : denies prior prostate issues, hematuria, UTIs    PHYSICAL EXAM:    Vitals:  BP (!) 157/74   Pulse 81   Temp 97.6 °F (36.4 °C) (Oral)   Resp 18   Ht 6' 2\" (1.88 m)   Wt 192 lb 3.2 oz (87.2 kg)   SpO2 100%   BMI 24.68 kg/m²     General:  Awake, alert, oriented X 3. Well developed, well nourished, well groomed. No apparent distress. HEENT:  Normocephalic, atraumatic. Pupils equal, round. No scleral icterus. No conjunctival injection. Normal lips, teeth, and gums. No nasal discharge. Neck:  Supple, no masses. Heart:  RRR. Lungs:  No audible wheezing. Respirations symmetric and non-labored. Clear bilaterally. Abdomen:  Soft, nontender, no masses, no organomegaly, no peritoneal signs. Active bowel sounds. No rebound or guarding. No flank or CVA tenderness. Extremities:  No clubbing, cyanosis, or edema. Brisk peripheral pulses. Left above-knee amputation with prosthesis  Skin:  Warm and dry, no open lesions or rashes. Neuro:  Cranial nerves 2-12 intact, no focal motor or sensory deficits. Alert and oriented. Rectal: Deferred  Genitalia: Circumcised male without lesions or deformity. Testes are normal bilaterally.   There is a 12 [place] [facility] Valles draining clear, yellow urine into a gravity bag    LABS:    Lab Results   Component Value Date    WBC 15.2 (H) 04/02/2022    HGB 10.7 (L) 04/02/2022    HCT 35.8 (L) 04/02/2022    MCV 89.9 04/02/2022     04/02/2022       Lab Results   Component Value Date    BUN 30 (H) 04/02/2022    CREATININE 2.7 (H) 04/02/2022       No results found for: PSA      ASSESSMENT / PLAN:    Acute urinary retention/BPH/Mild bilateral hydronephrosis  The Valles was placed for very distended bladder and large bladder scan residual.  He will need to be monitored for postobstructive diuresis and hematuria given his anticoagulation with Eliquis. His catheter be secured with a leg strap and irrigated manually with saline as needed. His creatinine will continue to be monitored but should decline. He is being followed by nephrology. Lasix renal scan will be considered if his creatinine does not improve with bladder decompression. I will start him on Flomax. He will be given a voiding trial at the nursing facility once he is ambulatory and medically stable. He will require outpatient follow-up with rectal examination and PSA testing. He is otherwise stable for discharge from my standpoint with a catheter to a leg bag. I will see him back in the office in follow-up. Thank you for allow me to assist in his care.   Sincerely,      Gisella Hyman MD  10:04 AM  4/2/2022

## 2022-04-02 NOTE — PROGRESS NOTES
Consulted Dr Trinity Terry via perfect [facility] concerning pt's Na, Cr and Bun. No new  orders received.

## 2022-04-03 LAB
ALBUMIN SERPL-MCNC: 2.5 G/DL (ref 3.5–5.2)
ALP BLD-CCNC: 75 U/L (ref 40–129)
ALT SERPL-CCNC: 16 U/L (ref 0–40)
ANION GAP SERPL CALCULATED.3IONS-SCNC: 10 MMOL/L (ref 7–16)
ANISOCYTOSIS: ABNORMAL
AST SERPL-CCNC: 17 U/L (ref 0–39)
BASOPHILS ABSOLUTE: 0.02 E9/L (ref 0–0.2)
BASOPHILS RELATIVE PERCENT: 0.2 % (ref 0–2)
BILIRUB SERPL-MCNC: 0.8 MG/DL (ref 0–1.2)
BUN BLDV-MCNC: 23 MG/DL (ref 6–23)
CALCIUM SERPL-MCNC: 7.6 MG/DL (ref 8.6–10.2)
CHLORIDE BLD-SCNC: 113 MMOL/L (ref 98–107)
CHLORIDE URINE RANDOM: 125 MMOL/L
CO2: 25 MMOL/L (ref 22–29)
CREAT SERPL-MCNC: 1.8 MG/DL (ref 0.7–1.2)
CREATININE URINE: 112 MG/DL (ref 40–278)
EOSINOPHILS ABSOLUTE: 0 E9/L (ref 0.05–0.5)
EOSINOPHILS RELATIVE PERCENT: 0 % (ref 0–6)
GFR AFRICAN AMERICAN: 46
GFR NON-AFRICAN AMERICAN: 38 ML/MIN/1.73
GLUCOSE BLD-MCNC: 123 MG/DL (ref 74–99)
HCT VFR BLD CALC: 30.1 % (ref 37–54)
HEMOGLOBIN: 8.8 G/DL (ref 12.5–16.5)
HYPOCHROMIA: ABNORMAL
IMMATURE GRANULOCYTES #: 0.07 E9/L
IMMATURE GRANULOCYTES %: 0.7 % (ref 0–5)
LYMPHOCYTES ABSOLUTE: 1.04 E9/L (ref 1.5–4)
LYMPHOCYTES RELATIVE PERCENT: 10.2 % (ref 20–42)
MAGNESIUM: 2.3 MG/DL (ref 1.6–2.6)
MCH RBC QN AUTO: 26.6 PG (ref 26–35)
MCHC RBC AUTO-ENTMCNC: 29.2 % (ref 32–34.5)
MCV RBC AUTO: 90.9 FL (ref 80–99.9)
MONOCYTES ABSOLUTE: 0.64 E9/L (ref 0.1–0.95)
MONOCYTES RELATIVE PERCENT: 6.3 % (ref 2–12)
NEUTROPHILS ABSOLUTE: 8.46 E9/L (ref 1.8–7.3)
NEUTROPHILS RELATIVE PERCENT: 82.6 % (ref 43–80)
OSMOLALITY URINE: 536 MOSM/KG (ref 300–900)
OVALOCYTES: ABNORMAL
PDW BLD-RTO: 22.8 FL (ref 11.5–15)
PHOSPHORUS: 4 MG/DL (ref 2.5–4.5)
PLATELET # BLD: 262 E9/L (ref 130–450)
PMV BLD AUTO: 9.9 FL (ref 7–12)
POIKILOCYTES: ABNORMAL
POTASSIUM SERPL-SCNC: 3.7 MMOL/L (ref 3.5–5)
RBC # BLD: 3.31 E12/L (ref 3.8–5.8)
SODIUM BLD-SCNC: 148 MMOL/L (ref 132–146)
SODIUM URINE: 117 MMOL/L
TOTAL PROTEIN: 4.8 G/DL (ref 6.4–8.3)
WBC # BLD: 10.2 E9/L (ref 4.5–11.5)

## 2022-04-03 PROCEDURE — 97530 THERAPEUTIC ACTIVITIES: CPT

## 2022-04-03 PROCEDURE — 6370000000 HC RX 637 (ALT 250 FOR IP): Performed by: INTERNAL MEDICINE

## 2022-04-03 PROCEDURE — 80053 COMPREHEN METABOLIC PANEL: CPT

## 2022-04-03 PROCEDURE — 2060000000 HC ICU INTERMEDIATE R&B

## 2022-04-03 PROCEDURE — 36415 COLL VENOUS BLD VENIPUNCTURE: CPT

## 2022-04-03 PROCEDURE — 83735 ASSAY OF MAGNESIUM: CPT

## 2022-04-03 PROCEDURE — 6370000000 HC RX 637 (ALT 250 FOR IP): Performed by: NURSE PRACTITIONER

## 2022-04-03 PROCEDURE — 84100 ASSAY OF PHOSPHORUS: CPT

## 2022-04-03 PROCEDURE — 6370000000 HC RX 637 (ALT 250 FOR IP): Performed by: UROLOGY

## 2022-04-03 PROCEDURE — 2580000003 HC RX 258: Performed by: FAMILY MEDICINE

## 2022-04-03 PROCEDURE — 99233 SBSQ HOSP IP/OBS HIGH 50: CPT | Performed by: INTERNAL MEDICINE

## 2022-04-03 PROCEDURE — 85025 COMPLETE CBC W/AUTO DIFF WBC: CPT

## 2022-04-03 RX ADMIN — PETROLATUM: 420 OINTMENT TOPICAL at 08:49

## 2022-04-03 RX ADMIN — TAMSULOSIN HYDROCHLORIDE 0.4 MG: 0.4 CAPSULE ORAL at 21:46

## 2022-04-03 RX ADMIN — DEXTROSE AND SODIUM CHLORIDE: 5; 450 INJECTION, SOLUTION INTRAVENOUS at 12:12

## 2022-04-03 RX ADMIN — Medication: at 21:45

## 2022-04-03 RX ADMIN — Medication: at 08:48

## 2022-04-03 RX ADMIN — AMIODARONE HYDROCHLORIDE 400 MG: 200 TABLET ORAL at 08:47

## 2022-04-03 RX ADMIN — PANTOPRAZOLE SODIUM 40 MG: 40 TABLET, DELAYED RELEASE ORAL at 06:15

## 2022-04-03 RX ADMIN — AMIODARONE HYDROCHLORIDE 400 MG: 200 TABLET ORAL at 21:46

## 2022-04-03 RX ADMIN — METOPROLOL TARTRATE 100 MG: 50 TABLET, FILM COATED ORAL at 21:46

## 2022-04-03 RX ADMIN — PETROLATUM: 420 OINTMENT TOPICAL at 22:11

## 2022-04-03 RX ADMIN — METOPROLOL TARTRATE 100 MG: 50 TABLET, FILM COATED ORAL at 08:47

## 2022-04-03 ASSESSMENT — PAIN SCALES - GENERAL
PAINLEVEL_OUTOF10: 0

## 2022-04-03 NOTE — PROGRESS NOTES
Attending Physician Progress Note     Current Meds:  dextrose 5 % and 0.45 % sodium chloride infusion, Continuous  tamsulosin (FLOMAX) capsule 0.4 mg, Nightly  amiodarone (CORDARONE) tablet 400 mg, BID   Followed by  Lázaro Cano ON 4/11/2022] amiodarone (CORDARONE) tablet 200 mg, Daily  diatrizoate meglumine-sodium (GASTROGRAFIN) 66-10 % solution 120 mL, ONCE PRN  calcium carbonate (TUMS) chewable tablet 500 mg, Q6H PRN  pantoprazole (PROTONIX) tablet 40 mg, QAM AC  white petrolatum ointment, BID  0.9 % sodium chloride bolus, Once  Hydrocerin cream CREA, BID  sodium chloride flush 0.9 % injection 5-40 mL, 2 times per day  sodium chloride flush 0.9 % injection 5-40 mL, PRN  0.9 % sodium chloride infusion, PRN  [Held by provider] apixaban (ELIQUIS) tablet 5 mg, BID  trimethobenzamide (TIGAN) injection 200 mg, Q6H PRN  metoprolol tartrate (LOPRESSOR) tablet 100 mg, BID  sodium chloride flush 0.9 % injection 5-40 mL, 2 times per day  sodium chloride flush 0.9 % injection 5-40 mL, PRN  0.9 % sodium chloride infusion, PRN         Vitals/Labs:    Patient Vitals for the past 24 hrs:   BP Temp Temp src Pulse Resp SpO2   04/03/22 0430 128/70 98 °F (36.7 °C) Oral 77 18 96 %   04/03/22 0000 (!) 156/80 97.7 °F (36.5 °C) Oral 90 26 96 %   04/02/22 2030 (!) 141/75 97.2 °F (36.2 °C) Oral 88 28 96 %   04/02/22 1353 (!) 140/69 98.9 °F (37.2 °C) -- 95 18 98 %   04/02/22 0735 (!) 157/74 97.6 °F (36.4 °C) Oral 81 18 100 %        I/O last 3 completed shifts: In: 0   Out: 5300 [Urine:5300]  No intake/output data recorded.     Recent Labs     04/01/22  0400 04/02/22  0345 04/03/22  0300   WBC 11.4 15.2* 10.2   HGB 10.0* 10.7* 8.8*   HCT 33.9* 35.8* 30.1*    290 262     Recent Labs     04/01/22  0400 04/02/22  0345 04/03/22  0300   CREATININE 1.4* 2.7* 1.8*   BUN 22 30* 23    147* 148*   K 3.6 4.4 3.7   * 111* 113*   CO2 23 21* 25     Recent Labs     04/01/22  0400 04/02/22  0345 04/03/22  0300   AST 30 25 17   ALT 30 26 16 BILITOT 1.3* 1.1 0.8   ALKPHOS 101 94 75     No results for input(s): LIPASE, AMYLASE in the last 72 hours. No results for input(s): LACTATE in the last 72 hours. No results for input(s): INR, PTT in the last 72 hours. Invalid input(s): PT    No new events  Drainage from midline slowed down  Valles catheter placed with large volume urine drained  Urology consult noted and appreciated    Physical Exam:    Abdomen:    soft and non distended.    Non-tender    Incision:   Intact with mild serous drainage    Impression:  Status post laparoscopic right hemicolectomy  Postop ileus and COURTNEY resolving  Urinary retention    Plan:  Discharge planning once okay with consultants      Electronically by Liat Walker MD, MD on 4/3/2022 at 7:11 AM

## 2022-04-03 NOTE — PROGRESS NOTES
Progress Note  Yeyo Donahue       WSRL:[id number]  Patient Name:Miguelangel Lewis. YOB: 1955     Age:66 y.o. Subjective    Subjective afebrile, bp stable, consults noted, appreciated, creatinine 1.8, catheter in, na 148, hgb 8.8  Pt denies n/v today, no confusion, no sob, no abdominal pain  Monitor says afib but it looks like he has p waves  Review of Systems  Objective         Vitals Last 24 Hours:  TEMPERATURE:  Temp  Av.9 °F (36.6 °C)  Min: 97.2 °F (36.2 °C)  Max: 98.9 °F (37.2 °C)  RESPIRATIONS RANGE: Resp  Av.6  Min: 18  Max: 28  PULSE OXIMETRY RANGE: SpO2  Av.2 %  Min: 96 %  Max: 100 %  PULSE RANGE: Pulse  Av.2  Min: 77  Max: 95  BLOOD PRESSURE RANGE: Systolic (04QMN), QTO:980 , Min:128 , RKA:877   ; Diastolic (76QWQ), LMM:43, Min:69, Max:80    I/O (24Hr): Intake/Output Summary (Last 24 hours) at 4/3/2022 0733  Last data filed at 4/3/2022 0616  Gross per 24 hour   Intake 0 ml   Output 5300 ml   Net -5300 ml     Objective  Labs/Imaging/Diagnostics    Labs:  CBC:  Recent Labs     22  0400 22  0345 22  0300   WBC 11.4 15.2* 10.2   RBC 3.73* 3.98 3.31*   HGB 10.0* 10.7* 8.8*   HCT 33.9* 35.8* 30.1*   MCV 90.9 89.9 90.9   RDW 22.8* 23.6* 22.8*    290 262     CHEMISTRIES:  Recent Labs     22  0400 22  0345 22  0300    147* 148*   K 3.6 4.4 3.7   * 111* 113*   CO2 23 21* 25   BUN 22 30* 23   CREATININE 1.4* 2.7* 1.8*   GLUCOSE 106* 113* 123*   PHOS 3.1  --  4.0   MG 2.2  --  2.3     PT/INR:No results for input(s): PROTIME, INR in the last 72 hours. APTT:No results for input(s): APTT in the last 72 hours.   LIVER PROFILE:  Recent Labs     22  0400 22  0345 22  0300   AST 30 25 17   ALT 30 26 16   BILITOT 1.3* 1.1 0.8   ALKPHOS 101 94 75       Imaging Last 24 Hours:  CT ABDOMEN PELVIS W WO CONTRAST Additional Contrast? Oral    Result Date: 2022  EXAMINATION: CT OF THE ABDOMEN AND PELVIS WITH AND WITHOUT CONTRAST 4/1/2022 8:45 pm TECHNIQUE: CT of the abdomen and pelvis was performed with and without the administration of intravenous contrast.  Multiplanar reformatted images are provided for review. Dose modulation, iterative reconstruction, and/or weight based adjustment of the mA/kV was utilized to reduce the radiation dose to as low as reasonably achievable. COMPARISON: CT of the abdomen and pelvis, 01/24/2022. HISTORY: ORDERING SYSTEM PROVIDED HISTORY: SBO TECHNOLOGIST PROVIDED HISTORY: If patient can tolerate oral contrast. Reason for exam:->SBO Additional Contrast?->Oral FINDINGS: Lower Chest: The heart is normal in size. Small bilateral pleural effusions left greater than right. Mild dependent atelectasis in the lower lobes. Organs: Liver: Unremarkable. Gallbladder: Unremarkable. Pancreas: Moderately atrophied. Otherwise, unremarkable. Spleen:  Unremarkable. Adrenals: Unremarkable. Kidneys: Mild bilateral hydroureteronephrosis. No obstructing calculus is seen. A 1 mm nonobstructive intrarenal calculus is seen within an upper pole calyx of the right kidney. Multiple simple and small cysts are seen in the kidneys bilaterally. GI/Bowel: Status post right hemicolectomy. There is mild distension of small bowel loops without evidence of wall thickening or tapering Pelvis: The urinary bladder is distended. Small locule of gas within it is likely the result of recent instrumentation. The prostate gland is moderately enlarged measuring 6.2 x 5.8 x 7.1 cm. The prostate gland is somewhat asymmetrically larger towards the right and protrudes into the bladder base. Peritoneum/Retroperitoneum: Within the right paracolic gutter, there is a 7.8 x 3.1 x 10.0 cm enhancing fluid collection, which extends to the site of the right hemicolectomy sutures. It may represent an abscess. Bones/Soft Tissues: The visualized bones are intact without fracture or focal lesion.   Prominent loss of disc height at L5-S1. Facet hypertrophic changes are noted at L5-S1, worse on right. Severe right neural foraminal stenosis at L5-S1     1. Within the right paracolic gutter extending to the site of the right hemicolectomy, there is a 7.8 x 3.1 x 10.0 cm fluid collection with rim enhancement. It may represent ABSCESS. 2. There is mild dilation of small bowel loops without evidence1 of obstruction. Findings likely represent ILEUS. 3. MILD HYDROURETERONEPHROSIS likely related to a distended urinary bladder. 4. Prominently ENLARGED PROSTATE. Asymmetrically prominent prostate gland on right exerts mass effect on the bladder base. Findings may due BPH, although a neoplastic etiology be excluded. RECOMMENDATIONS: Unavailable     XR ABDOMEN (KUB) (SINGLE AP VIEW)    Result Date: 4/1/2022  EXAMINATION: ONE SUPINE XRAY VIEW(S) OF THE ABDOMEN 4/1/2022 10:24 am COMPARISON: 01/29/2018, CT of 01/24/2022 HISTORY: ORDERING SYSTEM PROVIDED HISTORY: ileus TECHNOLOGIST PROVIDED HISTORY: Reason for exam:->ileus FINDINGS: Multiple dilated small bowel loops are identified in the central abdomen just to the left of midline with apparent fold/wall edema. Maximum diameter in the left lower quadrant is 4.1 cm. There is mild gaseous distention of the stomach and the transverse colon. No free intraperitoneal air. Left lung bases show subsegmental atelectasis and possible effusion. Right lung base appears clear. No organomegaly. Postop changes are noted in the right abdomen. Osseous structures are stable. No gas is noted in the rectal vault. Findings compatible with small-bowel obstruction. There is bowel wall edema. No free air detected. Consider CT of the abdomen and pelvis if clinically indicated. Awake, alert  Appears comfortable  heasrt distant but regular  Lungs ctab, decreased bases  Abdomen distended. , soft, bs present  Rt leg with adema  Assessment//Plan           Hospital Problems           Last Modified POA    * (Principal) Benign neoplasm of cecum 3/22/2022 Yes    S/P right hemicolectomy 3/22/2022 Yes    PSVT (paroxysmal supraventricular tachycardia) ([address] 3/25/2022 Yes    Primary hypertension 3/25/2022 Yes        Assessment & Plan  paf controlled  Acute on chronic kidney failure improving  Hypernatremia  Urine retention  Moderate protein calorie malnutrition  Iv fluids, catheter, labs  Electronically signed by John Chavez DO on 4/3/22 at 7:33 AM EDT

## 2022-04-03 NOTE — CARE COORDINATION
Received message from Cedrick at [facility]  [phone] indicating that facility has received SNF auth and could accept patient for admission today. Chart reviewed and discussion had with DESTINY Pelletier floor charge. Patient is not clinically ready for discharge. Call placed to Cedrick to inform her of same. She verbalized request to have CM/SW staff notify her when to re-submit request for auth as the one in hand would  today. Chloe Prieto.  Dior, MSN, RN  [facility] Case Management  [phone]

## 2022-04-03 NOTE — PROGRESS NOTES
Physical Therapy    [facility]  Treatment note    NAME: Greta Cee. : 1955  MRN: [id number]    Date of Service: 4/3/2022               Patient Diagnosis(es): There were no encounter diagnoses. has a past medical history of Employs prosthetic leg, History of atrial fibrillation, [place] (hard of hearing), Hx of AKA (above knee amputation), left ([facility] [address] Hx of necrotizing fasciitis, Hypertension, Rectal bleeding, and Rectal cancer ([facility] [address] has a past surgical history that includes above knee amputation (); Colonoscopy (10/21/2011); Eye surgery (Left, 10/03/2016); eye surgery (Bilateral, ?); eye surgery (Right, ?); Colon surgery (2018); Abdomen surgery (2018); pr revision of ileostomy,complicated (N/A, ); Colonoscopy (N/A, 2022); Cardioversion (2020); and hemicolectomy (Right, 3/22/2022). Evaluating Therapist: Maren Francis, PT     Referring Provider:  Richard Estrada MD    PT order : PT eval and treat     Room #:  411   DIAGNOSIS:  Benign neoplasm of cecum s/p L hemicolectomy 3/23/2022   Additional Pertinent History: L AKA   PRECAUTIONS:  Falls , splinting , [place]     Social:  Pt lives with  Mother and sister   in a  2  floor plan  With first floor set up , 3 steps with 1 HR to enter    Prior to admission pt walked with  QC and L prosthesis      Initial Evaluation  Date: 3/24/2022  Treatment  4/3/2022    Short Term/ Long Term   Goals   Was pt agreeable to Eval/treatment?  Yes  yes    Does pt have pain?  abd , incisional   No complaints    Bed Mobility  Rolling: SBA  Supine to sit: min assist   Sit to supine:  NT   Scooting:  SBA  Rolling: SBA  Supine to sit: SBA  Scooting: SBA seated to EOB  independent    Transfers Sit to stand:  CGA/min assist   Stand to sit:  SBA/CGA   Stand pivot:  Min assist  Sit <> stand: CGA  Stand Pivot: CGA  independent    Ambulation   NT  25 feet x 1 using WW for support CGA for balance  100  feet with  QC with  Independent        Stair negotiation: ascended and descended NT  NT  4  steps with  1  rail with  SBA    LE ROM  Grossly WFL      LE strength  4- to 4/ 5      AM- PAC RAW score  14/ 24  16/24            Pt is alert and able to follow instruction  Balance: fair minus dynamic using [employer] for support    Pt performed therapeutic exercise of the following: NT    Patient education  Pt was educated on UE usage to assist with transfers, gait promoting posture and staying within [employer] base of support    Patient response to education:   Pt verbalized understanding Pt demonstrated skill Pt requires further education in this area   yes With prompt for transfer safety yes     ASSESSMENT:   Comments: Pt found in bed, agreed to Rx. Pt assisted self to EOB, sat EOB SBA, was able to jamey L LE AKA prosthesis without assistance. Pt stood using [employer] for support approx 2 minutes with SBA for balance while receiving hygiene care. Pivot performed bed to chair. Gait performed a short distance in the room, annette remains slow, inconsistent and laboring. Pt unsteady throughout, required light hands on assist for balance and safety. Pt short of breath, fatigued after activity. 02 saturation 94% after activity on room air. Again noted L LE AKA prosthesis IR with sitting, Pt states this normal. Noted good alignment during gait performed. Treatment: Pt practiced and was instructed in the following treatment: transfer safety, gait mechanics. Pt was left upright in a recliner chair with call light in reach. Chair/bed alarm: chair alarm active    Time in 1209   Time out 1235   Total Treatment Time 26 minutes   CPT codes:     Therapeutic activities 07492 26 minutes   Therapeutic exercises 53102 0 minutes       Pt is making fair progress toward established Physical Therapy goals. Continue with physical therapy current plan of care.     Tavares Bowles PTA   License Number: [id number]

## 2022-04-03 NOTE — PROGRESS NOTES
[address] [place] [address] FOLLOW-UP    Name: Sandralee Bence. Age: 74 y.o. Date of Admission: 3/22/2022  5:36 AM    Date of Service: 4/3/2022    Chief Complaint: Follow-up for SVT  Interim History:  No new overnight cardiac complaints. Currently with no complaints of CP, SOB, palpitations, dizziness, or lightheadedness. .  Patient underwent successful ОЛЬГА guided cardioversion on 3/30/2022 and remains in sinus rhythm with intermittent episodes of AF. Has been having bleeding from his abdominal incision site without drop in his Hb,  CT abd showed no abscess or hematoma as per surgery. Normal sinus rhythm heart rate in the 80s on telemetry with intermittent episodes of atrial fibrillation. 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    Rectal cancer ([address]    Ileus ([address]    Atrial flutter with rapid ventricular response (HCC)    GI bleed    Gastrointestinal bleed    Benign neoplasm of cecum    S/P right hemicolectomy    PSVT (paroxysmal supraventricular tachycardia) ([address]    Primary hypertension       Allergies:   Allergies   Allergen Reactions    Cefepime Rash    Clindamycin/Lincomycin Rash    Pcn [Penicillins] Rash       Current Medications:  Current Facility-Administered Medications   Medication Dose Route Frequency Provider Last Rate Last Admin    dextrose 5 % and 0.45 % sodium chloride infusion   IntraVENous Continuous Aminah Yu  mL/hr at 04/03/22 1212 New Bag at 04/03/22 1212    tamsulosin (FLOMAX) capsule 0.4 mg  0.4 mg Oral Nightly Glendy Cordova MD   0.4 mg at 04/02/22 2041    amiodarone (CORDARONE) tablet 400 mg  400 mg Oral BID Robert Espinal MD   400 mg at 04/03/22 0847    Followed by   Aarti Chicas ON 4/11/2022] amiodarone (CORDARONE) tablet 200 mg  200 mg Oral Daily Robert Espinal MD        diatrizoate meglumine-sodium (GASTROGRAFIN) 66-10 % solution 120 mL  120 mL Oral ONCE PRN Vannie Fess, DO   120 mL at 04/01/22 2041    calcium carbonate (TUMS) chewable tablet 500 mg  500 mg Oral Q6H PRN Hernán Dudley MD   500 mg at 03/31/22 1609    pantoprazole (PROTONIX) tablet 40 mg  40 mg Oral QAM AC Hernán Dudley MD   40 mg at 04/03/22 0615    white petrolatum ointment   Topical BID Carissa Miller MD   Given at 04/03/22 0849    0.9 % sodium chloride bolus  1,000 mL IntraVENous Once Robert Espinal MD        Hydrocerin cream CREA   Topical BID Hernán Dudley MD   Given at 04/03/22 0848    sodium chloride flush 0.9 % injection 5-40 mL  5-40 mL IntraVENous 2 times per Robert Espinal MD   10 mL at 04/02/22 2040    sodium chloride flush 0.9 % injection 5-40 mL  5-40 mL IntraVENous PRN Robert Espinal MD   10 mL at 04/01/22 1000    0.9 % sodium chloride infusion  25 mL IntraVENous PRN Robert Espinal MD        [Held by provider] apixaban Janeen Gi) tablet 5 mg  5 mg Oral BID Robert Espinal MD   5 mg at 04/01/22 0957    trimethobenzamide (TIGAN) injection 200 mg  200 mg IntraMUSCular Q6H PRN Robert Espinal MD   200 mg at 04/01/22 0611    metoprolol tartrate (LOPRESSOR) tablet 100 mg  100 mg Oral BID Arabella Vargas.  FAVIO Tabor   100 mg at 04/03/22 0847    sodium chloride flush 0.9 % injection 5-40 mL  5-40 mL IntraVENous 2 times per day Chino Tinajero MD   10 mL at 03/31/22 0900    sodium chloride flush 0.9 % injection 5-40 mL  5-40 mL IntraVENous PRN Chino Tinajero MD   10 mL at 03/28/22 1547    0.9 % sodium chloride infusion  25 mL IntraVENous PRN Chino Tinajero MD          dextrose 5 % and 0.45 % NaCl 100 mL/hr at 04/03/22 1212    sodium chloride      sodium chloride         Physical Exam:  /65   Pulse 62   Temp 97.8 °F (36.6 °C) (Oral)   Resp 18   Ht 6' 2\" (1.88 m)   Wt 192 lb 3.2 oz (87.2 kg)   SpO2 96%   BMI 24.68 kg/m²   Wt Readings from Last 3 Encounters:   04/02/22 192 lb 3.2 oz (87.2 kg)   03/15/22 190 lb (86.2 kg)   02/14/22 187 lb (84.8 kg)     Appearance: Awake, alert, no acute respiratory distress  Skin: Intact, no rash  Head: Normocephalic, atraumatic  Eyes: EOMI, no conjunctival erythema  ENMT: No pharyngeal erythema, MMM, no rhinorrhea  Neck: Supple, no elevated JVP, no carotid bruits  Lungs: Clear to auscultation bilaterally. No wheezes, rales, or rhonchi. Cardiac: Regular rate and rhythm, tachycardic +S1S2, no murmurs apparent  Abdomen: Soft, nontender, +bowel sounds, surgical sites looks clean with intermittent bloody discharge with cough. Extremities: Moves all extremities x 4, no lower extremity edema, rash over the right LLE is improving. Neurologic: No focal motor deficits apparent, normal mood and affect  Peripheral Pulses: Intact posterior tibial pulses bilaterally    Intake/Output:    Intake/Output Summary (Last 24 hours) at 4/3/2022 1441  Last data filed at 4/3/2022 0616  Gross per 24 hour   Intake --   Output 2100 ml   Net -2100 ml     No intake/output data recorded.     Laboratory Tests:  Recent Labs     04/01/22  0400 04/02/22  0345 04/03/22  0300    147* 148*   K 3.6 4.4 3.7   * 111* 113*   CO2 23 21* 25   BUN 22 30* 23   CREATININE 1.4* 2.7* 1.8*   GLUCOSE 106* 113* 123*   CALCIUM 8.4* 8.2* 7.6*     Lab Results   Component Value Date    MG 2.3 04/03/2022     Recent Labs     04/01/22  0400 04/02/22  0345 04/03/22  0300   ALKPHOS 101 94 75   ALT 30 26 16   AST 30 25 17   PROT 6.0* 6.2* 4.8*   BILITOT 1.3* 1.1 0.8   LABALBU 2.9* 2.9* 2.5*     Recent Labs     04/01/22  0400 04/02/22  0345 04/03/22  0300   WBC 11.4 15.2* 10.2   RBC 3.73* 3.98 3.31*   HGB 10.0* 10.7* 8.8*   HCT 33.9* 35.8* 30.1*   MCV 90.9 89.9 90.9   MCH 26.8 26.9 26.6   MCHC 29.5* 29.9* 29.2*   RDW 22.8* 23.6* 22.8*    290 262   MPV 10.0 10.0 9.9     Lab Results   Component Value Date    CKTOTAL 12 (L) 03/18/2011    CKMB <0.2 03/18/2011    TROPONINI <0.01 12/19/2020    TROPONINI <0.01 03/18/2011    TROPONINI 0.02 01/03/2011     Lab Results   Component Value Date    INR 1.1 01/24/2022    INR 1.0 12/19/2020    INR 1.2 03/18/2011    PROTIME 12.6 (H) 01/24/2022    PROTIME 11.7 12/19/2020    PROTIME 12.2 03/18/2011     Lab Results   Component Value Date    TSH 1.050 03/25/2022     Lab Results   Component Value Date    LABA1C 5.6 12/20/2020     No results found for: EAG  Lab Results   Component Value Date    CHOL 114 12/20/2020     Lab Results   Component Value Date    TRIG 76 12/20/2020     Lab Results   Component Value Date    HDL 41 12/20/2020     Lab Results   Component Value Date    LDLCALC 58 12/20/2020     Lab Results   Component Value Date    LABVLDL 15 12/20/2020     No results found for: CHOLHDLRATIO    Cardiac Tests:  Telemetry findings reviewed: Normal sinus rhythm with intermittent episodes of AF and non sustained VT or aberrant conduction. Heart rate in the 80s. EKG: Aflutter with 2:1 conduction, NSST, abnormal EKG. Labs and vitals were reviewed: Blood pressure 135/65 heart rate 62, sats 96 %on room air    Labs/BUN creat 46/2.0>>40/1.6>> 34/1.4>> 24/1.2>>30/2.7>> 23/1.8, sodium 148, H/H 8/26.8>>9.4/30.8>> 9.2/31.7>>10.7/35.8>> 8.8/30.1,  WBC 19.3>> 17.3>>11.4>>15.2    1. Pharmacological stress test: 1/2/2011: LVEF 54%, with no evidence of stress-induced ischemia. 2. TTE: 12/20/2020 (Dr. Samson George), LVEF 55-60%, no wall motion abnormality, mildly dilated right ventricle, trace MR, trace pericardial effusion, TAPSE 21 mm.        Assessment:  · PAF and now with persistent AF/flutter with RVR, underwent successful ОЛЬГА guided cardioversion on 3/30/2022, and now, with intermittent episodes of AF, currently in NSR with frequent PACs and intermittent A. fib. · [id number] Vasc score-2, off anticoagulation due to bleeding from surgical site and hemoglobin dropped from 10.3->8.8  · Status post hemicolectomy for cecal malignancy  · History of hypertension now blood pressure is soft>> improved chronic blood pressure 127/87  · COURTNEY creatinine 1.6>> 1.4>>1.3>> 1.2>>2.7>> 1.8  · Left lower extremity above-knee amputation  · Hard of hearing  · Hypokalemia, improved. · Generalized erythematous rash, etiology unclear, drug rash or infection. Discussed with Dr. Thomas Romero and he thinks it is mostly from contact dermatitis from laundry detergent>> improving. .  · Hemodynamically stable, /74  · Mild anemia, Hb 10.3>> 10.3>>8.8  · Hyponatremia secondary dehydration. Plan:   · Continue metoprolol 100 mg p.o. twice daily and amiodarone loading dose for rate and rhythm control  · Hold Eliquis due to abdominal incision bleeding, restart anticoagulant therapy if okay with surgical service  · Rest as per primary service. · Patient NPO due aspiration risk and ileus, continue IVF due to dehydration and hypernatremia  Secondary to decreased free water intake. · Will follow. Jacey Pedraza MD., Mamie Rose.   [facility]) Cardiology

## 2022-04-04 LAB
ALBUMIN SERPL-MCNC: 2.3 G/DL (ref 3.5–5.2)
ALP BLD-CCNC: 70 U/L (ref 40–129)
ALT SERPL-CCNC: 14 U/L (ref 0–40)
ANION GAP SERPL CALCULATED.3IONS-SCNC: 9 MMOL/L (ref 7–16)
ANISOCYTOSIS: ABNORMAL
AST SERPL-CCNC: 16 U/L (ref 0–39)
BASOPHILS ABSOLUTE: 0.03 E9/L (ref 0–0.2)
BASOPHILS RELATIVE PERCENT: 0.3 % (ref 0–2)
BILIRUB SERPL-MCNC: 0.7 MG/DL (ref 0–1.2)
BUN BLDV-MCNC: 17 MG/DL (ref 6–23)
CALCIUM SERPL-MCNC: 7.6 MG/DL (ref 8.6–10.2)
CHLORIDE BLD-SCNC: 110 MMOL/L (ref 98–107)
CO2: 25 MMOL/L (ref 22–29)
CREAT SERPL-MCNC: 1.4 MG/DL (ref 0.7–1.2)
EOSINOPHILS ABSOLUTE: 0 E9/L (ref 0.05–0.5)
EOSINOPHILS RELATIVE PERCENT: 0 % (ref 0–6)
GFR AFRICAN AMERICAN: >60
GFR NON-AFRICAN AMERICAN: 50 ML/MIN/1.73
GLUCOSE BLD-MCNC: 121 MG/DL (ref 74–99)
HCT VFR BLD CALC: 28.1 % (ref 37–54)
HEMOGLOBIN: 8.3 G/DL (ref 12.5–16.5)
IMMATURE GRANULOCYTES #: 0.05 E9/L
IMMATURE GRANULOCYTES %: 0.5 % (ref 0–5)
LYMPHOCYTES ABSOLUTE: 0.9 E9/L (ref 1.5–4)
LYMPHOCYTES RELATIVE PERCENT: 9.2 % (ref 20–42)
MCH RBC QN AUTO: 26.9 PG (ref 26–35)
MCHC RBC AUTO-ENTMCNC: 29.5 % (ref 32–34.5)
MCV RBC AUTO: 90.9 FL (ref 80–99.9)
MONOCYTES ABSOLUTE: 0.48 E9/L (ref 0.1–0.95)
MONOCYTES RELATIVE PERCENT: 4.9 % (ref 2–12)
NEUTROPHILS ABSOLUTE: 8.31 E9/L (ref 1.8–7.3)
NEUTROPHILS RELATIVE PERCENT: 85.1 % (ref 43–80)
OVALOCYTES: ABNORMAL
PDW BLD-RTO: 21.8 FL (ref 11.5–15)
PLATELET # BLD: 253 E9/L (ref 130–450)
PMV BLD AUTO: 10 FL (ref 7–12)
POIKILOCYTES: ABNORMAL
POLYCHROMASIA: ABNORMAL
POTASSIUM SERPL-SCNC: 3.3 MMOL/L (ref 3.5–5)
RBC # BLD: 3.09 E12/L (ref 3.8–5.8)
SODIUM BLD-SCNC: 144 MMOL/L (ref 132–146)
TEAR DROP CELLS: ABNORMAL
TOTAL PROTEIN: 5.7 G/DL (ref 6.4–8.3)
WBC # BLD: 9.8 E9/L (ref 4.5–11.5)

## 2022-04-04 PROCEDURE — 6370000000 HC RX 637 (ALT 250 FOR IP)

## 2022-04-04 PROCEDURE — 2580000003 HC RX 258: Performed by: INTERNAL MEDICINE

## 2022-04-04 PROCEDURE — 97530 THERAPEUTIC ACTIVITIES: CPT

## 2022-04-04 PROCEDURE — 97535 SELF CARE MNGMENT TRAINING: CPT

## 2022-04-04 PROCEDURE — 36415 COLL VENOUS BLD VENIPUNCTURE: CPT

## 2022-04-04 PROCEDURE — 6370000000 HC RX 637 (ALT 250 FOR IP): Performed by: INTERNAL MEDICINE

## 2022-04-04 PROCEDURE — 99233 SBSQ HOSP IP/OBS HIGH 50: CPT | Performed by: INTERNAL MEDICINE

## 2022-04-04 PROCEDURE — 2060000000 HC ICU INTERMEDIATE R&B

## 2022-04-04 PROCEDURE — 6370000000 HC RX 637 (ALT 250 FOR IP): Performed by: UROLOGY

## 2022-04-04 PROCEDURE — 6370000000 HC RX 637 (ALT 250 FOR IP): Performed by: NURSE PRACTITIONER

## 2022-04-04 PROCEDURE — 80053 COMPREHEN METABOLIC PANEL: CPT

## 2022-04-04 PROCEDURE — 2580000003 HC RX 258: Performed by: SURGERY

## 2022-04-04 PROCEDURE — 85025 COMPLETE CBC W/AUTO DIFF WBC: CPT

## 2022-04-04 RX ORDER — POTASSIUM CHLORIDE 20 MEQ/1
40 TABLET, EXTENDED RELEASE ORAL ONCE
Status: COMPLETED | OUTPATIENT
Start: 2022-04-04 | End: 2022-04-04

## 2022-04-04 RX ADMIN — SODIUM CHLORIDE, PRESERVATIVE FREE 10 ML: 5 INJECTION INTRAVENOUS at 21:41

## 2022-04-04 RX ADMIN — PETROLATUM: 420 OINTMENT TOPICAL at 21:40

## 2022-04-04 RX ADMIN — METOPROLOL TARTRATE 100 MG: 50 TABLET, FILM COATED ORAL at 21:40

## 2022-04-04 RX ADMIN — Medication 10 ML: at 21:39

## 2022-04-04 RX ADMIN — Medication 10 ML: at 08:23

## 2022-04-04 RX ADMIN — PETROLATUM: 420 OINTMENT TOPICAL at 08:21

## 2022-04-04 RX ADMIN — METOPROLOL TARTRATE 100 MG: 50 TABLET, FILM COATED ORAL at 08:19

## 2022-04-04 RX ADMIN — TAMSULOSIN HYDROCHLORIDE 0.4 MG: 0.4 CAPSULE ORAL at 21:40

## 2022-04-04 RX ADMIN — POTASSIUM BICARBONATE 40 MEQ: 782 TABLET, EFFERVESCENT ORAL at 21:39

## 2022-04-04 RX ADMIN — POTASSIUM BICARBONATE 40 MEQ: 782 TABLET, EFFERVESCENT ORAL at 11:49

## 2022-04-04 RX ADMIN — AMIODARONE HYDROCHLORIDE 400 MG: 200 TABLET ORAL at 08:19

## 2022-04-04 RX ADMIN — Medication: at 08:20

## 2022-04-04 RX ADMIN — Medication: at 21:40

## 2022-04-04 RX ADMIN — POTASSIUM CHLORIDE 40 MEQ: 20 TABLET, EXTENDED RELEASE ORAL at 08:20

## 2022-04-04 RX ADMIN — AMIODARONE HYDROCHLORIDE 400 MG: 200 TABLET ORAL at 21:40

## 2022-04-04 RX ADMIN — PANTOPRAZOLE SODIUM 40 MG: 40 TABLET, DELAYED RELEASE ORAL at 05:59

## 2022-04-04 RX ADMIN — SODIUM CHLORIDE, PRESERVATIVE FREE 10 ML: 5 INJECTION INTRAVENOUS at 08:24

## 2022-04-04 ASSESSMENT — PAIN SCALES - GENERAL
PAINLEVEL_OUTOF10: 0

## 2022-04-04 NOTE — PROGRESS NOTES
Department of Internal Vik Orellana M.D.  Progress Note      SUBJECTIVE: He maintains he was eating over the weekend but no recording of intake diet was never ordered    OBJECTIVE abdominal wound covered with dressing abdomen is slightly distended but very soft and nonpainful    Medications    Current Facility-Administered Medications: potassium chloride (KLOR-CON M) extended release tablet 40 mEq, 40 mEq, Oral, Once  tamsulosin (FLOMAX) capsule 0.4 mg, 0.4 mg, Oral, Nightly  amiodarone (CORDARONE) tablet 400 mg, 400 mg, Oral, BID **FOLLOWED BY** [START ON 4/11/2022] amiodarone (CORDARONE) tablet 200 mg, 200 mg, Oral, Daily  diatrizoate meglumine-sodium (GASTROGRAFIN) 66-10 % solution 120 mL, 120 mL, Oral, ONCE PRN  calcium carbonate (TUMS) chewable tablet 500 mg, 500 mg, Oral, Q6H PRN  pantoprazole (PROTONIX) tablet 40 mg, 40 mg, Oral, QAM AC  white petrolatum ointment, , Topical, BID  0.9 % sodium chloride bolus, 1,000 mL, IntraVENous, Once  Hydrocerin cream CREA, , Topical, BID  sodium chloride flush 0.9 % injection 5-40 mL, 5-40 mL, IntraVENous, 2 times per day  sodium chloride flush 0.9 % injection 5-40 mL, 5-40 mL, IntraVENous, PRN  0.9 % sodium chloride infusion, 25 mL, IntraVENous, PRN  [Held by provider] apixaban (ELIQUIS) tablet 5 mg, 5 mg, Oral, BID  trimethobenzamide (TIGAN) injection 200 mg, 200 mg, IntraMUSCular, Q6H PRN  metoprolol tartrate (LOPRESSOR) tablet 100 mg, 100 mg, Oral, BID  sodium chloride flush 0.9 % injection 5-40 mL, 5-40 mL, IntraVENous, 2 times per day  sodium chloride flush 0.9 % injection 5-40 mL, 5-40 mL, IntraVENous, PRN  0.9 % sodium chloride infusion, 25 mL, IntraVENous, PRN  Physical    VITALS:  BP (!) 158/72   Pulse 68   Temp 97.8 °F (36.6 °C) (Oral)   Resp 16   Ht 6' 2\" (1.88 m)   Wt 192 lb 3.2 oz (87.2 kg)   SpO2 95%   BMI 24.68 kg/m²   24HR INTAKE/OUTPUT:    Intake/Output Summary (Last 24 hours) at 4/4/2022 4897  Last data filed at 4/4/2022 0525  Gross per 24 hour   Intake --   Output 550 ml   Net -550 ml     LUNGS: Decreased breath sounds throughout  CARDIOVASCULAR: S1-S2 occasional skipped beats  Data    CBC with Differential:    Lab Results   Component Value Date    WBC 9.8 04/04/2022    RBC 3.09 04/04/2022    HGB 8.3 04/04/2022    HCT 28.1 04/04/2022     04/04/2022    MCV 90.9 04/04/2022    MCH 26.9 04/04/2022    MCHC 29.5 04/04/2022    RDW 21.8 04/04/2022    NRBC 0.0 03/31/2022    SEGSPCT 47 03/18/2011    METASPCT 0.9 03/30/2022    LYMPHOPCT 9.2 04/04/2022    PROMYELOPCT 1.8 03/28/2022    MONOPCT 4.9 04/04/2022    MYELOPCT 1.8 03/28/2022    BASOPCT 0.3 04/04/2022    MONOSABS 0.48 04/04/2022    LYMPHSABS 0.90 04/04/2022    EOSABS 0.00 04/04/2022    BASOSABS 0.03 04/04/2022     CMP:    Lab Results   Component Value Date     04/04/2022    K 3.3 04/04/2022    K 4.1 03/24/2022     04/04/2022    CO2 25 04/04/2022    BUN 17 04/04/2022    CREATININE 1.4 04/04/2022    GFRAA >60 04/04/2022    LABGLOM 50 04/04/2022    GLUCOSE 121 04/04/2022    GLUCOSE 106 03/24/2011    PROT 5.7 04/04/2022    LABALBU 2.3 04/04/2022    LABALBU 2.4 03/20/2011    CALCIUM 7.6 04/04/2022    BILITOT 0.7 04/04/2022    ALKPHOS 70 04/04/2022    AST 16 04/04/2022    ALT 14 04/04/2022     PT/INR:    Lab Results   Component Value Date    PROTIME 12.6 01/24/2022    PROTIME 12.2 03/18/2011    INR 1.1 01/24/2022       ASSESSMENT AND PLAN      Principal Problem:  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

## 2022-04-04 NOTE — PROGRESS NOTES
Occupational Therapy  OT BEDSIDE TREATMENT NOTE      Date:2022  Patient Name: Jose Richards. MRN: [id number]  : 1955  Room: [address] [place] [address]     Evaluating OT: Serena KEE OTR/L #[id number]     Referring Provider: Jareth Olivera MD  Specific Provider Orders/Date: eval and treat 3/23/2022     Diagnosis:  Benign neoplasm of cecum [D12.0]  S/P right hemicolectomy [Z90.49]    Procedure: Right hemicolectomy 3/22/22      Pertinent Medical History:   Past Medical History        Past Medical History:   Diagnosis Date    Employs prosthetic leg       left    History of atrial fibrillation      [place] (hard of hearing)       uses bilat hearing aides    Hx of AKA (above knee amputation), left ([facility] [address]      Hx of necrotizing fasciitis      left leg    Hypertension      Rectal bleeding      Rectal cancer ([facility] [address] 2017     rectal            Precautions:  fall risk, alarm, O2 2L, L prosthesis     Assessment of current deficits   [x]? Functional mobility             [x]?ADLs           [x]? Strength                  []?Cognition   [x]? Functional transfers           [x]? IADLs         [x]? Safety Awareness   [x]? Endurance   []? Fine Coordination              [x]? Balance      []? Vision/perception   []? Sensation     []? Gross Motor Coordination  []? ROM           []?  Delirium                   []? Motor Control      OT PLAN OF CARE   OT POC based on physician orders, patient diagnosis and results of clinical assessment     Frequency/Duration 2-5 days/wk for 10-14 days PRN   Specific OT Treatment Interventions to include:   * Instruction/training on adapted ADL techniques and AE recommendations to increase functional independence within precautions       * Training on energy conservation strategies, correct breathing pattern and techniques to improve independence/tolerance for self-care routine  * Functional transfer/mobility training/DME recommendations for increased independence, safety, and fall prevention  * Patient/Family education to increase follow through with safety techniques and functional independence  * Recommendation of environmental modifications for increased safety with functional transfers/mobility and ADLs  * Therapeutic exercise to improve motor endurance, ROM, and functional strength for ADLs/functional transfers  * Therapeutic activities to facilitate/challenge dynamic balance, stand tolerance for increased safety and independence with ADLs     Recommended Adaptive Equipment: TBD     Home Living: Pt lives with mother and sister in a 2 story home with laundry in the basement, pt states the family only uses one floor for living space. Bathroom setup: walk in shower with 3-4\" ledge   Equipment Owned: walker, quad cane, L leg prosthetic      Prior Level of Function: independent with ADLs, independent with IADLs. Completed functional mobility with cane     Pain Level: no pain reported     Cognition: A&O: alert, following multi step directions              Problem solving:  Fair +              Sequencing: fair +              Memory: fair +              Judgement/safety:  Fair +                Functional Assessment: AM-PAC Daily Activity Raw Score: 15/24    Initial Eval Status  Date: 3/24/22 Treatment session: 3/29/22 STGs=LTGS  Timeframe 10-14 days      Feeding  independent       Grooming  SBA-set up   Independent    UB Dressing SBA    independent    LB Dressing Mod A  Assist with RLE donning socks  Initiated but unable to jamey LLE prosthetic d/t respiratory status Max A to don R shoe and mod A to don brief  Min Assist required to don liner for prosthesis and for standing balance when donning prosthesis.   Mod I with use of AD as needed   Bathing Min A     Independent    Toileting Min A Max A, patient with cath and incontinent of bowel, assist required for hygiene.   independent   Bed Mobility  Supine to sit: SBA Supine to sit SBA  independent   Functional Transfers Min A  Sit <> stand from bed surface to attempt to jamey prosthetic     SBA  Stand pivot transfer to chair only d/t unable to jamey prosthetic fully Sit <> stand min A  independent   Functional Mobility NT stand pivot transfer for chair only d/t unable to fully jamey prostetic In room using [employer] min A  Mod I with AD as needed with good tolerance   Balance Sitting:        Static: fair +       Dynamic: fair +     Standing: fair without prosthetic donned  Standing balance min A with WW while donning prosthesis Sitting:        Static: good       Dynamic: good     Standing: good   Activity Tolerance fair  Pt limited by SOB and increased HR with light activity     At rest O2 stats 90%     Sitting EOB 92% O2 stats and HR increased to 160 bpm-nursing notified     96% O2 stats HR decreased to 127% sitting in chair Fair, O2 sat 94 after activity despite SOB. good during ADL activity. Pt will verbalize and demonstrate good understanding of ECWS techniques       Comments:  Patient cleared with nursing and agreeable to therapy. Patient with good participation and motivated despite being quick to fatigue, improved tolerance demonstrated. Patient in chair with call light in reach and alarm on at the end of the session    Education/treatment: ADL and functional transfer/activity performed to increase safety and independence during self care tasks. Education provided on safety awareness, adl reeducation, functional transfer training, breathing technique    · Pt has made progress towards set goals.      Time In: 9:48  Time Out: 10:17     Min Units   Therapeutic Ex 74458     Therapeutic Activities 62107 10 1   ADL/Self Care 74649 19 1   Orthotic Management 47661     Neuro Re-Ed 32299     Non-Billable Time     TOTAL TIMED TREATMENT [address] [place]/L [id number]

## 2022-04-04 NOTE — CARE COORDINATION
4/4/2022  Social Work Discharge Planning:Pt is to go to [facility] at discharge. Notified Samira Camacho that OT update for new precert is in. X-26, transport form and 7000 completed.  Electronically signed by TAMI Euceda on 4/4/2022 at 9:48 AM

## 2022-04-04 NOTE — PROGRESS NOTES
[address] [place] [address] FOLLOW-UP    Name: Celina Pinto. Age: 74 y.o. Date of Admission: 3/22/2022  5:36 AM    Date of Service: 4/4/2022    Chief Complaint: Follow-up for villous adenoma of the colon, paroxysmal atrial fibrillation, hypertension, resolving acute kidney injury, resolved hypernatremia, anemia, hypokalemia    Interim History: The patient is presently quietly sleeping with persistent maintenance of sinus rhythm and improvement of his renal function and hypernatremia. Review of Systems: The remainder of a complete multisystem review including consitutional, central nervous, respiratory, circulatory, gastrointestinal, genitourinary, endocrinologic, hematologic, musculoskeletal and psychiatric are negative. Problem List:  Patient Active Problem List   Diagnosis    Retinal detachment of left eye with multiple breaks    Rectal cancer ([address]    Ileus ([address]    Atrial flutter with rapid ventricular response (HCC)    GI bleed    Gastrointestinal bleed    Benign neoplasm of cecum    S/P right hemicolectomy    PSVT (paroxysmal supraventricular tachycardia) ([address]    Primary hypertension       Allergies:   Allergies   Allergen Reactions    Cefepime Rash    Clindamycin/Lincomycin Rash    Pcn [Penicillins] Rash       Current Medications:  Current Facility-Administered Medications   Medication Dose Route Frequency Provider Last Rate Last Admin    dextrose 5 % and 0.45 % sodium chloride infusion   IntraVENous Continuous Will Bower  mL/hr at 04/03/22 2110 Rate Change at 04/03/22 2110    tamsulosin (FLOMAX) capsule 0.4 mg  0.4 mg Oral Nightly Randy Cordova MD   0.4 mg at 04/03/22 2146    amiodarone (CORDARONE) tablet 400 mg  400 mg Oral BID Anjum Garcia MD   400 mg at 04/03/22 2146    Followed by   Jabari Oquendo ON 4/11/2022] amiodarone (CORDARONE) tablet 200 mg  200 mg Oral Daily Anjum Garcia MD        diatrizoate meglumine-sodium (GASTROGRAFIN) 66-10 % solution 120 mL  120 mL Oral ONCE PRN Hollie Boeck, DO   120 mL at 04/01/22 2041    calcium carbonate (TUMS) chewable tablet 500 mg  500 mg Oral Q6H PRN Javy Narvaez MD   500 mg at 03/31/22 1609    pantoprazole (PROTONIX) tablet 40 mg  40 mg Oral QAM AC Javy Narvaez MD   40 mg at 04/04/22 0559    white petrolatum ointment   Topical BID Ny Arenas MD   Given by Other at 04/03/22 2211    0.9 % sodium chloride bolus  1,000 mL IntraVENous Once Anjum Garcia MD        Hydrocerin cream CREA   Topical BID Javy Narvaez MD   Given at 04/03/22 2145    sodium chloride flush 0.9 % injection 5-40 mL  5-40 mL IntraVENous 2 times per day Anjum Garcia MD   10 mL at 04/02/22 2040    sodium chloride flush 0.9 % injection 5-40 mL  5-40 mL IntraVENous PRN Anjum Garcia MD   10 mL at 04/01/22 1000    0.9 % sodium chloride infusion  25 mL IntraVENous PRN Anjum Garcia MD        [Held by provider] apixaban Carvel Gigi) tablet 5 mg  5 mg Oral BID Anjum Garcia MD   5 mg at 04/01/22 0957    trimethobenzamide (TIGAN) injection 200 mg  200 mg IntraMUSCular Q6H PRN Anjum Garcia MD   200 mg at 04/01/22 0611    metoprolol tartrate (LOPRESSOR) tablet 100 mg  100 mg Oral BID Jaime Curraner, APN   100 mg at 04/03/22 2146    sodium chloride flush 0.9 % injection 5-40 mL  5-40 mL IntraVENous 2 times per day Nolberto Sharma MD   10 mL at 03/31/22 0900    sodium chloride flush 0.9 % injection 5-40 mL  5-40 mL IntraVENous PRN Nolberto Sharma MD   10 mL at 03/28/22 1547    0.9 % sodium chloride infusion  25 mL IntraVENous PRN Nolberto Sharma MD          dextrose 5 % and 0.45 % NaCl 150 mL/hr at 04/03/22 2110    sodium chloride      sodium chloride         Physical Exam:  BP (!) 158/72   Pulse 68   Temp 97.8 °F (36.6 °C) (Oral)   Resp 16   Ht 6' 2\" (1.88 m)   Wt 192 lb 3.2 oz (87.2 kg)   SpO2 95%   BMI 24.68 kg/m²   Weight change:    Wt Readings from Last 3 Encounters:   04/02/22 192 lb 3.2 oz (87.2 kg)   03/15/22 190 lb (86.2 kg)   02/14/22 187 lb (84.8 kg)     The patient is awake, alert and in no discomfort or distress. No gross musculoskeletal deformity is present. No significant skin or nail changes are present. Gross examination of head, eyes, nose and throat are negative. Jugular venous pressure is normal and no carotid bruits are present. Normal respiratory effort is noted with no accessory muscle usage present. Lung fields are clear to ascultation. Cardiac examination is notable for a regular rate and rhythm with no palpable thrill. No gallop rhythm or cardiac murmur are identified. A benign abdominal examination is present with no masses or organomegaly. Intact pulses are present throughout all extremities and no peripheral edema is present. No focal neurologic deficits are present. Intake/Output:    Intake/Output Summary (Last 24 hours) at 4/4/2022 0748  Last data filed at 4/4/2022 0525  Gross per 24 hour   Intake --   Output 550 ml   Net -550 ml     No intake/output data recorded. Laboratory Tests:  Lab Results   Component Value Date    CREATININE 1.4 (H) 04/04/2022    BUN 17 04/04/2022     04/04/2022    K 3.3 (L) 04/04/2022     (H) 04/04/2022    CO2 25 04/04/2022     No results for input(s): CKTOTAL, CKMB in the last 72 hours.     Invalid input(s): TROPONONI  Lab Results   Component Value Date    BNP 46 03/18/2011     Lab Results   Component Value Date    WBC 9.8 04/04/2022    RBC 3.09 04/04/2022    HGB 8.3 04/04/2022    HCT 28.1 04/04/2022    MCV 90.9 04/04/2022    MCH 26.9 04/04/2022    MCHC 29.5 04/04/2022    RDW 21.8 04/04/2022     04/04/2022    MPV 10.0 04/04/2022     Recent Labs     04/02/22  0345 04/03/22  0300 04/04/22  0253   ALKPHOS 94 75 70   ALT 26 16 14   AST 25 17 16   PROT 6.2* 4.8* 5.7*   BILITOT 1.1 0.8 0.7   LABALBU 2.9* 2.5* 2.3*     Lab Results   Component Value Date    MG 2.3 04/03/2022     Lab Results   Component Value Date    PROTIME 12.6 01/24/2022 PROTIME 12.2 03/18/2011    INR 1.1 01/24/2022     Lab Results   Component Value Date    TSH 1.050 03/25/2022     No components found for: CHLPL  Lab Results   Component Value Date    TRIG 76 12/20/2020     Lab Results   Component Value Date    HDL 41 12/20/2020     Lab Results   Component Value Date    LDLCALC 58 12/20/2020       Cardiac Tests:  Telemetry findings reviewed: sinus rhythm with occasional supraventricular ectopy, no new tachy/bradyarrhythmias overnight      ASSESSMENT / PLAN: On a clinical basis, the patient presently appears compensated from a cardiovascular standpoint with his most recent surgical assessment reviewed. His acute kidney injury and hyperkalemia continues to gradually resolve with ongoing needs of nutritional support to assist fluid mobilization reduce risk of progressive debilitation. Correction of hypokalemia will be necessary to reduce risk of recurrent arrhythmias with ongoing careful monitoring of his volume status as well as that of renal function and electrolytes necessary. Continue careful monitoring of hemoglobin levels will be necessary with plans of resumption of anticoagulation potentially tomorrow if remaining acceptable with the surgical service. Additional management will be deferred to primary care. Note: This report was completed utilizing computer voice recognition software. Every effort has been made to ensure accuracy, however; inadvertent computerized transcription errors may be present. Madhavi Sanchez.  Nav Banks, [facility]

## 2022-04-04 NOTE — PROGRESS NOTES
[facility] MD Gali Willis, MD Christiana Hernández, MD Phyllis Calero, MD Henrique Hodges, CNP   Chhaya Albarado, CHARMAINE  Progress Note    4/4/2022    SUBJECTIVE:   3/25: Since early afternoon. Feeling better. Denies abdominal pain though still distended. Had a BM, and distention has markedly improved since then. Denies dyspnea at rest on nasal cannula. Has developed lower extremity and dependent swelling denies cp/palp. Diet has been advanced, and he has anorexia, though has so far tolerated some food  3/26: \"Sleepy. \"  Denies dyspnea though still on nasal cannula. Ate breakfast today without event. Swelling improved compared to yesterday  3/27: Feeling better today. Notes that he is eating better. Hypotensive last evening received LR bolus. On NS drip again. Diuretics on hold. Still has the rash, though he still not aware of it  3/28: Denies complaint. Feeling better yesterday. Appetite has improved. Developed atrial fibrillation/flutter heart rates in the 120s received digoxin, now on amio drip  3/29: Feeling little better. Ongoing poor intake of food and fluid. Remains tachycardic. No pain. No swelling. ROS otherwise unremarkable. 3/30: Feeling better. Denies complaint. Status post ОЛЬГА/DC cardioversion, currently in NSR  3/31: No new complaint or issue. Lefty Shi Appetite good. No dyspnea. 4/1 stable vitals on RA no reported n/v/d/cp/sob  4/2 : cr up to 2.7 today which appear to postrenal . He had soto placed by dr Don Armando . I did see him in his room , he is more or less euvolemic to mildly hyper volemic clinically   Stable BP , on RA .   4/3 : High UO . BP stable. Cr down   4/4: Feeling better than yesterday. Soto still in place, good UO. Denies all other complaint.   ROS unremarkable    PROBLEM LIST:    Principal Problem:    Benign neoplasm of cecum  Active Problems:    S/P right hemicolectomy    PSVT (paroxysmal supraventricular tachycardia) [facility])    Primary hypertension  Resolved Problems:    * No resolved hospital problems. *         DIET:    ADULT DIET;  Regular; 5 carb choices (75 gm/meal)     MEDS (scheduled):    potassium bicarb-citric acid  40 mEq Oral BID    tamsulosin  0.4 mg Oral Nightly    amiodarone  400 mg Oral BID    Followed by   Mikayla James ON 4/11/2022] amiodarone  200 mg Oral Daily    pantoprazole  40 mg Oral QAM AC    white petrolatum   Topical BID    sodium chloride  1,000 mL IntraVENous Once    Hydrocerin   Topical BID    sodium chloride flush  5-40 mL IntraVENous 2 times per day    [Held by provider] apixaban  5 mg Oral BID    metoprolol tartrate  100 mg Oral BID    sodium chloride flush  5-40 mL IntraVENous 2 times per day       MEDS (infusions):   sodium chloride      sodium chloride         MEDS (prn):  diatrizoate meglumine-sodium, calcium carbonate, sodium chloride flush, sodium chloride, trimethobenzamide, sodium chloride flush, sodium chloride    PHYSICAL EXAM:     Patient Vitals for the past 24 hrs:   BP Temp Temp src Pulse Resp SpO2   04/04/22 1315 134/67 98.1 °F (36.7 °C) Oral 67 18 99 %   04/04/22 0730 (!) 158/72 97.8 °F (36.6 °C) Oral 68 16 95 %   04/04/22 0515 130/62 98.8 °F (37.1 °C) Oral 68 18 96 %   04/03/22 2146 (!) 144/57 98.9 °F (37.2 °C) Oral 75 18 96 %   04/03/22 1700 (!) 143/66 97.5 °F (36.4 °C) Oral 70 18 97 %   @      Intake/Output Summary (Last 24 hours) at 4/4/2022 1346  Last data filed at 4/4/2022 1318  Gross per 24 hour   Intake --   Output 1875 ml   Net -1875 ml         Wt Readings from Last 3 Encounters:   04/02/22 192 lb 3.2 oz (87.2 kg)   03/15/22 190 lb (86.2 kg)   02/14/22 187 lb (84.8 kg)       Constitutional:  in no acute distress  HEENT: NC/AT, EOMI, sclera and conjunctiva are clear and anicteric, mucus membranes moist  Neck: Trachea midline, no JVD  Cardiovascular: S1, S2 regular rhythm, no murmur,or rub  Respiratory: Crackles at bases improved since yesterday  Gastrointestinal:  Soft, diffusely mildly tender, mildly distended with tympany, no guarding or referred pain, NABS  Ext: 1/2+ dependent and distal lower extremity edema, feet warm  Skin: dry, no rash  Neuro: awake, alert, interactive moves all 4 extremities      DATA:    Recent Labs     04/02/22 0345 04/03/22  0300 04/04/22  0253   WBC 15.2* 10.2 9.8   HGB 10.7* 8.8* 8.3*   HCT 35.8* 30.1* 28.1*   MCV 89.9 90.9 90.9    262 253     Recent Labs     04/02/22 0345 04/03/22  0300 04/04/22  0253   * 148* 144   K 4.4 3.7 3.3*   * 113* 110*   CO2 21* 25 25   MG  --  2.3  --    PHOS  --  4.0  --    BUN 30* 23 17   CREATININE 2.7* 1.8* 1.4*   ALT 26 16 14   AST 25 17 16   BILITOT 1.1 0.8 0.7   ALKPHOS 94 75 70       Lab Results   Component Value Date    LABPROT 0.1 03/23/2022    LABPROT 0.1 03/23/2022    LABPROT 0.3 (H) 03/23/2022    LABPROT 0.3 03/23/2022       Assessment  74 y.o. 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 Postoperatively tachycardic, hypotensive treated with IV bolus.     1. Acute kidney injury, hemodynamically mediated, least in part secondary to volume contraction, anemia, increase insensible losses; the final common pathway was decreased effective circulating volume. Status post IV fluid boluses, increase in IV fluid rate, azotemia is improving, as is his urine output.     2. Anemia, severe, normocytic but MCV is very low. Severely iron deficient    3. Hypophosphatemia, status post supplementation yesterday orally. Normalized with supplement    4. Rash looks like a drug rash. May be due to the iron. Could be due to antimicrobial therapy. Improved since yesterday.     5.  Recurrent COURTNEY due to obstruction    Good UO.   Continued improvement in renal function status post Valles catheter placement 4/2    Recommendations  Continue supportive care  Follow labs, UO  Maintain Valles  Okay for discharge from renal standpoint    Electronically signed by Dali Cummins Roxan Goldmann, MD on 4/4/2022

## 2022-04-04 NOTE — PROGRESS NOTES
GENERAL SURGERY  DAILY PROGRESS NOTE  4/4/2022      Subjective:  No acute issues overnight. Incision with some drainage from the superior porion. Mild superficial skin dehiscence, fascia appears intact on CT. breathing improved,     Objective:  /62   Pulse 68   Temp 98.8 °F (37.1 °C) (Oral)   Resp 18   Ht 6' 2\" (1.88 m)   Wt 192 lb 3.2 oz (87.2 kg)   SpO2 96%   BMI 24.68 kg/m²     GENERAL:  Laying in bed, awake, cooperative, no apparent distress  HEAD: Normocephalic, atraumatic  LUNGS:  No increased work of breathing  CARDIOVASCULAR:  RR  ABDOMEN:  Soft, appropriately tender, mildly distended. Incisions are D/C/I aside from midline- with small superficial wound dehiscence-- small amount of SS drainage. No purulent drainage.  To rebound or peritoneal signs   EXTREMITIES: No edema or swelling  SKIN: Warm and dry    Assessment/Plan:  74 y.o. male S/p laparoscopic right hemicolectomy for tubulovillous adenoma on 3/22, urinary retention s/p soto        Doing well this morning  Continue soto- per urology   Recent cardioversion 3/30- appreciate cardiology input and care     PT/OT, OOB as able   CHITRA planning  Triston Stahl-- plan to restart tomorrow   daily wound care to midline-- fascia intact      Electronically signed by George Cash DO on 4/4/2022 at 7:29 AM     Discharge planning  [place]

## 2022-04-04 NOTE — PROGRESS NOTES
[facility] MD Angel Ding, MD Mihaela Garcia, MD Sendy Newton, CNP   Chhaya Albarado, CHARMAINE  Progress Note    4/3/2022    SUBJECTIVE:   3/25: Since early afternoon. Feeling better. Denies abdominal pain though still distended. Had a BM, and distention has markedly improved since then. Denies dyspnea at rest on nasal cannula. Has developed lower extremity and dependent swelling denies cp/palp. Diet has been advanced, and he has anorexia, though has so far tolerated some food  3/26: \"Sleepy. \"  Denies dyspnea though still on nasal cannula. Ate breakfast today without event. Swelling improved compared to yesterday  3/27: Feeling better today. Notes that he is eating better. Hypotensive last evening received LR bolus. On NS drip again. Diuretics on hold. Still has the rash, though he still not aware of it  3/28: Denies complaint. Feeling better yesterday. Appetite has improved. Developed atrial fibrillation/flutter heart rates in the 120s received digoxin, now on amio drip  3/29: Feeling little better. Ongoing poor intake of food and fluid. Remains tachycardic. No pain. No swelling. ROS otherwise unremarkable. 3/30: Feeling better. Denies complaint. Status post ОЛЬГА/DC cardioversion, currently in NSR  3/31: No new complaint or issue. Nuzhat Varner Appetite good. No dyspnea. 4/1 stable vitals on RA no reported n/v/d/cp/sob  4/2 : cr up to 2.7 today which appear to postrenal . He had soto placed by dr Shad Roque . I did see him in his room , he is more or less euvolemic to mildly hyper volemic clinically   Stable BP , on RA .     4/3 : High UO . BP stable  Cr down     PROBLEM LIST:    Principal Problem:    Benign neoplasm of cecum  Active Problems:    S/P right hemicolectomy    PSVT (paroxysmal supraventricular tachycardia) (HCC)    Primary hypertension  Resolved Problems:    * No resolved hospital problems.  *         DIET:    Diet NPO Exceptions are: Sips of Water with Meds     MEDS (scheduled):    tamsulosin  0.4 mg Oral Nightly    amiodarone  400 mg Oral BID    Followed by   Luz Elena Styles ON 4/11/2022] amiodarone  200 mg Oral Daily    pantoprazole  40 mg Oral QAM AC    white petrolatum   Topical BID    sodium chloride  1,000 mL IntraVENous Once    Hydrocerin   Topical BID    sodium chloride flush  5-40 mL IntraVENous 2 times per day    [Held by provider] apixaban  5 mg Oral BID    metoprolol tartrate  100 mg Oral BID    sodium chloride flush  5-40 mL IntraVENous 2 times per day       MEDS (infusions):   dextrose 5 % and 0.45 % NaCl 150 mL/hr at 04/03/22 2110    sodium chloride      sodium chloride         MEDS (prn):  diatrizoate meglumine-sodium, calcium carbonate, sodium chloride flush, sodium chloride, trimethobenzamide, sodium chloride flush, sodium chloride    PHYSICAL EXAM:     Patient Vitals for the past 24 hrs:   BP Temp Temp src Pulse Resp SpO2   04/03/22 2146 (!) 144/57 98.9 °F (37.2 °C) Oral 75 18 --   04/03/22 1700 (!) 143/66 97.5 °F (36.4 °C) Oral 70 18 97 %   04/03/22 1200 135/65 97.8 °F (36.6 °C) Oral 62 18 96 %   04/03/22 0800 138/69 98.1 °F (36.7 °C) Oral 80 18 95 %   04/03/22 0430 128/70 98 °F (36.7 °C) Oral 77 18 96 %   04/03/22 0000 (!) 156/80 97.7 °F (36.5 °C) Oral 90 26 96 %   @      Intake/Output Summary (Last 24 hours) at 4/3/2022 2206  Last data filed at 4/3/2022 9084  Gross per 24 hour   Intake --   Output 1500 ml   Net -1500 ml         Wt Readings from Last 3 Encounters:   04/02/22 192 lb 3.2 oz (87.2 kg)   03/15/22 190 lb (86.2 kg)   02/14/22 187 lb (84.8 kg)       Constitutional:  in no acute distress  HEENT: NC/AT, EOMI, sclera and conjunctiva are clear and anicteric, mucus membranes moist  Neck: Trachea midline, no JVD  Cardiovascular: S1, S2 regular rhythm, no murmur,or rub  Respiratory: Crackles at bases improved since yesterday  Gastrointestinal:  Soft, diffusely mildly tender, mildly distended with tympany, no guarding or referred pain, NABS  Ext: 1/2+ dependent and distal lower extremity edema, feet warm  Skin: dry, no rash  Neuro: awake, alert, interactive moves all 4 extremities      DATA:    Recent Labs     04/01/22 0400 04/02/22 0345 04/03/22  0300   WBC 11.4 15.2* 10.2   HGB 10.0* 10.7* 8.8*   HCT 33.9* 35.8* 30.1*   MCV 90.9 89.9 90.9    290 262     Recent Labs     04/01/22 0400 04/02/22 0345 04/03/22  0300    147* 148*   K 3.6 4.4 3.7   * 111* 113*   CO2 23 21* 25   MG 2.2  --  2.3   PHOS 3.1  --  4.0   BUN 22 30* 23   CREATININE 1.4* 2.7* 1.8*   ALT 30 26 16   AST 30 25 17   BILITOT 1.3* 1.1 0.8   ALKPHOS 101 94 75       Lab Results   Component Value Date    LABPROT 0.1 03/23/2022    LABPROT 0.1 03/23/2022    LABPROT 0.3 (H) 03/23/2022    LABPROT 0.3 03/23/2022       ASSESSMENT     74 y.o. 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 Postoperatively tachycardic, hypotensive treated with IV bolus.     1. Acute kidney injury, hemodynamically mediated, least in part secondary to volume contraction, anemia, increase insensible losses; the final common pathway was decreased effective circulating volume. Status post IV fluid boluses, increase in IV fluid rate, azotemia is improving, as is his urine output.     2. Anemia, severe, normocytic but MCV is very low. Severely iron deficient    3. Hypophosphatemia, status post supplementation yesterday orally. Normalized with supplement    4. Rash looks like a drug rash. May be due to the iron. Could be due to antimicrobial therapy.   Improved since yesterday.       Cr up to down to 1.8 post soto placement   High UO reflecting post obstructive diuresis   Na creeping up    RECOMMENDATIONS      -increase rate of d5w-1.2 NS to 150 cc/hr   -bmp in am       Electronically signed by Ivan Aldridge MD on 4/3/2022

## 2022-04-05 ENCOUNTER — TELEPHONE (OUTPATIENT)
Dept: CARDIOLOGY CLINIC | Age: 67
End: 2022-04-05

## 2022-04-05 VITALS
HEART RATE: 80 BPM | HEIGHT: 74 IN | TEMPERATURE: 98.7 F | OXYGEN SATURATION: 92 % | RESPIRATION RATE: 18 BRPM | WEIGHT: 181.1 LBS | BODY MASS INDEX: 23.24 KG/M2 | SYSTOLIC BLOOD PRESSURE: 144 MMHG | DIASTOLIC BLOOD PRESSURE: 55 MMHG

## 2022-04-05 DIAGNOSIS — M79.89 LEG SWELLING: Primary | ICD-10-CM

## 2022-04-05 DIAGNOSIS — R06.02 SOB (SHORTNESS OF BREATH): ICD-10-CM

## 2022-04-05 LAB
ALBUMIN SERPL-MCNC: 2.4 G/DL (ref 3.5–5.2)
ALP BLD-CCNC: 70 U/L (ref 40–129)
ALT SERPL-CCNC: 12 U/L (ref 0–40)
ANION GAP SERPL CALCULATED.3IONS-SCNC: 9 MMOL/L (ref 7–16)
ANISOCYTOSIS: ABNORMAL
AST SERPL-CCNC: 12 U/L (ref 0–39)
BASOPHILS ABSOLUTE: 0.05 E9/L (ref 0–0.2)
BASOPHILS RELATIVE PERCENT: 0.6 % (ref 0–2)
BILIRUB SERPL-MCNC: 0.8 MG/DL (ref 0–1.2)
BUN BLDV-MCNC: 15 MG/DL (ref 6–23)
CALCIUM SERPL-MCNC: 7.7 MG/DL (ref 8.6–10.2)
CHLORIDE BLD-SCNC: 108 MMOL/L (ref 98–107)
CO2: 25 MMOL/L (ref 22–29)
CREAT SERPL-MCNC: 1.4 MG/DL (ref 0.7–1.2)
EOSINOPHILS ABSOLUTE: 0.15 E9/L (ref 0.05–0.5)
EOSINOPHILS RELATIVE PERCENT: 1.7 % (ref 0–6)
GFR AFRICAN AMERICAN: >60
GFR NON-AFRICAN AMERICAN: 50 ML/MIN/1.73
GLUCOSE BLD-MCNC: 87 MG/DL (ref 74–99)
HCT VFR BLD CALC: 28.1 % (ref 37–54)
HEMOGLOBIN: 8.5 G/DL (ref 12.5–16.5)
HYPOCHROMIA: ABNORMAL
IMMATURE GRANULOCYTES #: 0.03 E9/L
IMMATURE GRANULOCYTES %: 0.3 % (ref 0–5)
LYMPHOCYTES ABSOLUTE: 1.14 E9/L (ref 1.5–4)
LYMPHOCYTES RELATIVE PERCENT: 13 % (ref 20–42)
MAGNESIUM: 1.9 MG/DL (ref 1.6–2.6)
MCH RBC QN AUTO: 27 PG (ref 26–35)
MCHC RBC AUTO-ENTMCNC: 30.2 % (ref 32–34.5)
MCV RBC AUTO: 89.2 FL (ref 80–99.9)
MONOCYTES ABSOLUTE: 0.58 E9/L (ref 0.1–0.95)
MONOCYTES RELATIVE PERCENT: 6.6 % (ref 2–12)
NEUTROPHILS ABSOLUTE: 6.83 E9/L (ref 1.8–7.3)
NEUTROPHILS RELATIVE PERCENT: 77.8 % (ref 43–80)
OVALOCYTES: ABNORMAL
PDW BLD-RTO: 21.1 FL (ref 11.5–15)
PHOSPHORUS: 2.7 MG/DL (ref 2.5–4.5)
PLATELET # BLD: 271 E9/L (ref 130–450)
PMV BLD AUTO: 10.3 FL (ref 7–12)
POIKILOCYTES: ABNORMAL
POTASSIUM SERPL-SCNC: 3.6 MMOL/L (ref 3.5–5)
RBC # BLD: 3.15 E12/L (ref 3.8–5.8)
SARS-COV-2, NAAT: NOT DETECTED
SODIUM BLD-SCNC: 142 MMOL/L (ref 132–146)
TOTAL PROTEIN: 5.5 G/DL (ref 6.4–8.3)
WBC # BLD: 8.8 E9/L (ref 4.5–11.5)

## 2022-04-05 PROCEDURE — 80053 COMPREHEN METABOLIC PANEL: CPT

## 2022-04-05 PROCEDURE — 36415 COLL VENOUS BLD VENIPUNCTURE: CPT

## 2022-04-05 PROCEDURE — 2580000003 HC RX 258: Performed by: SURGERY

## 2022-04-05 PROCEDURE — 84100 ASSAY OF PHOSPHORUS: CPT

## 2022-04-05 PROCEDURE — 6370000000 HC RX 637 (ALT 250 FOR IP): Performed by: INTERNAL MEDICINE

## 2022-04-05 PROCEDURE — 87635 SARS-COV-2 COVID-19 AMP PRB: CPT

## 2022-04-05 PROCEDURE — 85025 COMPLETE CBC W/AUTO DIFF WBC: CPT

## 2022-04-05 PROCEDURE — 6370000000 HC RX 637 (ALT 250 FOR IP): Performed by: NURSE PRACTITIONER

## 2022-04-05 PROCEDURE — 99232 SBSQ HOSP IP/OBS MODERATE 35: CPT | Performed by: INTERNAL MEDICINE

## 2022-04-05 PROCEDURE — 83735 ASSAY OF MAGNESIUM: CPT

## 2022-04-05 PROCEDURE — 2580000003 HC RX 258: Performed by: INTERNAL MEDICINE

## 2022-04-05 RX ORDER — PANTOPRAZOLE SODIUM 40 MG/1
40 TABLET, DELAYED RELEASE ORAL
Qty: 30 TABLET | Refills: 3 | DISCHARGE
Start: 2022-04-06 | End: 2022-10-07 | Stop reason: ALTCHOICE

## 2022-04-05 RX ORDER — AMIODARONE HYDROCHLORIDE 200 MG/1
200 TABLET ORAL DAILY
Qty: 10 TABLET | Refills: 0 | DISCHARGE
Start: 2022-04-11 | End: 2022-05-06 | Stop reason: ALTCHOICE

## 2022-04-05 RX ORDER — FUROSEMIDE 20 MG/1
20 TABLET ORAL DAILY
Status: DISCONTINUED | OUTPATIENT
Start: 2022-04-05 | End: 2022-04-05 | Stop reason: HOSPADM

## 2022-04-05 RX ORDER — TAMSULOSIN HYDROCHLORIDE 0.4 MG/1
0.4 CAPSULE ORAL NIGHTLY
Qty: 30 CAPSULE | Refills: 3 | DISCHARGE
Start: 2022-04-05 | End: 2022-10-07 | Stop reason: ALTCHOICE

## 2022-04-05 RX ORDER — CALCIUM CARBONATE 200(500)MG
500 TABLET,CHEWABLE ORAL EVERY 6 HOURS PRN
DISCHARGE
Start: 2022-04-05 | End: 2022-05-05

## 2022-04-05 RX ORDER — AMIODARONE HYDROCHLORIDE 400 MG/1
400 TABLET ORAL 2 TIMES DAILY
Qty: 12 TABLET | Refills: 0 | Status: ON HOLD | DISCHARGE
Start: 2022-04-05 | End: 2022-04-15 | Stop reason: HOSPADM

## 2022-04-05 RX ADMIN — PANTOPRAZOLE SODIUM 40 MG: 40 TABLET, DELAYED RELEASE ORAL at 05:03

## 2022-04-05 RX ADMIN — Medication: at 09:35

## 2022-04-05 RX ADMIN — AMIODARONE HYDROCHLORIDE 400 MG: 200 TABLET ORAL at 09:33

## 2022-04-05 RX ADMIN — Medication 10 ML: at 09:34

## 2022-04-05 RX ADMIN — SODIUM CHLORIDE, PRESERVATIVE FREE 10 ML: 5 INJECTION INTRAVENOUS at 09:34

## 2022-04-05 RX ADMIN — PETROLATUM: 420 OINTMENT TOPICAL at 09:34

## 2022-04-05 RX ADMIN — METOPROLOL TARTRATE 100 MG: 50 TABLET, FILM COATED ORAL at 09:33

## 2022-04-05 RX ADMIN — FUROSEMIDE 20 MG: 20 TABLET ORAL at 10:32

## 2022-04-05 ASSESSMENT — PAIN SCALES - GENERAL: PAINLEVEL_OUTOF10: 0

## 2022-04-05 NOTE — PLAN OF CARE
Problem: Pain:  Goal: Pain level will decrease  Description: Pain level will decrease  Outcome: Met This Shift     Problem: Falls - Risk of:  Goal: Will remain free from falls  Description: Will remain free from falls  Outcome: Ongoing  Goal: Absence of physical injury  Description: Absence of physical injury  Outcome: Ongoing

## 2022-04-05 NOTE — DISCHARGE SUMMARY
Discharge Summary     Patient ID:  Mitchel Sung  [id number]  52 y.o. 1955 male  Delsa Mcardle, MD        Admit date: 3/22/2022    Discharge date and time:  4/5/2022  7:57 AM      Activity level: Up with prosthesis daily  Disposition: To subacute rehab  Condition on Discharge: Fair    Admit Diagnoses: Tubulovillous adenoma of right colon    Discharge Diagnoses: Tubulovillous adenoma right colon with laparoscopic right hemicolectomy ; atrial flutter with rapid ventricular response treated by cardioversion. Acute kidney injury due to volume depletion and diastolic heart failure and postobstructive urinary retention    Consults:  IP CONSULT TO PRIMARY CARE PROVIDER  IP CONSULT TO NEPHROLOGY  IP CONSULT TO CARDIOLOGY  IP CONSULT TO INFECTIOUS DISEASES  IP CONSULT TO IV TEAM  IP CONSULT TO IV TEAM  IP CONSULT TO IV TEAM  IP CONSULT TO UROLOGY  IP CONSULT TO SOCIAL WORK    Procedures: Laparoscopic right hemicolectomy, cardioversion    Hospital Course: 77-year-old with cognitive dysfunction underwent a hemicolectomy for tubulovillous adenoma. Postop it was noted that he had a rapid heartbeat initially thought to be accelerated intraventricular conduction but turned out to be atrial flutter. He did not respond to medical therapy and was not eating well during this time as he had acute kidney injury and was given multiple fluid boluses. Eventually he was cardioverted and placed on amiodarone because of intermittent atrial fibrillation. He also started getting very nauseated and was found to be in acute urinary retention and was treated with a Valles catheter.   He remains in moderate protein calorie malnutrition with a slight wound dehiscence he was encouraged to  his calories and because of his weakened state will be transferred to subacute rehab for physical therapy and Occupational Therapy      LABS:  Recent Labs     04/03/22  0300 04/04/22  0253 04/05/22  0420   * 144 142   K 3.7 3.3* 3.6 * 110* 108*   CO2 25 25 25   BUN 23 17 15   CREATININE 1.8* 1.4* 1.4*   GLUCOSE 123* 121* 87   CALCIUM 7.6* 7.6* 7.7*       Recent Labs     04/03/22  0300 04/04/22  0253 04/05/22  0420   WBC 10.2 9.8 8.8   RBC 3.31* 3.09* 3.15*   HGB 8.8* 8.3* 8.5*   HCT 30.1* 28.1* 28.1*   MCV 90.9 90.9 89.2   MCH 26.6 26.9 27.0   MCHC 29.2* 29.5* 30.2*   RDW 22.8* 21.8* 21.1*    253 271   MPV 9.9 10.0 10.3       No results for input(s): GLUMET in the last 72 hours. Patient Instructions:   Current Discharge Medication List      START taking these medications    Details   calcium carbonate (TUMS) 500 MG chewable tablet Take 1 tablet by mouth every 6 hours as needed for Heartburn (up to six times daily with meals)      ! ! amiodarone (PACERONE) 400 MG tablet Take 1 tablet by mouth 2 times daily for 12 doses  Qty: 12 tablet, Refills: 0      !! amiodarone (CORDARONE) 200 MG tablet Take 1 tablet by mouth daily for 10 doses  Qty: 10 tablet, Refills: 0      tamsulosin (FLOMAX) 0.4 MG capsule Take 1 capsule by mouth nightly  Qty: 30 capsule, Refills: 3      white petrolatum OINT ointment Apply topically 2 times daily  Refills: 0      pantoprazole (PROTONIX) 40 MG tablet Take 1 tablet by mouth every morning (before breakfast)  Qty: 30 tablet, Refills: 3       !! - Potential duplicate medications found. Please discuss with provider.       CONTINUE these medications which have NOT CHANGED    Details   apixaban (ELIQUIS) 5 MG TABS tablet Take 5 mg by mouth 2 times daily      amLODIPine (NORVASC) 5 MG tablet Take 1 tablet by mouth daily  Qty: 90 tablet, Refills: 3      metoprolol (LOPRESSOR) 100 MG tablet Take 1 tablet by mouth 2 times daily  Qty: 180 tablet, Refills: 3      hydrALAZINE (APRESOLINE) 50 MG tablet take 1 tablet by mouth three times a day  Qty: 270 tablet, Refills: 3                 Signed:  Electronically signed by Attila Barcenas MD on 4/5/2022 at 7:57 AM

## 2022-04-05 NOTE — PROGRESS NOTES
[address] [place] [address] FOLLOW-UP    Name: Hanane Nascimento. Age: 74 y.o. Date of Admission: 3/22/2022  5:36 AM    Date of Service: 4/5/2022    Chief Complaint: Follow-up for villous adenoma of the colon, paroxysmal atrial fibrillation, hypertension, resolving acute kidney injury, anemia    Interim History: The patient remains debilitated with present maintenance of sinus rhythm and stable renal function and borderline hypokalemia. Review of Systems: The remainder of a complete multisystem review including consitutional, central nervous, respiratory, circulatory, gastrointestinal, genitourinary, endocrinologic, hematologic, musculoskeletal and psychiatric are negative. Problem List:  Patient Active Problem List   Diagnosis    Retinal detachment of left eye with multiple breaks    Rectal cancer ([address]    Ileus ([address]    Atrial flutter with rapid ventricular response (HCC)    GI bleed    Gastrointestinal bleed    Benign neoplasm of cecum    S/P right hemicolectomy    PSVT ([facility]oxysmal supraventricular tachycardia) [facility]    Primary hypertension       Allergies:   Allergies   Allergen Reactions    Cefepime Rash    Clindamycin/Lincomycin Rash    Pcn [Penicillins] Rash       Current Medications:  Current Facility-Administered Medications   Medication Dose Route Frequency Provider Last Rate Last Admin    tamsulosin (FLOMAX) capsule 0.4 mg  0.4 mg Oral Nightly Zoilalene Yolanda Cordova MD   0.4 mg at 04/04/22 2140    amiodarone (CORDARONE) tablet 400 mg  400 mg Oral BID Ginna Kaur MD   400 mg at 04/04/22 2140    Followed by   Sushant Martinez ON 4/11/2022] amiodarone (CORDARONE) tablet 200 mg  200 mg Oral Daily Ginna Kaur MD        diatrizoate meglumine-sodium (GASTROGRAFIN) 66-10 % solution 120 mL  120 mL Oral ONCE PRN Elsie Cason DO   120 mL at 04/01/22 2041    calcium carbonate (TUMS) chewable tablet 500 mg  500 mg Oral Q6H PRN Cayetano Joseph MD   500 mg at 03/31/22 1609    Component Value Date    HDL 41 12/20/2020     Lab Results   Component Value Date    LDLCALC 58 12/20/2020       Cardiac Tests:  Telemetry findings reviewed: sinus rhythm, no new tachy/bradyarrhythmias overnight      ASSESSMENT / PLAN: Clinical basis, the patient remains extremely debilitated but compensated from a cardiovascular standpoint with the maintenance of sinus rhythm and present plans of resumption of oral anticoagulation. Ongoing nutritional support remain essential to fluid mobilization and assisting to reduce risk of progressive debilitation with need of continued careful monitoring of his arrhythmia related status in the face of amiodarone administration. Continued appropriate monitoring of hepatic transaminase levels and thyroid function will be necessary in the face of amiodarone administration. No additional cardiovascular assessment is indicated during his present hospitalization with need of arrangements for outpatient cardiovascular follow-up upon discharge. Additional management presently will be deferred predominantly to that of the primary care and general surgical service. Note: This report was completed utilizing computer voice recognition software. Every effort has been made to ensure accuracy, however; inadvertent computerized transcription errors may be present. Laurie Canales.  Domingo García, [address] [facility]

## 2022-04-05 NOTE — PROGRESS NOTES
[facility] MD Eduardo Muñoz, MD Amina Rincon, MD Lindsey Ramirez, MD Alvarado Sood, CNP   Chhaya Albarado, CHARMAINE  Progress Note    4/5/2022    SUBJECTIVE:   3/25: Since early afternoon. Feeling better. Denies abdominal pain though still distended. Had a BM, and distention has markedly improved since then. Denies dyspnea at rest on nasal cannula. Has developed lower extremity and dependent swelling denies cp/palp. Diet has been advanced, and he has anorexia, though has so far tolerated some food  3/26: \"Sleepy. \"  Denies dyspnea though still on nasal cannula. Ate breakfast today without event. Swelling improved compared to yesterday  3/27: Feeling better today. Notes that he is eating better. Hypotensive last evening received LR bolus. On NS drip again. Diuretics on hold. Still has the rash, though he still not aware of it  3/28: Denies complaint. Feeling better yesterday. Appetite has improved. Developed atrial fibrillation/flutter heart rates in the 120s received digoxin, now on amio drip  3/29: Feeling little better. Ongoing poor intake of food and fluid. Remains tachycardic. No pain. No swelling. ROS otherwise unremarkable. 3/30: Feeling better. Denies complaint. Status post ОЛЬГА/DC cardioversion, currently in NSR  3/31: No new complaint or issue. Darlynn Magic Appetite good. No dyspnea. 4/1 stable vitals on RA no reported n/v/d/cp/sob  4/2 : cr up to 2.7 today which appear to postrenal . He had soto placed by dr Maynor Moreno . I did see him in his room , he is more or less euvolemic to mildly hyper volemic clinically   Stable BP , on RA .   4/3 : High UO . BP stable. Cr down   4/4: Feeling better than yesterday. Soto still in place, good UO. Denies all other complaint. ROS unremarkable  4/5: No new complaint or issue.   Does have mild distal right lower extremity swelling    PROBLEM LIST:    Principal Problem:    Benign neoplasm of cecum  Active Problems:    S/P right hemicolectomy    PSVT (paroxysmal supraventricular tachycardia) (HCC)    Primary hypertension  Resolved Problems:    * No resolved hospital problems. *         DIET:    ADULT DIET;  Regular; 5 carb choices (75 gm/meal)     MEDS (scheduled):    furosemide  20 mg Oral Daily    tamsulosin  0.4 mg Oral Nightly    amiodarone  400 mg Oral BID    Followed by   Filiberto Rivera ON 4/11/2022] amiodarone  200 mg Oral Daily    pantoprazole  40 mg Oral QAM AC    white petrolatum   Topical BID    sodium chloride  1,000 mL IntraVENous Once    Hydrocerin   Topical BID    sodium chloride flush  5-40 mL IntraVENous 2 times per day    [Held by provider] apixaban  5 mg Oral BID    metoprolol tartrate  100 mg Oral BID    sodium chloride flush  5-40 mL IntraVENous 2 times per day       MEDS (infusions):   sodium chloride      sodium chloride         MEDS (prn):  diatrizoate meglumine-sodium, calcium carbonate, sodium chloride flush, sodium chloride, trimethobenzamide, sodium chloride flush, sodium chloride    PHYSICAL EXAM:     Patient Vitals for the past 24 hrs:   BP Temp Temp src Pulse Resp SpO2 Weight   04/05/22 0932 (!) 144/55 98.7 °F (37.1 °C) Oral 80 18 92 % --   04/05/22 0608 -- -- -- -- -- -- 181 lb 1.6 oz (82.1 kg)   04/05/22 0502 136/67 97.7 °F (36.5 °C) Oral 73 18 99 % --   04/05/22 0035 136/64 97.9 °F (36.6 °C) Oral 67 18 99 % --   04/04/22 2137 135/66 98.4 °F (36.9 °C) Oral 73 18 96 % --   04/04/22 1315 134/67 98.1 °F (36.7 °C) Oral 67 18 99 % --   @      Intake/Output Summary (Last 24 hours) at 4/5/2022 1015  Last data filed at 4/4/2022 2154  Gross per 24 hour   Intake 120 ml   Output 1850 ml   Net -1730 ml         Wt Readings from Last 3 Encounters:   04/05/22 181 lb 1.6 oz (82.1 kg)   03/15/22 190 lb (86.2 kg)   02/14/22 187 lb (84.8 kg)       Constitutional:  in no acute distress  HEENT: NC/AT, EOMI, sclera and conjunctiva are clear and anicteric, mucus membranes moist  Neck: Trachea midline, no JVD  Cardiovascular: S1, S2 regular rhythm, no murmur,or rub  Respiratory: Crackles at bases improved since yesterday  Gastrointestinal:  Soft, diffusely mildly tender, mildly distended with tympany, no guarding or referred pain, NABS  Ext: 1+ dependent and distal lower extremity edema, feet warm  Skin: dry, no rash  Neuro: awake, alert, interactive moves all 4 extremities      DATA:    Recent Labs     04/03/22 0300 04/04/22 0253 04/05/22  0420   WBC 10.2 9.8 8.8   HGB 8.8* 8.3* 8.5*   HCT 30.1* 28.1* 28.1*   MCV 90.9 90.9 89.2    253 271     Recent Labs     04/03/22 0300 04/04/22 0253 04/05/22  0420   * 144 142   K 3.7 3.3* 3.6   * 110* 108*   CO2 25 25 25   MG 2.3  --  1.9   PHOS 4.0  --  2.7   BUN 23 17 15   CREATININE 1.8* 1.4* 1.4*   ALT 16 14 12   AST 17 16 12   BILITOT 0.8 0.7 0.8   ALKPHOS 75 70 70       Lab Results   Component Value Date    LABPROT 0.1 03/23/2022    LABPROT 0.1 03/23/2022    LABPROT 0.3 (H) 03/23/2022    LABPROT 0.3 03/23/2022       Assessment  74 y.o. 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 Postoperatively tachycardic, hypotensive treated with IV bolus.     1. Acute kidney injury, hemodynamically mediated, least in part secondary to volume contraction, anemia, increase insensible losses; the final common pathway was decreased effective circulating volume. Status post IV fluid boluses, increase in IV fluid rate, azotemia is improving, as is his urine output.     2. Anemia, severe, normocytic but MCV is very low. Severely iron deficient    3. Hypophosphatemia, status post supplementation yesterday orally. Normalized with supplement    4. Rash looks like a drug rash. May be due to the iron. Could be due to antimicrobial therapy. Improved since yesterday.     5.  Recurrent COURTNEY due to obstruction    Good UO.   Continued improvement in renal function status post Valles catheter placement 4/2    Recommendations  Start Lasix 20 mg p.o. daily  Continue supportive care  Follow labs, UO  Maintain Valles  Okay for discharge from renal standpoint  Follow-up in office in 2 weeks    Electronically signed by Darrel Owens MD on 4/5/2022

## 2022-04-05 NOTE — PROGRESS NOTES
GENERAL SURGERY  DAILY PROGRESS NOTE  4/5/2022      Subjective:  No acute issues overnight, resting comfortably. Incision with some drainage from the superior porion-- improved drainage from yesterday. Mild superficial skin dehiscence, fascia appears intact on CT. breathing improved,     Objective:  /67   Pulse 73   Temp 97.7 °F (36.5 °C) (Oral)   Resp 18   Ht 6' 2\" (1.88 m)   Wt 181 lb 1.6 oz (82.1 kg)   SpO2 99%   BMI 23.25 kg/m²     GENERAL:  Laying in bed, awake, cooperative, no apparent distress  CARDIOVASCULAR:  RR  ABDOMEN:  Soft, appropriately tender, mildly distended. Incisions are D/C/I aside from midline- with small superficial wound dehiscence-- small amount of SS drainage. No purulent drainage.  To rebound or peritoneal signs     Assessment/Plan:  74 y.o. male S/p laparoscopic right hemicolectomy for tubulovillous adenoma on 3/22, urinary retention s/p soto        Doing well this morning, resting comfortably   Continue soto- per urology   Recent cardioversion 3/30- appreciate cardiology input and care     PT/OT, OOB as able   CHITRA planning  Hold Maribeth-- plan to restart tomorrow   daily wound care to midline-- fascia intact   dispo planning      Electronically signed by Antonia Macedo DO on 4/5/2022 at 7:31 AM

## 2022-04-05 NOTE — TELEPHONE ENCOUNTER
----- Message from Jack Terry MD sent at 4/5/2022  8:35 AM EDT -----  Patient of Brendon's who developed paroxysmal atrial fibrillation during hospitalization predominately for noncardiac surgical procedure.   Please schedule follow-up with him in approximately 1 month

## 2022-04-06 NOTE — TELEPHONE ENCOUNTER
Mounika at [facility] notified of Dr. Keren Rubio' recommendation. F/U scheduled with Dr. Evangelista Hill on 5/6/22 at 10:20 a.m. Yoel Garcia states patient is having shortness of breath with exertion and has 2+ edema in right lower extremity. Please advise.

## 2022-04-06 NOTE — TELEPHONE ENCOUNTER
Please asked him to get a BMP and BNP and start on Demadex 20 mg p.o. daily and ask them to call us on Friday morning to see how he is doing.

## 2022-04-06 NOTE — TELEPHONE ENCOUNTER
[facility] notified of Dr. Kennedy's recommendations. Orders placed for BMP/BNP. Will follow-up with facility on Friday.

## 2022-04-07 ENCOUNTER — HOSPITAL ENCOUNTER (INPATIENT)
Age: 67
LOS: 8 days | Discharge: NURSING, REHAB OR LONG-TERM CARE | DRG: 698 | End: 2022-04-15
Attending: EMERGENCY MEDICINE | Admitting: INTERNAL MEDICINE
Payer: MEDICARE

## 2022-04-07 ENCOUNTER — APPOINTMENT (OUTPATIENT)
Dept: GENERAL RADIOLOGY | Age: 67
DRG: 698 | End: 2022-04-07
Payer: MEDICARE

## 2022-04-07 ENCOUNTER — APPOINTMENT (OUTPATIENT)
Dept: CT IMAGING | Age: 67
DRG: 698 | End: 2022-04-07
Payer: MEDICARE

## 2022-04-07 ENCOUNTER — APPOINTMENT (OUTPATIENT)
Dept: ULTRASOUND IMAGING | Age: 67
DRG: 698 | End: 2022-04-07
Payer: MEDICARE

## 2022-04-07 ENCOUNTER — TELEPHONE (OUTPATIENT)
Dept: CARDIOLOGY CLINIC | Age: 67
End: 2022-04-07

## 2022-04-07 DIAGNOSIS — M79.89 LEG SWELLING: ICD-10-CM

## 2022-04-07 DIAGNOSIS — N17.9 AKI (ACUTE KIDNEY INJURY) (HCC): ICD-10-CM

## 2022-04-07 DIAGNOSIS — A41.9 SEPSIS, DUE TO UNSPECIFIED ORGANISM, UNSPECIFIED WHETHER ACUTE ORGAN DYSFUNCTION PRESENT (HCC): ICD-10-CM

## 2022-04-07 DIAGNOSIS — T83.511A URINARY TRACT INFECTION ASSOCIATED WITH INDWELLING URETHRAL CATHETER, INITIAL ENCOUNTER (HCC): Primary | ICD-10-CM

## 2022-04-07 DIAGNOSIS — B99.9 INTRA-ABDOMINAL INFECTION: ICD-10-CM

## 2022-04-07 DIAGNOSIS — R06.02 SOB (SHORTNESS OF BREATH): ICD-10-CM

## 2022-04-07 DIAGNOSIS — J96.01 ACUTE RESPIRATORY FAILURE WITH HYPOXIA (HCC): ICD-10-CM

## 2022-04-07 DIAGNOSIS — N39.0 URINARY TRACT INFECTION ASSOCIATED WITH INDWELLING URETHRAL CATHETER, INITIAL ENCOUNTER (HCC): Primary | ICD-10-CM

## 2022-04-07 PROBLEM — L02.211 ABDOMINAL WALL ABSCESS: Status: ACTIVE | Noted: 2022-04-07

## 2022-04-07 LAB
ABO/RH: NORMAL
ALBUMIN SERPL-MCNC: 2.7 G/DL (ref 3.5–5.2)
ALP BLD-CCNC: 110 U/L (ref 40–129)
ALT SERPL-CCNC: 24 U/L (ref 0–40)
ANION GAP SERPL CALCULATED.3IONS-SCNC: 14 MMOL/L (ref 7–16)
ANISOCYTOSIS: ABNORMAL
ANTIBODY SCREEN: NORMAL
APTT: 28.1 SEC (ref 24.5–35.1)
AST SERPL-CCNC: 25 U/L (ref 0–39)
BACTERIA: ABNORMAL /HPF
BASOPHILS ABSOLUTE: 0.03 E9/L (ref 0–0.2)
BASOPHILS RELATIVE PERCENT: 0.3 % (ref 0–2)
BILIRUB SERPL-MCNC: 0.9 MG/DL (ref 0–1.2)
BILIRUBIN URINE: ABNORMAL
BLOOD, URINE: ABNORMAL
BUN BLDV-MCNC: 28 MG/DL (ref 6–23)
BURR CELLS: ABNORMAL
CALCIUM SERPL-MCNC: 8.1 MG/DL (ref 8.6–10.2)
CHLORIDE BLD-SCNC: 103 MMOL/L (ref 98–107)
CLARITY: CLEAR
CO2: 23 MMOL/L (ref 22–29)
COLOR: YELLOW
CREAT SERPL-MCNC: 2.3 MG/DL (ref 0.7–1.2)
D DIMER: 883 NG/ML DDU
EOSINOPHILS ABSOLUTE: 0 E9/L (ref 0.05–0.5)
EOSINOPHILS RELATIVE PERCENT: 0 % (ref 0–6)
EPITHELIAL CELLS, UA: ABNORMAL /HPF
GFR AFRICAN AMERICAN: 34
GFR NON-AFRICAN AMERICAN: 28 ML/MIN/1.73
GLUCOSE BLD-MCNC: 108 MG/DL (ref 74–99)
GLUCOSE BLD-MCNC: 110 MG/DL
GLUCOSE URINE: NEGATIVE MG/DL
HCT VFR BLD CALC: 31.6 % (ref 37–54)
HEMOGLOBIN: 9.7 G/DL (ref 12.5–16.5)
HYPOCHROMIA: ABNORMAL
IMMATURE GRANULOCYTES #: 0.06 E9/L
IMMATURE GRANULOCYTES %: 0.6 % (ref 0–5)
INFLUENZA A BY PCR: NOT DETECTED
INFLUENZA B BY PCR: NOT DETECTED
INR BLD: 2.4
KETONES, URINE: NEGATIVE MG/DL
LACTIC ACID, SEPSIS: 2 MMOL/L (ref 0.5–1.9)
LACTIC ACID, SEPSIS: 3.4 MMOL/L (ref 0.5–1.9)
LEUKOCYTE ESTERASE, URINE: ABNORMAL
LIPASE: 48 U/L (ref 13–60)
LYMPHOCYTES ABSOLUTE: 0.51 E9/L (ref 1.5–4)
LYMPHOCYTES RELATIVE PERCENT: 4.9 % (ref 20–42)
MCH RBC QN AUTO: 26.4 PG (ref 26–35)
MCHC RBC AUTO-ENTMCNC: 30.7 % (ref 32–34.5)
MCV RBC AUTO: 85.9 FL (ref 80–99.9)
MONOCYTES ABSOLUTE: 0.41 E9/L (ref 0.1–0.95)
MONOCYTES RELATIVE PERCENT: 4 % (ref 2–12)
NEUTROPHILS ABSOLUTE: 9.36 E9/L (ref 1.8–7.3)
NEUTROPHILS RELATIVE PERCENT: 90.2 % (ref 43–80)
NITRITE, URINE: NEGATIVE
OVALOCYTES: ABNORMAL
PDW BLD-RTO: 21.4 FL (ref 11.5–15)
PH UA: 8.5 (ref 5–9)
PLATELET # BLD: 365 E9/L (ref 130–450)
PMV BLD AUTO: 9.9 FL (ref 7–12)
POIKILOCYTES: ABNORMAL
POLYCHROMASIA: ABNORMAL
POTASSIUM REFLEX MAGNESIUM: 3.8 MMOL/L (ref 3.5–5)
PRO-BNP: 2894 PG/ML (ref 0–125)
PROTEIN UA: >=300 MG/DL
PROTHROMBIN TIME: 26.2 SEC (ref 9.3–12.4)
RBC # BLD: 3.68 E12/L (ref 3.8–5.8)
RBC UA: >20 /HPF (ref 0–2)
RSV BY PCR: NEGATIVE
SARS-COV-2, NAAT: NOT DETECTED
SODIUM BLD-SCNC: 140 MMOL/L (ref 132–146)
SPECIFIC GRAVITY UA: 1.02 (ref 1–1.03)
TARGET CELLS: ABNORMAL
TOTAL PROTEIN: 6.5 G/DL (ref 6.4–8.3)
TROPONIN, HIGH SENSITIVITY: 18 NG/L (ref 0–11)
TROPONIN, HIGH SENSITIVITY: 19 NG/L (ref 0–11)
UROBILINOGEN, URINE: 0.2 E.U./DL
WBC # BLD: 10.4 E9/L (ref 4.5–11.5)
WBC UA: >20 /HPF (ref 0–5)

## 2022-04-07 PROCEDURE — 93005 ELECTROCARDIOGRAM TRACING: CPT | Performed by: STUDENT IN AN ORGANIZED HEALTH CARE EDUCATION/TRAINING PROGRAM

## 2022-04-07 PROCEDURE — 87150 DNA/RNA AMPLIFIED PROBE: CPT

## 2022-04-07 PROCEDURE — 6370000000 HC RX 637 (ALT 250 FOR IP): Performed by: INTERNAL MEDICINE

## 2022-04-07 PROCEDURE — 93971 EXTREMITY STUDY: CPT

## 2022-04-07 PROCEDURE — 2580000003 HC RX 258: Performed by: STUDENT IN AN ORGANIZED HEALTH CARE EDUCATION/TRAINING PROGRAM

## 2022-04-07 PROCEDURE — 83690 ASSAY OF LIPASE: CPT

## 2022-04-07 PROCEDURE — 2580000003 HC RX 258

## 2022-04-07 PROCEDURE — 74176 CT ABD & PELVIS W/O CONTRAST: CPT

## 2022-04-07 PROCEDURE — 87502 INFLUENZA DNA AMP PROBE: CPT

## 2022-04-07 PROCEDURE — 87077 CULTURE AEROBIC IDENTIFY: CPT

## 2022-04-07 PROCEDURE — 86901 BLOOD TYPING SEROLOGIC RH(D): CPT

## 2022-04-07 PROCEDURE — 87040 BLOOD CULTURE FOR BACTERIA: CPT

## 2022-04-07 PROCEDURE — 86900 BLOOD TYPING SEROLOGIC ABO: CPT

## 2022-04-07 PROCEDURE — 87186 SC STD MICRODIL/AGAR DIL: CPT

## 2022-04-07 PROCEDURE — 2500000003 HC RX 250 WO HCPCS

## 2022-04-07 PROCEDURE — 85025 COMPLETE CBC W/AUTO DIFF WBC: CPT

## 2022-04-07 PROCEDURE — 85378 FIBRIN DEGRADE SEMIQUANT: CPT

## 2022-04-07 PROCEDURE — 96365 THER/PROPH/DIAG IV INF INIT: CPT

## 2022-04-07 PROCEDURE — 81001 URINALYSIS AUTO W/SCOPE: CPT

## 2022-04-07 PROCEDURE — 83880 ASSAY OF NATRIURETIC PEPTIDE: CPT

## 2022-04-07 PROCEDURE — 86850 RBC ANTIBODY SCREEN: CPT

## 2022-04-07 PROCEDURE — 2700000000 HC OXYGEN THERAPY PER DAY

## 2022-04-07 PROCEDURE — 99285 EMERGENCY DEPT VISIT HI MDM: CPT

## 2022-04-07 PROCEDURE — 87635 SARS-COV-2 COVID-19 AMP PRB: CPT

## 2022-04-07 PROCEDURE — 2580000003 HC RX 258: Performed by: INTERNAL MEDICINE

## 2022-04-07 PROCEDURE — 96366 THER/PROPH/DIAG IV INF ADDON: CPT

## 2022-04-07 PROCEDURE — 80053 COMPREHEN METABOLIC PANEL: CPT

## 2022-04-07 PROCEDURE — 84484 ASSAY OF TROPONIN QUANT: CPT

## 2022-04-07 PROCEDURE — 83605 ASSAY OF LACTIC ACID: CPT

## 2022-04-07 PROCEDURE — 96367 TX/PROPH/DG ADDL SEQ IV INF: CPT

## 2022-04-07 PROCEDURE — 71045 X-RAY EXAM CHEST 1 VIEW: CPT

## 2022-04-07 PROCEDURE — 87807 RSV ASSAY W/OPTIC: CPT

## 2022-04-07 PROCEDURE — 2060000000 HC ICU INTERMEDIATE R&B

## 2022-04-07 PROCEDURE — 6360000002 HC RX W HCPCS: Performed by: STUDENT IN AN ORGANIZED HEALTH CARE EDUCATION/TRAINING PROGRAM

## 2022-04-07 PROCEDURE — 36415 COLL VENOUS BLD VENIPUNCTURE: CPT

## 2022-04-07 PROCEDURE — 85730 THROMBOPLASTIN TIME PARTIAL: CPT

## 2022-04-07 PROCEDURE — 85610 PROTHROMBIN TIME: CPT

## 2022-04-07 PROCEDURE — 71250 CT THORAX DX C-: CPT

## 2022-04-07 PROCEDURE — 6370000000 HC RX 637 (ALT 250 FOR IP): Performed by: STUDENT IN AN ORGANIZED HEALTH CARE EDUCATION/TRAINING PROGRAM

## 2022-04-07 PROCEDURE — 87088 URINE BACTERIA CULTURE: CPT

## 2022-04-07 RX ORDER — MENTHOL AND ZINC OXIDE .44; 20.625 G/100G; G/100G
OINTMENT TOPICAL 2 TIMES DAILY
Status: ON HOLD | COMMUNITY
End: 2022-04-15 | Stop reason: HOSPADM

## 2022-04-07 RX ORDER — ACETAMINOPHEN 500 MG
1000 TABLET ORAL ONCE
Status: COMPLETED | OUTPATIENT
Start: 2022-04-07 | End: 2022-04-07

## 2022-04-07 RX ORDER — SODIUM CHLORIDE 9 MG/ML
INJECTION, SOLUTION INTRAVENOUS CONTINUOUS
Status: DISCONTINUED | OUTPATIENT
Start: 2022-04-07 | End: 2022-04-11

## 2022-04-07 RX ORDER — PANTOPRAZOLE SODIUM 40 MG/1
40 TABLET, DELAYED RELEASE ORAL
Status: DISCONTINUED | OUTPATIENT
Start: 2022-04-08 | End: 2022-04-15 | Stop reason: HOSPADM

## 2022-04-07 RX ORDER — AMIODARONE HYDROCHLORIDE 200 MG/1
400 TABLET ORAL 2 TIMES DAILY
Status: DISCONTINUED | OUTPATIENT
Start: 2022-04-07 | End: 2022-04-13

## 2022-04-07 RX ORDER — ONDANSETRON 2 MG/ML
4 INJECTION INTRAMUSCULAR; INTRAVENOUS EVERY 6 HOURS PRN
Status: DISCONTINUED | OUTPATIENT
Start: 2022-04-07 | End: 2022-04-08

## 2022-04-07 RX ORDER — LANOLIN ALCOHOL/MO/W.PET/CERES
CREAM (GRAM) TOPICAL NIGHTLY
Status: ON HOLD | COMMUNITY
End: 2022-04-15 | Stop reason: HOSPADM

## 2022-04-07 RX ORDER — SODIUM CHLORIDE 9 MG/ML
INJECTION, SOLUTION INTRAVENOUS PRN
Status: DISCONTINUED | OUTPATIENT
Start: 2022-04-07 | End: 2022-04-15 | Stop reason: HOSPADM

## 2022-04-07 RX ORDER — MAGNESIUM SULFATE IN WATER 40 MG/ML
2000 INJECTION, SOLUTION INTRAVENOUS ONCE
Status: COMPLETED | OUTPATIENT
Start: 2022-04-07 | End: 2022-04-07

## 2022-04-07 RX ORDER — ACETAMINOPHEN 325 MG/1
650 TABLET ORAL EVERY 6 HOURS PRN
Status: DISCONTINUED | OUTPATIENT
Start: 2022-04-07 | End: 2022-04-15 | Stop reason: HOSPADM

## 2022-04-07 RX ORDER — ACETAMINOPHEN 650 MG/1
650 SUPPOSITORY RECTAL EVERY 6 HOURS PRN
Status: DISCONTINUED | OUTPATIENT
Start: 2022-04-07 | End: 2022-04-15 | Stop reason: HOSPADM

## 2022-04-07 RX ORDER — 0.9 % SODIUM CHLORIDE 0.9 %
1500 INTRAVENOUS SOLUTION INTRAVENOUS ONCE
Status: COMPLETED | OUTPATIENT
Start: 2022-04-07 | End: 2022-04-07

## 2022-04-07 RX ORDER — TORSEMIDE 20 MG/1
20 TABLET ORAL DAILY
Status: ON HOLD | COMMUNITY
Start: 2022-04-06 | End: 2022-04-15 | Stop reason: HOSPADM

## 2022-04-07 RX ORDER — 0.9 % SODIUM CHLORIDE 0.9 %
1000 INTRAVENOUS SOLUTION INTRAVENOUS ONCE
Status: COMPLETED | OUTPATIENT
Start: 2022-04-07 | End: 2022-04-07

## 2022-04-07 RX ORDER — SODIUM CHLORIDE 0.9 % (FLUSH) 0.9 %
5-40 SYRINGE (ML) INJECTION PRN
Status: DISCONTINUED | OUTPATIENT
Start: 2022-04-07 | End: 2022-04-15 | Stop reason: HOSPADM

## 2022-04-07 RX ORDER — METOPROLOL TARTRATE 50 MG/1
100 TABLET, FILM COATED ORAL 2 TIMES DAILY
Status: DISCONTINUED | OUTPATIENT
Start: 2022-04-07 | End: 2022-04-09

## 2022-04-07 RX ORDER — SODIUM CHLORIDE 0.9 % (FLUSH) 0.9 %
5-40 SYRINGE (ML) INJECTION EVERY 12 HOURS SCHEDULED
Status: DISCONTINUED | OUTPATIENT
Start: 2022-04-07 | End: 2022-04-15 | Stop reason: HOSPADM

## 2022-04-07 RX ORDER — ONDANSETRON 4 MG/1
4 TABLET, ORALLY DISINTEGRATING ORAL EVERY 8 HOURS PRN
Status: DISCONTINUED | OUTPATIENT
Start: 2022-04-07 | End: 2022-04-08

## 2022-04-07 RX ORDER — BISACODYL 10 MG
10 SUPPOSITORY, RECTAL RECTAL DAILY PRN
Status: ON HOLD | COMMUNITY
End: 2022-11-04 | Stop reason: HOSPADM

## 2022-04-07 RX ORDER — TAMSULOSIN HYDROCHLORIDE 0.4 MG/1
0.4 CAPSULE ORAL NIGHTLY
Status: DISCONTINUED | OUTPATIENT
Start: 2022-04-07 | End: 2022-04-15 | Stop reason: HOSPADM

## 2022-04-07 RX ADMIN — SODIUM CHLORIDE 1000 ML: 9 INJECTION, SOLUTION INTRAVENOUS at 15:45

## 2022-04-07 RX ADMIN — METOPROLOL TARTRATE 100 MG: 50 TABLET, FILM COATED ORAL at 23:05

## 2022-04-07 RX ADMIN — AMIODARONE HYDROCHLORIDE 400 MG: 200 TABLET ORAL at 23:05

## 2022-04-07 RX ADMIN — MEROPENEM 1000 MG: 1 INJECTION, POWDER, FOR SOLUTION INTRAVENOUS at 17:49

## 2022-04-07 RX ADMIN — SODIUM CHLORIDE, PRESERVATIVE FREE 10 ML: 5 INJECTION INTRAVENOUS at 23:05

## 2022-04-07 RX ADMIN — ACETAMINOPHEN 1000 MG: 500 TABLET ORAL at 15:42

## 2022-04-07 RX ADMIN — TAMSULOSIN HYDROCHLORIDE 0.4 MG: 0.4 CAPSULE ORAL at 23:05

## 2022-04-07 RX ADMIN — VANCOMYCIN HYDROCHLORIDE 1750 MG: 5 INJECTION, POWDER, LYOPHILIZED, FOR SOLUTION INTRAVENOUS at 19:18

## 2022-04-07 RX ADMIN — MAGNESIUM SULFATE HEPTAHYDRATE 2000 MG: 40 INJECTION, SOLUTION INTRAVENOUS at 15:44

## 2022-04-07 RX ADMIN — SODIUM CHLORIDE: 9 INJECTION, SOLUTION INTRAVENOUS at 23:11

## 2022-04-07 RX ADMIN — SODIUM CHLORIDE 1500 ML: 9 INJECTION, SOLUTION INTRAVENOUS at 17:52

## 2022-04-07 ASSESSMENT — ENCOUNTER SYMPTOMS
BACK PAIN: 0
VOMITING: 0
BLOOD IN STOOL: 0
NAUSEA: 0
SORE THROAT: 0
COUGH: 1
DIARRHEA: 1
ABDOMINAL PAIN: 0
RHINORRHEA: 0
SHORTNESS OF BREATH: 1

## 2022-04-07 ASSESSMENT — PAIN SCALES - GENERAL
PAINLEVEL_OUTOF10: 0

## 2022-04-07 NOTE — TELEPHONE ENCOUNTER
Please asked them to stop Norvasc and decrease hydralazine to 25 mg p.o. 3 times a day, call us with blood pressure readings in a.m.

## 2022-04-07 NOTE — TELEPHONE ENCOUNTER
Spoke with Harshad Horton at [facility].  She states she just sent patient to [facility] via ambulance. She states he was eating and had a coughing fit. A few minutes later she states he crashed, started shaking, became diaphoretic, temp was 101, BP in the 160s, HR in the 100s. Will reassess upon discharge.

## 2022-04-07 NOTE — ED NOTES
Pt is from a nursing home, this am he was more short of breath and oxygen level dropped to 86% at the nursing home they placed him on 5 liters nc, he also had a fever, he is alert times 3, speech is mumbled lungs are clear a and p, abdomen is soft with bowel sounds times 4, he does have a left aka, his rt leg is 1 plus edema, pedal pulse to the rt foot is 1 plus.       Haily Styles RN  04/07/22 5804

## 2022-04-07 NOTE — ED PROVIDER NOTES
[address]  Department of Emergency Medicine     Written by: David Bullock DO  Patient Name: Launie Nyhan. Attending Provider: Pietro Varela MD  Admit Date: 2022  1:41 PM  MRN: [id number]    : 1955        Chief Complaint   Patient presents with    Fever     at the nursing home he developed a fever that they are unable to get down     Shortness of Breath     pulse ox dropped on room air to 86%    Edema     to the rt leg     - Chief complaint    HPI   Launie Nyhan. is a 74 y.o. male presenting to the ED for evaluation of Fever (at the nursing home he developed a fever that they are unable to get down ), Shortness of Breath (pulse ox dropped on room air to 86%), and Edema (to the rt leg )    Patient is a 67-year-old male with past medical history of HTN, GI bleed in 2022, left AKA in  after developing necrotizing fasciitis of his left leg; COPD, not on chronic supplemental O2; recent right hemicolectomy for removal of large polyp on 3/22/2022; history of atrial fibrillation/atrial flutter, on Eliquis; and previous history of rectal cancer. He lives at [address] [place] [address]; he was recently discharged to this nursing facility for rehabilitation after suffering postoperative complications including bleeding and worsening generalized weakness, was sent to SNF in order to undergo physical therapy and rehabilitation. He is presenting for evaluation of fever, shortness of breath and hypoxia, and right lower extremity swelling. Symptoms started today prior to arrival; patient is fully oriented but is not a great historian; his sister presents with him at bedside and assists in providing history.   States that he has been generally fatigued, had a fever of 101 °F earlier this morning, and was noted to appear more fatigued and shortness of breath, nursing home staff reported that he was 86% on room air which he does not typically require supplemental oxygen. Patient also states that he has had a cough. Sister reports that nursing home told her patient had a episode of loose stool last night as well. Patient denies any chest pain, abdominal pain, palpitations, nausea or vomiting, or black or bloody stools. Patient denies any urinary symptoms, but he does have an indwelling Valles catheter since being discharged to the nursing facility. Complaints are severe in severity, no specific alleviating factors; shortness of breath is worse with coughing and exertion. Symptoms began developing over the past 1 day. Review of Systems   Constitutional: Positive for fatigue and fever. Negative for chills. HENT: Negative for rhinorrhea and sore throat. Eyes: Negative for visual disturbance. Respiratory: Positive for cough and shortness of breath. Cardiovascular: Positive for leg swelling (right lower). Negative for chest pain and palpitations. Gastrointestinal: Positive for diarrhea. Negative for abdominal pain, blood in stool, nausea and vomiting. Genitourinary: Negative for dysuria and frequency. Musculoskeletal: Negative for back pain and myalgias. Skin: Negative for rash and wound. Neurological: Negative for weakness and headaches. Psychiatric/Behavioral: Negative for confusion. All other systems reviewed and are negative. Physical Exam  Vitals and nursing note reviewed. Constitutional:       General: He is not in acute distress. Appearance: He is ill-appearing. HENT:      Head: Normocephalic and atraumatic. Right Ear: External ear normal.      Left Ear: External ear normal.      Nose: Nose normal. No rhinorrhea. Mouth/Throat:      Mouth: Mucous membranes are dry. Pharynx: Oropharynx is clear. Eyes:      Extraocular Movements: Extraocular movements intact. Conjunctiva/sclera: Conjunctivae normal.      Pupils: Pupils are equal, round, and reactive to light.    Cardiovascular:      Rate and Rhythm: Normal rate and regular rhythm. Pulses: Normal pulses. Heart sounds: Normal heart sounds. Pulmonary:      Effort: Pulmonary effort is normal. No respiratory distress. Breath sounds: No wheezing or rales. Comments: Diminished breath sounds bilaterally    Abdominal:      General: Bowel sounds are normal. There is distension. Palpations: Abdomen is soft. Tenderness: There is no abdominal tenderness. There is no right CVA tenderness, left CVA tenderness, guarding or rebound. Comments: Significant suprapubic fullness  Soto catheter in place, minimal output; what is in the soto bag appear dark yellow with pink tinge and cloudy with sediment   Musculoskeletal:         General: No tenderness. Normal range of motion. Cervical back: Normal range of motion and neck supple. Right lower leg: Edema present. Comments: Left AKA, stump normal in appearance and non-tender  Right leg with mild 1+ edema; distal pulses 2+, compartments soft     Skin:     General: Skin is warm and dry. Capillary Refill: Capillary refill takes less than 2 seconds. Coloration: Skin is pale. Skin is not jaundiced. Findings: No rash. Neurological:      General: No focal deficit present. Mental Status: He is alert and oriented to person, place, and time. Psychiatric:         Mood and Affect: Mood normal.         Behavior: Behavior normal.          Procedures       [facility]     ED Course as of 04/08/22 0243   Thu Apr 07, 2022   1426 Patient's sister states over the several few weeks patient has had intermittent confusion with mumbled/garbled speech, episodically. [VG]   5262 Per chart review patient just had a urinalysis on 3/27/2022 that grew Klebsiella pneumoniae. It does not appear that the patient was treated for this. Additionally, he has retrocardiac infiltrates on his chest x-ray.   He has cefepime allergy which although low risk was listed in 2011 and does not appear he has received cephalosporins in that time. Patient was treated with Kika Cartgaena in 2018. We will treat him with vancomycin and meropenem here in the ED. [VG]   2066 Patient is on eliquis for atrial fibrillation/flutter. [VG]   2031 Consult placed to medicine for admission. [VG]   2040 Spoke with Dr. Felipe Henriquez, discussed case, he will provide general surgery consultation for this patient. [VG]   2052 Spoke with Dr. Courtney Hull, discussed case, patient is accepted for admission to intermediate floor.  [VG]      ED Course User Index  [VG] Nery Sinclair, DO       MDM    This is a 67-year-old male with recent past medical history of right hemicolectomy for removal of large polyp on 3/22; GI bleed in January 2022; history of necrotizing fasciitis to his left leg in 2010 s/p left AKA; COPD; and history of atrial fibrillation/flutter on Eliquis. He lives at a nursing facility at the moment after recent discharge, is there for rehabilitation. He is presenting today for evaluation of shortness of breath, hypoxia at 86%, right lower extremity edema, and fever. On arrival patient is ill-appearing, he is in no acute distress and is oriented. On room air, patient's SPO2 is 91-93% at rest but drops to the mid 80s on minimal exertion. He was placed on 2 L NC O2.  Mucous membranes appear dry. Abdomen is distended, tympanic, and there is significant suprapubic fullness. Patient has a Valles catheter in place with minimal output; Valles catheter was flushed and a small clot dislodged and there was significant urine output afterward. Urine appeared pink-tinged, cloudy. UA positive for UTI. Patient with fever of 103 °F.  Blood and urine cultures were obtained. Vitals were initially significant for hypotension and tachycardia as well; patient was treated with 30 cc/kg IV fluids with improvement. Given Tylenol for fever. Lungs with no significant rales or wheezing but with diminished breath sounds bilaterally.   Of note, per chart review patient recently had a urine culture on 3/27 positive for Klebsiella; it does not appear that this was treated. Given concern for possible pneumonia as well as UTI, patient treated with vancomycin and Merrem. Lactic acid initially 2.0; repeat worsened to 3.4 despite IV fluids. CBC shows hemoglobin 9.7, at baseline; no leukocytosis. RSV, rapid flu, and Covid are negative. Labs additionally noted for COURTNEY on CKD with creatinine 2.3. There is mild 1+ edema to his right lower extremity; distal pulses 2+. Normal sensation and normal range of motion. US RLE shows no evidence for DVT. Of note, D-dimer was obtained and greater than 800; given patient's COURTNEY and GFR of 28, will defer CTA at this time; patient is on Eliquis and will be continued on this. CT chest and CT abdomen pelvis without contrast were obtained; there is 5.0 x 4.0 walled off fluid collection lateral to the ascending colon, \"slightly increased in size when compared to 4/1/2022 and worrisome for abscess. \"  General surgery was consulted, they will provide evaluation for this patient. Given these findings, decision made to admit this patient for further treatment and evaluation of pneumonia, sepsis, UTI, and concern for intra-abdominal infection. Discussed results and plan with the patient, he voiced understanding and is amenable. Medicine consulted for admission, patient is accepted. EKG reviewed and interpreted by me:  Normal sinus rhythm; baseline artifact/wandering baseline; no ST elevation; nonspecific T wave abnormalities. Changes as compared to previous EKG. QTC is now prolonged. Vent. rate 93 BPM   TX interval 170 ms   QRS duration 82 ms   QT/QTc 440/547 ms      I have discussed this patient with my attending, who has seen the patient and agrees with this disposition.     Patient was seen and evaluated by myself and my attending Mary Heath MD. Assessment and Plan discussed with attending provider, please see attestation for final plan of care.         --------------------------------------------- PAST HISTORY ---------------------------------------------  Past Medical History:  has a past medical history of Employs prosthetic leg, History of atrial fibrillation, [place] (hard of hearing), Hx of AKA (above knee amputation), left ([facility] [address] Hx of necrotizing fasciitis, Hypertension, Rectal bleeding, and Rectal cancer ([facility] [address] Past Surgical History:  has a past surgical history that includes above knee amputation (2011); Colonoscopy (10/21/2011); Eye surgery (Left, 10/03/2016); eye surgery (Bilateral, 2002?); eye surgery (Right, ?); Colon surgery (01/23/2018); Abdomen surgery (03/20/2018); pr revision of ileostomy,complicated (N/A, 8/62/2131); Colonoscopy (N/A, 2/14/2022); Cardioversion (12/2020); and hemicolectomy (Right, 3/22/2022). Social History:  reports that he quit smoking about 11 years ago. His smoking use included cigarettes. He started smoking about 48 years ago. He has a 70.00 pack-year smoking history. He has never used smokeless tobacco. He reports that he does not drink alcohol and does not use drugs. Family History: family history includes Dementia in his mother; Diabetes in his father, mother, sister, sister, and sister; Glaucoma in his father; Heart Attack in his brother and mother; High Blood Pressure in his mother; High Cholesterol in his father, mother, sister, sister, and sister; Pacemaker in his father; Thyroid Disease in his sister, sister, and sister. The patients home medications have been reviewed.     Allergies: Cefepime, Clindamycin/lincomycin, and Pcn [penicillins]    -------------------------------------------------- RESULTS -------------------------------------------------    LABS:  Results for orders placed or performed during the hospital encounter of 04/07/22   COVID-19, Rapid    Specimen: Nasopharyngeal Swab   Result Value Ref Range    SARS-CoV-2, NAAT Not Detected Not Detected   Rapid RSV Antigen Specimen: Nasopharyngeal Swab   Result Value Ref Range    RSV by PCR Negative Negative   Rapid influenza A/B antigens    Specimen: Nasopharyngeal   Result Value Ref Range    Influenza A by PCR Not Detected Not Detected    Influenza B by PCR Not Detected Not Detected   CBC with Auto Differential   Result Value Ref Range    WBC 10.4 4.5 - 11.5 E9/L    RBC 3.68 (L) 3.80 - 5.80 E12/L    Hemoglobin 9.7 (L) 12.5 - 16.5 g/dL    Hematocrit 31.6 (L) 37.0 - 54.0 %    MCV 85.9 80.0 - 99.9 fL    MCH 26.4 26.0 - 35.0 pg    MCHC 30.7 (L) 32.0 - 34.5 %    RDW 21.4 (H) 11.5 - 15.0 fL    Platelets 310 784 - 655 E9/L    MPV 9.9 7.0 - 12.0 fL    Neutrophils % 90.2 (H) 43.0 - 80.0 %    Immature Granulocytes % 0.6 0.0 - 5.0 %    Lymphocytes % 4.9 (L) 20.0 - 42.0 %    Monocytes % 4.0 2.0 - 12.0 %    Eosinophils % 0.0 0.0 - 6.0 %    Basophils % 0.3 0.0 - 2.0 %    Neutrophils Absolute 9.36 (H) 1.80 - 7.30 E9/L    Immature Granulocytes # 0.06 E9/L    Lymphocytes Absolute 0.51 (L) 1.50 - 4.00 E9/L    Monocytes Absolute 0.41 0.10 - 0.95 E9/L    Eosinophils Absolute 0.00 (L) 0.05 - 0.50 E9/L    Basophils Absolute 0.03 0.00 - 0.20 E9/L    Anisocytosis 1+     Polychromasia 1+     Hypochromia 1+     Poikilocytes 1+     Ankur Cells 1+     Ovalocytes 1+     Target Cells 1+    Comprehensive Metabolic Panel w/ Reflex to MG   Result Value Ref Range    Sodium 140 132 - 146 mmol/L    Potassium reflex Magnesium 3.8 3.5 - 5.0 mmol/L    Chloride 103 98 - 107 mmol/L    CO2 23 22 - 29 mmol/L    Anion Gap 14 7 - 16 mmol/L    Glucose 108 (H) 74 - 99 mg/dL    BUN 28 (H) 6 - 23 mg/dL    CREATININE 2.3 (H) 0.7 - 1.2 mg/dL    GFR Non-African American 28 >=60 mL/min/1.73    GFR African American 34     Calcium 8.1 (L) 8.6 - 10.2 mg/dL    Total Protein 6.5 6.4 - 8.3 g/dL    Albumin 2.7 (L) 3.5 - 5.2 g/dL    Total Bilirubin 0.9 0.0 - 1.2 mg/dL    Alkaline Phosphatase 110 40 - 129 U/L    ALT 24 0 - 40 U/L    AST 25 0 - 39 U/L   Troponin   Result Value Ref Range Troponin, High Sensitivity 18 (H) 0 - 11 ng/L   Brain Natriuretic Peptide   Result Value Ref Range    Pro-BNP 2,894 (H) 0 - 125 pg/mL   Lipase   Result Value Ref Range    Lipase 48 13 - 60 U/L   Protime-INR   Result Value Ref Range    Protime 26.2 (H) 9.3 - 12.4 sec    INR 2.4    APTT   Result Value Ref Range    aPTT 28.1 24.5 - 35.1 sec   Urinalysis with Microscopic   Result Value Ref Range    Color, UA Yellow Straw/Yellow    Clarity, UA Clear Clear    Glucose, Ur Negative Negative mg/dL    Bilirubin Urine SMALL (A) Negative    Ketones, Urine Negative Negative mg/dL    Specific Gravity, UA 1.025 1.005 - 1.030    Blood, Urine LARGE (A) Negative    pH, UA 8.5 5.0 - 9.0    Protein, UA >=300 (A) Negative mg/dL    Urobilinogen, Urine 0.2 <2.0 E.U./dL    Nitrite, Urine Negative Negative    Leukocyte Esterase, Urine LARGE (A) Negative    WBC, UA >20 (A) 0 - 5 /HPF    RBC, UA >20 0 - 2 /HPF    Epithelial Cells, UA MODERATE /HPF    Bacteria, UA MANY (A) None Seen /HPF   D-Dimer, Quantitative   Result Value Ref Range    D-Dimer, Quant 883 ng/mL DDU   Lactate, Sepsis   Result Value Ref Range    Lactic Acid, Sepsis 2.0 (H) 0.5 - 1.9 mmol/L   Lactate, Sepsis   Result Value Ref Range    Lactic Acid, Sepsis 3.4 (H) 0.5 - 1.9 mmol/L   Troponin   Result Value Ref Range    Troponin, High Sensitivity 19 (H) 0 - 11 ng/L   POCT Glucose   Result Value Ref Range    Glucose 110 mg/dL   EKG 12 Lead   Result Value Ref Range    Ventricular Rate 93 BPM    Atrial Rate 93 BPM    P-R Interval 170 ms    QRS Duration 82 ms    Q-T Interval 440 ms    QTc Calculation (Bazett) 547 ms    P Axis 72 degrees    R Axis 30 degrees    T Axis 44 degrees   TYPE AND SCREEN   Result Value Ref Range    ABO/Rh A NEG     Antibody Screen NEG        RADIOLOGY:  CT ABDOMEN PELVIS WO CONTRAST Additional Contrast? None   Final Result   Significant prostatomegaly with urinary bladder distension and bilateral   hydroureteronephrosis.       5.0 cm x 4.0 cm walled-off fluid collection lateral to the ascending colon   slightly increased in size compared to 04/01/2022 and worrisome for abscess. This is adjacent to the anastomosis sutures. Small bilateral pleural effusions left greater than right. CT CHEST WO CONTRAST   Final Result   Small bilateral pleural effusions left greater than right. Emphysematous changes. No evidence for pneumonia. US DUP LOWER EXTREMITY RIGHT HAYDEE   Final Result   No evidence of DVT in the right lower extremity. XR CHEST PORTABLE   Final Result   Suspect early retrocardiac infiltrates. ------------------------- NURSING NOTES AND VITALS REVIEWED ---------------------------  Date / Time Roomed:  4/7/2022  1:41 PM  ED Bed Assignment:  [id number]    The nursing notes within the ED encounter and vital signs as below have been reviewed. Patient Vitals for the past 24 hrs:   BP Temp Temp src Pulse Resp SpO2 Height Weight   04/08/22 0050 -- -- -- -- -- -- -- 174 lb 8 oz (79.2 kg)   04/07/22 2227 (!) 122/59 97.5 °F (36.4 °C) Oral 108 24 96 % -- --   04/07/22 1730 137/65 99.1 °F (37.3 °C) Axillary -- 22 91 % -- --   04/07/22 1545 -- 102.5 °F (39.2 °C) -- -- 16 -- -- --   04/07/22 1404 -- -- -- -- -- 93 % -- --   04/07/22 1402 -- -- -- -- -- -- 6' (1.829 m) 175 lb (79.4 kg)   04/07/22 1347 (!) 102/55 103 °F (39.4 °C) Oral 67 20 97 % 6' 2\" (1.88 m) 175 lb (79.4 kg)       Oxygen Saturation Interpretation: Normal    ------------------------------------------ PROGRESS NOTES ------------------------------------------  Re-evaluation(s):  Please see ED course    Counseling:  I have spoken with the patient and discussed todays results, in addition to providing specific details for the plan of care and counseling regarding the diagnosis and prognosis.   Their questions are answered at this time and they are agreeable with the plan of admission.    --------------------------------- ADDITIONAL PROVIDER NOTES ---------------------------------  Consultations:  Please see ED course    This patient's ED course included: a personal history and physicial examination, re-evaluation prior to disposition, multiple bedside re-evaluations, IV medications, cardiac monitoring, continuous pulse oximetry and complex medical decision making and emergency management    This patient has remained hemodynamically stable during their ED course. Diagnosis:  1. Urinary tract infection associated with indwelling urethral catheter, initial encounter ([facility] [address]    2. Intra-abdominal infection    3. Acute respiratory failure with hypoxia (HCC)    4. COURTNEY (acute kidney injury) ([facility] [address]    5. Sepsis, due to unspecified organism, unspecified whether acute organ dysfunction present [facility])        Disposition:  Patient's disposition: Admit to telemetry  Patient's condition is stable.          Billy Dailey DO  Resident  04/08/22 9223

## 2022-04-07 NOTE — TELEPHONE ENCOUNTER
Spoke with Saida Lucia at [facility].  She states she just sent patient to [facility] via ambulance. She states he was eating and had a coughing fit. A few minutes later she states he crashed, started shaking, became diaphoretic, temp was 101, BP in the 160s, HR in the 100s. Will reassess upon discharge.

## 2022-04-07 NOTE — TELEPHONE ENCOUNTER
----- Message from Stephon Sinha MD sent at 4/7/2022  1:21 PM EDT -----  Continue Demadex 20 mg p.o. daily and repeat BMP and proBNP on Monday.

## 2022-04-07 NOTE — LETTER
[address] Medicaid  CERTIFICATION OF NECESSITY  FOR TRANSPORTATION   BY WHEELCHAIR VAN     Individual Information   1. Name: Klaani Godoy 2. [facility] Billing Number:    3. Address: [address] [place] [address]      Transportation Provider Information   4. Provider Name: [facility]   5. [place] Medicaid Provider Number:  National Provider Identifier (NPI):      Certification  7. Criteria:  By signing this document, the practitioner certifies that two statements are true:  A. This individual must be accompanied by a mobility-related assistive device from the point of pick-up to the point of drop-off. B. Transport of this individual by standard passenger vehicle or common carrier is precluded or contraindicated. 8. Period Beginning Date: 4/15/2022     9. Length  [x] Not more than 1 day(s)  [] One Year     Additional Information Relevant to Certification   10. Comments or Explanations, If Necessary or Appropriate   Transfer to SNF     Certifying Practitioner Information   11. Name of Practitioner: Dr. Valencia Arias MD   12. [place] Medicaid Provider Number, If Applicable:  [address] Provider Identifier (NPI):      Signature Information   14. Date of Signature: 4/15/2022   15. Name of Person Signing: Electronically signed by Aminah Cabrera RN on 4/15/2022 at [address] PM     16.  Signature and Professional Designation: Electronically signed by Aminah Cabrera RN on 4/15/2022 at [address] PM  Discharge Planner     [place] [id number]  Rev. 7/2015

## 2022-04-07 NOTE — TELEPHONE ENCOUNTER
Received a fax from [employer] stating BP has been running /52-56 with HR of 89. Patient taking Hydralazine 50 mg TID, Metoprolol 100 mg BID, Norvasc 5 mg daily and Amiodarone 400 mg BID. They are asking for parameters for BP meds. Also just started on Demadex per below note. Labs from today scanned. Please advise.

## 2022-04-08 LAB
ACANTHOCYTES: ABNORMAL
ACINETOBACTER CALCOAC BAUMANNII COMPLEX BY PCR: NOT DETECTED
ALBUMIN SERPL-MCNC: 1.7 G/DL (ref 3.5–5.2)
ALBUMIN SERPL-MCNC: 2.3 G/DL (ref 3.5–5.2)
ALP BLD-CCNC: 109 U/L (ref 40–129)
ALP BLD-CCNC: 98 U/L (ref 40–129)
ALT SERPL-CCNC: 20 U/L (ref 0–40)
ALT SERPL-CCNC: 23 U/L (ref 0–40)
ANION GAP SERPL CALCULATED.3IONS-SCNC: 16 MMOL/L (ref 7–16)
ANION GAP SERPL CALCULATED.3IONS-SCNC: 17 MMOL/L (ref 7–16)
ANISOCYTOSIS: ABNORMAL
ANISOCYTOSIS: ABNORMAL
AST SERPL-CCNC: 30 U/L (ref 0–39)
AST SERPL-CCNC: 33 U/L (ref 0–39)
B.E.: -5.4 MMOL/L (ref -3–3)
BACTEROIDES FRAGILIS BY PCR: NOT DETECTED
BASOPHILS ABSOLUTE: 0 E9/L (ref 0–0.2)
BASOPHILS ABSOLUTE: 0 E9/L (ref 0–0.2)
BASOPHILS RELATIVE PERCENT: 0 % (ref 0–2)
BASOPHILS RELATIVE PERCENT: 0 % (ref 0–2)
BILIRUB SERPL-MCNC: 1.1 MG/DL (ref 0–1.2)
BILIRUB SERPL-MCNC: 1.4 MG/DL (ref 0–1.2)
BOTTLE TYPE: ABNORMAL
BUN BLDV-MCNC: 40 MG/DL (ref 6–23)
BUN BLDV-MCNC: 40 MG/DL (ref 6–23)
BURR CELLS: ABNORMAL
CALCIUM SERPL-MCNC: 6.7 MG/DL (ref 8.6–10.2)
CALCIUM SERPL-MCNC: 7.3 MG/DL (ref 8.6–10.2)
CANDIDA ALBICANS BY PCR: NOT DETECTED
CANDIDA AURIS BY PCR: NOT DETECTED
CANDIDA GLABRATA BY PCR: NOT DETECTED
CANDIDA KRUSEI BY PCR: NOT DETECTED
CANDIDA PARAPSILOSIS BY PCR: NOT DETECTED
CANDIDA TROPICALIS BY PCR: NOT DETECTED
CARBAPENEM RESISTANCE IMP GENE BY PCR: NOT DETECTED
CARBAPENEM RESISTANCE KPC BY PCR: NOT DETECTED
CARBAPENEM RESISTANCE NDM GENE BY PCR: NOT DETECTED
CARBAPENEM RESISTANCE OXA-48 GENE BY PCR: NOT DETECTED
CARBAPENEM RESISTANCE VIM GENE BY PCR: NOT DETECTED
CEPHALOSPORIN RESISTANCE CTX-M GENE BY PCR: NOT DETECTED
CHLORIDE BLD-SCNC: 103 MMOL/L (ref 98–107)
CHLORIDE BLD-SCNC: 106 MMOL/L (ref 98–107)
CO2: 18 MMOL/L (ref 22–29)
CO2: 20 MMOL/L (ref 22–29)
COHB: 0.6 % (ref 0–1.5)
COLISTIN RESISTANCE MCR-1 GENE BY PCR: NOT DETECTED
CREAT SERPL-MCNC: 3.6 MG/DL (ref 0.7–1.2)
CREAT SERPL-MCNC: 3.7 MG/DL (ref 0.7–1.2)
CRITICAL: ABNORMAL
CRYPTOCOCCUS NEOFORMANS/GATTII BY PCR: NOT DETECTED
DATE ANALYZED: ABNORMAL
DATE OF COLLECTION: ABNORMAL
ENTEROBACTER CLOACAE COMPLEX BY PCR: NOT DETECTED
ENTEROBACTERALES BY PCR: DETECTED
ENTEROCOCCUS FAECALIS BY PCR: NOT DETECTED
ENTEROCOCCUS FAECIUM BY PCR: NOT DETECTED
EOSINOPHILS ABSOLUTE: 0 E9/L (ref 0.05–0.5)
EOSINOPHILS ABSOLUTE: 0 E9/L (ref 0.05–0.5)
EOSINOPHILS RELATIVE PERCENT: 0 % (ref 0–6)
EOSINOPHILS RELATIVE PERCENT: 0 % (ref 0–6)
ESCHERICHIA COLI BY PCR: NOT DETECTED
GFR AFRICAN AMERICAN: 20
GFR AFRICAN AMERICAN: 20
GFR NON-AFRICAN AMERICAN: 16 ML/MIN/1.73
GFR NON-AFRICAN AMERICAN: 17 ML/MIN/1.73
GLUCOSE BLD-MCNC: 73 MG/DL (ref 74–99)
GLUCOSE BLD-MCNC: 79 MG/DL (ref 74–99)
HAEMOPHILUS INFLUENZAE BY PCR: NOT DETECTED
HCO3: 17.5 MMOL/L (ref 22–26)
HCT VFR BLD CALC: 25.5 % (ref 37–54)
HCT VFR BLD CALC: 28.1 % (ref 37–54)
HEMOGLOBIN: 7.6 G/DL (ref 12.5–16.5)
HEMOGLOBIN: 8.5 G/DL (ref 12.5–16.5)
HHB: 3.1 % (ref 0–5)
HYPOCHROMIA: ABNORMAL
HYPOCHROMIA: ABNORMAL
KLEBSIELLA AEROGENES BY PCR: NOT DETECTED
KLEBSIELLA OXYTOCA BY PCR: NOT DETECTED
KLEBSIELLA PNEUMONIAE GROUP BY PCR: DETECTED
LAB: ABNORMAL
LACTIC ACID: 1.2 MMOL/L (ref 0.5–2.2)
LACTIC ACID: 2.4 MMOL/L (ref 0.5–2.2)
LISTERIA MONOCYTOGENES BY PCR: NOT DETECTED
LYMPHOCYTES ABSOLUTE: 0.4 E9/L (ref 1.5–4)
LYMPHOCYTES ABSOLUTE: 1.23 E9/L (ref 1.5–4)
LYMPHOCYTES RELATIVE PERCENT: 1.7 % (ref 20–42)
LYMPHOCYTES RELATIVE PERCENT: 6 % (ref 20–42)
Lab: ABNORMAL
MAGNESIUM: 2 MG/DL (ref 1.6–2.6)
MCH RBC QN AUTO: 26.1 PG (ref 26–35)
MCH RBC QN AUTO: 26.2 PG (ref 26–35)
MCHC RBC AUTO-ENTMCNC: 29.8 % (ref 32–34.5)
MCHC RBC AUTO-ENTMCNC: 30.2 % (ref 32–34.5)
MCV RBC AUTO: 86.5 FL (ref 80–99.9)
MCV RBC AUTO: 87.6 FL (ref 80–99.9)
METAMYELOCYTES RELATIVE PERCENT: 0.9 % (ref 0–1)
METAMYELOCYTES RELATIVE PERCENT: 1.8 % (ref 0–1)
METER GLUCOSE: 95 MG/DL (ref 74–99)
METHB: 0.3 % (ref 0–1.5)
MODE: ABNORMAL
MONOCYTES ABSOLUTE: 0.81 E9/L (ref 0.1–0.95)
MONOCYTES ABSOLUTE: 1.64 E9/L (ref 0.1–0.95)
MONOCYTES RELATIVE PERCENT: 3.5 % (ref 2–12)
MONOCYTES RELATIVE PERCENT: 7.8 % (ref 2–12)
NEISSERIA MENINGITIDIS BY PCR: NOT DETECTED
NEUTROPHILS ABSOLUTE: 17.63 E9/L (ref 1.8–7.3)
NEUTROPHILS ABSOLUTE: 19.19 E9/L (ref 1.8–7.3)
NEUTROPHILS RELATIVE PERCENT: 85.3 % (ref 43–80)
NEUTROPHILS RELATIVE PERCENT: 93 % (ref 43–80)
NUCLEATED RED BLOOD CELLS: 0 /100 WBC
NUCLEATED RED BLOOD CELLS: 0 /100 WBC
O2 CONTENT: 12.2 ML/DL
O2 SATURATION: 96.9 % (ref 92–98.5)
O2HB: 96 % (ref 94–97)
OPERATOR ID: 3186
ORDER NUMBER: ABNORMAL
OVALOCYTES: ABNORMAL
PATIENT TEMP: 37 C
PCO2: 25.7 MMHG (ref 35–45)
PDW BLD-RTO: 21.2 FL (ref 11.5–15)
PDW BLD-RTO: 21.5 FL (ref 11.5–15)
PH BLOOD GAS: 7.45 (ref 7.35–7.45)
PLATELET # BLD: 162 E9/L (ref 130–450)
PLATELET # BLD: 185 E9/L (ref 130–450)
PMV BLD AUTO: 10.1 FL (ref 7–12)
PMV BLD AUTO: 10.8 FL (ref 7–12)
PO2: 91.1 MMHG (ref 75–100)
POIKILOCYTES: ABNORMAL
POIKILOCYTES: ABNORMAL
POLYCHROMASIA: ABNORMAL
POLYCHROMASIA: ABNORMAL
POTASSIUM REFLEX MAGNESIUM: 4.3 MMOL/L (ref 3.5–5)
POTASSIUM REFLEX MAGNESIUM: 4.6 MMOL/L (ref 3.5–5)
PROCALCITONIN: 49.29 NG/ML (ref 0–0.08)
PROTEUS SPECIES BY PCR: NOT DETECTED
PSEUDOMONAS AERUGINOSA BY PCR: NOT DETECTED
RBC # BLD: 2.91 E12/L (ref 3.8–5.8)
RBC # BLD: 3.25 E12/L (ref 3.8–5.8)
SALMONELLA SPECIES BY PCR: NOT DETECTED
SERRATIA MARCESCENS BY PCR: NOT DETECTED
SODIUM BLD-SCNC: 140 MMOL/L (ref 132–146)
SODIUM BLD-SCNC: 140 MMOL/L (ref 132–146)
SOURCE OF BLOOD CULTURE: ABNORMAL
SOURCE, BLOOD GAS: ABNORMAL
STAPHYLOCOCCUS AUREUS BY PCR: NOT DETECTED
STAPHYLOCOCCUS EPIDERMIDIS BY PCR: NOT DETECTED
STAPHYLOCOCCUS LUGDUNENSIS BY PCR: NOT DETECTED
STAPHYLOCOCCUS SPECIES BY PCR: NOT DETECTED
STENOTROPHOMONAS MALTOPHILIA BY PCR: NOT DETECTED
STREPTOCOCCUS AGALACTIAE BY PCR: NOT DETECTED
STREPTOCOCCUS PNEUMONIAE BY PCR: NOT DETECTED
STREPTOCOCCUS PYOGENES  BY PCR: NOT DETECTED
STREPTOCOCCUS SPECIES BY PCR: NOT DETECTED
THB: 8.9 G/DL (ref 11.5–16.5)
TIME ANALYZED: 945
TOTAL PROTEIN: 5 G/DL (ref 6.4–8.3)
TOTAL PROTEIN: 5.7 G/DL (ref 6.4–8.3)
WBC # BLD: 20.2 E9/L (ref 4.5–11.5)
WBC # BLD: 20.5 E9/L (ref 4.5–11.5)

## 2022-04-08 PROCEDURE — 87088 URINE BACTERIA CULTURE: CPT

## 2022-04-08 PROCEDURE — C1751 CATH, INF, PER/CENT/MIDLINE: HCPCS

## 2022-04-08 PROCEDURE — 87077 CULTURE AEROBIC IDENTIFY: CPT

## 2022-04-08 PROCEDURE — 6360000002 HC RX W HCPCS: Performed by: INTERNAL MEDICINE

## 2022-04-08 PROCEDURE — 51702 INSERT TEMP BLADDER CATH: CPT

## 2022-04-08 PROCEDURE — 2580000003 HC RX 258: Performed by: INTERNAL MEDICINE

## 2022-04-08 PROCEDURE — 2580000003 HC RX 258: Performed by: SPECIALIST

## 2022-04-08 PROCEDURE — 87081 CULTURE SCREEN ONLY: CPT

## 2022-04-08 PROCEDURE — 6360000002 HC RX W HCPCS: Performed by: SPECIALIST

## 2022-04-08 PROCEDURE — 84145 PROCALCITONIN (PCT): CPT

## 2022-04-08 PROCEDURE — 83735 ASSAY OF MAGNESIUM: CPT

## 2022-04-08 PROCEDURE — 85025 COMPLETE CBC W/AUTO DIFF WBC: CPT

## 2022-04-08 PROCEDURE — 2700000000 HC OXYGEN THERAPY PER DAY

## 2022-04-08 PROCEDURE — 6370000000 HC RX 637 (ALT 250 FOR IP): Performed by: INTERNAL MEDICINE

## 2022-04-08 PROCEDURE — 82962 GLUCOSE BLOOD TEST: CPT

## 2022-04-08 PROCEDURE — 37799 UNLISTED PX VASCULAR SURGERY: CPT

## 2022-04-08 PROCEDURE — 82805 BLOOD GASES W/O2 SATURATION: CPT

## 2022-04-08 PROCEDURE — 87186 SC STD MICRODIL/AGAR DIL: CPT

## 2022-04-08 PROCEDURE — 36415 COLL VENOUS BLD VENIPUNCTURE: CPT

## 2022-04-08 PROCEDURE — 2000000000 HC ICU R&B

## 2022-04-08 PROCEDURE — 02HV33Z INSERTION OF INFUSION DEVICE INTO SUPERIOR VENA CAVA, PERCUTANEOUS APPROACH: ICD-10-PCS

## 2022-04-08 PROCEDURE — 83605 ASSAY OF LACTIC ACID: CPT

## 2022-04-08 PROCEDURE — 36556 INSERT NON-TUNNEL CV CATH: CPT

## 2022-04-08 PROCEDURE — 2500000003 HC RX 250 WO HCPCS: Performed by: INTERNAL MEDICINE

## 2022-04-08 PROCEDURE — 80053 COMPREHEN METABOLIC PANEL: CPT

## 2022-04-08 PROCEDURE — 36620 INSERTION CATHETER ARTERY: CPT

## 2022-04-08 RX ORDER — SODIUM CHLORIDE 9 MG/ML
INJECTION, SOLUTION INTRAVENOUS EVERY 12 HOURS
Status: DISCONTINUED | OUTPATIENT
Start: 2022-04-08 | End: 2022-04-10 | Stop reason: ALTCHOICE

## 2022-04-08 RX ADMIN — SODIUM CHLORIDE, PRESERVATIVE FREE 10 ML: 5 INJECTION INTRAVENOUS at 10:00

## 2022-04-08 RX ADMIN — SODIUM CHLORIDE, PRESERVATIVE FREE 10 ML: 5 INJECTION INTRAVENOUS at 21:19

## 2022-04-08 RX ADMIN — AMIODARONE HYDROCHLORIDE 400 MG: 200 TABLET ORAL at 21:56

## 2022-04-08 RX ADMIN — MEROPENEM 500 MG: 500 INJECTION INTRAVENOUS at 21:14

## 2022-04-08 RX ADMIN — PANTOPRAZOLE SODIUM 40 MG: 40 TABLET, DELAYED RELEASE ORAL at 06:20

## 2022-04-08 RX ADMIN — MEROPENEM 1000 MG: 1 INJECTION, POWDER, FOR SOLUTION INTRAVENOUS at 09:59

## 2022-04-08 RX ADMIN — NOREPINEPHRINE BITARTRATE 20 MCG/MIN: 1 INJECTION, SOLUTION, CONCENTRATE INTRAVENOUS at 12:17

## 2022-04-08 RX ADMIN — SODIUM CHLORIDE: 9 INJECTION, SOLUTION INTRAVENOUS at 18:57

## 2022-04-08 ASSESSMENT — PAIN SCALES - GENERAL
PAINLEVEL_OUTOF10: 0

## 2022-04-08 NOTE — CONSULTS
4/8/2022 8:55 AM  Service: Urology  Group: [facility] (Vishal/Tasha/Dana)    Santhosh Kingston.  [id number]     Chief Complaint:    Soto care     History of Present Illness: The patient is a 74 y.o. male patient who presents with fever of 103 from the nursing facility for fever, hypoxia and sob. He was seen by Dr. Valdene Oppenheim on 4/2/22 for high volume urinary retention  (>1700 cc) after a laparoscopic hemicolectomy by Dr. Efren Blizzard 3/22/22. He was found to have an enlarged prostate. He was discharged with the soto catheter to follow up as an op for a VT. He was placed on Flomax. Yesterday's CT scan noted a left abdominal wall fluid collection, bladder distention and bilateral hydronephrosis. Upon further review it was noted pt had a displaced soto catheter. Upon my arrival to the room the catheter had been replaced by nursing staff when they noticed the soto was not draining. 1500 cc was obtained. Pt was in the middle of an RRT. Urine is cloudy light pink. I spoke with RRT physician and let her know the soto had been displaced in the prostate for unknown length of time.      Past Medical History:   Diagnosis Date    Employs prosthetic leg     left    History of atrial fibrillation     [place] (hard of hearing)     uses bilat hearing aides    Hx of AKA (above knee amputation), left ([address]     Hx of necrotizing fasciitis 2011    left leg    Hypertension     Rectal bleeding     Rectal cancer ([address] 12/2017    rectal         Past Surgical History:   Procedure Laterality Date    ABDOMEN SURGERY  03/20/2018    ileostomy open take down    ABOVE KNEE AMPUTATION  2011    left; hx flesh eating bacteria    CARDIOVERSION  12/2020    COLON SURGERY  01/23/2018    laprascopic low anterior colon resection  take down splenic fexure   ileostomy    COLONOSCOPY  10/21/2011    COLONOSCOPY N/A 2/14/2022    COLONOSCOPY POLYPECTOMY SNARE/COLD BIOPSY performed by Chris Dickey MD at [address] Left 10/03/2016    pars plana vitrectomy, retinal detachment repair, laser gas/fluid exchange    EYE SURGERY Bilateral 2002? bilat cataracts w lens implants    EYE SURGERY Right ?    retinal detachment    HEMICOLECTOMY Right 3/22/2022    LAPAROSCOPIC RIGHT HEMICOLECTOMY performed by Eliana Sears MD at [facility] N/A 9/46/1944    ILEOSTOMY OPEN TAKEDOWN performed by Eliana Sears MD at [facility] OR       Medications Prior to Admission:    Medications Prior to Admission: magnesium hydroxide (MILK OF MAGNESIA) 400 MG/5ML suspension, Take 30 mLs by mouth daily as needed for Constipation (Given if no BM in 48 hrs)  torsemide (DEMADEX) 20 MG tablet, Take 20 mg by mouth daily  menthol-zinc oxide (CALMOSEPTINE) 0.44-20.625 % OINT ointment, Apply topically 2 times daily Max 30 ml per day.   Apply to buttocks  Emollient (EUCERIN) lotion, Apply topically nightly Apply topically to upper and lower extremities  bisacodyl (DULCOLAX) 10 MG suppository, Place 10 mg rectally daily as needed for Constipation (If no results from milk of mag.)  calcium carbonate (TUMS) 500 MG chewable tablet, Take 1 tablet by mouth every 6 hours as needed for Heartburn (up to six times daily with meals)  amiodarone (PACERONE) 400 MG tablet, Take 1 tablet by mouth 2 times daily for 12 doses  [START ON 4/11/2022] amiodarone (CORDARONE) 200 MG tablet, Take 1 tablet by mouth daily for 10 doses  tamsulosin (FLOMAX) 0.4 MG capsule, Take 1 capsule by mouth nightly  white petrolatum OINT ointment, Apply topically 2 times daily  pantoprazole (PROTONIX) 40 MG tablet, Take 1 tablet by mouth every morning (before breakfast)  apixaban (ELIQUIS) 5 MG TABS tablet, Take 5 mg by mouth 2 times daily  amLODIPine (NORVASC) 5 MG tablet, Take 1 tablet by mouth daily  metoprolol (LOPRESSOR) 100 MG tablet, Take 1 tablet by mouth 2 times daily  hydrALAZINE (APRESOLINE) 50 MG tablet, take 1 tablet by mouth three times a day    Allergies: Cefepime, Clindamycin/lincomycin, and Pcn [penicillins]    Social History:    reports that he quit smoking about 11 years ago. His smoking use included cigarettes. He started smoking about 48 years ago. He has a 70.00 pack-year smoking history. He has never used smokeless tobacco. He reports that he does not drink alcohol and does not use drugs. Family History:   Non-contributory to this Urological problem  family history includes Dementia in his mother; Diabetes in his father, mother, sister, sister, and sister; Glaucoma in his father; Heart Attack in his brother and mother; High Blood Pressure in his mother; High Cholesterol in his father, mother, sister, sister, and sister; Pacemaker in his father; Thyroid Disease in his sister, sister, and sister. Review of Systems:  Unable to obtain due to RRT     Physical Exam:     Vitals:  BP (!) 64/35   Pulse 73   Temp 99.5 °F (37.5 °C) (Axillary)   Resp 24   Ht 6' (1.829 m)   Wt 174 lb 8 oz (79.2 kg)   SpO2 98%   BMI 23.67 kg/m²     General:  Awake, becoming more responsive during RRT. Starting to answer simple questions. HEENT:  Normocephalic, atraumatic. Pupils equal, round. No scleral icterus. No conjunctival injection. Pale. No nasal discharge. Neck:  Supple, no masses. Heart:  Irregular rate and rhythm   Lungs:  No audible wheezing. Respirations symmetric and non-labored. Abdomen:  soft, nontender at this time  Extremities:  No clubbing, cyanosis, or edema, L aka  Skin:  Warm and dry, no open lesions or rashes  Neuro: lethargic   Genitalia:  Soto catheter well placed and draining cloudy pink urine.   I added 10 ml NS to the soto balloon      Labs:     Recent Labs     04/07/22  0617 04/07/22  1500 04/08/22  0633   WBC 8.8 10.4 20.5*   RBC 3.25* 3.68* 3.25*   HGB 8.4* 9.7* 8.5*   HCT 28.1* 31.6* 28.1*   MCV 86.5 85.9 86.5   MCH 25.8* 26.4 26.2   MCHC 29.9* 30.7* 30.2*   RDW 21.3* 21.4* 21.5*    365 185   MPV 9.7 9.9 10.1         Recent Labs     04/07/22  0617 04/07/22  1500 04/08/22  0633   CREATININE 1.7* 2.3* 3.7*     Results for Vani Tineo (MRN [id number]) as of 4/8/2022 09:12   Ref. Range 4/7/2022 15:00   Color, UA Latest Ref Range: Straw/Yellow  Yellow   Clarity, UA Latest Ref Range: Clear  Clear   Glucose, UA Latest Ref Range: Negative mg/dL Negative   Bilirubin, Urine Latest Ref Range: Negative  SMALL (A)   Ketones, Urine Latest Ref Range: Negative mg/dL Negative   Specific Gravity, UA Latest Ref Range: 1.005 - 1.030  1.025   Blood, Urine Latest Ref Range: Negative  LARGE (A)   pH, UA Latest Ref Range: 5.0 - 9.0  8.5   Protein, UA Latest Ref Range: Negative mg/dL >=300 (A)   Urobilinogen, Urine Latest Ref Range: <2.0 E.U./dL 0.2   Nitrite, Urine Latest Ref Range: Negative  Negative   Leukocyte Esterase, Urine Latest Ref Range: Negative  LARGE (A)   WBC, UA Latest Ref Range: 0 - 5 /HPF >20 (A)   RBC, UA Latest Ref Range: 0 - 2 /HPF >20   Epithelial Cells, UA Latest Units: /HPF MODERATE   Bacteria, UA Latest Ref Range: None Seen /HPF MANY (A)     4/7/22 1500    Blood Culture, Routine  Abnormal   Gram stain performed from blood culture bottle media   Gram negative rods       Culture, Blood 2  Order: [id number]   Status: Preliminary result     Visible to patient: No (not released)     Next appt: 05/06/2022 at 10:20 AM in Cardiology (Marium Adams MD)    Specimen Information: Blood         0 Result Notes    Component 4/7/22 1500   Culture, Blood 2  Abnormal   Gram stain performed from blood culture bottle media   Gram negative rods              EXAMINATION:   CT OF THE ABDOMEN AND PELVIS WITHOUT CONTRAST 4/7/2022 6:43 pm       TECHNIQUE:   CT of the abdomen and pelvis was performed without the administration of   intravenous contrast. Multiplanar reformatted images are provided for review.    Dose modulation, iterative reconstruction, and/or weight based adjustment of   the mA/kV was utilized to reduce the radiation dose to as low as reasonably   achievable.       COMPARISON:   04/01/2022       HISTORY:   ORDERING SYSTEM PROVIDED HISTORY: urinary retention, COURTNEY   TECHNOLOGIST PROVIDED HISTORY:   Additional Contrast?->None   Reason for exam:->urinary retention, COURTNEY   Decision Support Exception - unselect if not a suspected or confirmed   emergency medical condition->Emergency Medical Condition (MA)       FINDINGS:   Lower Chest: Small bilateral pleural effusions left greater than right.       Organs: The liver, spleen, adrenal glands, pancreas and gallbladder are   normal.  Bilateral hydroureteronephrosis with no obstructing calculi.       GI/Bowel: Grossly normal large and small bowel.       Pelvis: Significant prostatomegaly with distended urinary bladder.       Peritoneum/Retroperitoneum: 5.0 CM X 4.0 CM walled-off fluid collection   lateral to the ascending colon slightly increased in size compared to prior   study.       Bones/Soft Tissues: Degenerative changes thoracic spine.           Impression   Significant prostatomegaly with urinary bladder distension and bilateral   hydroureteronephrosis.       5.0 cm x 4.0 cm walled-off fluid collection lateral to the ascending colon   slightly increased in size compared to 04/01/2022 and worrisome for abscess.    This is adjacent to the anastomosis sutures.       Small bilateral pleural effusions left greater than right.                                               Assessment/plan:    Urinary retention  BPH with very large prostate, HENRY  Displaced soto    Soto replaced by nursing staff for 1500 cc  Gram negative sepsis from a urinary source  Klebsiella in urine 3/27/22   ID has been consulted   Antibiotics per ID  Monitor creatinine    Pt transferred to ICU, on Vasopressors   Hold Flomax until hemodynamically stable, then restart   Continue soto  Irrigate manually prn for hematuria until clot free  Maintain catheter fixation device to soto catheter  He will be discharged with the soto catheter  He will be followed up as OP for PSA, SHANTE  He will be followed up as OP for consideration of VT   He will likely need TURP in the future. [facility]                  Electronically signed by Merlin Major, APRN - CNP on 4/8/2022 at 8:55 AM     I agree with the nurse practitioner assessment and plan. I personally evaluated the patient and made any changes to reflect my impression and plan. Maintain soto. Will follow peripherally. Call with any issues.      Wade Beverly MD

## 2022-04-08 NOTE — CONSULTS
Critical Care Admit/Consult Note         Patient - Launie Nyhan. MRN -  [id number]   [facility]   - 1955      Date of Admission -  2022  1:41 PM  Date of evaluation -  2022  0204/0204-A   Hospital Day - 1            ADMIT/CONSULT DETAILS     Reason for Admit/Consult   Septic shock    Rocio Mandel MD  Primary Care Physician - Toyin Berrios MD         [facility]   The patient is a 74 y.o. male with significant past medical history of hypertension, GI bleed 3 months ago, COPD, A. fib/flutter on Eliquis, rectal cancer and recent right hemicolectomy on 3/22. Patient presented to the emergency department yesterday from the [facility]. Patient had recently been discharged to the SNF after having a right hemicolectomy. He presented to the emergency department with fatigue, fever, shortness of breath with hypoxia on presentation. Patient did have an indwelling Valles catheter. Symptoms have developed over a single day. In the emergency department, patient was noted to have an COURTNEY and UTI. Blood culture returned today showing gram-negative rods, more specifically Klebsiella. Patient has been started on meropenem in the emergency department due to penicillin allergy. After admission, the patient was noted to not have any urine output and his Valles inadequately located in the bladder. Valles was replaced and patient has had good urine output since.     Past Medical History         Diagnosis Date    Employs prosthetic leg     left    History of atrial fibrillation     [place] (hard of hearing)     uses bilat hearing aides    Hx of AKA (above knee amputation), left ([address]     Hx of necrotizing fasciitis     left leg    Hypertension     Rectal bleeding     Rectal cancer ([address] 2017    rectal        Past Surgical History           Procedure Laterality Date    ABDOMEN SURGERY  2018    ileostomy open take down    ABOVE KNEE AMPUTATION  2011    left; hx flesh eating bacteria    CARDIOVERSION  12/2020    COLON SURGERY  01/23/2018    laprascopic low anterior colon resection  take down splenic fexure   ileostomy    COLONOSCOPY  10/21/2011    COLONOSCOPY N/A 2/14/2022    COLONOSCOPY POLYPECTOMY SNARE/COLD BIOPSY performed by Jone Nichloas MD at [address] Left 10/03/2016    pars plana vitrectomy, retinal detachment repair, laser gas/fluid exchange    EYE SURGERY Bilateral 2002? bilat cataracts w lens implants    EYE SURGERY Right ?    retinal detachment    HEMICOLECTOMY Right 3/22/2022    LAPAROSCOPIC RIGHT HEMICOLECTOMY performed by Jone Nicholas MD at [address] [facility] N/A 2/91/0927    ILEOSTOMY OPEN TAKEDOWN performed by Jone Nicholas MD at [facility]:  The patient is a former smoker with a 70-pack-year history. Current Medications   Current Medications    meropenem  1,000 mg IntraVENous Once    amiodarone  400 mg Oral BID    [Held by provider] metoprolol  100 mg Oral BID    pantoprazole  40 mg Oral QAM AC    [Held by provider] tamsulosin  0.4 mg Oral Nightly    sodium chloride flush  5-40 mL IntraVENous 2 times per day     sodium chloride flush, sodium chloride, acetaminophen **OR** acetaminophen  IV Drips/Infusions   sodium bicarbonate infusion      sodium chloride 50 mL/hr at 04/07/22 2311    sodium chloride       Home Medications  Medications Prior to Admission: magnesium hydroxide (MILK OF MAGNESIA) 400 MG/5ML suspension, Take 30 mLs by mouth daily as needed for Constipation (Given if no BM in 48 hrs)  torsemide (DEMADEX) 20 MG tablet, Take 20 mg by mouth daily  menthol-zinc oxide (CALMOSEPTINE) 0.44-20.625 % OINT ointment, Apply topically 2 times daily Max 30 ml per day.   Apply to buttocks  Emollient (EUCERIN) lotion, Apply topically nightly Apply topically to upper and lower extremities  bisacodyl (DULCOLAX) 10 MG suppository, Place 10 mg rectally daily as needed for Constipation (If no results from milk of mag.)  calcium carbonate (TUMS) 500 MG chewable tablet, Take 1 tablet by mouth every 6 hours as needed for Heartburn (up to six times daily with meals)  amiodarone (PACERONE) 400 MG tablet, Take 1 tablet by mouth 2 times daily for 12 doses  [START ON 4/11/2022] amiodarone (CORDARONE) 200 MG tablet, Take 1 tablet by mouth daily for 10 doses  tamsulosin (FLOMAX) 0.4 MG capsule, Take 1 capsule by mouth nightly  white petrolatum OINT ointment, Apply topically 2 times daily  pantoprazole (PROTONIX) 40 MG tablet, Take 1 tablet by mouth every morning (before breakfast)  apixaban (ELIQUIS) 5 MG TABS tablet, Take 5 mg by mouth 2 times daily  amLODIPine (NORVASC) 5 MG tablet, Take 1 tablet by mouth daily  metoprolol (LOPRESSOR) 100 MG tablet, Take 1 tablet by mouth 2 times daily  hydrALAZINE (APRESOLINE) 50 MG tablet, take 1 tablet by mouth three times a day    Diet/Nutrition   Diet NPO    Allergies   Cefepime, Clindamycin/lincomycin, and Pcn [penicillins]    Social History   Tobacco   reports that he quit smoking about 11 years ago. His smoking use included cigarettes. He started smoking about 48 years ago. He has a 70.00 pack-year smoking history. He has never used smokeless tobacco.    Alcohol     reports no history of alcohol use.     Occupational history :    Family History         Problem Relation Age of Onset    Diabetes Mother     High Blood Pressure Mother     High Cholesterol Mother     Heart Attack Mother     Dementia Mother     Diabetes Father     Glaucoma Father     High Cholesterol Father    Damon Pacemaker Father     Diabetes Sister     Thyroid Disease Sister     High Cholesterol Sister     Heart Attack Brother     Diabetes Sister     High Cholesterol Sister     Thyroid Disease Sister     Diabetes Sister     High Cholesterol Sister     Thyroid Disease Sister        ROS     REVIEW OF SYSTEMS:  CONSTITUTIONAL:  positive for  fevers and fatigue  EYES:  negative  HEENT:  negative  RESPIRATORY:  negative except for cough and shortness of breath  CARDIOVASCULAR: Negative except for right lower leg swelling  GASTROINTESTINAL:  positive for diarrhea  GENITOURINARY:  negative  INTEGUMENT/BREAST:  negative  HEMATOLOGIC/LYMPHATIC:  negative  ALLERGIC/IMMUNOLOGIC:  negative  ENDOCRINE:  negative  MUSCULOSKELETAL:  negative  NEUROLOGICAL:  negative  BEHAVIOR/PSYCH:  negative    Lines and Devices   Triple-lumen central venous catheter and arterial line placed in the right femoral vein and artery respectively today, . Mechanical Ventilation Data   VENT SETTINGS (Comprehensive)  Vent Information  SpO2: 98 %  Additional Respiratory  Assessments  Pulse: 73  Resp: 30  SpO2: 98 %    ABG  Lab Results   Component Value Date    PH 7.507 01/10/2011    PCO2 34.4 01/10/2011    PO2 71.3 01/10/2011    HCO3 26.6 01/10/2011    O2SAT 99.1 2011     Lab Results   Component Value Date    MODE NC- 2L 01/10/2011           Vitals    height is 6' (1.829 m) and weight is 174 lb 8 oz (79.2 kg). His axillary temperature is 99.5 °F (37.5 °C). His blood pressure is 98/54 (abnormal) and his pulse is 73. His respiration is 30 and oxygen saturation is 98%.        Temperature Range: Temp: 99.5 °F (37.5 °C) Temp  Av.8 °F (37.7 °C)  Min: 97.5 °F (36.4 °C)  Max: 103 °F (39.4 °C)  BP Range:  Systolic (42RTC), YVX:31 , Min:64 , VPB:215     Diastolic (30VQQ), ZFZ:24, Min:35, Max:65    Pulse Range: Pulse  Av.6  Min: 67  Max: 108  Respiration Range: Resp  Av.4  Min: 16  Max: 30  Current Pulse Ox[de-identified]  SpO2: 98 %  24HR Pulse Ox Range:  SpO2  Av.7 %  Min: 91 %  Max: 98 %  Oxygen Amount and Delivery: O2 Flow Rate (L/min): 3 L/min      I/O (24 Hours)    Patient Vitals for the past 8 hrs:   BP Temp Temp src Pulse Resp SpO2   22 0909 (!) 98/54 -- -- 73 30 --   22 0841 (!) 64/35 -- -- 73 24 --   22 0836 (!) 68/40 99.5 °F (37.5 °C) Axillary 76 22 98 %   04/08/22 0800 (!) 79/47 99.1 °F (37.3 °C) Axillary 78 22 97 %   04/08/22 0615 (!) 93/54 97.6 °F (36.4 °C) Oral 82 22 98 %       Intake/Output Summary (Last 24 hours) at 4/8/2022 0947  Last data filed at 4/8/2022 9853  Gross per 24 hour   Intake 60 ml   Output 300 ml   Net -240 ml     I/O last 3 completed shifts: In: 61 [P.O.:60]  Out: 300 [Urine:300]   Date 04/08/22 0000 - 04/08/22 2359   Shift 8332-8241 4443-4875 7988-9570 24 Hour Total   INTAKE   P.O.(mL/kg/hr) 60(0.1)   60   Shift Total(mL/kg) 60(0.8)   60(0.8)   OUTPUT   Shift Total(mL/kg)       Weight (kg) 79.2 79.2 79.2 79.2     Patient Vitals for the past 96 hrs (Last 3 readings):   Weight   04/08/22 0050 174 lb 8 oz (79.2 kg)   04/07/22 1402 175 lb (79.4 kg)   04/07/22 1347 175 lb (79.4 kg)         Drains/Tubes Outputs  1500 cc of urine output through Valles today. Exam       Physical Exam  Vitals reviewed. Constitutional:       General: He is not in acute distress. Appearance: Normal appearance. He is normal weight. He is ill-appearing. He is not toxic-appearing. HENT:      Head: Normocephalic and atraumatic. Right Ear: External ear normal.      Left Ear: External ear normal.      Nose: Nose normal. No rhinorrhea. Mouth/Throat:      Mouth: Mucous membranes are moist.      Pharynx: Oropharynx is clear. No oropharyngeal exudate. Eyes:      Extraocular Movements: Extraocular movements intact. Conjunctiva/sclera: Conjunctivae normal.      Pupils: Pupils are equal, round, and reactive to light. Cardiovascular:      Rate and Rhythm: Normal rate and regular rhythm. Heart sounds: Normal heart sounds. No murmur heard. Pulmonary:      Effort: Pulmonary effort is normal. No respiratory distress. Breath sounds: Normal breath sounds. Abdominal:      General: Abdomen is flat. There is distension. Tenderness: There is no abdominal tenderness. There is no guarding. Musculoskeletal:         General: No swelling or tenderness. Normal range of motion. Cervical back: Normal range of motion. No rigidity or tenderness. Skin:     General: Skin is warm and dry. Coloration: Skin is not jaundiced or pale. Findings: No bruising or erythema. Neurological:      General: No focal deficit present. Mental Status: He is alert and oriented to person, place, and time. Cranial Nerves: No cranial nerve deficit. Motor: No weakness.    Psychiatric:         Mood and Affect: Mood normal.         Behavior: Behavior normal.           Data   Old records and images have been reviewed    Lab Results   CBC     Lab Results   Component Value Date    WBC 20.5 04/08/2022    RBC 3.25 04/08/2022    HGB 8.5 04/08/2022    HCT 28.1 04/08/2022     04/08/2022    MCV 86.5 04/08/2022    MCH 26.2 04/08/2022    MCHC 30.2 04/08/2022    RDW 21.5 04/08/2022    NRBC 0.0 04/08/2022    SEGSPCT 47 03/18/2011    METASPCT 0.9 04/08/2022    LYMPHOPCT 6.0 04/08/2022    PROMYELOPCT 1.8 03/28/2022    MONOPCT 7.8 04/08/2022    MYELOPCT 1.8 03/28/2022    BASOPCT 0.0 04/08/2022    MONOSABS 1.64 04/08/2022    LYMPHSABS 1.23 04/08/2022    EOSABS 0.00 04/08/2022    BASOSABS 0.00 04/08/2022       BMP   Lab Results   Component Value Date     04/08/2022    K 4.3 04/08/2022     04/08/2022    CO2 20 04/08/2022    BUN 40 04/08/2022    CREATININE 3.7 04/08/2022    GLUCOSE 79 04/08/2022    GLUCOSE 106 03/24/2011    CALCIUM 7.3 04/08/2022    IONCA 1.23 02/19/2011       LFTS  Lab Results   Component Value Date    ALKPHOS 109 04/08/2022    ALT 23 04/08/2022    AST 33 04/08/2022    PROT 5.7 04/08/2022    BILITOT 1.4 04/08/2022    BILIDIR <0.1 03/20/2011    LABALBU 2.3 04/08/2022    LABALBU 2.4 03/20/2011       INR  Recent Labs     04/07/22  1500   PROTIME 26.2*   INR 2.4       APTT  Recent Labs     04/07/22  1500   APTT 28.1       Lactic Acid  Lab Results   Component Value Date    LACTA 2.4 hemodynamically stable    Infectious disease   UTI vs postoperative intraabdominal infection  o ID following  o Meropenem per ID  o Blood cultures x4 positive for klebsiella    Heme/Onc   Acute on chronic Anemia  o Acute at least partially dilutional  o Monitor   Leukocytosis  o Due to sepsis, treat with antibiotics   Thrombocytopenia  o Acute drop from 365-185  o On Eliquis, did not take yesterday. Hold for now  o INR 2.4    Endocrine   No acute issues    Social/Spiritual/DNR/Other   Full code    Lines/catheters  · 4/8  · Valles  · Right femoral arterial line  · Right femoral central venous catheter      Elpidio Ochoa MD  11:52 AM      I personally saw, examined and provided care for the patient. Radiographs, labs and medication list were reviewed by me independently. I spoke with bedside nursing, therapists and consultants. Critical care services and times documented are independent of procedures and multidisciplinary rounds with Residents. Additionally comprehensive, multidisciplinary rounds were conducted with the MICU team. The case was discussed in detail and plans for care were established. Review of Residents documentation was conducted and revisions were made as appropriate. I agree with the above documented exam, problem list and plan of care.   Ronda Abbott MD   CCT excluding procedures:60'

## 2022-04-08 NOTE — CONSULTS
[address] [place] [address] [facility]  Consultation Note     Admit Date: 4/7/2022  1:41 PM    Reason for Consult: UTI versus abscess    Attending Physician:  Deedee Burgos MD    HISTORY OF PRESENT ILLNESS:             The history is obtained from extensive review of available past medical records. The patient is a 74 y.o. male who is previously known to the ID service. The patient presents to the ED at [facility] 4/7/2022 from [place] [address] with shortness of breath, fever and right lower extremity edema. White count was normal but has jumped up to 20.5. He had a temperature 103 °F and was slightly hypotensive. Creatinine was 1.7 and increased to 3.7. Lactic acid was 3.4. Transaminases were normal.  CT of the abdomen and pelvis ordered today right fluid collection compatible with an abscess. Urine was found to be malpositioned and was replaced. An RRT was called and he was transferred out of the ICU. Blood cultures are positive for Klebsiella pneumoniae. He received Vancomycin and Meropenem. Past Medical History:        Diagnosis Date    Employs prosthetic leg     left    History of atrial fibrillation     [place] (hard of hearing)     uses bilat hearing aides    Hx of AKA (above knee amputation), left ([address]     Hx of necrotizing fasciitis 2011    left leg    Hypertension     Rectal bleeding     Rectal cancer ([facility] [address] 12/2017    rectal     March 2022. Admitted to [facility] for laparoscopic right hemicolectomy, after which she developed acute kidney injury and hypotension. Seen by ID for leukocytosis and cellulitis of the abdomen. He was felt to have a rash from Clindamycin and it was added to his list of allergies. He was covered off antibiotics. January 2011. Admitted to to [facility] with necrotizing fasciitis of the left leg. He underwent a guillotine amputation, followed by an above-the-knee amputation. Seen by ID.   Treated with Ertapenem, followed by Ertapenem. Antibiotics were changed over to Clindamycin and Cefazolin he broke out in a rash. Antibiotics were reverted to Daptomycin and Ertapenem and he completed antibiotic during that admission. He was readmitted in February for anemia and blood transfusions. Seen by ID. He did not warrant antibiotics were these were discontinued altogether. The PICC was pulled. Past Surgical History:        Procedure Laterality Date    ABDOMEN SURGERY  03/20/2018    ileostomy open take down    ABOVE KNEE AMPUTATION  2011    left; hx flesh eating bacteria    CARDIOVERSION  12/2020    COLON SURGERY  01/23/2018    laprascopic low anterior colon resection  take down splenic fexure   ileostomy    COLONOSCOPY  10/21/2011    COLONOSCOPY N/A 2/14/2022    COLONOSCOPY POLYPECTOMY SNARE/COLD BIOPSY performed by Kalpesh Dickey MD at [address] Left 10/03/2016    pars plana vitrectomy, retinal detachment repair, laser gas/fluid exchange    EYE SURGERY Bilateral 2002?     bilat cataracts w lens implants    EYE SURGERY Right ?    retinal detachment    HEMICOLECTOMY Right 3/22/2022    LAPAROSCOPIC RIGHT HEMICOLECTOMY performed by Kalpesh Dickey MD at [address] [facility] N/A 4/40/7128    ILEOSTOMY OPEN TAKEDOWN performed by Kalpesh Dickey MD at [facility] OR     Current Medications:   Scheduled Meds:   meropenem  1,000 mg IntraVENous Once    amiodarone  400 mg Oral BID    [Held by provider] metoprolol  100 mg Oral BID    pantoprazole  40 mg Oral QAM AC    [Held by provider] tamsulosin  0.4 mg Oral Nightly    sodium chloride flush  5-40 mL IntraVENous 2 times per day     Continuous Infusions:   sodium bicarbonate infusion Stopped (04/08/22 1001)    norepinephrine      sodium chloride 150 mL/hr at 04/08/22 1000    sodium chloride       PRN Meds:sodium chloride flush, sodium chloride, acetaminophen **OR** acetaminophen    Allergies:  Cefepime, Clindamycin/lincomycin, and Pcn [penicillins]    Social History:   Social History     Socioeconomic History    Marital status: Single     Spouse name: Not on file    Number of children: Not on file    Years of education: Not on file    Highest education level: Not on file   Occupational History    Not on file   Tobacco Use    Smoking status: Former Smoker     Packs/day: 2.00     Years: 35.00     Pack years: 70.00     Types: Cigarettes     Start date: 1974     Quit date: 2010     Years since quittin.3    Smokeless tobacco: Never Used   Vaping Use    Vaping Use: Never used   Substance and Sexual Activity    Alcohol use: No    Drug use: No    Sexual activity: Not on file   Other Topics Concern    Not on file   Social History Narrative    Not on file     Social Determinants of Health     Financial Resource Strain:     Difficulty of Paying Living Expenses: Not on file   Food Insecurity:     Worried About Running Out of Food in the Last Year: Not on file    Donny of Food in the Last Year: Not on file   Transportation Needs:     Lack of Transportation (Medical): Not on file    Lack of Transportation (Non-Medical):  Not on file   Physical Activity:     Days of Exercise per Week: Not on file    Minutes of Exercise per Session: Not on file   Stress:     Feeling of Stress : Not on file   Social Connections:     Frequency of Communication with Friends and Family: Not on file    Frequency of Social Gatherings with Friends and Family: Not on file    Attends Judaism Services: Not on file    Active Member of Clubs or Organizations: Not on file    Attends Club or Organization Meetings: Not on file    Marital Status: Not on file   Intimate Partner Violence:     Fear of Current or Ex-Partner: Not on file    Emotionally Abused: Not on file    Physically Abused: Not on file    Sexually Abused: Not on file   Housing Stability:     Unable to Pay for Housing in the Last Year: Not on file  Number of Places Lived in the Last Year: Not on file    Unstable Housing in the Last Year: Not on file      Pets: None  Travel: No  The patient chet was living es at home with his mother and sister. He is now at [facility]. He retired from working at [employer] History:       Problem Relation Age of Onset    Diabetes Mother     High Blood Pressure Mother     High Cholesterol Mother     Heart Attack Mother     Dementia Mother     Diabetes Father     Glaucoma Father     High Cholesterol Father     Pacemaker Father     Diabetes Sister     Thyroid Disease Sister     High Cholesterol Sister     Heart Attack Brother     Diabetes Sister     High Cholesterol Sister     Thyroid Disease Sister     Diabetes Sister     High Cholesterol Sister     Thyroid Disease Sister    . Otherwise non-pertinent to the chief complaint. REVIEW OF SYSTEMS:    A 10 point review of system cannot be obtained the patient given her status of lethargy. Otherwise, as in the HPI    PHYSICAL EXAM:    Vitals:   BP (!) 99/56   Pulse 75   Temp 99 °F (37.2 °C) (Oral)   Resp (!) 42   Ht 6' (1.829 m)   Wt 174 lb 8 oz (79.2 kg)   SpO2 99%   BMI 23.67 kg/m²   Constitutional: The patient is lying in bed in the ICU. He is lethargic and minimally arousable. No distress. Skin: Warm and dry. No rashes were noted. HEENT: Eyes show round, and reactive pupils. No jaundice. Dry mouth. Neck: Supple to movements. No lymphadenopathy. Chest: No use of accessory muscles to breathe. Symmetrical expansion. Slightly tender Auscultation reveals no wheezing, crackles, or rhonchi. Cardiovascular: S1 and S2 are rhythmic and regular. No murmurs appreciated. Abdomen: Positive bowel sounds to auscultation. Slightly tender to palpation, right side. No masses felt. No hepatosplenomegaly. Surgical wounds clean. Extremities: Left AKA stump is unremarkable. Minimal right leg edema.   Lines: Right femoral TLC 4/8/2022. Right femoral arterial line 4/8/2022. Valles catheter. CBC+dif:  Recent Labs     04/07/22  1500 04/07/22  1500 04/08/22  0633 04/08/22  0633 04/08/22  0856   WBC 10.4  --  20.5*  --  20.2*   HGB 9.7*   < > 8.5*   < > 7.6*   HCT 31.6*   < > 28.1*   < > 25.5*   MCV 85.9   < > 86.5   < > 87.6      < > 185   < > 162   NEUTROABS 9.36*   < > 17.63*  --   --     < > = values in this interval not displayed.      Lab Results   Component Value Date    CRP 29.2 (H) 01/03/2011      No results found for: [facility]  Lab Results   Component Value Date    SEDRATE 45 (H) 01/03/2011     Lab Results   Component Value Date    ALT 20 04/08/2022    AST 30 04/08/2022    ALKPHOS 98 04/08/2022    BILITOT 1.1 04/08/2022     Lab Results   Component Value Date     04/08/2022    K 4.6 04/08/2022     04/08/2022    CO2 18 04/08/2022    BUN 40 04/08/2022    CREATININE 3.6 04/08/2022    GFRAA 20 04/08/2022    LABGLOM 17 04/08/2022    GLUCOSE 73 04/08/2022    GLUCOSE 106 03/24/2011    PROT 5.0 04/08/2022    LABALBU 1.7 04/08/2022    LABALBU 2.4 03/20/2011    CALCIUM 6.7 04/08/2022    BILITOT 1.1 04/08/2022    ALKPHOS 98 04/08/2022    AST 30 04/08/2022    ALT 20 04/08/2022       Lab Results   Component Value Date    PROTIME 26.2 04/07/2022    PROTIME 12.2 03/18/2011    INR 2.4 04/07/2022       Lab Results   Component Value Date    TSH 1.050 03/25/2022       Lab Results   Component Value Date    COLORU Yellow 04/07/2022    PHUR 8.5 04/07/2022    LABCAST RARE 10/18/2016    WBCUA >20 04/07/2022    WBCUA NONE 03/18/2011    RBCUA >20 04/07/2022    RBCUA NONE 03/18/2011    BACTERIA MANY 04/07/2022    CLARITYU Clear 04/07/2022    SPECGRAV 1.025 04/07/2022    LEUKOCYTESUR LARGE 04/07/2022    UROBILINOGEN 0.2 04/07/2022    BILIRUBINUR SMALL 04/07/2022    BILIRUBINUR NEGATIVE 03/18/2011    BLOODU LARGE 04/07/2022    GLUCOSEU Negative 04/07/2022    GLUCOSEU NEGATIVE 03/18/2011    AMORPHOUS MODERATE 03/18/2011       Lab Results Component Value Date    HCO3 17.5 04/08/2022    BE -5.4 04/08/2022    O2SAT 96.9 04/08/2022    PH 7.452 04/08/2022    PH 7.507 01/10/2011    THGB 12.6 01/03/2011    PCO2 25.7 04/08/2022    PO2 91.1 04/08/2022     Radiology:  Noted    Microbiology:  Pending  Recent Labs     04/07/22  1500   BC Gram stain performed from blood culture bottle media  Gram negative rods  *     No results for input(s): ORG in the last 72 hours. Recent Labs     04/07/22  1500   BLOODCULT2 Gram stain performed from blood culture bottle media  Gram negative rods  *     No results for input(s): STREPNEUMAGU in the last 72 hours. No results for input(s): LP1UAG in the last 72 hours. No results for input(s): ASO in the last 72 hours. No results for input(s): CULTRESP in the last 72 hours. No results for input(s): PROCAL in the last 72 hours. Assessment:  · Bladder outlet obstruction secondary to malpositioned Valles catheter  · Complicated UTI with sepsis associated to the bed  · Sepsis with septic shock associated to blood  · Right abdominal abscess  · Acute kidney injury  · Leukocytosis associated to the above  · Fever associated to the above    Plan:    · Continue Meropenem  · No more Vancomycin for now to reduce further kidney insult  · Check cultures, baseline ESR, CRP  · Consult IR for CT-guided drainage of abscess  · Will follow with you    Thank you for having us see this patient in consultation. I will be discussing this case with the treating physicians.     Anika Lopez MD  11:54 AM  4/8/2022

## 2022-04-08 NOTE — PROGRESS NOTES
ID consult called via office. Notified of (+) blood cultures, 4/4 bottles positive for gram (-) rods.

## 2022-04-08 NOTE — DISCHARGE INSTR - COC
Continuity of Care Form    Patient Name: Trang Osuna. :  1955  MRN:  [id number]    Admit date:  2022  Discharge date:  4/15/2022    Code Status Order: Full Code   Advance Directives:      Admitting Physician:  Matt Reed MD  PCP: Matt Reed MD    Discharging Nurse: [address] [place] [address] Unit/Room#: 3055/9791-Z  Discharging Unit Phone Number: [phone]    Emergency Contact:   Extended Emergency Contact Information  Primary Emergency Contact: Ryland Wilburn  Address: [facility], [address] [facility] [address]  Phone: [phone]  Mobile Phone: [phone]  Relation: Brother/Sister   needed? No  Secondary Emergency Contact: Phil Gramajo  Address: [address] [place] [address] [address] [place] [address], [address] [facility] [address] Phone: [phone]  Mobile Phone: [phone]  Relation: Brother/Sister    Past Surgical History:  Past Surgical History:   Procedure Laterality Date    ABDOMEN SURGERY  2018    ileostomy open take down    ABOVE KNEE AMPUTATION      left; hx flesh eating bacteria    CARDIOVERSION  2020    COLON SURGERY  2018    laprascopic low anterior colon resection  take down splenic fexure   ileostomy    COLONOSCOPY  10/21/2011    COLONOSCOPY N/A 2022    COLONOSCOPY POLYPECTOMY SNARE/COLD BIOPSY performed by Lencho Ochoa MD at [address] [facility] Left 10/03/2016    pars plana vitrectomy, retinal detachment repair, laser gas/fluid exchange    EYE SURGERY Bilateral ?     bilat cataracts w lens implants    EYE SURGERY Right ?    retinal detachment    HEMICOLECTOMY Right 3/22/2022    LAPAROSCOPIC RIGHT HEMICOLECTOMY performed by Lencho Ochoa MD at [address] N/A     ILEOSTOMY OPEN TAKEDOWN performed by Lencho Ochoa MD at [facility] History:   Immunization History   Administered Date(s) Administered    COVID-19, [address], Primary or Immunocompromised, PF, 100mcg/0.5mL 04/02/2021, 05/03/2021    Influenza Virus Vaccine 10/05/2017       Active Problems:  Patient Active Problem List   Diagnosis Code    Retinal detachment of left eye with multiple breaks H33.022    Rectal cancer ([facility]) C20    Ileus ([facility] [address] K56.7    Atrial flutter with rapid ventricular response ([facility]) I48.92    GI bleed K92.2    Gastrointestinal bleed K92.2    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    S/P right hemicolectomy Z90.49    PSVT (paroxysmal supraventricular tachycardia) ([facility]) I47.1    Primary hypertension I10    Abdominal wall abscess L02.211       Isolation/Infection:   Isolation            No Isolation          Patient Infection Status       Infection Onset Added Last Indicated Last Indicated By Review Planned Expiration Resolved Resolved By    None active    Resolved    COVID-19 (Rule Out) 04/07/22 04/07/22 04/07/22 COVID-19, Rapid (Ordered)   04/07/22 Rule-Out Test Resulted    C-diff Rule Out 03/27/22 03/27/22 03/27/22 CLOSTRIDIUM DIFFICILE EIA (Ordered)   03/27/22 Rule-Out Test Resulted            Nurse Assessment:  Last Vital Signs: BP (!) 64/35   Pulse 73   Temp 99.5 °F (37.5 °C) (Axillary)   Resp 24   Ht 6' (1.829 m)   Wt 174 lb 8 oz (79.2 kg)   SpO2 98%   BMI 23.67 kg/m²     Last documented pain score (0-10 scale): Pain Level: 0  Last Weight:   Wt Readings from Last 1 Encounters:   04/08/22 174 lb 8 oz (79.2 kg)     Mental Status:  oriented, alert, coherent, and logical    IV Access:  - None    Nursing Mobility/ADLs:  Walking   Assisted  Transfer  Assisted  Bathing  Assisted  Dressing  Assisted  Toileting  Assisted  Feeding  Assisted  Med Admin  Assisted  Med Delivery   whole    Wound Care Documentation and Therapy:  Incision 03/20/18 Abdomen (Active)   Number of days: 1479        Elimination:  Continence: Bowel: No  Bladder: No  Urinary Catheter:  Insertion Date: 4/8/22    Colostomy/Ileostomy/Ileal Conduit: No       Date of Last BM: 22    Intake/Output Summary (Last 24 hours) at 2022 0857  Last data filed at 2022 8816  Gross per 24 hour   Intake 60 ml   Output 300 ml   Net -240 ml     I/O last 3 completed shifts: In: 61 [P.O.:60]  Out: 300 [Urine:300]    Safety Concerns: At Risk for Falls    Impairments/Disabilities:      Amputation - L AKA    Nutrition Therapy:  Current Nutrition Therapy:   - Oral Diet:  Dental Soft and Low Sodium (2gm)  - Oral Nutrition Supplement:  High Protein Pudding  twice a day    Routes of Feeding: Oral  Liquids: Thin Liquids  Daily Fluid Restriction: no  Last Modified Barium Swallow with Video (Video Swallowing Test): not done    Treatments at the Time of Hospital Discharge:   Respiratory Treatments: ***  Oxygen Therapy:  is not on home oxygen therapy.   Ventilator:    - No ventilator support    Rehab Therapies: PT and OT and ST eval and treat  Weight Bearing Status/Restrictions: [address] [place] [address] Weight Bearin}  Other Medical Equipment (for information only, NOT a DME order):  {EQUIPMENT:[id number]}  Other Treatments: ***    Patient's personal belongings (please select all that are sent with patient):  ***    RN SIGNATURE:  Electronically signed by Les Boyd RN on 4/15/22 at 5:33 PM EDT    CASE MANAGEMENT/SOCIAL WORK SECTION    Inpatient Status Date: ***    Readmission Risk Assessment Score:  Readmission Risk              Risk of Unplanned Readmission:  23           [facility]   [facility] [place] [address] [address] [place] [address]    Dialysis Facility (if applicable)   Name:  Address:  Dialysis Schedule:  Phone:  Fax:    / signature: {Esignature:[id number]}Electronically signed by TAMI Davis on 22 at 9:01 AM EDT     PHYSICIAN SECTION    Prognosis: {Prognosis:2172835593}    Condition at Discharge: Stable    Rehab Potential (if transferring to Rehab): {Prognosis:0779792417}    Recommended Labs or Other Treatments After Discharge: ***    Physician Certification: I certify the above information and transfer of Yenny Le.  is necessary for the continuing treatment of the diagnosis listed and that he requires [facility] for less than 30 days.      Update Admission H&P: {CHP DME Changes in UFWAV:229652656}    PHYSICIAN SIGNATURE:  {Esignature:[id number]}Electronically signed by Sun Lewis MD on 4/15/2022 at 12:52 PM

## 2022-04-08 NOTE — FLOWSHEET NOTE
Spoke with ICU nurse regarding ordered IR procedure. Explained that the order needs clarified as to what exactly is wanted. We are also awaiting input from intensive doctor and surgery as to how urgent the procedure is for the patient as our doctor would like the patient to have FFP prior due to patient's elevated INR and use of Eliquis.

## 2022-04-08 NOTE — PLAN OF CARE
Problem: Skin Integrity:  Goal: Absence of new skin breakdown  Description: Absence of new skin breakdown  4/8/2022 1749 by Bayron Garner RN  Outcome: Met This Shift  4/8/2022 1021 by Ayush Mirza RN  Outcome: Ongoing     Problem: Falls - Risk of:  Goal: Will remain free from falls  Description: Will remain free from falls  4/8/2022 1749 by Bayron Garner RN  Outcome: Met This Shift  4/8/2022 1021 by Ayush Mirza RN  Outcome: Ongoing  Goal: Absence of physical injury  Description: Absence of physical injury  4/8/2022 1749 by Bayron Garner RN  Outcome: Met This Shift  4/8/2022 1021 by Ayush Mirza RN  Outcome: Ongoing     Problem: Skin Integrity:  Goal: Will show no infection signs and symptoms  Description: Will show no infection signs and symptoms  4/8/2022 1749 by Bayron Garner RN  Outcome: Ongoing  4/8/2022 1021 by Ayush Mirza RN  Outcome: Ongoing

## 2022-04-08 NOTE — PROGRESS NOTES
Please NOTE : the patient's QTc is 547 . The use of Zofran may further prolong the QT interval. Please DC the Zofran at this time. If an antiemetic is needed, Tigan 200 mg IM q6h prn  is a safer alternative. Thank you.

## 2022-04-08 NOTE — SIGNIFICANT EVENT
Called in for RRT this AM. Upon arrival, patient is awake but unresponsive. VS were significant for hypotension with MAP in the 40s. 2L NS were adminstered with no much improvement in MAP. Pt was started on IV Levophed with MAP up in the 70S. A/P:  -Shock, septic in the light of UTI and possible intra-abdominal source. -ID, GS on board.  IV Abx.    -S/p 30cc/kg and now on pressors.    -Transfer to ICU    Electronically signed by Vidya Maya MD on 4/8/2022 at 10:01 AM

## 2022-04-08 NOTE — PROGRESS NOTES
This nurse made night shift RN Amilcar aware of patients concerning AM blood work including WBC 20.5, H&H 8.5/28.1 and Plt count 185. Also voiced concern about patients drop in BP (systolic in the 28'D). Amilcar during bedside shift report notified Dr Heather Frederick who ordered to increase IVF's to 100 cc/hr. Upon AM assessment per this nurse BP systolic in the 38'K. Patient alert but drowsy. Lower abd firm and distended. Soto draining minimal output (cloudy yellow). Dr Christa Glaser paged then arrived to the unit. Informed of updates. Ordered to bladder scan and change patients soto. Dr Christa Glaser also mentioned transfer of the patient to the ICU for possible urosepsis. Bladder scan revealed 135 cc. Soto changed with instant cloudy hematuria. 1.5 L urine drained within a few minutes. BP checked again. . now in the 32'O systolic. RRT called per discretion of this nurse. See RRT report for further details and interventions. At the conclusion of the RRT, this nurse also with multiple student nurses/charge RN and Dr Daniel Scanlon transferred the patient down to the ICU room 204. Nurse to nurse report given.

## 2022-04-08 NOTE — CONSULTS
GENERAL SURGERY  CONSULT NOTE  4/8/2022    Physician Consulted: Dr. Teo Mcclellan   Reason for Consult: Abd. Abscess  Referring Physician: Dr. Johnella Dance     HPI  HPI: 68-year-old male with recent history of open right hemicolectomy for cecal polyp - tubulovillous adenoma. Postop course was complicated by [facility] requiring cardioversion, and urinary retention requiring soto decompression, and then was awaiting placement at SNF for several days. He had been having bowel function and toelrating a diet. He was discharged 3/4/22 and returns today from facility with fevers. Found to have urinary retention and UTI 2/2 clogged soto in UTI as well as evidence of PNA. CTAP showed fluid collection in the abdomen without air fluid levels or loculation. Seems confused this morning, unsure why he returned to the hospital. Unable to provide much history. Denying tenderness but has moderate RLQ tenderness to palpation. Midline leakage prior to discharge has healed. Febrile to 103, tachy to 108. WBC 10.3   CTAP with large rim enhancing collection adjacent to staple line with surrounding inflammatory changes.        Past Medical History:   Diagnosis Date    Employs prosthetic leg     left    History of atrial fibrillation     Quechan (hard of hearing)     uses bilat hearing aides    Hx of AKA (above knee amputation), left ([address]     Hx of necrotizing fasciitis 2011    left leg    Hypertension     Rectal bleeding     Rectal cancer ([address] 12/2017    rectal       Past Surgical History:   Procedure Laterality Date    ABDOMEN SURGERY  03/20/2018    ileostomy open take down    ABOVE KNEE AMPUTATION  2011    left; hx flesh eating bacteria    CARDIOVERSION  12/2020    COLON SURGERY  01/23/2018    laprascopic low anterior colon resection  take down splenic fexure   ileostomy    COLONOSCOPY  10/21/2011    COLONOSCOPY N/A 2/14/2022    COLONOSCOPY POLYPECTOMY SNARE/COLD BIOPSY performed by Eliana Sears MD at [address] Left 10/03/2016    pars plana vitrectomy, retinal detachment repair, laser gas/fluid exchange    EYE SURGERY Bilateral 2002? bilat cataracts w lens implants    EYE SURGERY Right ?    retinal detachment    HEMICOLECTOMY Right 3/22/2022    LAPAROSCOPIC RIGHT HEMICOLECTOMY performed by Jareth Olivera MD at [address] [place] [address] N/A 6/43/2835    ILEOSTOMY OPEN TAKEDOWN performed by Jareth Olivera MD at [facility] OR       Medications Prior to Admission    Prior to Admission medications    Medication Sig Start Date End Date Taking? Authorizing Provider   magnesium hydroxide (MILK OF MAGNESIA) 400 MG/5ML suspension Take 30 mLs by mouth daily as needed for Constipation (Given if no BM in 48 hrs)   Yes Historical Provider, MD   torsemide (DEMADEX) 20 MG tablet Take 20 mg by mouth daily 4/6/22 4/7/22 Yes Historical Provider, MD   menthol-zinc oxide (CALMOSEPTINE) 0.44-20.625 % OINT ointment Apply topically 2 times daily Max 30 ml per day.   Apply to buttocks   Yes Historical Provider, MD   Emollient (EUCERIN) lotion Apply topically nightly Apply topically to upper and lower extremities   Yes Historical Provider, MD   bisacodyl (DULCOLAX) 10 MG suppository Place 10 mg rectally daily as needed for Constipation (If no results from milk of mag.)   Yes Historical Provider, MD   calcium carbonate (TUMS) 500 MG chewable tablet Take 1 tablet by mouth every 6 hours as needed for Heartburn (up to six times daily with meals) 4/5/22 5/5/22  Marli Wong MD   amiodarone (PACERONE) 400 MG tablet Take 1 tablet by mouth 2 times daily for 12 doses 4/5/22 4/11/22  Marli Wong MD   amiodarone (CORDARONE) 200 MG tablet Take 1 tablet by mouth daily for 10 doses 4/11/22 4/21/22  Marli Wong MD   tamsulosin [facility]) 0.4 MG capsule Take 1 capsule by mouth nightly 4/5/22   Marli Wong MD   white petrolatum OINT ointment Apply topically 2 times daily 4/5/22   Marli Wong MD pantoprazole (PROTONIX) 40 MG tablet Take 1 tablet by mouth every morning (before breakfast) 22   Toyin Berrios MD   apixaban (ELIQUIS) 5 MG TABS tablet Take 5 mg by mouth 2 times daily    Historical Provider, MD   amLODIPine (NORVASC) 5 MG tablet Take 1 tablet by mouth daily 22   Johanna Osuna MD   metoprolol (LOPRESSOR) 100 MG tablet Take 1 tablet by mouth 2 times daily 21   Johanna Osuna MD   hydrALAZINE (APRESOLINE) 50 MG tablet take 1 tablet by mouth three times a day 21   Johanna Osuna MD       Allergies   Allergen Reactions    Cefepime Rash    Clindamycin/Lincomycin Rash    Pcn [Penicillins] Rash       Family History   Problem Relation Age of Onset    Diabetes Mother     High Blood Pressure Mother     High Cholesterol Mother     Heart Attack Mother     Dementia Mother     Diabetes Father     Glaucoma Father     High Cholesterol Father    Samuel Rudder Pacemaker Father     Diabetes Sister     Thyroid Disease Sister     High Cholesterol Sister     Heart Attack Brother     Diabetes Sister     High Cholesterol Sister     Thyroid Disease Sister     Diabetes Sister     High Cholesterol Sister     Thyroid Disease Sister        Social History     Tobacco Use    Smoking status: Former Smoker     Packs/day: 2.00     Years: 35.00     Pack years: 70.00     Types: Cigarettes     Start date: 1974     Quit date: 2010     Years since quittin.3    Smokeless tobacco: Never Used   Vaping Use    Vaping Use: Never used   Substance Use Topics    Alcohol use: No    Drug use: No         Review of Systems: pertinent ROS listed in HPI, all others negative       PHYSICAL EXAM:    Vitals:    22 2227   BP: (!) 122/59   Pulse: 108   Resp: 24   Temp: 97.5 °F (36.4 °C)   SpO2: 96%       GENERAL:  NAD. HEAD:  Normocephalic. Atraumatic. EYES:   No scleral icterus. PERRL. LUNGS:  No increased work of breathing.   CARDIOVASCULAR: RR  ABDOMEN:  Soft, non-distended, RLQ tenderness, midline clean dry in tact, no drainage. No guarding, rigidity, rebound. EXTREMITIES:   MAEx4. Atraumatic. No LE edema. SKIN:  Warm and dry    ASSESSMENT/PLAN:  74 y.o. male with s/p R hemicolectomy for large tubulovillous colonic polyp 3/22. Wbc uptrend to 20 from 10, worsening COURTNEY. Lactate 3.4     Septic shock in the setting of GNR growth in cx in <24hr likely represents a significant bacteremia. Based on his clinical exam and CT findings of pelvic fluid it is doubtful that intraabdominal abscess would be the culprit. More concerning that this is urosepsis. Appreciate ID input for antibiotics. Will monitor abdominal exam and white count and follow along    Medical management per primary   GNR bacteremia- appreciate ID recs for antibiotics       Plan discussed with Dr. HAMILTON [facility].     Jillian Segura MD  Surgery Resident PGY-1  4/8/2022  4:55 AM    Electronically signed by Mike Park MD on 4/8/2022 at 10:32 AM     Seen/examined  Imaging reviewed  I do not think the RUQ fluid collection represents an abscess; more likely hematoma/seroma in the surgical bed  Most likely urinary source of infection  Ok for diet from my standpoint  JSGadyMD

## 2022-04-08 NOTE — CARE COORDINATION
Social Work / Discharge Planning : Patient was admitted from [facility]. SW spoke to Dayday Byrne from [facility] and [place] and she verified patient CAN return at discharge and will need pre-cert. N 17 and transport forms completed. RRT called on patient will confirm plan back to [facility] when more appropriate. SW to follow.  Electronically signed by TAMI Tineo on 4/8/22 at 8:54 AM EDT

## 2022-04-08 NOTE — H&P
[address] [facility]                              HISTORY AND PHYSICAL    PATIENT NAME: Lili Cartagena                :        1955  MED REC NO:   [id number]                            ROOM:       [id number]  ACCOUNT NO:   [de-identified]                           ADMIT DATE: 2022  PROVIDER:     Viviane Carmichael MD    DATE OF ADMISSION:  2022. CHIEF COMPLAINT ON ADMISSION:  Fever and hypoxia. HISTORY OF PRESENT ILLNESS:  This 68-year-old recently had a right  hemicolectomy for a tubulovillous adenoma. He had complications of  urinary retention postoperatively as well as atrial flutter. The atrial  flutter was converted and unfortunately he had atrial fibrillation and  was placed on amiodarone. He went to the nursing facility, did not do  well, as he became confused, febrile, and brought in hypotensive to the  emergency room. He is found on CT scan to have a large increasing size  seroma in the right lower quadrant. This could be abscess. He also had  an large bladder that did not seem to drain that well. For this reason,  he was brought in and subsequently grew out 4/4 blood culture bottles  for gram-negative rods. PAST MEDICAL HISTORY:  Significant for necrotizing fasciitis of his left  leg that required amputation. He has also had a bout of rectal  carcinoma treated in the past.  He has had acute kidney injury from his  volume depletion. He at one time was hypertensive and hyperlipidemic. SOCIAL HISTORY:  He quit smoking 10 years ago. Does not drink alcohol. REVIEW OF SYSTEMS:  Has handles all his activities of daily living  ordinarily fairly well. PHYSICAL EXAMINATION:  GENERAL:  He is a pale-looking male, that is responding to voice. He  has not been eating and drinking. NECK:  Carotids +1 and equal.  HEENT:  Conjunctivae are pale. LUNGS:  Grossly clear.   HEART:  S1 and S2.  ABDOMEN:  Extremely _____ in the right lower quadrant and suprapubic  area and somewhat distended. It is quite painful in those areas. Left  side is soft. No bowel sounds heard. MUSCULOSKELETAL:  No swelling of the right leg. IMPRESSION:  Gram-negative sepsis, most likely from urinary tract versus  intra-abdominal process. PLAN:  Have consultants evaluate and we will follow along.         Bib Tolliver MD    D: 04/08/2022 8:33:45       T: 04/08/2022 8:37:09     [id number]  Job#: [id number]     Doc#: [id number]    CC:

## 2022-04-08 NOTE — PROGRESS NOTES
Rodri Pan from IR called verbalizing concerns stated below; Last INR results.  Last Eliques intake by patient  Transparency of the IR order   Per dr Kelly Perez IR procedure is not urgent

## 2022-04-09 LAB
ACANTHOCYTES: ABNORMAL
ANION GAP SERPL CALCULATED.3IONS-SCNC: 13 MMOL/L (ref 7–16)
ANISOCYTOSIS: ABNORMAL
BASOPHILS ABSOLUTE: 0 E9/L (ref 0–0.2)
BASOPHILS RELATIVE PERCENT: 0 % (ref 0–2)
BUN BLDV-MCNC: 44 MG/DL (ref 6–23)
BURR CELLS: ABNORMAL
CALCIUM SERPL-MCNC: 6.5 MG/DL (ref 8.6–10.2)
CHLORIDE BLD-SCNC: 114 MMOL/L (ref 98–107)
CO2: 19 MMOL/L (ref 22–29)
CREAT SERPL-MCNC: 2.2 MG/DL (ref 0.7–1.2)
EKG ATRIAL RATE: 93 BPM
EKG P AXIS: 72 DEGREES
EKG P-R INTERVAL: 170 MS
EKG Q-T INTERVAL: 440 MS
EKG QRS DURATION: 82 MS
EKG QTC CALCULATION (BAZETT): 547 MS
EKG R AXIS: 30 DEGREES
EKG T AXIS: 44 DEGREES
EKG VENTRICULAR RATE: 93 BPM
EOSINOPHILS ABSOLUTE: 0.21 E9/L (ref 0.05–0.5)
EOSINOPHILS RELATIVE PERCENT: 1.7 % (ref 0–6)
GFR AFRICAN AMERICAN: 36
GFR NON-AFRICAN AMERICAN: 30 ML/MIN/1.73
GLUCOSE BLD-MCNC: 92 MG/DL (ref 74–99)
HCT VFR BLD CALC: 26.6 % (ref 37–54)
HEMOGLOBIN: 7.9 G/DL (ref 12.5–16.5)
INR BLD: 1.5
LACTIC ACID: 1.6 MMOL/L (ref 0.5–2.2)
LYMPHOCYTES ABSOLUTE: 0.73 E9/L (ref 1.5–4)
LYMPHOCYTES RELATIVE PERCENT: 6.1 % (ref 20–42)
MCH RBC QN AUTO: 26.1 PG (ref 26–35)
MCHC RBC AUTO-ENTMCNC: 29.7 % (ref 32–34.5)
MCV RBC AUTO: 87.8 FL (ref 80–99.9)
METAMYELOCYTES RELATIVE PERCENT: 0.9 % (ref 0–1)
MONOCYTES ABSOLUTE: 0.12 E9/L (ref 0.1–0.95)
MONOCYTES RELATIVE PERCENT: 0.9 % (ref 2–12)
MRSA CULTURE ONLY: NORMAL
NEUTROPHILS ABSOLUTE: 11.01 E9/L (ref 1.8–7.3)
NEUTROPHILS RELATIVE PERCENT: 90.4 % (ref 43–80)
NUCLEATED RED BLOOD CELLS: 0 /100 WBC
ORGANISM: ABNORMAL
OVALOCYTES: ABNORMAL
PDW BLD-RTO: 21.1 FL (ref 11.5–15)
PLATELET # BLD: 130 E9/L (ref 130–450)
PMV BLD AUTO: 11.6 FL (ref 7–12)
POIKILOCYTES: ABNORMAL
POLYCHROMASIA: ABNORMAL
POTASSIUM SERPL-SCNC: 3.8 MMOL/L (ref 3.5–5)
PROTHROMBIN TIME: 16.7 SEC (ref 9.3–12.4)
RBC # BLD: 3.03 E12/L (ref 3.8–5.8)
SCHISTOCYTES: ABNORMAL
SODIUM BLD-SCNC: 146 MMOL/L (ref 132–146)
TEAR DROP CELLS: ABNORMAL
URINE CULTURE, ROUTINE: ABNORMAL
WBC # BLD: 12.1 E9/L (ref 4.5–11.5)

## 2022-04-09 PROCEDURE — 85610 PROTHROMBIN TIME: CPT

## 2022-04-09 PROCEDURE — 6360000002 HC RX W HCPCS: Performed by: EMERGENCY MEDICINE

## 2022-04-09 PROCEDURE — 6370000000 HC RX 637 (ALT 250 FOR IP): Performed by: INTERNAL MEDICINE

## 2022-04-09 PROCEDURE — 2000000000 HC ICU R&B

## 2022-04-09 PROCEDURE — 36415 COLL VENOUS BLD VENIPUNCTURE: CPT

## 2022-04-09 PROCEDURE — 85025 COMPLETE CBC W/AUTO DIFF WBC: CPT

## 2022-04-09 PROCEDURE — 2580000003 HC RX 258: Performed by: EMERGENCY MEDICINE

## 2022-04-09 PROCEDURE — 2580000003 HC RX 258: Performed by: SPECIALIST

## 2022-04-09 PROCEDURE — 2500000003 HC RX 250 WO HCPCS

## 2022-04-09 PROCEDURE — 2580000003 HC RX 258: Performed by: INTERNAL MEDICINE

## 2022-04-09 PROCEDURE — 2700000000 HC OXYGEN THERAPY PER DAY

## 2022-04-09 PROCEDURE — 37799 UNLISTED PX VASCULAR SURGERY: CPT

## 2022-04-09 PROCEDURE — 93010 ELECTROCARDIOGRAM REPORT: CPT | Performed by: INTERNAL MEDICINE

## 2022-04-09 PROCEDURE — 83605 ASSAY OF LACTIC ACID: CPT

## 2022-04-09 PROCEDURE — 6360000002 HC RX W HCPCS: Performed by: SPECIALIST

## 2022-04-09 PROCEDURE — 80048 BASIC METABOLIC PNL TOTAL CA: CPT

## 2022-04-09 PROCEDURE — 2500000003 HC RX 250 WO HCPCS: Performed by: EMERGENCY MEDICINE

## 2022-04-09 RX ORDER — METOPROLOL TARTRATE 5 MG/5ML
10 INJECTION INTRAVENOUS ONCE
Status: COMPLETED | OUTPATIENT
Start: 2022-04-09 | End: 2022-04-09

## 2022-04-09 RX ORDER — METOPROLOL TARTRATE 5 MG/5ML
5 INJECTION INTRAVENOUS ONCE
Status: COMPLETED | OUTPATIENT
Start: 2022-04-09 | End: 2022-04-09

## 2022-04-09 RX ORDER — METOPROLOL TARTRATE 5 MG/5ML
INJECTION INTRAVENOUS
Status: COMPLETED
Start: 2022-04-09 | End: 2022-04-09

## 2022-04-09 RX ORDER — 0.9 % SODIUM CHLORIDE 0.9 %
500 INTRAVENOUS SOLUTION INTRAVENOUS ONCE
Status: COMPLETED | OUTPATIENT
Start: 2022-04-09 | End: 2022-04-09

## 2022-04-09 RX ADMIN — METOPROLOL TARTRATE 25 MG: 25 TABLET, FILM COATED ORAL at 20:41

## 2022-04-09 RX ADMIN — MEROPENEM 500 MG: 500 INJECTION INTRAVENOUS at 09:38

## 2022-04-09 RX ADMIN — SODIUM CHLORIDE: 9 INJECTION, SOLUTION INTRAVENOUS at 00:27

## 2022-04-09 RX ADMIN — SODIUM CHLORIDE, PRESERVATIVE FREE 10 ML: 5 INJECTION INTRAVENOUS at 08:15

## 2022-04-09 RX ADMIN — PHENYLEPHRINE HYDROCHLORIDE 30 MCG/MIN: 100 INJECTION INTRAVENOUS at 06:33

## 2022-04-09 RX ADMIN — SODIUM CHLORIDE: 9 INJECTION, SOLUTION INTRAVENOUS at 01:46

## 2022-04-09 RX ADMIN — AMIODARONE HYDROCHLORIDE 400 MG: 200 TABLET ORAL at 20:42

## 2022-04-09 RX ADMIN — SODIUM CHLORIDE: 9 INJECTION, SOLUTION INTRAVENOUS at 17:36

## 2022-04-09 RX ADMIN — MEROPENEM 500 MG: 500 INJECTION INTRAVENOUS at 20:40

## 2022-04-09 RX ADMIN — SODIUM CHLORIDE: 9 INJECTION, SOLUTION INTRAVENOUS at 09:14

## 2022-04-09 RX ADMIN — METOPROLOL TARTRATE 5 MG: 1 INJECTION, SOLUTION INTRAVENOUS at 05:25

## 2022-04-09 RX ADMIN — METOPROLOL TARTRATE 5 MG: 1 INJECTION, SOLUTION INTRAVENOUS at 04:44

## 2022-04-09 RX ADMIN — APIXABAN 5 MG: 5 TABLET, FILM COATED ORAL at 12:22

## 2022-04-09 RX ADMIN — METOPROLOL TARTRATE 10 MG: 5 INJECTION INTRAVENOUS at 06:04

## 2022-04-09 RX ADMIN — SODIUM CHLORIDE, PRESERVATIVE FREE 10 ML: 5 INJECTION INTRAVENOUS at 20:42

## 2022-04-09 RX ADMIN — SODIUM CHLORIDE: 9 INJECTION, SOLUTION INTRAVENOUS at 23:39

## 2022-04-09 RX ADMIN — APIXABAN 5 MG: 5 TABLET, FILM COATED ORAL at 20:42

## 2022-04-09 RX ADMIN — METOPROLOL TARTRATE 25 MG: 25 TABLET, FILM COATED ORAL at 12:22

## 2022-04-09 RX ADMIN — SODIUM CHLORIDE 500 ML: 9 INJECTION, SOLUTION INTRAVENOUS at 04:20

## 2022-04-09 RX ADMIN — AMIODARONE HYDROCHLORIDE 400 MG: 200 TABLET ORAL at 08:15

## 2022-04-09 RX ADMIN — METOPROLOL TARTRATE 10 MG: 1 INJECTION, SOLUTION INTRAVENOUS at 06:04

## 2022-04-09 ASSESSMENT — PAIN SCALES - GENERAL
PAINLEVEL_OUTOF10: 0

## 2022-04-09 NOTE — PROGRESS NOTES
Seen/examined  No events reported  Still on phenylephrine and norepinephrine drips  White blood cell count down to 12.1 from 20  Abdomen very soft, nondistended, nontender. Incisions clean  Impression: Bacteremia, urinary tract infection. I am not impressed with the right upper quadrant fluid collection. Considering his surgery was on 3/22, if this were a postoperative abscess it should look much worse.   I do not think this fluid collection needs to be drained, especially if the blood and urine cultures are identical organisms  Okay for diet from my standpoint  Emmanuelle Winters MD

## 2022-04-09 NOTE — PROGRESS NOTES
[address] [place] [address] [facility]  Progress Note    SUBJECTIVE:  Chief Complaint   Patient presents with    Fever     at the nursing home he developed a fever that they are unable to get down     Shortness of Breath     pulse ox dropped on room air to 86%    Edema     to the rt leg      Patient is tolerating medications. No reported adverse drug reactions. The patient is feeling better. He is still in the ICU. He is still on vasopressors. Tolerating antibiotics    Review of systems:  As stated above in the chief complaint, otherwise negative. Medications:  Scheduled Meds:   meropenem  500 mg IntraVENous Q12H    amiodarone  400 mg Oral BID    [Held by provider] metoprolol  100 mg Oral BID    pantoprazole  40 mg Oral QAM AC    [Held by provider] tamsulosin  0.4 mg Oral Nightly    sodium chloride flush  5-40 mL IntraVENous 2 times per day     Continuous Infusions:   phenylephrine (CALLIE-SYNEPHRINE) 50mg/250mL infusion 30 mcg/min (22 9014)    sodium bicarbonate infusion Stopped (22 100)    norepinephrine 5 mcg/min (22)    sodium chloride 33.3 mL/hr at 22 0609    sodium chloride 150 mL/hr at 22 0609    sodium chloride       PRN Meds:sodium chloride flush, sodium chloride, acetaminophen **OR** acetaminophen    OBJECTIVE:  /77   Pulse 125   Temp 97.8 °F (36.6 °C) (Oral)   Resp 27   Ht 6' (1.829 m)   Wt 174 lb 8 oz (79.2 kg)   SpO2 99%   BMI 23.67 kg/m²   Temp  Av °F (36.7 °C)  Min: 97.6 °F (36.4 °C)  Max: 99 °F (37.2 °C)  Constitutional: The patient is awake, alert, and oriented. No distress. Lying in bed. Skin: Warm and dry. No rashes were noted. HEENT: Round and reactive pupils. Moist mucous membranes. No ulcerations or thrush. Neck: Supple to movements. Chest: No use of accessory muscles to breathe. Symmetrical expansion. No wheezing, crackles or rhonchi. Cardiovascular: S1 and S2 are rhythmic and regular.  No murmurs appreciated. Abdomen: Positive bowel sounds to auscultation. Benign to palpation. Surgical wounds are clean. Extremities: No clubbing, no cyanosis, no edema. Lines: Right femoral TLC 4/8/2022. Right femoral arterial line/3/22.     Laboratory and Tests Review:  Lab Results   Component Value Date    WBC 12.1 (H) 04/09/2022    WBC 20.2 (H) 04/08/2022    WBC 20.5 (H) 04/08/2022    HGB 7.9 (L) 04/09/2022    HCT 26.6 (L) 04/09/2022    MCV 87.8 04/09/2022     04/09/2022     Lab Results   Component Value Date    NEUTROABS 19.19 (H) 04/08/2022    NEUTROABS 17.63 (H) 04/08/2022    NEUTROABS 9.36 (H) 04/07/2022     No results found for: [facility]  Lab Results   Component Value Date    ALT 20 04/08/2022    AST 30 04/08/2022    ALKPHOS 98 04/08/2022    BILITOT 1.1 04/08/2022     Lab Results   Component Value Date     04/09/2022    K 3.8 04/09/2022    K 4.6 04/08/2022     04/09/2022    CO2 19 04/09/2022    BUN 44 04/09/2022    CREATININE 2.2 04/09/2022    CREATININE 3.6 04/08/2022    CREATININE 3.7 04/08/2022    GFRAA 36 04/09/2022    LABGLOM 30 04/09/2022    GLUCOSE 92 04/09/2022    GLUCOSE 106 03/24/2011    PROT 5.0 04/08/2022    LABALBU 1.7 04/08/2022    LABALBU 2.4 03/20/2011    CALCIUM 6.5 04/09/2022    BILITOT 1.1 04/08/2022    ALKPHOS 98 04/08/2022    AST 30 04/08/2022    ALT 20 04/08/2022     Lab Results   Component Value Date    CRP 29.2 (H) 01/03/2011     Lab Results   Component Value Date    SEDRATE 45 (H) 01/03/2011     Radiology:      Microbiology:   Blood cultures 4/7/2022: Klebsiella pneumoniae in 2 of 2 sets  Urine culture 4/8/2022: 100,000 GNR  Rapid SARS-CoV-2: Negative  Rapid RSV: Negative  Rapid influenza: Negative  Nares screen for MRSA: Negative    Recent Labs     04/08/22  0854   PROCAL 49.29*       ASSESSMENT:  · Bladder outlet obstruction associated to malpositioned Valles catheter  · Complicated UTI associated to the above  · Sepsis with septic shock  · GNR septicemia associated to the above  · Leukocytosis, improving  · Fever, improved    PLAN:  · Continue Meropenem  · Check final cultures  · We will follow with you    Yane Hough MD  9:04 AM  4/9/2022

## 2022-04-09 NOTE — PROCEDURES
Central Line Placement Procedure Note     Indication: vascular access, hypovolemia and centrally administered medications     Consent: The patient provided verbal consent for this procedure.     Procedure: The patient was positioned appropriately and the skin over the right femoral vein was prepped with betadine and draped in a sterile fashion. Local anesthesia was obtained by infiltration using 1% Lidocaine without epinephrine. A large bore needle was used to identify the vein. A guide wire was then inserted into the vein through the needle. A triple lumen catheter was then inserted into the vessel over the guide wire using the Seldinger technique. All ports showed good, free flowing blood return and were flushed with saline solution. The catheter was then securely fastened to the skin with suture at hub. Two sutures were placed into the proximal eyelets. An antibiotic disk was placed and the site was then covered with a sterile dressing. A post procedure X-ray was not indicated.     The patient tolerated the procedure well.     Complications: None              Arterial Line Placement Procedure Note                     Indication: severe hypotension, shock and sepsis     Consent: The patient provided verbal consent for this procedure.     David's Test: Not indicated in this particular procedure     Procedure: The skin over the right femoral artery was prepped with betadine and draped in a sterile fashion. Local anesthesia was obtained by infiltration using 1% Lidocaine without epinephrine. An arterial line catheter was then inserted, using a modified Seldinger technique, into the vessel. The transducer set was then attached and securely fastened to the skin with sutures and with an adhesive dressing. Waveforms on the monitor were observed and found to be adequate. The patient had good distal perfusion after the procedure.  The site was then dressed in a sterile fashion.     The patient tolerated the procedure well.      Complications: None    Saurabh Perez MD  4/8/2022

## 2022-04-09 NOTE — PROGRESS NOTES
Admit Date: 4/7/2022    Subjective:     Patient is alert. He denies pain. He appears weak. Scheduled Meds:   apixaban  5 mg Oral BID    meropenem  500 mg IntraVENous Q12H    amiodarone  400 mg Oral BID    [Held by provider] metoprolol  100 mg Oral BID    pantoprazole  40 mg Oral QAM AC    [Held by provider] tamsulosin  0.4 mg Oral Nightly    sodium chloride flush  5-40 mL IntraVENous 2 times per day     Continuous Infusions:   phenylephrine (CALLIE-SYNEPHRINE) 50mg/250mL infusion 30 mcg/min (04/09/22 6233)    sodium chloride 33.3 mL/hr at 04/09/22 0609    sodium chloride 150 mL/hr at 04/09/22 0914    sodium chloride       PRN Meds:sodium chloride flush, sodium chloride, acetaminophen **OR** acetaminophen      Objective:     I/O last 3 completed shifts: In: 5802.2 [P.O.:60; I.V.:4347; IV Piggyback:1395.2]  Out: 7671 [Urine:3460]  No intake/output data recorded. BP 99/68   Pulse 129   Temp 98 °F (36.7 °C) (Oral)   Resp 18   Ht 6' (1.829 m)   Wt 174 lb 8 oz (79.2 kg)   SpO2 98%   BMI 23.67 kg/m²     LUNGS:  No rales, no wheezing  CARDIOVASCULAR:  RRR with no murmurs, no gallops, no rubs  ABDOMEN:  Flat, soft, non-tender, no HSM, no masses  MUSCULOSKELETAL:  No cyanosis or edema of right ankle. Left AKA site clean.       Data Review  CBC:   Recent Labs     04/08/22  0633 04/08/22  0856 04/09/22  0510   WBC 20.5* 20.2* 12.1*   HGB 8.5* 7.6* 7.9*    162 130     BMP:    Recent Labs     04/08/22  0633 04/08/22  0856 04/09/22  0510    140 146   K 4.3 4.6 3.8    106 114*   CO2 20* 18* 19*   BUN 40* 40* 44*   CREATININE 3.7* 3.6* 2.2*   GLUCOSE 79 73* 92     Coagulation:   Lab Results   Component Value Date    INR 1.5 04/09/2022    APTT 28.1 04/07/2022     Cardiac markers:   Lab Results   Component Value Date    CKMB <0.2 03/18/2011     Lab Results   Component Value Date    LABPROT 0.1 03/23/2022    LABPROT 0.1 03/23/2022    LABALBU 1.7 (L) 04/08/2022     Lab Results   Component Value Date    ALT 20 04/08/2022    AST 30 04/08/2022    ALKPHOS 98 04/08/2022    BILITOT 1.1 04/08/2022         Assessment:     Sepsis secondary to UTI  Seroma of abdomen  Hypotension from sepsis  A fib with RVR: rate remains rapid    Plan:     Metoprolol for rate control. Appreciate care by ICU staff.

## 2022-04-09 NOTE — PATIENT CARE CONFERENCE
Intensive Care Daily Quality Rounding Checklist      ICU Team Members:     ICU Day #: NUMBER: 2    Intubation Date:  n/a    Ventilator Day #:     Central Line Insertion Date: April 8,2022        Day #: NUMBER: 2     Arterial Line Insertion Date: April 8,2022      Day #: NUMBER: 2    Temporary Hemodialysis Catheter Insertion Date:  n/a      Day #     DVT Prophylaxis:     GI Prophylaxis: protonix    Soto Catheter Insertion Date: April 8,2022       Day #: 2      Continued need (if yes, reason documented and discussed with physician): yes, chronic soto    Skin Issues/ Wounds and ordered treatment discussed on rounds:     Goals/ Plans for the Day: monitor labs, wean chata, swallow eval

## 2022-04-09 NOTE — PROGRESS NOTES
Critical Care Team - Daily Progress Note      Date and time: 4/9/2022 10:11 AM  Patient's name:  Sylvia Pierce Medical Record Number: [id number]  Patient's account/billing number: [de-identified]  Patient's YOB: 1955  Age: 74 y.o. Date of Admission: 4/7/2022  1:41 PM  Length of stay during current admission: 2      Primary Care Physician: Clara Garay MD  ICU Attending Physician: Dr. Holder Large    Code Status: Full Code    Reason for ICU admission: Septic shock      SUBJECTIVE:     OVERNIGHT EVENTS:       Overall patient is doing much better. Hypotension, lactic acid, WBC and creatinine have all improved. Patient did go into atrial fibrillation with rapid ventricular rate last night and was placed on IV Lopressor and switched from norepinephrine to phenylephrine. By morning rounds, patient's hypotension is resolved and the phenylephrine will be shut off. Patient himself has no current complaints and is feeling well overall. Intake/Output:   No intake/output data recorded. I/O last 3 completed shifts: In: 5802.2 [P.O.:60; I.V.:4347; IV Piggyback:1395.2]  Out: 2707 [Urine:3460]    Awake and following commands: yes  Current Ventilation: - Nasal cannula 2L  Secretions: No  Sedation: none  Paralyzed: No  Vasopressors: phenylephrine    Initial HPI:  The patient is a 74 y.o. male with significant past medical history of hypertension, GI bleed 3 months ago, COPD, A. fib/flutter on Eliquis, rectal cancer and recent right hemicolectomy on 3/22. Patient presented to the emergency department yesterday from the [facility]. Patient had recently been discharged to the SNF after having a right hemicolectomy. He presented to the emergency department with fatigue, fever, shortness of breath with hypoxia on presentation. Patient did have an indwelling Valles catheter. Symptoms have developed over a single day.      In the emergency department, patient was noted to have an COURTNEY and UTI. Blood culture returned today showing gram-negative rods, more specifically Klebsiella. Patient has been started on meropenem in the emergency department due to penicillin allergy. After admission, the patient was noted to not have any urine output and his Valles inadequately located in the bladder. Valles was replaced and patient has had good urine output since. OBJECTIVE:     VITAL SIGNS:  /77   Pulse 125   Temp 97.8 °F (36.6 °C) (Oral)   Resp 27   Ht 6' (1.829 m)   Wt 174 lb 8 oz (79.2 kg)   SpO2 99%   BMI 23.67 kg/m²   Tmax over 24 hours:  Temp (24hrs), Av °F (36.7 °C), Min:97.6 °F (36.4 °C), Max:99 °F (37.2 °C)      Patient Vitals for the past 6 hrs:   BP Pulse Resp SpO2   22 0700 104/77 125 27 99 %   22 0600 102/68 131 17 98 %   22 0500 99/60 142 (!) 34 97 %         Intake/Output Summary (Last 24 hours) at 2022 1011  Last data filed at 2022 0609  Gross per 24 hour   Intake 5742.2 ml   Output 1660 ml   Net 4082.2 ml     Wt Readings from Last 2 Encounters:   22 174 lb 8 oz (79.2 kg)   22 181 lb 1.6 oz (82.1 kg)     Body mass index is 23.67 kg/m². Physical Exam  Vitals reviewed. Constitutional:       General: He is not in acute distress. Appearance: Normal appearance. He is ill-appearing (chronically). Comments: Appears older than state age   HENT:      Head: Normocephalic and atraumatic. Nose: Nose normal. No rhinorrhea. Eyes:      Extraocular Movements: Extraocular movements intact. Conjunctiva/sclera: Conjunctivae normal.      Pupils: Pupils are equal, round, and reactive to light. Cardiovascular:      Rate and Rhythm: Tachycardia present. Rhythm irregular. Heart sounds: Normal heart sounds. No murmur heard. Pulmonary:      Effort: Pulmonary effort is normal. No respiratory distress. Breath sounds: Normal breath sounds. Abdominal:      General: Abdomen is flat. There is no distension. Tenderness: There is no abdominal tenderness. There is no guarding. Musculoskeletal:         General: No swelling or tenderness. Normal range of motion. Cervical back: Normal range of motion. No rigidity. Comments: Above knee amputation left   Skin:     General: Skin is warm and dry. Coloration: Skin is not jaundiced or pale. Findings: No bruising or erythema. Neurological:      General: No focal deficit present. Mental Status: He is alert and oriented to person, place, and time. Cranial Nerves: No cranial nerve deficit. Motor: No weakness. Psychiatric:         Mood and Affect: Mood normal.         Behavior: Behavior normal.         Thought Content: Thought content normal.           MEDICATIONS:    Scheduled Meds:   apixaban  5 mg Oral BID    meropenem  500 mg IntraVENous Q12H    amiodarone  400 mg Oral BID    [Held by provider] metoprolol  100 mg Oral BID    pantoprazole  40 mg Oral QAM AC    [Held by provider] tamsulosin  0.4 mg Oral Nightly    sodium chloride flush  5-40 mL IntraVENous 2 times per day     Continuous Infusions:   phenylephrine (CALLIE-SYNEPHRINE) 50mg/250mL infusion 30 mcg/min (04/09/22 1194)    sodium chloride 33.3 mL/hr at 04/09/22 0609    sodium chloride 150 mL/hr at 04/09/22 0914    sodium chloride       PRN Meds:   sodium chloride flush, 5-40 mL, PRN  sodium chloride, , PRN  acetaminophen, 650 mg, Q6H PRN   Or  acetaminophen, 650 mg, Q6H PRN          VENT SETTINGS (Comprehensive) (if applicable):  Vent Information  SpO2: 99 %  Additional Respiratory  Assessments  Pulse: 125  Resp: 27  SpO2: 99 %  Oral Care: Patient refused    Arterial Blood Gas 4/9/2022  None.       Laboratory findings:    Complete Blood Count:   Recent Labs     04/08/22  0633 04/08/22  0856 04/09/22  0510   WBC 20.5* 20.2* 12.1*   HGB 8.5* 7.6* 7.9*   HCT 28.1* 25.5* 26.6*    162 130        Last 3 Blood Glucose:   Recent Labs     04/08/22  0633 04/08/22  0856 04/09/22  0510 consultants. Critical care services and times documented are independent of procedures and multidisciplinary rounds with Residents. Additionally comprehensive, multidisciplinary rounds were conducted with the MICU team. The case was discussed in detail and plans for care were established. Review of Residents documentation was conducted and revisions were made as appropriate. I agree with the above documented exam, problem list and plan of care.    Brendon Serrano MD   CCT excluding procedures :45'

## 2022-04-09 NOTE — PLAN OF CARE
Problem: Skin Integrity:  Goal: Absence of new skin breakdown  Description: Absence of new skin breakdown  4/8/2022 1749 by Emma Blackburn RN  Outcome: Met This Shift     Problem: Falls - Risk of:  Goal: Will remain free from falls  Description: Will remain free from falls  4/8/2022 1749 by Emma Blackburn RN  Outcome: Met This Shift  Goal: Absence of physical injury  Description: Absence of physical injury  4/8/2022 1749 by Emma Blackburn RN  Outcome: Met This Shift     Problem: Anxiety/Stress:  Goal: Level of anxiety will decrease  Description: Level of anxiety will decrease  Outcome: Met This Shift     Problem: Aspiration:  Goal: Absence of aspiration  Description: Absence of aspiration  Outcome: Met This Shift     Problem: Cardiac Output - Decreased:  Goal: Hemodynamic stability will improve  Description: Hemodynamic stability will improve  Outcome: Met This Shift     Problem: Skin Integrity:  Goal: Will show no infection signs and symptoms  Description: Will show no infection signs and symptoms  4/8/2022 1749 by Emma Blackburn RN  Outcome: Ongoing

## 2022-04-10 LAB
ANION GAP SERPL CALCULATED.3IONS-SCNC: 8 MMOL/L (ref 7–16)
ANISOCYTOSIS: ABNORMAL
BASOPHILS ABSOLUTE: 0.04 E9/L (ref 0–0.2)
BASOPHILS RELATIVE PERCENT: 0.5 % (ref 0–2)
BLOOD CULTURE, ROUTINE: ABNORMAL
BUN BLDV-MCNC: 33 MG/DL (ref 6–23)
BURR CELLS: ABNORMAL
CALCIUM SERPL-MCNC: 6.9 MG/DL (ref 8.6–10.2)
CHLORIDE BLD-SCNC: 113 MMOL/L (ref 98–107)
CO2: 21 MMOL/L (ref 22–29)
CREAT SERPL-MCNC: 1.3 MG/DL (ref 0.7–1.2)
EOSINOPHILS ABSOLUTE: 0.02 E9/L (ref 0.05–0.5)
EOSINOPHILS RELATIVE PERCENT: 0.3 % (ref 0–6)
GFR AFRICAN AMERICAN: >60
GFR NON-AFRICAN AMERICAN: 55 ML/MIN/1.73
GLUCOSE BLD-MCNC: 92 MG/DL (ref 74–99)
HCT VFR BLD CALC: 28.9 % (ref 37–54)
HEMOGLOBIN: 8.6 G/DL (ref 12.5–16.5)
IMMATURE GRANULOCYTES #: 0.03 E9/L
IMMATURE GRANULOCYTES %: 0.4 % (ref 0–5)
INR BLD: 1.5
LYMPHOCYTES ABSOLUTE: 0.9 E9/L (ref 1.5–4)
LYMPHOCYTES RELATIVE PERCENT: 12.1 % (ref 20–42)
MCH RBC QN AUTO: 25.9 PG (ref 26–35)
MCHC RBC AUTO-ENTMCNC: 29.8 % (ref 32–34.5)
MCV RBC AUTO: 87 FL (ref 80–99.9)
MONOCYTES ABSOLUTE: 0.4 E9/L (ref 0.1–0.95)
MONOCYTES RELATIVE PERCENT: 5.4 % (ref 2–12)
NEUTROPHILS ABSOLUTE: 6.07 E9/L (ref 1.8–7.3)
NEUTROPHILS RELATIVE PERCENT: 81.3 % (ref 43–80)
ORGANISM: ABNORMAL
OVALOCYTES: ABNORMAL
PDW BLD-RTO: 20.8 FL (ref 11.5–15)
PLATELET # BLD: 110 E9/L (ref 130–450)
PMV BLD AUTO: 12.3 FL (ref 7–12)
POIKILOCYTES: ABNORMAL
POLYCHROMASIA: ABNORMAL
POTASSIUM SERPL-SCNC: 3.5 MMOL/L (ref 3.5–5)
PROTHROMBIN TIME: 16.9 SEC (ref 9.3–12.4)
RBC # BLD: 3.32 E12/L (ref 3.8–5.8)
SODIUM BLD-SCNC: 142 MMOL/L (ref 132–146)
URINE CULTURE, ROUTINE: ABNORMAL
WBC # BLD: 7.5 E9/L (ref 4.5–11.5)

## 2022-04-10 PROCEDURE — 2580000003 HC RX 258: Performed by: INTERNAL MEDICINE

## 2022-04-10 PROCEDURE — 2060000000 HC ICU INTERMEDIATE R&B

## 2022-04-10 PROCEDURE — 6370000000 HC RX 637 (ALT 250 FOR IP): Performed by: INTERNAL MEDICINE

## 2022-04-10 PROCEDURE — 85610 PROTHROMBIN TIME: CPT

## 2022-04-10 PROCEDURE — 85025 COMPLETE CBC W/AUTO DIFF WBC: CPT

## 2022-04-10 PROCEDURE — 36592 COLLECT BLOOD FROM PICC: CPT

## 2022-04-10 PROCEDURE — 2700000000 HC OXYGEN THERAPY PER DAY

## 2022-04-10 PROCEDURE — 36415 COLL VENOUS BLD VENIPUNCTURE: CPT

## 2022-04-10 PROCEDURE — 2580000003 HC RX 258: Performed by: SPECIALIST

## 2022-04-10 PROCEDURE — 6360000002 HC RX W HCPCS: Performed by: SPECIALIST

## 2022-04-10 PROCEDURE — 6370000000 HC RX 637 (ALT 250 FOR IP): Performed by: SPECIALIST

## 2022-04-10 PROCEDURE — 80048 BASIC METABOLIC PNL TOTAL CA: CPT

## 2022-04-10 RX ORDER — METOPROLOL TARTRATE 50 MG/1
50 TABLET, FILM COATED ORAL 2 TIMES DAILY
Status: DISCONTINUED | OUTPATIENT
Start: 2022-04-10 | End: 2022-04-10

## 2022-04-10 RX ORDER — SULFAMETHOXAZOLE AND TRIMETHOPRIM 800; 160 MG/1; MG/1
2 TABLET ORAL EVERY 12 HOURS SCHEDULED
Status: DISCONTINUED | OUTPATIENT
Start: 2022-04-10 | End: 2022-04-15 | Stop reason: HOSPADM

## 2022-04-10 RX ADMIN — SULFAMETHOXAZOLE AND TRIMETHOPRIM 2 TABLET: 800; 160 TABLET ORAL at 23:16

## 2022-04-10 RX ADMIN — SODIUM CHLORIDE: 9 INJECTION, SOLUTION INTRAVENOUS at 13:23

## 2022-04-10 RX ADMIN — SODIUM CHLORIDE: 9 INJECTION, SOLUTION INTRAVENOUS at 06:40

## 2022-04-10 RX ADMIN — METOPROLOL TARTRATE 75 MG: 50 TABLET, FILM COATED ORAL at 20:53

## 2022-04-10 RX ADMIN — SODIUM CHLORIDE, PRESERVATIVE FREE 10 ML: 5 INJECTION INTRAVENOUS at 08:48

## 2022-04-10 RX ADMIN — APIXABAN 5 MG: 5 TABLET, FILM COATED ORAL at 20:53

## 2022-04-10 RX ADMIN — AMIODARONE HYDROCHLORIDE 400 MG: 200 TABLET ORAL at 20:53

## 2022-04-10 RX ADMIN — SULFAMETHOXAZOLE AND TRIMETHOPRIM 2 TABLET: 800; 160 TABLET ORAL at 11:55

## 2022-04-10 RX ADMIN — MEROPENEM 500 MG: 500 INJECTION INTRAVENOUS at 08:42

## 2022-04-10 RX ADMIN — APIXABAN 5 MG: 5 TABLET, FILM COATED ORAL at 08:43

## 2022-04-10 RX ADMIN — SODIUM CHLORIDE: 9 INJECTION, SOLUTION INTRAVENOUS at 20:53

## 2022-04-10 RX ADMIN — METOPROLOL TARTRATE 25 MG: 25 TABLET, FILM COATED ORAL at 08:43

## 2022-04-10 RX ADMIN — PANTOPRAZOLE SODIUM 40 MG: 40 TABLET, DELAYED RELEASE ORAL at 08:43

## 2022-04-10 RX ADMIN — AMIODARONE HYDROCHLORIDE 400 MG: 200 TABLET ORAL at 08:43

## 2022-04-10 ASSESSMENT — PAIN SCALES - GENERAL
PAINLEVEL_OUTOF10: 0

## 2022-04-10 NOTE — PROGRESS NOTES
Critical Care Team - Daily Progress Note      Date and time: 4/10/2022 10:09 AM  Patient's name:  Tawnya Stoddard. Medical Record Number: [id number]  Patient's account/billing number: [de-identified]  Patient's YOB: 1955  Age: 74 y.o. Date of Admission: 4/7/2022  1:41 PM  Length of stay during current admission: 3      Primary Care Physician: Shaw Galdamez MD  ICU Attending Physician: Dr. Ricci Raymond Status: Full Code    Reason for ICU admission: Septic shock      SUBJECTIVE:     OVERNIGHT EVENTS:         4/11: No acute events overnight. Patient is off pressors. Patient resting comfortably saturating 95% room air. Patient has remained afebrile. Denies fevers, chills, chest pain, SOB, headaches, abdominal pain. Intake/Output:   No intake/output data recorded. I/O last 3 completed shifts: In: 6647.7 [I.V.:5314.8; IV Piggyback:1332.8]  Out: 4345 [Urine:3010]      Initial HPI + past overnight events:     4/9: Overall patient is doing much better. Hypotension, lactic acid, WBC and creatinine have all improved. Patient did go into atrial fibrillation with rapid ventricular rate last night and was placed on IV Lopressor and switched from norepinephrine to phenylephrine. By morning rounds, patient's hypotension is resolved and the phenylephrine will be shut off.     Patient himself has no current complaints and is feeling well overall. HPI  The patient is a 72 y. o. male with significant past medical history of hypertension, GI bleed 3 months ago, COPD, A. fib/flutter on Eliquis, rectal cancer and recent right hemicolectomy on 3/22.     Patient presented to the emergency department yesterday from the [facility]. Filipe Cheek had recently been discharged to the SNF after having a right hemicolectomy. Daniela Velazquez presented to the emergency department with fatigue, fever, shortness of breath with hypoxia on presentation.  Patient did have an indwelling Valles catheter.  Symptoms have developed over a single day.     In the emergency department, patient was noted to have an COURTNEY and UTI.  Blood culture returned today showing gram-negative rods, more specifically Theresa Richardson has been started on meropenem in the emergency department due to penicillin allergy.     After admission, the patient was noted to not have any urine output and his Valles inadequately located in the bladder. Linzie Andreas was replaced and patient has had good urine output since. OBJECTIVE:     VITAL SIGNS:  /88   Pulse 142   Temp 98.4 °F (36.9 °C) (Axillary)   Resp 19   Ht 6' (1.829 m)   Wt 180 lb 5.4 oz (81.8 kg)   SpO2 95%   BMI 24.46 kg/m²   Tmax over 24 hours:  Temp (24hrs), Av.5 °F (36.9 °C), Min:98.3 °F (36.8 °C), Max:98.8 °F (37.1 °C)      Patient Vitals for the past 6 hrs:   BP Pulse Resp SpO2   04/10/22 0600 117/88 142 19 95 %   04/10/22 0500 122/84 137 22 97 %         Intake/Output Summary (Last 24 hours) at 4/10/2022 1009  Last data filed at 4/10/2022 0555  Gross per 24 hour   Intake 3646.5 ml   Output 1975 ml   Net 1671.5 ml     Wt Readings from Last 2 Encounters:   04/10/22 180 lb 5.4 oz (81.8 kg)   22 181 lb 1.6 oz (82.1 kg)     Body mass index is 24.46 kg/m². Physical Exam  Vitals and nursing note reviewed. Constitutional:       Appearance: He is normal weight. He is not ill-appearing or toxic-appearing. HENT:      Head: Normocephalic and atraumatic. Mouth/Throat:      Mouth: Mucous membranes are moist.   Eyes:      Extraocular Movements: Extraocular movements intact. Conjunctiva/sclera: Conjunctivae normal.      Pupils: Pupils are equal, round, and reactive to light. Cardiovascular:      Rate and Rhythm: Normal rate. Rhythm irregular. Pulses: Normal pulses. Heart sounds: Normal heart sounds. Pulmonary:      Effort: Pulmonary effort is normal.      Breath sounds: No wheezing. Chest:      Chest wall: No tenderness.    Abdominal: General: Abdomen is flat. Bowel sounds are normal.      Palpations: Abdomen is soft. Comments: Incisional scars clean   Musculoskeletal:         General: No swelling. Cervical back: Normal range of motion and neck supple. Right lower leg: No edema. Comments: Above knee amputation left    Skin:     General: Skin is warm. Capillary Refill: Capillary refill takes less than 2 seconds. Neurological:      General: No focal deficit present. Mental Status: He is alert and oriented to person, place, and time. Mental status is at baseline. Psychiatric:         Mood and Affect: Mood normal.         Behavior: Behavior normal.         Thought Content: Thought content normal.         Judgment: Judgment normal.         MEDICATIONS:    Scheduled Meds:   metoprolol  50 mg Oral BID    sulfamethoxazole-trimethoprim  2 tablet Oral 2 times per day    apixaban  5 mg Oral BID    amiodarone  400 mg Oral BID    pantoprazole  40 mg Oral QAM AC    [Held by provider] tamsulosin  0.4 mg Oral Nightly    sodium chloride flush  5-40 mL IntraVENous 2 times per day     Continuous Infusions:   phenylephrine (CALLIE-SYNEPHRINE) 50mg/250mL infusion Stopped (04/09/22 0944)    sodium chloride 150 mL/hr at 04/10/22 0640    sodium chloride       PRN Meds:   sodium chloride flush, 5-40 mL, PRN  sodium chloride, , PRN  acetaminophen, 650 mg, Q6H PRN   Or  acetaminophen, 650 mg, Q6H PRN        VENT SETTINGS (Comprehensive) (if applicable):  Vent Information  SpO2: 95 %  Additional Respiratory  Assessments  Pulse: 142  Resp: 19  SpO2: 95 %  Oral Care: Patient refused    Arterial Blood Gas 4/10/2022  None.       Laboratory findings:    Complete Blood Count:   Recent Labs     04/08/22  0856 04/09/22  0510 04/10/22  0540   WBC 20.2* 12.1* 7.5   HGB 7.6* 7.9* 8.6*   HCT 25.5* 26.6* 28.9*    130 110*        Last 3 Blood Glucose:   Recent Labs     04/08/22  0856 04/09/22  0510 04/10/22  0540   GLUCOSE 73* 92 92 PT/INR:    Lab Results   Component Value Date    PROTIME 16.9 04/10/2022    PROTIME 12.2 03/18/2011    INR 1.5 04/10/2022     PTT:    Lab Results   Component Value Date    APTT 28.1 04/07/2022       Comprehensive Metabolic Profile:   Recent Labs     04/08/22  0856 04/09/22  0510 04/10/22  0540    146 142   K 4.6 3.8 3.5    114* 113*   CO2 18* 19* 21*   BUN 40* 44* 33*   CREATININE 3.6* 2.2* 1.3*   GLUCOSE 73* 92 92   CALCIUM 6.7* 6.5* 6.9*   PROT 5.0*  --   --    LABALBU 1.7*  --   --    BILITOT 1.1  --   --    ALKPHOS 98  --   --    AST 30  --   --    ALT 20  --   --       Magnesium:   Lab Results   Component Value Date    MG 2.0 04/08/2022     Phosphorus:   Lab Results   Component Value Date    PHOS 2.7 04/05/2022     Ionized Calcium: No results found for: CAION     Troponin: No results for input(s): TROPONINI in the last 72 hours. 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 Altered Mental Status -resolved  ? Likely due to Urosepsis  ? Patient alert, awake and responds to commands     Respiratory  2. Acute on chronic respiratory failure - improving  3. Bilateral pleural effusion  ? Hx of COPD without home O2  ? Currently on room air saturating >95%  ? No pneumonia     Cardiovascular  4.  Septic shock likely due to UTI - improving  ? Meropenem per ID. Received one dose of vancomycin in ED  ? NS @ 150cc/hr  ? Off pressors since yesterday  ? Fever control with tylenol as needed  5. History of atrial fibrillation  6. Afib RVR  ? Lopressor increased to 50 mg twice daily  ? Continue amiodarone   ? Continue Eliquis      GI  7. Recent hemicolectomy 3/22  ? Followed by surgery  ? Intraabdominal fluid  ? Per surgery, no need for IR drainage, they think it unlikely to be postoperative abscess  8. Ulcer prophylaxis  ? Protonix, takes at home     Renal  9. COURTNEY with bilateral hydrouretonephrosis - improving      - Cr 1.3 today  10. Urinary tract infection  ? Due to soto obstruction  § Replaced, 1500cc returned  ? Soto care  ? Maintenance fluids  ? Antibiotics  ? Hold Flomax per urology. Restart when hemodynamically stable     Infectious disease  11. Urosepsis - improving  ? ID following  ? Antibiotics switched over to Bactrim  ? Blood cultures x4 positive for klebsiella  ? Procalcitonin pending     Heme/Onc  12. Acute on chronic Anemia - stable  ? Acute at least partially dilutional  ? Monitor  13. Leukocytosis - improving  ? Due to sepsis, treat with antibiotics  14. Thrombocytopenia   ? 110 today  ? Restart eliquis     Endocrine  15. No acute issues        Social/Spiritual/DNR/Other  · Code status: Full  · Diet: ADULT DIET; Regular; Low Sodium (2 gm)  · Stress ulcer prophylaxis: protonix  · DVT prophylaxis: intermittent pneumatic compression boots  · Consultations needed: No  · Transfer out of ICU today? Yes     Lines/catheters  · 4/8  ? CVC right fem  ? Art line right fem  ? Erin Nascimento MD PGY-1  10:09 AM  04/10/22    I personally saw, examined and provided care for the patient. Radiographs, labs and medication list were reviewed by me independently. I spoke with bedside nursing, therapists and consultants. Critical care services and times documented are independent of procedures and multidisciplinary rounds with Residents. Additionally comprehensive, multidisciplinary rounds were conducted with the MICU team. The case was discussed in detail and plans for care were established. Review of Residents documentation was conducted and revisions were made as appropriate. I agree with the above documented exam, problem list and plan of care.   Delores Rosa MD   CCT excluding procedures: 39'

## 2022-04-10 NOTE — PROGRESS NOTES
Admit Date: 4/7/2022    Subjective:     Patient looks stronger. No complaints of pain. Scheduled Meds:   metoprolol  50 mg Oral BID    sulfamethoxazole-trimethoprim  2 tablet Oral 2 times per day    apixaban  5 mg Oral BID    amiodarone  400 mg Oral BID    pantoprazole  40 mg Oral QAM AC    [Held by provider] tamsulosin  0.4 mg Oral Nightly    sodium chloride flush  5-40 mL IntraVENous 2 times per day     Continuous Infusions:   phenylephrine (CALLIE-SYNEPHRINE) 50mg/250mL infusion Stopped (04/09/22 0944)    sodium chloride 150 mL/hr at 04/10/22 0640    sodium chloride       PRN Meds:sodium chloride flush, sodium chloride, acetaminophen **OR** acetaminophen      Objective:     I/O last 3 completed shifts: In: 6647.7 [I.V.:5314.8; IV Piggyback:1332.8]  Out: 0589 [Urine:3010]  No intake/output data recorded.   /88   Pulse 142   Temp 98.4 °F (36.9 °C) (Axillary)   Resp 19   Ht 6' (1.829 m)   Wt 180 lb 5.4 oz (81.8 kg)   SpO2 95%   BMI 24.46 kg/m²     LUNGS:  No rales, no wheezing  CARDIOVASCULAR:  Rapid rate, irregular, no murmurs  ABDOMEN:  Flat, soft, non-tender  MUSCULOSKELETAL:  No cyanosis, no edema, no clubbing of right foot or leg    Data Review  CBC:   Recent Labs     04/08/22  0856 04/09/22  0510 04/10/22  0540   WBC 20.2* 12.1* 7.5   HGB 7.6* 7.9* 8.6*    130 110*     BMP:    Recent Labs     04/08/22  0856 04/09/22  0510 04/10/22  0540    146 142   K 4.6 3.8 3.5    114* 113*   CO2 18* 19* 21*   BUN 40* 44* 33*   CREATININE 3.6* 2.2* 1.3*   GLUCOSE 73* 92 92     Coagulation:   Lab Results   Component Value Date    INR 1.5 04/10/2022    APTT 28.1 04/07/2022     Cardiac markers:   Lab Results   Component Value Date    CKMB <0.2 03/18/2011     Lab Results   Component Value Date    LABPROT 0.1 03/23/2022    LABPROT 0.1 03/23/2022    LABALBU 1.7 (L) 04/08/2022     Lab Results   Component Value Date    ALT 20 04/08/2022    AST 30 04/08/2022    ALKPHOS 98 04/08/2022 BILITOT 1.1 04/08/2022         Assessment:     Sepsis secondary to UTI: Klebsiella  Seroma of abdomen  Hypotension from sepsis  A fib with RVR: rate remains rapid    Plan:     Continue efforts to control rate with a fib/RVR. Antibiotics per ID's orders: change to oral Bactrim noted.   Patient's sister would like to know if he can be treated with a probiotic to help avoid C dif which he has had in the past.

## 2022-04-10 NOTE — PROGRESS NOTES
[address] [place] [address] [facility]  CALLIEIDA  Progress Note    SUBJECTIVE:  Chief Complaint   Patient presents with    Fever     at the nursing home he developed a fever that they are unable to get down     Shortness of Breath     pulse ox dropped on room air to 86%    Edema     to the rt leg      No new complaints. The patient is feeling better. No nausea or vomiting. No pain. No pressors. He is tolerating antibiotics. He is still in the ICU. Review of systems:  As stated above in the chief complaint, otherwise negative. Medications:  Scheduled Meds:   apixaban  5 mg Oral BID    metoprolol  25 mg Oral BID    meropenem  500 mg IntraVENous Q12H    amiodarone  400 mg Oral BID    pantoprazole  40 mg Oral QAM AC    [Held by provider] tamsulosin  0.4 mg Oral Nightly    sodium chloride flush  5-40 mL IntraVENous 2 times per day     Continuous Infusions:   phenylephrine (CALLIE-SYNEPHRINE) 50mg/250mL infusion Stopped (22 0944)    sodium chloride Stopped (22 1358)    sodium chloride 150 mL/hr at 04/10/22 0640    sodium chloride       PRN Meds:sodium chloride flush, sodium chloride, acetaminophen **OR** acetaminophen    OBJECTIVE:  /88   Pulse 142   Temp 98.4 °F (36.9 °C) (Axillary)   Resp 19   Ht 6' (1.829 m)   Wt 180 lb 5.4 oz (81.8 kg)   SpO2 95%   BMI 24.46 kg/m²   Temp  Av.5 °F (36.9 °C)  Min: 98.3 °F (36.8 °C)  Max: 98.8 °F (37.1 °C)  Constitutional: The patient sitting up in bed in the ICU. He is awake and alert. No distress. Skin: Warm and dry. No rashes were noted. HEENT: Round and reactive pupils. Moist mucous membranes. No ulcerations or thrush. Neck: Supple to movements. Chest: No use of accessory muscles to breathe. Symmetrical expansion. No wheezing, crackles or rhonchi. Cardiovascular: S1 and S2 are rhythmic and regular. No murmurs appreciated. Abdomen: Positive bowel sounds to auscultation. Benign to palpation.   Surgical wounds are clean.  Extremities: Left AKA stump is unremarkable. No edema right lower extremity. Lines: Right femoral TLC 4/8/2022. Right femoral arterial line 4/8/2022.     Laboratory and Tests Review:  Lab Results   Component Value Date    WBC 7.5 04/10/2022    WBC 12.1 (H) 04/09/2022    WBC 20.2 (H) 04/08/2022    HGB 8.6 (L) 04/10/2022    HCT 28.9 (L) 04/10/2022    MCV 87.0 04/10/2022     (L) 04/10/2022     Lab Results   Component Value Date    NEUTROABS 6.07 04/10/2022    NEUTROABS 11.01 (H) 04/09/2022    NEUTROABS 19.19 (H) 04/08/2022     No results found for: [facility]  Lab Results   Component Value Date    ALT 20 04/08/2022    AST 30 04/08/2022    ALKPHOS 98 04/08/2022    BILITOT 1.1 04/08/2022     Lab Results   Component Value Date     04/10/2022    K 3.5 04/10/2022    K 4.6 04/08/2022     04/10/2022    CO2 21 04/10/2022    BUN 33 04/10/2022    CREATININE 1.3 04/10/2022    CREATININE 2.2 04/09/2022    CREATININE 3.6 04/08/2022    GFRAA >60 04/10/2022    LABGLOM 55 04/10/2022    GLUCOSE 92 04/10/2022    GLUCOSE 106 03/24/2011    PROT 5.0 04/08/2022    LABALBU 1.7 04/08/2022    LABALBU 2.4 03/20/2011    CALCIUM 6.9 04/10/2022    BILITOT 1.1 04/08/2022    ALKPHOS 98 04/08/2022    AST 30 04/08/2022    ALT 20 04/08/2022     Lab Results   Component Value Date    CRP 29.2 (H) 01/03/2011     Lab Results   Component Value Date    SEDRATE 45 (H) 01/03/2011     Radiology:      Microbiology:   Blood cultures 4/7/2022: Klebsiella pneumoniae in 2 of 2 sets  Urine culture 4/8/2022: >100,000 Klebsiella pneumoniae  Rapid SARS-CoV-2: Negative  Rapid RSV: Negative  Rapid influenza: Negative  Nares screen for MRSA: Negative    Recent Labs     04/08/22  0854   PROCAL 49.29*       ASSESSMENT:  · Bladder outlet obstruction associated to malpositioned Valles catheter  · Complicated UTI associated to the above  · Sepsis with septic shock, improving  · Klebsiella septicemia associated to the above  · Leukocytosis, improved  · Right abdominal fluid collection. I agree with surgery. It is less likely to be the source of the infection    PLAN:  · Change Meropenem to Bactrim p.o.   · We will follow with you  · Transfer out of the ICU    Flako Ramirez MD  8:59 AM  4/10/2022

## 2022-04-10 NOTE — PLAN OF CARE
Problem: Skin Integrity:  Goal: Will show no infection signs and symptoms  Description: Will show no infection signs and symptoms  Outcome: Met This Shift  Goal: Absence of new skin breakdown  Description: Absence of new skin breakdown  Outcome: Met This Shift     Problem: Falls - Risk of:  Goal: Will remain free from falls  Description: Will remain free from falls  Outcome: Met This Shift  Goal: Absence of physical injury  Description: Absence of physical injury  Outcome: Met This Shift     Problem: Anxiety/Stress:  Goal: Level of anxiety will decrease  Description: Level of anxiety will decrease  Outcome: Met This Shift     Problem: Aspiration:  Goal: Absence of aspiration  Description: Absence of aspiration  Outcome: Met This Shift     Problem: Cardiac Output - Decreased:  Goal: Hemodynamic stability will improve  Description: Hemodynamic stability will improve  Outcome: Met This Shift     Problem: Fluid Volume - Imbalance:  Goal: Absence of imbalanced fluid volume signs and symptoms  Description: Absence of imbalanced fluid volume signs and symptoms  Outcome: Met This Shift

## 2022-04-10 NOTE — PATIENT CARE CONFERENCE
Intensive Care Daily Quality Rounding Checklist        ICU Team Members: Dr Samra Holt, residents, bedside nurse,      ICU Day #: NUMBER: 3     Intubation Date:  n/a     Ventilator Day #:      Central Line Insertion Date: April 8,2022                                                    Day #: NUMBER: 3      Arterial Line Insertion Date:                              Day #:      Temporary Hemodialysis Catheter Insertion Date:  n/a                             Day #      DVT Prophylaxis: Eliquis     GI Prophylaxis: protonix     Soto Catheter Insertion Date: April 8,2022                                        Day #: 3                             Continued need (if yes, reason documented and discussed with physician): yes, chronic soto     Skin Issues/ Wounds and ordered treatment discussed on rounds:      Goals/ Plans for the Day: monitor labs, increase lopressor to 50 mg bid,  Ok for intermediate

## 2022-04-11 LAB
ANION GAP SERPL CALCULATED.3IONS-SCNC: 10 MMOL/L (ref 7–16)
ANISOCYTOSIS: ABNORMAL
BASOPHILS ABSOLUTE: 0.05 E9/L (ref 0–0.2)
BASOPHILS RELATIVE PERCENT: 0.5 % (ref 0–2)
BUN BLDV-MCNC: 24 MG/DL (ref 6–23)
BURR CELLS: ABNORMAL
C-REACTIVE PROTEIN: 6.8 MG/DL (ref 0–0.4)
CALCIUM SERPL-MCNC: 7.5 MG/DL (ref 8.6–10.2)
CHLORIDE BLD-SCNC: 114 MMOL/L (ref 98–107)
CO2: 21 MMOL/L (ref 22–29)
CREAT SERPL-MCNC: 1.3 MG/DL (ref 0.7–1.2)
EKG ATRIAL RATE: 258 BPM
EKG Q-T INTERVAL: 276 MS
EKG QRS DURATION: 90 MS
EKG QTC CALCULATION (BAZETT): 393 MS
EKG R AXIS: 31 DEGREES
EKG T AXIS: -146 DEGREES
EKG VENTRICULAR RATE: 122 BPM
EOSINOPHILS ABSOLUTE: 0.49 E9/L (ref 0.05–0.5)
EOSINOPHILS RELATIVE PERCENT: 5.3 % (ref 0–6)
GFR AFRICAN AMERICAN: >60
GFR NON-AFRICAN AMERICAN: 55 ML/MIN/1.73
GLUCOSE BLD-MCNC: 94 MG/DL (ref 74–99)
HCT VFR BLD CALC: 32.2 % (ref 37–54)
HEMOGLOBIN: 9.7 G/DL (ref 12.5–16.5)
IMMATURE GRANULOCYTES #: 0.07 E9/L
IMMATURE GRANULOCYTES %: 0.8 % (ref 0–5)
INR BLD: 1.6
LYMPHOCYTES ABSOLUTE: 1.34 E9/L (ref 1.5–4)
LYMPHOCYTES RELATIVE PERCENT: 14.6 % (ref 20–42)
MCH RBC QN AUTO: 26 PG (ref 26–35)
MCHC RBC AUTO-ENTMCNC: 30.1 % (ref 32–34.5)
MCV RBC AUTO: 86.3 FL (ref 80–99.9)
MONOCYTES ABSOLUTE: 0.51 E9/L (ref 0.1–0.95)
MONOCYTES RELATIVE PERCENT: 5.6 % (ref 2–12)
NEUTROPHILS ABSOLUTE: 6.7 E9/L (ref 1.8–7.3)
NEUTROPHILS RELATIVE PERCENT: 73.2 % (ref 43–80)
OVALOCYTES: ABNORMAL
PDW BLD-RTO: 21 FL (ref 11.5–15)
PLATELET # BLD: 150 E9/L (ref 130–450)
PMV BLD AUTO: 11.6 FL (ref 7–12)
POIKILOCYTES: ABNORMAL
POLYCHROMASIA: ABNORMAL
POTASSIUM SERPL-SCNC: 4.2 MMOL/L (ref 3.5–5)
PROCALCITONIN: 3.92 NG/ML (ref 0–0.08)
PROTHROMBIN TIME: 17.2 SEC (ref 9.3–12.4)
RBC # BLD: 3.73 E12/L (ref 3.8–5.8)
SODIUM BLD-SCNC: 145 MMOL/L (ref 132–146)
WBC # BLD: 9.2 E9/L (ref 4.5–11.5)

## 2022-04-11 PROCEDURE — 86140 C-REACTIVE PROTEIN: CPT

## 2022-04-11 PROCEDURE — APPSS180 APP SPLIT SHARED TIME > 60 MINUTES: Performed by: CLINICAL NURSE SPECIALIST

## 2022-04-11 PROCEDURE — 80048 BASIC METABOLIC PNL TOTAL CA: CPT

## 2022-04-11 PROCEDURE — 6370000000 HC RX 637 (ALT 250 FOR IP): Performed by: CLINICAL NURSE SPECIALIST

## 2022-04-11 PROCEDURE — 99223 1ST HOSP IP/OBS HIGH 75: CPT | Performed by: INTERNAL MEDICINE

## 2022-04-11 PROCEDURE — 85025 COMPLETE CBC W/AUTO DIFF WBC: CPT

## 2022-04-11 PROCEDURE — 97161 PT EVAL LOW COMPLEX 20 MIN: CPT

## 2022-04-11 PROCEDURE — 6370000000 HC RX 637 (ALT 250 FOR IP): Performed by: INTERNAL MEDICINE

## 2022-04-11 PROCEDURE — 2580000003 HC RX 258: Performed by: INTERNAL MEDICINE

## 2022-04-11 PROCEDURE — 2060000000 HC ICU INTERMEDIATE R&B

## 2022-04-11 PROCEDURE — 85610 PROTHROMBIN TIME: CPT

## 2022-04-11 PROCEDURE — 36415 COLL VENOUS BLD VENIPUNCTURE: CPT

## 2022-04-11 PROCEDURE — 84145 PROCALCITONIN (PCT): CPT

## 2022-04-11 PROCEDURE — 93005 ELECTROCARDIOGRAM TRACING: CPT | Performed by: INTERNAL MEDICINE

## 2022-04-11 RX ORDER — METOPROLOL SUCCINATE 100 MG/1
100 TABLET, EXTENDED RELEASE ORAL 2 TIMES DAILY
Status: DISCONTINUED | OUTPATIENT
Start: 2022-04-11 | End: 2022-04-15 | Stop reason: HOSPADM

## 2022-04-11 RX ORDER — METOPROLOL TARTRATE 50 MG/1
100 TABLET, FILM COATED ORAL 2 TIMES DAILY
Status: DISCONTINUED | OUTPATIENT
Start: 2022-04-11 | End: 2022-04-11

## 2022-04-11 RX ADMIN — AMIODARONE HYDROCHLORIDE 400 MG: 200 TABLET ORAL at 08:34

## 2022-04-11 RX ADMIN — SODIUM CHLORIDE: 9 INJECTION, SOLUTION INTRAVENOUS at 03:03

## 2022-04-11 RX ADMIN — SULFAMETHOXAZOLE AND TRIMETHOPRIM 2 TABLET: 800; 160 TABLET ORAL at 08:38

## 2022-04-11 RX ADMIN — METOPROLOL TARTRATE 100 MG: 50 TABLET, FILM COATED ORAL at 08:35

## 2022-04-11 RX ADMIN — METOPROLOL SUCCINATE 100 MG: 100 TABLET, FILM COATED, EXTENDED RELEASE ORAL at 20:22

## 2022-04-11 RX ADMIN — SULFAMETHOXAZOLE AND TRIMETHOPRIM 2 TABLET: 800; 160 TABLET ORAL at 20:22

## 2022-04-11 RX ADMIN — PANTOPRAZOLE SODIUM 40 MG: 40 TABLET, DELAYED RELEASE ORAL at 08:38

## 2022-04-11 RX ADMIN — APIXABAN 5 MG: 5 TABLET, FILM COATED ORAL at 08:35

## 2022-04-11 RX ADMIN — SODIUM CHLORIDE, PRESERVATIVE FREE 10 ML: 5 INJECTION INTRAVENOUS at 20:22

## 2022-04-11 RX ADMIN — APIXABAN 5 MG: 5 TABLET, FILM COATED ORAL at 20:22

## 2022-04-11 RX ADMIN — SODIUM CHLORIDE, PRESERVATIVE FREE 10 ML: 5 INJECTION INTRAVENOUS at 08:36

## 2022-04-11 RX ADMIN — PANTOPRAZOLE SODIUM 40 MG: 40 TABLET, DELAYED RELEASE ORAL at 06:49

## 2022-04-11 RX ADMIN — AMIODARONE HYDROCHLORIDE 400 MG: 200 TABLET ORAL at 20:21

## 2022-04-11 ASSESSMENT — PAIN SCALES - GENERAL
PAINLEVEL_OUTOF10: 0

## 2022-04-11 NOTE — PROGRESS NOTES
Physical Therapy    Facility/Department: [facility] MED SURG  Initial Assessment    NAME: Corry Mcneal. : 1955  MRN: [id number]    Date of Service: 2022       REQUIRES PT FOLLOW UP: Yes       Patient Diagnosis(es): The primary encounter diagnosis was Urinary tract infection associated with indwelling urethral catheter, initial encounter ([facility] Diagnoses of Intra-abdominal infection, Acute respiratory failure with hypoxia ([facility] COURTNEY (acute kidney injury) ([facility] and Sepsis, due to unspecified organism, unspecified whether acute organ dysfunction present [facility]) were also pertinent to this visit. has a past medical history of Employs prosthetic leg, History of atrial fibrillation, [place] (hard of hearing), Hx of AKA (above knee amputation), left ([facility] Hx of necrotizing fasciitis, Hypertension, Rectal bleeding, and Rectal cancer ([facility] has a past surgical history that includes above knee amputation (); Colonoscopy (10/21/2011); Eye surgery (Left, 10/03/2016); eye surgery (Bilateral, ?); eye surgery (Right, ?); Colon surgery (2018); Abdomen surgery (2018); pr revision of ileostomy,complicated (N/A, ); Colonoscopy (N/A, 2022); Cardioversion (2020); and hemicolectomy (Right, 3/22/2022). Evaluating Therapist: Mian Moraes PT      Referring Provider:  Marleni Schmitt MD     PT order : PT eval and treat      Room #:  868  DIAGNOSIS:  UTI, abd wall abscess   Additional Pertinent History: L AKA ,  L hemicolectomy 3/23/2022  PRECAUTIONS:  Falls ,  [place]      Social:  Pt lives with  Mother and sister   in a  2  floor plan  With first floor set up , 3 steps with 1 HR to enter    Prior to admission pt walked with  QC and L prosthesis     Admitted from [facility]        Initial Evaluation  Date: 2022  Treatment      Short Term/ Long Term   Goals   Was pt agreeable to Eval/treatment? Yes        Does pt have pain?  None reported, stated he is weak        Bed Mobility Rolling: SBA  Supine to sit: mod assist   Sit to supine:  NT   Scooting:  SBA    SBA    Transfers Sit to stand:  mod assist   Stand to sit:  min assist   Stand pivot: Mod  assist    SBA   Ambulation   NT     100  feet with  AAD  with  SBA         Stair negotiation: ascended and descended NT     4  steps with  1  rail with  min assist    LE ROM  Grossly WFL        LE strength  4- to 4/ 5        AM- PAC RAW score  11/ 24                 Pt is alert and Oriented x  3       Balance:  mod assist in limited stand, fall risk   Endurance: decreased   Bed/Chair alarm:  yes       ASSESSMENT    Pt displays functional ability as noted in the objective portion of this evaluation.          Conditions Requiring Skilled Therapeutic Intervention:     [x]? Decreased strength                                      []?Decreased ROM  [x]? Decreased functional mobility  [x]? Decreased balance   [x]? Decreased endurance   [x]? Decreased posture  []? Decreased sensation  []? Decreased coordination   []? Decreased vision  []? Decreased safety awareness   [x]? Increased pain             Treatment/Education:    Pt in bed upon arrival ; agreeable to PT. PT reports he is feeling weak and stubbs snot want to don prosthesis, but would like to get up in a chair . Mobility as above. Pt with labored breathing with minimal activity. Pulse ox 94%.      Pt educated on fall risk,  Safe and proper technique with mobility          Patient response to education:   Pt verbalized understanding Pt demonstrated skill Pt requires further education in this area   x  with cues  x         Comments:  Pt left  In chair after session, with call light in reach. Waffle cushion placed in chair       Rehab potential is Good for reaching above PT goals.       Pts/ family goals     1. None stated       Patient and or family understand(s) diagnosis, prognosis, and plan of care. - yes       PLAN    PT care will be provided in accordance with the objectives noted above.   Whenever appropriate, clear delegation orders will be provided for nursing staff. Exercises and functional mobility practice will be used as well as appropriate assistive devices or modalities to obtain goals. Patient and family education will also be administered as needed.           PLAN OF CARE:     Current Treatment Recommendations      [x]? Strengthening to improve independence with functional mobility   []? ROM to improve independence with functional mobility   [x]? Balance Training to improve static/dynamic balance and to reduce fall risk  [x]? Endurance Training to improve activity tolerance during functional mobility   [x]? Transfer Training to improve safety and independence with all functional transfers   [x]? Gait Training to improve gait mechanics, endurance and assess need for appropriate assistive device  [x]? Stair Training in preparation for safe discharge home and/or into the community   [x]? Positioning to prevent skin breakdown and contractures  [x]? Safety and Education Training   [x]? Patient/Caregiver Education   []? HEP  []? Other      Frequency of treatments will be 2-5x/week x 7-10 days.     Time in:  1348   Time out:  1405          Evaluation Time includes thorough review of current medical information, gathering information on past medical history/social history and prior level of function, completion of standardized testing/informal observation of tasks, assessment of data and education on plan of care and goals.     CPT codes:  [x]? Low Complexity PT evaluation [id number]  []? Moderate Complexity PT evaluation 83866  []? High Complexity PT evaluation [id number]  []? PT Re-evaluation [id number]  []? Gait training 68662  minutes  []? Therapeutic activities 56749  minutes  []? Therapeutic exercises 03130 8 minutes  []?  Neuromuscular reeducation 27135 minutes         Monty 18 number:  PT 0304

## 2022-04-11 NOTE — PROGRESS NOTES
New consult sent to [facility] via [facility] message.

## 2022-04-11 NOTE — PROGRESS NOTES
[address] [place] [address] [facility]  CALLIESTEPHEN  Progress Note    SUBJECTIVE:  Chief Complaint   Patient presents with    Fever     at the nursing home he developed a fever that they are unable to get down     Shortness of Breath     pulse ox dropped on room air to 86%    Edema     to the rt leg      Sitting up in bed, eating lunch  Doesn't like the food so he isn't eating much  No fevers, on room air  Tolerating oral antibiotic     Review of systems:  As stated above in the chief complaint, otherwise negative. Medications:  Scheduled Meds:   metoprolol succinate  100 mg Oral BID    sulfamethoxazole-trimethoprim  2 tablet Oral 2 times per day    apixaban  5 mg Oral BID    amiodarone  400 mg Oral BID    pantoprazole  40 mg Oral QAM AC    [Held by provider] tamsulosin  0.4 mg Oral Nightly    sodium chloride flush  5-40 mL IntraVENous 2 times per day     Continuous Infusions:   sodium chloride       PRN Meds:sodium chloride flush, sodium chloride, acetaminophen **OR** acetaminophen    OBJECTIVE:  /78   Pulse 110   Temp 97.4 °F (36.3 °C) (Oral)   Resp 20   Ht 6' (1.829 m)   Wt 189 lb 1.6 oz (85.8 kg)   SpO2 96%   BMI 25.65 kg/m²   Temp  Av.6 °F (36.4 °C)  Min: 97.4 °F (36.3 °C)  Max: 97.9 °F (36.6 °C)  Constitutional: The patient sitting up. In no distress. Eating lunch. Skin: Warm and dry. No rashes were noted. HEENT: Round and reactive pupils. Moist mucous membranes. No ulcerations or thrush. Neck: Supple to movements. Chest: No use of accessory muscles to breathe. Symmetrical expansion. No wheezing, crackles or rhonchi. Cardiovascular: S1 and S2 are rhythmic and regular. No murmurs appreciated. Abdomen: Positive bowel sounds to auscultation. Benign to palpation. Surgical wounds are clean. Extremities: Left AKA stump is unremarkable. No edema right lower extremity.   Lines: PIV    Laboratory and Tests Review:  Lab Results   Component Value Date    WBC 9.2 2022 WBC 7.5 04/10/2022    WBC 12.1 (H) 04/09/2022    HGB 9.7 (L) 04/11/2022    HCT 32.2 (L) 04/11/2022    MCV 86.3 04/11/2022     04/11/2022     Lab Results   Component Value Date    NEUTROABS 6.70 04/11/2022    NEUTROABS 6.07 04/10/2022    NEUTROABS 11.01 (H) 04/09/2022     No results found for: CRP  Lab Results   Component Value Date    ALT 20 04/08/2022    AST 30 04/08/2022    ALKPHOS 98 04/08/2022    BILITOT 1.1 04/08/2022     Lab Results   Component Value Date     04/11/2022    K 4.2 04/11/2022    K 4.6 04/08/2022     04/11/2022    CO2 21 04/11/2022    BUN 24 04/11/2022    CREATININE 1.3 04/11/2022    CREATININE 1.3 04/10/2022    CREATININE 2.2 04/09/2022    GFRAA >60 04/11/2022    LABGLOM 55 04/11/2022    GLUCOSE 94 04/11/2022    GLUCOSE 106 03/24/2011    PROT 5.0 04/08/2022    LABALBU 1.7 04/08/2022    LABALBU 2.4 03/20/2011    CALCIUM 7.5 04/11/2022    BILITOT 1.1 04/08/2022    ALKPHOS 98 04/08/2022    AST 30 04/08/2022    ALT 20 04/08/2022     Lab Results   Component Value Date    CRP 6.8 (H) 04/11/2022    CRP 29.2 (H) 01/03/2011     Lab Results   Component Value Date    SEDRATE 45 (H) 01/03/2011     Radiology:  Reviewed     Microbiology:   Blood cultures 4/7/2022: Klebsiella pneumoniae in 2 of 2 sets  Urine culture 4/8/2022: >100,000 Klebsiella pneumoniae  Rapid SARS-CoV-2: Negative  Rapid RSV: Negative  Rapid influenza: Negative  Nares screen for MRSA: Negative    Recent Labs     04/11/22  0305   PROCAL 3.92*       ASSESSMENT:  · Bladder outlet obstruction associated to malpositioned Valles catheter  · Complicated UTI associated to the above  · Sepsis with septic shock, improving  · Klebsiella septicemia associated to the above  · Leukocytosis, improved  · Right abdominal fluid collection. I agree with surgery. It is less likely to be the source of the infection    PLAN:  · Continue Bactrim p.o.   · Labs reviewed   · We will follow with you    Salome Dimas, CRISTI - CNP  12:20 PM  4/11/2022     Patient seen and examined. I had a face to face encounter with the patient. Agree with exam.  Assessment and plan as outlined above and directed by me. Addition and corrections were done as deemed appropriate. My exam and plan include: The patient is out of the ICU. He is resting comfortably. No new complaints. He seems to be tolerating Bactrim. Watch creatinine.     Ramirez Lee MD  4/11/2022  1:34 PM

## 2022-04-11 NOTE — CONSULTS
Inpatient Cardiology Consultation      Reason for Consult: Recurrent atrial flutter    Consulting Physician: Dr. Anahi Dunbar    Requesting Physician:  Dr. Cruz Montanez    Date of Consultation: 4/11/2022    History of Present Illness:     Mr. Augusto Garay is a 67-year-old gentleman who is known to Dr. Paul anthony. He is known to have atrial fibrillation and hypertension, and has been treated with amiodarone and apixaban. 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 Postoperatively, he was tachycardic which was thought to be related to dehydration, but he was not responsive to fluids and was found to be anemic and with post operative ileus and COURTNEY. He was seen in consult by Dr. Sanjuana Tsang and placed on dilitiazem in addition to his previously prescribed metoprolol, but remained in rapid AF/AFL. He underwent successful ОЛЬГА guided cardioversion on March 30, with telemetry showing intermittent episodes of AF and frequent PACs. Anticoagulation was stopped due to bleeding from his incisional site and decline in hemoglobin. He was discharged to SNF on April 5, on amiodarone, metoprolol, and apixaban. He was sent back to the ER on April 7 due to fever and hypoxia, and found to have COURTNEY,  abdominal wall abscess, bladder distention, and bilateral hydronephrosis. He was seen in consultation by urology, and it was noted the soto placed prior to discharge was malpositioned. He was transferred to the ICU due to acute on chronic respiratory failure and sepsis; metoprolol and amiodarone were held due to hypotension and he was placed on Levophed. Blood cultures are positive for Klebsiella. He developed rapid AF on April 9, placed on IV Lopressor and was changed from Levophed to Joey. He was transferred out of the ICU on April 10. Past Medical History:    1. Hypertension  2. History of rectal cancer status post resection  3.  History of retinal detachment status post repair  4. Hard of hearing  5. 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 6. PAF  A. status post successful ОЛЬГА/DCCV (12/21/2020) to normal sinus rhythm. B. SPO2IX2-MDWy score at least 2.  7. On chronic anticoagulation, Eliquis 5 mg twice daily. 8. History of hemicolectomy  9. History of ileostomy  10. Pharmacological stress test: 1/2/2011: LVEF 54%, with no evidence of stress-induced ischemia. 11. TTE: 12/20/2020 (Dr. Rafi Mchugh), LVEF 55-60%, no wall motion abnormality, mildly dilated right ventricle, trace MR, trace pericardial effusion, TAPSE 21 mm. 12. ОЛЬГА 3/30/2022 (Dr. Rafi Mchugh),  Normal left ventricle size and systolic function. Ejection fraction is visually estimated at 55-60%. Atrial flutter may  affect the evaluation of LV systolic function. No regional wall motion abnormalities seen. Agitated saline injected for shunt evaluation. No evidence of thrombus within left atrium or appendage. Normal right ventricular size and function. Mild mitral regurgitation is present. No hemodynamically significant aortic stenosis is present. Unable to estimate PA systolic pressure. Mildly dilated aortic root (4.0 cm). No evidence for hemodynamically significant pericardial effusion. 15. Former smoker: Quit 2010, 70 pack years      Medications Prior to Admit:  Prior to Admission medications    Medication Sig Start Date End Date Taking? Authorizing Provider   magnesium hydroxide (MILK OF MAGNESIA) 400 MG/5ML suspension Take 30 mLs by mouth daily as needed for Constipation (Given if no BM in 48 hrs)   Yes Historical Provider, MD   torsemide (DEMADEX) 20 MG tablet Take 20 mg by mouth daily 4/6/22 4/7/22 Yes Historical Provider, MD   menthol-zinc oxide (CALMOSEPTINE) 0.44-20.625 % OINT ointment Apply topically 2 times daily Max 30 ml per day.   Apply to buttocks   Yes Historical Provider, MD   Emollient (EUCERIN) lotion Apply topically nightly Apply topically to upper and lower extremities Yes Historical Provider, MD   bisacodyl (DULCOLAX) 10 MG suppository Place 10 mg rectally daily as needed for Constipation (If no results from milk of mag.)   Yes Historical Provider, MD   calcium carbonate (TUMS) 500 MG chewable tablet Take 1 tablet by mouth every 6 hours as needed for Heartburn (up to six times daily with meals) 4/5/22 5/5/22  Cayetano Joseph MD   amiodarone (PACERONE) 400 MG tablet Take 1 tablet by mouth 2 times daily for 12 doses 4/5/22 4/11/22  Cayetano Joseph MD   amiodarone (CORDARONE) 200 MG tablet Take 1 tablet by mouth daily for 10 doses 4/11/22 4/21/22  Cayetano Joseph MD   tamsulosin [facility]) 0.4 MG capsule Take 1 capsule by mouth nightly 4/5/22   Cayetano Joseph MD   white petrolatum OINT ointment Apply topically 2 times daily 4/5/22   Cayetano Joseph MD   pantoprazole (PROTONIX) 40 MG tablet Take 1 tablet by mouth every morning (before breakfast) 4/6/22   Cayetano Joseph MD   apixaban (ELIQUIS) 5 MG TABS tablet Take 5 mg by mouth 2 times daily    Historical Provider, MD   amLODIPine (NORVASC) 5 MG tablet Take 1 tablet by mouth daily 1/21/22   Ginna Kaur MD   metoprolol (LOPRESSOR) 100 MG tablet Take 1 tablet by mouth 2 times daily 4/30/21   Ginna Kaur MD   hydrALAZINE (APRESOLINE) 50 MG tablet take 1 tablet by mouth three times a day 4/30/21   Ginna Kaur MD       Current Medications:    Current Facility-Administered Medications: metoprolol tartrate (LOPRESSOR) tablet 100 mg, 100 mg, Oral, BID  sulfamethoxazole-trimethoprim (BACTRIM DS;SEPTRA DS) 800-160 MG per tablet 2 tablet, 2 tablet, Oral, 2 times per day  apixaban (ELIQUIS) tablet 5 mg, 5 mg, Oral, BID  amiodarone (CORDARONE) tablet 400 mg, 400 mg, Oral, BID  pantoprazole (PROTONIX) tablet 40 mg, 40 mg, Oral, QAM AC  [Held by provider] tamsulosin (FLOMAX) capsule 0.4 mg, 0.4 mg, Oral, Nightly  sodium chloride flush 0.9 % injection 5-40 mL, 5-40 mL, IntraVENous, 2 times per day  sodium warm to touch   ABDOMEN: Soft, non-tender to light palpation. Bowel sounds present. No palpable masses   MS: Good muscle strength and tone. No atrophy or abnormal movements. : Deferred  SKIN: Warm and dry   NEURO / PSYCH: Oriented to person, place and time. Speech clear and appropriate. Follows all commands. +[place]. Telemetry: AF with RVR, rates 120s to 140s  ECG 4/07/2022: SR, 93 bpm   ECG from today pending         Intake/Output Summary (Last 24 hours) at 4/11/2022 0826  Last data filed at 4/11/2022 0359  Gross per 24 hour   Intake 3271.84 ml   Output 1650 ml   Net 1621.84 ml       Labs:   CBC:   Recent Labs     04/10/22  0540 04/11/22  0305   WBC 7.5 9.2   HGB 8.6* 9.7*   HCT 28.9* 32.2*   * 150     BMP:   Recent Labs     04/10/22  0540 04/11/22  0305    145   K 3.5 4.2   CO2 21* 21*   BUN 33* 24*   CREATININE 1.3* 1.3*   LABGLOM 55 55   CALCIUM 6.9* 7.5*     Mag:   Recent Labs     04/08/22  0856   MG 2.0     TFT:   Lab Results   Component Value Date    TSH 1.050 03/25/2022      HgA1c:   Lab Results   Component Value Date    LABA1C 5.6 12/20/2020     FASTING LIPID PANEL:  Lab Results   Component Value Date    CHOL 114 12/20/2020    HDL 41 12/20/2020    LDLCALC 58 12/20/2020    TRIG 76 12/20/2020     LIVER PROFILE:  Recent Labs     04/08/22  0856   AST 30   ALT 20   LABALBU 1.7*     CT abdomen and pelvis without contrast 4/07/2022:  Impression:     Significant prostatomegaly with urinary bladder distension and bilateral   hydroureteronephrosis. 5.0 cm x 4.0 cm walled-off fluid collection lateral to the ascending colon   slightly increased in size compared to 04/01/2022 and worrisome for abscess. This is adjacent to the anastomosis sutures. Small bilateral pleural effusions left greater than right. CT chest without contrast 4/07/2022:   Impression:      Small bilateral pleural effusions left greater than right. Emphysematous changes. No evidence for pneumonia.      CXR 4/07/2022:  FINDINGS:   There is mild retrocardiac opacity.  The right lung is clear.  The heart is   prominent in size.  No pneumothorax.  The costophrenic angles are clear.     Impression:    Suspect early retrocardiac infiltrates. Assessment:    1. Recurrent paroxysmal atrial fibrillation/flutter with RVR: in setting of sepsis  · Successful ОЛЬГА/cardioversion on 3/30/2022  · Anticoagulated with Eliquis  · On Amiodarone and Metoprolol    2. Klebsiella septicemia due to UTI/bladder outlet obstruction    3. COURTNEY: Cr now stable at 1.3    4. History of hypertension    5. Left AKA    6. Anemia: stable    7. Hypoalbuminemia       Treatment Plan:     1. Continue Eliquis, Amiodarone at present dose (will taper Amiodarone dose prior to discharge)    2. Change Lopressor to Toprol XL for improved rate control     3. Pending his clinical course, will consider repeat cardioversion prior to discharge vs as outpatient once he has recovered from acute illness    The above assessment and treatment plan was made in collaboration with Dr. Suzanna Monteiro.   ______________________________________________________________________  Cardiology attending attestation:  I have independently interviewed and examined the patient. I personally reviewed pertinent laboratory values and diagnostic testing (including, if applicable, chest xray, electrocardiogram, telemetry, echocardiogram, stress testing, and coronary angiography). I have reviewed the above documentation completed by JUHI Anderson including past medical, social, and family history and agree with the findings, assessment and plan except where noted. Plan formulated by me. I participated in all aspects of the medical decision making.   Please see my additional contributions to the HPI, physical exam, and assessment / medical decision making:  _______________________________________________________________________    Patient with recent ОЛЬГА/DCC for atrial flutter, shortly thereafter readmitted with septic shock and obstructive uropathy with GNR bacteremia requiring ICU and pressors. Was sinus on admission and went back into atrial fibrillation with RVR, now rate mildly elevated. He is feeling better. Denies chest pain shortness breath or palpitations. Physical Exam:  /89   Pulse 110   Temp 97.7 °F (36.5 °C)   Resp 20   Ht 6' (1.829 m)   Wt 189 lb 1.6 oz (85.8 kg)   SpO2 97%   BMI 25.65 kg/m²   General appearance: Chronically ill-appearing gentleman, awake, alert, no acute respiratory distress  Skin: Intact, no rash  ENMT: Moist mucous membranes  Neck: Supple, no carotid bruits. Normal jugular venous pressure  Lungs: Clear to auscultation bilaterally. No wheezes, rales, or rhonchi  Cardiac: Irregular rhythm with a mildly elevated rate, normal S1&S2, no murmurs apparent  Abdomen: Soft, positive bowel sounds, nontender  Extremities: Moves all extremities x 4, left AKA  Neurologic: No focal motor deficits apparent, normal mood and affect    Telemetry: Atrial fibrillation 110s    Impression:   1. Recurrent PAF, recent ОЛЬГА/DCC for AFl  2. Septic shock/GNR bacteremia/obstructive uropathy with COURTNEY    Recommendations:     Rate control for now with metoprolol and amiodarone   Continue anticoagulation   Repeat cardioversion when acute issues resolve probably as outpatient, unless we have difficulty controlling rates could be considered prior to discharge    Thank you for the consultation. Please do not hesitate to call with questions.     Natalia Sherman MD, [facility] Cardiology

## 2022-04-11 NOTE — PROGRESS NOTES
Department of Internal [facility] Dodge M.D.  Progress Note      SUBJECTIVE: He denies all complaints although he is not eating    OBJECTIVE abdomen has no pain and slightly distended    Medications    Current Facility-Administered Medications: metoprolol tartrate (LOPRESSOR) tablet 100 mg, 100 mg, Oral, BID  sulfamethoxazole-trimethoprim (BACTRIM DS;SEPTRA DS) 800-160 MG per tablet 2 tablet, 2 tablet, Oral, 2 times per day  apixaban (ELIQUIS) tablet 5 mg, 5 mg, Oral, BID  amiodarone (CORDARONE) tablet 400 mg, 400 mg, Oral, BID  pantoprazole (PROTONIX) tablet 40 mg, 40 mg, Oral, QAM AC  [Held by provider] tamsulosin (FLOMAX) capsule 0.4 mg, 0.4 mg, Oral, Nightly  sodium chloride flush 0.9 % injection 5-40 mL, 5-40 mL, IntraVENous, 2 times per day  sodium chloride flush 0.9 % injection 5-40 mL, 5-40 mL, IntraVENous, PRN  0.9 % sodium chloride infusion, , IntraVENous, PRN  acetaminophen (TYLENOL) tablet 650 mg, 650 mg, Oral, Q6H PRN **OR** acetaminophen (TYLENOL) suppository 650 mg, 650 mg, Rectal, Q6H PRN  Physical    VITALS:  /88   Pulse 111   Temp 97.5 °F (36.4 °C) (Oral)   Resp 20   Ht 6' (1.829 m)   Wt 189 lb 1.6 oz (85.8 kg)   SpO2 96%   BMI 25.65 kg/m²   24HR INTAKE/OUTPUT:    Intake/Output Summary (Last 24 hours) at 4/11/2022 0808  Last data filed at 4/11/2022 0359  Gross per 24 hour   Intake 3271.84 ml   Output 1650 ml   Net 1621.84 ml     LUNGS: Decreased breath sounds throughout  CARDIOVASCULAR: S1-S2 tachycardia slightly irregular  Data    CBC with Differential:    Lab Results   Component Value Date    WBC 9.2 04/11/2022    RBC 3.73 04/11/2022    HGB 9.7 04/11/2022    HCT 32.2 04/11/2022     04/11/2022    MCV 86.3 04/11/2022    MCH 26.0 04/11/2022    MCHC 30.1 04/11/2022    RDW 21.0 04/11/2022    NRBC 0.0 04/09/2022    SEGSPCT 47 03/18/2011    METASPCT 0.9 04/09/2022    LYMPHOPCT 14.6 04/11/2022    PROMYELOPCT 1.8 03/28/2022    MONOPCT 5.6 04/11/2022 MYELOPCT 1.8 03/28/2022    BASOPCT 0.5 04/11/2022    MONOSABS 0.51 04/11/2022    LYMPHSABS 1.34 04/11/2022    EOSABS 0.49 04/11/2022    BASOSABS 0.05 04/11/2022     CMP:    Lab Results   Component Value Date     04/11/2022    K 4.2 04/11/2022    K 4.6 04/08/2022     04/11/2022    CO2 21 04/11/2022    BUN 24 04/11/2022    CREATININE 1.3 04/11/2022    GFRAA >60 04/11/2022    LABGLOM 55 04/11/2022    GLUCOSE 94 04/11/2022    GLUCOSE 106 03/24/2011    PROT 5.0 04/08/2022    LABALBU 1.7 04/08/2022    LABALBU 2.4 03/20/2011    CALCIUM 7.5 04/11/2022    BILITOT 1.1 04/08/2022    ALKPHOS 98 04/08/2022    AST 30 04/08/2022    ALT 20 04/08/2022     PT/INR:    Lab Results   Component Value Date    PROTIME 17.2 04/11/2022    PROTIME 12.2 03/18/2011    INR 1.6 04/11/2022       ASSESSMENT AND PLAN    Atrial tachycardia causing diastolic heart failure; continue on amiodarone and Metroprolol but suspect atrial flutter again  Urinary tract infection with sepsis of Klebsiella continue on Bactrim  Moderate protein calorie malnutrition still not eating well          Electronically signed by Toyin Berrios MD on 4/11/2022 at 8:08 AM

## 2022-04-11 NOTE — PROGRESS NOTES
Physician Progress Note      PATIENT:               Winifred Mcmillan  CSN #:                  [id number]  :                       1955  ADMIT DATE:       2022 1:41 PM  [address] [place] DATE:  Theron Rei  PROVIDER #:        Dez Abel MD          QUERY TEXT:    Pt admitted with UTI. Pt noted to have urinary retention and chronic   indwelling urinary catheter. If possible, please document in the progress   notes and discharge summary if you are evaluating and/or treating any of the   following: The medical record reflects the following:  Risk Factors: soto inserted 4/2 (POA)  Clinical Indicators: per surgery \". [facility] Found to have urinary retention and UTI   2/2 clogged soto in UTI as well as evidence of PNA. [facility] \", per urology   \". .. Urinary retention. .. BPH with very large prostate, HENRY. [facility] Displaced   soto. [facility] Soto replaced by nursing staff for 1500 cc. [facility] Gram negative sepsis   from a urinary source. [facility] \"  Treatment: soto replaced    Thank you,  Tracee Shipman RN, BSN, CDIS  Clinical Documentation Improvement  [email or web address]  Options provided:  -- UTI due to chronic indwelling urinary catheter  -- UTI not due to indwelling urinary catheter  -- Other - I will add my own diagnosis  -- Disagree - Not applicable / Not valid  -- Disagree - Clinically unable to determine / Unknown  -- Refer to Clinical Documentation Reviewer    PROVIDER RESPONSE TEXT:    UTI is due to the chronic indwelling urinary catheter.     Query created by: Deedee Harman on 2022 9:10 AM      Electronically signed by:  Dez Abel MD 2022 1:23 PM

## 2022-04-11 NOTE — PROGRESS NOTES
GENERAL SURGERY  DAILY PROGRESS NOTE  4/11/2022    CHIEF COMPLAINT:  Chief Complaint   Patient presents with    Fever     at the nursing home he developed a fever that they are unable to get down     Shortness of Breath     pulse ox dropped on room air to 86%    Edema     to the rt leg        SUBJECTIVE:  Denies pain, nausea, vomiting this morning. Seems less confused. Continues to be tachycardic, afebrile. Wbc 9.2     OBJECTIVE:  /88   Pulse 111   Temp 97.5 °F (36.4 °C) (Oral)   Resp 20   Ht 6' (1.829 m)   Wt 189 lb 1.6 oz (85.8 kg)   SpO2 96%   BMI 25.65 kg/m²     GENERAL:  NAD. LUNGS:  No increased work of breathing. CARDIOVASCULAR: RR  ABDOMEN:  Soft, mildly-distended, non-tender. No guarding, rigidity, rebound. ASSESSMENT/PLAN:  74 y.o. male with s/p R hemicolectomy for large tubulovillous colonic polyp 3/22 with concern for intraabdominal source of sepsis. More likely urosepsis as CT scan not impressive for abscess collection within the timeframe postop and more likely represents seroma or hematoma. Suspect urosepsis.     Continue antibiotics per ID   Continue diet   Pain control with PRN tylenol   Ok for eliquis   No further surgical management indicated at this time, please reach out with questions or concerns   Appreciate medicine care per remainder of issues     Andrei Singh MD  Surgery Resident PGY-1  4/11/2022  5:55 AM

## 2022-04-11 NOTE — ACP (ADVANCE CARE PLANNING)
Advance Care Planning   Healthcare Decision Maker:    Primary Decision Maker: Rosaura Reina Brother/Sister - [phone]    Secondary Decision Maker: Prasanna Ortega - Brother/Sister - [phone]      Today we documented Decision Maker(s) consistent with Legal Next of Kin hierarchy.

## 2022-04-11 NOTE — PROGRESS NOTES
Consult left shoulder blade, puncture /indentation  Alert and verbally appropriate  Does have small indentation. No interventions needed  Favian Romano.  Edwige Arriaga, CNS, Wound Care

## 2022-04-11 NOTE — CARE COORDINATION
Transition of Care-Patient admitted from [address], admitted for fever, shortness of breath and leg edema. Met with patient bedside to confirm discharge plan is to return to [address] on returning. Per liaison patient will require pre-cert to return and a covid test on the day of discharge. PT/OT ordered , will need updated therapy evaluations to submit pre-cert. Discharge pending Cardiology and Urology plans, envelope and ambulance form in soft chart.      Stefany SHIPLEYN, RN  [facility]

## 2022-04-12 LAB
ANION GAP SERPL CALCULATED.3IONS-SCNC: 11 MMOL/L (ref 7–16)
ANISOCYTOSIS: ABNORMAL
BASOPHILS ABSOLUTE: 0.05 E9/L (ref 0–0.2)
BASOPHILS RELATIVE PERCENT: 0.5 % (ref 0–2)
BUN BLDV-MCNC: 20 MG/DL (ref 6–23)
CALCIUM SERPL-MCNC: 7.8 MG/DL (ref 8.6–10.2)
CHLORIDE BLD-SCNC: 110 MMOL/L (ref 98–107)
CO2: 20 MMOL/L (ref 22–29)
CREAT SERPL-MCNC: 1.3 MG/DL (ref 0.7–1.2)
EOSINOPHILS ABSOLUTE: 0.37 E9/L (ref 0.05–0.5)
EOSINOPHILS RELATIVE PERCENT: 4 % (ref 0–6)
GFR AFRICAN AMERICAN: >60
GFR NON-AFRICAN AMERICAN: 55 ML/MIN/1.73
GLUCOSE BLD-MCNC: 93 MG/DL (ref 74–99)
HCT VFR BLD CALC: 31.1 % (ref 37–54)
HEMOGLOBIN: 9.2 G/DL (ref 12.5–16.5)
IMMATURE GRANULOCYTES #: 0.14 E9/L
IMMATURE GRANULOCYTES %: 1.5 % (ref 0–5)
INR BLD: 1.6
LYMPHOCYTES ABSOLUTE: 1.36 E9/L (ref 1.5–4)
LYMPHOCYTES RELATIVE PERCENT: 14.8 % (ref 20–42)
MCH RBC QN AUTO: 25.8 PG (ref 26–35)
MCHC RBC AUTO-ENTMCNC: 29.6 % (ref 32–34.5)
MCV RBC AUTO: 87.4 FL (ref 80–99.9)
MONOCYTES ABSOLUTE: 0.59 E9/L (ref 0.1–0.95)
MONOCYTES RELATIVE PERCENT: 6.4 % (ref 2–12)
NEUTROPHILS ABSOLUTE: 6.68 E9/L (ref 1.8–7.3)
NEUTROPHILS RELATIVE PERCENT: 72.8 % (ref 43–80)
OVALOCYTES: ABNORMAL
PDW BLD-RTO: 20.6 FL (ref 11.5–15)
PLATELET # BLD: 160 E9/L (ref 130–450)
PMV BLD AUTO: 11.9 FL (ref 7–12)
POIKILOCYTES: ABNORMAL
POTASSIUM SERPL-SCNC: 3.4 MMOL/L (ref 3.5–5)
PROTHROMBIN TIME: 17.9 SEC (ref 9.3–12.4)
RBC # BLD: 3.56 E12/L (ref 3.8–5.8)
SODIUM BLD-SCNC: 141 MMOL/L (ref 132–146)
WBC # BLD: 9.2 E9/L (ref 4.5–11.5)

## 2022-04-12 PROCEDURE — 85610 PROTHROMBIN TIME: CPT

## 2022-04-12 PROCEDURE — 85025 COMPLETE CBC W/AUTO DIFF WBC: CPT

## 2022-04-12 PROCEDURE — 80048 BASIC METABOLIC PNL TOTAL CA: CPT

## 2022-04-12 PROCEDURE — 36415 COLL VENOUS BLD VENIPUNCTURE: CPT

## 2022-04-12 PROCEDURE — 6370000000 HC RX 637 (ALT 250 FOR IP): Performed by: CLINICAL NURSE SPECIALIST

## 2022-04-12 PROCEDURE — 2580000003 HC RX 258: Performed by: INTERNAL MEDICINE

## 2022-04-12 PROCEDURE — 6370000000 HC RX 637 (ALT 250 FOR IP): Performed by: INTERNAL MEDICINE

## 2022-04-12 PROCEDURE — 99233 SBSQ HOSP IP/OBS HIGH 50: CPT | Performed by: INTERNAL MEDICINE

## 2022-04-12 PROCEDURE — 97165 OT EVAL LOW COMPLEX 30 MIN: CPT

## 2022-04-12 PROCEDURE — 2060000000 HC ICU INTERMEDIATE R&B

## 2022-04-12 PROCEDURE — 97530 THERAPEUTIC ACTIVITIES: CPT

## 2022-04-12 RX ORDER — DILTIAZEM HYDROCHLORIDE 180 MG/1
180 CAPSULE, COATED, EXTENDED RELEASE ORAL DAILY
Status: DISCONTINUED | OUTPATIENT
Start: 2022-04-12 | End: 2022-04-13

## 2022-04-12 RX ADMIN — SODIUM CHLORIDE, PRESERVATIVE FREE 10 ML: 5 INJECTION INTRAVENOUS at 08:44

## 2022-04-12 RX ADMIN — SULFAMETHOXAZOLE AND TRIMETHOPRIM 2 TABLET: 800; 160 TABLET ORAL at 08:42

## 2022-04-12 RX ADMIN — METOPROLOL SUCCINATE 100 MG: 100 TABLET, FILM COATED, EXTENDED RELEASE ORAL at 21:03

## 2022-04-12 RX ADMIN — METOPROLOL SUCCINATE 100 MG: 100 TABLET, FILM COATED, EXTENDED RELEASE ORAL at 08:42

## 2022-04-12 RX ADMIN — AMIODARONE HYDROCHLORIDE 400 MG: 200 TABLET ORAL at 08:42

## 2022-04-12 RX ADMIN — SULFAMETHOXAZOLE AND TRIMETHOPRIM 2 TABLET: 800; 160 TABLET ORAL at 21:03

## 2022-04-12 RX ADMIN — APIXABAN 5 MG: 5 TABLET, FILM COATED ORAL at 08:43

## 2022-04-12 RX ADMIN — APIXABAN 5 MG: 5 TABLET, FILM COATED ORAL at 21:03

## 2022-04-12 RX ADMIN — SODIUM CHLORIDE, PRESERVATIVE FREE 10 ML: 5 INJECTION INTRAVENOUS at 21:04

## 2022-04-12 RX ADMIN — DILTIAZEM HYDROCHLORIDE 180 MG: 180 CAPSULE, COATED, EXTENDED RELEASE ORAL at 09:17

## 2022-04-12 RX ADMIN — PANTOPRAZOLE SODIUM 40 MG: 40 TABLET, DELAYED RELEASE ORAL at 05:43

## 2022-04-12 RX ADMIN — AMIODARONE HYDROCHLORIDE 400 MG: 200 TABLET ORAL at 21:03

## 2022-04-12 NOTE — PROGRESS NOTES
Occupational Therapy  OCCUPATIONAL THERAPY INITIAL EVALUATION  [facility]  [address], [facility], [place]    Date: 2022     Patient Name: Sandralee Bence. MRN: [id number]  : 1955  Room: [address] [place] [address]    Evaluating OT: Benito Peraza, OTR/L - [id number]    Referring Provider: Kristen Meraz MD  Specific Provider Orders/Date: \"OT eval and treat\" - 4/10/2022    Diagnosis: Abdominal wall abscess [L02.211]      Pertinent Medical History: recent LAPAROSCOPIC RIGHT HEMICOLECTOMY (3/22/2022), L AKA, HTN, rectal cancer     Precautions: fall risk, L AKA - has prosthetic, bed/chair alarms, skin integrity, hearing impairment    Assessment of Current Deficits:    [x] Functional mobility   [x]ADLs  [x] Strength               [x]Cognition   [x] Functional transfers   [x] IADLs         [x] Safety Awareness   [x]Endurance   [] Fine Coordination              [x] Balance      [] Vision/perception   [x]Sensation    []Gross Motor Coordination  [] ROM  [] Delirium                   [] Motor Control     OT PLAN OF CARE   OT POC is based on physician orders, patient diagnosis, and results of clinical assessment.   Frequency/Duration 2-5 days/week for 2 weeks PRN   Specific OT Treatment Interventions to Include:   * Instruction/training on adapted ADL techniques and AE recommendations to increase functional independence within precautions       * Training on energy conservation strategies, correct breathing pattern and techniques to improve independence/tolerance for self-care routine  * Functional transfer/mobility training/DME recommendations for increased independence, safety, and fall prevention  * Patient/Family education to increase follow through with safety techniques and functional independence  * Recommendation of environmental modifications for increased safety with functional transfers/mobility and ADLs  * Therapeutic exercise to improve motor endurance, ROM, and functional strength for ADLs/functional transfers  * Therapeutic activities to facilitate/challenge dynamic balance, stand tolerance for increased safety and independence with ADLs  * Neuro-muscular re-education: facilitation of righting/equilibrium reactions, midline orientation, scapular stability/mobility, normalization of muscle tone, and facilitation of volitional active controled movement  * Positioning to improve skin integrity, interaction with environment and functional independence    Recommended Adaptive Equipment: TBD    Home Living: Patient lives with family in a one-floor setup. Bathroom Setup: walk-in tub (with handheld shower head and grab bar available) and standard-height toilet  Equipment Owned: cane    Prior Level of Function (PLOF): Patient reported he was mostly independent with ADLs; family completes IADLs. Patient noted that he was independent with functional transfers/mobility (with use of cane) prior to this hospitalization. Pain Level: Patient denied experiencing pain. Cognition: Patient alert and oriented x3. WFL command follow demonstrated. Patient pleasant, cooperative, and motivated to return to PLOF. Memory: WFL  Sequencing: WFL  Problem Solving: WFL grossly  Judgement/Safety: WFL grossly    Functional Assessment:  AM-PAC Daily Activity Raw Score: 16/24   Initial Eval Status  Date: 4/12/2022 Treatment Status  Date:  Short Term Goals = Long Term Goals   Feeding Independent  N/A   Grooming Setup  Supervision  (seated/standing)   UB Dressing Setup  Supervision  (including item retrieval)   LB Dressing Mod A  SBA - with use of AE, as needed/appropriate   Bathing Mod A  SBA - with use of AE/DME, as needed/appropriate   Toileting Mod A  Patient with soto catheter currently. SBA   Bed Mobility  Sit-to-Supine: SBA   Independent in order to maximize patient's independence with ADLs, re-positioning, and other functional tasks. Functional Transfers Sit-to-Stand:  Mod A  from bedside chair. Cues given to maximize safety. Scoot-Pivot Transfer: SBA from bedside chair (with arm rest swung up and out of the way) to EOB  Supervision   Functional Mobility Not assessed. Patient declined to don L LE prosthesis. Supervision with functional mobility (with device, as needed/appropriate) in order to maximize independence with ADLs/IADLs and other functional tasks. Balance Sitting: Fair+  (at EOB)  Standing: Fair-  (with walker - without L LE prosthesis)  Fair+ dynamic standing balance during completion of ADLs/IADLs and other functional tasks. Activity Tolerance Fair-  Shortness of breath demonstrated with minimal functional activity; patient's O2 saturations were greater than or equal to 90% (on room air). Cues given to facilitate proper breathing techniques. Patient will demonstrate Good understanding and consistent implementation of energy conservation techniques and work simplification techniques into ADL/IADL routines. Visual/  Perceptual WFL     N/A   B UE Strength 4/5  Patient will demonstrate 4+/5 B UE strength in order to maximize independence with ADLs and functional transfers. Additional Long-Term Goal: Patient will increase functional independence to PLOF in order to allow patient to live in least restrictive environment. ROM: Additional Information:    R UE  WFL    L UE WFL      Hearing: Impaired  Sensation: No complaints of numbness/tingling in B UEs. Tone: WFL  Edema: No    Comments: RN approved patient's participation in [address] [place] [address] activities. Upon arrival, patient seated in bedside chair. At end of session, patient supine in bed (per patient preference) with call light and phone within reach, bed alarm activated, and all lines and tubes intact. Patient would benefit from continued skilled OT to increase safety and independence with completion of ADL/IADL tasks for functional independence and quality of life.      Treatment: OT treatment provided this date included:    Instruction/training on safety and adapted techniques for completion of ADLs.   Instruction/training on safe functional mobility/transfer techniques. Patient education provided regardin) importance of having staff assistance with ADLs and other OOB activities to prevent falls/injury during hospitalization. Patient indicated understanding. Further skilled OT treatment indicated to increase patient's safety and independence with completion of ADL/IADL tasks in order to maximize patient's functional independence and quality of life. Rehab Potential: Good for established goals. Patient / Family Goal: Patient did not state a goal.  Patient and/or family were instructed on functional diagnosis, prognosis/goals, and OT plan of care. Demonstrated Fair understanding. Eval Complexity: Low    Time In: 1215  Time Out: 1240  Total Treatment Time: 10 minutes      Minutes Units   OT Eval Low 93241 15 1   OT Eval Medium 21701     OT Eval High 69081     OT Re-Eval [id number]     Therapeutic Ex 34373     Therapeutic Activities 12816 10 1   ADL/Self Care 41525     Orthotic Management 33114     Neuro Re-Ed 85915     Non-Billable Time N/A ---     Evaluation time includes thorough review of current medical information, gathering information on past medical history/social history and prior level of function, completion of standardized testing/informal observation of tasks, assessment of data, and education on plan of care and goals. LIT Thomas/L  License Number: [id number]

## 2022-04-12 NOTE — PROGRESS NOTES
GENERAL SURGERY  DAILY PROGRESS NOTE  4/12/2022    CHIEF COMPLAINT:  Chief Complaint   Patient presents with    Fever     at the nursing home he developed a fever that they are unable to get down     Shortness of Breath     pulse ox dropped on room air to 86%    Edema     to the rt leg        SUBJECTIVE:  No issues overnight, patient resting comfortable this morning. OBJECTIVE:  BP (!) 141/99   Pulse 127   Temp 97.5 °F (36.4 °C) (Oral)   Resp 18   Ht 6' (1.829 m)   Wt 190 lb (86.2 kg)   SpO2 95%   BMI 25.77 kg/m²     GENERAL:  NAD. LUNGS:  No increased work of breathing. CARDIOVASCULAR: RR  ABDOMEN:  Soft, mildly-distended, non-tender. No guarding, rigidity, rebound. ASSESSMENT/PLAN:  74 y.o. male with s/p R hemicolectomy for large tubulovillous colonic polyp 3/22 with concern for intraabdominal source of sepsis. More likely urosepsis as CT scan not impressive for abscess collection within the timeframe postop and more likely represents seroma or hematoma. Suspect urosepsis.     Continue antibiotics per ID   Continue diet   Pain control with PRN tylenol   Ok for eliquis   No further surgical management indicated at this time, please reach out with questions or concerns   Appreciate medicine care per remainder of issues     Jillian Segura MD  Surgery Resident PGY-1  4/12/2022  5:39 AM

## 2022-04-12 NOTE — PROGRESS NOTES
[address] [place] [address] FOLLOW-UP    Name: Santhosh Kingston. Age: 74 y.o. Date of Admission: 4/7/2022  1:41 PM    Date of Service: 4/12/2022    Primary Cardiologist: Raquel Bowden    Chief Complaint: Follow-up for recurrent AF    Interim History:  Feels okay. Denies chest pain, shortness of breath or palpitation. Low appetite. Remains in atrial fibrillation with mildly elevated rates.     Review of Systems:   Negative except as described above    Problem List:  Patient Active Problem List   Diagnosis    Retinal detachment of left eye with multiple breaks    [facility] ([facility]    Ileus ([facility]    Atrial flutter ([facility]    GI bleed    Gastrointestinal bleed    Benign neoplasm of cecum    S/P right hemicolectomy    PSVT (paroxysmal supraventricular tachycardia) ([facility]    Primary hypertension    Abdominal wall abscess    Atrial fibrillation with RVR (HCC)    Urinary tract infection associated with indwelling urethral catheter (HCC)    PAF (paroxysmal atrial fibrillation) ([facility]    Intra-abdominal infection    COURTNEY (acute kidney injury) ([facility]    Septic shock (HCC)    Bacteremia       Current Medications:    Current Facility-Administered Medications:     metoprolol succinate (TOPROL XL) extended release tablet 100 mg, 100 mg, Oral, BID, CRISTI Frederick - CNS, 100 mg at 04/11/22 2022    sulfamethoxazole-trimethoprim (BACTRIM DS;SEPTRA DS) 800-160 MG per tablet 2 tablet, 2 tablet, Oral, 2 times per day, Aruna Frye MD, 2 tablet at 04/11/22 2022    apixaban (ELIQUIS) tablet 5 mg, 5 mg, Oral, BID, Aruna Frye MD, 5 mg at 04/11/22 2022    amiodarone (CORDARONE) tablet 400 mg, 400 mg, Oral, BID, Aruna Frye MD, 400 mg at 04/11/22 2021    pantoprazole (PROTONIX) tablet 40 mg, 40 mg, Oral, QAM AC, Aruna Frye MD, 40 mg at 04/12/22 4763    [Held by provider] tamsulosin (FLOMAX) capsule 0.4 mg, 0.4 mg, Oral, Nightly, Mikey John MD, 0.4 mg at 04/07/22 1386   sodium chloride flush 0.9 % injection 5-40 mL, 5-40 mL, IntraVENous, 2 times per day, Angelica Escobar MD, 10 mL at 04/11/22 2022    sodium chloride flush 0.9 % injection 5-40 mL, 5-40 mL, IntraVENous, PRN, Angelica Escobar MD    0.9 % sodium chloride infusion, , IntraVENous, PRN, Angelica Escobar MD    acetaminophen (TYLENOL) tablet 650 mg, 650 mg, Oral, Q6H PRN **OR** acetaminophen (TYLENOL) suppository 650 mg, 650 mg, Rectal, Q6H PRN, Angelica Escobar MD    Physical Exam:  /87   Pulse 120   Temp 97.5 °F (36.4 °C) (Oral)   Resp 16   Ht 6' (1.829 m)   Wt 190 lb (86.2 kg)   SpO2 97%   BMI 25.77 kg/m²   Wt Readings from Last 3 Encounters:   04/12/22 190 lb (86.2 kg)   04/05/22 181 lb 1.6 oz (82.1 kg)   03/15/22 190 lb (86.2 kg)     Appearance: Chronically ill-appearing gentleman, awake, alert, and in no acute respiratory distress  Skin: Intact, no rash  Head: Normocephalic, atraumatic  Eyes: EOMI, no conjunctival erythema  ENMT: No pharyngeal erythema, MMM, no rhinorrhea  Neck: Supple, no elevated JVP, no carotid bruits  Lungs: Clear to auscultation bilaterally. No wheezes, rales, or rhonchi. Cardiac: PMI nondisplaced, irregular rhythm with a mildly tachycardic rate, S1 & S2 normal, no murmurs  Abdomen: Soft, nontender, +bowel sounds  Extremities: Moves all extremities x 4, 1+ pitting lower extremity edema on the right, left AKA  Neurologic: No focal motor deficits apparent, normal mood and affect  Peripheral Pulses: Intact posterior tibial pulses bilaterally    Intake/Output:    Intake/Output Summary (Last 24 hours) at 4/12/2022 0733  Last data filed at 4/12/2022 0242  Gross per 24 hour   Intake 1011.76 ml   Output 1500 ml   Net -488.24 ml     No intake/output data recorded.     Laboratory Tests:  Recent Labs     04/10/22  0540 04/11/22  0305 04/12/22  0458    145 141   K 3.5 4.2 3.4*   * 114* 110*   CO2 21* 21* 20*   BUN 33* 24* 20   CREATININE 1.3* 1.3* 1.3*   GLUCOSE 92 94 93   CALCIUM 6.9* 7.5* 7.8*     Lab Results   Component Value Date    MG 2.0 2022     No results for input(s): ALKPHOS, ALT, AST, PROT, BILITOT, BILIDIR, LABALBU in the last 72 hours. Recent Labs     04/10/22  0540 22  0305 22  0458   WBC 7.5 9.2 9.2   RBC 3.32* 3.73* 3.56*   HGB 8.6* 9.7* 9.2*   HCT 28.9* 32.2* 31.1*   MCV 87.0 86.3 87.4   MCH 25.9* 26.0 25.8*   MCHC 29.8* 30.1* 29.6*   RDW 20.8* 21.0* 20.6*   * 150 160   MPV 12.3* 11.6 11.9     Lab Results   Component Value Date    CKTOTAL 12 (L) 2011    CKMB <0.2 2011    TROPONINI <0.01 2020    TROPONINI <0.01 2011    TROPONINI 0.02 2011     Lab Results   Component Value Date    INR 1.6 2022    INR 1.6 2022    INR 1.5 04/10/2022    PROTIME 17.9 (H) 2022    PROTIME 17.2 (H) 2022    PROTIME 16.9 (H) 04/10/2022     Lab Results   Component Value Date    TSH 1.050 2022     Lab Results   Component Value Date    LABA1C 5.6 2020     No results found for: EAG  Lab Results   Component Value Date    CHOL 114 2020     Lab Results   Component Value Date    TRIG 76 2020     Lab Results   Component Value Date    HDL 41 2020     Lab Results   Component Value Date    LDLCALC 58 2020     Lab Results   Component Value Date    LABVLDL 15 2020     No results found for: CHOLHDLRATIO  No results for input(s): PROBNP in the last 72 hours. Cardiac Tests:    EK2022: Atrial fibrillation with  bpm.  Normal axis and intervals. Low voltage. Nonspecific T wave changes    Telemetry:   Atrial fibrillation 110s 120s    Chest X-ray:   22      Impression   Suspect early retrocardiac infiltrates. Echocardiogram:   ОЛЬГА 3/30/22    Summary   Normal left ventricle size and systolic function. Ejection fraction is visually estimated at 55-60%. Atrial flutter may   affect the evaluation of LV systolic function.    No regional wall motion abnormalities seen.   Agitated saline injected for shunt evaluation. No evidence of thrombus within left atrium or appendage. Normal right ventricular size and function. Mild mitral regurgitation is present. No hemodynamically significant aortic stenosis is present. Unable to estimate PA systolic pressure. Mildly dilated aortic root (4.0 cm). No evidence for hemodynamically significant pericardial effusion. Stress test:    Adenosine stress 2011  Findings-    There is normal distribution of left ventricular myocardial activity on   both the adenosine stress and rest SPECT myocardial perfusion images. Gated study shows normal left ventricular wall motion and myocardial   thickening during systole.  Resting left ventricular ejection fraction   is 54%, the EDV is 136 mL, and the ESV is 63 mL. Cardiac catheterization:     ----------------------------------------------------------------------------------------------------------------------------------------------------------------  IMPRESSION:  1. Recurrent paroxysmal atrial fibrillation/recent ОЛЬГА/DCC for atrial flutter 3/30/2022. Currently AF with mildly elevated rates. On amiodarone/metoprolol succinate/apixaban  2. Septic shock, resolved  3. GNR bacteremia  4. COURTNEY, improving due to obstructive uropathy/misplaced Valles. Creatinine 3.7->1.3  5. Hypertension  6. Anemia Hgb 9.2  7. PAD with left AKA    RECOMMENDATIONS:  Patient seems to be improving. Rate controlled not too bad. Suspect his poor p.o. intake is more related to his acute septic and renal failure issues and less related to atrial fibrillation.      Continue rate control measures with metoprolol succinate, add long-acting diltiazem    Continue amiodarone loading 400 twice daily, decrease to 200 twice daily after tomorrow for a week then 200 daily   If unable to adequately rate control with above measures, then would proceed with repeat ОЛЬГА/cardioversion though I would prefer to let him further recover from his acute infectious issues to increase the likelihood of successful maintenance of sinus rhythm   Continue apixaban for stroke risk reduction   Aggressive risk factor modification   Further care per primary and other consultants    Discussed with Dr. Shawna Jean MD, [address] [place] [address] Cardiology    NOTE: This report was transcribed using voice recognition software. Every effort was made to ensure accuracy; however, inadvertent computerized transcription errors may be present.

## 2022-04-12 NOTE — PROGRESS NOTES
[address] [place] [address] [facility]  RACIEL  Progress Note    SUBJECTIVE:  Chief Complaint   Patient presents with    Fever     at the nursing home he developed a fever that they are unable to get down     Shortness of Breath     pulse ox dropped on room air to 86%    Edema     to the rt leg      Sitting up in the chair  No issues  Not eating much. No fevers     Review of systems:  As stated above in the chief complaint, otherwise negative. Medications:  Scheduled Meds:   dilTIAZem  180 mg Oral Daily    metoprolol succinate  100 mg Oral BID    sulfamethoxazole-trimethoprim  2 tablet Oral 2 times per day    apixaban  5 mg Oral BID    amiodarone  400 mg Oral BID    pantoprazole  40 mg Oral QAM AC    [Held by provider] tamsulosin  0.4 mg Oral Nightly    sodium chloride flush  5-40 mL IntraVENous 2 times per day     Continuous Infusions:   sodium chloride       PRN Meds:sodium chloride flush, sodium chloride, acetaminophen **OR** acetaminophen    OBJECTIVE:  BP (!) 127/90   Pulse 111   Temp 98.3 °F (36.8 °C) (Oral)   Resp 18   Ht 6' (1.829 m)   Wt 190 lb (86.2 kg)   SpO2 94%   BMI 25.77 kg/m²   Temp  Av.7 °F (36.5 °C)  Min: 97.4 °F (36.3 °C)  Max: 98.3 °F (36.8 °C)  Constitutional: The patient sitting up in the chair. In no distress. Skin: Warm and dry. No rashes were noted. HEENT: Round and reactive pupils. Moist mucous membranes. No ulcerations or thrush. Neck: Supple to movements. Chest: No use of accessory muscles to breathe. Symmetrical expansion. No wheezing, crackles or rhonchi. Cardiovascular: S1 and S2 are rhythmic and regular. No murmurs appreciated. Abdomen: Positive bowel sounds to auscultation. Benign to palpation. Surgical wounds are clean. Extremities: Left AKA stump is unremarkable. No edema right lower extremity.   Lines: PIV    Laboratory and Tests Review:  Lab Results   Component Value Date    WBC 9.2 2022    WBC 9.2 2022    WBC 7.5 face to face encounter with the patient. Agree with exam.  Assessment and plan as outlined above and directed by me. Addition and corrections were done as deemed appropriate. My exam and plan include: The patient is tolerating antibiotics well. Continue Bactrim.     Susan Hogan MD  4/12/2022  2:20 PM

## 2022-04-12 NOTE — CARE COORDINATION
Transition of Care-Spoke with Dr. Chandler Massey this morning-anticipate discharge Friday, afib/rvr persists. Cardiology is adjusting meds, may need a ОЛЬГА/DCCV if no success with medication mgmt. Plan remains transfer to [facility]-will require pre-cert-will submit today after OT evaluation to chart. Will need covid testing day of discharge. Envelope/transportation form on soft chart.      Margaux SHIPLEYN, RN  [facility]

## 2022-04-12 NOTE — PROGRESS NOTES
Department of Internal Santos Fofana M.D. Progress Note      SUBJECTIVE: He still continues to not eat which puts him at risk.   The atrial fibrillation has thrown him off as he does have mild cognitive impairment    OBJECTIVE abdomen is soft and nontender    Medications    Current Facility-Administered Medications: metoprolol succinate (TOPROL XL) extended release tablet 100 mg, 100 mg, Oral, BID  sulfamethoxazole-trimethoprim (BACTRIM DS;SEPTRA DS) 800-160 MG per tablet 2 tablet, 2 tablet, Oral, 2 times per day  apixaban (ELIQUIS) tablet 5 mg, 5 mg, Oral, BID  amiodarone (CORDARONE) tablet 400 mg, 400 mg, Oral, BID  pantoprazole (PROTONIX) tablet 40 mg, 40 mg, Oral, QAM AC  [Held by provider] tamsulosin (FLOMAX) capsule 0.4 mg, 0.4 mg, Oral, Nightly  sodium chloride flush 0.9 % injection 5-40 mL, 5-40 mL, IntraVENous, 2 times per day  sodium chloride flush 0.9 % injection 5-40 mL, 5-40 mL, IntraVENous, PRN  0.9 % sodium chloride infusion, , IntraVENous, PRN  acetaminophen (TYLENOL) tablet 650 mg, 650 mg, Oral, Q6H PRN **OR** acetaminophen (TYLENOL) suppository 650 mg, 650 mg, Rectal, Q6H PRN  Physical    VITALS:  BP (!) 127/90   Pulse 111   Temp 98.3 °F (36.8 °C) (Oral)   Resp 18   Ht 6' (1.829 m)   Wt 190 lb (86.2 kg)   SpO2 94%   BMI 25.77 kg/m²   24HR INTAKE/OUTPUT:    Intake/Output Summary (Last 24 hours) at 4/12/2022 0819  Last data filed at 4/12/2022 0242  Gross per 24 hour   Intake 891.76 ml   Output 1500 ml   Net -608.24 ml     LUNGS: Decreased breath sounds in both bases  CARDIOVASCULAR: S1-S2 irregular regular rhythm tachycardia  Data    CBC with Differential:    Lab Results   Component Value Date    WBC 9.2 04/12/2022    RBC 3.56 04/12/2022    HGB 9.2 04/12/2022    HCT 31.1 04/12/2022     04/12/2022    MCV 87.4 04/12/2022    MCH 25.8 04/12/2022    MCHC 29.6 04/12/2022    RDW 20.6 04/12/2022    NRBC 0.0 04/09/2022    SEGSPCT 47 03/18/2011    METASPCT 0.9 04/09/2022    LYMPHOPCT 14.8 04/12/2022    PROMYELOPCT 1.8 03/28/2022    MONOPCT 6.4 04/12/2022    MYELOPCT 1.8 03/28/2022    BASOPCT 0.5 04/12/2022    MONOSABS 0.59 04/12/2022    LYMPHSABS 1.36 04/12/2022    EOSABS 0.37 04/12/2022    BASOSABS 0.05 04/12/2022     CMP:    Lab Results   Component Value Date     04/12/2022    K 3.4 04/12/2022    K 4.6 04/08/2022     04/12/2022    CO2 20 04/12/2022    BUN 20 04/12/2022    CREATININE 1.3 04/12/2022    GFRAA >60 04/12/2022    LABGLOM 55 04/12/2022    GLUCOSE 93 04/12/2022    GLUCOSE 106 03/24/2011    PROT 5.0 04/08/2022    LABALBU 1.7 04/08/2022    LABALBU 2.4 03/20/2011    CALCIUM 7.8 04/12/2022    BILITOT 1.1 04/08/2022    ALKPHOS 98 04/08/2022    AST 30 04/08/2022    ALT 20 04/08/2022     PT/INR:    Lab Results   Component Value Date    PROTIME 17.9 04/12/2022    PROTIME 12.2 03/18/2011    INR 1.6 04/12/2022       ASSESSMENT AND PLAN      PAF (paroxysmal [facility] fibrillation) ([facility]  Plan; add Cardizem    COURTNEY (acute kidney injury) ([facility]  Plan: Resolved    Septic shock (HCC)  Plan: Resolved    Bacteremia  Plan: Klebsiella pneumonia treated with Bactrim          Electronically signed by Hugo Hernandez MD on 4/12/2022 at 8:19 AM

## 2022-04-13 LAB
ANION GAP SERPL CALCULATED.3IONS-SCNC: 10 MMOL/L (ref 7–16)
BUN BLDV-MCNC: 19 MG/DL (ref 6–23)
CALCIUM SERPL-MCNC: 7.6 MG/DL (ref 8.6–10.2)
CHLORIDE BLD-SCNC: 108 MMOL/L (ref 98–107)
CO2: 22 MMOL/L (ref 22–29)
CREAT SERPL-MCNC: 1.4 MG/DL (ref 0.7–1.2)
GFR AFRICAN AMERICAN: >60
GFR NON-AFRICAN AMERICAN: 50 ML/MIN/1.73
GLUCOSE BLD-MCNC: 102 MG/DL (ref 74–99)
MAGNESIUM: 1.8 MG/DL (ref 1.6–2.6)
POTASSIUM SERPL-SCNC: 4 MMOL/L (ref 3.5–5)
PRO-BNP: 5768 PG/ML (ref 0–125)
SODIUM BLD-SCNC: 140 MMOL/L (ref 132–146)

## 2022-04-13 PROCEDURE — 6370000000 HC RX 637 (ALT 250 FOR IP): Performed by: INTERNAL MEDICINE

## 2022-04-13 PROCEDURE — 83880 ASSAY OF NATRIURETIC PEPTIDE: CPT

## 2022-04-13 PROCEDURE — 80048 BASIC METABOLIC PNL TOTAL CA: CPT

## 2022-04-13 PROCEDURE — 2060000000 HC ICU INTERMEDIATE R&B

## 2022-04-13 PROCEDURE — 97530 THERAPEUTIC ACTIVITIES: CPT

## 2022-04-13 PROCEDURE — 83735 ASSAY OF MAGNESIUM: CPT

## 2022-04-13 PROCEDURE — 99233 SBSQ HOSP IP/OBS HIGH 50: CPT | Performed by: INTERNAL MEDICINE

## 2022-04-13 PROCEDURE — 36415 COLL VENOUS BLD VENIPUNCTURE: CPT

## 2022-04-13 PROCEDURE — 2580000003 HC RX 258: Performed by: INTERNAL MEDICINE

## 2022-04-13 PROCEDURE — 6360000002 HC RX W HCPCS: Performed by: INTERNAL MEDICINE

## 2022-04-13 PROCEDURE — 6370000000 HC RX 637 (ALT 250 FOR IP): Performed by: CLINICAL NURSE SPECIALIST

## 2022-04-13 RX ORDER — DILTIAZEM HYDROCHLORIDE 240 MG/1
240 CAPSULE, COATED, EXTENDED RELEASE ORAL DAILY
Status: DISCONTINUED | OUTPATIENT
Start: 2022-04-13 | End: 2022-04-14

## 2022-04-13 RX ORDER — AMIODARONE HYDROCHLORIDE 200 MG/1
200 TABLET ORAL 2 TIMES DAILY
Status: DISCONTINUED | OUTPATIENT
Start: 2022-04-13 | End: 2022-04-15 | Stop reason: HOSPADM

## 2022-04-13 RX ORDER — FUROSEMIDE 10 MG/ML
40 INJECTION INTRAMUSCULAR; INTRAVENOUS ONCE
Status: COMPLETED | OUTPATIENT
Start: 2022-04-13 | End: 2022-04-13

## 2022-04-13 RX ADMIN — AMIODARONE HYDROCHLORIDE 200 MG: 200 TABLET ORAL at 09:35

## 2022-04-13 RX ADMIN — FUROSEMIDE 40 MG: 10 INJECTION, SOLUTION INTRAMUSCULAR; INTRAVENOUS at 09:46

## 2022-04-13 RX ADMIN — APIXABAN 5 MG: 5 TABLET, FILM COATED ORAL at 19:44

## 2022-04-13 RX ADMIN — DILTIAZEM HYDROCHLORIDE 240 MG: 240 CAPSULE, COATED, EXTENDED RELEASE ORAL at 09:35

## 2022-04-13 RX ADMIN — APIXABAN 5 MG: 5 TABLET, FILM COATED ORAL at 09:35

## 2022-04-13 RX ADMIN — SULFAMETHOXAZOLE AND TRIMETHOPRIM 2 TABLET: 800; 160 TABLET ORAL at 09:35

## 2022-04-13 RX ADMIN — AMIODARONE HYDROCHLORIDE 200 MG: 200 TABLET ORAL at 19:44

## 2022-04-13 RX ADMIN — PANTOPRAZOLE SODIUM 40 MG: 40 TABLET, DELAYED RELEASE ORAL at 06:16

## 2022-04-13 RX ADMIN — METOPROLOL SUCCINATE 100 MG: 100 TABLET, FILM COATED, EXTENDED RELEASE ORAL at 19:44

## 2022-04-13 RX ADMIN — SODIUM CHLORIDE, PRESERVATIVE FREE 10 ML: 5 INJECTION INTRAVENOUS at 09:49

## 2022-04-13 RX ADMIN — METOPROLOL SUCCINATE 100 MG: 100 TABLET, FILM COATED, EXTENDED RELEASE ORAL at 09:35

## 2022-04-13 RX ADMIN — SULFAMETHOXAZOLE AND TRIMETHOPRIM 2 TABLET: 800; 160 TABLET ORAL at 19:44

## 2022-04-13 RX ADMIN — SODIUM CHLORIDE, PRESERVATIVE FREE 10 ML: 5 INJECTION INTRAVENOUS at 19:44

## 2022-04-13 ASSESSMENT — ENCOUNTER SYMPTOMS: NAUSEA: 1

## 2022-04-13 ASSESSMENT — PAIN SCALES - GENERAL: PAINLEVEL_OUTOF10: 0

## 2022-04-13 NOTE — PROGRESS NOTES
[address] [place] [address] [facility]  RACIEL  Progress Note    SUBJECTIVE:  Chief Complaint   Patient presents with    Fever     at the nursing home he developed a fever that they are unable to get down     Shortness of Breath     pulse ox dropped on room air to 86%    Edema     to the rt leg      Sitting up in the chair  Says he is feeling ok  In no distress. Feels that his appetite is improving     Review of systems:  As stated above in the chief complaint, otherwise negative. Medications:  Scheduled Meds:   dilTIAZem  240 mg Oral Daily    amiodarone  200 mg Oral BID    metoprolol succinate  100 mg Oral BID    sulfamethoxazole-trimethoprim  2 tablet Oral 2 times per day    apixaban  5 mg Oral BID    pantoprazole  40 mg Oral QAM AC    [Held by provider] tamsulosin  0.4 mg Oral Nightly    sodium chloride flush  5-40 mL IntraVENous 2 times per day     Continuous Infusions:   sodium chloride       PRN Meds:sodium chloride flush, sodium chloride, acetaminophen **OR** acetaminophen    OBJECTIVE:  /80   Pulse 122   Temp 97.8 °F (36.6 °C) (Axillary)   Resp 18   Ht 6' (1.829 m)   Wt 175 lb 4.8 oz (79.5 kg)   SpO2 95%   BMI 23.77 kg/m²   Temp  Av °F (36.7 °C)  Min: 97.8 °F (36.6 °C)  Max: 98.2 °F (36.8 °C)  Constitutional: The patient sitting up in the chair. In no distress. Skin: Warm and dry. No rashes were noted. Skin is dry & peeling   HEENT: Round and reactive pupils. Moist mucous membranes. No ulcerations or thrush. Neck: Supple to movements. Chest: No use of accessory muscles to breathe. Symmetrical expansion. No wheezing, crackles or rhonchi. Cardiovascular: S1 and S2 are rhythmic and regular. No murmurs appreciated. Abdomen: Positive bowel sounds to auscultation. Benign to palpation. Surgical wounds are clean. Extremities: Left AKA stump. No edema right lower extremity.   Lines: PIV    Laboratory and Tests Review:  Lab Results   Component Value Date    WBC 9.2 04/12/2022    WBC 9.2 04/11/2022    WBC 7.5 04/10/2022    HGB 9.2 (L) 04/12/2022    HCT 31.1 (L) 04/12/2022    MCV 87.4 04/12/2022     04/12/2022     Lab Results   Component Value Date    NEUTROABS 6.68 04/12/2022    NEUTROABS 6.70 04/11/2022    NEUTROABS 6.07 04/10/2022     No results found for: CRP  Lab Results   Component Value Date    ALT 20 04/08/2022    AST 30 04/08/2022    ALKPHOS 98 04/08/2022    BILITOT 1.1 04/08/2022     Lab Results   Component Value Date     04/13/2022    K 4.0 04/13/2022    K 4.6 04/08/2022     04/13/2022    CO2 22 04/13/2022    BUN 19 04/13/2022    CREATININE 1.4 04/13/2022    CREATININE 1.3 04/12/2022    CREATININE 1.3 04/11/2022    GFRAA >60 04/13/2022    LABGLOM 50 04/13/2022    GLUCOSE 102 04/13/2022    GLUCOSE 106 03/24/2011    PROT 5.0 04/08/2022    LABALBU 1.7 04/08/2022    LABALBU 2.4 03/20/2011    CALCIUM 7.6 04/13/2022    BILITOT 1.1 04/08/2022    ALKPHOS 98 04/08/2022    AST 30 04/08/2022    ALT 20 04/08/2022     Lab Results   Component Value Date    CRP 6.8 (H) 04/11/2022    CRP 29.2 (H) 01/03/2011     Lab Results   Component Value Date    SEDRATE 45 (H) 01/03/2011     Radiology:  Reviewed     Microbiology:   Blood cultures 4/7/2022: Klebsiella pneumoniae in 2 of 2 sets  Urine culture 4/8/2022: >100,000 Klebsiella pneumoniae  Rapid SARS-CoV-2: Negative  Rapid RSV: Negative  Rapid influenza: Negative  Nares screen for MRSA: Negative    Recent Labs     04/11/22  0305   PROCAL 3.92*       ASSESSMENT:  · Bladder outlet obstruction associated to malpositioned Valles catheter  · Complicated UTI associated to the above  · Sepsis with septic shock, improving  · Klebsiella septicemia associated to the above  · Leukocytosis, improved  · Right abdominal fluid collection. I agree with surgery.   It is less likely to be the source of the infection    PLAN:  · Continue Bactrim p.o. day 7 of 10   · Labs reviewed   · We will follow with you  · Ok to discharge from ID standpoint     CRISTI Flores CNP  11:12 AM  4/13/2022     Patient seen and examined. I had a face to face encounter with the patient. Agree with exam.  Assessment and plan as outlined above and directed by me. Addition and corrections were done as deemed appropriate. My exam and plan include: The patient has no new complaints. Tolerating Bactrim.     Yane Hough MD  4/13/2022  12:09 PM

## 2022-04-13 NOTE — PROGRESS NOTES
[address] FOLLOW-UP    Name: Lara Villasenor. Age: 74 y.o. Date of Admission: 4/7/2022  1:41 PM    Date of Service: 4/13/2022    Primary Cardiologist: Samir Krishnamurthy    Chief Complaint: Follow-up for recurrent AF    Interim History:  Feeling a little better. Appetite improved. Has not been ambulatory. Resting heart rates of around 110.     He is net +3.6 L    Review of Systems:   Negative except as described above    Problem List:  Patient Active Problem List   Diagnosis    Retinal detachment of left eye with multiple breaks    Rectal cancer ([address]    Ileus ([address]    Atrial flutter ([address]    GI bleed    Gastrointestinal bleed    Benign neoplasm of cecum    S/P right hemicolectomy    PSVT (paroxysmal supraventricular tachycardia) ([address]    Primary hypertension    Abdominal wall abscess    Atrial fibrillation with RVR (HCC)    Urinary tract infection associated with indwelling urethral catheter (HCC)    PAF (paroxysmal atrial fibrillation) ([address]    Intra-abdominal infection    COURTNEY (acute kidney injury) ([address]    Septic shock ([address]    Bacteremia       Current Medications:    Current Facility-Administered Medications:     dilTIAZem (CARDIZEM CD) extended release capsule 240 mg, 240 mg, Oral, Daily, Ulices De Leon MD    amiodarone (CORDARONE) tablet 200 mg, 200 mg, Oral, BID, Ana Parker MD    furosemide (LASIX) injection 40 mg, 40 mg, IntraVENous, Once, Ulices De Leon MD    metoprolol succinate (TOPROL XL) extended release tablet 100 mg, 100 mg, Oral, BID, CRISTI Estevze - CNS, 100 mg at 04/12/22 2103    sulfamethoxazole-trimethoprim (BACTRIM DS;SEPTRA DS) 800-160 MG per tablet 2 tablet, 2 tablet, Oral, 2 times per day, Landon Griffiths MD, 2 tablet at 04/12/22 2103    apixaban (ELIQUIS) tablet 5 mg, 5 mg, Oral, BID, Landon Griffiths MD, 5 mg at 04/12/22 2103    pantoprazole (PROTONIX) tablet 40 mg, 40 mg, Oral, QAM AC, Landon Griffiths MD, 40 mg at 04/13/22 0616    [Held by provider] tamsulosin (FLOMAX) capsule 0.4 mg, 0.4 mg, Oral, Nightly, Sun Lewis MD, 0.4 mg at 04/07/22 2305    sodium chloride flush 0.9 % injection 5-40 mL, 5-40 mL, IntraVENous, 2 times per day, Amber Diaz MD, 10 mL at 04/12/22 2104    sodium chloride flush 0.9 % injection 5-40 mL, 5-40 mL, IntraVENous, PRN, Amber Diaz MD    0.9 % sodium chloride infusion, , IntraVENous, PRN, Amber Diaz MD    acetaminophen (TYLENOL) tablet 650 mg, 650 mg, Oral, Q6H PRN **OR** acetaminophen (TYLENOL) suppository 650 mg, 650 mg, Rectal, Q6H PRN, Amber Diaz MD    Physical Exam:  /74   Pulse 108   Temp 98.2 °F (36.8 °C) (Oral)   Resp 18   Ht 6' (1.829 m)   Wt 175 lb 4.8 oz (79.5 kg)   SpO2 95%   BMI 23.77 kg/m²   Wt Readings from Last 3 Encounters:   04/13/22 175 lb 4.8 oz (79.5 kg)   04/05/22 181 lb 1.6 oz (82.1 kg)   03/15/22 190 lb (86.2 kg)     Appearance: Chronically ill-appearing gentleman, awake, alert, and in no acute respiratory distress, looks better today  Skin: Intact, no rash  Head: Normocephalic, atraumatic  Eyes: EOMI, no conjunctival erythema  ENMT: No pharyngeal erythema, MMM, no rhinorrhea  Neck: Supple, no elevated JVP, no carotid bruits  Lungs: Clear to auscultation bilaterally. No wheezes, rales, or rhonchi. Cardiac: PMI nondisplaced, irregular rhythm with a mildly tachycardic rate, S1 & S2 normal, no murmurs  Abdomen: Soft, nontender, +bowel sounds  Extremities: Moves all extremities x 4, 1+ pitting lower extremity edema on the right, left AKA  Neurologic: No focal motor deficits apparent, normal mood and affect  Peripheral Pulses: Intact posterior tibial pulses bilaterally    Intake/Output:    Intake/Output Summary (Last 24 hours) at 4/13/2022 0856  Last data filed at 4/13/2022 0617  Gross per 24 hour   Intake 120 ml   Output 1700 ml   Net -1580 ml     No intake/output data recorded.     Laboratory Tests:  Recent Labs 22  030228    141   K 4.2 3.4*   * 110*   CO2 21* 20*   BUN 24* 20   CREATININE 1.3* 1.3*   GLUCOSE 94 93   CALCIUM 7.5* 7.8*     Lab Results   Component Value Date    MG 2.0 2022     No results for input(s): ALKPHOS, ALT, AST, PROT, BILITOT, BILIDIR, LABALBU in the last 72 hours. Recent Labs     22   WBC 9.2 9.2   RBC 3.73* 3.56*   HGB 9.7* 9.2*   HCT 32.2* 31.1*   MCV 86.3 87.4   MCH 26.0 25.8*   MCHC 30.1* 29.6*   RDW 21.0* 20.6*    160   MPV 11.6 11.9     Lab Results   Component Value Date    CKTOTAL 12 (L) 2011    CKMB <0.2 2011    TROPONINI <0.01 2020    TROPONINI <0.01 2011    TROPONINI 0.02 2011     Lab Results   Component Value Date    INR 1.6 2022    INR 1.6 2022    INR 1.5 04/10/2022    PROTIME 17.9 (H) 2022    PROTIME 17.2 (H) 2022    PROTIME 16.9 (H) 04/10/2022     Lab Results   Component Value Date    TSH 1.050 2022     Lab Results   Component Value Date    LABA1C 5.6 2020     No results found for: EAG  Lab Results   Component Value Date    CHOL 114 2020     Lab Results   Component Value Date    TRIG 76 2020     Lab Results   Component Value Date    HDL 41 2020     Lab Results   Component Value Date    LDLCALC 58 2020     Lab Results   Component Value Date    LABVLDL 15 2020     No results found for: CHOLHDLRATIO  No results for input(s): PROBNP in the last 72 hours. Cardiac Tests:    EK2022: Atrial fibrillation with  bpm.  Normal axis and intervals. Low voltage. Nonspecific T wave changes    Telemetry:   Atrial fibrillation 110s 120s    Chest X-ray:   22      Impression   Suspect early retrocardiac infiltrates. Echocardiogram:   ОЛЬГА 3/30/22    Summary   Normal left ventricle size and systolic function. Ejection fraction is visually estimated at 55-60%.  Atrial flutter may   affect the evaluation of LV systolic function. No regional wall motion abnormalities seen. Agitated saline injected for shunt evaluation. No evidence of thrombus within left atrium or appendage. Normal right ventricular size and function. Mild mitral regurgitation is present. No hemodynamically significant aortic stenosis is present. Unable to estimate PA systolic pressure. Mildly dilated aortic root (4.0 cm). No evidence for hemodynamically significant pericardial effusion. Stress test:    Adenosine stress 2011  Findings-    There is normal distribution of left ventricular myocardial activity on   both the adenosine stress and rest SPECT myocardial perfusion images. Gated study shows normal left ventricular wall motion and myocardial   thickening during systole.  Resting left ventricular ejection fraction   is 54%, the EDV is 136 mL, and the ESV is 63 mL. Cardiac catheterization:     ----------------------------------------------------------------------------------------------------------------------------------------------------------------  IMPRESSION:  1. Recurrent paroxysmal atrial fibrillation/recent ОЛЬГА/DCC for atrial flutter 3/30/2022. Currently AF with mildly elevated rates. On amiodarone/metoprolol succinate/apixaban  2. Probable mild acute on chronic HFpEF/volume overload  3. Hypokalemia yesterday  4. Septic shock, resolved  5. GNR bacteremia  6. COURTNEY, improving due to obstructive uropathy/misplaced Valles. Creatinine 3.7->1.3  7. Hypertension  8. Anemia Hgb 9.2  9. PAD with left AKA    RECOMMENDATIONS:  Patient seems to be improving. Resting heart rate still above 100.      Check proBNP, give 1 dose IV furosemide   No labs today, check BMP and magnesium   Maintain K> 4.0 and mag> 2.0   Continue rate control measures with metoprolol succinate, increase long-acting diltiazem    Continue amiodarone loading 400 twice daily, decrease to 200 twice daily after tomorrow for a week then 200 daily -still has not achieved full 10 g loading dose   Tentative ОЛЬГА cardioversion on Friday if resting heart rates not significantly improved; anticipate with ambulation rates will spike   Continue apixaban for stroke risk reduction   Aggressive risk factor modification   Further care per primary and other consultants    Discussed with Dr. Daksha Cox MD, [address] [place] [address] Cardiology    NOTE: This report was transcribed using voice recognition software. Every effort was made to ensure accuracy; however, inadvertent computerized transcription errors may be present.

## 2022-04-13 NOTE — PROGRESS NOTES
Department of Internal Lala Jacobsen M.D.  Progress Note      SUBJECTIVE: He admits to eating more today he is starting to feel a little bit better    OBJECTIVE abdomen is soft somewhat distended    Medications    Current Facility-Administered Medications: dilTIAZem (CARDIZEM CD) extended release capsule 240 mg, 240 mg, Oral, Daily  amiodarone (CORDARONE) tablet 200 mg, 200 mg, Oral, BID  metoprolol succinate (TOPROL XL) extended release tablet 100 mg, 100 mg, Oral, BID  sulfamethoxazole-trimethoprim (BACTRIM DS;SEPTRA DS) 800-160 MG per tablet 2 tablet, 2 tablet, Oral, 2 times per day  apixaban (ELIQUIS) tablet 5 mg, 5 mg, Oral, BID  pantoprazole (PROTONIX) tablet 40 mg, 40 mg, Oral, QAM AC  [Held by provider] tamsulosin (FLOMAX) capsule 0.4 mg, 0.4 mg, Oral, Nightly  sodium chloride flush 0.9 % injection 5-40 mL, 5-40 mL, IntraVENous, 2 times per day  sodium chloride flush 0.9 % injection 5-40 mL, 5-40 mL, IntraVENous, PRN  0.9 % sodium chloride infusion, , IntraVENous, PRN  acetaminophen (TYLENOL) tablet 650 mg, 650 mg, Oral, Q6H PRN **OR** acetaminophen (TYLENOL) suppository 650 mg, 650 mg, Rectal, Q6H PRN  Physical    VITALS:  /74   Pulse 108   Temp 98.2 °F (36.8 °C) (Oral)   Resp 18   Ht 6' (1.829 m)   Wt 175 lb 4.8 oz (79.5 kg)   SpO2 95%   BMI 23.77 kg/m²   24HR INTAKE/OUTPUT:    Intake/Output Summary (Last 24 hours) at 4/13/2022 0818  Last data filed at 4/13/2022 0617  Gross per 24 hour   Intake 120 ml   Output 1700 ml   Net -1580 ml     LUNGS:  No increased work of breathing, good air exchange, clear to auscultation bilaterally, S1-S2 irregular tachycardic rhythm  Data    CBC with Differential:    Lab Results   Component Value Date    WBC 9.2 04/12/2022    RBC 3.56 04/12/2022    HGB 9.2 04/12/2022    HCT 31.1 04/12/2022     04/12/2022    MCV 87.4 04/12/2022    MCH 25.8 04/12/2022    MCHC 29.6 04/12/2022    RDW 20.6 04/12/2022    NRBC 0.0 04/09/2022 SEGSPCT 47 03/18/2011    METASPCT 0.9 04/09/2022    LYMPHOPCT 14.8 04/12/2022    PROMYELOPCT 1.8 03/28/2022    MONOPCT 6.4 04/12/2022    MYELOPCT 1.8 03/28/2022    BASOPCT 0.5 04/12/2022    MONOSABS 0.59 04/12/2022    LYMPHSABS 1.36 04/12/2022    EOSABS 0.37 04/12/2022    BASOSABS 0.05 04/12/2022     CMP:    Lab Results   Component Value Date     04/12/2022    K 3.4 04/12/2022    K 4.6 04/08/2022     04/12/2022    CO2 20 04/12/2022    BUN 20 04/12/2022    CREATININE 1.3 04/12/2022    GFRAA >60 04/12/2022    LABGLOM 55 04/12/2022    GLUCOSE 93 04/12/2022    GLUCOSE 106 03/24/2011    PROT 5.0 04/08/2022    LABALBU 1.7 04/08/2022    LABALBU 2.4 03/20/2011    CALCIUM 7.8 04/12/2022    BILITOT 1.1 04/08/2022    ALKPHOS 98 04/08/2022    AST 30 04/08/2022    ALT 20 04/08/2022     PT/INR:    Lab Results   Component Value Date    PROTIME 17.9 04/12/2022    PROTIME 12.2 03/18/2011    INR 1.6 04/12/2022       ASSESSMENT AND TAJ      PAF (paroxysmal atrial fibrillation) ([facility]  Plan: Still in atrial fibrillation with rates above 100    Bacteremia  Plan: Klebsiella          Electronically signed by Ada Bethea MD on 4/13/2022 at 8:18 AM

## 2022-04-13 NOTE — CARE COORDINATION
Social work / Discharge Planning:       Discharge plan is to return to [facility] [place]. Will need precert approval prior to discharge and negative covid result on the day of discharge. ALAN and transport form completed. ID has signed off.    Electronically signed by TAMI Carrion on 4/13/2022 at 11:17 AM

## 2022-04-13 NOTE — PROGRESS NOTES
Physical Therapy  Facility/Department: [facility] 6W MED SURG  NAME: Rafael Wise. : 1955  MRN: [id number]     Chart reviewed and PT treatment attempted this pm.  Pt found sitting up in the chair. Pt reported he was comfortable in the chair and did not want to put his prothesis on to attempt ambulation. Will check back at later time/date.      Sara Joseph, [address]

## 2022-04-13 NOTE — PROGRESS NOTES
Occupational Therapy  OT BEDSIDE TREATMENT NOTE      Date:2022  Patient Name: Kirill Arreola. MRN: [id number]  : 1955  Room: [address] [place], [place] [address]       Evaluating OT: Harjit Carmichael, OTR/LEILA Stuart [id number]     Referring Provider: Zain Michelle MD  Specific Provider Orders/Date: \"OT eval and treat\" - 4/10/2022     Diagnosis: Abdominal wall abscess [L02.211]       Pertinent Medical History: recent LAPAROSCOPIC RIGHT HEMICOLECTOMY (3/22/2022), L AKA, HTN, rectal cancer      Precautions: fall risk, L AKA - has prosthetic      Assessment of Current Deficits:    [x]? Functional mobility             [x]?ADLs           [x]? Strength                  [x]? Cognition   [x]? Functional transfers           [x]? IADLs         [x]? Safety Awareness   [x]? Endurance   []? Fine Coordination              [x]? Balance      []? Vision/perception   [x]? Sensation     []? Gross Motor Coordination  []? ROM           []? Delirium                   []? Motor Control      OT PLAN OF CARE   OT POC is based on physician orders, patient diagnosis, and results of clinical assessment.   Frequency/Duration 2-5 days/week for 2 weeks PRN   Specific OT Treatment Interventions to Include:   * Instruction/training on adapted ADL techniques and AE recommendations to increase functional independence within precautions       * Training on energy conservation strategies, correct breathing pattern and techniques to improve independence/tolerance for self-care routine  * Functional transfer/mobility training/DME recommendations for increased independence, safety, and fall prevention  * Patient/Family education to increase follow through with safety techniques and functional independence  * Recommendation of environmental modifications for increased safety with functional transfers/mobility and ADLs  * Therapeutic exercise to improve motor endurance, ROM, and functional strength for ADLs/functional transfers  * Therapeutic activities to facilitate/challenge dynamic balance, stand tolerance for increased safety and independence with ADLs  * Neuro-muscular re-education: facilitation of righting/equilibrium reactions, midline orientation, scapular stability/mobility, normalization of muscle tone, and facilitation of volitional active controled movement  * Positioning to improve skin integrity, interaction with environment and functional independence     Recommended Adaptive Equipment: TBD     Home Living: Patient lives with family in a one-floor setup. Bathroom Setup: walk-in tub (with handheld shower head and grab bar available) and standard-height toilet  Equipment Owned: cane     Prior Level of Function (PLOF): Patient reported he was mostly independent with ADLs; family completes IADLs. Patient noted that he was independent with functional transfers/mobility (with use of cane) prior to this hospitalization.     Pain Level: Patient denied experiencing pain. Cognition: Patient alert and oriented. Unable to recall where he was prior to coming into the hospital.      Functional Assessment:                  AM-PAC Daily Activity Raw Score: 16/24    Initial Eval Status  Date: 4/12/2022 Treatment Status  Date: 4/13/22  Short Term Goals = Long Term Goals   Feeding Independent   N/A   Grooming Setup   Supervision  (seated/standing)   UB Dressing Setup   Supervision  (including item retrieval)   LB Dressing Mod A Pt declined to don prothesis.   SBA - with use of AE, as needed/appropriate   Bathing Mod A   SBA - with use of AE/DME, as needed/appropriate   Toileting Mod A  Patient with soto catheter currently.   SBA   Bed Mobility  Sit-to-Supine: SBA  Min A supine to sit   Independent in order to maximize patient's independence with ADLs, re-positioning, and other functional tasks. Functional Transfers Sit-to-Stand: Mod A  from bedside chair. Cues given to maximize safety.   Scoot-Pivot Transfer: SBA from bedside chair (with arm rest swung up and out of the way) to EOB SBA low pivot from bed to chair with arm rest of chair swung away.    Supervision   Functional Mobility Not assessed. Patient declined to don L LE prosthesis.   Supervision with functional mobility (with device, as needed/appropriate) in order to maximize independence with ADLs/IADLs and other functional tasks. Balance Sitting: Fair+  (at EOB)  Standing: Fair-  (with walker - without L LE prosthesis)   Fair+ dynamic standing balance during completion of ADLs/IADLs and other functional tasks. Activity Tolerance Fair-  Shortness of breath demonstrated with minimal functional activity; patient's O2 saturations were greater than or equal to 90% (on room air). Cues given to facilitate proper breathing techniques. Fair   Patient will demonstrate Good understanding and consistent implementation of energy conservation techniques and work simplification techniques into ADL/IADL routines. B UE Strength 4/5   Patient will demonstrate 4+/5 B UE strength in order to maximize independence with ADLs and functional transfers. Comments:  Pt laying in the bed. Agreeable to get up to the chair. Declined to don prothesis. Low scoot pivot to chair. Positioned in chair with alarm activated. Education/treatment:  ADL retraining with facilitation of movement to increase self care skills. Therapeutic activity to address balance and endurance for ADL and transfers. Pt education of transfer safety. · Pt has made  progress towards set goals.        Time In: 10:30   Time Out: 10:46     Min Units   Therapeutic Ex 29034     Therapeutic Activities 20354 16 1   ADL/Self Care [id number]     Orthotic Management [id number]     Neuro Re-Ed 08439     Non-Billable Time     TOTAL TIMED TREATMENT           David Neville KAYLA/L [id number]

## 2022-04-13 NOTE — CARE COORDINATION
Social work / Discharge Planning:       Precert approved for return to [facility]. Per RN, cardiology has not cleared patient for discharge.    Electronically signed by TAMI Terrazas on 4/13/2022 at 1:07 PM

## 2022-04-14 PROBLEM — E44.0 MODERATE PROTEIN-CALORIE MALNUTRITION (HCC): Chronic | Status: ACTIVE | Noted: 2022-04-14

## 2022-04-14 LAB
ANION GAP SERPL CALCULATED.3IONS-SCNC: 12 MMOL/L (ref 7–16)
BUN BLDV-MCNC: 18 MG/DL (ref 6–23)
CALCIUM SERPL-MCNC: 8.1 MG/DL (ref 8.6–10.2)
CHLORIDE BLD-SCNC: 104 MMOL/L (ref 98–107)
CO2: 23 MMOL/L (ref 22–29)
CREAT SERPL-MCNC: 1.4 MG/DL (ref 0.7–1.2)
EKG ATRIAL RATE: 103 BPM
EKG P-R INTERVAL: 88 MS
EKG Q-T INTERVAL: 370 MS
EKG QRS DURATION: 88 MS
EKG QTC CALCULATION (BAZETT): 484 MS
EKG R AXIS: 14 DEGREES
EKG T AXIS: -63 DEGREES
EKG VENTRICULAR RATE: 103 BPM
GFR AFRICAN AMERICAN: >60
GFR NON-AFRICAN AMERICAN: 50 ML/MIN/1.73
GLUCOSE BLD-MCNC: 85 MG/DL (ref 74–99)
POTASSIUM SERPL-SCNC: 4 MMOL/L (ref 3.5–5)
SODIUM BLD-SCNC: 139 MMOL/L (ref 132–146)

## 2022-04-14 PROCEDURE — 80048 BASIC METABOLIC PNL TOTAL CA: CPT

## 2022-04-14 PROCEDURE — 36415 COLL VENOUS BLD VENIPUNCTURE: CPT

## 2022-04-14 PROCEDURE — 99233 SBSQ HOSP IP/OBS HIGH 50: CPT | Performed by: INTERNAL MEDICINE

## 2022-04-14 PROCEDURE — 2060000000 HC ICU INTERMEDIATE R&B

## 2022-04-14 PROCEDURE — 2580000003 HC RX 258: Performed by: INTERNAL MEDICINE

## 2022-04-14 PROCEDURE — 6370000000 HC RX 637 (ALT 250 FOR IP): Performed by: INTERNAL MEDICINE

## 2022-04-14 PROCEDURE — 93005 ELECTROCARDIOGRAM TRACING: CPT | Performed by: INTERNAL MEDICINE

## 2022-04-14 PROCEDURE — 6360000002 HC RX W HCPCS: Performed by: INTERNAL MEDICINE

## 2022-04-14 PROCEDURE — 6370000000 HC RX 637 (ALT 250 FOR IP): Performed by: CLINICAL NURSE SPECIALIST

## 2022-04-14 RX ORDER — FUROSEMIDE 10 MG/ML
40 INJECTION INTRAMUSCULAR; INTRAVENOUS 2 TIMES DAILY
Status: COMPLETED | OUTPATIENT
Start: 2022-04-14 | End: 2022-04-14

## 2022-04-14 RX ORDER — SULFAMETHOXAZOLE AND TRIMETHOPRIM 800; 160 MG/1; MG/1
2 TABLET ORAL EVERY 12 HOURS SCHEDULED
Qty: 8 TABLET | Refills: 0 | DISCHARGE
Start: 2022-04-14 | End: 2022-04-16

## 2022-04-14 RX ORDER — CALCIUM CARBONATE 200(500)MG
500 TABLET,CHEWABLE ORAL 3 TIMES DAILY PRN
Status: DISCONTINUED | OUTPATIENT
Start: 2022-04-14 | End: 2022-04-15 | Stop reason: HOSPADM

## 2022-04-14 RX ORDER — POTASSIUM CHLORIDE 20 MEQ/1
40 TABLET, EXTENDED RELEASE ORAL ONCE
Status: COMPLETED | OUTPATIENT
Start: 2022-04-14 | End: 2022-04-14

## 2022-04-14 RX ADMIN — DILTIAZEM HYDROCHLORIDE 240 MG: 240 CAPSULE, COATED, EXTENDED RELEASE ORAL at 09:24

## 2022-04-14 RX ADMIN — SULFAMETHOXAZOLE AND TRIMETHOPRIM 2 TABLET: 800; 160 TABLET ORAL at 20:44

## 2022-04-14 RX ADMIN — FUROSEMIDE 40 MG: 10 INJECTION, SOLUTION INTRAMUSCULAR; INTRAVENOUS at 12:19

## 2022-04-14 RX ADMIN — METOPROLOL SUCCINATE 100 MG: 100 TABLET, FILM COATED, EXTENDED RELEASE ORAL at 20:44

## 2022-04-14 RX ADMIN — APIXABAN 5 MG: 5 TABLET, FILM COATED ORAL at 09:24

## 2022-04-14 RX ADMIN — SODIUM CHLORIDE, PRESERVATIVE FREE 10 ML: 5 INJECTION INTRAVENOUS at 09:26

## 2022-04-14 RX ADMIN — MAGNESIUM HYDROXIDE 30 ML: 400 SUSPENSION ORAL at 23:11

## 2022-04-14 RX ADMIN — AMIODARONE HYDROCHLORIDE 200 MG: 200 TABLET ORAL at 09:24

## 2022-04-14 RX ADMIN — AMIODARONE HYDROCHLORIDE 200 MG: 200 TABLET ORAL at 20:44

## 2022-04-14 RX ADMIN — POTASSIUM CHLORIDE 40 MEQ: 1500 TABLET, EXTENDED RELEASE ORAL at 12:19

## 2022-04-14 RX ADMIN — FUROSEMIDE 40 MG: 10 INJECTION, SOLUTION INTRAMUSCULAR; INTRAVENOUS at 17:06

## 2022-04-14 RX ADMIN — CALCIUM CARBONATE 500 MG: 500 TABLET, CHEWABLE ORAL at 13:36

## 2022-04-14 RX ADMIN — APIXABAN 5 MG: 5 TABLET, FILM COATED ORAL at 20:44

## 2022-04-14 RX ADMIN — SODIUM CHLORIDE, PRESERVATIVE FREE 10 ML: 5 INJECTION INTRAVENOUS at 20:44

## 2022-04-14 RX ADMIN — MAGNESIUM HYDROXIDE 30 ML: 400 SUSPENSION ORAL at 11:26

## 2022-04-14 RX ADMIN — SULFAMETHOXAZOLE AND TRIMETHOPRIM 2 TABLET: 800; 160 TABLET ORAL at 09:24

## 2022-04-14 ASSESSMENT — PAIN SCALES - GENERAL
PAINLEVEL_OUTOF10: 0
PAINLEVEL_OUTOF10: 0

## 2022-04-14 NOTE — PROGRESS NOTES
[facility] Quality Flow/Interdisciplinary Rounds Progress Note        Quality Flow Rounds held on April 14, 2022    Disciplines Attending:  Bedside Nurse, ,  and [address] KAYLEE King was admitted on 4/7/2022  1:41 PM    Anticipated Discharge Date:  Expected Discharge Date: 04/13/22    Disposition:    Jose Miguel Score:  Jose Miguel Scale Score: 15    Readmission Risk              Risk of Unplanned Readmission:  23           Discussed patient goal for the day, patient clinical progression, and barriers to discharge. The following Goal(s) of the Day/Commitment(s) have been identified:  Check Cardiology plan, ОЛЬГА/DCCV tomorrow?       Ciara Todd RN  April 14, 2022

## 2022-04-14 NOTE — PROGRESS NOTES
[address] [place] [address] [facility]  CALLIEIDA  Progress Note    SUBJECTIVE:  Chief Complaint   Patient presents with    Fever     at the nursing home he developed a fever that they are unable to get down     Shortness of Breath     pulse ox dropped on room air to 86%    Edema     to the rt leg      Resting in bed  No nausea or abdominal pain  In no distress. Review of systems:  As stated above in the chief complaint, otherwise negative. Medications:  Scheduled Meds:   magnesium hydroxide  30 mL Oral BID    dilTIAZem  240 mg Oral Daily    amiodarone  200 mg Oral BID    metoprolol succinate  100 mg Oral BID    sulfamethoxazole-trimethoprim  2 tablet Oral 2 times per day    apixaban  5 mg Oral BID    pantoprazole  40 mg Oral QAM AC    [Held by provider] tamsulosin  0.4 mg Oral Nightly    sodium chloride flush  5-40 mL IntraVENous 2 times per day     Continuous Infusions:   sodium chloride       PRN Meds:sodium chloride flush, sodium chloride, acetaminophen **OR** acetaminophen    OBJECTIVE:  BP 98/62   Pulse 103   Temp 98.3 °F (36.8 °C) (Oral)   Resp 18   Ht 6' (1.829 m)   Wt 173 lb 8 oz (78.7 kg)   SpO2 95%   BMI 23.53 kg/m²   Temp  Av.9 °F (36.6 °C)  Min: 97.6 °F (36.4 °C)  Max: 98.3 °F (36.8 °C)  Constitutional: The patient sitting up in the chair. In no distress. Skin: Warm and dry. No rashes were noted. Skin is dry & peeling   HEENT: Round and reactive pupils. Moist mucous membranes. No ulcerations or thrush. Neck: Supple to movements. Chest: No use of accessory muscles to breathe. Symmetrical expansion. No wheezing, crackles or rhonchi. Cardiovascular: S1 and S2 are rhythmic and regular. No murmurs appreciated. Abdomen: Positive bowel sounds to auscultation. Benign to palpation. Surgical wounds are clean. Extremities: Left AKA stump. No edema right lower extremity.   Lines: PIV  Valles yellow urine    Laboratory and Tests Review:  Lab Results   Component Value Date    WBC 9.2 04/12/2022    WBC 9.2 04/11/2022    WBC 7.5 04/10/2022    HGB 9.2 (L) 04/12/2022    HCT 31.1 (L) 04/12/2022    MCV 87.4 04/12/2022     04/12/2022     Lab Results   Component Value Date    NEUTROABS 6.68 04/12/2022    NEUTROABS 6.70 04/11/2022    NEUTROABS 6.07 04/10/2022     No results found for: [facility]  Lab Results   Component Value Date    ALT 20 04/08/2022    AST 30 04/08/2022    ALKPHOS 98 04/08/2022    BILITOT 1.1 04/08/2022     Lab Results   Component Value Date     04/14/2022    K 4.0 04/14/2022    K 4.6 04/08/2022     04/14/2022    CO2 23 04/14/2022    BUN 18 04/14/2022    CREATININE 1.4 04/14/2022    CREATININE 1.4 04/13/2022    CREATININE 1.3 04/12/2022    GFRAA >60 04/14/2022    LABGLOM 50 04/14/2022    GLUCOSE 85 04/14/2022    GLUCOSE 106 03/24/2011    PROT 5.0 04/08/2022    LABALBU 1.7 04/08/2022    LABALBU 2.4 03/20/2011    CALCIUM 8.1 04/14/2022    BILITOT 1.1 04/08/2022    ALKPHOS 98 04/08/2022    AST 30 04/08/2022    ALT 20 04/08/2022     Lab Results   Component Value Date    CRP 6.8 (H) 04/11/2022    CRP 29.2 (H) 01/03/2011     Lab Results   Component Value Date    SEDRATE 45 (H) 01/03/2011     Radiology:  Reviewed     Microbiology:   Blood cultures 4/7/2022: Klebsiella pneumoniae in 2 of 2 sets  Urine culture 4/8/2022: >100,000 Klebsiella pneumoniae  Rapid SARS-CoV-2: Negative  Rapid RSV: Negative  Rapid influenza: Negative  Nares screen for MRSA: Negative    No results for input(s): PROCAL in the last 72 hours. ASSESSMENT:  · Bladder outlet obstruction associated to malpositioned Valles catheter  · Complicated UTI associated to the above  · Sepsis with septic shock, improving  · Klebsiella septicemia associated to the above  · Leukocytosis, improved  · Right abdominal fluid collection. I agree with surgery.   It is less likely to be the source of the infection    PLAN:  · Continue Bactrim p.o. day 8 of 10   · Labs reviewed   · We will follow with you  · [place]  9:53 AM  4/14/2022     Patient seen and examined. I had a face to face encounter with the patient. Agree with exam.  Assessment and plan as outlined above and directed by me. Addition and corrections were done as deemed appropriate. My exam and plan include: Patient is doing well. He will be going to [facility]. He will complete antibiotics for 2 days. No further recommendations. ID will sign off.     Florian Johnson MD  4/14/2022  2:39 PM

## 2022-04-14 NOTE — PROGRESS NOTES
[address] FOLLOW-UP    Name: Andrés Kate. Age: 74 y.o. Date of Admission: 4/7/2022  1:41 PM    Date of Service: 4/14/2022    Primary Cardiologist: Rafi Mchugh    Chief Complaint: Follow-up for recurrent AF    Interim History:  Feeling a little better. Appetite improved. Has not been ambulatory. Resting heart rates of around low 100s, looks like he has organized back into an atrial flutter. Diuresed well with 1 dose of furosemide, put out 2.7 L of urine, still net positive about 1 L.     Review of Systems:   Negative except as described above    Problem List:  Patient Active Problem List   Diagnosis    Retinal detachment of left eye with multiple breaks    Rectal cancer ([address]    Ileus ([address]    Atrial flutter ([address]    GI bleed    Gastrointestinal bleed    Benign neoplasm of cecum    S/P right hemicolectomy    PSVT (paroxysmal supraventricular tachycardia) ([address]    Primary hypertension    Abdominal wall abscess    Atrial fibrillation with RVR ([facility])    Urinary tract infection associated with indwelling urethral catheter ([facility])    PAF (paroxysmal atrial fibrillation) ([facility])    Intra-abdominal infection    COURTNEY (acute kidney injury) ([address]    Septic shock ([facility])    Bacteremia    Moderate protein-calorie malnutrition ([facility])       Current Medications:    Current Facility-Administered Medications:     magnesium hydroxide (MILK OF MAGNESIA) 400 MG/5ML suspension 30 mL, 30 mL, Oral, BID, Dandre Martins MD, 30 mL at 04/14/22 1126    furosemide (LASIX) injection 40 mg, 40 mg, IntraVENous, BID, Moiz Gomez MD    potassium chloride (KLOR-CON M) extended release tablet 40 mEq, 40 mEq, Oral, Once, Moiz Gomez MD    dilTIAZem (CARDIZEM CD) extended release capsule 240 mg, 240 mg, Oral, Daily, Moiz Gomez MD, 240 mg at 04/14/22 0166    amiodarone (CORDARONE) tablet 200 mg, 200 mg, Oral, BID, Dandre Martins MD, 200 mg at 04/14/22 0924    metoprolol succinate (TOPROL XL) extended release tablet 100 mg, 100 mg, Oral, BID, Linda Herbert APRN - CNS, 100 mg at 04/13/22 1944    sulfamethoxazole-trimethoprim (BACTRIM DS;SEPTRA DS) 800-160 MG per tablet 2 tablet, 2 tablet, Oral, 2 times per day, Ana M Weston MD, 2 tablet at 04/14/22 0924    apixaban (ELIQUIS) tablet 5 mg, 5 mg, Oral, BID, Ana M Weston MD, 5 mg at 04/14/22 0924    pantoprazole (PROTONIX) tablet 40 mg, 40 mg, Oral, QAM AC, Ana M Weston MD, 40 mg at 04/13/22 0616    [Held by provider] tamsulosin (FLOMAX) capsule 0.4 mg, 0.4 mg, Oral, Nightly, Bryon Platt MD, 0.4 mg at 04/07/22 2305    sodium chloride flush 0.9 % injection 5-40 mL, 5-40 mL, IntraVENous, 2 times per day, Ana M Weston MD, 10 mL at 04/14/22 0926    sodium chloride flush 0.9 % injection 5-40 mL, 5-40 mL, IntraVENous, PRN, Ana M Weston MD    0.9 % sodium chloride infusion, , IntraVENous, PRN, Ana M Weston MD    acetaminophen (TYLENOL) tablet 650 mg, 650 mg, Oral, Q6H PRN **OR** acetaminophen (TYLENOL) suppository 650 mg, 650 mg, Rectal, Q6H PRN, Ana M Weston MD    Physical Exam:  BP 98/62   Pulse 103   Temp 98.3 °F (36.8 °C) (Oral)   Resp 18   Ht 6' (1.829 m)   Wt 173 lb 8 oz (78.7 kg)   SpO2 95%   BMI 23.53 kg/m²   Wt Readings from Last 3 Encounters:   04/14/22 173 lb 8 oz (78.7 kg)   04/05/22 181 lb 1.6 oz (82.1 kg)   03/15/22 190 lb (86.2 kg)     Appearance: Chronically ill-appearing gentleman, awake, alert, and in no acute respiratory distress, looks better today  Skin: Intact, no rash  Head: Normocephalic, atraumatic  Eyes: EOMI, no conjunctival erythema  ENMT: No pharyngeal erythema, MMM, no rhinorrhea  Neck: Supple, no elevated JVP, no carotid bruits  Lungs: Clear to auscultation bilaterally. No wheezes, rales, or rhonchi.   Cardiac: PMI nondisplaced, regular rhythm with a mildly tachycardic rate, S1 & S2 normal, no murmurs  Abdomen: Soft, nontender, +bowel sounds  Extremities: Moves all extremities x 4, 1+ pitting lower extremity edema on the right, left AKA  Neurologic: No focal motor deficits apparent, normal mood and affect  Peripheral Pulses: Intact posterior tibial pulses bilaterally    Intake/Output:    Intake/Output Summary (Last 24 hours) at 4/14/2022 1131  Last data filed at 4/14/2022 0210  Gross per 24 hour   Intake --   Output 2650 ml   Net -2650 ml     No intake/output data recorded. Laboratory Tests:  Recent Labs     04/12/22  0458 04/13/22  1000 04/14/22  0756    140 139   K 3.4* 4.0 4.0   * 108* 104   CO2 20* 22 23   BUN 20 19 18   CREATININE 1.3* 1.4* 1.4*   GLUCOSE 93 102* 85   CALCIUM 7.8* 7.6* 8.1*     Lab Results   Component Value Date    MG 1.8 04/13/2022     No results for input(s): ALKPHOS, ALT, AST, PROT, BILITOT, BILIDIR, LABALBU in the last 72 hours.   Recent Labs     04/12/22 0458   WBC 9.2   RBC 3.56*   HGB 9.2*   HCT 31.1*   MCV 87.4   MCH 25.8*   MCHC 29.6*   RDW 20.6*      MPV 11.9     Lab Results   Component Value Date    CKTOTAL 12 (L) 03/18/2011    CKMB <0.2 03/18/2011    TROPONINI <0.01 12/19/2020    TROPONINI <0.01 03/18/2011    TROPONINI 0.02 01/03/2011     Lab Results   Component Value Date    INR 1.6 04/12/2022    INR 1.6 04/11/2022    INR 1.5 04/10/2022    PROTIME 17.9 (H) 04/12/2022    PROTIME 17.2 (H) 04/11/2022    PROTIME 16.9 (H) 04/10/2022     Lab Results   Component Value Date    TSH 1.050 03/25/2022     Lab Results   Component Value Date    LABA1C 5.6 12/20/2020     No results found for: EAG  Lab Results   Component Value Date    CHOL 114 12/20/2020     Lab Results   Component Value Date    TRIG 76 12/20/2020     Lab Results   Component Value Date    HDL 41 12/20/2020     Lab Results   Component Value Date    LDLCALC 58 12/20/2020     Lab Results   Component Value Date    LABVLDL 15 12/20/2020     No results found for: CHOLHDLRATIO  Recent Labs     04/13/22  1000   PROBNP 5,768*       Cardiac Tests:    EK2022: Atrial fibrillation with  bpm.  Normal axis and intervals. Low voltage. Nonspecific T wave changes    2022: Slow cycle length atrial flutter with 2: 1 AV conduction 103 bpm.  Nonspecific ST-T wave changes. Telemetry:   Not working today, systemwide issue    Chest X-ray:   22      Impression   Suspect early retrocardiac infiltrates. Echocardiogram:   ОЛЬГА 3/30/22    Summary   Normal left ventricle size and systolic function. Ejection fraction is visually estimated at 55-60%. Atrial flutter may   affect the evaluation of LV systolic function. No regional wall motion abnormalities seen. Agitated saline injected for shunt evaluation. No evidence of thrombus within left atrium or appendage. Normal right ventricular size and function. Mild mitral regurgitation is present. No hemodynamically significant aortic stenosis is present. Unable to estimate PA systolic pressure. Mildly dilated aortic root (4.0 cm). No evidence for hemodynamically significant pericardial effusion. Stress test:    Adenosine stress   Findings-    There is normal distribution of left ventricular myocardial activity on   both the adenosine stress and rest SPECT myocardial perfusion images. Gated study shows normal left ventricular wall motion and myocardial   thickening during systole.  Resting left ventricular ejection fraction   is 54%, the EDV is 136 mL, and the ESV is 63 mL. Cardiac catheterization:     ----------------------------------------------------------------------------------------------------------------------------------------------------------------  IMPRESSION:  1. Recurrent paroxysmal atrial fibrillation/recent ОЛЬГА/DCC for atrial flutter 3/30/2022. Currently AFl with mildly elevated rates. On amiodarone/metoprolol succinate/diltiazem/apixaban  2. Probable mild acute on chronic HFpEF/volume overload. proBNP 5800  3.  Hypokalemia improved  4. Septic shock, resolved  5. GNR bacteremia  6. COURTNEY, improving due to obstructive uropathy/misplaced Valles. Creatinine 3.7->1.4  7. Hypertension  8. Anemia Hgb 9.2  9. PAD with left AKA    RECOMMENDATIONS:  Patient continues to improve. Resting heart rate around 100, now back in flutter.  Repeat IV furosemide 40 mg x 2 today   Give a dose of potassium   Maintain K> 4.0 and mag> 2.0   Continue metoprolol succinate and diltiazem for now   Continue amiodarone loading 400 twice daily for 1 week (completed), then 200 twice daily for a week then 200 daily -still has not achieved full 10 g loading dose   N.p.o. after midnight for ОЛЬГА cardioversion on Friday   Continue apixaban for stroke risk reduction   Aggressive risk factor modification   Further care per primary and other consultants    Discussed with Dr. Cassia Dobson MD, [facility] Card[facility]    NOTE: This report was transcribed using voice recognition software. Every effort was made to ensure accuracy; however, inadvertent computerized transcription errors may be present.

## 2022-04-14 NOTE — PROGRESS NOTES
Department of Internal Lala Jacobsen M.D.  Progress Note      SUBJECTIVE: Has not moved his bowels in 4 days; plans are to discharge him after cardioversion    OBJECTIVE abdomen is soft and nontender    Medications    Current Facility-Administered Medications: magnesium hydroxide (MILK OF MAGNESIA) 400 MG/5ML suspension 30 mL, 30 mL, Oral, BID  dilTIAZem (CARDIZEM CD) extended release capsule 240 mg, 240 mg, Oral, Daily  amiodarone (CORDARONE) tablet 200 mg, 200 mg, Oral, BID  metoprolol succinate (TOPROL XL) extended release tablet 100 mg, 100 mg, Oral, BID  sulfamethoxazole-trimethoprim (BACTRIM DS;SEPTRA DS) 800-160 MG per tablet 2 tablet, 2 tablet, Oral, 2 times per day  apixaban (ELIQUIS) tablet 5 mg, 5 mg, Oral, BID  pantoprazole (PROTONIX) tablet 40 mg, 40 mg, Oral, QAM AC  [Held by provider] tamsulosin (FLOMAX) capsule 0.4 mg, 0.4 mg, Oral, Nightly  sodium chloride flush 0.9 % injection 5-40 mL, 5-40 mL, IntraVENous, 2 times per day  sodium chloride flush 0.9 % injection 5-40 mL, 5-40 mL, IntraVENous, PRN  0.9 % sodium chloride infusion, , IntraVENous, PRN  acetaminophen (TYLENOL) tablet 650 mg, 650 mg, Oral, Q6H PRN **OR** acetaminophen (TYLENOL) suppository 650 mg, 650 mg, Rectal, Q6H PRN  Physical    VITALS:  /66   Pulse 98   Temp 97.6 °F (36.4 °C) (Axillary)   Resp 18   Ht 6' (1.829 m)   Wt 173 lb 8 oz (78.7 kg)   SpO2 93%   BMI 23.53 kg/m²   24HR INTAKE/OUTPUT:    Intake/Output Summary (Last 24 hours) at 4/14/2022 0809  Last data filed at 4/14/2022 0210  Gross per 24 hour   Intake --   Output 2650 ml   Net -2650 ml     LUNGS:  No increased work of breathing, good air exchange, clear to auscultation bilaterally, no crackles or wheezing  CARDIOVASCULAR S1-S2 irregular rhythm tachycardia  Data    CBC with Differential:    Lab Results   Component Value Date    WBC 9.2 04/12/2022    RBC 3.56 04/12/2022    HGB 9.2 04/12/2022    HCT 31.1 04/12/2022     04/12/2022    MCV 87.4 04/12/2022    MCH 25.8 04/12/2022    MCHC 29.6 04/12/2022    RDW 20.6 04/12/2022    NRBC 0.0 04/09/2022    SEGSPCT 47 03/18/2011    METASPCT 0.9 04/09/2022    LYMPHOPCT 14.8 04/12/2022    PROMYELOPCT 1.8 03/28/2022    MONOPCT 6.4 04/12/2022    MYELOPCT 1.8 03/28/2022    BASOPCT 0.5 04/12/2022    MONOSABS 0.59 04/12/2022    LYMPHSABS 1.36 04/12/2022    EOSABS 0.37 04/12/2022    BASOSABS 0.05 04/12/2022     CMP:    Lab Results   Component Value Date     04/13/2022    K 4.0 04/13/2022    K 4.6 04/08/2022     04/13/2022    CO2 22 04/13/2022    BUN 19 04/13/2022    CREATININE 1.4 04/13/2022    GFRAA >60 04/13/2022    LABGLOM 50 04/13/2022    GLUCOSE 102 04/13/2022    GLUCOSE 106 03/24/2011    PROT 5.0 04/08/2022    LABALBU 1.7 04/08/2022    LABALBU 2.4 03/20/2011    CALCIUM 7.6 04/13/2022    BILITOT 1.1 04/08/2022    ALKPHOS 98 04/08/2022    AST 30 04/08/2022    ALT 20 04/08/2022     PT/INR:    Lab Results   Component Value Date    PROTIME 17.9 04/12/2022    PROTIME 12.2 03/18/2011    INR 1.6 04/12/2022       ASSESSMENT AND PLAN          Urinary tract infection associated with indwelling urethral catheter (HCC)  Plan: Resolving    PAF (paroxysmal atrial fibrillation) (HCC)  Plan: Cardioversion tomorrow    COURTNEY (acute kidney injury) ([facility]  Plan: Resolved    Septic shock ([facility]  Plan: Resolved    Bacteremia  Plan: Klebsiella resolving          Electronically signed by Ada Bethea MD on 4/14/2022 at 8:09 AM

## 2022-04-14 NOTE — PROGRESS NOTES
Comprehensive Nutrition Assessment    Type and Reason for Visit:  Initial,RD Nutrition Re-Screen/LOS    Nutrition Recommendations/Plan: Continue Diet. Will Start ONS and monitor. Nutrition Assessment:  Pt adm w/ abd pain, fever, SOB and BLE edema x ~1-2d pta. PMHx COPD, Lt AKA (11'), hx rectal CA s/p Ileostomy w/ reversal (18'), s/p recent Rt Hemicolectomy (3/22/22) 2/2 large polyp. Adm w/ septic shock/bacteremia/UTI, COURTNEY, A-fib, bladder outlet obstruction and B/L pleural effusions. Noted RRT 4/8 d/t unresponsive; noted plan for cardioversion soon. Pt at nutritional risk d/t meeting criteria for Moderate Malnutrition w/ muscle/fat wasting, poor appetite and noted wt loss d/t COPD hx w/ poor appetite. Will Start ONS and monitor. Malnutrition Assessment:  Malnutrition Status: Moderate malnutrition    Context:  Chronic Illness     Findings of the 6 clinical characteristics of malnutrition:  Energy Intake:  7 - 75% or less estimated energy requirements for 1 month or longer  Weight Loss:  1 - Mild weight loss (specify amount and time period) (~14% x ~1yr)     Body Fat Loss:  1 - Mild body fat loss Orbital,Triceps,Buccal region   Muscle Mass Loss:  1 - Mild muscle mass loss Temples (temporalis),Clavicles (pectoralis & deltoids),Hand (interosseous),Scapula (trapezius)  Fluid Accumulation:  No significant fluid accumulation     Strength:  Not Performed    Estimated Daily Nutrient Needs:  Energy (kcal):  ; Weight Used for Energy Requirements:  Current     Protein (g):  1.3-1.5gm/kg Adj IBW=  (Adjusted IBW for Lt AKA= 160#);  Weight Used for Protein Requirements:  Adjusted        Fluid (ml/day):  ; Method Used for Fluid Requirements:  1 ml/kcal      Nutrition Related Findings:  A&O, poor dentition, Abd/BS WDL, +constipation, no edema, Lt AKA, I/O's WNL      Wounds:  Open Wounds       Current Nutrition Therapies:    ADULT DIET; Easy to Chew; Low Sodium (2 gm)  Diet NPO    Anthropometric Measures:  · Height: 6' (182.9 cm)  · Current Body Weight: 173 lb (78.5 kg) (bed 4/14)   · Admission Body Weight: 174 lb (78.9 kg) (bed 4/8)    · Usual Body Weight: 200 lb (90.7 kg) (actual 4/30/21 per EMR; ~14% x ~1yr)     · Ideal Body Weight: 178 lbs; % Ideal Body Weight 97.2 %   · BMI: 23.5  · Adjusted Body Weight: 190.5; Amputation   · Adjusted BMI: 25.9    · BMI Categories: Overweight (BMI 25.0-29.9) (per Adjusted body weight 2/2 Lt AKA= 25.9)       Nutrition Diagnosis:   · Moderate malnutrition,In context of chronic illness related to catabolic illness (2/2 COPD w/ chronic poor appetite) as evidenced by intake 26-50%,poor intake prior to admission,weight loss,mild loss of subcutaneous fat,mild muscle loss      Nutrition Interventions:   Food and/or Nutrient Delivery:  Continue Current Diet,Start Oral Nutrition Supplement (Continue Diet. Will Start ONS and monitor.)  Nutrition Education/Counseling:  No recommendation at this time   Coordination of Nutrition Care:  Continue to monitor while inpatient    Goals:  PO intake >75%       Nutrition Monitoring and Evaluation:   Behavioral-Environmental Outcomes:  None Identified   Food/Nutrient Intake Outcomes:  Diet Advancement/Tolerance,Food and Nutrient Intake,Supplement Intake  Physical Signs/Symptoms Outcomes:  Biochemical Data,Chewing or Swallowing,Constipation,GI Status,Nausea or Vomiting,Fluid Status or Edema,Nutrition Focused Physical Findings,Skin,Weight     Discharge Planning:     Too soon to determine     Electronically signed by Lela Davis RD, LD on 4/14/22 at 10:47 AM EDT    Contact: ext [phone]

## 2022-04-15 ENCOUNTER — APPOINTMENT (OUTPATIENT)
Dept: NON INVASIVE DIAGNOSTICS | Age: 67
DRG: 698 | End: 2022-04-15
Payer: MEDICARE

## 2022-04-15 ENCOUNTER — ANESTHESIA EVENT (OUTPATIENT)
Dept: NON INVASIVE DIAGNOSTICS | Age: 67
DRG: 698 | End: 2022-04-15
Payer: MEDICARE

## 2022-04-15 ENCOUNTER — ANESTHESIA (OUTPATIENT)
Dept: NON INVASIVE DIAGNOSTICS | Age: 67
DRG: 698 | End: 2022-04-15
Payer: MEDICARE

## 2022-04-15 VITALS
DIASTOLIC BLOOD PRESSURE: 49 MMHG | OXYGEN SATURATION: 96 % | SYSTOLIC BLOOD PRESSURE: 81 MMHG | RESPIRATION RATE: 16 BRPM

## 2022-04-15 VITALS
SYSTOLIC BLOOD PRESSURE: 116 MMHG | OXYGEN SATURATION: 100 % | BODY MASS INDEX: 23.43 KG/M2 | HEIGHT: 72 IN | WEIGHT: 173 LBS | DIASTOLIC BLOOD PRESSURE: 68 MMHG | RESPIRATION RATE: 16 BRPM | TEMPERATURE: 97.5 F | HEART RATE: 57 BPM

## 2022-04-15 LAB — SARS-COV-2, NAAT: NOT DETECTED

## 2022-04-15 PROCEDURE — 3700000001 HC ADD 15 MINUTES (ANESTHESIA)

## 2022-04-15 PROCEDURE — 92960 CARDIOVERSION ELECTRIC EXT: CPT | Performed by: INTERNAL MEDICINE

## 2022-04-15 PROCEDURE — 6360000002 HC RX W HCPCS: Performed by: NURSE ANESTHETIST, CERTIFIED REGISTERED

## 2022-04-15 PROCEDURE — 6370000000 HC RX 637 (ALT 250 FOR IP): Performed by: INTERNAL MEDICINE

## 2022-04-15 PROCEDURE — 7100000001 HC PACU RECOVERY - ADDTL 15 MIN

## 2022-04-15 PROCEDURE — 6370000000 HC RX 637 (ALT 250 FOR IP): Performed by: CLINICAL NURSE SPECIALIST

## 2022-04-15 PROCEDURE — 93325 DOPPLER ECHO COLOR FLOW MAPG: CPT

## 2022-04-15 PROCEDURE — 93325 DOPPLER ECHO COLOR FLOW MAPG: CPT | Performed by: INTERNAL MEDICINE

## 2022-04-15 PROCEDURE — 99233 SBSQ HOSP IP/OBS HIGH 50: CPT | Performed by: INTERNAL MEDICINE

## 2022-04-15 PROCEDURE — 93320 DOPPLER ECHO COMPLETE: CPT

## 2022-04-15 PROCEDURE — 92960 CARDIOVERSION ELECTRIC EXT: CPT

## 2022-04-15 PROCEDURE — 87635 SARS-COV-2 COVID-19 AMP PRB: CPT

## 2022-04-15 PROCEDURE — 6360000002 HC RX W HCPCS

## 2022-04-15 PROCEDURE — B246ZZ4 ULTRASONOGRAPHY OF RIGHT AND LEFT HEART, TRANSESOPHAGEAL: ICD-10-PCS | Performed by: INTERNAL MEDICINE

## 2022-04-15 PROCEDURE — 2580000003 HC RX 258: Performed by: ANESTHESIOLOGY

## 2022-04-15 PROCEDURE — 2580000003 HC RX 258: Performed by: INTERNAL MEDICINE

## 2022-04-15 PROCEDURE — 3700000000 HC ANESTHESIA ATTENDED CARE

## 2022-04-15 PROCEDURE — 7100000000 HC PACU RECOVERY - FIRST 15 MIN

## 2022-04-15 PROCEDURE — 6360000002 HC RX W HCPCS: Performed by: INTERNAL MEDICINE

## 2022-04-15 PROCEDURE — 5A2204Z RESTORATION OF CARDIAC RHYTHM, SINGLE: ICD-10-PCS | Performed by: INTERNAL MEDICINE

## 2022-04-15 PROCEDURE — 93312 ECHO TRANSESOPHAGEAL: CPT

## 2022-04-15 RX ORDER — PROPOFOL 10 MG/ML
INJECTION, EMULSION INTRAVENOUS PRN
Status: DISCONTINUED | OUTPATIENT
Start: 2022-04-15 | End: 2022-04-15 | Stop reason: SDUPTHER

## 2022-04-15 RX ORDER — SODIUM CHLORIDE 0.9 % (FLUSH) 0.9 %
5-40 SYRINGE (ML) INJECTION PRN
Status: DISCONTINUED | OUTPATIENT
Start: 2022-04-15 | End: 2022-04-15 | Stop reason: HOSPADM

## 2022-04-15 RX ORDER — DIPHENHYDRAMINE HYDROCHLORIDE 50 MG/ML
12.5 INJECTION INTRAMUSCULAR; INTRAVENOUS
Status: DISCONTINUED | OUTPATIENT
Start: 2022-04-15 | End: 2022-04-15 | Stop reason: HOSPADM

## 2022-04-15 RX ORDER — SODIUM CHLORIDE 9 MG/ML
INJECTION, SOLUTION INTRAVENOUS PRN
Status: DISCONTINUED | OUTPATIENT
Start: 2022-04-15 | End: 2022-04-15 | Stop reason: HOSPADM

## 2022-04-15 RX ORDER — SODIUM CHLORIDE 0.9 % (FLUSH) 0.9 %
5-40 SYRINGE (ML) INJECTION EVERY 12 HOURS SCHEDULED
Status: DISCONTINUED | OUTPATIENT
Start: 2022-04-15 | End: 2022-04-15 | Stop reason: HOSPADM

## 2022-04-15 RX ORDER — METOPROLOL SUCCINATE 100 MG/1
100 TABLET, EXTENDED RELEASE ORAL 2 TIMES DAILY
Qty: 30 TABLET | Refills: 3 | DISCHARGE
Start: 2022-04-15

## 2022-04-15 RX ORDER — FENTANYL CITRATE 50 UG/ML
25 INJECTION, SOLUTION INTRAMUSCULAR; INTRAVENOUS EVERY 5 MIN PRN
Status: DISCONTINUED | OUTPATIENT
Start: 2022-04-15 | End: 2022-04-15 | Stop reason: HOSPADM

## 2022-04-15 RX ORDER — MEPERIDINE HYDROCHLORIDE 25 MG/ML
12.5 INJECTION INTRAMUSCULAR; INTRAVENOUS; SUBCUTANEOUS EVERY 5 MIN PRN
Status: DISCONTINUED | OUTPATIENT
Start: 2022-04-15 | End: 2022-04-15 | Stop reason: HOSPADM

## 2022-04-15 RX ORDER — FUROSEMIDE 10 MG/ML
40 INJECTION INTRAMUSCULAR; INTRAVENOUS ONCE
Status: COMPLETED | OUTPATIENT
Start: 2022-04-15 | End: 2022-04-15

## 2022-04-15 RX ADMIN — PROPOFOL 200 MG: 10 INJECTION, EMULSION INTRAVENOUS at 13:15

## 2022-04-15 RX ADMIN — PHENYLEPHRINE HYDROCHLORIDE 200 MCG: 10 INJECTION INTRAVENOUS at 13:25

## 2022-04-15 RX ADMIN — FUROSEMIDE 40 MG: 10 INJECTION, SOLUTION INTRAMUSCULAR; INTRAVENOUS at 10:35

## 2022-04-15 RX ADMIN — METOPROLOL SUCCINATE 100 MG: 100 TABLET, FILM COATED, EXTENDED RELEASE ORAL at 09:09

## 2022-04-15 RX ADMIN — AMIODARONE HYDROCHLORIDE 200 MG: 200 TABLET ORAL at 09:09

## 2022-04-15 RX ADMIN — PROPOFOL 50 MG: 10 INJECTION, EMULSION INTRAVENOUS at 13:19

## 2022-04-15 RX ADMIN — APIXABAN 5 MG: 5 TABLET, FILM COATED ORAL at 09:09

## 2022-04-15 RX ADMIN — SODIUM CHLORIDE, PRESERVATIVE FREE 10 ML: 5 INJECTION INTRAVENOUS at 09:11

## 2022-04-15 RX ADMIN — MAGNESIUM HYDROXIDE 30 ML: 400 SUSPENSION ORAL at 09:09

## 2022-04-15 RX ADMIN — SULFAMETHOXAZOLE AND TRIMETHOPRIM 2 TABLET: 800; 160 TABLET ORAL at 09:15

## 2022-04-15 RX ADMIN — PHENYLEPHRINE HYDROCHLORIDE 200 MCG: 10 INJECTION INTRAVENOUS at 13:20

## 2022-04-15 RX ADMIN — SODIUM CHLORIDE: 9 INJECTION, SOLUTION INTRAVENOUS at 13:05

## 2022-04-15 RX ADMIN — PHENYLEPHRINE HYDROCHLORIDE 200 MCG: 10 INJECTION INTRAVENOUS at 13:40

## 2022-04-15 RX ADMIN — PANTOPRAZOLE SODIUM 40 MG: 40 TABLET, DELAYED RELEASE ORAL at 09:13

## 2022-04-15 ASSESSMENT — LIFESTYLE VARIABLES: SMOKING_STATUS: 0

## 2022-04-15 ASSESSMENT — PAIN SCALES - GENERAL
PAINLEVEL_OUTOF10: 0

## 2022-04-15 ASSESSMENT — PAIN - FUNCTIONAL ASSESSMENT: PAIN_FUNCTIONAL_ASSESSMENT: 0-10

## 2022-04-15 NOTE — PROGRESS NOTES
Occupational Therapy  Patient treatment attempted this PM.  Patient off the unit for procedure. Family member in room and states pt is going to rehab later this PM.   Will attempt at a later time.   Nicky GARZA/LEILA [id number]

## 2022-04-15 NOTE — PROGRESS NOTES
Time out 1309 pre procedure, all concurred. Working IV fluids running, O2 in place, suction in place, pads on patient. ОЛЬГА and Cardioversion complete Afib converted to SR/SB.   Pt A/O x4

## 2022-04-15 NOTE — CARE COORDINATION
Care coordination:   Precert expires today for patient discharge to [facility]. ALAN and transport form completed. Will need a rapid COVID day of discharge. Electronically signed by Sofy Chacko RN on 4/15/2022 at 9:43 AM      Addendum: Discharge noted. Patient to be  at 6 pm via [facility] to return to facility. Transport forms in soft chart. Sister at bedside and made aware.   N-N [phone]

## 2022-04-15 NOTE — PROGRESS NOTES
[address] [place] [address] FOLLOW-UP    Name: Greta Cee. Age: 74 y.o. Date of Admission: 4/7/2022  1:41 PM    Date of Service: 4/15/2022    Primary Cardiologist: Cleo Krishnamurthy    Chief Complaint: Follow-up for recurrent AF    Interim History:  Feeling a little better. Appetite improved. Resting heart rates of around low 100s, looks like he has organized back into an atrial flutter.     Diuresing well net -4 L    Review of Systems:   Negative except as described above    Problem List:  Patient Active Problem List   Diagnosis    Retinal detachment of left eye with multiple breaks    Rectal cancer ([address]    Ileus ([address]    Atrial flutter ([address]    GI bleed    Gastrointestinal bleed    Benign neoplasm of cecum    S/P right hemicolectomy    PSVT (paroxysmal supraventricular tachycardia) ([address]    Primary hypertension    Abdominal wall abscess    Atrial fibrillation with RVR (HCC)    Urinary tract infection associated with indwelling urethral catheter ([facility])    PAF (paroxysmal atrial fibrillation) ([facility])    Intra-abdominal infection    COURTNEY (acute kidney injury) ([address]    Septic shock ([facility])    Bacteremia    Moderate protein-calorie malnutrition ([facility])       Current Medications:    Current Facility-Administered Medications:     magnesium hydroxide (MILK OF MAGNESIA) 400 MG/5ML suspension 30 mL, 30 mL, Oral, BID, Bobby Randolph MD, 30 mL at 04/15/22 0909    calcium carbonate (TUMS) chewable tablet 500 mg, 500 mg, Oral, TID PRN, Bobby Randolph MD, 500 mg at 04/14/22 1336    amiodarone (CORDARONE) tablet 200 mg, 200 mg, Oral, BID, Bobby Randolph MD, 200 mg at 04/15/22 0909    metoprolol succinate (TOPROL XL) extended release tablet 100 mg, 100 mg, Oral, BID, CRISTI Solano - CNS, 100 mg at 04/15/22 0239    sulfamethoxazole-trimethoprim (BACTRIM DS;SEPTRA DS) 800-160 MG per tablet 2 tablet, 2 tablet, Oral, 2 times per day, Ronda Abbott MD, 2 tablet at 04/15/22 0915   apixaban (ELIQUIS) tablet 5 mg, 5 mg, Oral, BID, Amber Diaz MD, 5 mg at 04/15/22 0909    pantoprazole (PROTONIX) tablet 40 mg, 40 mg, Oral, QAM AC, Amber Diaz MD, 40 mg at 04/15/22 0913    [Held by provider] tamsulosin (FLOMAX) capsule 0.4 mg, 0.4 mg, Oral, Nightly, Sun Lewis MD, 0.4 mg at 04/07/22 2305    sodium chloride flush 0.9 % injection 5-40 mL, 5-40 mL, IntraVENous, 2 times per day, Amber Diaz MD, 10 mL at 04/15/22 0911    sodium chloride flush 0.9 % injection 5-40 mL, 5-40 mL, IntraVENous, PRN, Amber Diaz MD    0.9 % sodium chloride infusion, , IntraVENous, PRN, Amber Diaz MD    acetaminophen (TYLENOL) tablet 650 mg, 650 mg, Oral, Q6H PRN **OR** acetaminophen (TYLENOL) suppository 650 mg, 650 mg, Rectal, Q6H PRN, Amber Diaz MD    Physical Exam:  BP 94/68   Pulse 107   Temp 97.4 °F (36.3 °C) (Oral)   Resp 16   Ht 6' (1.829 m)   Wt 173 lb 9.6 oz (78.7 kg)   SpO2 94%   BMI 23.54 kg/m²   Wt Readings from Last 3 Encounters:   04/15/22 173 lb 9.6 oz (78.7 kg)   04/05/22 181 lb 1.6 oz (82.1 kg)   03/15/22 190 lb (86.2 kg)     Appearance: Chronically ill-appearing gentleman, awake, alert, and in no acute respiratory distress, looks better today  Skin: Intact, no rash  Head: Normocephalic, atraumatic  Eyes: EOMI, no conjunctival erythema  ENMT: No pharyngeal erythema, MMM, no rhinorrhea  Neck: Supple, no elevated JVP, no carotid bruits  Lungs: Clear to auscultation bilaterally. No wheezes, rales, or rhonchi.   Cardiac: PMI nondisplaced, regular rhythm with a mildly tachycardic rate, S1 & S2 normal, no murmurs  Abdomen: Soft, nontender, +bowel sounds  Extremities: Moves all extremities x 4, 1+ pitting lower extremity edema on the right, left AKA  Neurologic: No focal motor deficits apparent, normal mood and affect  Peripheral Pulses: Intact posterior tibial pulses bilaterally    Intake/Output:    Intake/Output Summary (Last 24 hours) at 4/15/2022 0928  Last data filed at 4/15/2022 0222  Gross per 24 hour   Intake --   Output 4925 ml   Net -4925 ml     No intake/output data recorded. Laboratory Tests:  Recent Labs     22  1000 22  0756    139   K 4.0 4.0   * 104   CO2 22 23   BUN 19 18   CREATININE 1.4* 1.4*   GLUCOSE 102* 85   CALCIUM 7.6* 8.1*     Lab Results   Component Value Date    MG 1.8 2022     No results for input(s): ALKPHOS, ALT, AST, PROT, BILITOT, BILIDIR, LABALBU in the last 72 hours. No results for input(s): WBC, RBC, HGB, HCT, MCV, MCH, MCHC, RDW, PLT, MPV in the last 72 hours. Lab Results   Component Value Date    CKTOTAL 12 (L) 2011    CKMB <0.2 2011    TROPONINI <0.01 2020    TROPONINI <0.01 2011    TROPONINI 0.02 2011     Lab Results   Component Value Date    INR 1.6 2022    INR 1.6 2022    INR 1.5 04/10/2022    PROTIME 17.9 (H) 2022    PROTIME 17.2 (H) 2022    PROTIME 16.9 (H) 04/10/2022     Lab Results   Component Value Date    TSH 1.050 2022     Lab Results   Component Value Date    LABA1C 5.6 2020     No results found for: EAG  Lab Results   Component Value Date    CHOL 114 2020     Lab Results   Component Value Date    TRIG 76 2020     Lab Results   Component Value Date    HDL 41 2020     Lab Results   Component Value Date    LDLCALC 58 2020     Lab Results   Component Value Date    LABVLDL 15 2020     No results found for: CHOLHDLRATIO  Recent Labs     22  1000   PROBNP 5,768*       Cardiac Tests:    EK2022: Atrial fibrillation with  bpm.  Normal axis and intervals. Low voltage. Nonspecific T wave changes    2022: Slow cycle length atrial flutter with 2: 1 AV conduction 103 bpm.  Nonspecific ST-T wave changes.     Telemetry:   Not working today, systemwide issue  Bedside appears to be atrial flutter 100    Chest X-ray:   22      Impression   Suspect early retrocardiac infiltrates. Echocardiogram:   ОЛЬГА 3/30/22    Summary   Normal left ventricle size and systolic function. Ejection fraction is visually estimated at 55-60%. Atrial flutter may   affect the evaluation of LV systolic function. No regional wall motion abnormalities seen. Agitated saline injected for shunt evaluation. No evidence of thrombus within left atrium or appendage. Normal right ventricular size and function. Mild mitral regurgitation is present. No hemodynamically significant aortic stenosis is present. Unable to estimate PA systolic pressure. Mildly dilated aortic root (4.0 cm). No evidence for hemodynamically significant pericardial effusion. Stress test:    Adenosine stress 2011  Findings-    There is normal distribution of left ventricular myocardial activity on   both the adenosine stress and rest SPECT myocardial perfusion images. Gated study shows normal left ventricular wall motion and myocardial   thickening during systole.  Resting left ventricular ejection fraction   is 54%, the EDV is 136 mL, and the ESV is 63 mL. Cardiac catheterization:     ----------------------------------------------------------------------------------------------------------------------------------------------------------------  IMPRESSION:  1. Recurrent paroxysmal atrial fibrillation/recent ОЛЬГА/DCC for atrial flutter 3/30/2022. Currently AFl with mildly elevated rates. On amiodarone/metoprolol succinate/diltiazem/apixaban  2. Probable mild acute on chronic HFpEF/volume overload. proBNP 5800. Now net -4 L  3. Hypokalemia improved  4. Septic shock, resolved  5. GNR bacteremia  6. COURTNEY, improving due to obstructive uropathy/misplaced Valles. Creatinine 3.7->1.4  7. Hypertension  8. Anemia Hgb 9.2  9. PAD with left AKA    RECOMMENDATIONS:  Resting heart rate around 100, now back in flutter.      Repeat IV furosemide 40 mg x 1 today   ОЛЬГА cardioversion today   Discontinue oral diltiazem   Continue metoprolol succinate   Continue amiodarone loading 400 twice daily for 1 week (completed), then 200 twice daily for a week then 200 daily -still has not achieved full 10 g loading dose   Continue apixaban for stroke risk reduction   Aggressive risk factor modification   Further care per primary and other consultants  Ingris Mitchell for discharge after cardioversion   Outpatient follow-up with Dr Arjun Gao    Risks and benefits of ОЛЬГА cardioversion explained to patient, can risk of esophageal perforation, CVA, failure to restore sinus rhythm, and recurrence of atrial or other arrhythmias. He understands and agrees to proceed. Discussed with Dr. Pb Arroyo MD, [address] [place] [address] Cardiology    NOTE: This report was transcribed using voice recognition software. Every effort was made to ensure accuracy; however, inadvertent computerized transcription errors may be present.

## 2022-04-15 NOTE — PLAN OF CARE
Problem: Skin Integrity:  Goal: Will show no infection signs and symptoms  Description: Will show no infection signs and symptoms  Outcome: Met This Shift     Problem: Skin Integrity:  Goal: Absence of new skin breakdown  Description: Absence of new skin breakdown  Outcome: Met This Shift     Problem: Falls - Risk of:  Goal: Will remain free from falls  Description: Will remain free from falls  Outcome: Met This Shift     Problem: Falls - Risk of:  Goal: Absence of physical injury  Description: Absence of physical injury  Outcome: Met This Shift     Problem: Anxiety/Stress:  Goal: Level of anxiety will decrease  Description: Level of anxiety will decrease  Outcome: Met This Shift     Problem: Aspiration:  Goal: Absence of aspiration  Description: Absence of aspiration  Outcome: Met This Shift     Problem: Cardiac Output - Decreased:  Goal: Hemodynamic stability will improve  Description: Hemodynamic stability will improve  Outcome: Met This Shift

## 2022-04-15 NOTE — OP NOTE
Operative Note      Patient: Tawnya Stoddard. YOB: 1955  MRN: [id number]    Date of Procedure: 4/15/2022    Cardioversion: Consent for operation or procedure: Risks and benefits discussed with patient and consent obtained    Diagnosis: Atrial fibrillation. EBL: 0 ml    The appropriate time-out procedure was performed including proper identification of the patient, physician, procedure, documentation, and there were no safety issues identified. The patient participated actively in this. After sedation was achieved, the patient  was placed in the supine position and hands free patches were placed on his chest in the AP position. ОЛЬГА performed. Full report to follow. Pt tolerated procedure well. 1 shock was provided at, 200 Joules with successful restoration of normal sinus rhythm. Repeat EKG confirms return to sinus rhythm. Complications: The patient tolerated the procedure well without complications.     Cristal Benedict MD  Cardiologist  Cardiology, [facility]) Physicians    Electronically signed by Cristal Benedict MD on 4/15/2022 at 1:28 PM

## 2022-04-15 NOTE — ANESTHESIA PRE PROCEDURE
Department of Anesthesiology  Preprocedure Note       Name:  Trang Osuna. Age:  74 y.o.  :  1955                                          MRN:  [id number]         Date:  4/15/2022      Surgeon: Cardiology    Procedure: ОЛЬГА    Medications prior to admission:   Prior to Admission medications    Medication Sig Start Date End Date Taking? Authorizing Provider   sulfamethoxazole-trimethoprim (BACTRIM DS;SEPTRA DS) 800-160 MG per tablet Take 2 tablets by mouth every 12 hours for 2 days 22 Yes Abby Islas MD   magnesium hydroxide (MILK OF MAGNESIA) 400 MG/5ML suspension Take 30 mLs by mouth daily as needed for Constipation (Given if no BM in 48 hrs)   Yes Historical Provider, MD   torsemide (DEMADEX) 20 MG tablet Take 20 mg by mouth daily 22 Yes Historical Provider, MD   menthol-zinc oxide (CALMOSEPTINE) 0.44-20.625 % OINT ointment Apply topically 2 times daily Max 30 ml per day.   Apply to buttocks   Yes Historical Provider, MD   Emollient (EUCERIN) lotion Apply topically nightly Apply topically to upper and lower extremities   Yes Historical Provider, MD   bisacodyl (DULCOLAX) 10 MG suppository Place 10 mg rectally daily as needed for Constipation (If no results from milk of mag.)   Yes Historical Provider, MD   calcium carbonate (TUMS) 500 MG chewable tablet Take 1 tablet by mouth every 6 hours as needed for Heartburn (up to six times daily with meals) 22  Matt Reed MD   amiodarone (PACERONE) 400 MG tablet Take 1 tablet by mouth 2 times daily for 12 doses 22  Matt Reed MD   amiodarone (CORDARONE) 200 MG tablet Take 1 tablet by mouth daily for 10 doses 22  Matt Reed MD   tamsulosin [facility]) 0.4 MG capsule Take 1 capsule by mouth nightly 22   Matt Reed MD   white petrolatum OINT ointment Apply topically 2 times daily 22   Matt Reed MD   pantoprazole (PROTONIX) 40 MG tablet Take 1 tablet by mouth every morning (before breakfast) 4/6/22   Matt Reed MD   apixaban (ELIQUIS) 5 MG TABS tablet Take 5 mg by mouth 2 times daily    Historical Provider, MD   amLODIPine (NORVASC) 5 MG tablet Take 1 tablet by mouth daily 1/21/22   Ruby Randall MD   metoprolol (LOPRESSOR) 100 MG tablet Take 1 tablet by mouth 2 times daily 4/30/21   Ruby Randall MD   hydrALAZINE (APRESOLINE) 50 MG tablet take 1 tablet by mouth three times a day 4/30/21   Ruby Randall MD       Current medications:    Current Facility-Administered Medications   Medication Dose Route Frequency Provider Last Rate Last Admin    magnesium hydroxide (MILK OF MAGNESIA) 400 MG/5ML suspension 30 mL  30 mL Oral BID Matt Reed MD   30 mL at 04/14/22 2311    calcium carbonate (TUMS) chewable tablet 500 mg  500 mg Oral TID PRN Matt Reed MD   500 mg at 04/14/22 1336    amiodarone (CORDARONE) tablet 200 mg  200 mg Oral BID Matt Reed MD   200 mg at 04/14/22 2044    metoprolol succinate (TOPROL XL) extended release tablet 100 mg  100 mg Oral BID Dala Rim, APRN - CNS   100 mg at 04/14/22 2044    sulfamethoxazole-trimethoprim (BACTRIM DS;SEPTRA DS) 800-160 MG per tablet 2 tablet  2 tablet Oral 2 times per day Cleo Wolf MD   2 tablet at 04/14/22 2044    apixaban (ELIQUIS) tablet 5 mg  5 mg Oral BID Cleo Wolf MD   5 mg at 04/14/22 2044    pantoprazole (PROTONIX) tablet 40 mg  40 mg Oral QAM AC Cleo Wolf MD   40 mg at 04/13/22 0616    [Held by provider] tamsulosin (FLOMAX) capsule 0.4 mg  0.4 mg Oral Nightly Matt Reed MD   0.4 mg at 04/07/22 2305    sodium chloride flush 0.9 % injection 5-40 mL  5-40 mL IntraVENous 2 times per day Cleo Wolf MD   10 mL at 04/14/22 2044    sodium chloride flush 0.9 % injection 5-40 mL  5-40 mL IntraVENous PRN Cleo Wolf MD        0.9 % sodium chloride infusion   IntraVENous PRN Ana KEYS Carlos Hidalgo MD        acetaminophen (TYLENOL) tablet 650 mg  650 mg Oral Q6H PRN Cleo Wolf MD        Or    acetaminophen (TYLENOL) suppository 650 mg  650 mg Rectal Q6H PRN Cleo Wolf MD           Allergies: Allergies   Allergen Reactions    Cefepime Rash    Clindamycin/Lincomycin Rash    Pcn [Penicillins] Rash       Problem List:    Patient Active Problem List   Diagnosis Code    Retinal detachment of left eye with multiple breaks H33.022    Rectal cancer ([facility] C20    Ileus ([facility] K56.7    Atrial flutter ([facility] I48.92    GI bleed K92.2    Gastrointestinal bleed K92.2    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    S/P right hemicolectomy Z90.49    PSVT (paroxysmal supraventricular tachycardia) ([facility] I47.1    Primary hypertension I10    Abdominal wall abscess L02. 80    Atrial fibrillation with RVR ([facility]) I48.91    Urinary tract infection associated with indwelling urethral catheter ([facility]) T83.511A, N39.0    PAF (paroxysmal atrial fibrillation) ([facility]) I48.0    Intra-abdominal infection B99.9    COURTNEY (acute kidney injury) ([facility] N17.9    Septic shock ([facility]) A41.9, R65.21    Bacteremia R78.81    Moderate protein-calorie malnutrition ([facility]) E44.0       Past Medical History:        Diagnosis Date    Employs prosthetic leg     left    History of atrial fibrillation     [place] (hard of hearing)     uses bilat hearing aides    Hx of AKA (above knee amputation), left ([facility]     Hx of necrotizing fasciitis 2011    left leg    Hypertension     Rectal bleeding     Rectal cancer ([facility] 12/2017    rectal       Past Surgical History:        Procedure Laterality Date    ABDOMEN SURGERY  03/20/2018    ileostomy open take down    ABOVE KNEE AMPUTATION  2011    left; hx flesh eating bacteria    CARDIOVERSION  12/2020    COLON SURGERY  01/23/2018    laprascopic low anterior colon resection  take down splenic fexure   ileostomy    COLONOSCOPY  10/21/2011    COLONOSCOPY N/A 2/14/2022    COLONOSCOPY POLYPECTOMY SNARE/COLD BIOPSY performed by Carissa Miller MD at [address] Left 10/03/2016    pars plana vitrectomy, retinal detachment repair, laser gas/fluid exchange    EYE SURGERY Bilateral 2002? bilat cataracts w lens implants    EYE SURGERY Right ?    retinal detachment    HEMICOLECTOMY Right 3/22/2022    LAPAROSCOPIC RIGHT HEMICOLECTOMY performed by Carissa Miller MD at [address] OF ILEOSTOMY,COMPLICATED N/A     ILEOSTOMY OPEN TAKEDOWN performed by Carissa Miller MD at [address] [place] [address] History:    Social History     Tobacco Use    Smoking status: Former Smoker     Packs/day: 2.00     Years: 35.00     Pack years: 70.00     Types: Cigarettes     Start date: 1974     Quit date: 2010     Years since quittin.3    Smokeless tobacco: Never Used   Substance Use Topics    Alcohol use: No                                Counseling given: Not Answered      Vital Signs (Current):   Vitals:    22 1532 22 2040 04/15/22 0122 04/15/22 0745   BP: 98/68 101/72  94/68   Pulse: 100 105  107   Resp:  16  16   Temp: 97.9 °F (36.6 °C) 97.4 °F (36.3 °C)  97.4 °F (36.3 °C)   TempSrc: Oral Oral  Oral   SpO2:  93%  94%   Weight:   173 lb 9.6 oz (78.7 kg)    Height:                                                  BP Readings from Last 3 Encounters:   04/15/22 94/68   22 (!) 144/55   22 103/63       NPO Status: Time of last liquid consumption:                         Time of last solid consumption:                         Date of last liquid consumption: 22                        Date of last solid food consumption: 22    BMI:   Wt Readings from Last 3 Encounters:   04/15/22 173 lb 9.6 oz (78.7 kg)   22 181 lb 1.6 oz (82.1 kg)   03/15/22 190 lb (86.2 kg)     Body mass index is 23.54 kg/m².     CBC:   Lab Results   Component Value Date    WBC 9.2 2022    RBC 3.56 2022    HGB 9.2 2022    HCT 31.1 2022 MCV 87.4 04/12/2022    RDW 20.6 04/12/2022     04/12/2022       CMP:   Lab Results   Component Value Date     04/14/2022    K 4.0 04/14/2022    K 4.6 04/08/2022     04/14/2022    CO2 23 04/14/2022    BUN 18 04/14/2022    CREATININE 1.4 04/14/2022    GFRAA >60 04/14/2022    LABGLOM 50 04/14/2022    GLUCOSE 85 04/14/2022    GLUCOSE 106 03/24/2011    PROT 5.0 04/08/2022    CALCIUM 8.1 04/14/2022    BILITOT 1.1 04/08/2022    ALKPHOS 98 04/08/2022    AST 30 04/08/2022    ALT 20 04/08/2022       POC Tests: No results for input(s): POCGLU, POCNA, POCK, POCCL, POCBUN, POCHEMO, POCHCT in the last 72 hours. Coags:   Lab Results   Component Value Date    PROTIME 17.9 04/12/2022    PROTIME 12.2 03/18/2011    INR 1.6 04/12/2022    APTT 28.1 04/07/2022       HCG (If Applicable): No results found for: PREGTESTUR, PREGSERUM, HCG, HCGQUANT     ABGs:   Lab Results   Component Value Date    Z2CMWNUR 95.3 01/10/2011        Type & Screen (If Applicable):  No results found for: LABABO, LABRH    Drug/Infectious Status (If Applicable):  No results found for: HIV, HEPCAB    COVID-19 Screening (If Applicable):   Lab Results   Component Value Date    COVID19 Not Detected 04/07/2022           Anesthesia Evaluation  Patient summary reviewed and Nursing notes reviewed no history of anesthetic complications:   Airway: Mallampati: III  TM distance: >3 FB     Mouth opening: > = 3 FB Dental:    (+) poor dentition      Pulmonary:   (+) rhonchi,  decreased breath sounds,      (-) not a current smoker (68 pyhx.  - quit 2010)                           Cardiovascular:  Exercise tolerance: poor (<4 METS),   (+) hypertension: mild, dysrhythmias (PSVT): atrial flutter and SVT, HEARD:, murmur (Grade I),       ECG reviewed  Rhythm: regular  Rate: normal  Echocardiogram reviewed         Beta Blocker:  Dose within 24 Hrs      ROS comment: EKG:  Atrial flutter with 2:1 AV conduction, atrial rate 211 bpm  Nonspecific ST and T wave abnormality  Abnormal ECG  When compared with ECG of 11-APR-2022 09:25,  ST now depressed in Anterior leads    ECHO:  Normal left ventricle size and systolic function. Ejection fraction is visually estimated at 55-60%. Atrial flutter may affect the evaluation of LV systolic function. No regional wall motion abnormalities seen. Agitated saline injected for shunt evaluation. No evidence of thrombus within left atrium or appendage. Normal right ventricular size and function. Mild mitral regurgitation is present. No hemodynamically significant aortic stenosis is present. Unable to estimate PA systolic pressure. Mildly dilated aortic root (4.0 cm). No evidence for hemodynamically significant pericardial effusion. Neuro/Psych:                ROS comment: History of retinal detachment  Ambulatory dysfunction - previous left AKA requiring a prosthesis and four post cane for ambulation assistance GI/Hepatic/Renal:   (+) renal disease (COURTNEY:  creatinine 1.4 & GFR 50): CRI,          ROS comment: GI bleed. Endo/Other:    (+) blood dyscrasia (9.2/31.1; Eliquis): anemia and anticoagulation therapy, arthritis: OA., malignancy/cancer (Rectal). Pt had no PAT visit        ROS comment: Bacteremia Abdominal:         (-) obese       Vascular:   + PVD, aortic or cerebral, . Other Findings:           Anesthesia Plan      MAC     ASA 4     (BP's have been running low - will require phenylephrine supplementation)  Induction: intravenous. Anesthetic plan and risks discussed with patient. Plan discussed with BRODIE.                 Kj Fine DO   4/15/2022

## 2022-04-15 NOTE — DISCHARGE SUMMARY
Discharge Summary     Patient ID:  Chyna Wells  [id number]  63 y.o. 1955 male  Clara Garay MD        Admit date: 4/7/2022    Discharge date and time:  4/15/2022  8:12 AM      Activity level: Up in roldan  Disposition: To subacute rehab  Condition on Discharge: Fair    Admit Diagnoses: Sepsis from urinary tract infection    Discharge Diagnoses: Klebsiella pneumonia sepsis from urinary tract infection. Atrial flutter and atrial fibrillation to be cardioverted, diastolic heart failure. Moderate protein calorie malnutrition    Consults:  IP CONSULT TO GENERAL SURGERY  IP CONSULT TO INTERNAL MEDICINE  IP CONSULT TO INFECTIOUS DISEASES  IP CONSULT TO UROLOGY  IP CONSULT TO CARDIOLOGY  IP CONSULT TO SOCIAL WORK    Procedures: Transesophageal echocardiogram with cardioversion    Hospital Course: 53-year-old male who presented with hypotension and fever and eventually grew out Klebsiella pneumonia from urinary tract he was treated with antibiotics and had a good response however he flipped back into his atrial fibrillation flutter rhythm he will be discharged to subacute rehab to improve strength and nutrition once he is cardioverted discharged in fair condition      LABS:  Recent Labs     04/13/22  1000 04/14/22  0756    139   K 4.0 4.0   * 104   CO2 22 23   BUN 19 18   CREATININE 1.4* 1.4*   GLUCOSE 102* 85   CALCIUM 7.6* 8.1*       No results for input(s): WBC, RBC, HGB, HCT, MCV, MCH, MCHC, RDW, PLT, MPV in the last 72 hours. No results for input(s): GLUMET in the last 72 hours.       Patient Instructions:   Current Discharge Medication List      START taking these medications    Details   sulfamethoxazole-trimethoprim (BACTRIM DS;SEPTRA DS) 800-160 MG per tablet Take 2 tablets by mouth every 12 hours for 2 days  Qty: 8 tablet, Refills: 0         CONTINUE these medications which have NOT CHANGED    Details   magnesium hydroxide (MILK OF MAGNESIA) 400 MG/5ML suspension Take 30 mLs by mouth daily as needed for Constipation (Given if no BM in 48 hrs)      torsemide (DEMADEX) 20 MG tablet Take 20 mg by mouth daily      menthol-zinc oxide (CALMOSEPTINE) 0.44-20.625 % OINT ointment Apply topically 2 times daily Max 30 ml per day.   Apply to buttocks      Emollient (EUCERIN) lotion Apply topically nightly Apply topically to upper and lower extremities      bisacodyl (DULCOLAX) 10 MG suppository Place 10 mg rectally daily as needed for Constipation (If no results from milk of mag.)      calcium carbonate (TUMS) 500 MG chewable tablet Take 1 tablet by mouth every 6 hours as needed for Heartburn (up to six times daily with meals)      amiodarone (CORDARONE) 200 MG tablet Take 1 tablet by mouth daily for 10 doses  Qty: 10 tablet, Refills: 0      tamsulosin (FLOMAX) 0.4 MG capsule Take 1 capsule by mouth nightly  Qty: 30 capsule, Refills: 3      white petrolatum OINT ointment Apply topically 2 times daily  Refills: 0      pantoprazole (PROTONIX) 40 MG tablet Take 1 tablet by mouth every morning (before breakfast)  Qty: 30 tablet, Refills: 3      apixaban (ELIQUIS) 5 MG TABS tablet Take 5 mg by mouth 2 times daily      amLODIPine (NORVASC) 5 MG tablet Take 1 tablet by mouth daily  Qty: 90 tablet, Refills: 3      metoprolol (LOPRESSOR) 100 MG tablet Take 1 tablet by mouth 2 times daily  Qty: 180 tablet, Refills: 3      hydrALAZINE (APRESOLINE) 50 MG tablet take 1 tablet by mouth three times a day  Qty: 270 tablet, Refills: 3                 Signed:  Electronically signed by Dandre Martins MD on 4/15/2022 at 8:12 AM

## 2022-04-15 NOTE — ANESTHESIA POSTPROCEDURE EVALUATION
Department of Anesthesiology  Postprocedure Note    Patient: Kenzie Frederick. MRN: [id number]  YOB: 1955  Date of evaluation: 4/15/2022  Time:  1:58 PM     Procedure Summary     Date: 04/15/22 Room / Location: [facility]    Anesthesia Start: 1305 Anesthesia Stop: 3791    Procedure: TRANSESOPHAGEAL ECHO Diagnosis:     Scheduled Providers:  Responsible Provider: Heidy Cruz DO    Anesthesia Type: MAC ASA Status: 4          Anesthesia Type: MAC    Jolene Phase I: Jolene Score: 10    Jolene Phase II:      Last vitals: Reviewed and per EMR flowsheets. Anesthesia Post Evaluation    Patient location during evaluation: PACU  Patient participation: complete - patient participated  Level of consciousness: awake and alert  Pain score: 0  Airway patency: patent  Nausea & Vomiting: no vomiting and no nausea  Cardiovascular status: blood pressure returned to baseline  Respiratory status: acceptable, spontaneous ventilation and room air  Hydration status: stable  Comments: Seen and examined. BP improved. Stable.   Multimodal analgesia pain management approach    CRISTI Jones - CRNA

## 2022-05-06 ENCOUNTER — OFFICE VISIT (OUTPATIENT)
Dept: CARDIOLOGY CLINIC | Age: 67
End: 2022-05-06
Payer: MEDICARE

## 2022-05-06 VITALS
HEIGHT: 74 IN | WEIGHT: 175 LBS | HEART RATE: 61 BPM | SYSTOLIC BLOOD PRESSURE: 98 MMHG | DIASTOLIC BLOOD PRESSURE: 56 MMHG | RESPIRATION RATE: 16 BRPM | BODY MASS INDEX: 22.46 KG/M2

## 2022-05-06 DIAGNOSIS — I48.92 ATRIAL FLUTTER, UNSPECIFIED TYPE (HCC): Primary | ICD-10-CM

## 2022-05-06 PROCEDURE — 93000 ELECTROCARDIOGRAM COMPLETE: CPT | Performed by: INTERNAL MEDICINE

## 2022-05-06 PROCEDURE — 99214 OFFICE O/P EST MOD 30 MIN: CPT | Performed by: INTERNAL MEDICINE

## 2022-05-06 RX ORDER — M-VIT,TX,IRON,MINS/CALC/FOLIC 27MG-0.4MG
1 TABLET ORAL DAILY
COMMUNITY

## 2022-05-06 RX ORDER — DIPHENHYDRAMINE HCL 25 MG
25 TABLET ORAL EVERY 6 HOURS PRN
COMMUNITY
End: 2022-10-07 | Stop reason: ALTCHOICE

## 2022-05-06 RX ORDER — ANTACID TABLETS 500 MG/1
1 TABLET, CHEWABLE ORAL EVERY 6 HOURS PRN
COMMUNITY

## 2022-05-06 NOTE — PROGRESS NOTES
OUTPATIENT CARDIOLOGY FOLLOW-UP    Name: Vikas Nava. Age: 74 y.o. Primary Care Physician: Samuel Paul MD    Date of Service: 5/6/2022    Chief Complaint:   Chief Complaint   Patient presents with    Atrial Flutter     Pt has no complaints       Interim History:   Mr. Rohith Dominguez is a 78-year-old  male with history of hypertension, rectal cancer diagnosed March 2018 underwent anterior posterior resection, followed by colostomy and colostomy reversal in March 2020, history of left lower extremity amputation and 2011 secondary to fasciitis, history of retinal detachment status post repair, right inguinal hernia repair who presented to the hospital with tachycardia. He was seen as a new consult on 12/20/2020 for new onset atrial fibrillation RVR. He underwent ОЛЬГА guided cardioversion on 12/20/2020 and was successfully converted to sinus rhythm. He was initiated on Eliquis for anticoagulation and metoprolol for rate control and was discharged home. He has  part D and that is covering his prescriptions. Otherwise, he is tolerating Eliquis without any bleeding complications. He is compliant with medications, as well as salt and fluid intake. He does not take any over-the-counter arthritis medications. He was seen in the office on 1/21/22, since his last visit, he was hospitalized from 4/7/2022 to 4/15/2022 with a urine tract infection and sepsis. He was diagnosed with a Klebsiella pneumonia sepsis and also atrial fibrillation/flutter and underwent cardioversion and on 4/15/2022 and was successfully converted to sinus rhythm. Following hospital discharge patient went into rehab and underwent rehab. Today, he was discharged from the rehab place and is going home and going to live by himself. His daughter is going to help him in his day to day activities. Denies any new cardiac symptoms including chest pain, dyspnea, palpitations, dizziness or lightheadedness.   His daughter told me that he had a low blood pressures when he was standing at the rehab place and his antihypertensive medications were discontinued. He still taking Toprol- mg p.o. twice daily. According to his daughter, he also lost weight. No new cardiac complaints since last cardiology evaluation. He denies recent chest pain, SOB, palpitations, lightheadedness, dizziness, syncope, PND, or orthopnea. SR on EKG. Review of Systems:   Cardiac: As per HPI  General: No fever, chills  Pulmonary: As per HPI  HEENT: No visual disturbances, difficult swallowing  GI: No nausea, vomiting  Endocrine: No thyroid disease or DM  Musculoskeletal: KHOURY x 4, no focal motor deficits  Skin: Intact, no rashes  Neuro/Psych: No headache or seizures    Past Medical History:  Past Medical History:   Diagnosis Date    Employs prosthetic leg     left    History of atrial fibrillation     [place] (hard of hearing)     uses bilat hearing aides    Hx of AKA (above knee amputation), left ([address]     Hx of necrotizing fasciitis 2011    left leg    Hypertension     Rectal bleeding     Rectal cancer ([address] 12/2017    rectal       Past Surgical History:  Past Surgical History:   Procedure Laterality Date    ABDOMEN SURGERY  03/20/2018    ileostomy open take down    ABOVE KNEE AMPUTATION  2011    left; hx flesh eating bacteria    CARDIOVERSION  12/2020    COLON SURGERY  01/23/2018    laprascopic low anterior colon resection  take down splenic fexure   ileostomy    COLONOSCOPY  10/21/2011    COLONOSCOPY N/A 2/14/2022    COLONOSCOPY POLYPECTOMY SNARE/COLD BIOPSY performed by Jone Nicholas MD at [address] Left 10/03/2016    pars plana vitrectomy, retinal detachment repair, laser gas/fluid exchange    EYE SURGERY Bilateral 2002?     bilat cataracts w lens implants    EYE SURGERY Right ?    retinal detachment    HEMICOLECTOMY Right 3/22/2022    [facility] REVISION OF ILEOSTOMY,COMPLICATED N/A     ILEOSTOMY OPEN TAKEDOWN performed by [facility]aux [facility] at [facility] OR       Family History:  Family History   Problem Relation Age of Onset    Diabetes Mother     High Blood Pressure Mother     High Cholesterol Mother     Heart Attack Mother     Dementia Mother     Diabetes Father     Glaucoma Father     High Cholesterol Father    Ardyth Moritz Pacemaker Father     Diabetes Sister     Thyroid Disease Sister     High Cholesterol Sister     Heart Attack Brother     Diabetes Sister     High Cholesterol Sister     Thyroid Disease Sister     Diabetes Sister     High Cholesterol Sister     Thyroid Disease Sister        Social History:  Social History     Socioeconomic History    Marital status: Single     Spouse name: Not on file    Number of children: Not on file    Years of education: Not on file    Highest education level: Not on file   Occupational History    Not on file   Tobacco Use    Smoking status: Former Smoker     Packs/day: 2.00     Years: 35.00     Pack years: 70.00     Types: Cigarettes     Start date: 1974     Quit date: 2010     Years since quittin.4    Smokeless tobacco: Never Used   Vaping Use    Vaping Use: Never used   Substance and Sexual Activity    Alcohol use: No    Drug use: No    Sexual activity: Not on file   Other Topics Concern    Not on file   Social History Narrative    Not on file     Social Determinants of Health     Financial Resource Strain:     Difficulty of Paying Living Expenses: Not on file   Food Insecurity:     Worried About Running Out of Food in the Last Year: Not on file    Donny of Food in the Last Year: Not on file   Transportation Needs:     Lack of Transportation (Medical): Not on file    Lack of Transportation (Non-Medical):  Not on file   Physical Activity:     Days of Exercise per Week: Not on file    Minutes of Exercise per Session: Not on file   Stress:     Feeling of Stress : Not on file   Social Connections:     Frequency of Communication with Friends and Family: Not on file    Frequency of Social Gatherings with Friends and Family: Not on file    Attends Faith Services: Not on file    Active Member of Clubs or Organizations: Not on file    Attends Club or Organization Meetings: Not on file    Marital Status: Not on file   Intimate Partner Violence:     Fear of Current or Ex-Partner: Not on file    Emotionally Abused: Not on file    Physically Abused: Not on file    Sexually Abused: Not on file   Housing Stability:     Unable to Pay for Housing in the Last Year: Not on file    Number of [place] in the Last Year: Not on file    Unstable Housing in the Last Year: Not on file       Allergies:   Allergies   Allergen Reactions    Cefepime Rash    Clindamycin/Lincomycin Rash    Pcn [Penicillins] Rash       Current Medications:  Current Outpatient Medications   Medication Sig Dispense Refill    diphenhydrAMINE (BENADRYL) 25 MG tablet Take 25 mg by mouth every 6 hours as needed for Itching      Calcium Carbonate 500 MG CHEW Take 1 tablet by mouth every 6 hours as needed (Heartburn)      Multiple Vitamins-Minerals (THERAPEUTIC MULTIVITAMIN-MINERALS) tablet Take 1 tablet by mouth daily      metoprolol succinate (TOPROL XL) 100 MG extended release tablet Take 1 tablet by mouth 2 times daily 30 tablet 3    magnesium hydroxide (MILK OF MAGNESIA) 400 MG/5ML suspension Take 30 mLs by mouth daily as needed for Constipation (Given if no BM in 48 hrs)      bisacodyl (DULCOLAX) 10 MG suppository Place 10 mg rectally daily as needed for Constipation (If no results from milk of mag.)      tamsulosin (FLOMAX) 0.4 MG capsule Take 1 capsule by mouth nightly 30 capsule 3    pantoprazole (PROTONIX) 40 MG tablet Take 1 tablet by mouth every morning (before breakfast) 30 tablet 3    apixaban (ELIQUIS) 5 MG TABS tablet Take 5 mg by mouth 2 times daily       No current facility-administered medications for this visit. Physical Exam:  BP (!) 100/54   Pulse 61   Resp 16   Ht 6' 2\" (1.88 m)   Wt 175 lb (79.4 kg)   BMI 22.47 kg/m²   Wt Readings from Last 3 Encounters:   05/06/22 175 lb (79.4 kg)   04/15/22 173 lb (78.5 kg)   04/05/22 181 lb 1.6 oz (82.1 kg)     Appearance: Awake, alert and oriented x 3, no acute respiratory distress  Skin: Intact, no rash  Head: Normocephalic, atraumatic  Eyes: EOMI, no conjunctival erythema  ENMT: No pharyngeal erythema, MMM, no rhinorrhea  Neck: Supple, no elevated JVP, no carotid bruits  Lungs: Clear to auscultation bilaterally. No wheezes, rales, or rhonchi.   Cardiac: Regular rate and rhythm, +S1S2, no murmurs apparent  Abdomen: Soft, nontender, +bowel sounds  Extremities: Moves all extremities x 4, no lower extremity edema, has left AKA with prosthetic limb  Neurologic: No focal motor deficits apparent, normal mood and affect, alert and oriented x 3  Peripheral Pulses: Intact posterior tibial pulses bilaterally    Laboratory Tests:  Lab Results   Component Value Date    CREATININE 2.0 (H) 04/21/2022    BUN 23 04/21/2022     (L) 04/21/2022    K 4.3 04/21/2022    CL 97 (L) 04/21/2022    CO2 22 04/21/2022     Lab Results   Component Value Date    MG 1.8 04/13/2022     Lab Results   Component Value Date    WBC 4.8 04/21/2022    HGB 8.9 (L) 04/21/2022    HCT 30.0 (L) 04/21/2022    MCV 87.7 04/21/2022     04/21/2022     Lab Results   Component Value Date    ALT 14 04/21/2022    AST 17 04/21/2022    ALKPHOS 80 04/21/2022    BILITOT 0.4 04/21/2022     Lab Results   Component Value Date    CKTOTAL 12 (L) 03/18/2011    CKMB <0.2 03/18/2011    TROPONINI <0.01 12/19/2020    TROPONINI <0.01 03/18/2011    TROPONINI 0.02 01/03/2011     Lab Results   Component Value Date    INR 1.6 04/12/2022    INR 1.6 04/11/2022    INR 1.5 04/10/2022    PROTIME 17.9 (H) 04/12/2022    PROTIME 17.2 (H) 04/11/2022    PROTIME 16.9 (H) 04/10/2022     Lab Results   Component Value Date    TSH 1.050 03/25/2022     Lab Results   Component Value Date    LABA1C 5.6 12/20/2020     No results found for: EAG  Lab Results   Component Value Date    CHOL 114 12/20/2020     Lab Results   Component Value Date    TRIG 76 12/20/2020     Lab Results   Component Value Date    HDL 41 12/20/2020     Lab Results   Component Value Date    LDLCALC 58 12/20/2020     Lab Results   Component Value Date    LABVLDL 15 12/20/2020     No results found for: [facility]    Cardiac Tests:  ECG: Normal sinus rhythm, normal EKG      TTE-12/20/2020:  Normal left ventricle size and systolic function.   Ejection fraction is visually estimated at 55-60%. Atrial fibrillation may   affect the evaluation of LV systolic function.   Normal left ventricle wall thickness.   No wall motion abnormalities.   Normal diastolic function (E/e' < 71).  The left atrium is moderately dilated.   Mildly dilated right ventricle.   Right ventricle global systolic function is normal . TAPSE 21 mm.   Physiologic and/or trace mitral regurgitation is present.   No hemodynamically significant aortic stenosis is present.   Unable to estimate PA systolic pressure.   Trace pericardial effusion.     ОЛЬГА-: Normal LV function, definite echo contrast was used to visualize left atrial appendage. No intracardiac thrombus and no left atrial appendage thrombus noted. Stress test:      ОЛЬГА- 4/7/2022:  Left ventricle was not well visualized. The right ventricle was not clearly visualized. No evidence of thrombus within left atrium/ left atrial appendage. Emptying velocity is low at 32 cms/s. No evidence of thrombus or mass in the right atrium. Mild mitral regurgitation is present. Mild tricuspid regurgitation. Mild diffuse atherosclerosis in the descending aorta. Pleural effusion seen. Technically limited study.     Cardiac catheterization:     The ASCVD Risk score (Michaela Kurtz, et al., 2013) failed to calculate for the following reasons: The valid total cholesterol range is 130 to 320 mg/dL        ASSESSMENT:  · New onset A. fib with RVR, underwent successful ОЛЬГА guided cardioversion to normal sinus rhythm. He remains in sinus rhythm. · [id number] score-2, Eliquis for anticoagulation  · Hypertension,now has hypotension   · History of rectal cancer s/p resection  · History of retinal detachment status post repair  · COURTNEY resolved  · Mild hypokalemia, improved  · Left lower extremity above-knee amputation  · ROBERTH  · Recent history of UTI with sepsis secondary to Klebsiella pneumonia, treated  . Plan:   · Continue Eliquis 5 mg p.o. twice daily for anticoagulation. · Decrease metoprolol succinate to 50 mg twice daily due to soft BP. · Monitor BP and heart rate daily and call me with readings in one week. · He was advised to drink at least 64 oz of fluids a day. · Follow-up with me in 3 months. The patient's current medication list, allergies, problem list and results of all previously ordered testing were reviewed at today's visit.   Tanya Dias MD  [facility]) Cardiology

## 2022-05-06 NOTE — PATIENT INSTRUCTIONS
· Continue Eliquis 5 mg p.o. twice daily for anticoagulation. · Decrease metoprolol succinate to 50 mg twice daily due to soft BP. · Monitor BP and heart rate daily and call me with readings in one week. · He was advised to drink at least 64 oz of fluids a day. · Follow-up with me in 3 months.

## 2022-05-09 ENCOUNTER — APPOINTMENT (OUTPATIENT)
Dept: ULTRASOUND IMAGING | Age: 67
DRG: 698 | End: 2022-05-09
Payer: MEDICARE

## 2022-05-09 ENCOUNTER — HOSPITAL ENCOUNTER (INPATIENT)
Age: 67
LOS: 4 days | Discharge: HOME HEALTH | DRG: 698 | End: 2022-05-13
Attending: EMERGENCY MEDICINE | Admitting: INTERNAL MEDICINE
Payer: MEDICARE

## 2022-05-09 ENCOUNTER — APPOINTMENT (OUTPATIENT)
Dept: GENERAL RADIOLOGY | Age: 67
DRG: 698 | End: 2022-05-09
Payer: MEDICARE

## 2022-05-09 ENCOUNTER — APPOINTMENT (OUTPATIENT)
Dept: CT IMAGING | Age: 67
DRG: 698 | End: 2022-05-09
Payer: MEDICARE

## 2022-05-09 DIAGNOSIS — T83.511A URINARY TRACT INFECTION ASSOCIATED WITH INDWELLING URETHRAL CATHETER, INITIAL ENCOUNTER (HCC): ICD-10-CM

## 2022-05-09 DIAGNOSIS — R21 RASH AND OTHER NONSPECIFIC SKIN ERUPTION: ICD-10-CM

## 2022-05-09 DIAGNOSIS — A41.9 SEPSIS WITHOUT ACUTE ORGAN DYSFUNCTION, DUE TO UNSPECIFIED ORGANISM (HCC): Primary | ICD-10-CM

## 2022-05-09 DIAGNOSIS — N39.0 URINARY TRACT INFECTION ASSOCIATED WITH INDWELLING URETHRAL CATHETER, INITIAL ENCOUNTER (HCC): ICD-10-CM

## 2022-05-09 PROBLEM — R33.9 URINARY RETENTION: Status: ACTIVE | Noted: 2022-05-09

## 2022-05-09 LAB
ACANTHOCYTES: ABNORMAL
ALBUMIN SERPL-MCNC: 3 G/DL (ref 3.5–5.2)
ALP BLD-CCNC: 82 U/L (ref 40–129)
ALT SERPL-CCNC: 10 U/L (ref 0–40)
ANION GAP SERPL CALCULATED.3IONS-SCNC: 11 MMOL/L (ref 7–16)
ANISOCYTOSIS: ABNORMAL
AST SERPL-CCNC: 18 U/L (ref 0–39)
BACTERIA: ABNORMAL /HPF
BASOPHILS ABSOLUTE: 0.02 E9/L (ref 0–0.2)
BASOPHILS RELATIVE PERCENT: 0.3 % (ref 0–2)
BILIRUB SERPL-MCNC: 0.9 MG/DL (ref 0–1.2)
BILIRUBIN URINE: NEGATIVE
BLOOD, URINE: ABNORMAL
BUN BLDV-MCNC: 43 MG/DL (ref 6–23)
CALCIUM SERPL-MCNC: 7.5 MG/DL (ref 8.6–10.2)
CHLORIDE BLD-SCNC: 102 MMOL/L (ref 98–107)
CHP ED QC CHECK: NORMAL
CLARITY: ABNORMAL
CO2: 24 MMOL/L (ref 22–29)
COARSE CASTS, UA: ABNORMAL /LPF (ref 0–2)
COLOR: YELLOW
CREAT SERPL-MCNC: 2.3 MG/DL (ref 0.7–1.2)
EKG ATRIAL RATE: 63 BPM
EKG P AXIS: 84 DEGREES
EKG P-R INTERVAL: 226 MS
EKG Q-T INTERVAL: 470 MS
EKG QRS DURATION: 84 MS
EKG QTC CALCULATION (BAZETT): 480 MS
EKG R AXIS: 66 DEGREES
EKG T AXIS: 64 DEGREES
EKG VENTRICULAR RATE: 63 BPM
EOSINOPHILS ABSOLUTE: 0 E9/L (ref 0.05–0.5)
EOSINOPHILS RELATIVE PERCENT: 0 % (ref 0–6)
GFR AFRICAN AMERICAN: 34
GFR NON-AFRICAN AMERICAN: 28 ML/MIN/1.73
GLUCOSE BLD-MCNC: 116 MG/DL
GLUCOSE BLD-MCNC: 116 MG/DL (ref 74–99)
GLUCOSE URINE: NEGATIVE MG/DL
HCT VFR BLD CALC: 32.6 % (ref 37–54)
HEMOGLOBIN: 9.9 G/DL (ref 12.5–16.5)
IMMATURE GRANULOCYTES #: 0.04 E9/L
IMMATURE GRANULOCYTES %: 0.6 % (ref 0–5)
KETONES, URINE: NEGATIVE MG/DL
LACTIC ACID: 1.5 MMOL/L (ref 0.5–2.2)
LEUKOCYTE ESTERASE, URINE: ABNORMAL
LYMPHOCYTES ABSOLUTE: 0.61 E9/L (ref 1.5–4)
LYMPHOCYTES RELATIVE PERCENT: 9 % (ref 20–42)
MCH RBC QN AUTO: 26.8 PG (ref 26–35)
MCHC RBC AUTO-ENTMCNC: 30.4 % (ref 32–34.5)
MCV RBC AUTO: 88.1 FL (ref 80–99.9)
MONOCYTES ABSOLUTE: 0.97 E9/L (ref 0.1–0.95)
MONOCYTES RELATIVE PERCENT: 14.3 % (ref 2–12)
NEUTROPHILS ABSOLUTE: 5.15 E9/L (ref 1.8–7.3)
NEUTROPHILS RELATIVE PERCENT: 75.8 % (ref 43–80)
NITRITE, URINE: POSITIVE
OVALOCYTES: ABNORMAL
PDW BLD-RTO: 21.4 FL (ref 11.5–15)
PH UA: 5.5 (ref 5–9)
PLATELET # BLD: 209 E9/L (ref 130–450)
PMV BLD AUTO: 9.5 FL (ref 7–12)
POIKILOCYTES: ABNORMAL
POTASSIUM REFLEX MAGNESIUM: 4.3 MMOL/L (ref 3.5–5)
PRO-BNP: 1628 PG/ML (ref 0–125)
PROTEIN UA: 100 MG/DL
RBC # BLD: 3.7 E12/L (ref 3.8–5.8)
RBC UA: ABNORMAL /HPF (ref 0–2)
SODIUM BLD-SCNC: 137 MMOL/L (ref 132–146)
SPECIFIC GRAVITY UA: >=1.03 (ref 1–1.03)
TOTAL CK: 29 U/L (ref 20–200)
TOTAL PROTEIN: 5.8 G/DL (ref 6.4–8.3)
TROPONIN, HIGH SENSITIVITY: 19 NG/L (ref 0–11)
TROPONIN, HIGH SENSITIVITY: 22 NG/L (ref 0–11)
UROBILINOGEN, URINE: 0.2 E.U./DL
WBC # BLD: 6.8 E9/L (ref 4.5–11.5)
WBC UA: >20 /HPF (ref 0–5)

## 2022-05-09 PROCEDURE — 85025 COMPLETE CBC W/AUTO DIFF WBC: CPT

## 2022-05-09 PROCEDURE — 93005 ELECTROCARDIOGRAM TRACING: CPT | Performed by: STUDENT IN AN ORGANIZED HEALTH CARE EDUCATION/TRAINING PROGRAM

## 2022-05-09 PROCEDURE — 96360 HYDRATION IV INFUSION INIT: CPT

## 2022-05-09 PROCEDURE — 87186 SC STD MICRODIL/AGAR DIL: CPT

## 2022-05-09 PROCEDURE — 6360000002 HC RX W HCPCS: Performed by: STUDENT IN AN ORGANIZED HEALTH CARE EDUCATION/TRAINING PROGRAM

## 2022-05-09 PROCEDURE — 2580000003 HC RX 258: Performed by: STUDENT IN AN ORGANIZED HEALTH CARE EDUCATION/TRAINING PROGRAM

## 2022-05-09 PROCEDURE — 71045 X-RAY EXAM CHEST 1 VIEW: CPT

## 2022-05-09 PROCEDURE — 96365 THER/PROPH/DIAG IV INF INIT: CPT

## 2022-05-09 PROCEDURE — 36415 COLL VENOUS BLD VENIPUNCTURE: CPT

## 2022-05-09 PROCEDURE — 80053 COMPREHEN METABOLIC PANEL: CPT

## 2022-05-09 PROCEDURE — 84484 ASSAY OF TROPONIN QUANT: CPT

## 2022-05-09 PROCEDURE — 6370000000 HC RX 637 (ALT 250 FOR IP): Performed by: INTERNAL MEDICINE

## 2022-05-09 PROCEDURE — 99285 EMERGENCY DEPT VISIT HI MDM: CPT

## 2022-05-09 PROCEDURE — 87150 DNA/RNA AMPLIFIED PROBE: CPT

## 2022-05-09 PROCEDURE — 87077 CULTURE AEROBIC IDENTIFY: CPT

## 2022-05-09 PROCEDURE — 70450 CT HEAD/BRAIN W/O DYE: CPT

## 2022-05-09 PROCEDURE — 83605 ASSAY OF LACTIC ACID: CPT

## 2022-05-09 PROCEDURE — 81001 URINALYSIS AUTO W/SCOPE: CPT

## 2022-05-09 PROCEDURE — 76770 US EXAM ABDO BACK WALL COMP: CPT

## 2022-05-09 PROCEDURE — 83880 ASSAY OF NATRIURETIC PEPTIDE: CPT

## 2022-05-09 PROCEDURE — 93010 ELECTROCARDIOGRAM REPORT: CPT | Performed by: INTERNAL MEDICINE

## 2022-05-09 PROCEDURE — 1200000000 HC SEMI PRIVATE

## 2022-05-09 PROCEDURE — 87040 BLOOD CULTURE FOR BACTERIA: CPT

## 2022-05-09 PROCEDURE — 6360000002 HC RX W HCPCS: Performed by: INTERNAL MEDICINE

## 2022-05-09 PROCEDURE — 2580000003 HC RX 258: Performed by: INTERNAL MEDICINE

## 2022-05-09 PROCEDURE — 82550 ASSAY OF CK (CPK): CPT

## 2022-05-09 PROCEDURE — 87088 URINE BACTERIA CULTURE: CPT

## 2022-05-09 RX ORDER — M-VIT,TX,IRON,MINS/CALC/FOLIC 27MG-0.4MG
1 TABLET ORAL DAILY
Status: DISCONTINUED | OUTPATIENT
Start: 2022-05-09 | End: 2022-05-13 | Stop reason: HOSPADM

## 2022-05-09 RX ORDER — SODIUM CHLORIDE 9 MG/ML
INJECTION, SOLUTION INTRAVENOUS CONTINUOUS
Status: DISCONTINUED | OUTPATIENT
Start: 2022-05-09 | End: 2022-05-10

## 2022-05-09 RX ORDER — METOPROLOL SUCCINATE 50 MG/1
50 TABLET, EXTENDED RELEASE ORAL 2 TIMES DAILY
Status: DISCONTINUED | OUTPATIENT
Start: 2022-05-09 | End: 2022-05-13 | Stop reason: HOSPADM

## 2022-05-09 RX ORDER — POLYETHYLENE GLYCOL 3350 17 G/17G
17 POWDER, FOR SOLUTION ORAL DAILY PRN
Status: DISCONTINUED | OUTPATIENT
Start: 2022-05-09 | End: 2022-05-13 | Stop reason: HOSPADM

## 2022-05-09 RX ORDER — SODIUM CHLORIDE 0.9 % (FLUSH) 0.9 %
5-40 SYRINGE (ML) INJECTION EVERY 12 HOURS SCHEDULED
Status: DISCONTINUED | OUTPATIENT
Start: 2022-05-09 | End: 2022-05-13 | Stop reason: HOSPADM

## 2022-05-09 RX ORDER — 0.9 % SODIUM CHLORIDE 0.9 %
1000 INTRAVENOUS SOLUTION INTRAVENOUS ONCE
Status: COMPLETED | OUTPATIENT
Start: 2022-05-09 | End: 2022-05-09

## 2022-05-09 RX ORDER — 0.9 % SODIUM CHLORIDE 0.9 %
500 INTRAVENOUS SOLUTION INTRAVENOUS ONCE
Status: COMPLETED | OUTPATIENT
Start: 2022-05-09 | End: 2022-05-09

## 2022-05-09 RX ORDER — SODIUM CHLORIDE 9 MG/ML
INJECTION, SOLUTION INTRAVENOUS PRN
Status: DISCONTINUED | OUTPATIENT
Start: 2022-05-09 | End: 2022-05-13 | Stop reason: HOSPADM

## 2022-05-09 RX ORDER — SODIUM CHLORIDE 0.9 % (FLUSH) 0.9 %
5-40 SYRINGE (ML) INJECTION PRN
Status: DISCONTINUED | OUTPATIENT
Start: 2022-05-09 | End: 2022-05-13 | Stop reason: HOSPADM

## 2022-05-09 RX ORDER — ONDANSETRON 2 MG/ML
4 INJECTION INTRAMUSCULAR; INTRAVENOUS EVERY 6 HOURS PRN
Status: DISCONTINUED | OUTPATIENT
Start: 2022-05-09 | End: 2022-05-13 | Stop reason: HOSPADM

## 2022-05-09 RX ORDER — ACETAMINOPHEN 650 MG/1
650 SUPPOSITORY RECTAL EVERY 6 HOURS PRN
Status: DISCONTINUED | OUTPATIENT
Start: 2022-05-09 | End: 2022-05-13 | Stop reason: HOSPADM

## 2022-05-09 RX ORDER — CALCIUM CARBONATE 200(500)MG
1 TABLET,CHEWABLE ORAL EVERY 6 HOURS PRN
Status: DISCONTINUED | OUTPATIENT
Start: 2022-05-09 | End: 2022-05-13 | Stop reason: HOSPADM

## 2022-05-09 RX ORDER — PANTOPRAZOLE SODIUM 40 MG/1
40 TABLET, DELAYED RELEASE ORAL
Status: DISCONTINUED | OUTPATIENT
Start: 2022-05-10 | End: 2022-05-13 | Stop reason: HOSPADM

## 2022-05-09 RX ORDER — TAMSULOSIN HYDROCHLORIDE 0.4 MG/1
0.4 CAPSULE ORAL NIGHTLY
Status: DISCONTINUED | OUTPATIENT
Start: 2022-05-09 | End: 2022-05-13 | Stop reason: HOSPADM

## 2022-05-09 RX ORDER — ONDANSETRON 4 MG/1
4 TABLET, ORALLY DISINTEGRATING ORAL EVERY 8 HOURS PRN
Status: DISCONTINUED | OUTPATIENT
Start: 2022-05-09 | End: 2022-05-13 | Stop reason: HOSPADM

## 2022-05-09 RX ORDER — ACETAMINOPHEN 325 MG/1
650 TABLET ORAL EVERY 6 HOURS PRN
Status: DISCONTINUED | OUTPATIENT
Start: 2022-05-09 | End: 2022-05-13 | Stop reason: HOSPADM

## 2022-05-09 RX ADMIN — MEROPENEM 1000 MG: 1 INJECTION, POWDER, FOR SOLUTION INTRAVENOUS at 20:22

## 2022-05-09 RX ADMIN — ACETAMINOPHEN 650 MG: 325 TABLET ORAL at 17:20

## 2022-05-09 RX ADMIN — MEROPENEM 1000 MG: 1 INJECTION, POWDER, FOR SOLUTION INTRAVENOUS at 12:19

## 2022-05-09 RX ADMIN — SODIUM CHLORIDE 500 ML: 9 INJECTION, SOLUTION INTRAVENOUS at 12:21

## 2022-05-09 RX ADMIN — MULTIPLE VITAMINS W/ MINERALS TAB 1 TABLET: TAB at 17:07

## 2022-05-09 RX ADMIN — APIXABAN 5 MG: 5 TABLET, FILM COATED ORAL at 20:17

## 2022-05-09 RX ADMIN — TAMSULOSIN HYDROCHLORIDE 0.4 MG: 0.4 CAPSULE ORAL at 20:17

## 2022-05-09 RX ADMIN — SODIUM CHLORIDE, PRESERVATIVE FREE 10 ML: 5 INJECTION INTRAVENOUS at 20:18

## 2022-05-09 RX ADMIN — SODIUM CHLORIDE 1000 ML: 9 INJECTION, SOLUTION INTRAVENOUS at 09:20

## 2022-05-09 RX ADMIN — SODIUM CHLORIDE: 9 INJECTION, SOLUTION INTRAVENOUS at 17:08

## 2022-05-09 ASSESSMENT — ENCOUNTER SYMPTOMS
SORE THROAT: 0
DIARRHEA: 0
ABDOMINAL PAIN: 0
EYE DISCHARGE: 0
COUGH: 0
VOMITING: 0
SINUS PRESSURE: 0
EYE REDNESS: 0
EYE PAIN: 0
WHEEZING: 0
NAUSEA: 0
BACK PAIN: 0
SHORTNESS OF BREATH: 0

## 2022-05-09 ASSESSMENT — PAIN SCALES - GENERAL
PAINLEVEL_OUTOF10: 0
PAINLEVEL_OUTOF10: 0

## 2022-05-09 ASSESSMENT — PAIN - FUNCTIONAL ASSESSMENT
PAIN_FUNCTIONAL_ASSESSMENT: NONE - DENIES PAIN

## 2022-05-09 NOTE — ED PROVIDER NOTES
[facility]  Department of Emergency Medicine     Written by: Pelon Sahu DO  Patient Name: Santhosh Kingston. Attending Provider: Clau Ordonez DO  Admit Date: 2022  8:28 AM  MRN: [id number]                   : 1955        Chief Complaint   Patient presents with    Extremity Weakness     patient from home, has been week, soto cath in place, rash on arms and sides    Fever    Rash    - Chief complaint    Patient is a 66-year-old male past medical history of hypertension, atrial fibrillation and left lower extremity necrotizing fasciitis status post AKA. Patient presents with chief complaint of generalized weakness as well as fevers and rash. Patient states that symptoms began a few days ago. He states that he has had generalized weakness as well as difficulty ambulating. Patient also endorses mild intermittent fevers. T-max at home of 101. Patient also notes generalized rash to his arms and legs. Patient also endorses decreased oral intake. Patient states that symptoms have been moderate in severity and constant since onset. He denies any exacerbating relieving factors. Patient stated symptoms have been constant since onset. Patient denies any similar episodes in the past.  Patient denies any nausea, vomiting, chest pain, cough, abdominal pain, constipation or diarrhea. The history is provided by the patient. No  was used. Review of Systems   Constitutional: Positive for appetite change, fatigue and fever. Negative for chills. HENT: Negative for ear pain, sinus pressure and sore throat. Eyes: Negative for pain, discharge and redness. Respiratory: Negative for cough, shortness of breath and wheezing. Cardiovascular: Negative for chest pain. Gastrointestinal: Negative for abdominal pain, diarrhea, nausea and vomiting. Genitourinary: Negative for dysuria and frequency.    Musculoskeletal: Negative for arthralgias and back pain. Skin: Positive for rash. Negative for wound. Neurological: Positive for weakness. Negative for headaches. Hematological: Negative for adenopathy. All other systems reviewed and are negative. Physical Exam  Vitals and nursing note reviewed. Constitutional:       Appearance: He is well-developed. HENT:      Head: Normocephalic and atraumatic. Eyes:      Conjunctiva/sclera: Conjunctivae normal.   Cardiovascular:      Rate and Rhythm: Normal rate and regular rhythm. Heart sounds: Normal heart sounds. No murmur heard. Pulmonary:      Effort: Pulmonary effort is normal. No respiratory distress. Breath sounds: Normal breath sounds. No wheezing or rales. Abdominal:      General: Bowel sounds are normal.      Palpations: Abdomen is soft. Tenderness: There is no abdominal tenderness. There is no guarding or rebound. Musculoskeletal:         General: No tenderness or deformity. Cervical back: Normal range of motion and neck supple. Skin:     General: Skin is warm and dry. Neurological:      Mental Status: He is alert and oriented to person, place, and time. Cranial Nerves: No cranial nerve deficit. Coordination: Coordination normal.          Procedures       MDM  Number of Diagnoses or Management Options  Rash and other nonspecific skin eruption  Sepsis without acute organ dysfunction, due to unspecified organism [facility])  Urinary tract infection associated with indwelling urethral catheter, initial encounter [facility])  Diagnosis management comments: Patient is a 67-year-old male past medical history of hypertension, A. fib and left extremity extremity fracture status post AKA. Patient presents with chief complaint of altered mental status as well as fevers. Patient hypotensive and febrile on arrival manual vital signs stable. On physical exam patient is lethargic but does awaken to voice. He is alert to person and place but not time or condition.   Heart regular rate and rhythm, lungs clear to auscultation bilaterally, abdomen soft nontender no rigidity rebound or guarding. Valles in place draining dark urine. No focal deficits, generalized rash noted to arms with excoriations. No petechiae noted. EKG obtained demonstrate no acute ischemic changes. Laboratory work obtained CBC demonstrated mild anemia hemoglobin 9.9, CMP obtained demonstrated chronically elevated creatinine at 2.3, lactic acid 1.5, proBNP 1628, CK29, troponin was obtained initially 22 and repeat 19. Urinalysis was obtained and was indicative of infection positive nitrite and moderate excite esterase with greater than 20 WBCs and many bacteria. Blood cultures obtained and are pending. CT scan of the head obtained demonstrated no acute abnormalities, chest x-ray obtained demonstrated no acute abnormalities. Findings consistent with sepsis secondary to UTI. Patient given 1 g meropenem. Decision made to admit patient. Case discussed with patient's PCP who agreed to admit patient. Plan of care discussed with patient and caretaker at the bedside, all questions were answered, they were in agreement plan of care and patient was admitted to the hospital in stable condition.        Amount and/or Complexity of Data Reviewed  Clinical lab tests: ordered and reviewed  Tests in the radiology section of CPT®: ordered and reviewed  Decide to obtain previous medical records or to obtain history from someone other than the patient: yes    Risk of Complications, Morbidity, and/or Mortality  Presenting problems: moderate  Diagnostic procedures: moderate  Management options: moderate    Patient Progress  Patient progress: stable       ED Course as of 05/10/22 0639   Mon May 09, 2022   1330 Case discussed with Dr. Darryle Frees who agreed to admit patient [BP]      ED Course User Index  [BP] Meagan Alexander DO        --------------------------------------------- PAST HISTORY ---------------------------------------------  Past Medical History:  has a past medical history of Employs prosthetic leg, History of atrial fibrillation, [place] (hard of hearing), Hx of AKA (above knee amputation), left ([facility] Hx of necrotizing fasciitis, Hypertension, Rectal bleeding, and [facility] Past Surgical History:  has a past surgical history that includes above knee amputation (2011); Colonoscopy (10/21/2011); Eye surgery (Left, 10/03/2016); eye surgery (Bilateral, 2002?); eye surgery (Right, ?); Colon surgery (01/23/2018); Abdomen surgery (03/20/2018); pr revision of ileostomy,complicated (N/A, 9/64/1188); Colonoscopy (N/A, 2/14/2022); Cardioversion (12/2020); and hemicolectomy (Right, 3/22/2022). Social History:  reports that he quit smoking about 11 years ago. His smoking use included cigarettes. He started smoking about 48 years ago. He has a 70.00 pack-year smoking history. He has never used smokeless tobacco. He reports that he does not drink alcohol and does not use drugs. Family History: family history includes Dementia in his mother; Diabetes in his father, mother, sister, sister, and sister; Glaucoma in his father; Heart Attack in his brother and mother; High Blood Pressure in his mother; High Cholesterol in his father, mother, sister, sister, and sister; Pacemaker in his father; Thyroid Disease in his sister, sister, and sister. The patients home medications have been reviewed.     Allergies: Cefepime, Clindamycin/lincomycin, and Pcn [penicillins]    -------------------------------------------------- RESULTS -------------------------------------------------    LABS:  Results for orders placed or performed during the hospital encounter of 05/09/22   CBC with Auto Differential   Result Value Ref Range    WBC 6.8 4.5 - 11.5 E9/L    RBC 3.70 (L) 3.80 - 5.80 E12/L    Hemoglobin 9.9 (L) 12.5 - 16.5 g/dL    Hematocrit 32.6 (L) 37.0 - 54.0 %    MCV 88.1 80.0 - 99.9 fL    MCH 26.8 26.0 - 35.0 pg    MCHC 30.4 (L) 32.0 - 34.5 %    RDW 21.4 (H) 11.5 - 15.0 fL    Platelets 238 225 - 696 E9/L    MPV 9.5 7.0 - 12.0 fL    Neutrophils % 75.8 43.0 - 80.0 %    Immature Granulocytes % 0.6 0.0 - 5.0 %    Lymphocytes % 9.0 (L) 20.0 - 42.0 %    Monocytes % 14.3 (H) 2.0 - 12.0 %    Eosinophils % 0.0 0.0 - 6.0 %    Basophils % 0.3 0.0 - 2.0 %    Neutrophils Absolute 5.15 1.80 - 7.30 E9/L    Immature Granulocytes # 0.04 E9/L    Lymphocytes Absolute 0.61 (L) 1.50 - 4.00 E9/L    Monocytes Absolute 0.97 (H) 0.10 - 0.95 E9/L    Eosinophils Absolute 0.00 (L) 0.05 - 0.50 E9/L    Basophils Absolute 0.02 0.00 - 0.20 E9/L    Anisocytosis 1+     Poikilocytes 1+     Acanthocytes 1+     Ovalocytes 1+    Comprehensive Metabolic Panel w/ Reflex to MG   Result Value Ref Range    Sodium 137 132 - 146 mmol/L    Potassium reflex Magnesium 4.3 3.5 - 5.0 mmol/L    Chloride 102 98 - 107 mmol/L    CO2 24 22 - 29 mmol/L    Anion Gap 11 7 - 16 mmol/L    Glucose 116 (H) 74 - 99 mg/dL    BUN 43 (H) 6 - 23 mg/dL    CREATININE 2.3 (H) 0.7 - 1.2 mg/dL    GFR Non-African American 28 >=60 mL/min/1.73    GFR African American 34     Calcium 7.5 (L) 8.6 - 10.2 mg/dL    Total Protein 5.8 (L) 6.4 - 8.3 g/dL    Albumin 3.0 (L) 3.5 - 5.2 g/dL    Total Bilirubin 0.9 0.0 - 1.2 mg/dL    Alkaline Phosphatase 82 40 - 129 U/L    ALT 10 0 - 40 U/L    AST 18 0 - 39 U/L   Troponin   Result Value Ref Range    Troponin, High Sensitivity 22 (H) 0 - 11 ng/L   Brain Natriuretic Peptide   Result Value Ref Range    Pro-BNP 1,628 (H) 0 - 125 pg/mL   Urinalysis with Microscopic   Result Value Ref Range    Color, UA Yellow Straw/Yellow    Clarity, UA CLOUDY (A) Clear    Glucose, Ur Negative Negative mg/dL    Bilirubin Urine Negative Negative    Ketones, Urine Negative Negative mg/dL    Specific Gravity, UA >=1.030 1.005 - 1.030    Blood, Urine LARGE (A) Negative    pH, UA 5.5 5.0 - 9.0    Protein,  (A) Negative mg/dL    Urobilinogen, Urine 0.2 <2.0 E.U./dL Nitrite, Urine POSITIVE (A) Negative    Leukocyte Esterase, Urine MODERATE (A) Negative    Coarse Casts, UA 0-2 0 - 2 /LPF    WBC, UA >20 (A) 0 - 5 /HPF    RBC, UA 5-10 (A) 0 - 2 /HPF    Bacteria, UA MANY (A) None Seen /HPF   Lactic Acid   Result Value Ref Range    Lactic Acid 1.5 0.5 - 2.2 mmol/L   CK   Result Value Ref Range    Total CK 29 20 - 200 U/L   Troponin   Result Value Ref Range    Troponin, High Sensitivity 19 (H) 0 - 11 ng/L   CBC with Auto Differential   Result Value Ref Range    WBC 5.2 4.5 - 11.5 E9/L    RBC 2.98 (L) 3.80 - 5.80 E12/L    Hemoglobin 7.9 (L) 12.5 - 16.5 g/dL    Hematocrit 26.9 (L) 37.0 - 54.0 %    MCV 90.3 80.0 - 99.9 fL    MCH 26.5 26.0 - 35.0 pg    MCHC 29.4 (L) 32.0 - 34.5 %    RDW 21.2 (H) 11.5 - 15.0 fL    Platelets 917 975 - 949 E9/L    MPV 10.0 7.0 - 12.0 fL    Neutrophils % 76.5 43.0 - 80.0 %    Lymphocytes % 8.7 (L) 20.0 - 42.0 %    Monocytes % 7.8 2.0 - 12.0 %    Eosinophils % 6.1 (H) 0.0 - 6.0 %    Basophils % 0.9 0.0 - 2.0 %    Neutrophils Absolute 4.00 1.80 - 7.30 E9/L    Lymphocytes Absolute 0.47 (L) 1.50 - 4.00 E9/L    Monocytes Absolute 0.42 0.10 - 0.95 E9/L    Eosinophils Absolute 0.32 0.05 - 0.50 E9/L    Basophils Absolute 0.05 0.00 - 0.20 E9/L    nRBC 0.0 /100 WBC    Anisocytosis 1+     Polychromasia 1+     Hypochromia 1+     Poikilocytes 2+     Schistocytes 1+     Ovalocytes 2+    Comprehensive Metabolic Panel w/ Reflex to MG   Result Value Ref Range    Sodium 136 132 - 146 mmol/L    Potassium reflex Magnesium 3.8 3.5 - 5.0 mmol/L    Chloride 105 98 - 107 mmol/L    CO2 25 22 - 29 mmol/L    Anion Gap 6 (L) 7 - 16 mmol/L    Glucose 120 (H) 74 - 99 mg/dL    BUN 38 (H) 6 - 23 mg/dL    CREATININE 1.7 (H) 0.7 - 1.2 mg/dL    GFR Non-African American 40 >=60 mL/min/1.73    GFR African American 49     Calcium 7.5 (L) 8.6 - 10.2 mg/dL    Total Protein 5.2 (L) 6.4 - 8.3 g/dL    Albumin 2.3 (L) 3.5 - 5.2 g/dL    Total Bilirubin 0.4 0.0 - 1.2 mg/dL    Alkaline Phosphatase 139 (H) 40 - 129 U/L    ALT 9 0 - 40 U/L    AST 16 0 - 39 U/L   POCT Glucose   Result Value Ref Range    Glucose 116 mg/dL    QC OK? ok    EKG 12 Lead   Result Value Ref Range    Ventricular Rate 63 BPM    Atrial Rate 63 BPM    P-R Interval 226 ms    QRS Duration 84 ms    Q-T Interval 470 ms    QTc Calculation (Bazett) 480 ms    P Axis 84 degrees    R Axis 66 degrees    T Axis 64 degrees       RADIOLOGY:  US RETROPERITONEAL COMPLETE   Final Result   Findings are suggestive of medical renal disease without obstruction. CT Head WO Contrast   Final Result   No definite acute intracranial abnormality. Moderate generalized atrophy         XR CHEST PORTABLE   Final Result   No acute cardiopulmonary process. EKG:  This EKG is signed and interpreted by me. Rate:63  Rhythm: Sinus  Interpretation: EKG obtained demonstrates sinus rhythm with first-degree AV block, rate 63, normal axis, , no acute ST segment changes. Comparison: stable as compared to patient's most recent EKG      ------------------------- NURSING NOTES AND VITALS REVIEWED ---------------------------  Date / Time Roomed:  5/9/2022  8:28 AM  ED Bed Assignment:  [id number]    The nursing notes within the ED encounter and vital signs as below have been reviewed.      Patient Vitals for the past 24 hrs:   BP Temp Temp src Pulse Resp SpO2 Height Weight   05/10/22 0037 -- -- -- -- -- -- -- 170 lb (77.1 kg)   05/09/22 2017 (!) 100/55 99 °F (37.2 °C) Oral 78 20 98 % -- --   05/09/22 1854 -- 99.5 °F (37.5 °C) Oral -- -- -- -- --   05/09/22 1659 (!) 121/56 102.5 °F (39.2 °C) Oral 77 20 -- -- --   05/09/22 1445 (!) 110/53 98.1 °F (36.7 °C) Oral 74 16 98 % -- --   05/09/22 1319 123/60 98.4 °F (36.9 °C) -- 68 14 98 % -- --   05/09/22 1220 (!) 113/58 98.6 °F (37 °C) -- 64 14 100 % -- --   05/09/22 1124 112/61 -- -- 64 -- 99 % -- --   05/09/22 1003 (!) 102/55 -- -- 65 -- 99 % -- --   05/09/22 0918 (!) 90/54 -- -- 63 14 98 % -- --   05/09/22 8955 (!) 86/50 98.3 °F (36.8 °C) Oral 63 14 97 % 6' 2\" (1.88 m) 175 lb (79.4 kg)       Oxygen Saturation Interpretation: Normal    ------------------------------------------ PROGRESS NOTES ------------------------------------------  Re-evaluation(s):  Time: 1120  Patients symptoms show no change  Repeat physical examination is improved    Counseling:  I have spoken with the patient and   Discussed todays results, in addition to providing specific details for the plan of care and counseling regarding the diagnosis and prognosis. Their questions are answered at this time and they are agreeable with the plan of admission.    --------------------------------- ADDITIONAL PROVIDER NOTES ---------------------------------  Consultations:  Time: 1330. Spoke with . Discussed case. They will admit the patient. This patient's ED course included: a personal history and physicial examination, re-evaluation prior to disposition and multiple bedside re-evaluations    This patient has remained hemodynamically stable during their ED course. Diagnosis:  1. Sepsis without acute organ dysfunction, due to unspecified organism ([facility]    2. Urinary tract infection associated with indwelling urethral catheter, initial encounter ([facility]    3. Rash and other nonspecific skin eruption        Disposition:  Patient's disposition: Admit to telemetry  Patient's condition is stable. Patient was seen and evaluated by myself and my attending Lanette Manzano DO. Assessment and Plan discussed with attending provider, please see attestation for final plan of care.      DO Virgil Holman DO  Resident  05/10/22 7800

## 2022-05-09 NOTE — CONSULTS
5/9/2022 3:47 PM  Service: Urology  Group: [facility] (Vishal/Tasha/Dana)    Rafael Wise.  [id number]     Chief Complaint:    Urinary Retention     History of Present Illness: The patient is a 74 y.o. male patient who presented to the hospital today for generalized weakness and difficulty ambulating. We were asked to evaluate for urinary retention. He was seen by Dr. Jorge Luis José on 4/2/22 for urinary retention  (>1700 cc) after a laparoscopic hemicolectomy by Dr. William Gerber 3/22/22. He was found to have an enlarged prostate. He was discharged with the soto catheter to follow up as an op for a VT. The facility apparently delayed f/u appt so Vt was done there. He failed and had the soto replaced. Family says he had had a fever and difficulty ambulating since 1 day prior to admission. He has a hx of left AKA with prosthesis, afib, rectal cancer. His sisters are here with him. No documented fevers since admission     He was placed on Flomax. He has an appointment with Dr. Alexandro Bullock on 5/16/22.      Past Medical History:   Diagnosis Date    Employs prosthetic leg     left    History of atrial fibrillation     [place] (hard of hearing)     uses bilat hearing aides    Hx of AKA (above knee amputation), left ([address]     Hx of necrotizing fasciitis 2011    left leg    Hypertension     Rectal bleeding     Rectal cancer ([address] 12/2017    rectal         Past Surgical History:   Procedure Laterality Date    ABDOMEN SURGERY  03/20/2018    ileostomy open take down    ABOVE KNEE AMPUTATION  2011    left; hx flesh eating bacteria    CARDIOVERSION  12/2020    COLON SURGERY  01/23/2018    laprascopic low anterior colon resection  take down splenic fexure   ileostomy    COLONOSCOPY  10/21/2011    COLONOSCOPY N/A 2/14/2022    COLONOSCOPY POLYPECTOMY SNARE/COLD BIOPSY performed by Magdaleno Haq MD at [address] Left 10/03/2016    pars plana vitrectomy, retinal detachment repair, laser gas/fluid exchange    EYE SURGERY Bilateral 2002? bilat cataracts w lens implants    EYE SURGERY Right ?    retinal detachment    HEMICOLECTOMY Right 3/22/2022    LAPAROSCOPIC RIGHT HEMICOLECTOMY performed by Marc Sellers MD at [address] [place] [address] N/A 8/48/9875    ILEOSTOMY OPEN TAKEDOWN performed by Marc Sellers MD at [facility] OR       Medications Prior to Admission:    Medications Prior to Admission: diphenhydrAMINE (BENADRYL) 25 MG tablet, Take 25 mg by mouth every 6 hours as needed for Itching  Calcium Carbonate 500 MG CHEW, Take 1 tablet by mouth every 6 hours as needed (Heartburn)  Multiple Vitamins-Minerals (THERAPEUTIC MULTIVITAMIN-MINERALS) tablet, Take 1 tablet by mouth daily  metoprolol succinate (TOPROL XL) 100 MG extended release tablet, Take 1 tablet by mouth 2 times daily (Patient taking differently: Take 50 mg by mouth 2 times daily )  magnesium hydroxide (MILK OF MAGNESIA) 400 MG/5ML suspension, Take 30 mLs by mouth daily as needed for Constipation (Given if no BM in 48 hrs)  bisacodyl (DULCOLAX) 10 MG suppository, Place 10 mg rectally daily as needed for Constipation (If no results from milk of mag.)  tamsulosin (FLOMAX) 0.4 MG capsule, Take 1 capsule by mouth nightly  pantoprazole (PROTONIX) 40 MG tablet, Take 1 tablet by mouth every morning (before breakfast)  apixaban (ELIQUIS) 5 MG TABS tablet, Take 5 mg by mouth 2 times daily    Allergies:    Cefepime, Clindamycin/lincomycin, and Pcn [penicillins]    Social History:    reports that he quit smoking about 11 years ago. His smoking use included cigarettes. He started smoking about 48 years ago. He has a 70.00 pack-year smoking history. He has never used smokeless tobacco. He reports that he does not drink alcohol and does not use drugs.     Family History:   Non-contributory to this Urological problem  family history includes Dementia in his mother; Diabetes in his father, mother, sister, sister, and sister; Glaucoma in his father; Heart Attack in his brother and mother; High Blood Pressure in his mother; High Cholesterol in his father, mother, sister, sister, and sister; Pacemaker in his father; Thyroid Disease in his sister, sister, and sister. Review of Systems:  Constitutional: + fever at home  and difficulty ambulating  Respiratory: negative for cough and hemoptysis  Cardiovascular: negative for chest pain and dyspnea  Gastrointestinal: negative for abdominal pain, diarrhea, nausea and vomiting   Derm: negative for rash and skin lesion(s)  Neurological: negative for seizures and tremors, + [place]   Musculoskeletal: Left AKA   Psychiatric: Negative   : As above in the HPI, otherwise negative  All other reviews are negative    Physical Exam:     Vitals:  BP (!) 110/53   Pulse 74   Temp 98.1 °F (36.7 °C) (Oral)   Resp 16   Ht 6' 2\" (1.88 m)   Wt 175 lb (79.4 kg)   SpO2 98%   BMI 22.47 kg/m²     General:  Awake, alert, oriented X 3. No apparent distress. HEENT:  Normocephalic, atraumatic. + [place]  Lungs:  Respirations symmetric and non-labored. Abdomen:  soft, nontender, no masses  Extremities: left leg prosthesis   Skin:  Warm and dry, no open lesions or rashes  Neuro: There are no motor or sensory deficits in the 4 quadrant extremities   Rectal: deferred  Genitourinary: Valles catheter well placed and draining yellow urine. Labs:     Recent Labs     05/09/22  0923   WBC 6.8   RBC 3.70*   HGB 9.9*   HCT 32.6*   MCV 88.1   MCH 26.8   MCHC 30.4*   RDW 21.4*      MPV 9.5         Recent Labs     05/09/22  0923   CREATININE 2.3*       No results found for: PSA      Results for Tran Hernandez (MRN [id number]) as of 5/9/2022 16:21   Ref.  Range 5/9/2022 09:23   Color, UA Latest Ref Range: Straw/Yellow  Yellow   Clarity, UA Latest Ref Range: Clear  CLOUDY (A)   Glucose, UA Latest Ref Range: Negative mg/dL Negative   Bilirubin, Urine Latest Ref Range: Negative  Negative   Ketones, Urine Latest Ref Range: Negative mg/dL Negative   Specific Gravity, UA Latest Ref Range: 1.005 - 1.030  >=1.030   Blood, Urine Latest Ref Range: Negative  LARGE (A)   pH, UA Latest Ref Range: 5.0 - 9.0  5.5   Protein, UA Latest Ref Range: Negative mg/dL 100 (A)   Urobilinogen, Urine Latest Ref Range: <2.0 E.U./dL 0.2   Nitrite, Urine Latest Ref Range: Negative  POSITIVE (A)   Leukocyte Esterase, Urine Latest Ref Range: Negative  MODERATE (A)   Coarse Casts, UA Latest Ref Range: 0 - 2 /LPF 0-2   WBC, UA Latest Ref Range: 0 - 5 /HPF >20 (A)   RBC, UA Latest Ref Range: 0 - 2 /HPF 5-10 (A)   Bacteria, UA Latest Ref Range: None Seen /HPF MANY (A)           Assessment/plan:  Urinary Retention   Failed VT   COURTNEY on CKD  UTI      Continue soto catheter  Will be discharged with the soto   Continue Flomax if tolerated   Monitor for orthostatic hypotension   Monitor the creatinine   Monitor I&O  Avoid nephrotoxins  Will get US of the kidneys and bladder   Urine cultures pending  Antibiotics per primary     Hill Ruiz NP-C  [facility]    Agree with above assessment and plan  benson lopez

## 2022-05-10 ENCOUNTER — APPOINTMENT (OUTPATIENT)
Dept: GENERAL RADIOLOGY | Age: 67
DRG: 698 | End: 2022-05-10
Payer: MEDICARE

## 2022-05-10 LAB
ACINETOBACTER CALCOAC BAUMANNII COMPLEX BY PCR: NOT DETECTED
ALBUMIN SERPL-MCNC: 2.3 G/DL (ref 3.5–5.2)
ALP BLD-CCNC: 139 U/L (ref 40–129)
ALT SERPL-CCNC: 9 U/L (ref 0–40)
ANION GAP SERPL CALCULATED.3IONS-SCNC: 6 MMOL/L (ref 7–16)
ANISOCYTOSIS: ABNORMAL
AST SERPL-CCNC: 16 U/L (ref 0–39)
BACTEROIDES FRAGILIS BY PCR: NOT DETECTED
BASOPHILS ABSOLUTE: 0.05 E9/L (ref 0–0.2)
BASOPHILS RELATIVE PERCENT: 0.9 % (ref 0–2)
BILIRUB SERPL-MCNC: 0.4 MG/DL (ref 0–1.2)
BOTTLE TYPE: ABNORMAL
BUN BLDV-MCNC: 38 MG/DL (ref 6–23)
C-REACTIVE PROTEIN: 16.9 MG/DL (ref 0–0.4)
CALCIUM SERPL-MCNC: 7.5 MG/DL (ref 8.6–10.2)
CANDIDA ALBICANS BY PCR: NOT DETECTED
CANDIDA AURIS BY PCR: NOT DETECTED
CANDIDA GLABRATA BY PCR: NOT DETECTED
CANDIDA KRUSEI BY PCR: NOT DETECTED
CANDIDA PARAPSILOSIS BY PCR: NOT DETECTED
CANDIDA TROPICALIS BY PCR: NOT DETECTED
CARBAPENEM RESISTANCE IMP GENE BY PCR: NOT DETECTED
CARBAPENEM RESISTANCE KPC BY PCR: NOT DETECTED
CARBAPENEM RESISTANCE NDM GENE BY PCR: NOT DETECTED
CARBAPENEM RESISTANCE OXA-48 GENE BY PCR: NOT DETECTED
CARBAPENEM RESISTANCE VIM GENE BY PCR: NOT DETECTED
CEPHALOSPORIN RESISTANCE CTX-M GENE BY PCR: NOT DETECTED
CHLORIDE BLD-SCNC: 105 MMOL/L (ref 98–107)
CO2: 25 MMOL/L (ref 22–29)
COLISTIN RESISTANCE MCR-1 GENE BY PCR: NOT DETECTED
CREAT SERPL-MCNC: 1.7 MG/DL (ref 0.7–1.2)
CRYPTOCOCCUS NEOFORMANS/GATTII BY PCR: NOT DETECTED
ENTEROBACTER CLOACAE COMPLEX BY PCR: NOT DETECTED
ENTEROBACTERALES BY PCR: DETECTED
ENTEROCOCCUS FAECALIS BY PCR: NOT DETECTED
ENTEROCOCCUS FAECIUM BY PCR: NOT DETECTED
EOSINOPHILS ABSOLUTE: 0.32 E9/L (ref 0.05–0.5)
EOSINOPHILS RELATIVE PERCENT: 6.1 % (ref 0–6)
ESCHERICHIA COLI BY PCR: NOT DETECTED
GFR AFRICAN AMERICAN: 49
GFR NON-AFRICAN AMERICAN: 40 ML/MIN/1.73
GLUCOSE BLD-MCNC: 120 MG/DL (ref 74–99)
HAEMOPHILUS INFLUENZAE BY PCR: NOT DETECTED
HCT VFR BLD CALC: 26.9 % (ref 37–54)
HEMOGLOBIN: 7.9 G/DL (ref 12.5–16.5)
HYPOCHROMIA: ABNORMAL
KLEBSIELLA AEROGENES BY PCR: NOT DETECTED
KLEBSIELLA OXYTOCA BY PCR: NOT DETECTED
KLEBSIELLA PNEUMONIAE GROUP BY PCR: DETECTED
LISTERIA MONOCYTOGENES BY PCR: NOT DETECTED
LYMPHOCYTES ABSOLUTE: 0.47 E9/L (ref 1.5–4)
LYMPHOCYTES RELATIVE PERCENT: 8.7 % (ref 20–42)
MCH RBC QN AUTO: 26.5 PG (ref 26–35)
MCHC RBC AUTO-ENTMCNC: 29.4 % (ref 32–34.5)
MCV RBC AUTO: 90.3 FL (ref 80–99.9)
MONOCYTES ABSOLUTE: 0.42 E9/L (ref 0.1–0.95)
MONOCYTES RELATIVE PERCENT: 7.8 % (ref 2–12)
NEISSERIA MENINGITIDIS BY PCR: NOT DETECTED
NEUTROPHILS ABSOLUTE: 4 E9/L (ref 1.8–7.3)
NEUTROPHILS RELATIVE PERCENT: 76.5 % (ref 43–80)
NUCLEATED RED BLOOD CELLS: 0 /100 WBC
ORDER NUMBER: ABNORMAL
OVALOCYTES: ABNORMAL
PDW BLD-RTO: 21.2 FL (ref 11.5–15)
PLATELET # BLD: 174 E9/L (ref 130–450)
PMV BLD AUTO: 10 FL (ref 7–12)
POIKILOCYTES: ABNORMAL
POLYCHROMASIA: ABNORMAL
POTASSIUM REFLEX MAGNESIUM: 3.8 MMOL/L (ref 3.5–5)
PROTEUS SPECIES BY PCR: NOT DETECTED
PSEUDOMONAS AERUGINOSA BY PCR: NOT DETECTED
RBC # BLD: 2.98 E12/L (ref 3.8–5.8)
SALMONELLA SPECIES BY PCR: NOT DETECTED
SCHISTOCYTES: ABNORMAL
SEDIMENTATION RATE, ERYTHROCYTE: 73 MM/HR (ref 0–15)
SERRATIA MARCESCENS BY PCR: NOT DETECTED
SODIUM BLD-SCNC: 136 MMOL/L (ref 132–146)
SOURCE OF BLOOD CULTURE: ABNORMAL
STAPHYLOCOCCUS AUREUS BY PCR: NOT DETECTED
STAPHYLOCOCCUS EPIDERMIDIS BY PCR: NOT DETECTED
STAPHYLOCOCCUS LUGDUNENSIS BY PCR: NOT DETECTED
STAPHYLOCOCCUS SPECIES BY PCR: NOT DETECTED
STENOTROPHOMONAS MALTOPHILIA BY PCR: NOT DETECTED
STREPTOCOCCUS AGALACTIAE BY PCR: NOT DETECTED
STREPTOCOCCUS PNEUMONIAE BY PCR: NOT DETECTED
STREPTOCOCCUS PYOGENES  BY PCR: NOT DETECTED
STREPTOCOCCUS SPECIES BY PCR: NOT DETECTED
TOTAL PROTEIN: 5.2 G/DL (ref 6.4–8.3)
WBC # BLD: 5.2 E9/L (ref 4.5–11.5)

## 2022-05-10 PROCEDURE — 2580000003 HC RX 258: Performed by: INTERNAL MEDICINE

## 2022-05-10 PROCEDURE — 6370000000 HC RX 637 (ALT 250 FOR IP): Performed by: INTERNAL MEDICINE

## 2022-05-10 PROCEDURE — 2580000003 HC RX 258: Performed by: SPECIALIST

## 2022-05-10 PROCEDURE — 85651 RBC SED RATE NONAUTOMATED: CPT

## 2022-05-10 PROCEDURE — 87040 BLOOD CULTURE FOR BACTERIA: CPT

## 2022-05-10 PROCEDURE — 85025 COMPLETE CBC W/AUTO DIFF WBC: CPT

## 2022-05-10 PROCEDURE — 80053 COMPREHEN METABOLIC PANEL: CPT

## 2022-05-10 PROCEDURE — 36415 COLL VENOUS BLD VENIPUNCTURE: CPT

## 2022-05-10 PROCEDURE — 6360000002 HC RX W HCPCS: Performed by: INTERNAL MEDICINE

## 2022-05-10 PROCEDURE — 97161 PT EVAL LOW COMPLEX 20 MIN: CPT

## 2022-05-10 PROCEDURE — 71046 X-RAY EXAM CHEST 2 VIEWS: CPT

## 2022-05-10 PROCEDURE — 97530 THERAPEUTIC ACTIVITIES: CPT

## 2022-05-10 PROCEDURE — 86140 C-REACTIVE PROTEIN: CPT

## 2022-05-10 PROCEDURE — 6360000002 HC RX W HCPCS: Performed by: SPECIALIST

## 2022-05-10 PROCEDURE — 1200000000 HC SEMI PRIVATE

## 2022-05-10 RX ADMIN — APIXABAN 5 MG: 5 TABLET, FILM COATED ORAL at 22:07

## 2022-05-10 RX ADMIN — MULTIPLE VITAMINS W/ MINERALS TAB 1 TABLET: TAB at 09:29

## 2022-05-10 RX ADMIN — METOPROLOL SUCCINATE 50 MG: 50 TABLET, EXTENDED RELEASE ORAL at 09:29

## 2022-05-10 RX ADMIN — TAMSULOSIN HYDROCHLORIDE 0.4 MG: 0.4 CAPSULE ORAL at 22:07

## 2022-05-10 RX ADMIN — MEROPENEM 1000 MG: 1 INJECTION, POWDER, FOR SOLUTION INTRAVENOUS at 04:19

## 2022-05-10 RX ADMIN — METOPROLOL SUCCINATE 50 MG: 50 TABLET, EXTENDED RELEASE ORAL at 22:07

## 2022-05-10 RX ADMIN — PETROLATUM: 420 OINTMENT TOPICAL at 10:13

## 2022-05-10 RX ADMIN — ACETAMINOPHEN 650 MG: 325 TABLET ORAL at 11:15

## 2022-05-10 RX ADMIN — MEROPENEM 1000 MG: 1 INJECTION, POWDER, FOR SOLUTION INTRAVENOUS at 16:09

## 2022-05-10 RX ADMIN — SODIUM CHLORIDE, PRESERVATIVE FREE 10 ML: 5 INJECTION INTRAVENOUS at 22:08

## 2022-05-10 RX ADMIN — PETROLATUM: 420 OINTMENT TOPICAL at 16:07

## 2022-05-10 RX ADMIN — ACETAMINOPHEN 650 MG: 325 TABLET ORAL at 17:27

## 2022-05-10 RX ADMIN — PETROLATUM: 420 OINTMENT TOPICAL at 22:07

## 2022-05-10 RX ADMIN — APIXABAN 5 MG: 5 TABLET, FILM COATED ORAL at 09:29

## 2022-05-10 RX ADMIN — SODIUM CHLORIDE: 9 INJECTION, SOLUTION INTRAVENOUS at 10:14

## 2022-05-10 ASSESSMENT — PAIN SCALES - GENERAL
PAINLEVEL_OUTOF10: 0
PAINLEVEL_OUTOF10: 0

## 2022-05-10 NOTE — PROGRESS NOTES
Occupational Therapy  Date:5/10/2022  Patient Name: Kenzie Frederick. Room: [address] [place] [address]-A     Occupational Therapy (OT) order received, patient's medical record reviewed, and OT evaluation attempted this morning; OT evaluation held, per nursing request, secondary to increased heart rate, increased BP, and respiratory status. OT evaluation to be re-attempted at later date, as able/appropriate. Meera Peters OTR/L  License Number: [id number]

## 2022-05-10 NOTE — CONSULTS
[address] [place] [address] [facility]  Consultation Note     Admit Date: 5/9/2022  8:28 AM    Reason for Consult:   Recurrent UTI or bacteremia    Attending Physician:  Jena Lopez MD    HISTORY OF PRESENT ILLNESS:             The history is obtained from extensive review of available past medical records. The patient is a 74 y.o. male who is previously known to the ID service. The patient was sent to the ED from [address] [place] [address] on 5/9/2022 with generalized weakness, fevers up to 101 °F and a rash. He is blood pressure was 86/50. Later on he had a temperature of 102.5 °F.  No tachycardia. His white count was normal with 0.3 to eosinophils. Creatinine was 2.3. He is admitted with a diagnosis of UTI with sepsis and was treated with Meropenem. He was seen by urology. They decided to keep the chronic indwelling Valles catheter. The patient currently has no complaints and says he feels fine. Past Medical History:        Diagnosis Date    Employs prosthetic leg     left    History of atrial fibrillation     [place] (hard of hearing)     uses bilat hearing aides    Hx of AKA (above knee amputation), left ([address]     Hx of necrotizing fasciitis 2011    left leg    Hypertension     Rectal bleeding     Rectal cancer ([facility] 12/2017    rectal     April 2022. Admitted to [facility] from [facility] seen home with shortness of breath, fever. He was found to have a malpositioned Valles catheter that was replaced. He had an RRT and was transferred to the ICU. Treated with Vancomycin and Meropenem. Seen by ID for bacteremia with Klebsiella pneumoniae. Antibiotics were changed over to oral Bactrim. March 2022. Admitted to [facility] for laparoscopic right hemicolectomy, after which she developed acute kidney injury and hypotension. Seen by ID for leukocytosis and cellulitis of the abdomen.   He was felt to have a rash from Clindamycin and it was added to his list of allergies. He was covered off antibiotics.     January 2011.  Admitted to to [facility] with necrotizing fasciitis of the left leg.  He underwent a guillotine amputation, followed by an above-the-knee amputation.  Seen by ID.  Treated with Ertapenem, followed by Ertapenem.  Antibiotics were changed over to Clindamycin and Cefazolin he broke out in a rash.  Antibiotics were reverted to Daptomycin and Ertapenem and he completed antibiotic during that admission. He was readmitted in February for anemia and blood transfusions.  Seen by ID. Dameon Stein did not warrant antibiotics were these were discontinued altogether.  The PICC was pulled. Past Surgical History:        Procedure Laterality Date    ABDOMEN SURGERY  03/20/2018    ileostomy open take down    ABOVE KNEE AMPUTATION  2011    left; hx flesh eating bacteria    CARDIOVERSION  12/2020    COLON SURGERY  01/23/2018    laprascopic low anterior colon resection  take down splenic fexure   ileostomy    COLONOSCOPY  10/21/2011    COLONOSCOPY N/A 2/14/2022    COLONOSCOPY POLYPECTOMY SNARE/COLD BIOPSY performed by Mally Gonzalez MD at [address] Left 10/03/2016    pars plana vitrectomy, retinal detachment repair, laser gas/fluid exchange    EYE SURGERY Bilateral 2002?     bilat cataracts w lens implants    EYE SURGERY Right ?    retinal detachment    HEMICOLECTOMY Right 3/22/2022    LAPAROSCOPIC RIGHT HEMICOLECTOMY performed by Mally Gonzalez MD at [facility] N/A 8/82/0465    ILEOSTOMY OPEN TAKEDOWN performed by Mally Gonzalez MD at [facility] OR     Current Medications:   Scheduled Meds:   white petrolatum   Topical TID    apixaban  5 mg Oral BID    metoprolol succinate  50 mg Oral BID    therapeutic multivitamin-minerals  1 tablet Oral Daily    pantoprazole  40 mg Oral QAM AC    tamsulosin  0.4 mg Oral Nightly    sodium chloride flush  5-40 mL IntraVENous 2 times per day    meropenem  1,000 mg IntraVENous Q8H     Continuous Infusions:   sodium chloride       PRN Meds:calcium carbonate, sodium chloride flush, sodium chloride, ondansetron **OR** ondansetron, polyethylene glycol, acetaminophen **OR** acetaminophen    Allergies:  Cefepime, Clindamycin/lincomycin, and Pcn [penicillins]    Social History:   Social History     Socioeconomic History    Marital status: Single     Spouse name: None    Number of children: None    Years of education: None    Highest education level: None   Occupational History    None   Tobacco Use    Smoking status: Former Smoker     Packs/day: 2.00     Years: 35.00     Pack years: 70.00     Types: Cigarettes     Start date: 1974     Quit date: 2010     Years since quittin.4    Smokeless tobacco: Never Used   Vaping Use    Vaping Use: Never used   Substance and Sexual Activity    Alcohol use: No    Drug use: No    Sexual activity: None   Other Topics Concern    None   Social History Narrative    None     Social Determinants of Health     Financial Resource Strain:     Difficulty of Paying Living Expenses: Not on file   Food Insecurity:     Worried About Running Out of Food in the Last Year: Not on file    Donny of Food in the Last Year: Not on file   Transportation Needs:     Lack of Transportation (Medical): Not on file    Lack of Transportation (Non-Medical):  Not on file   Physical Activity:     Days of Exercise per Week: Not on file    Minutes of Exercise per Session: Not on file   Stress:     Feeling of Stress : Not on file   Social Connections:     Frequency of Communication with Friends and Family: Not on file    Frequency of Social Gatherings with Friends and Family: Not on file    Attends [employer] Services: Not on file    Active Member of Clubs or Organizations: Not on file    Attends Club or Organization Meetings: Not on file    Marital Status: Not on file   Intimate Partner Violence:     Fear of Current or Ex-Partner: Not on file    Emotionally Abused: Not on file    Physically Abused: Not on file    Sexually Abused: Not on file   Housing Stability:     Unable to Pay for Housing in the Last Year: Not on file    Number of Places Lived in the Last Year: Not on file    Unstable Housing in the Last Year: Not on file      Pets: None  Travel: No  The patient  was living at home with his mother and sister. He is now at [address] He retired from working at [employer] History:       Problem Relation Age of Onset    Diabetes Mother     High Blood Pressure Mother     High Cholesterol Mother     Heart Attack Mother     Dementia Mother     Diabetes Father     Glaucoma Father     High Cholesterol Father     Pacemaker Father     Diabetes Sister     Thyroid Disease Sister     High Cholesterol Sister     Heart Attack Brother     Diabetes Sister     High Cholesterol Sister     Thyroid Disease Sister     Diabetes Sister     High Cholesterol Sister     Thyroid Disease Sister    . Otherwise non-pertinent to the chief complaint. REVIEW OF SYSTEMS:    Constitutional: Positive for fevers, chills, diaphoresis  Neurologic: Negative   Psychiatric: Negative  Rheumatologic: Negative   Endocrine: Negative  Hematologic: Negative  Immunologic: Negative  ENT: Negative  Respiratory: Negative   Cardiovascular: Negative  GI: Negative  : As in the HPI  Musculoskeletal: Negative  Skin: The patient has chronic itching and scratches both upper and lower extremity. PHYSICAL EXAM:    Vitals:   BP (!) 185/95   Pulse 98   Temp 102.3 °F (39.1 °C) (Axillary)   Resp 18   Ht 6' 2\" (1.88 m)   Wt 170 lb (77.1 kg)   SpO2 96%   BMI 21.83 kg/m²   Constitutional: The patient is awake, alert, and partially oriented. Skin: Warm and dry. No rashes were noted. Multiple linear abrasions on both upper extremities and right leg. HEENT: Eyes show round, and reactive pupils. No jaundice.  Moist mucous membranes, no ulcerations, no thrush. Poor dentition. A few teeth in the oral cavity. Neck: Supple to movements. No lymphadenopathy. Chest: No use of accessory muscles to breathe. Symmetrical expansion. Auscultation reveals no wheezing, crackles, or rhonchi. Cardiovascular: S1 and S2 are rhythmic and regular. No murmurs appreciated. Abdomen: Positive bowel sounds to auscultation. Benign to palpation. No masses felt. No hepatosplenomegaly. Extremities: Left AKA stump is unremarkable. Right lower extremity shows no edema. Lines: Peripheral.  Valles catheter with slightly turbid urine. CBC+dif:  Recent Labs     05/09/22  0923 05/09/22  0923 05/10/22  0352   WBC 6.8  --  5.2   HGB 9.9*   < > 7.9*   HCT 32.6*   < > 26.9*   MCV 88.1   < > 90.3      < > 174   NEUTROABS 5.15   < > 4.00    < > = values in this interval not displayed.      Lab Results   Component Value Date    CRP 6.8 (H) 04/11/2022    CRP 29.2 (H) 01/03/2011      No results found for: [facility]  Lab Results   Component Value Date    SEDRATE 45 (H) 01/03/2011     Lab Results   Component Value Date    ALT 9 05/10/2022    AST 16 05/10/2022    ALKPHOS 139 (H) 05/10/2022    BILITOT 0.4 05/10/2022     Lab Results   Component Value Date     05/10/2022    K 3.8 05/10/2022     05/10/2022    CO2 25 05/10/2022    BUN 38 05/10/2022    CREATININE 1.7 05/10/2022    GFRAA 49 05/10/2022    LABGLOM 40 05/10/2022    GLUCOSE 120 05/10/2022    GLUCOSE 106 03/24/2011    PROT 5.2 05/10/2022    LABALBU 2.3 05/10/2022    LABALBU 2.4 03/20/2011    CALCIUM 7.5 05/10/2022    BILITOT 0.4 05/10/2022    ALKPHOS 139 05/10/2022    AST 16 05/10/2022    ALT 9 05/10/2022       Lab Results   Component Value Date    PROTIME 17.9 04/12/2022    PROTIME 12.2 03/18/2011    INR 1.6 04/12/2022       Lab Results   Component Value Date    TSH 1.050 03/25/2022       Lab Results   Component Value Date    COLORU Yellow 05/09/2022    PHUR 5.5 05/09/2022    LABCAST RARE 10/18/2016    WBCUA >20 05/09/2022    WBCUA NONE 03/18/2011    RBCUA 5-10 05/09/2022    RBCUA NONE 03/18/2011    BACTERIA MANY 05/09/2022    CLARITYU CLOUDY 05/09/2022    SPECGRAV >=1.030 05/09/2022    LEUKOCYTESUR MODERATE 05/09/2022    UROBILINOGEN 0.2 05/09/2022    BILIRUBINUR Negative 05/09/2022    BILIRUBINUR NEGATIVE 03/18/2011    BLOODU LARGE 05/09/2022    GLUCOSEU Negative 05/09/2022    GLUCOSEU NEGATIVE 03/18/2011    AMORPHOUS MODERATE 03/18/2011       Lab Results   Component Value Date    HCO3 17.5 04/08/2022    BE -5.4 04/08/2022    O2SAT 96.9 04/08/2022    PH 7.452 04/08/2022    PH 7.507 01/10/2011    THGB 12.6 01/03/2011    PCO2 25.7 04/08/2022    PO2 91.1 04/08/2022     Radiology:  Reviewed    Microbiology:  Pending  Recent Labs     05/09/22 0923   BC Gram stain performed from blood culture bottle media  Gram negative rods  *     Recent Labs     05/09/22 0923   ORG Gram negative evangelina*     No results for input(s): BLOODCULT2 in the last 72 hours. No results for input(s): STREPNEUMAGU in the last 72 hours. No results for input(s): LP1UAG in the last 72 hours. No results for input(s): ASO in the last 72 hours. No results for input(s): CULTRESP in the last 72 hours. No results for input(s): PROCAL in the last 72 hours. Assessment:  · Complicated UTI with sepsis associated to a chronic indwelling Valles catheter  · Klebsiella septicemia associated to the above  · Fever associated to the above  · Urticaria    Plan:    · Continue Meropenem  · Check final and repeat blood cultures, baseline ESR, CRP  · Monitor labs  · Will follow with you    Thank you for having us see this patient in consultation. I will be discussing this case with the treating physicians. Spoke with nursing.     Gelacio Palacios MD  12:24 PM  5/10/2022

## 2022-05-10 NOTE — PROGRESS NOTES
Physical Therapy  Facility/Department: [facility] (formerly [facility]3] SURG  Physical Therapy Initial Assessment    Name: Ruben Herrera. : 1955  MRN: [id number]  Date of Service: 5/10/2022      Attending Provider:  Sheldon Muñoz MD    Evaluating PT:  Jem Cronin. James Richard, P.T. Room #:  3743/8653-V  Diagnosis:  Rash and other nonspecific skin eruption [R21]  Urinary retention [R33.9]  Sepsis ([facility] [A41.9]  Urinary tract infection associated with indwelling urethral catheter, initial encounter ([facility] [D88.727N, N39.0]  Sepsis without acute organ dysfunction, due to unspecified organism ([facility] [A41.9]  Pertinent PMHx/PSHx:  L AKA and has prosthesis  Precautions:  Falls, bed/chair alarm    SUBJECTIVE:    Pt lives with his 80 y.o. mother in a 2 story home with 3 stairs and 2 rails to enter. His bed and bath are on the first floor. Pt ambulated with a quad cane and LLE prosthesis PTA. He has a ww if needed    OBJECTIVE:   Initial Evaluation  Date: 5/10/22 Treatment Short Term/ Long Term   Goals   Was pt agreeable to Eval/treatment? yes     Does pt have pain? No c/o pain     Bed Mobility  Rolling: Independent  Supine to sit: Independent  Sit to supine: Independent  Scooting: Independent  Independent   Transfers Sit to stand: supervision  Stand to sit: supervision  Stand pivot: SBA with ww and LLE prosthesis  Independent    Ambulation   175 feet with ww and LLE prosthesis SBA  200 feet with ww and LLE prosthesis Independent    Stair negotiation: ascended and descended NA  3 steps with 2 rails Independent    AM-PAC 6 Clicks        BLE ROM is WFL, except L ankle and knee NA due to AKA. BLE strength is grossly 4/5 to 4+/5.    Sensation:  Pt denies numbness and tingling to extremities  Balance: sitting is Independent and standing with ww and LLE prosthesis is supervision  Endurance: fair+    ASSESSMENT:    Conditions Requiring Skilled Therapeutic Intervention:    [x]Decreased strength     []Decreased ROM  [x]Decreased functional mobility  []Decreased balance   [x]Decreased endurance   []Decreased posture  []Decreased sensation  []Decreased coordination   []Decreased vision  []Decreased safety awareness   []Increased pain     Comments:  Pt was in bed and agreeable to PT. He was able to jamey LLE prosthesis while lying in bed and then donned his R shoe himself. Pt walked in the room with ww and LLE prosthesis and had no LOB and then was agreeable to amb in the roldan. Pt walked with slow step too gait pattern, and had no LOB with amb. Treatment:  Patient practiced and was instructed in the following treatment:     Bed mobility, transfers, ADLs, and gait with ww to improve functional strength and endurance. Pt was left supine in bed per his request with call light left by patient. Chair/bed alarm: bed alarm was re-activated. Pt's/ family goals   1. To go home. Patient and or family understand(s) diagnosis, prognosis, and plan of care. PHYSICAL THERAPY PLAN OF CARE:    PT POC is established based on physician order and patient diagnosis     Referring provider/PT Order:  PT eval and treat  Diagnosis:  Rash and other nonspecific skin eruption [R21]  Urinary retention [R33.9]  Sepsis ([facility] [A41.9]  Urinary tract infection associated with indwelling urethral catheter, initial encounter ([facility] [Q92.730U, N39.0]  Sepsis without acute organ dysfunction, due to unspecified organism ([facility] [A41.9]  Specific instructions for next treatment:  Increase amb distance and attempt stairs.      Current Treatment Recommendations:     [x] Strengthening to improve independence with functional mobility   [] ROM to improve ROM and decrease spasm and pain which will help promote independence with functional mobility   [] Balance Training to improve static/dynamic balance and to reduce fall risk  [x] Endurance Training to improve activity tolerance during functional mobility   [x] Transfer Training to improve safety and independence with all functional transfers   [x] Gait Training to improve gait mechanics, endurance and assess need for appropriate assistive device  [x] Stair Training in preparation for safe discharge home and/or into the community   [] Positioning to prevent skin breakdown and contractures  [] Safety and Education Training   [x] Patient/Caregiver Education   [] HEP  [] Other     PT long term treatment goals are located in above grid    Frequency of treatments: 2-5x/week x 1-2 weeks. Time in  09:40  Time out  10:10    Total Treatment Time 15 minutes     Evaluation Time includes thorough review of current medical information, gathering information on past medical history/social history and prior level of function, completion of standardized testing/informal observation of tasks, assessment of data and education on plan of care and goals. CPT codes:  [x] Low Complexity PT evaluation 40769  [] Moderate Complexity PT evaluation 90180  [] High Complexity PT evaluation 15272  [] PT Re-evaluation 06811  [] Gait training 54534 ** minutes  [] Manual therapy 55602 ** minutes  [x] Therapeutic activities 58781 15 minutes  [] Therapeutic exercises 43648 ** minutes  [] Neuromuscular reeducation 84300 ** minutes     Miki Alves ., P.T.   License Number: [id number]

## 2022-05-10 NOTE — PROGRESS NOTES
[facility] Quality Flow/Interdisciplinary Rounds Progress Note        Quality Flow Rounds held on May 10, 2022    Disciplines Attending:  Bedside Nurse, ,  and Nursing Unit [address] [place] [address] KAYLEE Bey. was admitted on 5/9/2022  8:28 AM    Anticipated Discharge Date:       Disposition:    Jose Miguel Score:  Jose Miguel Scale Score: 16    Readmission Risk              Risk of Unplanned Readmission:  28           Discussed patient goal for the day, patient clinical progression, and barriers to discharge.   The following Goal(s) of the Day/Commitment(s) have been identified:  Diagnostics - Report Results and Labs - Report Results, Id consult      Kris Gracia RN  May 10, 2022

## 2022-05-10 NOTE — PROGRESS NOTES
Message left with Dr. Chandler Massey regarding wheezing and new oxygen requirement, currently on IV fluids. Awaiting call back.  Electronically signed by Emma Oquendo RN on 5/10/2022 at 11:22 AM

## 2022-05-10 NOTE — PROGRESS NOTES
ID notified of positive blood cultures at this time.  Electronically signed by James Worthington RN on 5/10/2022 at 10:49 AM

## 2022-05-10 NOTE — CARE COORDINATION
Social Work discharge planning   SW consult \"placement\" noted. Sw met with pt and discussed his PT AMPAC today of 20/24 and reportedly walking 80' with PT today. Pt's [facility] Medicare will not likely approve snf at this time. Pt has Hx at [facility]. [facility] is active with pt at home for home PT only. Pt said his 79 yo mother is also at home, but his two sisters to help as well. Pt does NOT use home 02. He is wearing 02 now and will need assessed for home 02 need at discharge. Pt is agreeable with plan for home with resuming hhc. Will need orders to RESUME and any new orders for [facility] at discharge. Will also need to notify Malia  [phone].   Electronically signed by Benson Hopper on 5/10/2022 at 12:11 PM

## 2022-05-10 NOTE — CONSULTS
Comprehensive Nutrition Assessment    Type and Reason for Visit:  Initial,Consult    Nutrition Recommendations/Plan:   1. Continue current diet  2. Start Trent BID  3. Start Ensure HP BID     Malnutrition Assessment:  Malnutrition Status: Moderate malnutrition (05/10/22 1217)    Context:  Chronic Illness     Findings of the 6 clinical characteristics of malnutrition:  Energy Intake:  75% or less estimated energy requirements for 1 month or longer  Weight Loss:  7.5% over 3 months (-10.1% X 4 months)     Body Fat Loss:  Mild body fat loss     Muscle Mass Loss:  Mild muscle mass loss    Fluid Accumulation:  No significant fluid accumulation     Strength:  Not Performed    Nutrition Assessment:    Pt w/ recurrent UTI, note COURTNEY on CKD. Hx L AKA ('11), rectal CA, s/p recent hemicolectomy 2/2 tubular adenoma. Pt w/ moderate malnutrition & wound, will add ONS & monitor. Nutrition Related Findings:    forgetful, I&Os WDL, trace edema, abd WDL, L AKA Wound Type: Pressure Injury     Current Nutrition Intake & Therapies:    Average Meal Intake: %  Average Supplements Intake: None Ordered  ADULT DIET; Regular    Anthropometric Measures:  Height: 6' 2\" (188 cm)  Ideal Body Weight (IBW): 190 lbs (86 kg)    Admission Body Weight: 170 lb (77.1 kg) (5/10 bed)  Current Body Weight: 170 lb (77.1 kg) (5/10 bed), 89.5 % IBW.     Current BMI (kg/m2): 21.8  Usual Body Weight: 189 lb (85.7 kg) (1/2022 actual per EMR, note 181# X 2 months)  % Weight Change (Calculated): -10.1  Weight Adjustment For: Amputation  Total Adjusted Percentage (Calculated): 10.1  Adjusted Ideal Body Weight (lbs) (Calculated): 170.8 lbs  Adjusted Ideal Body Weight (kg) (Calculated): 77.64 kg  Adjusted % Ideal Body Weight (Calculated): 99.5  Adjusted BMI (kg/m2) (Calculated): 24  BMI Categories: Normal Weight (BMI 22.0 to 24.9) age over 72    Estimated Daily Nutrient Needs:  Energy Requirements Based On: Kcal/kg  Weight Used for Energy Requirements: Current  Energy (kcal/day):  (28 kcal/kg)  Weight Used for Protein Requirements: Current  Protein (g/day): 100-115 (1.3-1.5 as tolerated)  Method Used for Fluid Requirements: 1 ml/kcal  Fluid (ml/day):     Nutrition Diagnosis:   · Moderate malnutrition,In context of chronic illness related to inadequate protein-energy intake as evidenced by Criteria as identified in malnutrition assessment    Nutrition Interventions:   Food and/or Nutrient Delivery: Continue Current Diet,Start Oral Nutrition Supplement  Nutrition Education/Counseling: Education not indicated  Coordination of Nutrition Care: Continue to monitor while inpatient       Goals:     Goals: PO intake 75% or greater       Nutrition Monitoring and Evaluation:      Food/Nutrient Intake Outcomes: Food and Nutrient Intake,Supplement Intake  Physical Signs/Symptoms Outcomes: Biochemical Data,GI Status,Fluid Status or Edema,Nutrition Focused Physical Findings,Skin,Weight    Discharge Planning:     Too soon to determine     Tim Abimael, RD, LD  Contact: [phone]

## 2022-05-10 NOTE — H&P
[address] [address]                              HISTORY AND PHYSICAL    PATIENT NAME: Marla Lujan                :        1955  MED REC NO:   [id number]                            ROOM:       6765  ACCOUNT NO:   [de-identified]                           ADMIT DATE: 2022  PROVIDER:     Mekhi Rodríguez MD    CHIEF COMPLAINT:  Fever. HISTORY OF PRESENT ILLNESS:  This is a 15-year-old who was recently  hospitalized after a right hemicolectomy for a tubulovillous adenoma. He had urinary retention plus atrial fibrillation and flutter, postop  complications. He went to a nursing facility with the Valles catheter  in. Apparently, he failed the trial without the catheter. Hence, the  catheter was replaced. He eventually was discharged home and after  several days at home developed a spike in temperature of 102 degrees. Nothing was noted as the cause although his skin was extremely  excoriated from what he said dry skin. His history is not good as he  has very poor memory of everything that took place. PAST MEDICAL HISTORY:  Significant that he has had a left leg amputation  for necrotizing fasciitis. He has had rectal carcinoma treated in the  past.  He has had acute kidney injuries from volume depletion. At one  time, he was hypertensive and hyperlipidemic. Presently, he is on the  spectrum of moderate cognitive dysfunction if not zeeshan Alzheimer's  developing. SOCIAL HISTORY:  He quit smoking more than 10 years ago. He does not  drink alcohol. He is able to walk with his prosthesis as he has gained  strength recently. Of note, his skin has become extremely dry and he  has done large amount of scratching to the skin. PHYSICAL EXAMINATION:  VITAL SIGNS:  His temperature is 98.1, pulse 82, respiratory rate 18,  and blood pressure 160/57.   HEENT:  Conjunctivae were normal.  Mouth is hydrated. No lymph nodes  around the neck. NECK:  Carotids +1 and equal.  LUNGS:  Clear to auscultation. HEART:  S1, S2. No murmur. ABDOMEN:  Soft, nontender. No liver or spleen palpated. SKIN:  The skin has numerous scratches, excoriations to it, but they all  seem to be healing, nothing acutely inflamed. IMPRESSION:  Recurrent urinary tract infection, prostatitis versus  bacteremia, which he has had recently. PLAN:  We will ask ID to evaluate, antibiotic therapy as well as  possible bacterial causes.         Dmitry Berger MD    D: 05/10/2022 8:29:10       T: 05/10/2022 9:50:07     [email or web address]  Job#: [id number]     Doc#: [id number]    CC:

## 2022-05-10 NOTE — PROGRESS NOTES
[facility]                                                            PROGRESS NOTE                                                                       5/10/2022          SUBJECTIVE:    Pt comfortable with soto  OBJECTIVE:    BP (!) 185/95   Pulse 98   Temp 102.3 °F (39.1 °C) (Axillary)   Resp 18   Ht 6' 2\" (1.88 m)   Wt 170 lb (77.1 kg)   SpO2 96%   BMI 21.83 kg/m²     PHYSICAL EXAMINATION:  Skin: dry, without rashes  Respirations: non-labored, intact  Abdomen: soft, non-tender, non-distended      Lab Results   Component Value Date    WBC 5.2 05/10/2022    HGB 7.9 (L) 05/10/2022    HCT 26.9 (L) 05/10/2022    MCV 90.3 05/10/2022     05/10/2022       Lab Results   Component Value Date    CREATININE 1.7 (H) 05/10/2022       No results found for: PSA    REVIEW OF SYSTEMS:    CONSTITUTIONAL: negative  HEENT: negative  HEMATOLOGIC: negative  ENDOCRINE: negative  RESPIRATORY: negative  CV: negative  GI: negative  NEURO: negative  ORTHOPEDICS: negative  PSYCHIATRIC: negative  : as above    PAST FAMILY HISTORY:    Family History   Problem Relation Age of Onset    Diabetes Mother     High Blood Pressure Mother     High Cholesterol Mother     Heart Attack Mother     Dementia Mother     Diabetes Father     Glaucoma Father     High Cholesterol Father    Damon Pacemaker Father     Diabetes Sister     Thyroid Disease Sister     High Cholesterol Sister     Heart Attack Brother     Diabetes Sister     High Cholesterol Sister     Thyroid Disease Sister     Diabetes Sister     High Cholesterol Sister     Thyroid Disease Sister      PAST SOCIAL HISTORY:    Social History     Socioeconomic History    Marital status: Single     Spouse name: None    Number of children: None    Years of education: None    Highest education level: None   Occupational History    None   Tobacco Use    Smoking status: Former Smoker     Packs/day: 2.00     Years: 35.00     Pack years: 70.00     Types: Cigarettes     Start date: 1974     Quit date: 2010     Years since quittin.4    Smokeless tobacco: Never Used   Vaping Use    Vaping Use: Never used   Substance and Sexual Activity    Alcohol use: No    Drug use: No    Sexual activity: None   Other Topics Concern    None   Social History Narrative    None     Social Determinants of Health     Financial Resource Strain:     Difficulty of Paying Living Expenses: Not on file   Food Insecurity:     Worried About Running Out of Food in the Last Year: Not on file    Donny of Food in the Last Year: Not on file   Transportation Needs:     Lack of Transportation (Medical): Not on file    Lack of Transportation (Non-Medical):  Not on file   Physical Activity:     Days of Exercise per Week: Not on file    Minutes of Exercise per Session: Not on file   Stress:     Feeling of Stress : Not on file   Social Connections:     Frequency of Communication with Friends and Family: Not on file    Frequency of Social Gatherings with Friends and Family: Not on file    Attends Anabaptist Services: Not on file    Active Member of [address] [place] [address] or Organizations: Not on file    Attends Club or Organization Meetings: Not on file    Marital Status: Not on file   Intimate Partner Violence:     Fear of Current or Ex-Partner: Not on file    Emotionally Abused: Not on file    Physically Abused: Not on file    Sexually Abused: Not on file   Housing Stability:     Unable to Pay for Housing in the Last Year: Not on file    Number of [place] in the Last Year: Not on file    Unstable Housing in the Last Year: Not on file       Scheduled Meds:   white petrolatum   Topical TID    meropenem  1,000 mg IntraVENous Q12H    apixaban  5 mg Oral BID    metoprolol succinate  50 mg Oral BID    therapeutic multivitamin-minerals  1 tablet Oral Daily    pantoprazole  40 mg Oral QAM AC    tamsulosin  0.4 mg Oral Nightly    sodium chloride flush  5-40 mL IntraVENous 2 times per day     Continuous Infusions:   sodium chloride       PRN Meds:.calcium carbonate, sodium chloride flush, sodium chloride, ondansetron **OR** ondansetron, polyethylene glycol, acetaminophen **OR** acetaminophen    ASSESSMENT:    Patient Active Problem List   Diagnosis    Retinal detachment of left eye with multiple breaks    Rectal cancer ([address]    Ileus ([address]    Atrial flutter (HCC)    GI bleed    Gastrointestinal bleed    Benign neoplasm of cecum    S/P right hemicolectomy    PSVT (paroxysmal supraventricular tachycardia) (HCC)    Primary hypertension    Abdominal wall abscess    Atrial fibrillation with RVR (HCC)    Urinary tract infection associated with indwelling urethral catheter (HCC)    PAF (paroxysmal atrial fibrillation) ([address]    Intra-abdominal infection    COURTNEY (acute kidney injury) ([address]    Septic shock (HCC)    Bacteremia    Moderate protein-calorie malnutrition ([address]    Sepsis ([address]    Urinary retention       PLAN:  dischare with soto  Will see in office for further management    Jami Flores MD, [facility].  5/10/2022  12:43 PM

## 2022-05-11 LAB
LV EF: 60 %
LVEF MODALITY: NORMAL
ORGANISM: ABNORMAL
URINE CULTURE, ROUTINE: ABNORMAL

## 2022-05-11 PROCEDURE — 6370000000 HC RX 637 (ALT 250 FOR IP): Performed by: INTERNAL MEDICINE

## 2022-05-11 PROCEDURE — 97165 OT EVAL LOW COMPLEX 30 MIN: CPT

## 2022-05-11 PROCEDURE — 93306 TTE W/DOPPLER COMPLETE: CPT

## 2022-05-11 PROCEDURE — 1200000000 HC SEMI PRIVATE

## 2022-05-11 PROCEDURE — 97530 THERAPEUTIC ACTIVITIES: CPT

## 2022-05-11 PROCEDURE — 2580000003 HC RX 258: Performed by: SPECIALIST

## 2022-05-11 PROCEDURE — 2580000003 HC RX 258: Performed by: INTERNAL MEDICINE

## 2022-05-11 PROCEDURE — 6360000002 HC RX W HCPCS: Performed by: SPECIALIST

## 2022-05-11 RX ADMIN — PANTOPRAZOLE SODIUM 40 MG: 40 TABLET, DELAYED RELEASE ORAL at 05:59

## 2022-05-11 RX ADMIN — MEROPENEM 1000 MG: 1 INJECTION, POWDER, FOR SOLUTION INTRAVENOUS at 17:09

## 2022-05-11 RX ADMIN — APIXABAN 5 MG: 5 TABLET, FILM COATED ORAL at 09:53

## 2022-05-11 RX ADMIN — SODIUM CHLORIDE, PRESERVATIVE FREE 10 ML: 5 INJECTION INTRAVENOUS at 09:55

## 2022-05-11 RX ADMIN — PETROLATUM: 420 OINTMENT TOPICAL at 21:56

## 2022-05-11 RX ADMIN — PETROLATUM: 420 OINTMENT TOPICAL at 09:53

## 2022-05-11 RX ADMIN — APIXABAN 5 MG: 5 TABLET, FILM COATED ORAL at 20:56

## 2022-05-11 RX ADMIN — PETROLATUM: 420 OINTMENT TOPICAL at 15:08

## 2022-05-11 RX ADMIN — TAMSULOSIN HYDROCHLORIDE 0.4 MG: 0.4 CAPSULE ORAL at 20:56

## 2022-05-11 RX ADMIN — MEROPENEM 1000 MG: 1 INJECTION, POWDER, FOR SOLUTION INTRAVENOUS at 03:45

## 2022-05-11 RX ADMIN — MULTIPLE VITAMINS W/ MINERALS TAB 1 TABLET: TAB at 09:53

## 2022-05-11 RX ADMIN — METOPROLOL SUCCINATE 50 MG: 50 TABLET, EXTENDED RELEASE ORAL at 09:53

## 2022-05-11 RX ADMIN — METOPROLOL SUCCINATE 50 MG: 50 TABLET, EXTENDED RELEASE ORAL at 20:56

## 2022-05-11 NOTE — PROGRESS NOTES
[address] [place] [address] [facility]  RACIEL  Progress Note    SUBJECTIVE:  Chief Complaint   Patient presents with    Extremity Weakness     patient from home, has been week, soto cath in place, rash on arms and sides    Fever    Rash     Patient is tolerating medications. No reported adverse drug reactions. No nausea, vomiting, diarrhea. Says he is feeling well today   tmax 102.3 yesterday afternoon - seems to be defervescencing      Review of systems:  As stated above in the chief complaint, otherwise negative. Medications:  Scheduled Meds:   white petrolatum   Topical TID    meropenem  1,000 mg IntraVENous Q12H    apixaban  5 mg Oral BID    metoprolol succinate  50 mg Oral BID    therapeutic multivitamin-minerals  1 tablet Oral Daily    pantoprazole  40 mg Oral QAM AC    tamsulosin  0.4 mg Oral Nightly    sodium chloride flush  5-40 mL IntraVENous 2 times per day     Continuous Infusions:   sodium chloride       PRN Meds:perflutren lipid microspheres, calcium carbonate, sodium chloride flush, sodium chloride, ondansetron **OR** ondansetron, polyethylene glycol, acetaminophen **OR** acetaminophen    OBJECTIVE:  /62   Pulse 79   Temp 97.7 °F (36.5 °C) (Oral)   Resp 18   Ht 6' 2\" (1.88 m)   Wt 174 lb (78.9 kg)   SpO2 95%   BMI 22.34 kg/m²   Temp  Av.5 °F (36.9 °C)  Min: 97.7 °F (36.5 °C)  Max: 100.2 °F (37.9 °C)  Constitutional: The patient is awake, alert, and mostly oriented. Says he feels pretty well today   Skin: Warm and dry. No rashes were noted. Scratch abrasions and scabs   HEENT: Round and reactive pupils. Moist mucous membranes. No ulcerations or thrush. Neck: Supple to movements. Chest: No respiratory distress. Symmetrical expansion. No wheezing, crackles or rhonchi. Cardiovascular: S1 and S2 are rhythmic and regular. No murmurs appreciated. Abdomen: Positive bowel sounds to auscultation. Benign to palpation. No masses felt. Extremities: No edema. Left AKA stump   Lines: peripheral  Valles catheter with clearing urine     Laboratory and Tests Review:  Lab Results   Component Value Date    WBC 5.2 05/10/2022    WBC 6.8 05/09/2022    WBC 4.8 04/21/2022    HGB 7.9 (L) 05/10/2022    HCT 26.9 (L) 05/10/2022    MCV 90.3 05/10/2022     05/10/2022     Lab Results   Component Value Date    NEUTROABS 4.00 05/10/2022    NEUTROABS 5.15 05/09/2022    NEUTROABS 3.41 04/21/2022     No results found for: CRP  Lab Results   Component Value Date    ALT 9 05/10/2022    AST 16 05/10/2022    ALKPHOS 139 (H) 05/10/2022    BILITOT 0.4 05/10/2022     Lab Results   Component Value Date     05/10/2022    K 3.8 05/10/2022     05/10/2022    CO2 25 05/10/2022    BUN 38 05/10/2022    CREATININE 1.7 05/10/2022    CREATININE 2.3 05/09/2022    CREATININE 2.0 04/21/2022    GFRAA 49 05/10/2022    LABGLOM 40 05/10/2022    GLUCOSE 120 05/10/2022    GLUCOSE 106 03/24/2011    PROT 5.2 05/10/2022    LABALBU 2.3 05/10/2022    LABALBU 2.4 03/20/2011    CALCIUM 7.5 05/10/2022    BILITOT 0.4 05/10/2022    ALKPHOS 139 05/10/2022    AST 16 05/10/2022    ALT 9 05/10/2022     Lab Results   Component Value Date    CRP 16.9 (H) 05/10/2022    CRP 6.8 (H) 04/11/2022    CRP 29.2 (H) 01/03/2011     Lab Results   Component Value Date    SEDRATE 73 (H) 05/10/2022    SEDRATE 45 (H) 01/03/2011     Radiology:  Reviewed     Microbiology:   Blood cultures 5/9/2022: 4/4 GNR   Urine culture 5/9/2022: >100K Klebsiella pneumoniae   Blood cultures 5/10/2022: pending     ASSESSMENT:  · Complicated UTI with sepsis associated to a chronic indwelling Valles catheter  · Klebsiella septicemia associated to the above  · Fever associated to the above  · Urticaria    PLAN:  · Continue Merrem for now   · Check final cultures & sensitivities   · Labs reviewed     CRISTI Zuñiga CNP  12:14 PM  5/11/2022     Patient seen and examined. I had a face to face encounter with the patient.  Agree with exam.  Assessment and plan as outlined above and directed by me. Addition and corrections were done as deemed appropriate. My exam and plan include: The patient is still complaining of itching. Abrasion lesions are unchanged. They are not infected. Tolerating Meropenem. Awaiting for final ID and sensitivities of blood cultures before deciding on oral antibiotics. Spoke with relative.     Jd Lorenzo MD  5/11/2022  12:34 PM

## 2022-05-11 NOTE — PROGRESS NOTES
Occupational Therapy  OCCUPATIONAL THERAPY INITIAL EVALUATION    [facility] [place], [place]        ARQP:                                                  Patient Name: Kirill Arreola.     MRN: [id number]    : 1955    Room: [address] [place] [address]      Evaluating OT: Zachary Renee, OTR/L [id number]      Referring Provider: Saba Mix MD    Specific Provider Orders/Date: OT eval and treat 5/10/22      Diagnosis: Rash and other nonspecific skin eruption [R21]  Urinary retention [R33.9]  Sepsis ([facility] [A41.9]  Urinary tract infection associated with indwelling urethral catheter, initial encounter ([facility] [T83.511A, N39.0]  Sepsis without acute organ dysfunction, due to unspecified organism ([facility] [A41.9]      Pertinent Medical History: a-fib, [place], L AKA with prosthetic leg, HTN, rectal CA       Precautions:  Fall Risk, alarm, [place], L AKA with prosthesis     Assessment of current deficits    [x] Functional mobility  [x]ADLs  [x] Strength               []Cognition    [x] Functional transfers   [x] IADLs         [x] Safety Awareness   [x]Endurance    [] Fine Coordination              [x] Balance      [] Vision/perception   []Sensation     []Gross Motor Coordination  [] ROM  [] Delirium                   [] Motor Control     OT PLAN OF CARE   OT POC based on physician orders, patient diagnosis and results of clinical assessment    Frequency/Duration 2-4 days/wk for 2 weeks PRN   Specific OT Treatment Interventions to include:   * Instruction/training on adapted ADL techniques and AE recommendations to increase functional independence within precautions       * Training on energy conservation strategies, correct breathing pattern and techniques to improve independence/tolerance for self-care routine  * Functional transfer/mobility training/DME recommendations for increased independence, safety, and fall prevention  * Patient/Family education to increase follow through with safety techniques and functional independence  * Recommendation of environmental modifications for increased safety with functional transfers/mobility and ADLs  * Therapeutic exercise to improve motor endurance, ROM, and functional strength for ADLs/functional transfers  * Therapeutic activities to facilitate/challenge dynamic balance, stand tolerance for increased safety and independence with ADLs  * Neuro-muscular re-education: facilitation of righting/equilibrium reactions, midline orientation, scapular stability/mobility, normalization of muscle tone, and facilitation of volitional active controled movement  * Positioning to improve skin integrity, interaction with environment and functional independence        Recommended Adaptive Equipment: TBD     Home Living: Pt lives with 80year old mother and 2 sisters in 2 story house. Pt reports that 2nd floor is used only for storage . Pt's bedroom and bathroom are on the 1st floor. Bathroom setup: walk in shower with shower chair and grab bars, standard height commode with grab bars  Equipment owned: wheeled walker, w/c, cane    Prior Level of Function: assist from sisters with ADLs , assist from sisters with IADLs; functional mobility: wheeled walker  Driving: no (family assists)    Pain Level: pt did not report pain this date; pt agreeable to therapy  Cognition: A&O: pt alert and oriented; [facility] command follow demonstrated. Pt pleasant throughout session.     Memory:  Fair    Sequencing:  Fair    Problem solving:  Fair    Judgement/safety:  Fair      Functional Assessment:  AM-PAC Daily Activity Raw Score: 18/24   Initial Eval Status  Date: 5/11/22 Treatment Status  Date: STGs = LTGs  Time frame: 10-14 days   Feeding Independent      Grooming SBA  To complete hand hygiene standing at sink  Mod I/I   UB Dressing Min A  To doff/don gown  Mod I/I   LB Dressing Min A  To don/doff shoe   Mod I/I-with use of AD as appropriate/needed   Bathing Mod A  For LB bathing tasks  Min A   For UB bathing tasks  SBA/Sup -with use of AD as appropriate/needed   Toileting SBA  For clothing management thorough pericare seated on commode following bowel movement  Independent    Bed Mobility  Supine to sit: Sup  Sit to supine: Independent   Independent   Functional Transfers SBA with wheeled walker  Sit<>Stand from EOB  Sit<>stand from commode  Independent    Functional Mobility SBA with wheeled walker   To and from bathroom   Independent -with device as needed to maximize independence with ADLs and functional task completion   Balance Sitting:     Static:  Independent (EOB)    Dynamic:SBA (EOB)  Standing: SBA with wheeled walker  I for dynamic sitting balance to maximize independence with ADLs and functional task completion    I for standing balance to maximize independence with ADLs and functional task completion   Activity Tolerance Fair with ADL activity. Pt with some SOB noted following mobility. Pt on room air during session and SpO2 was 92% sitting EOB and 95% following mobility. Good with ADL activity. Pt will demonstrate good understanding of education provided on EC/WS techniques    Visual/  Perceptual Glasses: yes                Additional long-term goal: Pt will increase functional independence to PLOF to allow pt to live in least restrictive environment. Hand Dominance R   AROM (PROM) Strength Additional Info:    RUE  WFL 4-/5 good  and wfl FMC/dexterity noted during ADL tasks       LUE WFL 4-/5 good  and wfl FMC/dexterity noted during ADL tasks       Hearing: WFL   Sensation:  No c/o numbness or tingling   Tone: WFL   Edema: none noted    Comments: Upon arrival patient lying in bed with PCA present. At end of session, patient returned to bed with call light and phone within reach, all lines and tubes intact, and alarm set.   Overall patient demonstrated decreased independence and safety during completion of ADL/functional transfer/mobility tasks. Pt would benefit from continued skilled OT to increase safety and independence with completion of ADL/IADL tasks for functional independence and quality of life. Treatment: OT treatment provided this date includes:    Instruction/training on safety and adapted techniques for completion of ADLs: Pt donned shoe seated EOB with Min A to tie shoe laces. Unsupported sitting SBA with slight posterior lean noted during dynamic sitting. Pt with cues for use of grab bar to sit to commode. SBA to manage gown during functional commode transfer. SBA for thorough hygiene following bowel movement while seated on commode. Increased time required. Pt complete hand hygiene standing at sink with SBA, working on standing tolerance and balance with cues for wheeled walker placement at sink and posture.   Instruction/training on safe functional mobility/transfer techniques: Cues for safe functional transfers with cues for hand placement, posture, and safe technique. Pt walked to and from bathroom with cues for safe wheeled walker management and navigation. Pt requiring increased time for functional transfers and mobility.   Instruction/training on energy conservation/work simplification for completion of ADLs: . Pt educated on energy conservation strategies to utilize during ADL and functional task completion to improve safety and independence with ADLs and functional tasks. Pt verbalized understanding. Education and cues for pursed lip breathing when SOB noted following mobility. Pt demonstrated and verbalized understanding. Rehab Potential: Good for established goals     Patient / Family Goal: none stated    Patient and/or family were instructed on functional diagnosis, prognosis/goals and OT plan of care. Demonstrated fair understanding.      Eval Complexity: Low    Time In: 8478  Time Out: 0910  Total Treatment Time: 5    Min Units   OT Eval Low 97165  x  1   OT Eval Medium 61652 OT Eval High [id number]      OT Re-Eval [id number]       Therapeutic Ex [id number]       Therapeutic Activities 35945  10  1   ADL/Self Care 17857       Orthotic Management 01205       Manual 72069     Neuro Re-Ed 47656       Non-Billable Time          Evaluation Time additionally includes thorough review of current medical information, gathering information on past medical history/social history and prior level of function, interpretation of standardized testing/informal observation of tasks, assessment of data and development of plan of care and goals.             Peña Scott, OTR/L, [id number]

## 2022-05-11 NOTE — PROGRESS NOTES
Department of Internal Tyrone Lim M.D.  Progress Note      SUBJECTIVE: He had bouts of rigor and chills yesterday with some shortness of breath I believe this was bacteria seen in the bloodstream.    OBJECTIVE abdomen is soft and nontender, the skin is showing some signs of healing    Medications    Current Facility-Administered Medications: white petrolatum ointment, , Topical, TID  meropenem (MERREM) 1,000 mg in sodium chloride 0.9 % 100 mL IVPB (mini-bag), 1,000 mg, IntraVENous, Q12H  apixaban (ELIQUIS) tablet 5 mg, 5 mg, Oral, BID  calcium carbonate (TUMS) chewable tablet 500 mg, 1 tablet, Oral, Q6H PRN  metoprolol succinate (TOPROL XL) extended release tablet 50 mg, 50 mg, Oral, BID  therapeutic multivitamin-minerals 1 tablet, 1 tablet, Oral, Daily  pantoprazole (PROTONIX) tablet 40 mg, 40 mg, Oral, QAM AC  tamsulosin (FLOMAX) capsule 0.4 mg, 0.4 mg, Oral, Nightly  sodium chloride flush 0.9 % injection 5-40 mL, 5-40 mL, IntraVENous, 2 times per day  sodium chloride flush 0.9 % injection 5-40 mL, 5-40 mL, IntraVENous, PRN  0.9 % sodium chloride infusion, , IntraVENous, PRN  ondansetron (ZOFRAN-ODT) disintegrating tablet 4 mg, 4 mg, Oral, Q8H PRN **OR** ondansetron (ZOFRAN) injection 4 mg, 4 mg, IntraVENous, Q6H PRN  polyethylene glycol (GLYCOLAX) packet 17 g, 17 g, Oral, Daily PRN  acetaminophen (TYLENOL) tablet 650 mg, 650 mg, Oral, Q6H PRN **OR** acetaminophen (TYLENOL) suppository 650 mg, 650 mg, Rectal, Q6H PRN  Physical    VITALS:  /66   Pulse 83   Temp 97.7 °F (36.5 °C) (Oral)   Resp 18   Ht 6' 2\" (1.88 m)   Wt 174 lb (78.9 kg)   SpO2 95%   BMI 22.34 kg/m²   24HR INTAKE/OUTPUT:    Intake/Output Summary (Last 24 hours) at 5/11/2022 0816  Last data filed at 5/11/2022 0646  Gross per 24 hour   Intake 1652.19 ml   Output 1150 ml   Net 502.19 ml     LUNGS: Decreased breath sounds at bases  CARDIOVASCULAR:  Normal apical impulse, regular rate and rhythm, normal S1 and S2, no S3 or S4, and no murmur noted  Data    CBC with Differential:    Lab Results   Component Value Date    WBC 5.2 05/10/2022    RBC 2.98 05/10/2022    HGB 7.9 05/10/2022    HCT 26.9 05/10/2022     05/10/2022    MCV 90.3 05/10/2022    MCH 26.5 05/10/2022    MCHC 29.4 05/10/2022    RDW 21.2 05/10/2022    NRBC 0.0 05/10/2022    SEGSPCT 47 03/18/2011    METASPCT 0.9 04/09/2022    LYMPHOPCT 8.7 05/10/2022    PROMYELOPCT 1.8 03/28/2022    MONOPCT 7.8 05/10/2022    MYELOPCT 1.8 03/28/2022    BASOPCT 0.9 05/10/2022    MONOSABS 0.42 05/10/2022    LYMPHSABS 0.47 05/10/2022    EOSABS 0.32 05/10/2022    BASOSABS 0.05 05/10/2022     CMP:    Lab Results   Component Value Date     05/10/2022    K 3.8 05/10/2022     05/10/2022    CO2 25 05/10/2022    BUN 38 05/10/2022    CREATININE 1.7 05/10/2022    GFRAA 49 05/10/2022    LABGLOM 40 05/10/2022    GLUCOSE 120 05/10/2022    GLUCOSE 106 03/24/2011    PROT 5.2 05/10/2022    LABALBU 2.3 05/10/2022    LABALBU 2.4 03/20/2011    CALCIUM 7.5 05/10/2022    BILITOT 0.4 05/10/2022    ALKPHOS 139 05/10/2022    AST 16 05/10/2022    ALT 9 05/10/2022     PT/INR:    Lab Results   Component Value Date    PROTIME 17.9 04/12/2022    PROTIME 12.2 03/18/2011    INR 1.6 04/12/2022       ASSESSMENT AND PLAN      Principal Problem:    Sepsis ([address]  Plan: Klebsiella pneumonia sepsis  Active Problems:    Urinary retention  Plan:  Valles in place however he may need TURP as this is the second time he is got some urosepsis    Moderate protein-calorie malnutrition ([address]  Plan: Oral intake has picked up          Electronically signed by Rekha Oneil MD on 5/11/2022 at 8:16 AM

## 2022-05-11 NOTE — CARE COORDINATION
Social Work discharge planning   Noted ID awaiting final results to determine po atb per note today. Pt's PT AMPAC is 20 and OT 18. Pt is active with [facility] and will need resume hhc order. Would also benefit from additional hhc order for cp assess, soto care, med compliance please. [place] [facility] will need called on day of discharge. Also, pt did not have home 02 pta. Pt had no home 02 dme preference from list of choices. Will need assessed for potential home 02 need. Electronically signed by Ny Hernández on 5/11/2022 at 1:44 PM     Addendum-    Noted pt now on room air.   Electronically signed by Ny Hernández on 5/11/2022 at 1:51 PM

## 2022-05-11 NOTE — PROGRESS NOTES
[facility] Quality Flow/Interdisciplinary Rounds Progress Note        Quality Flow Rounds held on May 11, 2022    Disciplines Attending:  Bedside Nurse, ,  and Nursing Unit [address] [place] [address] Jacqueline Mayes was admitted on 5/9/2022  8:28 AM    Anticipated Discharge Date:       Disposition:    Jose Miguel Score:  Jose Miguel Scale Score: 16    Readmission Risk              Risk of Unplanned Readmission:  28           Discussed patient goal for the day, patient clinical progression, and barriers to discharge.   The following Goal(s) of the Day/Commitment(s) have been identified:  Diagnostics - Report Results and Labs - Report Results      Azul Enriquez RN  May 11, 2022

## 2022-05-11 NOTE — PROGRESS NOTES
Physician Progress Note      PATIENT:               Aye Che  CSN #:                  [id number]  :                       1955  ADMIT DATE:       2022 8:28 AM  DISCH DATE:  RESPONDING  PROVIDER #:        Alex Love MD          QUERY TEXT:    Dear Attending Physician,    Pt admitted with UTI. Noted documentation of \" Complicated UTI with Sepsis   associated to a chronic indwelling Soto catheter \"   on 5/10 by ordered ID   consultant. If possible, please document in progress notes and discharge   summary:    The medical record reflects the following:  Risk Factors: recurrent UTI's, chronic soto catheter  Clinical Indicators: Per H/P,\". .Recurrent urinary tract infection . Sanjiv Carreno \"  Per ID   5/10,\" . Sanjiv Carreno Complicated UTI with sepsis associated to a chronic indwelling Soto   catheter. .Klebsiella septicemia associated to the above. .Fever associated to   the above . Sanjiv Carreno \" WBC  6.8 , 5/10 5.2  ,Lactic acid 1.5, Vitals   98.3 63 14   86/50 -> 102.3 110 20 185/90  Treatment: IV ATB    Thank you,  Anai Gentile RN CCDS  Clinical Documentation Improvement Specialist  [phone]  Options provided:  -- UTI due to chronic indwelling Soto catheter confirmed present on admission  -- UTI due to chronic indwelling Soto catheter was ruled out  -- Other - I will add my own diagnosis  -- Disagree - Not applicable / Not valid  -- Disagree - Clinically unable to determine / Unknown  -- Refer to Clinical Documentation Reviewer    PROVIDER RESPONSE TEXT:    The diagnosis of UTI due to chronic indwelling Soto catheter was confirmed as   present on admission. Query created by: Vanda Zuñiga on 5/10/2022 3:03 PM      QUERY TEXT:    Dear Attending Physician,    Pt admitted with UTI. Noted documentation of \" Complicated UTI with Sepsis   associated to a chronic indwelling Soto catheter \"   on 5/10 by ordered ID   consultant.   If possible, please document in progress notes and discharge   summary:    The medical record reflects the following:  Risk Factors: recurrent UTI's, chronic soto catheter  Clinical Indicators: Per H/P,\". .Recurrent urinary tract infection . Blanchie Bevels \"  Per ID   5/10,\" . Blanchie Bevels Blanchie Bevels Complicated UTI with sepsis associated to a chronic indwelling Soto   catheter. .Klebsiella septicemia associated to the above. .Fever associated to   the above . Blanchie Bevels \" WBC 5/9 6.8 , 5/10 5.2  ,Lactic acid 1.5, Vitals 5/9  98.3 63 14   86/50 -> 102.3 110 20 185/90  Treatment: IV ATB    Thank you,  Ginger Huitron RN CCDS  Clinical Documentation Improvement Specialist  [phone]  Options provided:  -- Sepsis due to UTI confirmed present on admission  -- Sepsis was ruled out  -- Other - I will add my own diagnosis  -- Disagree - Not applicable / Not valid  -- Disagree - Clinically unable to determine / Unknown  -- Refer to Clinical Documentation Reviewer    PROVIDER RESPONSE TEXT:    The diagnosis of Sepsis due UTI was confirmed and present on admission. Query created by: Laura Mullins on 5/10/2022 3:06 PM      QUERY TEXT:    Dear Attending Physician,    Pt admitted with UTI. Noted documentation of \"Moderate Malnutrition\" on   05/10/2022 note by dietary consult. If possible, please document in progress   notes and discharge summary:    The medical record reflects the following:  Risk Factors:chronic UTI's, Sepsis ,COURTNEY on CKD. 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 .Moderate malnutrition,In context of   chronic illness related to inadequate protein-energy intake as evidenced by   Criteria as identified in malnutrition assessment . Blanchie Bevels \"  Treatment: 1. Continue current diet 2. Start Trent BID 3. Start Ensure HP BID    Thank you,  Ginger Huitron RN CCDS  Clinical Documentation Improvement Specialist  [phone]  Options provided:  -- Moderate Protein Calorie Malnutrition confirmed POA  -- Moderate Protein Calorie Malnutrition ruled out  -- Other - I will add my own diagnosis  -- Disagree - Not applicable / Not valid  -- Disagree - Clinically unable to determine / Unknown  -- Refer to Clinical Documentation Reviewer    PROVIDER RESPONSE TEXT:    The diagnosis of Moderate Protein Calorie Malnutrition was confirmed as POA.     Query created by: Sam Luna on 5/10/2022 3:12 PM      Electronically signed by:  Yadiel Kwon MD 5/11/2022 8:11 AM

## 2022-05-12 ENCOUNTER — APPOINTMENT (OUTPATIENT)
Dept: CT IMAGING | Age: 67
DRG: 698 | End: 2022-05-12
Payer: MEDICARE

## 2022-05-12 LAB
ALBUMIN SERPL-MCNC: 2.7 G/DL (ref 3.5–5.2)
ALP BLD-CCNC: 257 U/L (ref 40–129)
ALT SERPL-CCNC: 44 U/L (ref 0–40)
ANION GAP SERPL CALCULATED.3IONS-SCNC: 9 MMOL/L (ref 7–16)
ANISOCYTOSIS: ABNORMAL
AST SERPL-CCNC: 51 U/L (ref 0–39)
BASOPHILS ABSOLUTE: 0.01 E9/L (ref 0–0.2)
BASOPHILS RELATIVE PERCENT: 0.1 % (ref 0–2)
BILIRUB SERPL-MCNC: 0.5 MG/DL (ref 0–1.2)
BUN BLDV-MCNC: 39 MG/DL (ref 6–23)
BURR CELLS: ABNORMAL
CALCIUM SERPL-MCNC: 8.5 MG/DL (ref 8.6–10.2)
CHLORIDE BLD-SCNC: 104 MMOL/L (ref 98–107)
CO2: 26 MMOL/L (ref 22–29)
CREAT SERPL-MCNC: 1.2 MG/DL (ref 0.7–1.2)
EOSINOPHILS ABSOLUTE: 0 E9/L (ref 0.05–0.5)
EOSINOPHILS RELATIVE PERCENT: 0 % (ref 0–6)
GFR AFRICAN AMERICAN: >60
GFR NON-AFRICAN AMERICAN: >60 ML/MIN/1.73
GLUCOSE BLD-MCNC: 126 MG/DL (ref 74–99)
HCT VFR BLD CALC: 31.2 % (ref 37–54)
HEMOGLOBIN: 9.2 G/DL (ref 12.5–16.5)
HYPOCHROMIA: ABNORMAL
IMMATURE GRANULOCYTES #: 0.05 E9/L
IMMATURE GRANULOCYTES %: 0.6 % (ref 0–5)
LYMPHOCYTES ABSOLUTE: 0.81 E9/L (ref 1.5–4)
LYMPHOCYTES RELATIVE PERCENT: 9.7 % (ref 20–42)
MCH RBC QN AUTO: 26.1 PG (ref 26–35)
MCHC RBC AUTO-ENTMCNC: 29.5 % (ref 32–34.5)
MCV RBC AUTO: 88.6 FL (ref 80–99.9)
MONOCYTES ABSOLUTE: 0.72 E9/L (ref 0.1–0.95)
MONOCYTES RELATIVE PERCENT: 8.6 % (ref 2–12)
NEUTROPHILS ABSOLUTE: 6.78 E9/L (ref 1.8–7.3)
NEUTROPHILS RELATIVE PERCENT: 81 % (ref 43–80)
OVALOCYTES: ABNORMAL
PDW BLD-RTO: 21.5 FL (ref 11.5–15)
PLATELET # BLD: 211 E9/L (ref 130–450)
PMV BLD AUTO: 9.6 FL (ref 7–12)
POIKILOCYTES: ABNORMAL
POLYCHROMASIA: ABNORMAL
POTASSIUM SERPL-SCNC: 4.5 MMOL/L (ref 3.5–5)
RBC # BLD: 3.52 E12/L (ref 3.8–5.8)
SCHISTOCYTES: ABNORMAL
SODIUM BLD-SCNC: 139 MMOL/L (ref 132–146)
TOTAL PROTEIN: 5.9 G/DL (ref 6.4–8.3)
WBC # BLD: 8.4 E9/L (ref 4.5–11.5)

## 2022-05-12 PROCEDURE — 2580000003 HC RX 258: Performed by: INTERNAL MEDICINE

## 2022-05-12 PROCEDURE — 6370000000 HC RX 637 (ALT 250 FOR IP): Performed by: SPECIALIST

## 2022-05-12 PROCEDURE — 80053 COMPREHEN METABOLIC PANEL: CPT

## 2022-05-12 PROCEDURE — 74176 CT ABD & PELVIS W/O CONTRAST: CPT

## 2022-05-12 PROCEDURE — 6370000000 HC RX 637 (ALT 250 FOR IP): Performed by: INTERNAL MEDICINE

## 2022-05-12 PROCEDURE — 97530 THERAPEUTIC ACTIVITIES: CPT

## 2022-05-12 PROCEDURE — 6360000002 HC RX W HCPCS: Performed by: SPECIALIST

## 2022-05-12 PROCEDURE — 2580000003 HC RX 258: Performed by: SPECIALIST

## 2022-05-12 PROCEDURE — 36415 COLL VENOUS BLD VENIPUNCTURE: CPT

## 2022-05-12 PROCEDURE — 85025 COMPLETE CBC W/AUTO DIFF WBC: CPT

## 2022-05-12 PROCEDURE — 1200000000 HC SEMI PRIVATE

## 2022-05-12 RX ORDER — LEVOFLOXACIN 500 MG/1
500 TABLET, FILM COATED ORAL DAILY
Qty: 10 TABLET | Refills: 0 | Status: SHIPPED | OUTPATIENT
Start: 2022-05-12 | End: 2022-05-22

## 2022-05-12 RX ORDER — LEVOFLOXACIN 500 MG/1
500 TABLET, FILM COATED ORAL DAILY
Status: DISCONTINUED | OUTPATIENT
Start: 2022-05-12 | End: 2022-05-13 | Stop reason: HOSPADM

## 2022-05-12 RX ADMIN — TAMSULOSIN HYDROCHLORIDE 0.4 MG: 0.4 CAPSULE ORAL at 20:39

## 2022-05-12 RX ADMIN — PANTOPRAZOLE SODIUM 40 MG: 40 TABLET, DELAYED RELEASE ORAL at 06:36

## 2022-05-12 RX ADMIN — PETROLATUM: 420 OINTMENT TOPICAL at 20:41

## 2022-05-12 RX ADMIN — APIXABAN 5 MG: 5 TABLET, FILM COATED ORAL at 08:28

## 2022-05-12 RX ADMIN — PETROLATUM: 420 OINTMENT TOPICAL at 08:28

## 2022-05-12 RX ADMIN — APIXABAN 5 MG: 5 TABLET, FILM COATED ORAL at 20:39

## 2022-05-12 RX ADMIN — MULTIPLE VITAMINS W/ MINERALS TAB 1 TABLET: TAB at 08:28

## 2022-05-12 RX ADMIN — MEROPENEM 1000 MG: 1 INJECTION, POWDER, FOR SOLUTION INTRAVENOUS at 04:09

## 2022-05-12 RX ADMIN — SODIUM CHLORIDE, PRESERVATIVE FREE 10 ML: 5 INJECTION INTRAVENOUS at 08:28

## 2022-05-12 RX ADMIN — LEVOFLOXACIN 500 MG: 500 TABLET, FILM COATED ORAL at 14:31

## 2022-05-12 RX ADMIN — ACETAMINOPHEN 650 MG: 325 TABLET ORAL at 20:39

## 2022-05-12 RX ADMIN — SODIUM CHLORIDE, PRESERVATIVE FREE 10 ML: 5 INJECTION INTRAVENOUS at 20:41

## 2022-05-12 RX ADMIN — PETROLATUM: 420 OINTMENT TOPICAL at 14:31

## 2022-05-12 RX ADMIN — METOPROLOL SUCCINATE 50 MG: 50 TABLET, EXTENDED RELEASE ORAL at 20:39

## 2022-05-12 RX ADMIN — METOPROLOL SUCCINATE 50 MG: 50 TABLET, EXTENDED RELEASE ORAL at 08:28

## 2022-05-12 ASSESSMENT — PAIN SCALES - GENERAL
PAINLEVEL_OUTOF10: 0

## 2022-05-12 NOTE — PROGRESS NOTES
Department of Internal Justyn Bright M.D.  Progress Note      SUBJECTIVE: Echocardiogram was not helpful and looking at valves we will check CT of the abdomen to see if the fluid collection has resolved    OBJECTIVE abdomen is soft nontender skin is still quite excoriated    Medications    Current Facility-Administered Medications: perflutren lipid microspheres (DEFINITY) injection 1.65 mg, 1.5 mL, IntraVENous, ONCE PRN  white petrolatum ointment, , Topical, TID  meropenem (MERREM) 1,000 mg in sodium chloride 0.9 % 100 mL IVPB (mini-bag), 1,000 mg, IntraVENous, Q12H  apixaban (ELIQUIS) tablet 5 mg, 5 mg, Oral, BID  calcium carbonate (TUMS) chewable tablet 500 mg, 1 tablet, Oral, Q6H PRN  metoprolol succinate (TOPROL XL) extended release tablet 50 mg, 50 mg, Oral, BID  therapeutic multivitamin-minerals 1 tablet, 1 tablet, Oral, Daily  pantoprazole (PROTONIX) tablet 40 mg, 40 mg, Oral, QAM AC  tamsulosin (FLOMAX) capsule 0.4 mg, 0.4 mg, Oral, Nightly  sodium chloride flush 0.9 % injection 5-40 mL, 5-40 mL, IntraVENous, 2 times per day  sodium chloride flush 0.9 % injection 5-40 mL, 5-40 mL, IntraVENous, PRN  0.9 % sodium chloride infusion, , IntraVENous, PRN  ondansetron (ZOFRAN-ODT) disintegrating tablet 4 mg, 4 mg, Oral, Q8H PRN **OR** ondansetron (ZOFRAN) injection 4 mg, 4 mg, IntraVENous, Q6H PRN  polyethylene glycol (GLYCOLAX) packet 17 g, 17 g, Oral, Daily PRN  acetaminophen (TYLENOL) tablet 650 mg, 650 mg, Oral, Q6H PRN **OR** acetaminophen (TYLENOL) suppository 650 mg, 650 mg, Rectal, Q6H PRN  Physical    VITALS:  /70   Pulse 73   Temp 98.1 °F (36.7 °C) (Oral)   Resp 16   Ht 6' 2\" (1.88 m)   Wt 175 lb (79.4 kg)   SpO2 95%   BMI 22.47 kg/m²   24HR INTAKE/OUTPUT:    Intake/Output Summary (Last 24 hours) at 5/12/2022 0823  Last data filed at 5/12/2022 0526  Gross per 24 hour   Intake 420 ml   Output 2350 ml   Net -1930 ml     LUNGS:  No increased work of breathing, good air exchange, clear to auscultation bilaterally, no crackles or wheezing  CARDIOVASCULAR:  Normal apical impulse, regular rate and rhythm, normal S1 and S2, no S3 or S4, and no murmur noted  Data    CBC with Differential:    Lab Results   Component Value Date    WBC 5.2 05/10/2022    RBC 2.98 05/10/2022    HGB 7.9 05/10/2022    HCT 26.9 05/10/2022     05/10/2022    MCV 90.3 05/10/2022    MCH 26.5 05/10/2022    MCHC 29.4 05/10/2022    RDW 21.2 05/10/2022    NRBC 0.0 05/10/2022    SEGSPCT 47 03/18/2011    METASPCT 0.9 04/09/2022    LYMPHOPCT 8.7 05/10/2022    PROMYELOPCT 1.8 03/28/2022    MONOPCT 7.8 05/10/2022    MYELOPCT 1.8 03/28/2022    BASOPCT 0.9 05/10/2022    MONOSABS 0.42 05/10/2022    LYMPHSABS 0.47 05/10/2022    EOSABS 0.32 05/10/2022    BASOSABS 0.05 05/10/2022     CMP:    Lab Results   Component Value Date     05/10/2022    K 3.8 05/10/2022     05/10/2022    CO2 25 05/10/2022    BUN 38 05/10/2022    CREATININE 1.7 05/10/2022    GFRAA 49 05/10/2022    LABGLOM 40 05/10/2022    GLUCOSE 120 05/10/2022    GLUCOSE 106 03/24/2011    PROT 5.2 05/10/2022    LABALBU 2.3 05/10/2022    LABALBU 2.4 03/20/2011    CALCIUM 7.5 05/10/2022    BILITOT 0.4 05/10/2022    ALKPHOS 139 05/10/2022    AST 16 05/10/2022    ALT 9 05/10/2022     PT/INR:    Lab Results   Component Value Date    PROTIME 17.9 04/12/2022    PROTIME 12.2 03/18/2011    INR 1.6 04/12/2022       ASSESSMENT AND PLAN      Principal Problem:    Sepsis ([address]  Plan: Klebsiella pneumonia most likely from urinary tract and Valles catheter  Active Problems:    Urinary retention  Plan: Decide if transurethral resection of prostate is possible    Moderate protein-calorie malnutrition ([facility]  Plan: Taking in supplements and food well    Neurodermatitis       Electronically signed by Judson Herrera MD on 5/12/2022 at 8:23 AM

## 2022-05-12 NOTE — PROGRESS NOTES
to extremities  Balance: sitting is Independent and standing with ww and LLE prosthesis is supervision  Endurance: fair+    ASSESSMENT:    Comments:  Pt was in bed and agreeable to PT. He was able to jamey LLE prosthesis while lying in bed and then donned his R shoe and sock himself. Pt walked in the roldan with ww and LLE prosthesis and had no LOB. Pt walked with slow step too gait pattern and SBA was provided for safety. During amb pt had labored breathing. Treatment:  Patient practiced and was instructed in the following treatment:     Bed mobility, transfers, ADLs, and gait with ww to improve functional strength and endurance. Pt was left sitting up in chair and lunch tray placed in front of him and with call light left by patient. Chair/bed alarm: Chair alarm was activated. PLAN:    Pt is making good progress toward established Physical Therapy goals. Continue with physical therapy current plan of care. Time in  11:00  Time out  11:25    Total Treatment Time  25 minutes     Evaluation Time includes thorough review of current medical information, gathering information on past medical history/social history and prior level of function, completion of standardized testing/informal observation of tasks, assessment of data and education on plan of care and goals. CPT codes:  [] Low Complexity PT evaluation 43637  [] Moderate Complexity PT evaluation 45378  [] High Complexity PT evaluation 21659  [] PT Re-evaluation 50362  [] Gait training 72351 ** minutes  [] Manual therapy 56012 ** minutes  [x] Therapeutic activities 52347 25 minutes  [] Therapeutic exercises 05077 ** minutes  [] Neuromuscular reeducation 37062 ** minutes     Miki Kern., P.T.   License Number: [id number]

## 2022-05-12 NOTE — PROGRESS NOTES
[address] [place] [address] [facility]  CALILESTEPHEN  Progress Note    SUBJECTIVE:  Chief Complaint   Patient presents with    Extremity Weakness     patient from home, has been week, soto cath in place, rash on arms and sides    Fever    Rash     Patient is tolerating medications. No reported adverse drug reactions. No nausea, vomiting, diarrhea. Says he is feeling well today   tmax 100.2 overnight. Review of systems:  As stated above in the chief complaint, otherwise negative. Medications:  Scheduled Meds:   white petrolatum   Topical TID    meropenem  1,000 mg IntraVENous Q12H    apixaban  5 mg Oral BID    metoprolol succinate  50 mg Oral BID    therapeutic multivitamin-minerals  1 tablet Oral Daily    pantoprazole  40 mg Oral QAM AC    tamsulosin  0.4 mg Oral Nightly    sodium chloride flush  5-40 mL IntraVENous 2 times per day     Continuous Infusions:   sodium chloride       PRN Meds:perflutren lipid microspheres, calcium carbonate, sodium chloride flush, sodium chloride, ondansetron **OR** ondansetron, polyethylene glycol, acetaminophen **OR** acetaminophen    OBJECTIVE:  BP (!) 116/59   Pulse 76   Temp 98 °F (36.7 °C) (Oral)   Resp 18   Ht 6' 2\" (1.88 m)   Wt 175 lb (79.4 kg)   SpO2 96%   BMI 22.47 kg/m²   Temp  Av.1 °F (36.7 °C)  Min: 98 °F (36.7 °C)  Max: 98.1 °F (36.7 °C)  Constitutional: The patient is awake, alert, and mostly oriented. Says he feels pretty well today   Skin: Warm and dry. No rashes were noted. Scratch abrasions and scabs   HEENT: Round and reactive pupils. Moist mucous membranes. No ulcerations or thrush. Neck: Supple to movements. Chest: No respiratory distress. Symmetrical expansion. No wheezing, crackles or rhonchi. Cardiovascular: S1 and S2 are rhythmic and regular. No murmurs appreciated. Abdomen: Positive bowel sounds to auscultation. Benign to palpation. No masses felt. Extremities: No edema.  Left AKA stump   Lines: peripheral  Soto the patient. Agree with exam.  Assessment and plan as outlined above and directed by me. Addition and corrections were done as deemed appropriate. My exam and plan include: The patient is feeling well. He has no new complaints. Blood and urine cultures growing pansensitive Klebsiella pneumonia. Change antibiotics to Levofloxacin x10 days. He can be discharged from ID standpoint.     Wen Leyva MD  5/12/2022  1:06 PM

## 2022-05-12 NOTE — PATIENT CARE CONFERENCE
P Quality Flow/Interdisciplinary Rounds Progress Note        Quality Flow Rounds held on May 12, 2022    Disciplines Attending:  Bedside Nurse, ,  and Nursing Unit [address] [place] [address] [facility]. was admitted on 5/9/2022  8:28 AM    Anticipated Discharge Date:       Disposition:    Jose Miguel Score:  Jose Miguel Scale Score: 16    Readmission Risk              Risk of Unplanned Readmission:  29           Discussed patient goal for the day, patient clinical progression, and barriers to discharge.   The following Goal(s) of the Day/Commitment(s) have been identified:  Diagnostics - Report Results and Labs - Report Results      Cinthia Grady RN  May 12, 2022

## 2022-05-13 VITALS
WEIGHT: 175.4 LBS | TEMPERATURE: 98.3 F | RESPIRATION RATE: 16 BRPM | SYSTOLIC BLOOD PRESSURE: 136 MMHG | BODY MASS INDEX: 22.51 KG/M2 | OXYGEN SATURATION: 95 % | HEART RATE: 71 BPM | HEIGHT: 74 IN | DIASTOLIC BLOOD PRESSURE: 66 MMHG

## 2022-05-13 PROCEDURE — 6370000000 HC RX 637 (ALT 250 FOR IP): Performed by: SPECIALIST

## 2022-05-13 PROCEDURE — 2580000003 HC RX 258: Performed by: INTERNAL MEDICINE

## 2022-05-13 PROCEDURE — 6370000000 HC RX 637 (ALT 250 FOR IP): Performed by: INTERNAL MEDICINE

## 2022-05-13 RX ADMIN — SODIUM CHLORIDE, PRESERVATIVE FREE 10 ML: 5 INJECTION INTRAVENOUS at 09:08

## 2022-05-13 RX ADMIN — METOPROLOL SUCCINATE 50 MG: 50 TABLET, EXTENDED RELEASE ORAL at 09:07

## 2022-05-13 RX ADMIN — LEVOFLOXACIN 500 MG: 500 TABLET, FILM COATED ORAL at 09:07

## 2022-05-13 RX ADMIN — PETROLATUM: 420 OINTMENT TOPICAL at 09:07

## 2022-05-13 RX ADMIN — MULTIPLE VITAMINS W/ MINERALS TAB 1 TABLET: TAB at 09:07

## 2022-05-13 RX ADMIN — PANTOPRAZOLE SODIUM 40 MG: 40 TABLET, DELAYED RELEASE ORAL at 05:46

## 2022-05-13 RX ADMIN — APIXABAN 5 MG: 5 TABLET, FILM COATED ORAL at 09:07

## 2022-05-13 NOTE — DISCHARGE SUMMARY
Discharge Summary     Patient ID:  Elham Paniagua  [id number]  74 y.o. 1955 male  Ada Bethea MD        Admit date: 5/9/2022    Discharge date and time:  5/13/2022  8:15 AM      Activity level: Ambulatory  Disposition to his home  Condition on Discharge: Fair    Admit Diagnoses: Sepsis due to Klebsiella pneumonia I urinary tract infection    Discharge Diagnoses: Sepsis due to Klebsiella pneumonia I urinary tract infection moderate protein calorie malnutrition, second bout assess urinary tract sepsis bladder neck obstruction requiring Valles catheter drainage as he has urinary retention    Consults:  IP CONSULT TO INTERNAL MEDICINE  IP CONSULT TO UROLOGY  IP CONSULT TO SOCIAL WORK  IP CONSULT TO DIETITIAN  IP CONSULT TO INFECTIOUS DISEASES    Procedures:     Hospital Course: 68-year-old recently discharged from hospital and rehab facility has been using a Valles catheter he did have a trial without it at the rehab which did not work he presented to the hospital with high temperature and low blood pressure and rigors and was diagnosed with Klebsiella pneumonia sepsis most likely from urinary tract his 2D echo did not demonstrate vegetations and his CAT scan of the abdomen demonstrated that the previous fluid loculated had cleared he will be discharged on oral antibiotic levofloxacin and will see urology in the coming week for scheduling of a transurethral resection of the prostate      LABS:  Recent Labs     05/12/22  1201      K 4.5      CO2 26   BUN 39*   CREATININE 1.2   GLUCOSE 126*   CALCIUM 8.5*       Recent Labs     05/12/22  1201   WBC 8.4   RBC 3.52*   HGB 9.2*   HCT 31.2*   MCV 88.6   MCH 26.1   MCHC 29.5*   RDW 21.5*      MPV 9.6       No results for input(s): GLUMET in the last 72 hours.       Patient Instructions:   Current Discharge Medication List      START taking these medications    Details   white petrolatum OINT ointment Apply topically three times daily  Qty: 6 g, Refills: 1      levoFLOXacin (LEVAQUIN) 500 MG tablet Take 1 tablet by mouth daily for 10 days  Qty: 10 tablet, Refills: 0         CONTINUE these medications which have NOT CHANGED    Details   diphenhydrAMINE (BENADRYL) 25 MG tablet Take 25 mg by mouth every 6 hours as needed for Itching      Calcium Carbonate 500 MG CHEW Take 1 tablet by mouth every 6 hours as needed (Heartburn)      Multiple Vitamins-Minerals (THERAPEUTIC MULTIVITAMIN-MINERALS) tablet Take 1 tablet by mouth daily      metoprolol succinate (TOPROL XL) 100 MG extended release tablet Take 1 tablet by mouth 2 times daily  Qty: 30 tablet, Refills: 3      magnesium hydroxide (MILK OF MAGNESIA) 400 MG/5ML suspension Take 30 mLs by mouth daily as needed for Constipation (Given if no BM in 48 hrs)      bisacodyl (DULCOLAX) 10 MG suppository Place 10 mg rectally daily as needed for Constipation (If no results from milk of mag.)      tamsulosin (FLOMAX) 0.4 MG capsule Take 1 capsule by mouth nightly  Qty: 30 capsule, Refills: 3      pantoprazole (PROTONIX) 40 MG tablet Take 1 tablet by mouth every morning (before breakfast)  Qty: 30 tablet, Refills: 3      apixaban (ELIQUIS) 5 MG TABS tablet Take 5 mg by mouth 2 times daily                 Signed:  Electronically signed by Chad Serrato MD on 5/13/2022 at 8:15 AM

## 2022-05-13 NOTE — PROGRESS NOTES
[address] [place] [address] [facility]  CALLIEIDA  Progress Note    SUBJECTIVE:  Chief Complaint   Patient presents with    Extremity Weakness     patient from home, has been week, soto cath in place, rash on arms and sides    Fever    Rash     Patient is tolerating change in medication. No reported adverse drug reactions. No nausea, vomiting, diarrhea. Family at bedside. Review of systems:  As stated above in the chief complaint, otherwise negative. Medications:  Scheduled Meds:   levoFLOXacin  500 mg Oral Daily    white petrolatum   Topical TID    apixaban  5 mg Oral BID    metoprolol succinate  50 mg Oral BID    therapeutic multivitamin-minerals  1 tablet Oral Daily    pantoprazole  40 mg Oral QAM AC    tamsulosin  0.4 mg Oral Nightly    sodium chloride flush  5-40 mL IntraVENous 2 times per day     Continuous Infusions:   sodium chloride       PRN Meds:perflutren lipid microspheres, calcium carbonate, sodium chloride flush, sodium chloride, ondansetron **OR** ondansetron, polyethylene glycol, acetaminophen **OR** acetaminophen    OBJECTIVE:  /66   Pulse 71   Temp 98.3 °F (36.8 °C) (Oral)   Resp 16   Ht 6' 2\" (1.88 m)   Wt 175 lb 6.4 oz (79.6 kg)   SpO2 95%   BMI 22.52 kg/m²   Temp  Av.6 °F (37 °C)  Min: 98.2 °F (36.8 °C)  Max: 99.2 °F (37.3 °C)  Constitutional: The patient is awake, alert, and mostly oriented. Says he feels pretty well today   Skin: Warm and dry. No rashes were noted. Scratch abrasions and scabs   HEENT: Round and reactive pupils. Moist mucous membranes. No ulcerations or thrush. Neck: Supple to movements. Chest: No respiratory distress. Symmetrical expansion. No wheezing, crackles or rhonchi. Cardiovascular: S1 and S2 are rhythmic and regular. No murmurs appreciated. Abdomen: Positive bowel sounds to auscultation. Benign to palpation. No masses felt. Extremities: No edema.  Left AKA stump   Lines: peripheral  Soto catheter with clearing urine Laboratory and Tests Review:  Lab Results   Component Value Date    WBC 8.4 05/12/2022    WBC 5.2 05/10/2022    WBC 6.8 05/09/2022    HGB 9.2 (L) 05/12/2022    HCT 31.2 (L) 05/12/2022    MCV 88.6 05/12/2022     05/12/2022     Lab Results   Component Value Date    NEUTROABS 6.78 05/12/2022    NEUTROABS 4.00 05/10/2022    NEUTROABS 5.15 05/09/2022     No results found for: [facility]  Lab Results   Component Value Date    ALT 44 (H) 05/12/2022    AST 51 (H) 05/12/2022    ALKPHOS 257 (H) 05/12/2022    BILITOT 0.5 05/12/2022     Lab Results   Component Value Date     05/12/2022    K 4.5 05/12/2022    K 3.8 05/10/2022     05/12/2022    CO2 26 05/12/2022    BUN 39 05/12/2022    CREATININE 1.2 05/12/2022    CREATININE 1.7 05/10/2022    CREATININE 2.3 05/09/2022    GFRAA >60 05/12/2022    LABGLOM >60 05/12/2022    GLUCOSE 126 05/12/2022    GLUCOSE 106 03/24/2011    PROT 5.9 05/12/2022    LABALBU 2.7 05/12/2022    LABALBU 2.4 03/20/2011    CALCIUM 8.5 05/12/2022    BILITOT 0.5 05/12/2022    ALKPHOS 257 05/12/2022    AST 51 05/12/2022    ALT 44 05/12/2022     Lab Results   Component Value Date    CRP 16.9 (H) 05/10/2022    CRP 6.8 (H) 04/11/2022    CRP 29.2 (H) 01/03/2011     Lab Results   Component Value Date    SEDRATE 73 (H) 05/10/2022    SEDRATE 45 (H) 01/03/2011     Radiology:  Reviewed     Microbiology:   Blood cultures 5/9/2022: 4/4 Klebsiella pneumoniae -pan sensitive  Urine culture 5/9/2022: >100K Klebsiella pneumoniae   Blood cultures 5/10/2022: NGTD    ASSESSMENT:  Complicated UTI with sepsis associated to a chronic indwelling Valles catheter  Klebsiella septicemia associated to the above  Fever associated to the above-resolving  Urticaria  Hemorrhagic cyst right kidney    PLAN:  Continue levaquin  Plan is home today  Script in chart    CRISTI Box - CNP  10:47 AM  5/13/2022   Pt seen and examined. Above discussed agree with advanced practice nurse.  Labs, cultures, and radiographs reviewed. Face to Face encounter occurred. Changes made as necessary.      Katja Thornton MD

## 2022-05-13 NOTE — PATIENT CARE CONFERENCE
[facility] Quality Flow/Interdisciplinary Rounds Progress Note        Quality Flow Rounds held on May 13, 2022    Disciplines Attending:  Bedside Nurse, ,  and Nursing Unit [address] [place] [address] Fabricio Howard. was admitted on 5/9/2022  8:28 AM    Anticipated Discharge Date:       Disposition:    Jose Miguel Score:  Jose Miguel Scale Score: 16    Readmission Risk              Risk of Unplanned Readmission:  28           Discussed patient goal for the day, patient clinical progression, and barriers to discharge.   The following Goal(s) of the Day/Commitment(s) have been identified:  Labs - Report Results      Dong Keyes RN  May 13, 2022

## 2022-05-13 NOTE — PROGRESS NOTES
Discharge order noted. Care plan and education unresolved per floor's request. Discharge paperwork initiated in computer.

## 2022-05-13 NOTE — PLAN OF CARE
Problem: ABCDS Injury Assessment  Goal: Absence of physical injury  5/12/2022 2234 by Sandra Casas RN  Outcome: Progressing     Problem: Skin/Tissue Integrity  Goal: Absence of new skin breakdown  Description: 1. Monitor for areas of redness and/or skin breakdown  2. Assess vascular access sites hourly  3. Every 4-6 hours minimum:  Change oxygen saturation probe site  4. Every 4-6 hours:  If on nasal continuous positive airway pressure, respiratory therapy assess nares and determine need for appliance change or resting period.   5/12/2022 2234 by Sandra Casas RN  Outcome: Progressing     Problem: Safety - Adult  Goal: Free from fall injury  5/12/2022 2234 by Sandra Casas RN  Outcome: Progressing     Problem: Pain  Goal: Verbalizes/displays adequate comfort level or baseline comfort level  Outcome: Progressing

## 2022-05-13 NOTE — CARE COORDINATION
Social Work discharge planning   Sw notified Marcelle with [facility] of resume [facility] order for discharge to home today.   Electronically signed by Magdalena Iyer on 5/13/2022 at 10:47 AM

## 2022-05-14 LAB
CULTURE, BLOOD 2: ABNORMAL
CULTURE, BLOOD 2: ABNORMAL
ORGANISM: ABNORMAL
ORGANISM: ABNORMAL

## 2022-05-15 LAB — BLOOD CULTURE, ROUTINE: NORMAL

## 2022-05-19 ENCOUNTER — HOSPITAL ENCOUNTER (EMERGENCY)
Age: 67
Discharge: HOME | End: 2022-05-19
Payer: MEDICARE

## 2022-05-19 VITALS
HEIGHT: 74 IN | SYSTOLIC BLOOD PRESSURE: 124 MMHG | HEART RATE: 76 BPM | DIASTOLIC BLOOD PRESSURE: 60 MMHG | OXYGEN SATURATION: 95 % | TEMPERATURE: 97 F | WEIGHT: 178 LBS | BODY MASS INDEX: 22.84 KG/M2 | RESPIRATION RATE: 16 BRPM

## 2022-05-19 DIAGNOSIS — R33.8 ACUTE URINARY RETENTION: Primary | ICD-10-CM

## 2022-05-19 LAB
BACTERIA: ABNORMAL /HPF
BILIRUBIN URINE: NEGATIVE
BLOOD, URINE: ABNORMAL
CLARITY: CLEAR
COLOR: YELLOW
EPITHELIAL CELLS, UA: ABNORMAL /HPF
GLUCOSE URINE: NEGATIVE MG/DL
KETONES, URINE: NEGATIVE MG/DL
LEUKOCYTE ESTERASE, URINE: NEGATIVE
NITRITE, URINE: POSITIVE
PH UA: 6.5 (ref 5–9)
PROTEIN UA: NEGATIVE MG/DL
RBC UA: ABNORMAL /HPF (ref 0–2)
SPECIFIC GRAVITY UA: 1.02 (ref 1–1.03)
UROBILINOGEN, URINE: 0.2 E.U./DL
WBC UA: ABNORMAL /HPF (ref 0–5)

## 2022-05-19 PROCEDURE — 81001 URINALYSIS AUTO W/SCOPE: CPT

## 2022-05-19 PROCEDURE — 99283 EMERGENCY DEPT VISIT LOW MDM: CPT

## 2022-05-20 NOTE — ED PROVIDER NOTES
[facility]  Department of Emergency Medicine   ED  Encounter Note  Admit Date/RoomTime: 2022  5:53 PM  ED Room: SG/SG-WR    NAME: Jaron Hackett : 1955  MRN: [id number]     Chief Complaint:  Urinary Retention (urinary retention, had soto out this am unable to void, needs cath.)    History of Present Illness       Jaron Hackett is a 74 y.o. old male who presents to the emergency department by private vehicle, for urinary retention, which occured all day today prior to arrival.  Since onset the symptoms have been persistent and mild-moderate in severity. Symptoms are associated with nothing additional as it pertains to encounter. Jaron Hackett has a history of prostatic hypertrophy. He was catheterized for a few days prior to this and just had the catheter out yesterday. Urology wanted to see how he did. He urinated this morning and then not since. When traveling nurse came to home he still was unable to urinate. They were unable to place catheter at that time and he came to emergency. He denies any abdominal pain, back pain, fever or chills. ROS   Pertinent positives and negatives are stated within HPI, all other systems reviewed and are negative. Past Medical History:  has a past medical history of Employs prosthetic leg, History of atrial fibrillation, "[employer] (hard of hearing), Hx of AKA (above knee amputation), left ([address] Hx of necrotizing fasciitis, Hypertension, Rectal bleeding, and Rectal cancer ([address] Surgical History:  has a past surgical history that includes above knee amputation (); Colonoscopy (10/21/2011); Eye surgery (Left, 10/03/2016); eye surgery (Bilateral, ?); eye surgery (Right, ?); Colon surgery (2018); Abdomen surgery (2018); pr revision of ileostomy,complicated (N/A, 4999); Colonoscopy (N/A, 2022);  Cardioversion (2020); and hemicolectomy (Right, 3/22/2022). Social History:  reports that he quit smoking about 11 years ago. His smoking use included cigarettes. He started smoking about 48 years ago. He has a 70.00 pack-year smoking history. He has never used smokeless tobacco. He reports that he does not drink alcohol and does not use drugs. Family History: family history includes Dementia in his mother; Diabetes in his father, mother, sister, sister, and sister; Glaucoma in his father; Heart Attack in his brother and mother; High Blood Pressure in his mother; High Cholesterol in his father, mother, sister, sister, and sister; Pacemaker in his father; Thyroid Disease in his sister, sister, and sister. Allergies: Cefepime, Clindamycin/lincomycin, and Pcn [penicillins]    Physical Exam   Oxygen Saturation Interpretation: Normal.        ED Triage Vitals [05/19/22 1548]   BP Temp Temp Source Pulse Resp SpO2 Height Weight   124/60 97 °F (36.1 °C) Temporal 76 16 95 % 6' 2\" (1.88 m) 178 lb (80.7 kg)         Constitutional:  Alert, development consistent with age. HEENT:  NC/NT. Airway patent. Neck:  Normal ROM. Supple. Respiratory:  Lungs Clear to auscultation and breath sounds equal.  CV:  Regular rate and rhythm, normal heart sounds, without pathological murmurs, ectopy, gallops, or rubs. GI:  normal appearing, non-distended with no visible hernias. Bowel sounds: normal bowel sounds. Tenderness: No abdominal tenderness, guarding, rebound, rigidity or pulsatile mass. .        Liver: non-tender. Spleen:  non-tender. : (chaperone present during examination). not indicated / deferred. Back: CVA Tenderness: No CVA tenderness. Integument:  Normal turgor. Warm, dry, without visible rash, unless noted elsewhere. Lymphatics: No lymphangitis or adenopathy noted. Neurological:  Oriented. Motor functions intact.     Lab / Imaging Results   (All laboratory and radiology results have been personally reviewed by myself)  Labs:  Results for orders placed or performed during the hospital encounter of 05/19/22   Urinalysis with Microscopic   Result Value Ref Range    Color, UA Yellow Straw/Yellow    Clarity, UA Clear Clear    Glucose, Ur Negative Negative mg/dL    Bilirubin Urine Negative Negative    Ketones, Urine Negative Negative mg/dL    Specific Gravity, UA 1.020 1.005 - 1.030    Blood, Urine SMALL (A) Negative    pH, UA 6.5 5.0 - 9.0    Protein, UA Negative Negative mg/dL    Urobilinogen, Urine 0.2 <2.0 E.U./dL    Nitrite, Urine POSITIVE (A) Negative    Leukocyte Esterase, Urine Negative Negative    WBC, UA 5-10 (A) 0 - 5 /HPF    RBC, UA 2-5 0 - 2 /HPF    Epithelial Cells, UA NONE SEEN /HPF    Bacteria, UA RARE (A) None Seen /HPF       Imaging: All Radiology results interpreted by Radiologist unless otherwise noted. No orders to display     ED Course / Medical Decision Making   Medications - No data to display     Re-examination:  5/19/22       Time: Valles was placed without complication and there is about 750 cc in the bag. Leg bag was placed    Consults:   None    Procedures:   none    Medical Decision Making:    Patient is well appearing, non toxic and appropriate for outpatient management. Plan is for symptom management and urology follow-up, 1 to 2 days. Plan of Care/Counseling:  Richard Tucker PA-C reviewed today's visit with the patient and sister in addition to providing specific details for the plan of care and counseling regarding the diagnosis and prognosis. Questions are answered at this time and are agreeable with the plan. Assessment      1. Acute urinary retention      Plan   Disposition:   Discharged home. Patient condition is good    New Medications     Discharge Medication List as of 5/19/2022  8:11 PM        Electronically signed by Richard Tucker PA-C   DD: 5/19/22  **This report was transcribed using voice recognition software.  Every effort was made to ensure accuracy; however, inadvertent computerized transcription errors may be present.   END OF ED PROVIDER NOTE           Kayleigh Parson PA-C  05/19/22 5110

## 2022-06-03 ENCOUNTER — TELEPHONE (OUTPATIENT)
Dept: CARDIOLOGY CLINIC | Age: 67
End: 2022-06-03

## 2022-06-03 NOTE — TELEPHONE ENCOUNTER
Patient needs cardiac clearance for a cystoscopy retrogrades W/plasmaloop turp and soto change with (not yet scheduled), patient needs instructions regarding holding Eliquis.  Patient was seen in 5/2022, I spoke with his sister Phill Hill who said patient does not have any chest pain,SOB or palpitations and able to preform daily activities including climb one flight of stairs without any cardiac issues, (patient with memory issues according to sister and unable to speak on the phone), please advise

## 2022-06-03 NOTE — TELEPHONE ENCOUNTER
Patient's sister Yao Harris was notified and understood instructions, note was faxed to 's office at [phone]

## 2022-06-28 ENCOUNTER — HOSPITAL ENCOUNTER (OUTPATIENT)
Age: 67
Discharge: HOME | End: 2022-06-30

## 2022-06-28 PROCEDURE — 88305 TISSUE EXAM BY PATHOLOGIST: CPT

## 2022-07-06 NOTE — PROGRESS NOTES
Pediatric Well Child Exam 7-9yrs    Chief Complaint   Patient presents with   • Office Visit   • Well Child       SUBJECTIVE:  Angelica Bautista is a 5 year old female who presents for a well child exam.  Patient presents with Father and Mother.    Concerns: None     Diet:   Water intake - daily   Milk/dairy - milk daily, curd, no cheese   Proteins - chicken only   Fruits/veggies - daily   Carbs - daily   Pop/caffeine - none     Hackett:   Stool - daily   Constipation - none   Urination - normal     Sleep:   How much - 8-10 hrs   Snoring - none   Sleep well - yes     Dental:   Brushing teeth 2x/day - yes   Dental visits 2x/year - yes      Vision:   Issues - farsighted   Glasses/contacts - glasses     School:   Grade/School - 2nd grade   Issues - none   Screen time - 4 hrs     Activities: bike riding, playing with sister, karate this fall      RECENT HEALTH EVENTS:  · Illnesses: []  YES    [x]  NO    []  N/A  · Hospitalizations: []  YES    [x]  NO    []  N/A  · Injuries or Accidents: []  YES    [x]  NO    []  N/A    [unfilled]     DEVELOPMENT:  [x]  YES    []  NO      []  UNKNOWN    Has success in school?  [x]  YES    []  NO      []  UNKNOWN    Has friends/does well socially?  [x]  YES    []  NO      []  UNKNOWN    Participates in after school/outside activities?  [x]  YES    []  NO      []  UNKNOWN    Gets along with family?  [x]  YES    []  NO      []  UNKNOWN    Does chores when asked?  [x]  YES    []  NO      []  UNKNOWN    Given chances to make own decisions?  [x]  YES    []  NO      []  UNKNOWN    Feels good about self/generally happy?    Developmental milestones were reviewed.     OBJECTIVE:  Visit Vitals  BP 98/65   Pulse 100   Temp 98.7 °F (37.1 °C) (Temporal)   Ht 4' 2\" (1.27 m)   Wt 26.3 kg (57 lb 15.7 oz)   SpO2 100%   BMI 16.31 kg/m²                   Current Outpatient Medications   Medication Sig Dispense Refill   • Pediatric Multiple Vitamins (Multivitamin Childrens) Chew Tab Chew 1 tablet by  IVF D/C'd. EKG obtained. Resting quietly in bed will monitor. mouth daily.     • Acetaminophen 325 MG Cap        No current facility-administered medications for this visit.      History reviewed. No pertinent past medical history.   ALLERGIES:  No Known Allergies      FAMILY HX:  Family History   Problem Relation Age of Onset   • Diabetes Maternal Grandfather    • Diabetes Paternal Grandfather    • Hypertension Paternal Grandfather    • Myocardial Infarction Paternal Grandfather        PAST HISTORIES:  Allergies, Medications, Medical HX, Surgical HX, Family HX reviewed and updated.  IMMUNIZATION STATUS: Up to date as of today's visit   IMMUNIZATION REACTIONS: Denied by caregiver    REVIEW OF SYSTEMS:  Review of Systems   All other systems reviewed and are negative.        Pediatric Symptom Checklist:  Attention Subscale: 0 Normal  Internalizing Subscale: 0 Normal  Externalizing Subscale: 1 Normal  Total Score: 2 Normal    PHYSICAL EXAM:   Physical Exam  Exam conducted with a chaperone present.   Constitutional:       General: She is active.      Appearance: She is well-developed.   HENT:      Head: Normocephalic and atraumatic.      Right Ear: Tympanic membrane normal.      Left Ear: Tympanic membrane normal.      Nose: Nose normal.      Mouth/Throat:      Mouth: Mucous membranes are moist.      Pharynx: Oropharynx is clear.      Neck: Normal range of motion and neck supple.   Eyes:      Extraocular Movements: Extraocular movements intact.      Pupils: Pupils are equal, round, and reactive to light.   Cardiovascular:      Rate and Rhythm: Normal rate and regular rhythm.      Pulses: Normal pulses.      Heart sounds: Normal heart sounds, S1 normal and S2 normal. No murmur heard.  Pulmonary:      Effort: Pulmonary effort is normal.      Breath sounds: Normal breath sounds and air entry.   Chest:   Breasts:      Demarcus Score is 1.       Abdominal:      General: Bowel sounds are normal.      Palpations: Abdomen is soft. There is no mass.      Tenderness: There is no abdominal  tenderness.   Genitourinary:     Demarcus stage (genital): 1.   Musculoskeletal:         General: Normal range of motion.   Skin:     General: Skin is warm and dry.      Capillary Refill: Capillary refill takes less than 2 seconds.   Neurological:      General: No focal deficit present.      Mental Status: She is alert.      Cranial Nerves: No cranial nerve deficit.      Deep Tendon Reflexes: Reflexes normal.        ASSESSMENT/PLAN:  5 year old female well child exam  Diagnoses and all orders for this visit:  Encounter for routine child health examination without abnormal findings  Exercise counseling  Nutritional counseling      1. All parental concerns and questions discussed.  2. Anticipatory guidance provided.   3. Immunizations per orders.  Risks, benefits, and side effects discussed.  4. VIS for Immunizations given.  5. Continue to have healthy habits, eat well, participate in physical activity.   6. Discussed importance of COVID booster.     Follow up:Return in 1 year (on 7/6/2023) for Yearly well child visits.    Robert Anderson,

## 2022-10-07 ENCOUNTER — OFFICE VISIT (OUTPATIENT)
Dept: CARDIOLOGY CLINIC | Age: 67
End: 2022-10-07
Payer: MEDICARE

## 2022-10-07 VITALS
HEIGHT: 74 IN | HEART RATE: 58 BPM | BODY MASS INDEX: 25.44 KG/M2 | SYSTOLIC BLOOD PRESSURE: 128 MMHG | RESPIRATION RATE: 16 BRPM | DIASTOLIC BLOOD PRESSURE: 60 MMHG | WEIGHT: 198.2 LBS

## 2022-10-07 DIAGNOSIS — I48.92 ATRIAL FLUTTER, UNSPECIFIED TYPE (HCC): Primary | ICD-10-CM

## 2022-10-07 PROCEDURE — 99214 OFFICE O/P EST MOD 30 MIN: CPT | Performed by: INTERNAL MEDICINE

## 2022-10-07 PROCEDURE — 93000 ELECTROCARDIOGRAM COMPLETE: CPT | Performed by: INTERNAL MEDICINE

## 2022-10-07 PROCEDURE — 1123F ACP DISCUSS/DSCN MKR DOCD: CPT | Performed by: INTERNAL MEDICINE

## 2022-10-07 NOTE — PROGRESS NOTES
OUTPATIENT CARDIOLOGY FOLLOW-UP    Name: Shannon Velazquez. Age: 74 y.o. Primary Care Physician: Monae Alvarado MD    Date of Service: 10/7/2022    Chief Complaint:   Chief Complaint   Patient presents with    Atrial Flutter     3 mo visit        Interim History:   Mr. Brigette Odom is a 57-year-old  male with history of hypertension, rectal cancer diagnosed March 2018 underwent anterior posterior resection, followed by colostomy and colostomy reversal in March 2020, history of left lower extremity amputation and 2011 secondary to fasciitis, history of retinal detachment status post repair, right inguinal hernia repair who presented to the hospital with tachycardia. He was seen as a new consult on 12/20/2020 for new onset atrial fibrillation RVR. He underwent ОЛЬГА guided cardioversion on 12/20/2020 and was successfully converted to sinus rhythm. He was initiated on Eliquis for anticoagulation and metoprolol for rate control and was discharged home. He has  part D and that is covering his prescriptions. Otherwise, he is tolerating Eliquis without any bleeding complications. He is compliant with medications, as well as salt and fluid intake. He does not take any over-the-counter arthritis medications. He was seen in the office on 1/21/22, since his last visit, he was hospitalized from 4/7/2022 to 4/15/2022 with a urine tract infection and sepsis. He was diagnosed with a Klebsiella pneumonia sepsis and also atrial fibrillation/flutter and underwent cardioversion and on 4/15/2022 and was successfully converted to sinus rhythm. Following hospital discharge patient went into rehab and underwent rehab. He was seen in the office on 5/3/2022, since his last visit, he has not any further hospitalizations or ER visits. He lives at home by himself, his daughter helps him in his day to day activities.   Denies any new cardiac symptoms including chest pain, dyspnea, palpitations, dizziness or lightheadedness. His daughter told me that he had a low blood pressures when he was standing at the rehab place and his antihypertensive medications were discontinued. He still taking Toprol- mg p.o. twice daily. According to his daughter, he also lost weight. He is on Eliquis for anticoagulation without any bleeding complications. No new cardiac complaints since last cardiology evaluation. He denies recent chest pain, SOB, palpitations, lightheadedness, dizziness, syncope, PND, or orthopnea. SR on EKG. Review of Systems:   Cardiac: As per HPI  General: No fever, chills  Pulmonary: As per HPI  HEENT: No visual disturbances, difficult swallowing  GI: No nausea, vomiting  Endocrine: No thyroid disease or DM  Musculoskeletal: KHOURY x 4, no focal motor deficits  Skin: Intact, no rashes  Neuro/Psych: No headache or seizures    Past Medical History:  Past Medical History:   Diagnosis Date    Employs prosthetic leg     left    History of atrial fibrillation     [place] (hard of hearing)     uses bilat hearing aides    Hx of AKA (above knee amputation), left (HCC)     Hx of necrotizing fasciitis 2011    left leg    Hypertension     Rectal bleeding     Rectal cancer ([address] 12/2017    rectal       Past Surgical History:  Past Surgical History:   Procedure Laterality Date    ABDOMEN SURGERY  03/20/2018    ileostomy open take down    ABOVE KNEE AMPUTATION  2011    left; hx flesh eating bacteria    CARDIOVERSION  12/2020    COLON SURGERY  01/23/2018    laprascopic low anterior colon resection  take down splenic fexure   ileostomy    COLONOSCOPY  10/21/2011    COLONOSCOPY N/A 2/14/2022    COLONOSCOPY POLYPECTOMY SNARE/COLD BIOPSY performed by Marcie Tineo MD at [address] Left 10/03/2016    pars plana vitrectomy, retinal detachment repair, laser gas/fluid exchange    EYE SURGERY Bilateral 2002?     bilat cataracts w lens implants    EYE SURGERY Right ?    retinal detachment    HEMICOLECTOMY Right 3/22/2022    LAPAROSCOPIC RIGHT HEMICOLECTOMY performed by Jerilee Peabody, MD at [address] [place] [address] N/A     ILEOSTOMY OPEN TAKEDOWN performed by Jerilee Peabody, MD at [facility] OR       Family History:  Family History   Problem Relation Age of Onset    Diabetes Mother     High Blood Pressure Mother     High Cholesterol Mother     Heart Attack Mother     Dementia Mother     Diabetes Father     Glaucoma Father     High Cholesterol Father     Pacemaker Father     Diabetes Sister     Thyroid Disease Sister     High Cholesterol Sister     Heart Attack Brother     Diabetes Sister     High Cholesterol Sister     Thyroid Disease Sister     Diabetes Sister     High Cholesterol Sister     Thyroid Disease Sister        Social History:  Social History     Socioeconomic History    Marital status: Single     Spouse name: Not on file    Number of children: Not on file    Years of education: Not on file    Highest education level: Not on file   Occupational History    Not on file   Tobacco Use    Smoking status: Former     Packs/day: 2.00     Years: 35.00     Pack years: 70.00     Types: Cigarettes     Start date: 1974     Quit date: 2010     Years since quittin.8    Smokeless tobacco: Never   Vaping Use    Vaping Use: Never used   Substance and Sexual Activity    Alcohol use: No    Drug use: No    Sexual activity: Not on file   Other Topics Concern    Not on file   Social History Narrative    Not on file     Social Determinants of Health     Financial Resource Strain: Not on file   Food Insecurity: Not on file   Transportation Needs: Not on file   Physical Activity: Not on file   Stress: Not on file   Social Connections: Not on file   Intimate Partner Violence: Not on file   Housing Stability: Not on file       Allergies:   Allergies   Allergen Reactions    Cefepime Rash    Clindamycin/Lincomycin Rash    Pcn [Penicillins] Rash       Current Medications:  Current Outpatient Medications Medication Sig Dispense Refill    Loperamide HCl (IMODIUM PO) Take by mouth      Calcium Carbonate 500 MG CHEW Take 1 tablet by mouth every 6 hours as needed (Heartburn)      Multiple Vitamins-Minerals (THERAPEUTIC MULTIVITAMIN-MINERALS) tablet Take 1 tablet by mouth daily      metoprolol succinate (TOPROL XL) 100 MG extended release tablet Take 1 tablet by mouth 2 times daily (Patient taking differently: Take 50 mg by mouth 2 times daily) 30 tablet 3    magnesium hydroxide (MILK OF MAGNESIA) 400 MG/5ML suspension Take 30 mLs by mouth daily as needed for Constipation (Given if no BM in 48 hrs)      bisacodyl (DULCOLAX) 10 MG suppository Place 10 mg rectally daily as needed for Constipation (If no results from milk of mag.)      apixaban (ELIQUIS) 5 MG TABS tablet Take 5 mg by mouth 2 times daily      white petrolatum OINT ointment Apply topically three times daily (Patient not taking: Reported on 10/7/2022) 6 g 1    diphenhydrAMINE (BENADRYL) 25 MG tablet Take 25 mg by mouth every 6 hours as needed for Itching (Patient not taking: Reported on 10/7/2022)      tamsulosin (FLOMAX) 0.4 MG capsule Take 1 capsule by mouth nightly (Patient not taking: Reported on 10/7/2022) 30 capsule 3    pantoprazole (PROTONIX) 40 MG tablet Take 1 tablet by mouth every morning (before breakfast) (Patient not taking: Reported on 10/7/2022) 30 tablet 3     No current facility-administered medications for this visit.        Physical Exam:  /60   Pulse 58   Resp 16   Ht 6' 2\" (1.88 m)   Wt 198 lb 3.2 oz (89.9 kg)   BMI 25.45 kg/m²   Wt Readings from Last 3 Encounters:   10/07/22 198 lb 3.2 oz (89.9 kg)   05/19/22 178 lb (80.7 kg)   05/13/22 175 lb 6.4 oz (79.6 kg)     Appearance: Awake, alert and oriented x 3, no acute respiratory distress  Skin: Intact, no rash  Head: Normocephalic, atraumatic  Eyes: EOMI, no conjunctival erythema  ENMT: No pharyngeal erythema, MMM, no rhinorrhea  Neck: Supple, no elevated JVP, no carotid bruits  Lungs: Clear to auscultation bilaterally. No wheezes, rales, or rhonchi.   Cardiac: Regular rate and rhythm, +S1S2, no murmurs apparent  Abdomen: Soft, nontender, +bowel sounds  Extremities: Moves all extremities x 4, no lower extremity edema, has left AKA with prosthetic limb  Neurologic: No focal motor deficits apparent, normal mood and affect, alert and oriented x 3  Peripheral Pulses: Intact posterior tibial pulses bilaterally    Laboratory Tests:  Lab Results   Component Value Date    CREATININE 1.2 05/12/2022    BUN 39 (H) 05/12/2022     05/12/2022    K 4.5 05/12/2022     05/12/2022    CO2 26 05/12/2022     Lab Results   Component Value Date/Time    MG 1.8 04/13/2022 10:00 AM     Lab Results   Component Value Date    WBC 8.4 05/12/2022    HGB 9.2 (L) 05/12/2022    HCT 31.2 (L) 05/12/2022    MCV 88.6 05/12/2022     05/12/2022     Lab Results   Component Value Date    ALT 44 (H) 05/12/2022    AST 51 (H) 05/12/2022    ALKPHOS 257 (H) 05/12/2022    BILITOT 0.5 05/12/2022     Lab Results   Component Value Date    CKTOTAL 29 05/09/2022    CKMB <0.2 03/18/2011    TROPONINI <0.01 12/19/2020    TROPONINI <0.01 03/18/2011    TROPONINI 0.02 01/03/2011     Lab Results   Component Value Date    INR 1.6 04/12/2022    INR 1.6 04/11/2022    INR 1.5 04/10/2022    PROTIME 17.9 (H) 04/12/2022    PROTIME 17.2 (H) 04/11/2022    PROTIME 16.9 (H) 04/10/2022     Lab Results   Component Value Date    TSH 1.050 03/25/2022     Lab Results   Component Value Date    LABA1C 5.6 12/20/2020     No results found for: EAG  Lab Results   Component Value Date    CHOL 114 12/20/2020     Lab Results   Component Value Date    TRIG 76 12/20/2020     Lab Results   Component Value Date    HDL 41 12/20/2020     Lab Results   Component Value Date    LDLCALC 58 12/20/2020     Lab Results   Component Value Date    LABVLDL 15 12/20/2020     No results found for: CHOLHDLRATIO    Cardiac Tests:  ECG: Normal sinus rhythm, normal EKG      TTE-12/20/2020:  Normal left ventricle size and systolic function. Ejection fraction is visually estimated at 55-60%. Atrial fibrillation may   affect the evaluation of LV systolic function. Normal left ventricle wall thickness. No wall motion abnormalities. Normal diastolic function (E/e' < 11). The left atrium is moderately dilated. Mildly dilated right ventricle. Right ventricle global systolic function is normal . TAPSE 21 mm. Physiologic and/or trace mitral regurgitation is present. No hemodynamically significant aortic stenosis is present. Unable to estimate PA systolic pressure. Trace pericardial effusion. ОЛЬГА-: Normal LV function, definite echo contrast was used to visualize left atrial appendage. No intracardiac thrombus and no left atrial appendage thrombus noted. Stress test:      ОЛЬГА- 4/7/2022:  Left ventricle was not well visualized. The right ventricle was not clearly visualized. No evidence of thrombus within left atrium/ left atrial appendage. Emptying velocity is low at 32 cms/s. No evidence of thrombus or mass in the right atrium. Mild mitral regurgitation is present. Mild tricuspid regurgitation. Mild diffuse atherosclerosis in the descending aorta. Pleural effusion seen. Technically limited study. Cardiac catheterization:     The ASCVD Risk score (Kwan DK, et al., 2019) failed to calculate for the following reasons: The valid total cholesterol range is 130 to 320 mg/dL        ASSESSMENT:  New onset A. fib with RVR, underwent successful ОЛЬГА guided cardioversion to normal sinus rhythm 4/15/22. He remains in sinus rhythm. [id number] score-2, Eliquis for anticoagulation  Hypertension,well controlled.    History of rectal cancer s/p resection  History of retinal detachment status post repair  COURTNEY resolved  Mild hypokalemia, improved  Left lower extremity above-knee amputation  ROBERTH  Recent history of UTI with sepsis secondary to Klebsiella pneumonia, treated  . Plan:   Continue Eliquis 5 mg p.o. twice daily for anticoagulation. Decrease metoprolol succinate to 50 mg twice daily due to soft BP. He was advised to drink at least 64 oz of fluids a day. Follow-up with me in 6 months. The patient's current medication list, allergies, problem list and results of all previously ordered testing were reviewed at today's visit.   Lauren Akhtar MD  [facility]) Cardiology

## 2022-10-07 NOTE — PATIENT INSTRUCTIONS
Continue Eliquis 5 mg p.o. twice daily for anticoagulation. Decrease metoprolol succinate to 50 mg twice daily due to soft BP. He was advised to drink at least 64 oz of fluids a day. Follow-up with me in 6 months.

## 2022-11-01 ENCOUNTER — HOSPITAL ENCOUNTER (INPATIENT)
Age: 67
LOS: 3 days | Discharge: HOME | DRG: 308 | End: 2022-11-04
Attending: STUDENT IN AN ORGANIZED HEALTH CARE EDUCATION/TRAINING PROGRAM | Admitting: INTERNAL MEDICINE
Payer: MEDICARE

## 2022-11-01 ENCOUNTER — APPOINTMENT (OUTPATIENT)
Dept: GENERAL RADIOLOGY | Age: 67
DRG: 308 | End: 2022-11-01
Payer: MEDICARE

## 2022-11-01 DIAGNOSIS — I48.91 ATRIAL FIBRILLATION, UNSPECIFIED TYPE (HCC): ICD-10-CM

## 2022-11-01 DIAGNOSIS — E86.0 DEHYDRATION: ICD-10-CM

## 2022-11-01 DIAGNOSIS — R53.83 OTHER FATIGUE: Primary | ICD-10-CM

## 2022-11-01 LAB
ALBUMIN SERPL-MCNC: 3.8 G/DL (ref 3.5–5.2)
ALP BLD-CCNC: 96 U/L (ref 40–129)
ALT SERPL-CCNC: 34 U/L (ref 0–40)
ANION GAP SERPL CALCULATED.3IONS-SCNC: 13 MMOL/L (ref 7–16)
AST SERPL-CCNC: 34 U/L (ref 0–39)
BASOPHILS ABSOLUTE: 0.04 E9/L (ref 0–0.2)
BASOPHILS RELATIVE PERCENT: 0.5 % (ref 0–2)
BILIRUB SERPL-MCNC: 1.9 MG/DL (ref 0–1.2)
BUN BLDV-MCNC: 46 MG/DL (ref 6–23)
CALCIUM SERPL-MCNC: 9.4 MG/DL (ref 8.6–10.2)
CHLORIDE BLD-SCNC: 105 MMOL/L (ref 98–107)
CO2: 23 MMOL/L (ref 22–29)
CREAT SERPL-MCNC: 1.8 MG/DL (ref 0.7–1.2)
EOSINOPHILS ABSOLUTE: 0 E9/L (ref 0.05–0.5)
EOSINOPHILS RELATIVE PERCENT: 0 % (ref 0–6)
GFR SERPL CREATININE-BSD FRML MDRD: 40 ML/MIN/1.73
GLUCOSE BLD-MCNC: 160 MG/DL (ref 74–99)
HCT VFR BLD CALC: 42.8 % (ref 37–54)
HEMOGLOBIN: 13.5 G/DL (ref 12.5–16.5)
IMMATURE GRANULOCYTES #: 0.02 E9/L
IMMATURE GRANULOCYTES %: 0.2 % (ref 0–5)
INFLUENZA A BY PCR: NOT DETECTED
INFLUENZA B BY PCR: NOT DETECTED
LYMPHOCYTES ABSOLUTE: 1.63 E9/L (ref 1.5–4)
LYMPHOCYTES RELATIVE PERCENT: 19.3 % (ref 20–42)
MCH RBC QN AUTO: 27.8 PG (ref 26–35)
MCHC RBC AUTO-ENTMCNC: 31.5 % (ref 32–34.5)
MCV RBC AUTO: 88.2 FL (ref 80–99.9)
MONOCYTES ABSOLUTE: 0.91 E9/L (ref 0.1–0.95)
MONOCYTES RELATIVE PERCENT: 10.8 % (ref 2–12)
NEUTROPHILS ABSOLUTE: 5.85 E9/L (ref 1.8–7.3)
NEUTROPHILS RELATIVE PERCENT: 69.2 % (ref 43–80)
PDW BLD-RTO: 14.9 FL (ref 11.5–15)
PLATELET # BLD: 220 E9/L (ref 130–450)
PMV BLD AUTO: 10.3 FL (ref 7–12)
POTASSIUM REFLEX MAGNESIUM: 3.6 MMOL/L (ref 3.5–5)
PRO-BNP: 4113 PG/ML (ref 0–125)
RBC # BLD: 4.85 E12/L (ref 3.8–5.8)
RSV BY PCR: NEGATIVE
SARS-COV-2, NAAT: NOT DETECTED
SODIUM BLD-SCNC: 141 MMOL/L (ref 132–146)
TOTAL PROTEIN: 5.9 G/DL (ref 6.4–8.3)
TROPONIN, HIGH SENSITIVITY: 17 NG/L (ref 0–11)
WBC # BLD: 8.5 E9/L (ref 4.5–11.5)

## 2022-11-01 PROCEDURE — 71045 X-RAY EXAM CHEST 1 VIEW: CPT

## 2022-11-01 PROCEDURE — 87635 SARS-COV-2 COVID-19 AMP PRB: CPT

## 2022-11-01 PROCEDURE — 83880 ASSAY OF NATRIURETIC PEPTIDE: CPT

## 2022-11-01 PROCEDURE — 99285 EMERGENCY DEPT VISIT HI MDM: CPT

## 2022-11-01 PROCEDURE — 93005 ELECTROCARDIOGRAM TRACING: CPT | Performed by: STUDENT IN AN ORGANIZED HEALTH CARE EDUCATION/TRAINING PROGRAM

## 2022-11-01 PROCEDURE — 6370000000 HC RX 637 (ALT 250 FOR IP): Performed by: INTERNAL MEDICINE

## 2022-11-01 PROCEDURE — 87807 RSV ASSAY W/OPTIC: CPT

## 2022-11-01 PROCEDURE — 85025 COMPLETE CBC W/AUTO DIFF WBC: CPT

## 2022-11-01 PROCEDURE — 2060000000 HC ICU INTERMEDIATE R&B

## 2022-11-01 PROCEDURE — 2580000003 HC RX 258

## 2022-11-01 PROCEDURE — 80053 COMPREHEN METABOLIC PANEL: CPT

## 2022-11-01 PROCEDURE — 2500000003 HC RX 250 WO HCPCS: Performed by: INTERNAL MEDICINE

## 2022-11-01 PROCEDURE — 36415 COLL VENOUS BLD VENIPUNCTURE: CPT

## 2022-11-01 PROCEDURE — 87502 INFLUENZA DNA AMP PROBE: CPT

## 2022-11-01 PROCEDURE — 84484 ASSAY OF TROPONIN QUANT: CPT

## 2022-11-01 RX ORDER — METOPROLOL SUCCINATE 100 MG/1
100 TABLET, EXTENDED RELEASE ORAL 2 TIMES DAILY
Status: DISCONTINUED | OUTPATIENT
Start: 2022-11-01 | End: 2022-11-04 | Stop reason: HOSPADM

## 2022-11-01 RX ORDER — LOPERAMIDE HYDROCHLORIDE 2 MG/1
2 CAPSULE ORAL DAILY
Status: ON HOLD | COMMUNITY
End: 2022-11-04 | Stop reason: HOSPADM

## 2022-11-01 RX ORDER — ONDANSETRON 2 MG/ML
4 INJECTION INTRAMUSCULAR; INTRAVENOUS EVERY 6 HOURS PRN
Status: DISCONTINUED | OUTPATIENT
Start: 2022-11-01 | End: 2022-11-01

## 2022-11-01 RX ORDER — ACETAMINOPHEN 650 MG/1
650 SUPPOSITORY RECTAL EVERY 6 HOURS PRN
Status: DISCONTINUED | OUTPATIENT
Start: 2022-11-01 | End: 2022-11-04 | Stop reason: HOSPADM

## 2022-11-01 RX ORDER — SODIUM CHLORIDE 0.9 % (FLUSH) 0.9 %
5-40 SYRINGE (ML) INJECTION EVERY 12 HOURS SCHEDULED
Status: DISCONTINUED | OUTPATIENT
Start: 2022-11-01 | End: 2022-11-04 | Stop reason: HOSPADM

## 2022-11-01 RX ORDER — SODIUM CHLORIDE 0.9 % (FLUSH) 0.9 %
5-40 SYRINGE (ML) INJECTION PRN
Status: DISCONTINUED | OUTPATIENT
Start: 2022-11-01 | End: 2022-11-04 | Stop reason: HOSPADM

## 2022-11-01 RX ORDER — ONDANSETRON 4 MG/1
4 TABLET, ORALLY DISINTEGRATING ORAL EVERY 8 HOURS PRN
Status: DISCONTINUED | OUTPATIENT
Start: 2022-11-01 | End: 2022-11-01

## 2022-11-01 RX ORDER — ACETAMINOPHEN 325 MG/1
650 TABLET ORAL EVERY 6 HOURS PRN
Status: DISCONTINUED | OUTPATIENT
Start: 2022-11-01 | End: 2022-11-04 | Stop reason: HOSPADM

## 2022-11-01 RX ORDER — FLUOROMETHOLONE 0.1 %
1 SUSPENSION, DROPS(FINAL DOSAGE FORM)(ML) OPHTHALMIC (EYE) 4 TIMES DAILY
COMMUNITY
Start: 2022-10-28

## 2022-11-01 RX ORDER — 0.9 % SODIUM CHLORIDE 0.9 %
500 INTRAVENOUS SOLUTION INTRAVENOUS ONCE
Status: COMPLETED | OUTPATIENT
Start: 2022-11-01 | End: 2022-11-01

## 2022-11-01 RX ORDER — DILTIAZEM HYDROCHLORIDE 5 MG/ML
10 INJECTION INTRAVENOUS ONCE
Status: COMPLETED | OUTPATIENT
Start: 2022-11-01 | End: 2022-11-01

## 2022-11-01 RX ORDER — MULTIVITAMIN WITH IRON
1 TABLET ORAL DAILY
Status: DISCONTINUED | OUTPATIENT
Start: 2022-11-02 | End: 2022-11-04 | Stop reason: HOSPADM

## 2022-11-01 RX ORDER — CALCIUM CARBONATE 200(500)MG
500 TABLET,CHEWABLE ORAL EVERY 6 HOURS PRN
Status: DISCONTINUED | OUTPATIENT
Start: 2022-11-01 | End: 2022-11-04 | Stop reason: HOSPADM

## 2022-11-01 RX ORDER — SODIUM CHLORIDE 9 MG/ML
INJECTION, SOLUTION INTRAVENOUS PRN
Status: DISCONTINUED | OUTPATIENT
Start: 2022-11-01 | End: 2022-11-04 | Stop reason: HOSPADM

## 2022-11-01 RX ADMIN — METOPROLOL SUCCINATE 100 MG: 100 TABLET, FILM COATED, EXTENDED RELEASE ORAL at 22:13

## 2022-11-01 RX ADMIN — DILTIAZEM HYDROCHLORIDE 10 MG: 5 INJECTION, SOLUTION INTRAVENOUS at 20:43

## 2022-11-01 RX ADMIN — DEXTROSE MONOHYDRATE 5 MG/HR: 50 INJECTION, SOLUTION INTRAVENOUS at 20:47

## 2022-11-01 RX ADMIN — SODIUM CHLORIDE 500 ML: 9 INJECTION, SOLUTION INTRAVENOUS at 18:25

## 2022-11-01 RX ADMIN — APIXABAN 5 MG: 5 TABLET, FILM COATED ORAL at 22:13

## 2022-11-01 ASSESSMENT — PAIN - FUNCTIONAL ASSESSMENT
PAIN_FUNCTIONAL_ASSESSMENT: NONE - DENIES PAIN
PAIN_FUNCTIONAL_ASSESSMENT: NONE - DENIES PAIN

## 2022-11-01 NOTE — PROGRESS NOTES
Admission database completed to best of this RN's ability. Care plan and education initiated. Pt from home with sister. Hx L AKA with prosthesis. Uses a cane with ambulation at baseline; has [employer]. Denies any [place] [id number] services prior to admission. Hx with [facility].

## 2022-11-01 NOTE — ED TRIAGE NOTES
FIRST PROVIDER CONTACT ASSESSMENT NOTE       Department of Emergency Medicine                 First Provider Note            22  5:13 PM EDT    Date of Encounter: No admission date for patient encounter. Patient Name: Jose Devine : 1955  MRN: [id number]    Chief Complaint: Atrial Fibrillation (-150 at pcp, hx of and on thinners ) and Fatigue      History of Present Illness:   Jose Devine is a 69 y.o. male who presents to the ED for nausea, weakness and fatigue. Seen by PCP and EKG found to be abnormal.  Pt found to be in A. Fib. Does have hx of A. Fib. At doctors office found to have rates of close to 150. Denies CP. Focused Physical Exam:  VS:    ED Triage Vitals   BP Temp Temp src Pulse Resp SpO2 Height Weight   -- -- -- -- -- -- -- --        Physical Ex: Constitutional: Alert and non-toxic. Medical History:  has a past medical history of Employs prosthetic leg, History of atrial fibrillation, [place] (hard of hearing), Hx of AKA (above knee amputation), left ([address] Hx of necrotizing fasciitis, Hypertension, Rectal bleeding, and Rectal cancer ([facility] Surgical History:  has a past surgical history that includes above knee amputation (); Colonoscopy (10/21/2011); Eye surgery (Left, 10/03/2016); eye surgery (Bilateral, ?); eye surgery (Right, ?); Colon surgery (2018); Abdomen surgery (2018); pr revision of ileostomy,complicated (N/A, ); Colonoscopy (N/A, 2022); Cardioversion (2020); and hemicolectomy (Right, 3/22/2022). Social History:  reports that he quit smoking about 11 years ago. His smoking use included cigarettes. He started smoking about 48 years ago. He has a 70.00 pack-year smoking history. He has never used smokeless tobacco. He reports that he does not drink alcohol and does not use drugs.   Family History: family history includes Dementia in his mother; Diabetes in his father, mother, sister, sister, and sister; Glaucoma in his father; Heart Attack in his brother and mother; High Blood Pressure in his mother; High Cholesterol in his father, mother, sister, sister, and sister; Pacemaker in his father; Thyroid Disease in his sister, sister, and sister.     Allergies: Cefepime, Clindamycin/lincomycin, and Pcn [penicillins]     Initial Plan of Care: Initiate Treatment-Testing, Proceed toTreatment Area When Bed Available for ED Attending/MLP to Continue Care      ---END OF FIRST PROVIDER CONTACT ASSESSMENT NOTE---  Electronically signed by Aj Muñoz PA-C   DD: 11/1/22

## 2022-11-01 NOTE — ED NOTES
Initial contact with pt. Pt placed onto cardiac monitor by this rn and family at bedside. Pt hard of hearing. Pt alert and pleasantly confused, family states that is normal for him. Skin warm and dry. Respirations even and unlabored, pt denying any sob and family states that she has noticed him breathing heavier than usual. ls diminished. Denying any abd pains. Denying any cp. Denying any n/v/d. Dr at bedside.       Jen Gallagher RN  11/01/22 [id number]

## 2022-11-02 PROBLEM — I73.9 PAD (PERIPHERAL ARTERY DISEASE) (HCC): Status: ACTIVE | Noted: 2022-11-02

## 2022-11-02 PROBLEM — Z79.01 ON APIXABAN THERAPY: Status: ACTIVE | Noted: 2022-11-02

## 2022-11-02 PROBLEM — N18.9 ACUTE KIDNEY INJURY SUPERIMPOSED ON CHRONIC KIDNEY DISEASE (HCC): Status: ACTIVE | Noted: 2022-11-02

## 2022-11-02 PROBLEM — N17.9 ACUTE KIDNEY INJURY SUPERIMPOSED ON CHRONIC KIDNEY DISEASE (HCC): Status: ACTIVE | Noted: 2022-11-02

## 2022-11-02 LAB
ALBUMIN SERPL-MCNC: 3.6 G/DL (ref 3.5–5.2)
ALP BLD-CCNC: 94 U/L (ref 40–129)
ALT SERPL-CCNC: 33 U/L (ref 0–40)
ANION GAP SERPL CALCULATED.3IONS-SCNC: 14 MMOL/L (ref 7–16)
AST SERPL-CCNC: 34 U/L (ref 0–39)
BASOPHILS ABSOLUTE: 0.06 E9/L (ref 0–0.2)
BASOPHILS RELATIVE PERCENT: 0.7 % (ref 0–2)
BILIRUB SERPL-MCNC: 2 MG/DL (ref 0–1.2)
BUN BLDV-MCNC: 37 MG/DL (ref 6–23)
CALCIUM SERPL-MCNC: 8.7 MG/DL (ref 8.6–10.2)
CHLORIDE BLD-SCNC: 104 MMOL/L (ref 98–107)
CO2: 24 MMOL/L (ref 22–29)
CREAT SERPL-MCNC: 1.4 MG/DL (ref 0.7–1.2)
EOSINOPHILS ABSOLUTE: 0.19 E9/L (ref 0.05–0.5)
EOSINOPHILS RELATIVE PERCENT: 2.4 % (ref 0–6)
GFR SERPL CREATININE-BSD FRML MDRD: 54 ML/MIN/1.73
GLUCOSE BLD-MCNC: 90 MG/DL (ref 74–99)
HCT VFR BLD CALC: 43.2 % (ref 37–54)
HEMOGLOBIN: 13.5 G/DL (ref 12.5–16.5)
IMMATURE GRANULOCYTES #: 0.03 E9/L
IMMATURE GRANULOCYTES %: 0.4 % (ref 0–5)
LYMPHOCYTES ABSOLUTE: 1.77 E9/L (ref 1.5–4)
LYMPHOCYTES RELATIVE PERCENT: 22 % (ref 20–42)
MAGNESIUM: 2 MG/DL (ref 1.6–2.6)
MCH RBC QN AUTO: 27.8 PG (ref 26–35)
MCHC RBC AUTO-ENTMCNC: 31.3 % (ref 32–34.5)
MCV RBC AUTO: 89.1 FL (ref 80–99.9)
MONOCYTES ABSOLUTE: 0.97 E9/L (ref 0.1–0.95)
MONOCYTES RELATIVE PERCENT: 12.1 % (ref 2–12)
NEUTROPHILS ABSOLUTE: 5.02 E9/L (ref 1.8–7.3)
NEUTROPHILS RELATIVE PERCENT: 62.4 % (ref 43–80)
PDW BLD-RTO: 14.6 FL (ref 11.5–15)
PLATELET # BLD: 200 E9/L (ref 130–450)
PMV BLD AUTO: 9.8 FL (ref 7–12)
POTASSIUM REFLEX MAGNESIUM: 3.4 MMOL/L (ref 3.5–5)
RBC # BLD: 4.85 E12/L (ref 3.8–5.8)
SODIUM BLD-SCNC: 142 MMOL/L (ref 132–146)
TOTAL PROTEIN: 5.9 G/DL (ref 6.4–8.3)
WBC # BLD: 8 E9/L (ref 4.5–11.5)

## 2022-11-02 PROCEDURE — 85025 COMPLETE CBC W/AUTO DIFF WBC: CPT

## 2022-11-02 PROCEDURE — APPSS60 APP SPLIT SHARED TIME 46-60 MINUTES: Performed by: CLINICAL NURSE SPECIALIST

## 2022-11-02 PROCEDURE — 80053 COMPREHEN METABOLIC PANEL: CPT

## 2022-11-02 PROCEDURE — 2060000000 HC ICU INTERMEDIATE R&B

## 2022-11-02 PROCEDURE — 2500000003 HC RX 250 WO HCPCS: Performed by: INTERNAL MEDICINE

## 2022-11-02 PROCEDURE — 6370000000 HC RX 637 (ALT 250 FOR IP): Performed by: CLINICAL NURSE SPECIALIST

## 2022-11-02 PROCEDURE — 36415 COLL VENOUS BLD VENIPUNCTURE: CPT

## 2022-11-02 PROCEDURE — 99223 1ST HOSP IP/OBS HIGH 75: CPT | Performed by: INTERNAL MEDICINE

## 2022-11-02 PROCEDURE — 2580000003 HC RX 258: Performed by: INTERNAL MEDICINE

## 2022-11-02 PROCEDURE — 83735 ASSAY OF MAGNESIUM: CPT

## 2022-11-02 PROCEDURE — 6370000000 HC RX 637 (ALT 250 FOR IP): Performed by: INTERNAL MEDICINE

## 2022-11-02 PROCEDURE — 6360000002 HC RX W HCPCS: Performed by: CLINICAL NURSE SPECIALIST

## 2022-11-02 RX ORDER — FUROSEMIDE 10 MG/ML
40 INJECTION INTRAMUSCULAR; INTRAVENOUS ONCE
Status: COMPLETED | OUTPATIENT
Start: 2022-11-02 | End: 2022-11-02

## 2022-11-02 RX ORDER — POTASSIUM CHLORIDE 20 MEQ/1
40 TABLET, EXTENDED RELEASE ORAL ONCE
Status: COMPLETED | OUTPATIENT
Start: 2022-11-02 | End: 2022-11-02

## 2022-11-02 RX ADMIN — METOPROLOL SUCCINATE 100 MG: 100 TABLET, FILM COATED, EXTENDED RELEASE ORAL at 21:15

## 2022-11-02 RX ADMIN — DEXTROSE MONOHYDRATE 10 MG/HR: 50 INJECTION, SOLUTION INTRAVENOUS at 06:02

## 2022-11-02 RX ADMIN — METOPROLOL SUCCINATE 100 MG: 100 TABLET, FILM COATED, EXTENDED RELEASE ORAL at 08:42

## 2022-11-02 RX ADMIN — FUROSEMIDE 40 MG: 10 INJECTION, SOLUTION INTRAMUSCULAR; INTRAVENOUS at 12:44

## 2022-11-02 RX ADMIN — SODIUM CHLORIDE, PRESERVATIVE FREE 10 ML: 5 INJECTION INTRAVENOUS at 21:15

## 2022-11-02 RX ADMIN — APIXABAN 5 MG: 5 TABLET, FILM COATED ORAL at 21:15

## 2022-11-02 RX ADMIN — MULTIVITAMIN TABLET 1 TABLET: TABLET at 08:42

## 2022-11-02 RX ADMIN — APIXABAN 5 MG: 5 TABLET, FILM COATED ORAL at 08:42

## 2022-11-02 RX ADMIN — DEXTROSE MONOHYDRATE 7.5 MG/HR: 50 INJECTION, SOLUTION INTRAVENOUS at 18:22

## 2022-11-02 RX ADMIN — POTASSIUM CHLORIDE 40 MEQ: 1500 TABLET, EXTENDED RELEASE ORAL at 08:42

## 2022-11-02 ASSESSMENT — ENCOUNTER SYMPTOMS
PHOTOPHOBIA: 0
TROUBLE SWALLOWING: 0
VOICE CHANGE: 0
DIARRHEA: 0
SHORTNESS OF BREATH: 1
NAUSEA: 1
WHEEZING: 0
ABDOMINAL PAIN: 0
COUGH: 1
VOMITING: 0

## 2022-11-02 NOTE — PROGRESS NOTES
Charles STAHL sent secure message via perfect serve regarding new cardiology consult asking Dr. Moses Cordova to see.

## 2022-11-02 NOTE — CONSULTS
Inpatient Cardiology Consultation      Reason for Consult:  Atrial fibrillation CHF    Consulting Physician: Dr. Lukasz Arceo    Requesting Physician:  Dr. Sd Mayberry    Date of Consultation: 11/2/2022    History of Present Illness:   Mr. Dulce Bennett is a 69year old gentleman who is followed at our practice by Dr. Yovani Odom. He has a lengthy past medical history including recurrent atrial fibrillation with most recent cardioversion in April 2022; hypertension; and left lower extremity amputation due to fasciitis. When last seen by Dr. Yovani Odom in our office on October 7, 2022, he was in [facility]. He was sent to the ED on November 1 from his PCP's office after being found to have rapid AF in the office. He was hypertensive on arrival, and given 500 mls or normal saline as well as diltiazem bolus and drip. At this time, he is sitting up in bed and in no apparent acute distress. He is hard of hearing and admits to recent confusion, which according to chart documentation is at baseline per his family. He denies recent palpitations, chest discomfort, or dyspnea but thinks he may be more tired recently. He reports compliance with his medications despite his confusion. Past Medical History:    Recurrent paroxysmal atrial fibrillation  A. Diagnosed in 12/2020  B. S/p ОЛЬГА guided cardioversion in 12/2020  C. S/p ОЛЬГА guided cardioversion 3/2022  D.  Recurrent atrial fibrillation/flutter in 4/2022 (in the setting of gram negative evangelina bacteremia) s/p successful cardioversion   E. Chronic anticoagulation with Eliquis  Hypertension   Left leg AKA due to necrotizing fascitis January 2011  Rectal CA s/p Right hemicolectomy with postoperative course complicated by COURTNEY and hypotension March 2022  Admitted with bacteremia and malpositioned soto catheter in April 2022  Admitted May 5615 with complicated UTI with sepsis  Hearing loss  CKD      Past Surgical History:    Past Surgical History:   Procedure Laterality Date    ABDOMEN SURGERY 03/20/2018    ileostomy open take down    ABOVE KNEE AMPUTATION  2011    left; hx flesh eating bacteria    CARDIOVERSION  12/2020    COLON SURGERY  01/23/2018    laprascopic low anterior colon resection  take down splenic fexure   ileostomy    COLONOSCOPY  10/21/2011    COLONOSCOPY N/A 2/14/2022    COLONOSCOPY POLYPECTOMY SNARE/COLD BIOPSY performed by Yvonne Arroyo MD at [address] Left 10/03/2016    pars plana vitrectomy, retinal detachment repair, laser gas/fluid exchange    EYE SURGERY Bilateral 2002? bilat cataracts w lens implants    EYE SURGERY Right ?    retinal detachment    HEMICOLECTOMY Right 3/22/2022    LAPAROSCOPIC RIGHT HEMICOLECTOMY performed by Yvonne Arroyo MD at [address] N/A 5/61/2695    ILEOSTOMY OPEN TAKEDOWN performed by Yvonne Arroyo MD at [address]       Prior Cardiac Testing:      Adenosine stress 2011  Findings-   There is normal distribution of left ventricular myocardial activity on both the adenosine stress and rest SPECT myocardial perfusion images. Gated study shows normal left ventricular wall motion and myocardial thickening during systole. Resting left ventricular ejection fraction is 54%, the EDV is 136 mL, and the ESV is 63 mL. TTE-12/20/2020:  Normal left ventricle size and systolic function. Ejection fraction is visually estimated at 55-60%. Atrial fibrillation may   affect the evaluation of LV systolic function. Normal left ventricle wall thickness. No wall motion abnormalities. Normal diastolic function (E/e' < 11). The left atrium is moderately dilated. Mildly dilated right ventricle. Right ventricle global systolic function is normal . TAPSE 21 mm. Physiologic and/or trace mitral regurgitation is present. No hemodynamically significant aortic stenosis is present. Unable to estimate PA systolic pressure. Trace pericardial effusion.     ОЛЬГА 12/21/2020:  Summary   Normal left ventricle size and systolic function. Ejection fraction is visually estimated at 60-65%. No regional wall motion abnormalities seen. Normal left ventricle wall thickness. No evidence of thrombus within left atrium or appendage. Definity contrast   was used to delineate EZEKIEL thrombus. Normal right ventricular size and function. Mild mitral regurgitation with centrally directed jet. No hemodynamically significant aortic stenosis is present. Unable to estimate PA systolic pressure. Mildly dilated aortic root (4.1 cm) and normal sized ascending aorta. No evidence for hemodynamically significant pericardial effusion. ОЛЬГА 3/30/22    Summary   Normal left ventricle size and systolic function. Ejection fraction is visually estimated at 55-60%. Atrial flutter may   affect the evaluation of LV systolic function. No regional wall motion abnormalities seen. Agitated saline injected for shunt evaluation. No evidence of thrombus within left atrium or appendage. Normal right ventricular size and function. Mild mitral regurgitation is present. No hemodynamically significant aortic stenosis is present. Unable to estimate PA systolic pressure. Mildly dilated aortic root (4.0 cm). No evidence for hemodynamically significant pericardial effusion. ОЛЬГА- 4/15/2022:  Left ventricle was not well visualized. The right ventricle was not clearly visualized. No evidence of thrombus within left atrium/ left atrial appendage. Emptying velocity is low at 32 cms/s. No evidence of thrombus or mass in the right atrium. Mild mitral regurgitation is present. Mild tricuspid regurgitation. Mild diffuse atherosclerosis in the descending aorta. Pleural effusion seen. Technically limited study. TTE 5/11/2022:   Summary   Left ventricular internal dimensions were normal in diastole and systole. Borderline concentric left ventricular hypertrophy. No regional wall motion abnormalities seen.    Normal left ventricular ejection fraction. The left atrium is mildly dilated. Moderately enlarged right atrium size. The aortic valve appears mildly sclerotic. Physiologic and/or trace tricuspid regurgitation. There is a trivial circumferential pericardial effusion noted. Medications Prior to Admit:  Prior to Admission medications    Medication Sig Start Date End Date Taking?  Authorizing Provider   loperamide (IMODIUM) 2 MG capsule Take 2 mg by mouth daily   Yes Historical Provider, MD   fluorometholone (FML) 0.1 % ophthalmic suspension Place 1 drop into both eyes 4 times daily 10/28/22   Historical Provider, MD   Calcium Carbonate 500 MG CHEW Take 1 tablet by mouth every 6 hours as needed (Heartburn)    Historical Provider, MD   Multiple Vitamins-Minerals (THERAPEUTIC MULTIVITAMIN-MINERALS) tablet Take 1 tablet by mouth daily    Historical Provider, MD   metoprolol succinate (TOPROL XL) 100 MG extended release tablet Take 1 tablet by mouth 2 times daily  Patient taking differently: Take 50 mg by mouth 2 times daily 4/15/22   Monae Alvarado MD   magnesium hydroxide (MILK OF MAGNESIA) 400 MG/5ML suspension Take 30 mLs by mouth daily as needed for Constipation (Given if no BM in 48 hrs)  Patient not taking: Reported on 11/1/2022    Historical Provider, MD   bisacodyl (DULCOLAX) 10 MG suppository Place 10 mg rectally daily as needed for Constipation (If no results from milk of mag.)  Patient not taking: Reported on 11/1/2022    Historical Provider, MD   apixaban (ELIQUIS) 5 MG TABS tablet Take 5 mg by mouth 2 times daily    Historical Provider, MD       Current Medications:    Current Facility-Administered Medications: apixaban (ELIQUIS) tablet 5 mg, 5 mg, Oral, BID  calcium carbonate (TUMS) chewable tablet 500 mg, 500 mg, Oral, Q6H PRN  metoprolol succinate (TOPROL XL) extended release tablet 100 mg, 100 mg, Oral, BID  multivitamin 1 tablet, 1 tablet, Oral, Daily  sodium chloride flush 0.9 % injection 5-40 mL, 5-40 mL, IntraVENous, 2 times per day  sodium chloride flush 0.9 % injection 5-40 mL, 5-40 mL, IntraVENous, PRN  0.9 % sodium chloride infusion, , IntraVENous, PRN  acetaminophen (TYLENOL) tablet 650 mg, 650 mg, Oral, Q6H PRN **OR** acetaminophen (TYLENOL) suppository 650 mg, 650 mg, Rectal, Q6H PRN  [COMPLETED] dilTIAZem injection 10 mg, 10 mg, IntraVENous, Once **FOLLOWED BY** dilTIAZem 100 mg in dextrose 5 % 100 mL infusion (ADD-Brandon), 2.5-15 mg/hr, IntraVENous, Continuous    Allergies:  Cefepime, Clindamycin/lincomycin, and Pcn [penicillins]    Social History:    Social History     Socioeconomic History    Marital status: Single     Spouse name: Not on file    Number of children: Not on file    Years of education: Not on file    Highest education level: Not on file   Occupational History    Not on file   Tobacco Use    Smoking status: Former     Packs/day: 2.00     Years: 35.00     Pack years: 70.00     Types: Cigarettes     Start date: 1974     Quit date: 2010     Years since quittin.9    Smokeless tobacco: Never   Vaping Use    Vaping Use: Never used   Substance and Sexual Activity    Alcohol use: No    Drug use: No    Sexual activity: Not on file   Other Topics Concern    Not on file   Social History Narrative    Not on file     Social Determinants of Health     Financial Resource Strain: Not on file   Food Insecurity: Not on file   Transportation Needs: Not on file   Physical Activity: Not on file   Stress: Not on file   Social Connections: Not on file   Intimate Partner Violence: Not on file   Housing Stability: Not on file       Family History:   Family History   Problem Relation Age of Onset    Diabetes Mother     High Blood Pressure Mother     High Cholesterol Mother     Heart Attack Mother     Dementia Mother     Diabetes Father     Glaucoma Father     High Cholesterol Father     Pacemaker Father     Diabetes Sister     Thyroid Disease Sister     High Cholesterol Sister     Heart Attack Brother     Diabetes Sister     High Cholesterol Sister     Thyroid Disease Sister     Diabetes Sister     High Cholesterol Sister     Thyroid Disease Sister        Review of Systems: Unable to obtain full history due to his confusion and hearing loss. Please see HPI    Physical Exam:       BP (!) 143/90   Pulse 98   Temp 97.6 °F (36.4 °C) (Oral)   Resp 18   Ht 6' 2\" (1.88 m)   Wt 178 lb 4.8 oz (80.9 kg)   SpO2 96%   BMI 22.89 kg/m²   CONST:  Well developed, well nourished elderly gentleman who appears of stated age. Awake, alert and cooperative. No apparent distress. HEENT:   Head- Normocephalic, atraumatic   Eyes- Conjunctivae pink  Throat- Oral mucosa pink and moist  Neck-  No stridor, trachea midline, no jugular venous distention or carotid bruit. CHEST: Chest symmetrical and non-tender to palpation. Respirations unlabored. RESPIRATORY:  Breath sounds clear throughout   CARDIOVASCULAR:     Heart Ausculation- Irregular rhythm, no murmur. No s3, s4 or rub   PV: No lower extremity edema. No varicosities. Pedal pulses palpable  ABDOMEN: Soft, non-tender to light palpation. Bowel sounds present. No palpable masses   MS: Good muscle strength and tone. No atrophy or abnormal movements. : Deferred  SKIN: Warm and dry   NEURO / PSYCH: Oriented to person, place and time. Speech clear and appropriate. Follows all commands.  Pleasant affect     Telemetry: atrial flutter with variable conduction, currently rates in 90s  ECG: Atrial flutter, 136 bpm, nonspecific T wave changes         Intake/Output Summary (Last 24 hours) at 11/2/2022 0817  Last data filed at 11/2/2022 0725  Gross per 24 hour   Intake 500 ml   Output 900 ml   Net -400 ml       Labs:   CBC:   Recent Labs     11/01/22 1818 11/02/22  0350   WBC 8.5 8.0   HGB 13.5 13.5   HCT 42.8 43.2    200     BMP:   Recent Labs     11/01/22 1818 11/02/22  0350    142   K 3.6 3.4*   CO2 23 24   BUN 46* 37*   CREATININE 1.8* 1.4*   LABGLOM 40 54   CALCIUM 9.4 8.7     Mag:   Recent Labs     11/02/22  0350   MG 2.0       HgA1c:   Lab Results   Component Value Date    LABA1C 5.6 12/20/2020       proBNP:   Recent Labs     11/01/22 1818   PROBNP 4,113*       CARDIAC ENZYMES:  Recent Labs     11/01/22  1818   TROPHS 17*      FASTING LIPID PANEL:  Lab Results   Component Value Date/Time    CHOL 114 12/20/2020 10:00 AM    HDL 41 12/20/2020 10:00 AM    LDLCALC 58 12/20/2020 10:00 AM    TRIG 76 12/20/2020 10:00 AM     LIVER PROFILE:  Recent Labs     11/01/22 1818 11/02/22  0350   AST 34 34   ALT 34 33   LABALBU 3.8 3.6     Component      Latest Ref Rng & Units 11/1/2022 5/9/2022 4/13/2022 4/7/2022           6:18 PM  9:23 AM 10:00 AM  3:00 PM   Pro-BNP      0 - 125 pg/mL 4,113 (H) 1,628 (H) 5,768 (H) 2,894 (H)     CXR 11/01/2022:  Impression:     Suspect early bibasilar infiltrates. Assessment:   Recurrent paroxysmal atrial fibrillation/flutter  Now rate controlled on diltiazem drip   OAC: Eliquis   Mildly dilated left atrium   Previously placed on Amiodarone in 3/2022, but not continued at discharge    2. Mild acute on chronic HFpEF: in setting of #1    3. Hypertension     4. S/p left AKA due to necrotizing fasciitis    5. CKD with COURTNEY: Cr 1.8 >>1.4     6. Hypokalemia    Plan:   Continue Toprol XL and Eliquis  Wean off diltiazem drip  One time dose of IV Lasix: will reassess volume status tomorrow  Supplement potassium  Electrical cardioversion followed by amiodarone loading tomorrow. Allthough he reports compliance with Eliquis, due to his baseline confusion, will plan for ОЛЬГА to rule out EZEKIEL thrombus prior to cardioversion.    Aggressive risk factor modification       The above assessment and treatment plan was made in collaboration with Dr. Catarina Owens         Electronically signed by CRISTI Benedict on 11/2/2022 at 8:17 AM    ______________________________________________________________________  Cardiology attending attestation:  I have independently interviewed and examined the patient. I personally reviewed pertinent laboratory values and diagnostic testing (including, if applicable, chest xray, electrocardiogram, telemetry, echocardiogram, stress testing, and coronary angiography). I have reviewed the above documentation completed by JUHI Norris including past medical, social, and family history and agree with the findings, assessment and plan except where noted. Plan formulated by me. I participated in all aspects of the medical decision making and performed the substantive portion of the visit by contributing >50% of the total visit time. Please see my additional contributions to the HPI, physical exam, and assessment / medical decision making:  _______________________________________________________________________    64-year-old gentleman known to Dr. Danette Renteria with multiple cardioversions over the past year for recurrent atrial fibrillation. Last cardioversion April, was post be discharged on amiodarone but that does not seem to happen, was only given a 10-day supply. He was in sinus at last office visit with Dr. Danette Renteria in October. Sent in from primary care office with SHARONDA SANTOS. He is asymptomatic and unaware of the arrhythmia. Currently rate controlled on diltiazem infusion 10 mg/h. Denies chest pain shortness breath palpitations. Physical Exam:  BP (!) 131/95   Pulse 88   Temp 97.6 °F (36.4 °C) (Oral)   Resp 18   Ht 6' 2\" (1.88 m)   Wt 178 lb 4.8 oz (80.9 kg)   SpO2 98%   BMI 22.89 kg/m²   General appearance: Awake, alert, no acute respiratory distress  Skin: Intact, no rash  ENMT: Moist mucous membranes  Neck: Supple, no carotid bruits. Normal jugular venous pressure  Lungs: Clear to auscultation bilaterally.  No wheezes, rales, or rhonchi  Cardiac: Irregular rhythm with a normal rate, normal S1&S2, no murmurs apparent  Abdomen: Soft, positive bowel sounds, nontender  Extremities: Moves all extremities x 4, no lower extremity edema. Left AKA right leg trace edema  Neurologic: No focal motor deficits apparent, normal mood and affect    Telemetry: A. Flutter 90s  EK2022: Atrial flutter appears atypical with variable AV conduction 136 bpm.  Normal axis and intervals with nonspecific ST-T wave changes    Impression:   Recurrent atypical atrial flutter    Recommendations:    ОЛЬГА guided cardioversion tomorrow subsequent amiodarone loading  Continue apixaban and metoprolol  Wean diltiazem infusion  IV furosemide x1  Consider subsequent EP evaluation for ablation versus alternate AAD    Thank you for the consultation. Please do not hesitate to call with questions.     King Manju CAMPBELL, [address] [facility] Cardiology

## 2022-11-02 NOTE — H&P
[address] [address]                              HISTORY AND PHYSICAL    PATIENT NAME: Mehrdad Gibbons                :        1955  MED REC NO:   [id number]                            ROOM:       0408  ACCOUNT NO:   [de-identified]                           ADMIT DATE: 2022  PROVIDER:     Orlin Parnell MD    CHIEF COMPLAINT ON ADMISSION:  Congestion in the lungs. HISTORY OF PRESENT ILLNESS:  This 73-year-old cognitively impaired male  was noted to be in atrial fibrillation. Approximately six months ago he  did have a cardioversion. Presented to the office complaining for a  five-day history of not having much of an appetite and coughing and  wheezing. On exam, it was determined he was in atrial fibrillation with  rapid ventricular response and was sent into the hospital for rate  control and possible cardioversion. PAST MEDICAL AND SURGICAL HISTORY:  Extensive. He had a necrotizing  fasciitis of the left lower leg, which resulted in an above-the-knee  amputation. This was roughly in . He has had hypertension and  hyperlipidemia in the past.  He was diagnosed also in  with a rectal  carcinoma and had an ileostomy placed, which was re-corrected six months  later. 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  He went into the atrial  fibrillation after a bout of sepsis and that was relatively recently in  [id number]. He has had a retinal detachment of the eye on the left side  and has had cataracts removed from both eyes. SOCIAL HISTORY:  He does not drink alcohol, but was a two-pack a day  smoker for 35 years. FAMILY HISTORY:  His father is . Father had atrial fibrillation  chronically, diabetes, and dementia as well as hyperlipidemia and a  pacemaker placement.   His mother has dementia, high blood pressure, high  cholesterol, and myocardial infarction. He has one sister with diabetes  and thyroid disease and high cholesterol. He has a brother who had a  heart attack. He has another sister with high cholesterol, thyroid  disease, and diabetes. REVIEW OF SYSTEMS:  He is hard of hearing and he is alert and oriented. He does have trouble with short-term memory and his sister helps him  with all his activities of daily living. PHYSICAL EXAMINATION:  GENERAL:  Pleasant male, in no acute distress. He _____ lay flat in bed  without coughing or wheezing. VITAL SIGNS:  His temperature is 97.6, pulse is in the 70s to the 90s,  irregularly irregular rhythm, and respiratory rate is 16. HEENT AND NECK:  Examining the ears were clear. Mouth is hydrated and  clear throat. No lymph nodes in the neck area. The conjunctivae is  well colored. Carotids +1 and equal.  LUNGS:  Have decreased breath sounds in the bases, but fairly clear  breath sounds. HEART:  S1 and S2 irregularly irregular rhythm. No murmurs auscultated. ABDOMEN:  Soft and nontender. No liver or spleen palpated. MUSCULOSKELETAL:  Right leg did not show any swelling. He has  above-the-knee amputation on the left. IMPRESSIONS:  1. Atrial fibrillation with rapid ventricular response. 2.  Diastolic heart failure causing the breathing difficulties. 3.  Moderate cognitive dysfunction. PLAN:  See orders.         Sai Kaminski MD    D: 11/02/2022 7:54:02       T: 11/02/2022 7:56:57     [email or web address]  Job#: [id number]     Doc#: [id number]    CC:

## 2022-11-02 NOTE — CARE COORDINATION
11/2/2022 Social Work Discharge Planning:Cardizem drip. AFIB. ANNALISA. This worker met with Pt to discuss  role and transition of care/discharge planning. Pt resides with his mother who is 80years old. Pt uses a quad cane at all times and has a history with taking eliquis. Therapy evals will be helpful for discharge planning. Pt said he might be goiong to his sisters home at discharge. SW called Pts sister Mary Holliday, who said it would be fine if Pt wants to stay with her for awhile;however, she is semi-retired, lives three miles away and planned on staying with Pt and their mother at discharge. Will need to confirm discharge plan closer to discharge. [facility] and PCP is Dr. Ama Swanson.  Electronically signed by TAMI Cheng on 11/2/2022 at 1:01 PM

## 2022-11-02 NOTE — ED PROVIDER NOTES
69y.o. year old male presenting to the emergency room with concerns of fatigue, cough. Reports that symptom's onset yesterday . Worsen with exertion. Improves withrest. Severity of moderate, with no radiation . Symptoms are constant in timing. Symptoms described as cough, fatigue. associated symptoms of atrial fibrillation RVR noted at PCP. Patient in no acute distress. Sent in from [address] [place] [address] office after EKG demonstrated A. fib RVR. Patient is on Eliquis and metoprolol. Chief Complaint   Patient presents with    Atrial Fibrillation     -150 at pcp, hx of and on thinners     Fatigue       Review of Systems   Constitutional:  Positive for fatigue. Negative for chills and fever. HENT:  Positive for congestion. Negative for trouble swallowing and voice change. Eyes:  Negative for photophobia and visual disturbance. Respiratory:  Positive for cough and shortness of breath. Negative for wheezing. Cardiovascular:  Negative for chest pain. Gastrointestinal:  Positive for nausea. Negative for abdominal pain, diarrhea and vomiting. Genitourinary:  Negative for dysuria, hematuria and urgency. Musculoskeletal:  Negative for arthralgias, neck pain and neck stiffness. Skin:  Negative for rash and wound. Neurological:  Negative for dizziness, syncope, weakness, numbness and headaches. Psychiatric/Behavioral:  Negative for behavioral problems and confusion. The patient is nervous/anxious. Physical Exam  Vitals reviewed. Constitutional:       General: He is not in acute distress. Appearance: Normal appearance. HENT:      Head: Normocephalic. Right Ear: External ear normal.      Left Ear: External ear normal.      Nose: Congestion present. Mouth/Throat:      Mouth: Mucous membranes are dry. Pharynx: No oropharyngeal exudate or posterior oropharyngeal erythema. Eyes:      General: No scleral icterus. Right eye: No discharge. Left eye: No discharge. Conjunctiva/sclera: Conjunctivae normal.   Cardiovascular:      Rate and Rhythm: Tachycardia present. Rhythm irregular. Pulses: Normal pulses. Heart sounds: No friction rub. No gallop. Pulmonary:      Effort: Pulmonary effort is normal. No respiratory distress. Breath sounds: No stridor. Rhonchi present. Abdominal:      General: There is no distension. Palpations: Abdomen is soft. Tenderness: There is no abdominal tenderness. There is no guarding or rebound. Musculoskeletal:         General: No tenderness or deformity. Cervical back: Normal range of motion and neck supple. No rigidity or tenderness. Skin:     General: Skin is warm. Coloration: Skin is not jaundiced. Findings: No erythema. Neurological:      Mental Status: He is alert and oriented to person, place, and time. Sensory: No sensory deficit. Motor: No weakness. Psychiatric:         Mood and Affect: Mood normal.         Behavior: Behavior normal.        Procedures     XR CHEST PORTABLE   Final Result   Suspect early bibasilar infiltrates. EKG:  This EKG is signed by emergency department physician. Rate: 150  Rhythm: Atrial fibrillation  AXIS:normal  Interpretation: atrial fibrillation (chronic)  Comparison: changes compared to previous EKG     MDM  Number of Diagnoses or Management Options  Diagnosis management comments: 64-year-old male presenting to emergency department complaints of nausea, fatigue. Patient noted to be in A. fib RVR at PCPs office with an abnormal EKG and sent in. History of A. fib on metoprolol and Eliquis. Patient alert and oriented in no acute distress on initial exam.  COURTNEY noted. BNP elevated. Chest x-ray was findings or infiltrates. Negative COVID and influenza. Negative RSV. Patient is on diltiazem, with improvement of rate.   Patient was admitted by PCP Dr. Otero [place]       Amount and/or Complexity of Data Reviewed  Decide to obtain Ref Range    SARS-CoV-2, NAAT Not Detected Not Detected   RAPID INFLUENZA A/B ANTIGENS    Specimen: Nasopharyngeal   Result Value Ref Range    Influenza A by PCR Not Detected Not Detected    Influenza B by PCR Not Detected Not Detected   Rapid RSV Antigen    Specimen: Blood   Result Value Ref Range    RSV by PCR Negative Negative   CBC with Auto Differential   Result Value Ref Range    WBC 8.5 4.5 - 11.5 E9/L    RBC 4.85 3.80 - 5.80 E12/L    Hemoglobin 13.5 12.5 - 16.5 g/dL    Hematocrit 42.8 37.0 - 54.0 %    MCV 88.2 80.0 - 99.9 fL    MCH 27.8 26.0 - 35.0 pg    MCHC 31.5 (L) 32.0 - 34.5 %    RDW 14.9 11.5 - 15.0 fL    Platelets 383 038 - 900 E9/L    MPV 10.3 7.0 - 12.0 fL    Neutrophils % 69.2 43.0 - 80.0 %    Immature Granulocytes % 0.2 0.0 - 5.0 %    Lymphocytes % 19.3 (L) 20.0 - 42.0 %    Monocytes % 10.8 2.0 - 12.0 %    Eosinophils % 0.0 0.0 - 6.0 %    Basophils % 0.5 0.0 - 2.0 %    Neutrophils Absolute 5.85 1.80 - 7.30 E9/L    Immature Granulocytes # 0.02 E9/L    Lymphocytes Absolute 1.63 1.50 - 4.00 E9/L    Monocytes Absolute 0.91 0.10 - 0.95 E9/L    Eosinophils Absolute 0.00 (L) 0.05 - 0.50 E9/L    Basophils Absolute 0.04 0.00 - 0.20 E9/L   Comprehensive Metabolic Panel w/ Reflex to MG   Result Value Ref Range    Sodium 141 132 - 146 mmol/L    Potassium reflex Magnesium 3.6 3.5 - 5.0 mmol/L    Chloride 105 98 - 107 mmol/L    CO2 23 22 - 29 mmol/L    Anion Gap 13 7 - 16 mmol/L    Glucose 160 (H) 74 - 99 mg/dL    BUN 46 (H) 6 - 23 mg/dL    Creatinine 1.8 (H) 0.7 - 1.2 mg/dL    Est, Glom Filt Rate 40 >=60 mL/min/1.73    Calcium 9.4 8.6 - 10.2 mg/dL    Total Protein 5.9 (L) 6.4 - 8.3 g/dL    Albumin 3.8 3.5 - 5.2 g/dL    Total Bilirubin 1.9 (H) 0.0 - 1.2 mg/dL    Alkaline Phosphatase 96 40 - 129 U/L    ALT 34 0 - 40 U/L    AST 34 0 - 39 U/L   Troponin   Result Value Ref Range    Troponin, High Sensitivity 17 (H) 0 - 11 ng/L   Brain Natriuretic Peptide   Result Value Ref Range    Pro-BNP 4,113 (H) 0 - 125 pg/mL CBC with Auto Differential   Result Value Ref Range    WBC 8.0 4.5 - 11.5 E9/L    RBC 4.85 3.80 - 5.80 E12/L    Hemoglobin 13.5 12.5 - 16.5 g/dL    Hematocrit 43.2 37.0 - 54.0 %    MCV 89.1 80.0 - 99.9 fL    MCH 27.8 26.0 - 35.0 pg    MCHC 31.3 (L) 32.0 - 34.5 %    RDW 14.6 11.5 - 15.0 fL    Platelets 619 605 - 246 E9/L    MPV 9.8 7.0 - 12.0 fL    Neutrophils % 62.4 43.0 - 80.0 %    Immature Granulocytes % 0.4 0.0 - 5.0 %    Lymphocytes % 22.0 20.0 - 42.0 %    Monocytes % 12.1 (H) 2.0 - 12.0 %    Eosinophils % 2.4 0.0 - 6.0 %    Basophils % 0.7 0.0 - 2.0 %    Neutrophils Absolute 5.02 1.80 - 7.30 E9/L    Immature Granulocytes # 0.03 E9/L    Lymphocytes Absolute 1.77 1.50 - 4.00 E9/L    Monocytes Absolute 0.97 (H) 0.10 - 0.95 E9/L    Eosinophils Absolute 0.19 0.05 - 0.50 E9/L    Basophils Absolute 0.06 0.00 - 0.20 E9/L   Comprehensive Metabolic Panel w/ Reflex to MG   Result Value Ref Range    Sodium 142 132 - 146 mmol/L    Potassium reflex Magnesium 3.4 (L) 3.5 - 5.0 mmol/L    Chloride 104 98 - 107 mmol/L    CO2 24 22 - 29 mmol/L    Anion Gap 14 7 - 16 mmol/L    Glucose 90 74 - 99 mg/dL    BUN 37 (H) 6 - 23 mg/dL    Creatinine 1.4 (H) 0.7 - 1.2 mg/dL    Est, Glom Filt Rate 54 >=60 mL/min/1.73    Calcium 8.7 8.6 - 10.2 mg/dL    Total Protein 5.9 (L) 6.4 - 8.3 g/dL    Albumin 3.6 3.5 - 5.2 g/dL    Total Bilirubin 2.0 (H) 0.0 - 1.2 mg/dL    Alkaline Phosphatase 94 40 - 129 U/L    ALT 33 0 - 40 U/L    AST 34 0 - 39 U/L   Magnesium   Result Value Ref Range    Magnesium 2.0 1.6 - 2.6 mg/dL   EKG 12 Lead   Result Value Ref Range    Ventricular Rate 136 BPM    Atrial Rate 153 BPM    QRS Duration 86 ms    Q-T Interval 356 ms    QTc Calculation (Bazett) 535 ms    P Axis 79 degrees    R Axis 71 degrees    T Axis -150 degrees       RADIOLOGY:  XR CHEST PORTABLE   Final Result   Suspect early bibasilar infiltrates.                  ------------------------- NURSING NOTES AND VITALS REVIEWED ---------------------------  Date / Time Roomed:  11/1/2022  5:17 PM  ED Bed Assignment:  [id number]    The nursing notes within the ED encounter and vital signs as below have been reviewed. Patient Vitals for the past 24 hrs:   BP Temp Temp src Pulse Resp SpO2 Height Weight   11/02/22 0445 -- -- -- 98 -- -- -- --   11/02/22 0200 (!) 143/90 97.6 °F (36.4 °C) Oral (!) 102 18 96 % -- --   11/02/22 0145 -- -- -- 100 -- -- -- --   11/01/22 2245 -- -- -- (!) 110 -- -- -- --   11/01/22 2200 (!) 135/98 98.2 °F (36.8 °C) Oral (!) 130 18 96 % -- --   11/01/22 2154 (!) 123/106 -- -- (!) 117 -- -- -- --   11/01/22 2146 (!) 119/90 -- -- (!) 122 -- -- -- --   11/01/22 2128 (!) 163/108 -- -- (!) 107 16 95 % -- --   11/01/22 2124 -- -- -- (!) 121 -- -- -- --   11/01/22 2027 (!) 133/109 97.9 °F (36.6 °C) Oral (!) 129 18 93 % 6' 2\" (1.88 m) 170 lb (77.1 kg)   11/01/22 1859 (!) 144/106 98.1 °F (36.7 °C) -- (!) 114 18 96 % -- --   11/01/22 1826 (!) 133/97 -- -- (!) 133 20 96 % -- --   11/01/22 1742 -- -- -- (!) 140 -- -- -- --   11/01/22 1714 (!) 145/107 98 °F (36.7 °C) -- (!) 110 16 98 % -- --       Oxygen Saturation Interpretation: Normal    ------------------------------------------ PROGRESS NOTES ------------------------------------------  Re-evaluation(s):   Patients symptoms show no change  Repeat physical examination is not changed    Counseling:  Staff have spoken with the patient and discussed todays results, in addition to providing specific details for the plan of care and counseling regarding the diagnosis and prognosis. Their questions are answered at this time and they are agreeable with the plan of admission.    --------------------------------- ADDITIONAL PROVIDER NOTES ---------------------------------  Consultations:  Dr. Cole Or will admit the patient.   This patient's ED course included: a personal history and physicial examination, re-evaluation prior to disposition, multiple bedside re-evaluations, IV medications, cardiac monitoring, and continuous pulse oximetry    This patient has remained hemodynamically stable during their ED course. Diagnosis:  1. Other fatigue    2. Dehydration    3. Atrial fibrillation, unspecified type ([facility] [address]        Disposition:  Patient's disposition: Admit  Patient's condition is stable. Attending was present and available throughout encounter including all critical portions;  See Attending Note/Attestation for Final [facility],   Resident  11/02/22 1704

## 2022-11-03 ENCOUNTER — ANESTHESIA (OUTPATIENT)
Dept: NON INVASIVE DIAGNOSTICS | Age: 67
DRG: 308 | End: 2022-11-03
Payer: MEDICARE

## 2022-11-03 ENCOUNTER — ANESTHESIA EVENT (OUTPATIENT)
Dept: NON INVASIVE DIAGNOSTICS | Age: 67
DRG: 308 | End: 2022-11-03
Payer: MEDICARE

## 2022-11-03 ENCOUNTER — APPOINTMENT (OUTPATIENT)
Dept: NON INVASIVE DIAGNOSTICS | Age: 67
DRG: 308 | End: 2022-11-03
Payer: MEDICARE

## 2022-11-03 LAB
ANION GAP SERPL CALCULATED.3IONS-SCNC: 11 MMOL/L (ref 7–16)
BUN BLDV-MCNC: 29 MG/DL (ref 6–23)
CALCIUM SERPL-MCNC: 8.7 MG/DL (ref 8.6–10.2)
CHLORIDE BLD-SCNC: 103 MMOL/L (ref 98–107)
CO2: 25 MMOL/L (ref 22–29)
CREAT SERPL-MCNC: 1.5 MG/DL (ref 0.7–1.2)
EKG ATRIAL RATE: 153 BPM
EKG P AXIS: 79 DEGREES
EKG Q-T INTERVAL: 356 MS
EKG QRS DURATION: 86 MS
EKG QTC CALCULATION (BAZETT): 535 MS
EKG R AXIS: 71 DEGREES
EKG T AXIS: -150 DEGREES
EKG VENTRICULAR RATE: 136 BPM
GFR SERPL CREATININE-BSD FRML MDRD: 50 ML/MIN/1.73
GLUCOSE BLD-MCNC: 104 MG/DL (ref 74–99)
LV EF: 55 %
LVEF MODALITY: NORMAL
MAGNESIUM: 1.9 MG/DL (ref 1.6–2.6)
POTASSIUM REFLEX MAGNESIUM: 3.3 MMOL/L (ref 3.5–5)
SODIUM BLD-SCNC: 139 MMOL/L (ref 132–146)
T4 FREE: 1.88 NG/DL (ref 0.93–1.7)
TSH SERPL DL<=0.05 MIU/L-ACNC: 2.06 UIU/ML (ref 0.27–4.2)

## 2022-11-03 PROCEDURE — 2580000003 HC RX 258

## 2022-11-03 PROCEDURE — 2500000003 HC RX 250 WO HCPCS

## 2022-11-03 PROCEDURE — 93312 ECHO TRANSESOPHAGEAL: CPT

## 2022-11-03 PROCEDURE — 2500000003 HC RX 250 WO HCPCS: Performed by: ANESTHESIOLOGY

## 2022-11-03 PROCEDURE — 2580000003 HC RX 258: Performed by: INTERNAL MEDICINE

## 2022-11-03 PROCEDURE — 99233 SBSQ HOSP IP/OBS HIGH 50: CPT | Performed by: INTERNAL MEDICINE

## 2022-11-03 PROCEDURE — 93005 ELECTROCARDIOGRAM TRACING: CPT | Performed by: INTERNAL MEDICINE

## 2022-11-03 PROCEDURE — 93312 ECHO TRANSESOPHAGEAL: CPT | Performed by: INTERNAL MEDICINE

## 2022-11-03 PROCEDURE — 93325 DOPPLER ECHO COLOR FLOW MAPG: CPT | Performed by: INTERNAL MEDICINE

## 2022-11-03 PROCEDURE — 6370000000 HC RX 637 (ALT 250 FOR IP): Performed by: FAMILY MEDICINE

## 2022-11-03 PROCEDURE — 6360000002 HC RX W HCPCS

## 2022-11-03 PROCEDURE — 84443 ASSAY THYROID STIM HORMONE: CPT

## 2022-11-03 PROCEDURE — 80048 BASIC METABOLIC PNL TOTAL CA: CPT

## 2022-11-03 PROCEDURE — 3700000000 HC ANESTHESIA ATTENDED CARE

## 2022-11-03 PROCEDURE — 93320 DOPPLER ECHO COMPLETE: CPT

## 2022-11-03 PROCEDURE — 6360000002 HC RX W HCPCS: Performed by: INTERNAL MEDICINE

## 2022-11-03 PROCEDURE — 5A2204Z RESTORATION OF CARDIAC RHYTHM, SINGLE: ICD-10-PCS | Performed by: INTERNAL MEDICINE

## 2022-11-03 PROCEDURE — 7100000011 HC PHASE II RECOVERY - ADDTL 15 MIN

## 2022-11-03 PROCEDURE — 2500000003 HC RX 250 WO HCPCS: Performed by: INTERNAL MEDICINE

## 2022-11-03 PROCEDURE — 93325 DOPPLER ECHO COLOR FLOW MAPG: CPT

## 2022-11-03 PROCEDURE — 92960 CARDIOVERSION ELECTRIC EXT: CPT | Performed by: INTERNAL MEDICINE

## 2022-11-03 PROCEDURE — 93320 DOPPLER ECHO COMPLETE: CPT | Performed by: INTERNAL MEDICINE

## 2022-11-03 PROCEDURE — 36415 COLL VENOUS BLD VENIPUNCTURE: CPT

## 2022-11-03 PROCEDURE — 7100000001 HC PACU RECOVERY - ADDTL 15 MIN

## 2022-11-03 PROCEDURE — 3700000001 HC ADD 15 MINUTES (ANESTHESIA)

## 2022-11-03 PROCEDURE — 83735 ASSAY OF MAGNESIUM: CPT

## 2022-11-03 PROCEDURE — 92960 CARDIOVERSION ELECTRIC EXT: CPT

## 2022-11-03 PROCEDURE — 94664 DEMO&/EVAL PT USE INHALER: CPT

## 2022-11-03 PROCEDURE — A4216 STERILE WATER/SALINE, 10 ML: HCPCS | Performed by: ANESTHESIOLOGY

## 2022-11-03 PROCEDURE — 7100000010 HC PHASE II RECOVERY - FIRST 15 MIN

## 2022-11-03 PROCEDURE — 2700000000 HC OXYGEN THERAPY PER DAY

## 2022-11-03 PROCEDURE — 2580000003 HC RX 258: Performed by: ANESTHESIOLOGY

## 2022-11-03 PROCEDURE — B24BZZ4 ULTRASONOGRAPHY OF HEART WITH AORTA, TRANSESOPHAGEAL: ICD-10-PCS | Performed by: INTERNAL MEDICINE

## 2022-11-03 PROCEDURE — 87081 CULTURE SCREEN ONLY: CPT

## 2022-11-03 PROCEDURE — 6370000000 HC RX 637 (ALT 250 FOR IP): Performed by: INTERNAL MEDICINE

## 2022-11-03 PROCEDURE — 84439 ASSAY OF FREE THYROXINE: CPT

## 2022-11-03 PROCEDURE — 7100000000 HC PACU RECOVERY - FIRST 15 MIN

## 2022-11-03 PROCEDURE — 6370000000 HC RX 637 (ALT 250 FOR IP): Performed by: ANESTHESIOLOGY

## 2022-11-03 PROCEDURE — 2000000000 HC ICU R&B

## 2022-11-03 RX ORDER — POTASSIUM CHLORIDE 20 MEQ/1
20 TABLET, EXTENDED RELEASE ORAL ONCE
Status: COMPLETED | OUTPATIENT
Start: 2022-11-03 | End: 2022-11-03

## 2022-11-03 RX ORDER — METHYLPREDNISOLONE SODIUM SUCCINATE 125 MG/2ML
INJECTION, POWDER, LYOPHILIZED, FOR SOLUTION INTRAMUSCULAR; INTRAVENOUS
Status: COMPLETED
Start: 2022-11-03 | End: 2022-11-03

## 2022-11-03 RX ORDER — PHENYLEPHRINE HCL IN 0.9% NACL 1 MG/10 ML
SYRINGE (ML) INTRAVENOUS
Status: COMPLETED
Start: 2022-11-03 | End: 2022-11-03

## 2022-11-03 RX ORDER — PROPOFOL 10 MG/ML
INJECTION, EMULSION INTRAVENOUS PRN
Status: DISCONTINUED | OUTPATIENT
Start: 2022-11-03 | End: 2022-11-03 | Stop reason: SDUPTHER

## 2022-11-03 RX ORDER — LIDOCAINE HYDROCHLORIDE 20 MG/ML
INJECTION, SOLUTION INFILTRATION; PERINEURAL PRN
Status: DISCONTINUED | OUTPATIENT
Start: 2022-11-03 | End: 2022-11-03 | Stop reason: SDUPTHER

## 2022-11-03 RX ORDER — EPHEDRINE SULFATE/0.9% NACL/PF 50 MG/5 ML
5 SYRINGE (ML) INTRAVENOUS ONCE
Status: COMPLETED | OUTPATIENT
Start: 2022-11-03 | End: 2022-11-03

## 2022-11-03 RX ORDER — IPRATROPIUM BROMIDE AND ALBUTEROL SULFATE 2.5; .5 MG/3ML; MG/3ML
1 SOLUTION RESPIRATORY (INHALATION) ONCE
Status: COMPLETED | OUTPATIENT
Start: 2022-11-03 | End: 2022-11-03

## 2022-11-03 RX ORDER — DIPHENHYDRAMINE HYDROCHLORIDE 50 MG/ML
50 INJECTION INTRAMUSCULAR; INTRAVENOUS ONCE
Status: COMPLETED | OUTPATIENT
Start: 2022-11-03 | End: 2022-11-03

## 2022-11-03 RX ORDER — SODIUM CHLORIDE 9 MG/ML
INJECTION, SOLUTION INTRAVENOUS CONTINUOUS
Status: DISCONTINUED | OUTPATIENT
Start: 2022-11-03 | End: 2022-11-03

## 2022-11-03 RX ORDER — METHYLPREDNISOLONE SODIUM SUCCINATE 125 MG/2ML
125 INJECTION, POWDER, LYOPHILIZED, FOR SOLUTION INTRAMUSCULAR; INTRAVENOUS ONCE
Status: COMPLETED | OUTPATIENT
Start: 2022-11-03 | End: 2022-11-03

## 2022-11-03 RX ORDER — DIPHENHYDRAMINE HYDROCHLORIDE 50 MG/ML
INJECTION INTRAMUSCULAR; INTRAVENOUS
Status: COMPLETED
Start: 2022-11-03 | End: 2022-11-03

## 2022-11-03 RX ORDER — 0.9 % SODIUM CHLORIDE 0.9 %
500 INTRAVENOUS SOLUTION INTRAVENOUS ONCE
Status: COMPLETED | OUTPATIENT
Start: 2022-11-03 | End: 2022-11-03

## 2022-11-03 RX ORDER — EPHEDRINE SULFATE/0.9% NACL/PF 50 MG/5 ML
SYRINGE (ML) INTRAVENOUS
Status: COMPLETED
Start: 2022-11-03 | End: 2022-11-03

## 2022-11-03 RX ORDER — SODIUM CHLORIDE 9 MG/ML
INJECTION, SOLUTION INTRAVENOUS CONTINUOUS PRN
Status: DISCONTINUED | OUTPATIENT
Start: 2022-11-03 | End: 2022-11-03 | Stop reason: SDUPTHER

## 2022-11-03 RX ORDER — FAMOTIDINE 10 MG/ML
INJECTION, SOLUTION INTRAVENOUS
Status: DISPENSED
Start: 2022-11-03 | End: 2022-11-03

## 2022-11-03 RX ADMIN — SODIUM CHLORIDE, PRESERVATIVE FREE 10 ML: 5 INJECTION INTRAVENOUS at 13:00

## 2022-11-03 RX ADMIN — SODIUM CHLORIDE, PRESERVATIVE FREE 10 ML: 5 INJECTION INTRAVENOUS at 20:00

## 2022-11-03 RX ADMIN — Medication 100 MCG: at 09:49

## 2022-11-03 RX ADMIN — LIDOCAINE HYDROCHLORIDE 40 MG: 20 INJECTION, SOLUTION INFILTRATION; PERINEURAL at 08:05

## 2022-11-03 RX ADMIN — Medication 20 MG: at 10:00

## 2022-11-03 RX ADMIN — PROPOFOL 250 MG: 10 INJECTION, EMULSION INTRAVENOUS at 08:05

## 2022-11-03 RX ADMIN — DIPHENHYDRAMINE HYDROCHLORIDE 50 MG: 50 INJECTION INTRAMUSCULAR; INTRAVENOUS at 10:01

## 2022-11-03 RX ADMIN — METOPROLOL SUCCINATE 100 MG: 100 TABLET, FILM COATED, EXTENDED RELEASE ORAL at 13:00

## 2022-11-03 RX ADMIN — METHYLPREDNISOLONE SODIUM SUCCINATE 125 MG: 125 INJECTION, POWDER, LYOPHILIZED, FOR SOLUTION INTRAMUSCULAR; INTRAVENOUS at 10:05

## 2022-11-03 RX ADMIN — DEXTROSE MONOHYDRATE 7.5 MG/HR: 50 INJECTION, SOLUTION INTRAVENOUS at 06:38

## 2022-11-03 RX ADMIN — METOPROLOL SUCCINATE 100 MG: 100 TABLET, FILM COATED, EXTENDED RELEASE ORAL at 19:58

## 2022-11-03 RX ADMIN — IPRATROPIUM BROMIDE AND ALBUTEROL SULFATE 1 AMPULE: .5; 2.5 SOLUTION RESPIRATORY (INHALATION) at 10:15

## 2022-11-03 RX ADMIN — SODIUM CHLORIDE: 9 INJECTION, SOLUTION INTRAVENOUS at 08:01

## 2022-11-03 RX ADMIN — Medication 100 MCG: at 10:04

## 2022-11-03 RX ADMIN — POTASSIUM CHLORIDE 20 MEQ: 1500 TABLET, EXTENDED RELEASE ORAL at 05:46

## 2022-11-03 RX ADMIN — MULTIVITAMIN TABLET 1 TABLET: TABLET at 13:00

## 2022-11-03 RX ADMIN — Medication 5 MG: at 09:41

## 2022-11-03 RX ADMIN — Medication 5 MG: at 09:39

## 2022-11-03 RX ADMIN — METHYLPREDNISOLONE SODIUM SUCCINATE 125 MG: 125 INJECTION, POWDER, FOR SOLUTION INTRAMUSCULAR; INTRAVENOUS at 10:05

## 2022-11-03 RX ADMIN — POTASSIUM CHLORIDE 20 MEQ: 1500 TABLET, EXTENDED RELEASE ORAL at 13:00

## 2022-11-03 RX ADMIN — AMIODARONE HYDROCHLORIDE 150 MG: 50 INJECTION, SOLUTION INTRAVENOUS at 09:20

## 2022-11-03 RX ADMIN — SODIUM CHLORIDE: 9 INJECTION, SOLUTION INTRAVENOUS at 10:20

## 2022-11-03 RX ADMIN — Medication 100 MCG: at 09:45

## 2022-11-03 RX ADMIN — SODIUM CHLORIDE 500 ML: 9 INJECTION, SOLUTION INTRAVENOUS at 09:38

## 2022-11-03 RX ADMIN — Medication 100 MCG: at 09:48

## 2022-11-03 RX ADMIN — Medication 100 MCG: at 09:47

## 2022-11-03 RX ADMIN — APIXABAN 5 MG: 5 TABLET, FILM COATED ORAL at 20:05

## 2022-11-03 RX ADMIN — DIPHENHYDRAMINE HYDROCHLORIDE 50 MG: 50 INJECTION, SOLUTION INTRAMUSCULAR; INTRAVENOUS at 10:01

## 2022-11-03 RX ADMIN — APIXABAN 5 MG: 5 TABLET, FILM COATED ORAL at 09:17

## 2022-11-03 ASSESSMENT — PAIN SCALES - GENERAL
PAINLEVEL_OUTOF10: 0

## 2022-11-03 ASSESSMENT — ENCOUNTER SYMPTOMS: SHORTNESS OF BREATH: 1

## 2022-11-03 ASSESSMENT — LIFESTYLE VARIABLES: SMOKING_STATUS: 0

## 2022-11-03 NOTE — PROGRESS NOTES
Per my discussion with Dr. Antoine, patient will come to ICU for further monitoring due to concern in BP after ОЛЬГА cardioversion.     Gill Powell MD  11/3/2022  10:54 AM

## 2022-11-03 NOTE — PROGRESS NOTES
Patient had come down for a ОЛЬГА and a cardioversion by Dr. Queenie Deras. Procedure was finished and Dr. Queenie Deras wanted the patient to have his Eliquis and have an amiodarone drip, but have amiodarone bolus given to the patient before going back to the floor. Patient was fully recovered from the procedure. Jez Glow patient's amiodarone bolus and gave Eliquis (see MAR). Patients vital signs were being monitored. Patient was placed on floor heart monitor (CMR verified), then taken off PACU monitor. Went over to go and see what the patient needed. He explained that he peed, I told the patient that I was going to get some new linens to change his bed for him. Asked another nurse to come and help me when I got back from grabbing the linens the patient was not acting the same. He was coughing , could not talk, diaphoretic, clammy. Stopped the amiodarone drip right away. Asked the other nurses for help. One of the nurses grabbed Dr. Carlin Libman to come over and assess the patient. Patient was hooked back up to the monitor and vital signs were assess. CRNA was bagging the patient after oxygen came up a simple mask was placed on the patient. See flowsheets for vital signs. Patient was given medications to help with the low blood pressure (see MAR). Patient was given a bolus of normal saline to help with pressure as well (See Mar). Patient did not look good, kept trying to scratch his body, could hardly keep his eyes open. Another IV was placed in patient patient's arm. Dr. Piedad Holden was covering the patient was the earlier procedure. Dr. Queenie Deras was called to come down and see the patient. Patient then was coughing and was spitting up a good amount of sputum. Patient was rolled on to his side and was suctioned. Patient just kept coughing and his skin was turning red. Started treating the patient like he was having an allergic reaction. Medications were given (see MAR).   Patient started to turn around after those medications were given, normal saline was run at a slower rate (see MAR), he was more alert then before. Patient was the placed on a nonrebreather mask because his oxygen probe was not picking up and he sounded tight when Dr. Zina Bass auscultated the patient. Respiratory came down and gave patient a nebulization treatment (see MAR). Patient was then cleaned up and situated, vitals signs were looking better (see flowsheets). Dr. Catarina Owens called and talked with the ICU doctor to have the patient transferred. Once stable and situated and a bed was given for the patient in ICU  Patient's vitals were monitored and 2 nurse brought patient up to the ICU.

## 2022-11-03 NOTE — ANESTHESIA POSTPROCEDURE EVALUATION
Department of Anesthesiology  Postprocedure Note    Patient: Kole Woods. MRN: [id number]  YOB: 1955  Date of evaluation: 11/3/2022      Procedure Summary     Date: 11/03/22 Room / Location: [facility] Echocardiography    Anesthesia Start: 0801 Anesthesia Stop: 1172    Procedure: TRANSESOPHAGEAL ECHO Diagnosis:     Scheduled Providers:  Responsible Provider: Fito Davidson DO    Anesthesia Type: MAC ASA Status: 4          Anesthesia Type: No value filed.     Jolene Phase I:      Jolene Phase II:        Anesthesia Post Evaluation    Patient location during evaluation: PACU  Patient participation: complete - patient participated  Level of consciousness: awake and alert  Pain score: 0  Airway patency: patent  Nausea & Vomiting: no nausea and no vomiting  Complications: no  Cardiovascular status: blood pressure returned to baseline  Respiratory status: acceptable  Hydration status: euvolemic

## 2022-11-03 NOTE — PROCEDURES
PROCEDURE NOTE    Attending Cardiologist: Keila López MD  Primary Cardiologist: Dafne Salas MD    Date of Service: 11/3/2022    Procedure: Transesophageal Echocardiogram and Direct Current Cardioversion    Indication: Paroxsysmal atrial fibrillation    Anticoagulant: Apixaban    ОЛЬГА: No left atrial appendage thrombus    Antiarrhythmic drug(s): Metoprolol and Diltiazem    Description of procedure:  Patient presented in a fasting and well hydrated state. Informed consent obtained from patient. Risks, benefits and alternatives to ОЛЬГА and DC cardioversion were explained to the patient in detail, and the patient acknowledged understanding. Cardiac rhythm, arterial oxygen saturation, and blood pressure were continuously monitored. Deep sedation with Propofol administered by the Department of Anesthesiology. ОЛЬГА was performed and there was no evidence of LA or EZEKIEL thrombus. See full ОЛЬГА report in Epic. After removal of the probe and verification of adequate sedation, direct current cardioversion was performed as described:    Patch placement: Anterior/posterior  Energy: 200 Joules, synchronized  Number of shocks: 1  Outcome: Sinus rhythm  Complications: None    Impression:  Successful ОЛЬГА-guided DC cardioversion of atrial fibrillation to normal sinus rhythm.     Keila López MD, [facility] Cardiology

## 2022-11-03 NOTE — PROGRESS NOTES
[address] [place] [address] FOLLOW-UP    Name: Julietta Lesch. Age: 69 y.o. Date of Admission: 11/1/2022  5:17 PM    Date of Service: 11/3/2022    Primary Cardiologist: Dr. Marzena Whittington    Chief Complaint: Follow-up for recurrent A. fib    Interim History:  Seen this morning prior to cardioversion. Denies chest pain shortness breath palpitations or awareness of the arrhythmia.     Review of Systems:   Negative except as described above    Problem List:  Patient Active Problem List   Diagnosis    Retinal detachment of left eye with multiple breaks    Rectal cancer ([address]    Ileus ([address]    Atrial flutter (HCC)    GI bleed    Gastrointestinal bleed    Benign neoplasm of cecum    S/P right hemicolectomy    PSVT (paroxysmal supraventricular tachycardia) (HCC)    Primary hypertension    Abdominal wall abscess    Atrial fibrillation with RVR (HCC)    Urinary tract infection associated with indwelling urethral catheter (HCC)    PAF (paroxysmal atrial fibrillation) ([address]    Intra-abdominal infection    COURTNEY (acute kidney injury) ([address]    Septic shock ([address]    Bacteremia    Moderate protein-calorie malnutrition ([address]    Sepsis ([address]    Urinary retention    Atrial fibrillation with rapid ventricular response (HCC)    PAD (peripheral artery disease) ([address]    On apixaban therapy    Acute kidney injury superimposed on chronic kidney disease ([address]       Current Medications:    Current Facility-Administered Medications:     potassium chloride (KLOR-CON M) extended release tablet 20 mEq, 20 mEq, Oral, Once, Rosalinda David MD    apixaban Taylor Ballesteros) tablet 5 mg, 5 mg, Oral, BID, Jamal Hernandez MD, 5 mg at 11/02/22 2115    calcium carbonate (TUMS) chewable tablet 500 mg, 500 mg, Oral, Q6H PRN, Jamal Hernandez MD    metoprolol succinate (TOPROL XL) extended release tablet 100 mg, 100 mg, Oral, BID, Jamal Hernandez MD, 100 mg at 11/02/22 2115    multivitamin 1 tablet, 1 tablet, Oral, Daily, Jamal Hernandez MD, 1 tablet at 11/02/22 0842    sodium chloride flush 0.9 % injection 5-40 mL, 5-40 mL, IntraVENous, 2 times per day, Branden Keenan MD, 10 mL at 11/02/22 2115    sodium chloride flush 0.9 % injection 5-40 mL, 5-40 mL, IntraVENous, PRN, Branden Keenan MD    0.9 % sodium chloride infusion, , IntraVENous, PRN, Branden Keenan MD    acetaminophen (TYLENOL) tablet 650 mg, 650 mg, Oral, Q6H PRN **OR** acetaminophen (TYLENOL) suppository 650 mg, 650 mg, Rectal, Q6H PRN, Branden Keenan MD    [COMPLETED] dilTIAZem injection 10 mg, 10 mg, IntraVENous, Once, 10 mg at 11/01/22 2043 **FOLLOWED BY** dilTIAZem 100 mg in dextrose 5 % 100 mL infusion ([facility]), 2.5-15 mg/hr, IntraVENous, Continuous, Branden Keenan MD, Last Rate: 7.5 mL/hr at 11/03/22 0638, 7.5 mg/hr at 11/03/22 3887    Physical Exam:  BP (!) 124/91   Pulse 85   Temp 98.1 °F (36.7 °C) (Oral)   Resp 16   Ht 6' 2\" (1.88 m)   Wt 171 lb 14.4 oz (78 kg)   SpO2 95%   BMI 22.07 kg/m²   Wt Readings from Last 3 Encounters:   11/03/22 171 lb 14.4 oz (78 kg)   10/07/22 198 lb 3.2 oz (89.9 kg)   05/19/22 178 lb (80.7 kg)     Appearance: Awake, alert, no acute respiratory distress  Skin: Intact, no rash  Head: Normocephalic, atraumatic  Eyes: EOMI, no conjunctival erythema  ENMT: No pharyngeal erythema, MMM, no rhinorrhea  Neck: Supple, no elevated JVP, no carotid bruits  Lungs: Clear to auscultation bilaterally. No wheezes, rales, or rhonchi. Cardiac: PMI nondisplaced, irregular rhythm with a tachycardic rate, S1 & S2 normal, no murmurs  Abdomen: Soft, nontender, +bowel sounds  Extremities: Moves all extremities x 4, no lower extremity edema.   Left AKA  Neurologic: No focal motor deficits apparent, normal mood and affect  Peripheral Pulses: Intact posterior tibial pulses bilaterally    Intake/Output:    Intake/Output Summary (Last 24 hours) at 11/3/2022 0800  Last data filed at 11/3/2022 0548  Gross per 24 hour   Intake 795 ml   Output 2100 ml   Net -1305 ml     No intake/output data recorded. Laboratory Tests:  Recent Labs     11/01/22 1818 11/02/22  0350 11/03/22  0335    142 139   K 3.6 3.4* 3.3*    104 103   CO2 23 24 25   BUN 46* 37* 29*   CREATININE 1.8* 1.4* 1.5*   GLUCOSE 160* 90 104*   CALCIUM 9.4 8.7 8.7     Lab Results   Component Value Date/Time    MG 1.9 11/03/2022 03:35 AM     Recent Labs     11/01/22 1818 11/02/22  0350   ALKPHOS 96 94   ALT 34 33   AST 34 34   PROT 5.9* 5.9*   BILITOT 1.9* 2.0*   LABALBU 3.8 3.6     Recent Labs     11/01/22 1818 11/02/22  0350   WBC 8.5 8.0   RBC 4.85 4.85   HGB 13.5 13.5   HCT 42.8 43.2   MCV 88.2 89.1   MCH 27.8 27.8   MCHC 31.5* 31.3*   RDW 14.9 14.6    200   MPV 10.3 9.8     Lab Results   Component Value Date    CKTOTAL 29 05/09/2022    CKMB <0.2 03/18/2011    TROPONINI <0.01 12/19/2020    TROPONINI <0.01 03/18/2011    TROPONINI 0.02 01/03/2011     Lab Results   Component Value Date    INR 1.6 04/12/2022    INR 1.6 04/11/2022    INR 1.5 04/10/2022    PROTIME 17.9 (H) 04/12/2022    PROTIME 17.2 (H) 04/11/2022    PROTIME 16.9 (H) 04/10/2022     Lab Results   Component Value Date    TSH 2.060 11/03/2022     Lab Results   Component Value Date    LABA1C 5.6 12/20/2020     No results found for: EAG  Lab Results   Component Value Date    CHOL 114 12/20/2020     Lab Results   Component Value Date    TRIG 76 12/20/2020     Lab Results   Component Value Date    HDL 41 12/20/2020     Lab Results   Component Value Date    LDLCALC 58 12/20/2020     Lab Results   Component Value Date    LABVLDL 15 12/20/2020     No results found for: [facility]  Recent Labs     11/01/22  1818   PROBNP 4,113*       Cardiac Tests:    EKG: Atrial flutter RVR    Telemetry: A.flutter RVR    Chest X-ray:   Impression   Suspect early bibasilar infiltrates. Echocardiogram:   TTE 5/11/22   Summary   Left ventricular internal dimensions were normal in diastole and systole.    Borderline concentric left ventricular hypertrophy. No regional wall motion abnormalities seen. Normal left ventricular ejection fraction. The left atrium is mildly dilated. Moderately enlarged right atrium size. The aortic valve appears mildly sclerotic. Physiologic and/or trace tricuspid regurgitation. There is a trivial circumferential pericardial effusion noted. Stress test:      Cardiac catheterization:     ----------------------------------------------------------------------------------------------------------------------------------------------------------------  IMPRESSION:  Recurrent paroxysmal atypical atrial flutter. Prior cardioversions 12/2020, 3/2022, 4/2022. Started on amiodarone 4/2022 but discontinued upon discharge after 10 days for some reason. AC apixaban  Possible acute allergic reaction to IV amiodarone post cardioversion today associate with hypotension/diffuse pruritus/nausea  Mild acute on chronic HFpEF due to above  COURTNEY/CKD creatinine 1.8 -> 1.5  Hypokalemia  PAD with left AKA  Memory impairment  History of rectal cancer    RECOMMENDATIONS:  ОЛЬГА cardioversion today was initially successful with restoration of sinus rhythm. After IV amiodarone bolus had decompensation. No further amiodarone  Continue metoprolol anticoagulation  Transfer to ICU for monitoring, discussed with Dr. Kavon Garcia  If ends up back in atrial fibrillation, no plans for further cardioversion with rate control and consider outpatient EP to discuss alternate AAD versus ablation as outpatient  Aggressive risk factor modification  Further care per primary service    Gerson Lemus MD, [address] [place] [address] Cardiology    NOTE: This report was transcribed using voice recognition software. Every effort was made to ensure accuracy; however, inadvertent computerized transcription errors may be present.

## 2022-11-03 NOTE — CARE COORDINATION
11/3/2022  Social Work Discharge Planning:AFIB. Pt resides with his mother who is 80years old. Pt uses a quad cane at all times and has a history with taking eliquis. Therapy evals will be helpful for discharge planning. Pt said he might be going to his sisters home at discharge. SW called Pts sister Liliane Mcelroy, who said it would be fine if Pt wants to stay with her for awhile;however, she is semi-retired, lives three miles away and planned on staying with Pt and their mother at discharge. Will need to confirm discharge plan closer to discharge.  Electronically signed by TAMI Canales on 11/3/2022 at 10:09 AM

## 2022-11-03 NOTE — PROGRESS NOTES
Department of Internal Zach Wilson M.D.  Progress Note      SUBJECTIVE: Pleasantly confused and hard of hearing I explained the procedure to him today    OBJECTIVE abdomen soft and nontender    Medications    Current Facility-Administered Medications: potassium chloride (KLOR-CON M) extended release tablet 20 mEq, 20 mEq, Oral, Once  apixaban (ELIQUIS) tablet 5 mg, 5 mg, Oral, BID  calcium carbonate (TUMS) chewable tablet 500 mg, 500 mg, Oral, Q6H PRN  metoprolol succinate (TOPROL XL) extended release tablet 100 mg, 100 mg, Oral, BID  multivitamin 1 tablet, 1 tablet, Oral, Daily  sodium chloride flush 0.9 % injection 5-40 mL, 5-40 mL, IntraVENous, 2 times per day  sodium chloride flush 0.9 % injection 5-40 mL, 5-40 mL, IntraVENous, PRN  0.9 % sodium chloride infusion, , IntraVENous, PRN  acetaminophen (TYLENOL) tablet 650 mg, 650 mg, Oral, Q6H PRN **OR** acetaminophen (TYLENOL) suppository 650 mg, 650 mg, Rectal, Q6H PRN  [COMPLETED] dilTIAZem injection 10 mg, 10 mg, IntraVENous, Once **FOLLOWED BY** dilTIAZem 100 mg in dextrose 5 % 100 mL infusion (ADD-[place]), 2.5-15 mg/hr, IntraVENous, Continuous  Physical    VITALS:  BP (!) 124/91   Pulse 85   Temp 98.1 °F (36.7 °C) (Oral)   Resp 16   Ht 6' 2\" (1.88 m)   Wt 171 lb 14.4 oz (78 kg)   SpO2 95%   BMI 22.07 kg/m²   24HR INTAKE/OUTPUT:    Intake/Output Summary (Last 24 hours) at 11/3/2022 0756  Last data filed at 11/3/2022 0548  Gross per 24 hour   Intake 795 ml   Output 2100 ml   Net -1305 ml     LUNGS:  No increased work of breathing, good air exchange, clear to auscultation bilaterally, no crackles or wheezing  CARDIOVASCULAR: S1-S2 irregular regular rhythm  Data    CBC with Differential:    Lab Results   Component Value Date/Time    WBC 8.0 11/02/2022 03:50 AM    RBC 4.85 11/02/2022 03:50 AM    HGB 13.5 11/02/2022 03:50 AM    HCT 43.2 11/02/2022 03:50 AM     11/02/2022 03:50 AM    MCV 89.1 11/02/2022 03:50 AM MCH 27.8 11/02/2022 03:50 AM    MCHC 31.3 11/02/2022 03:50 AM    RDW 14.6 11/02/2022 03:50 AM    NRBC 0.0 05/10/2022 03:52 AM    SEGSPCT 47 03/18/2011 12:50 PM    METASPCT 0.9 04/09/2022 05:10 AM    LYMPHOPCT 22.0 11/02/2022 03:50 AM    PROMYELOPCT 1.8 03/28/2022 03:38 AM    MONOPCT 12.1 11/02/2022 03:50 AM    MYELOPCT 1.8 03/28/2022 03:38 AM    BASOPCT 0.7 11/02/2022 03:50 AM    MONOSABS 0.97 11/02/2022 03:50 AM    LYMPHSABS 1.77 11/02/2022 03:50 AM    EOSABS 0.19 11/02/2022 03:50 AM    BASOSABS 0.06 11/02/2022 03:50 AM     CMP:    Lab Results   Component Value Date/Time     11/03/2022 03:35 AM    K 3.3 11/03/2022 03:35 AM     11/03/2022 03:35 AM    CO2 25 11/03/2022 03:35 AM    BUN 29 11/03/2022 03:35 AM    CREATININE 1.5 11/03/2022 03:35 AM    GFRAA >60 05/12/2022 12:01 PM    LABGLOM 50 11/03/2022 03:35 AM    GLUCOSE 104 11/03/2022 03:35 AM    GLUCOSE 106 03/24/2011 04:46 AM    PROT 5.9 11/02/2022 03:50 AM    LABALBU 3.6 11/02/2022 03:50 AM    LABALBU 2.4 03/20/2011 06:00 PM    CALCIUM 8.7 11/03/2022 03:35 AM    BILITOT 2.0 11/02/2022 03:50 AM    ALKPHOS 94 11/02/2022 03:50 AM    AST 34 11/02/2022 03:50 AM    ALT 33 11/02/2022 03:50 AM     PT/INR:    Lab Results   Component Value Date/Time    PROTIME 17.9 04/12/2022 04:58 AM    PROTIME 12.2 03/18/2011 12:50 PM    INR 1.6 04/12/2022 04:58 AM       ASSESSMENT AND PLAN      Principal Problem:    Atrial fibrillation with rapid ventricular response ([address]  Plan: Under control  For transesophageal echo cardiogram and most likely direct cardioversion today        Electronically signed by Branden Keenan MD on 11/3/2022 at 7:56 AM

## 2022-11-03 NOTE — CONSULTS
Critical Care Admit/Consult Note         Patient - Christiano Bar. MRN -  [id number]   [facility]   - 1955      Date of Admission -  2022  5:17 PM  Date of evaluation -  11/3/2022  0206/0206-A   Hospital Day - 2          ADMIT/CONSULT DETAILS     Reason for Admit/Consult   Hypotension     Consulting Leatha Jimenez MD  Primary Care Physician - Guillaume Baxter MD       HPI   The patient is a 69 y.o. male with significant past medical history of atrial fibrillation on metoprolol and Eliquis, hyperlipidemia BPH and secondary necrotizing fasciitis, hypertension, rectal cancer s/p colostomy reversal.  He was admitted to the hospital on  with concerns for fatigue and atrial fibrillation with RVR. He follows with Dr. Vianey Mo outpatient and had a recent cardioversion in April for atrial fibrillation. This morning he was having another ОЛЬГА cardioversion, tolerated the procedure well, was being loaded with amiodarone following the procedure and became hypotensive and hypoxic, claiming that he was having a very difficult time breathing. He did require fluid bolus and push dose phenylephrine. He was also given steroids and antihistamines. He has had amiodarone several times in the past.  He was transferred to the ICU for close observation for further decompensation. On examination he is awake and alert at baseline, pleasantly confused. He is on room air and does not have any complaints. He is not having chest pain and the monitor in the room demonstrates regular rate.      Past Medical History         Diagnosis Date    Employs prosthetic leg     left    History of atrial fibrillation     [place] (hard of hearing)     uses bilat hearing aides    Hx of AKA (above knee amputation), left ([address]     Hx of necrotizing fasciitis     left leg    Hypertension     Rectal bleeding     Rectal cancer ([address] 2017    rectal        Past Surgical History Procedure Laterality Date    ABDOMEN SURGERY  03/20/2018    ileostomy open take down    ABOVE KNEE AMPUTATION  2011    left; hx flesh eating bacteria    CARDIOVERSION  12/2020    COLON SURGERY  01/23/2018    laprascopic low anterior colon resection  take down splenic fexure   ileostomy    COLONOSCOPY  10/21/2011    COLONOSCOPY N/A 2/14/2022    COLONOSCOPY POLYPECTOMY SNARE/COLD BIOPSY performed by Malcolm Carreon MD at [address] Left 10/03/2016    pars plana vitrectomy, retinal detachment repair, laser gas/fluid exchange    EYE SURGERY Bilateral 2002?     bilat cataracts w lens implants    EYE SURGERY Right ?    retinal detachment    HEMICOLECTOMY Right 3/22/2022    LAPAROSCOPIC RIGHT HEMICOLECTOMY performed by Malcolm Carreon MD at [address] N/A 3/21/3001    ILEOSTOMY OPEN TAKEDOWN performed by Malcolm Carreon MD at [facility] OR       Current Medications   Current Medications    potassium chloride  20 mEq Oral Once    famotidine        apixaban  5 mg Oral BID    metoprolol succinate  100 mg Oral BID    multivitamin  1 tablet Oral Daily    sodium chloride flush  5-40 mL IntraVENous 2 times per day     calcium carbonate, sodium chloride flush, sodium chloride, acetaminophen **OR** acetaminophen  IV Drips/Infusions   sodium chloride 100 mL/hr at 11/03/22 1020    sodium chloride      dilTIAZem Stopped (11/03/22 0801)     Home Medications  Medications Prior to Admission: loperamide (IMODIUM) 2 MG capsule, Take 2 mg by mouth daily  fluorometholone (FML) 0.1 % ophthalmic suspension, Place 1 drop into both eyes 4 times daily  Calcium Carbonate 500 MG CHEW, Take 1 tablet by mouth every 6 hours as needed (Heartburn)  Multiple Vitamins-Minerals (THERAPEUTIC MULTIVITAMIN-MINERALS) tablet, Take 1 tablet by mouth daily  metoprolol succinate (TOPROL XL) 100 MG extended release tablet, Take 1 tablet by mouth 2 times daily (Patient taking differently: Take 50 mg by mouth 2 times daily)  magnesium hydroxide (MILK OF MAGNESIA) 400 MG/5ML suspension, Take 30 mLs by mouth daily as needed for Constipation (Given if no BM in 48 hrs) (Patient not taking: Reported on 11/1/2022)  bisacodyl (DULCOLAX) 10 MG suppository, Place 10 mg rectally daily as needed for Constipation (If no results from milk of mag.) (Patient not taking: Reported on 11/1/2022)  apixaban (ELIQUIS) 5 MG TABS tablet, Take 5 mg by mouth 2 times daily    Diet/Nutrition   ADULT DIET; Regular; 5 carb choices (75 gm/meal)    Allergies   Amiodarone, Cefepime, Clindamycin/lincomycin, and Pcn [penicillins]    Social History   Tobacco   reports that he quit smoking about 11 years ago. His smoking use included cigarettes. He started smoking about 48 years ago. He has a 70.00 pack-year smoking history. He has never used smokeless tobacco.    Alcohol     reports no history of alcohol use. Occupational history : Retired    Family History         Problem Relation Age of Onset    Diabetes Mother     High Blood Pressure Mother     High Cholesterol Mother     Heart Attack Mother     Dementia Mother     Diabetes Father     Glaucoma Father     High Cholesterol Father     Pacemaker Father     Diabetes Sister     Thyroid Disease Sister     High Cholesterol Sister     Heart Attack Brother     Diabetes Sister     High Cholesterol Sister     Thyroid Disease Sister     Diabetes Sister     High Cholesterol Sister     Thyroid Disease Sister        Sleep History   n/a    ROS   REVIEW OF SYSTEMS:  Please see HPI above. All bolded are positive. All un-bolded are negative.   Constitutional Symptoms: generalized fatigue, generalized weakness, no diaphoresis  Eyes: no vision change   Ears, Nose, Mouth, Throat: no hearing loss, nasal congestion, sores in the mouth  Cardiovascular: denies chest pain, chest heaviness, experiences intermittent palpitations  Respiratory: denies shortness of breath, wheezing, coughing  Gastrointestinal: denies abdominal pain, nausea, vomiting, diarrhea, constipation, melena, hematochezia, hematemesis  Genitourinary: denies dysuria, hematuria, or increase in frequency  Musculoskeletal: no lower extremity edema, myalgias, arthralgias, back pain  Integumentary: no rashes, itching   Neurological: no headache, lightheadedness, dizziness  Psychiatric: depression, suicidal ideation, or anxiety  Endocrine: no unintentional weight change  Hematologic/Lymphatic: lymphadenopathy, easy bruising, easy bleeding   Allergic/Immunologic: recurrent infections      Lines and Devices   peripheral    Drains/Tubes/Outputs   none    Mechanical Ventilation Data   VENT SETTINGS (Comprehensive)     Additional Respiratory Assessments  Heart Rate: 70  Resp: 18  SpO2: 100 %    ABG  Lab Results   Component Value Date/Time    PH 7.452 2022 09:45 AM    PH 7.507 01/10/2011 05:50 AM    PCO2 25.7 2022 09:45 AM    PO2 91.1 2022 09:45 AM    HCO3 17.5 2022 09:45 AM    O2SAT 96.9 2022 09:45 AM     Lab Results   Component Value Date/Time    MODE NC- 3L 2022 09:45 AM         Vitals    height is 6' 2\" (1.88 m) and weight is 171 lb 14.4 oz (78 kg). His oral temperature is 98 °F (36.7 °C). His blood pressure is 109/68 and his pulse is 70. His respiration is 18 and oxygen saturation is 100%.        Temperature Range: Temp: 98 °F (36.7 °C) Temp  Av.1 °F (36.7 °C)  Min: 97.8 °F (36.6 °C)  Max: 98.5 °F (36.9 °C)  BP Range:  Systolic (62BJE), ED , Min:51 , BQU:531     Diastolic (74HXO), ANJELICA:49, Min:38, Max:91    Pulse Range: Pulse  Av.9  Min: 62  Max: 112  Respiration Range: Resp  Av  Min: 10  Max: 20  Current Pulse Ox[de-identified]  SpO2: 100 %  24HR Pulse Ox Range:  SpO2  Av.2 %  Min: 88 %  Max: 100 %  Oxygen Amount and Delivery: O2 Flow Rate (L/min): 4 L/min    I/O (24 Hours)    Patient Vitals for the past 8 hrs:   BP Temp Temp src Pulse Resp SpO2 Weight   22 1115 -- 98 °F (36.7 °C) Oral -- -- -- --   22 1041 109/68 -- -- 70 18 100 % --   11/03/22 1038 114/65 -- -- 70 18 100 % --   11/03/22 1035 106/62 -- -- 72 18 100 % --   11/03/22 1032 (!) 108/58 -- -- 70 18 100 % --   11/03/22 1029 110/60 -- -- 70 18 100 % --   11/03/22 1026 103/67 -- -- 72 18 100 % --   11/03/22 1023 90/68 -- -- 57 18 100 % --   11/03/22 1020 (!) 103/59 -- -- 76 18 100 % --   11/03/22 1018 95/76 -- -- 76 18 100 % --   11/03/22 1017 95/70 -- -- 76 18 100 % --   11/03/22 1016 110/64 -- -- 78 18 100 % --   11/03/22 1015 -- -- -- -- -- 100 % --   11/03/22 1014 -- -- -- -- -- 100 % --   11/03/22 1012 110/65 -- -- 81 18 95 % --   11/03/22 1010 114/65 -- -- 84 18 95 % --   11/03/22 1009 106/65 -- -- 84 18 100 % --   11/03/22 1008 99/67 -- -- 86 18 100 % --   11/03/22 1007 110/68 -- -- 90 18 100 % --   11/03/22 1005 106/68 -- -- 94 18 99 % --   11/03/22 1003 112/67 -- -- 92 18 100 % --   11/03/22 1000 89/66 -- -- 98 18 100 % --   11/03/22 0959 (!) 84/51 -- -- (!) 106 18 100 % --   11/03/22 0957 (!) 82/57 -- -- (!) 108 18 100 % --   11/03/22 0956 (!) 86/51 -- -- (!) 104 18 100 % --   11/03/22 0953 (!) 99/52 -- -- (!) 112 18 (!) 88 % --   11/03/22 0952 -- -- -- -- -- (!) 88 % --   11/03/22 0950 -- -- -- -- -- 95 % --   11/03/22 0949 (!) 60/47 -- -- (!) 102 18 93 % --   11/03/22 0948 (!) 69/45 -- -- (!) 102 18 91 % --   11/03/22 0947 (!) 61/50 -- -- (!) 102 14 90 % --   11/03/22 0945 (!) 58/49 -- -- 100 14 96 % --   11/03/22 0943 (!) 59/38 -- -- 98 18 98 % --   11/03/22 0941 (!) 51/40 -- -- 94 14 97 % --   11/03/22 0920 135/82 -- -- 60 16 98 % --   11/03/22 0911 138/83 -- -- 60 16 98 % --   11/03/22 0856 125/82 -- -- 62 16 98 % --   11/03/22 0841 118/69 -- -- 60 14 96 % --   11/03/22 0826 101/70 -- -- 62 14 96 % --   11/03/22 0824 103/65 -- -- 64 12 90 % --   11/03/22 0823 115/64 -- -- 67 10 93 % --   11/03/22 0635 -- -- -- -- -- -- 171 lb 14.4 oz (78 kg)       Intake/Output Summary (Last 24 hours) at 11/3/2022 1254  Last data filed at 11/3/2022 0548  Gross per 24 hour   Intake 195 ml   Output 2100 ml   Net -1905 ml     I/O last 3 completed shifts: In: 9944 [P.O.:720; I.V.:75; IV Piggyback:500]  Out: 3000 [Urine:3000]   Date 11/03/22 0000 - 11/03/22 2359   Shift 8841-4957 2297-8503 4008-7556 24 Hour Total   INTAKE   Shift Total(mL/kg)       OUTPUT   Urine(mL/kg/hr) 500(0.8)   500   Shift Total(mL/kg) 500(6.4)   500(6.4)   Weight (kg) 78 78 78 78     Patient Vitals for the past 96 hrs (Last 3 readings):   Weight   11/03/22 0635 171 lb 14.4 oz (78 kg)   11/02/22 0615 178 lb 4.8 oz (80.9 kg)   11/01/22 2027 170 lb (77.1 kg)         Exam   PHYSICAL EXAM:  General: awake, alert, slight confusion at baseline, pleasant  Eyes: conjunctivae/corneas clear, sclera non icteric, EOMI  Ears: no obvious scars, no lesions, no masses, hearing intact  Mouth: mucous membranes moist, no obvious oral sores  Head: normocephalic, atraumatic  Neck: no JVD, no adenopathy, no thyromegaly, neck is supple, trachea is midline  Back: ROM normal, no CVA tenderness.   Chest: no pain on palpation  Lungs: clear to auscultation bilaterally, without rhonchi, crackle, wheezing, or rale, no retractions or use of accessory muscles  Heart: regular rate and regular rhythm, no murmur  Abdomen: soft, non-tender; bowel sounds normal; no masses, no organomegaly  Extremities: no lower extremity edema, extremities atraumatic, no cyanosis, no clubbing, left AKA  Skin: normal color, normal texture, normal turgor, no rashes, no lesions  Neurologic: strength equal bilaterally, cranial nerves II-XII grossly intact    Data   Old records and images have been reviewed    Lab Results   CBC     Lab Results   Component Value Date/Time    WBC 8.0 11/02/2022 03:50 AM    RBC 4.85 11/02/2022 03:50 AM    HGB 13.5 11/02/2022 03:50 AM    HCT 43.2 11/02/2022 03:50 AM     11/02/2022 03:50 AM    MCV 89.1 11/02/2022 03:50 AM    MCH 27.8 11/02/2022 03:50 AM    MCHC 31.3 11/02/2022 03:50 AM    RDW 14.6 11/02/2022 03:50 AM    NRBC 0.0 05/10/2022 03:52 AM SEGSPCT 47 03/18/2011 12:50 PM    METASPCT 0.9 04/09/2022 05:10 AM    LYMPHOPCT 22.0 11/02/2022 03:50 AM    PROMYELOPCT 1.8 03/28/2022 03:38 AM    MONOPCT 12.1 11/02/2022 03:50 AM    MYELOPCT 1.8 03/28/2022 03:38 AM    BASOPCT 0.7 11/02/2022 03:50 AM    MONOSABS 0.97 11/02/2022 03:50 AM    LYMPHSABS 1.77 11/02/2022 03:50 AM    EOSABS 0.19 11/02/2022 03:50 AM    BASOSABS 0.06 11/02/2022 03:50 AM       BMP   Lab Results   Component Value Date/Time     11/03/2022 03:35 AM    K 3.3 11/03/2022 03:35 AM     11/03/2022 03:35 AM    CO2 25 11/03/2022 03:35 AM    BUN 29 11/03/2022 03:35 AM    CREATININE 1.5 11/03/2022 03:35 AM    GLUCOSE 104 11/03/2022 03:35 AM    GLUCOSE 106 03/24/2011 04:46 AM    CALCIUM 8.7 11/03/2022 03:35 AM    IONCA 1.23 02/19/2011 04:20 AM       LFTS  Lab Results   Component Value Date/Time    ALKPHOS 94 11/02/2022 03:50 AM    ALT 33 11/02/2022 03:50 AM    AST 34 11/02/2022 03:50 AM    PROT 5.9 11/02/2022 03:50 AM    BILITOT 2.0 11/02/2022 03:50 AM    BILIDIR <0.1 03/20/2011 06:00 PM    LABALBU 3.6 11/02/2022 03:50 AM    LABALBU 2.4 03/20/2011 06:00 PM       INR  No results for input(s): PROTIME, INR in the last 72 hours. APTT  No results for input(s): APTT in the last 72 hours. Lactic Acid  Lab Results   Component Value Date/Time    LACTA 1.5 05/09/2022 09:23 AM    LACTA 1.6 04/09/2022 05:10 AM    LACTA 1.2 04/08/2022 09:15 PM        BNP   No results for input(s): BNP in the last 72 hours. Cultures   No results for input(s): BC in the last 72 hours. No results for input(s): Sue Breech in the last 72 hours. No results for input(s): LABURIN in the last 72 hours. Radiology   11/3/2022  XR CHEST PORTABLE   Final Result   Suspect early bibasilar infiltrates.              SYSTEMS ASSESSMENT    Neuro   #History of memory loss  -Awake, alert and mentating at baseline    Respiratory   #No acute issues, on room air    Cardiovascular   #Atrial fibrillation with RVR  -Started on Cardizem infusion at hospital admission, has been held since this morning  -S/p ОЛЬГА cardioversion with amiodarone bolus following  -Hypotensive following amiodarone and experiencing difficulty breathing and hives. Given negative fluid, steroids, antihistamines. improved.  -Last cardioversion prior to today was in April 2022  -On metoprolol and Eliquis  -Per cardiology, will hold on further amiodarone and continue rate control with metoprolol    #Hypotension  -Resolved with fluids, 100 mL/h maintenance    #History of CHF  -Given 1 dose of IV Lasix per cardiology  -BNP increased to 4,113, x-ray not concerning for pulmonary vascular congestion or  -Monitor I's and O's  -Echo done 11/1 demonstrating EF of 55% with no evidence of thrombus within the left atrial appendage    Gastrointestinal   #No acute issues   #history of rectal cancer s/p colostomy reversal    Renal   #COURTNEY  -Baseline creatinine 1.2-1.8 at admission  -Normal saline at 100 mL/h  -Creatinine improved to 1.5 today, BUN trending down as well  -Urine output is good at 1.4 mL/kg/h    #Hypokalemia  -3.3 today, replete as needed     Infectious Disease   #No acute issues    Hematology/Oncology   #No acute issues  #History of rectal cancer s/p colostomy reversal    Endocrine   #No acute issues  -Maintain BG less than 180    Social/Spiritual/DNR/Other   Full code    ______________________________________________________________________    ASSESSMENT/ PLAN     GI prophylaxis:  DVT Prophylaxis: On Eliquis  Diet: full as tolerated  Discuss case and plan with attending      Stewart Clark DO  Resident, PGY-2  11/3/2022  12:54 PM    Attending Physician Attestation: Dr. Holli Blank    Thank you very much for allowing me to see this patient in consultation and follow up. I personally saw, examined and provided care for the patient. Radiographs, labs and medication list were reviewed by me independently.  I spoke with bedside nursing, respiratory therapists and consultants. Critical care services and times documented are independent of procedures and multidisciplinary rounds with Residents. Additionally comprehensive, multidisciplinary rounds were conducted with the MICU team. The case was discussed in detail and plans for care were established. Review of Residents documentation was conducted and revisions were made as appropriate. I agree with the the above documented information. Current Facility-Administered Medications   Medication Dose Route Frequency Provider Last Rate Last Admin    famotidine (PEPCID) 20 MG/2ML injection             0.9 % sodium chloride infusion   IntraVENous Continuous Erin Means  mL/hr at 11/03/22 1020 Given by Other Clinician at 11/03/22 1020    apixaban (ELIQUIS) tablet 5 mg  5 mg Oral BID Danette Oratnes MD   5 mg at 11/03/22 0917    calcium carbonate (TUMS) chewable tablet 500 mg  500 mg Oral Q6H PRN Danette Orantes MD        metoprolol succinate (TOPROL XL) extended release tablet 100 mg  100 mg Oral BID Danette Orantes MD   100 mg at 11/03/22 1300    multivitamin 1 tablet  1 tablet Oral Daily Danette Orantes MD   1 tablet at 11/03/22 1300    sodium chloride flush 0.9 % injection 5-40 mL  5-40 mL IntraVENous 2 times per day Danette Orantes MD   10 mL at 11/03/22 1300    sodium chloride flush 0.9 % injection 5-40 mL  5-40 mL IntraVENous PRN Danette Orantes MD        0.9 % sodium chloride infusion   IntraVENous PRN Danette Orantes MD        acetaminophen (TYLENOL) tablet 650 mg  650 mg Oral Q6H PRN Danette Orantes MD        Or    acetaminophen (TYLENOL) suppository 650 mg  650 mg Rectal Q6H PRN Danette Orantes MD        dilTIAZem 100 mg in dextrose 5 % 100 mL infusion (ADD[facility])  2.5-15 mg/hr IntraVENous Continuous Danette Orantes MD   Stopped at 11/03/22 0801        XR CHEST PORTABLE   Final Result   Suspect early bibasilar infiltrates.              Physical Examination:  Vitals:   Vitals:    11/03/22 1115 11/03/22 1200 11/03/22 1300 11/03/22 1400   BP:  113/73 131/79 131/79   Pulse:  66 74 69   Resp:  18 18 20   Temp: 98 °F (36.7 °C)      TempSrc: Oral      SpO2:  96% 94% 95%   Weight:       Height:          General: No Acute Distress  HEENT: normocephalic, atraumatic, no stridor  Abdomen: soft, NT, ND  CVS: RRR, S1, S2, No S3 or S4  Respiratory: decreased breath sounds at lung bases, no focal wheezes noted  Extremities: no clubbing/cyanosis/edema  Neuro: intact     ASSESSMENT:  1.) Anaphylaxis Reaction to Amiodarone   2.) Atrial Fibrillation with RVR - s/p ОЛЬГА with Cardioversion 11/3/2022  3.) Acute on Chronic Diastolic HFpEF Dysfunction   4.) S/p left AKA due to necrotizing fasciitis  5.) COURTNEY on CKD - Cr baseline 1.4-1.6  6.) H/O Hypertension     In addition the following applies:    Check: serial labs   Medication Alterations: N/A  Procedures: S/P ОЛЬГА with Cardioversion   Imaging: reviewed  New Consultations: N/A  VENT: N/A    Access: Peripheral   Consults: Cardiology  Drips: N/A  DVT Phylaxis: Eliquis  GI Prophylaxis: N/A  Nutrition: PO  ABX: N/A    Thank you for allowing me to participate in the care of this patient. Care reviewed with nursing staff, medical and surgical specialty care, primary care and the patient's family as available. Restraints are ordered when the patient can do harm to him/herself by pulling out devices. Critical Care Time: 35 minutes excluding procedures    Christi Tristan M.D.     Christi Tristan MD  11/3/2022  2:17 PM

## 2022-11-03 NOTE — ANESTHESIA PRE PROCEDURE
Department of Anesthesiology  Preprocedure Note       Name:  Shannon Velazquez. Age:  69 y.o.  :  1955                                          MRN:  [id number]         Date:  11/3/2022      Surgeon: * No surgeons listed *    Procedure: * No procedures listed *    Medications prior to admission:   Prior to Admission medications    Medication Sig Start Date End Date Taking?  Authorizing Provider   loperamide (IMODIUM) 2 MG capsule Take 2 mg by mouth daily   Yes Historical Provider, MD   fluorometholone (FML) 0.1 % ophthalmic suspension Place 1 drop into both eyes 4 times daily 10/28/22   Historical Provider, MD   Calcium Carbonate 500 MG CHEW Take 1 tablet by mouth every 6 hours as needed (Heartburn)    Historical Provider, MD   Multiple Vitamins-Minerals (THERAPEUTIC MULTIVITAMIN-MINERALS) tablet Take 1 tablet by mouth daily    Historical Provider, MD   metoprolol succinate (TOPROL XL) 100 MG extended release tablet Take 1 tablet by mouth 2 times daily  Patient taking differently: Take 50 mg by mouth 2 times daily 4/15/22   Monae Alvarado MD   magnesium hydroxide (MILK OF MAGNESIA) 400 MG/5ML suspension Take 30 mLs by mouth daily as needed for Constipation (Given if no BM in 48 hrs)  Patient not taking: Reported on 2022    Historical Provider, MD   bisacodyl (DULCOLAX) 10 MG suppository Place 10 mg rectally daily as needed for Constipation (If no results from milk of mag.)  Patient not taking: Reported on 2022    Historical Provider, MD   apixaban (ELIQUIS) 5 MG TABS tablet Take 5 mg by mouth 2 times daily    Historical Provider, MD       Current medications:    Current Facility-Administered Medications   Medication Dose Route Frequency Provider Last Rate Last Admin    potassium chloride (KLOR-CON M) extended release tablet 20 mEq  20 mEq Oral Once Cici Chaparro MD        apixaban [place]) tablet 5 mg  5 mg Oral BID Monae Alvarado MD   5 mg at 22    calcium carbonate (TUMS) chewable tablet 500 mg  500 mg Oral Q6H PRN Trina John MD        metoprolol succinate (TOPROL XL) extended release tablet 100 mg  100 mg Oral BID Trina John MD   100 mg at 11/02/22 2115    multivitamin 1 tablet  1 tablet Oral Daily Trina John MD   1 tablet at 11/02/22 0714    sodium chloride flush 0.9 % injection 5-40 mL  5-40 mL IntraVENous 2 times per day Trina John MD   10 mL at 11/02/22 2115    sodium chloride flush 0.9 % injection 5-40 mL  5-40 mL IntraVENous PRN Trina John MD        0.9 % sodium chloride infusion   IntraVENous PRN Trina John MD        acetaminophen (TYLENOL) tablet 650 mg  650 mg Oral Q6H PRN Trina John MD        Or    acetaminophen (TYLENOL) suppository 650 mg  650 mg Rectal Q6H PRN Trina John MD        dilTIAZem 100 mg in dextrose 5 % 100 mL infusion ([facility])  2.5-15 mg/hr IntraVENous Continuous Trina John MD 7.5 mL/hr at 11/03/22 0638 7.5 mg/hr at 11/03/22 0933       Allergies: Allergies   Allergen Reactions    Cefepime Rash    Clindamycin/Lincomycin Rash    Pcn [Penicillins] Rash       Problem List:    Patient Active Problem List   Diagnosis Code    Retinal detachment of left eye with multiple breaks H33.022    Rectal cancer ([facility] C20    Ileus ([facility] K56.7    Atrial flutter ([facility]) I48.92    GI bleed K92.2    Gastrointestinal bleed K92.2    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    S/P right hemicolectomy Z90.49    PSVT (paroxysmal supraventricular tachycardia) ([facility]) I47.1    Primary hypertension I10    Abdominal wall abscess L02. 211    Atrial fibrillation with RVR ([facility]) I48.91    Urinary tract infection associated with indwelling urethral catheter ([facility]) T83.511A, N39.0    PAF (paroxysmal atrial fibrillation) ([facility]) I48.0    Intra-abdominal infection B99.9    COURTNEY (acute kidney injury) ([facility] N17.9    Septic shock ([facility]) A41.9, R65.21    Bacteremia R78.81    Moderate protein-calorie malnutrition (HCC) E44.0    Sepsis ([address] A41.9    Urinary retention R33.9    Atrial fibrillation with rapid ventricular response (HCC) I48.91    PAD (peripheral artery disease) (HCC) I73.9    On apixaban therapy Z79.01    Acute kidney injury superimposed on chronic kidney disease (HCC) N17.9, N18.9       Past Medical History:        Diagnosis Date    Employs prosthetic leg     left    History of atrial fibrillation     [place] (hard of hearing)     uses bilat hearing aides    Hx of AKA (above knee amputation), left ([address]     Hx of necrotizing fasciitis     left leg    Hypertension     Rectal bleeding     Rectal cancer ([address] 2017    rectal       Past Surgical History:        Procedure Laterality Date    ABDOMEN SURGERY  2018    ileostomy open take down    ABOVE KNEE AMPUTATION      left; hx flesh eating bacteria    CARDIOVERSION  2020    COLON SURGERY  2018    laprascopic low anterior colon resection  take down splenic fexure   ileostomy    COLONOSCOPY  10/21/2011    COLONOSCOPY N/A 2022    COLONOSCOPY POLYPECTOMY SNARE/COLD BIOPSY performed by Renetta Bennett MD at [address] Left 10/03/2016    pars plana vitrectomy, retinal detachment repair, laser gas/fluid exchange    EYE SURGERY Bilateral ? bilat cataracts w lens implants    EYE SURGERY Right ?    retinal detachment    HEMICOLECTOMY Right 3/22/2022    LAPAROSCOPIC RIGHT HEMICOLECTOMY performed by Renetta Bennett MD at [address] [facility] N/A     ILEOSTOMY OPEN TAKEDOWN performed by Renetta Bennett MD at [address] [place] [address] History:    Social History     Tobacco Use    Smoking status: Former     Packs/day: 2.00     Years: 35.00     Pack years: 70.00     Types: Cigarettes     Start date: 1974     Quit date: 2010     Years since quittin.9    Smokeless tobacco: Never   Substance Use Topics    Alcohol use:  No Counseling given: Not Answered      Vital Signs (Current):   Vitals:    11/02/22 1745 11/02/22 2100 11/03/22 0315 11/03/22 0635   BP: (!) 116/91 128/88 (!) 124/91    Pulse: 95 96 85    Resp: 18 20 16    Temp: 36.6 °C (97.8 °F) 36.9 °C (98.5 °F) 36.7 °C (98.1 °F)    TempSrc: Oral Oral Oral    SpO2: 94% 95% 95%    Weight:    171 lb 14.4 oz (78 kg)   Height:                                                  BP Readings from Last 3 Encounters:   11/03/22 (!) 124/91   10/07/22 128/60   05/19/22 124/60       NPO Status:  > 8 hrs                                                                               BMI:   Wt Readings from Last 3 Encounters:   11/03/22 171 lb 14.4 oz (78 kg)   10/07/22 198 lb 3.2 oz (89.9 kg)   05/19/22 178 lb (80.7 kg)     Body mass index is 22.07 kg/m². CBC:   Lab Results   Component Value Date/Time    WBC 8.0 11/02/2022 03:50 AM    RBC 4.85 11/02/2022 03:50 AM    HGB 13.5 11/02/2022 03:50 AM    HCT 43.2 11/02/2022 03:50 AM    MCV 89.1 11/02/2022 03:50 AM    RDW 14.6 11/02/2022 03:50 AM     11/02/2022 03:50 AM       CMP:   Lab Results   Component Value Date/Time     11/03/2022 03:35 AM    K 3.3 11/03/2022 03:35 AM     11/03/2022 03:35 AM    CO2 25 11/03/2022 03:35 AM    BUN 29 11/03/2022 03:35 AM    CREATININE 1.5 11/03/2022 03:35 AM    GFRAA >60 05/12/2022 12:01 PM    LABGLOM 50 11/03/2022 03:35 AM    GLUCOSE 104 11/03/2022 03:35 AM    GLUCOSE 106 03/24/2011 04:46 AM    PROT 5.9 11/02/2022 03:50 AM    CALCIUM 8.7 11/03/2022 03:35 AM    BILITOT 2.0 11/02/2022 03:50 AM    ALKPHOS 94 11/02/2022 03:50 AM    AST 34 11/02/2022 03:50 AM    ALT 33 11/02/2022 03:50 AM       POC Tests: No results for input(s): POCGLU, POCNA, POCK, POCCL, POCBUN, POCHEMO, POCHCT in the last 72 hours.     Coags:   Lab Results   Component Value Date/Time    PROTIME 17.9 04/12/2022 04:58 AM    PROTIME 12.2 03/18/2011 12:50 PM    INR 1.6 04/12/2022 04:58 AM    APTT 28.1 04/07/2022 03:00 PM       HCG (If Applicable): No results found for: PREGTESTUR, PREGSERUM, HCG, HCGQUANT     ABGs:   Lab Results   Component Value Date/Time    E0CTHGMZ 95.3 01/10/2011 05:50 AM        Type & Screen (If Applicable):  No results found for: LABABO, LABRH    Drug/Infectious Status (If Applicable):  No results found for: HIV, HEPCAB    COVID-19 Screening (If Applicable):   Lab Results   Component Value Date/Time    COVID19 Not Detected 11/01/2022 06:18 PM    COVID19 Not Detected 04/19/2022 07:00 AM           Anesthesia Evaluation  Patient summary reviewed and Nursing notes reviewed no history of anesthetic complications:   Airway: Mallampati: III  TM distance: >3 FB   Neck ROM: limited  Mouth opening: > = 3 FB   Dental:    (+) edentulous      Pulmonary:   (+) shortness of breath:  rhonchi     (-) not a current smoker                           Cardiovascular:    (+) hypertension:, dysrhythmias: atrial fibrillation, HEARD:,       ECG reviewed  Rhythm: irregular  Rate: abnormal  Echocardiogram reviewed                  Neuro/Psych:   Negative Neuro/Psych ROS              GI/Hepatic/Renal:   (+) renal disease: ARF,           Endo/Other:    (+) blood dyscrasia: anticoagulation therapy, arthritis:., electrolyte abnormalities, . Abdominal:             Vascular: negative vascular ROS. Other Findings:           Anesthesia Plan      MAC     ASA 4       Induction: intravenous. Plan discussed with attending. CRISTI Aleman - CRNA   11/3/2022      Patient seen and examined, chart reviewed, agree with above findings. Anesthetic plan, risks, benefits, alternatives, and personnel involved discussed with patient. Patient verbalized an understanding and agreed to proceed. NPO status confirmed. Anesthetic plan discussed with care team members and agreed upon.     Sandra Hurt DO   11/3/2022  8:29 AM

## 2022-11-03 NOTE — SIGNIFICANT EVENT
After an uneventful ОЛЬГА cardioversion, the patient was then bolused with IV amiodarone in PACU, a medication has had in the past.  Shortly before finishing the bolus he became diaphoretic, hypotensive, hypoxic, red and itchy, and nauseous with dry heaves and has been given IV fluids, doses of IV phenylephrine per anesthesia as well as steroids, antihistamines. Possible allergic reaction to amiodarone, although again he has had this medication before. Will transfer patient to ICU for closer monitoring. Discussed with Dr. Liliane Vieyra who accepted him to the ICU.     Clint Patricio MD

## 2022-11-03 NOTE — FLOWSHEET NOTE
.Patient admitted from PACU to 206, with the following belongings shirt, pants, glasses, walker , prosthetic limb, placed on monitor, patient oriented to room and unit visiting hours. Patient guide at bedside, reviewed patient rights and responsibilities. MRSA nasal swab obtained. Bed alarm on. Call light within reach.

## 2022-11-04 VITALS
SYSTOLIC BLOOD PRESSURE: 144 MMHG | BODY MASS INDEX: 22.06 KG/M2 | HEART RATE: 78 BPM | TEMPERATURE: 98 F | OXYGEN SATURATION: 97 % | HEIGHT: 74 IN | DIASTOLIC BLOOD PRESSURE: 82 MMHG | RESPIRATION RATE: 21 BRPM | WEIGHT: 171.9 LBS

## 2022-11-04 LAB
ANION GAP SERPL CALCULATED.3IONS-SCNC: 11 MMOL/L (ref 7–16)
BASOPHILS ABSOLUTE: 0.01 E9/L (ref 0–0.2)
BASOPHILS RELATIVE PERCENT: 0.1 % (ref 0–2)
BUN BLDV-MCNC: 29 MG/DL (ref 6–23)
BURR CELLS: ABNORMAL
CALCIUM SERPL-MCNC: 8.7 MG/DL (ref 8.6–10.2)
CHLORIDE BLD-SCNC: 105 MMOL/L (ref 98–107)
CO2: 23 MMOL/L (ref 22–29)
CREAT SERPL-MCNC: 1.6 MG/DL (ref 0.7–1.2)
EOSINOPHILS ABSOLUTE: 0 E9/L (ref 0.05–0.5)
EOSINOPHILS RELATIVE PERCENT: 0 % (ref 0–6)
GFR SERPL CREATININE-BSD FRML MDRD: 46 ML/MIN/1.73
GLUCOSE BLD-MCNC: 145 MG/DL (ref 74–99)
HCT VFR BLD CALC: 41.4 % (ref 37–54)
HEMOGLOBIN: 13.4 G/DL (ref 12.5–16.5)
IMMATURE GRANULOCYTES #: 0.03 E9/L
IMMATURE GRANULOCYTES %: 0.3 % (ref 0–5)
LYMPHOCYTES ABSOLUTE: 0.49 E9/L (ref 1.5–4)
LYMPHOCYTES RELATIVE PERCENT: 4.6 % (ref 20–42)
MAGNESIUM: 2 MG/DL (ref 1.6–2.6)
MCH RBC QN AUTO: 28.3 PG (ref 26–35)
MCHC RBC AUTO-ENTMCNC: 32.4 % (ref 32–34.5)
MCV RBC AUTO: 87.3 FL (ref 80–99.9)
MONOCYTES ABSOLUTE: 0.16 E9/L (ref 0.1–0.95)
MONOCYTES RELATIVE PERCENT: 1.5 % (ref 2–12)
NEUTROPHILS ABSOLUTE: 9.92 E9/L (ref 1.8–7.3)
NEUTROPHILS RELATIVE PERCENT: 93.5 % (ref 43–80)
OVALOCYTES: ABNORMAL
PDW BLD-RTO: 14.8 FL (ref 11.5–15)
PHOSPHORUS: 2.9 MG/DL (ref 2.5–4.5)
PLATELET # BLD: 211 E9/L (ref 130–450)
PMV BLD AUTO: 9.9 FL (ref 7–12)
POIKILOCYTES: ABNORMAL
POTASSIUM REFLEX MAGNESIUM: 4.1 MMOL/L (ref 3.5–5)
RBC # BLD: 4.74 E12/L (ref 3.8–5.8)
SODIUM BLD-SCNC: 139 MMOL/L (ref 132–146)
WBC # BLD: 10.6 E9/L (ref 4.5–11.5)

## 2022-11-04 PROCEDURE — 6370000000 HC RX 637 (ALT 250 FOR IP): Performed by: INTERNAL MEDICINE

## 2022-11-04 PROCEDURE — 2580000003 HC RX 258: Performed by: INTERNAL MEDICINE

## 2022-11-04 PROCEDURE — 83735 ASSAY OF MAGNESIUM: CPT

## 2022-11-04 PROCEDURE — 84100 ASSAY OF PHOSPHORUS: CPT

## 2022-11-04 PROCEDURE — 99232 SBSQ HOSP IP/OBS MODERATE 35: CPT | Performed by: INTERNAL MEDICINE

## 2022-11-04 PROCEDURE — 80048 BASIC METABOLIC PNL TOTAL CA: CPT

## 2022-11-04 PROCEDURE — 36415 COLL VENOUS BLD VENIPUNCTURE: CPT

## 2022-11-04 PROCEDURE — 85025 COMPLETE CBC W/AUTO DIFF WBC: CPT

## 2022-11-04 RX ORDER — DILTIAZEM HYDROCHLORIDE 120 MG/1
120 CAPSULE, COATED, EXTENDED RELEASE ORAL EVERY EVENING
Qty: 30 CAPSULE | Refills: 3 | Status: SHIPPED | OUTPATIENT
Start: 2022-11-04

## 2022-11-04 RX ORDER — DILTIAZEM HYDROCHLORIDE 120 MG/1
120 CAPSULE, COATED, EXTENDED RELEASE ORAL EVERY EVENING
Status: DISCONTINUED | OUTPATIENT
Start: 2022-11-04 | End: 2022-11-04 | Stop reason: HOSPADM

## 2022-11-04 RX ADMIN — APIXABAN 5 MG: 5 TABLET, FILM COATED ORAL at 09:13

## 2022-11-04 RX ADMIN — SODIUM CHLORIDE, PRESERVATIVE FREE 10 ML: 5 INJECTION INTRAVENOUS at 09:14

## 2022-11-04 RX ADMIN — METOPROLOL SUCCINATE 100 MG: 100 TABLET, FILM COATED, EXTENDED RELEASE ORAL at 09:13

## 2022-11-04 RX ADMIN — MULTIVITAMIN TABLET 1 TABLET: TABLET at 09:13

## 2022-11-04 ASSESSMENT — PAIN SCALES - GENERAL
PAINLEVEL_OUTOF10: 0
PAINLEVEL_OUTOF10: 0

## 2022-11-04 NOTE — PLAN OF CARE
Problem: ABCDS Injury Assessment  Goal: Absence of physical injury  Outcome: Progressing  Flowsheets (Taken 11/3/2022 2015 by Arlette Tidwell RN)  Absence of Physical Injury: Implement safety measures based on patient assessment     Problem: Discharge Planning  Goal: Discharge to home or other facility with appropriate resources  Outcome: Progressing     Problem: Safety - Adult  Goal: Free from fall injury  Outcome: Progressing     Problem: Skin/Tissue Integrity  Goal: Absence of new skin breakdown  Description: 1. Monitor for areas of redness and/or skin breakdown  2. Assess vascular access sites hourly  3. Every 4-6 hours minimum:  Change oxygen saturation probe site  4. Every 4-6 hours:  If on nasal continuous positive airway pressure, respiratory therapy assess nares and determine need for appliance change or resting period.   Outcome: Progressing

## 2022-11-04 NOTE — PROGRESS NOTES
Department of Internal Dolan Goldberg M.D. Progress Note      SUBJECTIVE: He cannot recall the allergic reaction.   Since cardiology decreased beta-blockade I have increased it to 100 mg twice a day would also send him home on low-dose Cardizem since he tends to run hypertensive      OBJECTIVE abdomen soft and nontender    Medications    Current Facility-Administered Medications: dilTIAZem (CARDIZEM CD) extended release capsule 120 mg, 120 mg, Oral, QPM  apixaban (ELIQUIS) tablet 5 mg, 5 mg, Oral, BID  calcium carbonate (TUMS) chewable tablet 500 mg, 500 mg, Oral, Q6H PRN  metoprolol succinate (TOPROL XL) extended release tablet 100 mg, 100 mg, Oral, BID  multivitamin 1 tablet, 1 tablet, Oral, Daily  sodium chloride flush 0.9 % injection 5-40 mL, 5-40 mL, IntraVENous, 2 times per day  sodium chloride flush 0.9 % injection 5-40 mL, 5-40 mL, IntraVENous, PRN  0.9 % sodium chloride infusion, , IntraVENous, PRN  acetaminophen (TYLENOL) tablet 650 mg, 650 mg, Oral, Q6H PRN **OR** acetaminophen (TYLENOL) suppository 650 mg, 650 mg, Rectal, Q6H PRN  Physical    VITALS:  BP (!) 147/94   Pulse 77   Temp 98 °F (36.7 °C) (Oral)   Resp 27   Ht 6' 2\" (1.88 m)   Wt 171 lb 14.4 oz (78 kg)   SpO2 98%   BMI 22.07 kg/m²   24HR INTAKE/OUTPUT:    Intake/Output Summary (Last 24 hours) at 11/4/2022 0803  Last data filed at 11/3/2022 2000  Gross per 24 hour   Intake 958.33 ml   Output 400 ml   Net 558.33 ml     LUNGS:  No increased work of breathing, good air exchange, clear to auscultation bilaterally, no crackles or wheezing  CARDIOVASCULAR:  Normal apical impulse, regular rate and rhythm, normal S1 and S2, no S3 or S4, and no murmur noted  Data    CBC with Differential:    Lab Results   Component Value Date/Time    WBC 10.6 11/04/2022 04:20 AM    RBC 4.74 11/04/2022 04:20 AM    HGB 13.4 11/04/2022 04:20 AM    HCT 41.4 11/04/2022 04:20 AM     11/04/2022 04:20 AM    MCV 87.3 11/04/2022 04:20 AM    MCH 28.3 11/04/2022 04:20 AM    MCHC 32.4 11/04/2022 04:20 AM    RDW 14.8 11/04/2022 04:20 AM    NRBC 0.0 05/10/2022 03:52 AM    SEGSPCT 47 03/18/2011 12:50 PM    METASPCT 0.9 04/09/2022 05:10 AM    LYMPHOPCT 4.6 11/04/2022 04:20 AM    PROMYELOPCT 1.8 03/28/2022 03:38 AM    MONOPCT 1.5 11/04/2022 04:20 AM    MYELOPCT 1.8 03/28/2022 03:38 AM    BASOPCT 0.1 11/04/2022 04:20 AM    MONOSABS 0.16 11/04/2022 04:20 AM    LYMPHSABS 0.49 11/04/2022 04:20 AM    EOSABS 0.00 11/04/2022 04:20 AM    BASOSABS 0.01 11/04/2022 04:20 AM     CMP:    Lab Results   Component Value Date/Time     11/04/2022 04:20 AM    K 4.1 11/04/2022 04:20 AM     11/04/2022 04:20 AM    CO2 23 11/04/2022 04:20 AM    BUN 29 11/04/2022 04:20 AM    CREATININE 1.6 11/04/2022 04:20 AM    GFRAA >60 05/12/2022 12:01 PM    LABGLOM 46 11/04/2022 04:20 AM    GLUCOSE 145 11/04/2022 04:20 AM    GLUCOSE 106 03/24/2011 04:46 AM    PROT 5.9 11/02/2022 03:50 AM    LABALBU 3.6 11/02/2022 03:50 AM    LABALBU 2.4 03/20/2011 06:00 PM    CALCIUM 8.7 11/04/2022 04:20 AM    BILITOT 2.0 11/02/2022 03:50 AM    ALKPHOS 94 11/02/2022 03:50 AM    AST 34 11/02/2022 03:50 AM    ALT 33 11/02/2022 03:50 AM     PT/INR:    Lab Results   Component Value Date/Time    PROTIME 17.9 04/12/2022 04:58 AM    PROTIME 12.2 03/18/2011 12:50 PM    INR 1.6 04/12/2022 04:58 AM       ASSESSMENT AND PLAN      Principal Problem:    Atrial fibrillation with rapid ventricular response ([address] converted to sinus by cardioversion    Allergic reaction to amiodarone seems to have resolved    Acute kidney injury superimposed on chronic kidney disease ([address]  Plan: Stable  Will send home on Cardizem and beta-blocker        Electronically signed by Amanda Ko MD on 11/4/2022 at 8:03 AM

## 2022-11-04 NOTE — PATIENT CARE CONFERENCE
Intensive Care Daily Quality Rounding Checklist      ICU Team Members: Dr. Rolly Bosch, residents, bedside nurse, charge nurse, RT    ICU Day #: 2    Intubation Date: N/A    Ventilator Day #: N/A    Central Line Insertion Date: N/A        Day #: N/A     Arterial Line Insertion Date: N/A      Day #: N/A    Temporary Hemodialysis Catheter Insertion Date: N/A      Day # N/A    DVT Prophylaxis: Eliquis    GI Prophylaxis:     [place] Catheter Insertion Date: N/A       Day #: N/A      Continued need (if yes, reason documented and discussed with physician): N/A    Skin Issues/ Wounds and ordered treatment discussed on rounds: SOS precautions    Goals/ Plans for the Day: AVS/Discharge instructions. Discharge home.

## 2022-11-04 NOTE — PLAN OF CARE
Problem: ABCDS Injury Assessment  Goal: Absence of physical injury  11/4/2022 0528 by Shaista Duncan RN  Outcome: Progressing  11/4/2022 0236 by Samantha Baugh. Beatrice Mcgrath RN  Outcome: Progressing  Flowsheets (Taken 11/3/2022 2015 by Shaista Duncan RN)  Absence of Physical Injury: Implement safety measures based on patient assessment     Problem: Discharge Planning  Goal: Discharge to home or other facility with appropriate resources  11/4/2022 0528 by Shaista Duncan RN  Outcome: Progressing  11/4/2022 0236 by Samantha Baugh. DESTINY Mi  Outcome: Progressing     Problem: Safety - Adult  Goal: Free from fall injury  11/4/2022 0528 by Shaista Duncan RN  Outcome: Progressing  11/4/2022 0236 by Samantha Baugh. DESTINY Mi  Outcome: Progressing     Problem: Skin/Tissue Integrity  Goal: Absence of new skin breakdown  Description: 1. Monitor for areas of redness and/or skin breakdown  2. Assess vascular access sites hourly  3. Every 4-6 hours minimum:  Change oxygen saturation probe site  4. Every 4-6 hours:  If on nasal continuous positive airway pressure, respiratory therapy assess nares and determine need for appliance change or resting period. 11/4/2022 0528 by Shaista Duncan RN  Outcome: Progressing  11/4/2022 0236 by Samantha Baugh.  Era Go RN  Outcome: Progressing

## 2022-11-04 NOTE — PROGRESS NOTES
[address] FOLLOW-UP    Name: Alfonzo Ray. Age: 69 y.o. Date of Admission: 11/1/2022  5:17 PM    Date of Service: 11/4/2022    Primary Cardiologist: Dr. Shawn Sim    Chief Complaint: Follow-up for recurrent A. fib    Interim History:  Watched in ICU overnight, no issues. Remains in sinus rhythm blood pressure and other issues related to this reaction yesterday have resolved. Denies chest pain shortness of breath or palpitations.     Review of Systems:   Negative except as described above    Problem List:  Patient Active Problem List   Diagnosis    Retinal detachment of left eye with multiple breaks    Rectal cancer ([address]    Ileus (HCC)    Atrial flutter (HCC)    GI bleed    Gastrointestinal bleed    Benign neoplasm of cecum    S/P right hemicolectomy    PSVT (paroxysmal supraventricular tachycardia) (HCC)    Primary hypertension    Abdominal wall abscess    Atrial fibrillation with RVR (HCC)    Urinary tract infection associated with indwelling urethral catheter (HCC)    PAF (paroxysmal atrial fibrillation) ([address]    Intra-abdominal infection    COURTNEY (acute kidney injury) ([address]    Septic shock ([address]    Bacteremia    Moderate protein-calorie malnutrition ([address]    Sepsis ([address]    Urinary retention    Atrial fibrillation with rapid ventricular response (HCC)    PAD (peripheral artery disease) ([address]    On apixaban therapy    Acute kidney injury superimposed on chronic kidney disease ([address]       Current Medications:    Current Facility-Administered Medications:     dilTIAZem (CARDIZEM CD) extended release capsule 120 mg, 120 mg, Oral, QPM, Erin Hernandez MD    apixaban Celine Curio) tablet 5 mg, 5 mg, Oral, BID, Erin Hernandez MD, 5 mg at 11/04/22 0913    calcium carbonate (TUMS) chewable tablet 500 mg, 500 mg, Oral, Q6H PRN, Erin Hernandez MD    metoprolol succinate (TOPROL XL) extended release tablet 100 mg, 100 mg, Oral, BID, Erin Hernandez MD, 100 mg at 11/04/22 0035 multivitamin 1 tablet, 1 tablet, Oral, Daily, Danette Orantes MD, 1 tablet at 11/04/22 0913    sodium chloride flush 0.9 % injection 5-40 mL, 5-40 mL, IntraVENous, 2 times per day, Danette Orantes MD, 10 mL at 11/04/22 0914    sodium chloride flush 0.9 % injection 5-40 mL, 5-40 mL, IntraVENous, PRN, Danette Orantes MD    0.9 % sodium chloride infusion, , IntraVENous, PRN, Danette Orantes MD    acetaminophen (TYLENOL) tablet 650 mg, 650 mg, Oral, Q6H PRN **OR** acetaminophen (TYLENOL) suppository 650 mg, 650 mg, Rectal, Q6H PRN, Danette Orantes MD    Physical Exam:  BP (!) 144/85   Pulse 89   Temp 98 °F (36.7 °C) (Oral)   Resp 22   Ht 6' 2\" (1.88 m)   Wt 171 lb 14.4 oz (78 kg)   SpO2 97%   BMI 22.07 kg/m²   Wt Readings from Last 3 Encounters:   11/03/22 171 lb 14.4 oz (78 kg)   10/07/22 198 lb 3.2 oz (89.9 kg)   05/19/22 178 lb (80.7 kg)     Appearance: Awake, alert, no acute respiratory distress  Skin: Intact, no rash  Head: Normocephalic, atraumatic  Eyes: EOMI, no conjunctival erythema  ENMT: No pharyngeal erythema, MMM, no rhinorrhea  Neck: Supple, no elevated JVP, no carotid bruits  Lungs: Clear to auscultation bilaterally. No wheezes, rales, or rhonchi. Cardiac: PMI nondisplaced, regular rhythm with a normal rate, S1 & S2 normal, no murmurs  Abdomen: Soft, nontender, +bowel sounds  Extremities: Moves all extremities x 4, no lower extremity edema.   Left AKA  Neurologic: No focal motor deficits apparent, normal mood and affect  Peripheral Pulses: Intact posterior tibial pulses bilaterally    Intake/Output:    Intake/Output Summary (Last 24 hours) at 11/4/2022 1004  Last data filed at 11/4/2022 0915  Gross per 24 hour   Intake 1133.33 ml   Output 800 ml   Net 333.33 ml     I/O this shift:  In: 175 [P.O.:175]  Out: 400 [Urine:400]    Laboratory Tests:  Recent Labs     11/02/22  0350 11/03/22  0335 11/04/22  0420    139 139   K 3.4* 3.3* 4.1    103 105   CO2 24 25 23 BUN 37* 29* 29*   CREATININE 1.4* 1.5* 1.6*   GLUCOSE 90 104* 145*   CALCIUM 8.7 8.7 8.7     Lab Results   Component Value Date/Time    MG 2.0 11/04/2022 04:20 AM     Recent Labs     11/01/22 1818 11/02/22  0350   ALKPHOS 96 94   ALT 34 33   AST 34 34   PROT 5.9* 5.9*   BILITOT 1.9* 2.0*   LABALBU 3.8 3.6     Recent Labs     11/01/22 1818 11/02/22  0350 11/04/22  0420   WBC 8.5 8.0 10.6   RBC 4.85 4.85 4.74   HGB 13.5 13.5 13.4   HCT 42.8 43.2 41.4   MCV 88.2 89.1 87.3   MCH 27.8 27.8 28.3   MCHC 31.5* 31.3* 32.4   RDW 14.9 14.6 14.8    200 211   MPV 10.3 9.8 9.9     Lab Results   Component Value Date    CKTOTAL 29 05/09/2022    CKMB <0.2 03/18/2011    TROPONINI <0.01 12/19/2020    TROPONINI <0.01 03/18/2011    TROPONINI 0.02 01/03/2011     Lab Results   Component Value Date    INR 1.6 04/12/2022    INR 1.6 04/11/2022    INR 1.5 04/10/2022    PROTIME 17.9 (H) 04/12/2022    PROTIME 17.2 (H) 04/11/2022    PROTIME 16.9 (H) 04/10/2022     Lab Results   Component Value Date    TSH 2.060 11/03/2022     Lab Results   Component Value Date    LABA1C 5.6 12/20/2020     No results found for: EAG  Lab Results   Component Value Date    CHOL 114 12/20/2020     Lab Results   Component Value Date    TRIG 76 12/20/2020     Lab Results   Component Value Date    HDL 41 12/20/2020     Lab Results   Component Value Date    LDLCALC 58 12/20/2020     Lab Results   Component Value Date    LABVLDL 15 12/20/2020     No results found for: CHOLHDLRATIO  Recent Labs     11/01/22 1818   PROBNP 4,113*       Cardiac Tests:    EKG: Atrial flutter RVR    Sinus rhythm    Telemetry: Sinus rhythm 80s with PACs    Chest X-ray:   Impression   Suspect early bibasilar infiltrates. Echocardiogram:   TTE 5/11/22   Summary   Left ventricular internal dimensions were normal in diastole and systole. Borderline concentric left ventricular hypertrophy. No regional wall motion abnormalities seen. Normal left ventricular ejection fraction. The left atrium is mildly dilated. Moderately enlarged right atrium size. The aortic valve appears mildly sclerotic. Physiologic and/or trace tricuspid regurgitation. There is a trivial circumferential pericardial effusion noted. Stress test:      Cardiac catheterization:     ----------------------------------------------------------------------------------------------------------------------------------------------------------------  IMPRESSION:  Recurrent paroxysmal atypical atrial flutter. Prior cardioversions 12/2020, 3/2022, 4/2022. Started on amiodarone 4/2022 but discontinued upon discharge after 10 days for some reason. AC apixaban  ОЛЬГА/DCC 11/3/2022 successful  Possible acute allergic reaction to IV amiodarone 11/3/22 post cardioversion associated with hypotension/diffuse pruritus/nausea/diaphoresis. Resolved  Mild acute on chronic HFpEF due to AF. Resolved. Net -1.4 L  COURTNEY/CKD creatinine 1.8 -> 1.6  PAD with left AKA  Memory impairment  History of rectal cancer    RECOMMENDATIONS:  Stable from cardiac standpoint    No further amiodarone  Continue metoprolol succinate   Agree with addition of diltiazem   Continue apixaban for stroke risk reduction  If ends up back in atrial fibrillation, no plans for further cardioversion, would rate control and consider outpatient EP evaluation to discuss alternate AAD versus ablation  Aggressive risk factor modification  Further care per primary service  Okay for discharge from cardiology standpoint  Outpatient follow-up with Dr. Dorina Cortes  Will be available as needed, please call with questions    Discussed with Dr. Emily Coffey MD, [address] [place] [address] Cardiology    NOTE: This report was transcribed using voice recognition software. Every effort was made to ensure accuracy; however, inadvertent computerized transcription errors may be present.

## 2022-11-04 NOTE — PROGRESS NOTES
Critical Care Team - Daily Progress Note         Date and time: 11/4/2022 8:17 AM  Patient's name:  Jose Mcclure. Medical Record Number: [id number]  Patient's account/billing number: [de-identified]  Patient's YOB: 1955  Age: 69 y.o. Date of Admission: 11/1/2022  5:17 PM  Length of stay during current admission: 3      Primary Care Physician: Andre Kim MD  ICU Attending Physician: Dr. Jose Melissa Status: Full Code    Reason for ICU admission: Hypotension      SUBJECTIVE:     BRIEF HISTORY:   The patient is a 69 y.o. male with significant past medical history of atrial fibrillation on metoprolol and Eliquis, hyperlipidemia BPH and secondary necrotizing fasciitis, hypertension, rectal cancer s/p colostomy reversal.  He was admitted to the hospital on 11/1 with concerns for fatigue and atrial fibrillation with RVR. He follows with Dr. Ramy Sams outpatient and had a recent cardioversion in April for atrial fibrillation. This morning he was having another ОЛЬГА cardioversion, tolerated the procedure well, was being loaded with amiodarone following the procedure and became hypotensive and hypoxic, claiming that he was having a very difficult time breathing. He did require fluid bolus and push dose phenylephrine. He was also given steroids and antihistamines. He has had amiodarone several times in the past.  He was transferred to the ICU for close observation for further decompensation. OVERNIGHT EVENTS:    11/4/22: Patient resting comfortably, vital signs remained stable, awake, alert at his baseline. Transfer out of ICU today.     CURRENT VENTILATION STATUS:     [] Ventilator  [] BIPAP  [] Nasal Cannula [x] Room Air        SECRETIONS : Amount:  [] Small [] Moderate  [] Large  [x] None  Color:     [] White [] Colored  [] Bloody    SEDATION:  RAAS Score:  [] Propofol gtt  [] Versed gtt  [] Ativan gtt   [x] No Sedation    PARALYZED:  [x] No    [] Yes      VASOPRESSORS:  [x] No [] Yes    If yes -   [] Levophed       [] Dopamine     [] Vasopressin       [] Dobutamine  [] Phenylephrine         [] Epinephrine    CENTRAL LINES:     [x] No   [] Yes   (Date of Insertion:   )           If yes -     [] Right IJ     [] Left IJ [] Right Femoral [] Left Femoral                   [] Right Subclavian [] Left Subclavian       ROJO'S CATHETER:   [x] No   [] Yes  (Date of Insertion:   )     URINE OUTPUT:            [] Good   [x] Low              [] Anuric      OBJECTIVE:     VITAL SIGNS:  BP (!) 147/94   Pulse 77   Temp 98 °F (36.7 °C) (Oral)   Resp 27   Ht 6' 2\" (1.88 m)   Wt 171 lb 14.4 oz (78 kg)   SpO2 98%   BMI 22.07 kg/m²   Tmax over 24 hours:  Temp (24hrs), Av °F (36.7 °C), Min:97.8 °F (36.6 °C), Max:98.1 °F (36.7 °C)      Patient Vitals for the past 6 hrs:   BP Temp Temp src Pulse Resp SpO2   22 0600 (!) 147/94 98 °F (36.7 °C) Oral 77 27 98 %   22 0500 132/83 -- -- 76 21 97 %   22 0400 (!) 148/81 98 °F (36.7 °C) Oral 80 (!) 41 92 %   22 0300 (!) 145/86 -- -- 76 20 93 %         Intake/Output Summary (Last 24 hours) at 2022 0817  Last data filed at 11/3/2022 2000  Gross per 24 hour   Intake 958.33 ml   Output 400 ml   Net 558.33 ml     Wt Readings from Last 2 Encounters:   22 171 lb 14.4 oz (78 kg)   10/07/22 198 lb 3.2 oz (89.9 kg)     Body mass index is 22.07 kg/m². PHYSICAL EXAMINATION:  General:  Awake, alert, oriented at baseline. Well developed, well nourished, well groomed. No apparent distress. HEENT:  Normocephalic, atraumatic. Pupils equal, round, reactive to light. No scleral icterus. No conjunctival injection. No nasal discharge. Neck:  Supple, without stridor, no JVD  Heart:  RRR, no murmurs, gallops, rubs  Lungs:  CTA bilaterally, bilat symmetrical expansion, non-labored, no wheeze, rales, or rhonchi  Abdomen:   Bowel sounds present, soft, non-tender, non-distended, no guarding or rigidity  Extremities:  No clubbing, cyanosis, no edema, left AKA  Skin:  Warm and dry, no lesions noted  Neuro:  Cranial nerves 2-12 grossly intact, no focal deficits  Breast: deferred  Rectal: deferred  Genitalia:  deferred     MEDICATIONS:    Scheduled Meds:   dilTIAZem  120 mg Oral QPM    apixaban  5 mg Oral BID    metoprolol succinate  100 mg Oral BID    multivitamin  1 tablet Oral Daily    sodium chloride flush  5-40 mL IntraVENous 2 times per day     Continuous Infusions:   sodium chloride       PRN Meds:   calcium carbonate, 500 mg, Q6H PRN  sodium chloride flush, 5-40 mL, PRN  sodium chloride, , PRN  acetaminophen, 650 mg, Q6H PRN   Or  acetaminophen, 650 mg, Q6H PRN        VENT SETTINGS (Comprehensive) (if applicable): Additional Respiratory Assessments  Heart Rate: 77  Resp: 27  SpO2: 98 %    ABGs:   No results for input(s): PH, PCO2, PO2, HCO3, BE, O2SAT in the last 72 hours.     Laboratory findings:    Complete Blood Count:   Recent Labs     11/01/22  1818 11/02/22 0350 11/04/22 0420   WBC 8.5 8.0 10.6   HGB 13.5 13.5 13.4   HCT 42.8 43.2 41.4    200 211        Last 3 Blood Glucose:   Recent Labs     11/02/22 0350 11/03/22 0335 11/04/22  0420   GLUCOSE 90 104* 145*        PT/INR:    Lab Results   Component Value Date/Time    PROTIME 17.9 04/12/2022 04:58 AM    PROTIME 12.2 03/18/2011 12:50 PM    INR 1.6 04/12/2022 04:58 AM     PTT:    Lab Results   Component Value Date/Time    APTT 28.1 04/07/2022 03:00 PM       Comprehensive Metabolic Profile:   Recent Labs     11/02/22 0350 11/03/22 0335 11/04/22  0420    139 139   K 3.4* 3.3* 4.1    103 105   CO2 24 25 23   BUN 37* 29* 29*   CREATININE 1.4* 1.5* 1.6*   GLUCOSE 90 104* 145*   CALCIUM 8.7 8.7 8.7   PROT 5.9*  --   --    LABALBU 3.6  --   --    BILITOT 2.0*  --   --    ALKPHOS 94  --   --    AST 34  --   --    ALT 33  --   --       Magnesium:   Lab Results   Component Value Date/Time    MG 2.0 11/04/2022 04:20 AM     Phosphorus:   Lab Results   Component Value Date/Time    PHOS 2.9 11/04/2022 04:20 AM     Ionized Calcium: No results found for: CAION     Urinalysis:     Troponin: No results for input(s): TROPONINI in the last 72 hours. Microbiology:  Cultures during this admission:     Blood cultures:                 [x] None drawn      [] Negative             []  Positive (Details:  )  Urine Culture:                   [x] None drawn      [] Negative             []  Positive (Details:  )  Sputum Culture:               [x] None drawn       [] Negative             []  Positive (Details:  )   Endotracheal aspirate:     [x] None drawn       [] Negative             []  Positive (Details:  )     ASSESSMENT:     Principal Problem:    Atrial fibrillation with rapid ventricular response ([facility]  Active Problems:    PAD (peripheral artery disease) ([facility]    On apixaban therapy    Acute kidney injury superimposed on chronic kidney disease ([facility]  Resolved Problems:    * No resolved hospital problems. *      PLAN:     WEAN PER PROTOCOL:  [] No   [] Yes  [x] N/A    DISCONTINUE ANY LABS:   [x] No   [] Yes    ICU PROPHYLAXIS:  Stress ulcer:  [x] PPI Agent  [] [id number] [] Sucralfate  [] Other:  VTE:   [] Enoxaparin  [] Unfract. Heparin Subcut  [] EPC Cuffs    NUTRITION:  [] NPO [] Tube Feeding (Specify: ) [] TPN  [x] PO (Diet: ADULT DIET;  Regular; 5 carb choices (75 gm/meal))    HOME MEDICATIONS RECONCILED: [] No  [x] Yes    INSULIN DRIP:   [x] No   [] Yes    CONSULTATION NEEDED:  [x] No   [] Yes    FAMILY UPDATED:    [] No   [x] Yes    TRANSFER OUT OF ICU:   [] No   [x] Yes    ADDITIONAL PLAN:    ASSESSMENT / PLAN:     Neuro   #History of memory loss  -Awake, alert and mentating at baseline     Respiratory   #No acute issues, on room air     Cardiovascular   #Atrial fibrillation with RVR  -Started on Cardizem infusion at hospital admission, has been held since this morning  -S/p ОЛЬГА cardioversion with amiodarone bolus following  -Hypotensive following amiodarone and experiencing difficulty breathing and hives. Given negative fluid, steroids, antihistamines. improved.  -Last cardioversion prior to today was in April 2022  -On metoprolol and Eliquis  -Per cardiology, will hold on further amiodarone and continue rate control with metoprolol  - start PO cardizem in addition to PO metoprolol     #Hypotension  -Resolved, did not require pressors     #History of CHF  -Given 1 dose of IV Lasix per cardiology  -BNP increased to 4,113, x-ray not concerning for pulmonary vascular congestion or  -Monitor I's and O's  -Echo done 11/1 demonstrating EF of 55% with no evidence of thrombus within the left atrial appendage     Gastrointestinal   #No acute issues   #history of rectal cancer s/p colostomy reversal     Renal   #COURTNEY  -Baseline creatinine 1.2-1.8 at admission  -Normal saline at 100 mL/h  -Creatinine improved to 1.6 today     #Hypokalemia  -resolved     Infectious Disease   #No acute issues     Hematology/Oncology   #No acute issues  #History of rectal cancer s/p colostomy reversal     Endocrine   #No acute issues  -Maintain BG less than 180     Social/Spiritual/DNR/Other   Full code    Pain/Analgesia: Tylenol as needed  Bowel regimen: If needed  Diet: ADULT DIET; Regular; 5 carb choices (75 gm/meal)  DVT proph: Eliquis  GI proph: None  Seizure proph: None  Glucose protocol: None  Mouth/eye care: As needed  Valles: No  CVC sites: None  Ancillary consults: Cardiology  Family Update: As able  CODE Status: Full Code    Dispo: Transfer out of ICU      Emerson Masterson DO  Resident, PGY-2  11/4/2022  8:17 AM    Attending Physician Attestation: Dr. Lisa Sloan    Thank you very much for allowing me to see this patient in consultation and follow up. I personally saw, examined and provided care for the patient. Radiographs, labs and medication list were reviewed by me independently. I spoke with bedside nursing, respiratory therapists and consultants.  Critical care services and times documented are independent of procedures and multidisciplinary rounds with Residents. Additionally comprehensive, multidisciplinary rounds were conducted with the MICU team. The case was discussed in detail and plans for care were established. Review of Residents documentation was conducted and revisions were made as appropriate. I agree with the the above documented information. No current facility-administered medications for this encounter. Current Outpatient Medications   Medication Sig Dispense Refill    dilTIAZem (CARDIZEM CD) 120 MG extended release capsule Take 1 capsule by mouth every evening 30 capsule 3    fluorometholone (FML) 0.1 % ophthalmic suspension Place 1 drop into both eyes 4 times daily      Calcium Carbonate 500 MG CHEW Take 1 tablet by mouth every 6 hours as needed (Heartburn)      Multiple Vitamins-Minerals (THERAPEUTIC MULTIVITAMIN-MINERALS) tablet Take 1 tablet by mouth daily      metoprolol succinate (TOPROL XL) 100 MG extended release tablet Take 1 tablet by mouth 2 times daily 30 tablet 3    apixaban (ELIQUIS) 5 MG TABS tablet Take 5 mg by mouth 2 times daily          XR CHEST PORTABLE   Final Result   Suspect early bibasilar infiltrates.              Physical Examination:  Vitals:   Vitals:    11/04/22 0800 11/04/22 0900 11/04/22 0913 11/04/22 1000   BP: (!) 147/87 (!) 166/92 (!) 144/85 (!) 144/82   Pulse: 89 85 89 78   Resp: 21 22 21   Temp:       TempSrc:       SpO2: 97%      Weight:       Height:          General: No Acute Distress  HEENT: normocephalic, atraumatic, no stridor  Abdomen: soft, NT, ND  CVS: RRR, S1, S2, No S3 or S4  Respiratory: decreased breath sounds at lung bases, no focal wheezes noted  Extremities: no clubbing/cyanosis/edema  Neuro: intact      ASSESSMENT:  1.) Anaphylaxis Reaction to Amiodarone   2.) Atrial Fibrillation with RVR - s/p ОЛЬГА with Cardioversion 11/3/2022 now in NSR  3.) Acute on Chronic Diastolic HFpEF Dysfunction   4.) S/p left AKA due to necrotizing fasciitis  5.)

## 2022-11-04 NOTE — FLOWSHEET NOTE
Discharge instructions reviewed with patient and sisters at the bedside. All verbalized understanding of discharge instructions, medication changes and follow up appointments. IV's x2 removed and dressings applied. Wheel chair in for discharge needs.

## 2022-11-05 LAB — MRSA CULTURE ONLY: NORMAL

## 2022-11-06 LAB
EKG ATRIAL RATE: 67 BPM
EKG P AXIS: 43 DEGREES
EKG P-R INTERVAL: 180 MS
EKG Q-T INTERVAL: 492 MS
EKG QRS DURATION: 84 MS
EKG QTC CALCULATION (BAZETT): 519 MS
EKG R AXIS: 36 DEGREES
EKG T AXIS: 100 DEGREES
EKG VENTRICULAR RATE: 67 BPM

## 2022-11-07 ENCOUNTER — APPOINTMENT (OUTPATIENT)
Dept: GENERAL RADIOLOGY | Age: 67
End: 2022-11-07
Payer: MEDICARE

## 2022-11-07 ENCOUNTER — TELEPHONE (OUTPATIENT)
Dept: ADMINISTRATIVE | Age: 67
End: 2022-11-07

## 2022-11-07 ENCOUNTER — HOSPITAL ENCOUNTER (EMERGENCY)
Age: 67
Discharge: HOME | End: 2022-11-07
Attending: EMERGENCY MEDICINE
Payer: MEDICARE

## 2022-11-07 VITALS
HEIGHT: 74 IN | TEMPERATURE: 97.7 F | RESPIRATION RATE: 16 BRPM | HEART RATE: 75 BPM | BODY MASS INDEX: 21.94 KG/M2 | OXYGEN SATURATION: 97 % | WEIGHT: 171 LBS | SYSTOLIC BLOOD PRESSURE: 151 MMHG | DIASTOLIC BLOOD PRESSURE: 96 MMHG

## 2022-11-07 DIAGNOSIS — R53.83 OTHER FATIGUE: Primary | ICD-10-CM

## 2022-11-07 LAB
ALBUMIN SERPL-MCNC: 3.8 G/DL (ref 3.5–5.2)
ALP BLD-CCNC: 93 U/L (ref 40–129)
ALT SERPL-CCNC: 57 U/L (ref 0–40)
ANION GAP SERPL CALCULATED.3IONS-SCNC: 13 MMOL/L (ref 7–16)
AST SERPL-CCNC: 40 U/L (ref 0–39)
BASOPHILS ABSOLUTE: 0.02 E9/L (ref 0–0.2)
BASOPHILS RELATIVE PERCENT: 0.3 % (ref 0–2)
BILIRUB SERPL-MCNC: 1.3 MG/DL (ref 0–1.2)
BUN BLDV-MCNC: 26 MG/DL (ref 6–23)
CALCIUM SERPL-MCNC: 9.6 MG/DL (ref 8.6–10.2)
CHLORIDE BLD-SCNC: 100 MMOL/L (ref 98–107)
CO2: 24 MMOL/L (ref 22–29)
CREAT SERPL-MCNC: 1.3 MG/DL (ref 0.7–1.2)
EOSINOPHILS ABSOLUTE: 0.2 E9/L (ref 0.05–0.5)
EOSINOPHILS RELATIVE PERCENT: 2.6 % (ref 0–6)
GFR SERPL CREATININE-BSD FRML MDRD: 59 ML/MIN/1.73
GLUCOSE BLD-MCNC: 103 MG/DL (ref 74–99)
HCT VFR BLD CALC: 49.4 % (ref 37–54)
HEMOGLOBIN: 15.7 G/DL (ref 12.5–16.5)
IMMATURE GRANULOCYTES #: 0.03 E9/L
IMMATURE GRANULOCYTES %: 0.4 % (ref 0–5)
INFLUENZA A BY PCR: NOT DETECTED
INFLUENZA B BY PCR: NOT DETECTED
LYMPHOCYTES ABSOLUTE: 1.8 E9/L (ref 1.5–4)
LYMPHOCYTES RELATIVE PERCENT: 23.5 % (ref 20–42)
MAGNESIUM: 2.2 MG/DL (ref 1.6–2.6)
MCH RBC QN AUTO: 28.2 PG (ref 26–35)
MCHC RBC AUTO-ENTMCNC: 31.8 % (ref 32–34.5)
MCV RBC AUTO: 88.8 FL (ref 80–99.9)
MONOCYTES ABSOLUTE: 1.06 E9/L (ref 0.1–0.95)
MONOCYTES RELATIVE PERCENT: 13.8 % (ref 2–12)
NEUTROPHILS ABSOLUTE: 4.56 E9/L (ref 1.8–7.3)
NEUTROPHILS RELATIVE PERCENT: 59.4 % (ref 43–80)
PDW BLD-RTO: 14.3 FL (ref 11.5–15)
PLATELET # BLD: 245 E9/L (ref 130–450)
PMV BLD AUTO: 9.4 FL (ref 7–12)
POTASSIUM SERPL-SCNC: 3.8 MMOL/L (ref 3.5–5)
PRO-BNP: 1726 PG/ML (ref 0–125)
RBC # BLD: 5.56 E12/L (ref 3.8–5.8)
SARS-COV-2, NAAT: NOT DETECTED
SODIUM BLD-SCNC: 137 MMOL/L (ref 132–146)
TOTAL PROTEIN: 6.8 G/DL (ref 6.4–8.3)
TROPONIN, HIGH SENSITIVITY: 13 NG/L (ref 0–11)
TROPONIN, HIGH SENSITIVITY: 13 NG/L (ref 0–11)
WBC # BLD: 7.7 E9/L (ref 4.5–11.5)

## 2022-11-07 PROCEDURE — 85025 COMPLETE CBC W/AUTO DIFF WBC: CPT

## 2022-11-07 PROCEDURE — 87635 SARS-COV-2 COVID-19 AMP PRB: CPT

## 2022-11-07 PROCEDURE — 99285 EMERGENCY DEPT VISIT HI MDM: CPT

## 2022-11-07 PROCEDURE — 83880 ASSAY OF NATRIURETIC PEPTIDE: CPT

## 2022-11-07 PROCEDURE — 87502 INFLUENZA DNA AMP PROBE: CPT

## 2022-11-07 PROCEDURE — 71046 X-RAY EXAM CHEST 2 VIEWS: CPT

## 2022-11-07 PROCEDURE — 93005 ELECTROCARDIOGRAM TRACING: CPT | Performed by: STUDENT IN AN ORGANIZED HEALTH CARE EDUCATION/TRAINING PROGRAM

## 2022-11-07 PROCEDURE — 80053 COMPREHEN METABOLIC PANEL: CPT

## 2022-11-07 PROCEDURE — 93005 ELECTROCARDIOGRAM TRACING: CPT | Performed by: PHYSICIAN ASSISTANT

## 2022-11-07 PROCEDURE — 83735 ASSAY OF MAGNESIUM: CPT

## 2022-11-07 PROCEDURE — 84484 ASSAY OF TROPONIN QUANT: CPT

## 2022-11-07 ASSESSMENT — PAIN - FUNCTIONAL ASSESSMENT: PAIN_FUNCTIONAL_ASSESSMENT: NONE - DENIES PAIN

## 2022-11-07 NOTE — ED NOTES
Department of Emergency Medicine  FIRST PROVIDER TRIAGE NOTE             Independent MLP           11/7/22  4:05 PM EST    Date of Encounter: 11/7/22   MRN: [id number]      HPI: Julietta Lesch. is a 69 y.o. male who presents to the ED for No chief complaint on file. Patient is a 68-year-old presenting after recent cardioversion over at [facility] with worsening fatigue. Patient states he just does not feel well and feels like his to go outside to catch his breath. Patient did call his cardiologist Dr. Marzena Whittington who asked him to come in for further evaluation and to be seen by the electrophysiologist    ROS: Negative for cp, sob, abd pain, back pain, or fever. PE: Gen Appearance/Constitutional: alert  HEENT: NC/NT. PERRLA,  Airway patent. Neck: supple     Initial Plan of Care: All treatment areas with department are currently occupied. Plan to order/Initiate the following while awaiting opening in ED: labs, EKG, and imaging studies.   Initiate Treatment-Testing, Proceed toTreatment Area When Bed Available for ED Attending/MLP to Continue Care    Electronically signed by Stefany Dailey PA-C   DD: 11/7/22       Stefany Dailey PA-C  11/07/22 1042

## 2022-11-07 NOTE — TELEPHONE ENCOUNTER
Ly called, Marbin Guerrero was discharged from hospital Friday 11/4, afib & rvr; still not feeling well, symptoms same as before she took him to ER; nausea & high bp of 180/112. Please schedule hospital f/u with Dr Leon More or give further instructions.  She can be reached at [phone]

## 2022-11-07 NOTE — TELEPHONE ENCOUNTER
Per verbal from Dr. Perico Man, patient should proceed to [facility] for evaluation and evaluation by EP. Patient's wife notified. She states she understands and they will comply.

## 2022-11-08 LAB
EKG ATRIAL RATE: 61 BPM
EKG ATRIAL RATE: 67 BPM
EKG P AXIS: 24 DEGREES
EKG P AXIS: 56 DEGREES
EKG P-R INTERVAL: 174 MS
EKG P-R INTERVAL: 184 MS
EKG Q-T INTERVAL: 470 MS
EKG Q-T INTERVAL: 496 MS
EKG QRS DURATION: 82 MS
EKG QRS DURATION: 84 MS
EKG QTC CALCULATION (BAZETT): 496 MS
EKG QTC CALCULATION (BAZETT): 499 MS
EKG R AXIS: 50 DEGREES
EKG R AXIS: 75 DEGREES
EKG T AXIS: 4 DEGREES
EKG T AXIS: 51 DEGREES
EKG VENTRICULAR RATE: 61 BPM
EKG VENTRICULAR RATE: 67 BPM

## 2022-11-08 PROCEDURE — 93010 ELECTROCARDIOGRAM REPORT: CPT | Performed by: INTERNAL MEDICINE

## 2022-11-09 ASSESSMENT — ENCOUNTER SYMPTOMS
COUGH: 0
CHEST TIGHTNESS: 0
ABDOMINAL PAIN: 0
EYE PAIN: 0
EYE REDNESS: 0
SINUS PRESSURE: 0
VOMITING: 0
CONSTIPATION: 0
SORE THROAT: 0
BACK PAIN: 0
DIARRHEA: 0
NAUSEA: 0
SHORTNESS OF BREATH: 0
SINUS PAIN: 0

## 2022-11-29 ENCOUNTER — TELEPHONE (OUTPATIENT)
Dept: CARDIOLOGY CLINIC | Age: 67
End: 2022-11-29

## 2023-03-02 ENCOUNTER — TELEPHONE (OUTPATIENT)
Dept: ADMINISTRATIVE | Age: 68
End: 2023-03-02

## 2023-03-02 NOTE — TELEPHONE ENCOUNTER
I would recommend memory pill Aricept 10 mg p.o. daily they can contact Dr. Cici Day. He is okay to take Aricept from the cardiology standpoint and they should contact Dr. Cici Day for this .

## 2023-03-02 NOTE — TELEPHONE ENCOUNTER
Please call sister, patient memory has gotten bad and she got him some ginkgo biloba to take but it states not to take if on blood thinner. She wants to know if it's ok for patient to take or is there something else Dr Ankush Monahan would recommend.

## 2023-03-25 ENCOUNTER — APPOINTMENT (OUTPATIENT)
Dept: GENERAL RADIOLOGY | Age: 68
DRG: 309 | End: 2023-03-25
Payer: MEDICARE

## 2023-03-25 ENCOUNTER — APPOINTMENT (OUTPATIENT)
Dept: CT IMAGING | Age: 68
DRG: 309 | End: 2023-03-25
Payer: MEDICARE

## 2023-03-25 ENCOUNTER — HOSPITAL ENCOUNTER (INPATIENT)
Age: 68
LOS: 5 days | Discharge: HOME | DRG: 309 | End: 2023-03-30
Attending: EMERGENCY MEDICINE | Admitting: FAMILY MEDICINE
Payer: MEDICARE

## 2023-03-25 DIAGNOSIS — I48.91 ATRIAL FIBRILLATION WITH RAPID VENTRICULAR RESPONSE (HCC): Primary | ICD-10-CM

## 2023-03-25 DIAGNOSIS — J44.1 COPD EXACERBATION (HCC): ICD-10-CM

## 2023-03-25 PROBLEM — R33.9 URINARY RETENTION: Status: RESOLVED | Noted: 2022-05-09 | Resolved: 2023-03-25

## 2023-03-25 PROBLEM — A41.9 SEPSIS (HCC): Status: RESOLVED | Noted: 2022-05-09 | Resolved: 2023-03-25

## 2023-03-25 PROBLEM — C20 RECTAL CANCER (HCC): Chronic | Status: ACTIVE | Noted: 2018-01-23

## 2023-03-25 PROBLEM — I73.9 PAD (PERIPHERAL ARTERY DISEASE) (HCC): Chronic | Status: ACTIVE | Noted: 2022-11-02

## 2023-03-25 PROBLEM — Z79.01 ON APIXABAN THERAPY: Chronic | Status: ACTIVE | Noted: 2022-11-02

## 2023-03-25 PROBLEM — N18.9 ACUTE KIDNEY INJURY SUPERIMPOSED ON CHRONIC KIDNEY DISEASE (HCC): Status: RESOLVED | Noted: 2022-11-02 | Resolved: 2023-03-25

## 2023-03-25 PROBLEM — E44.0 MODERATE PROTEIN-CALORIE MALNUTRITION (HCC): Status: ACTIVE | Noted: 2022-04-14

## 2023-03-25 PROBLEM — K56.7 ILEUS (HCC): Status: RESOLVED | Noted: 2018-01-29 | Resolved: 2023-03-25

## 2023-03-25 PROBLEM — N17.9 ACUTE KIDNEY INJURY SUPERIMPOSED ON CHRONIC KIDNEY DISEASE (HCC): Status: RESOLVED | Noted: 2022-11-02 | Resolved: 2023-03-25

## 2023-03-25 LAB
ALBUMIN SERPL-MCNC: 3.8 G/DL (ref 3.5–5.2)
ALP SERPL-CCNC: 99 U/L (ref 40–129)
ALT SERPL-CCNC: 22 U/L (ref 0–40)
ANION GAP SERPL CALCULATED.3IONS-SCNC: 14 MMOL/L (ref 7–16)
AST SERPL-CCNC: 55 U/L (ref 0–39)
BASOPHILS # BLD: 0.02 E9/L (ref 0–0.2)
BASOPHILS NFR BLD: 0.3 % (ref 0–2)
BILIRUB SERPL-MCNC: 0.9 MG/DL (ref 0–1.2)
BNP BLD-MCNC: 5807 PG/ML (ref 0–125)
BUN SERPL-MCNC: 27 MG/DL (ref 6–23)
CALCIUM SERPL-MCNC: 8.6 MG/DL (ref 8.6–10.2)
CHLORIDE SERPL-SCNC: 102 MMOL/L (ref 98–107)
CO2 SERPL-SCNC: 23 MMOL/L (ref 22–29)
CREAT SERPL-MCNC: 1.2 MG/DL (ref 0.7–1.2)
EOSINOPHIL # BLD: 0 E9/L (ref 0.05–0.5)
EOSINOPHIL NFR BLD: 0 % (ref 0–6)
ERYTHROCYTE [DISTWIDTH] IN BLOOD BY AUTOMATED COUNT: 13 FL (ref 11.5–15)
GLUCOSE SERPL-MCNC: 149 MG/DL (ref 74–99)
HCT VFR BLD AUTO: 44.3 % (ref 37–54)
HGB BLD-MCNC: 14.3 G/DL (ref 12.5–16.5)
IMM GRANULOCYTES # BLD: 0.01 E9/L
IMM GRANULOCYTES NFR BLD: 0.2 % (ref 0–5)
INFLUENZA A BY PCR: NOT DETECTED
INFLUENZA B BY PCR: NOT DETECTED
LYMPHOCYTES # BLD: 0.68 E9/L (ref 1.5–4)
LYMPHOCYTES NFR BLD: 10.6 % (ref 20–42)
MCH RBC QN AUTO: 27.8 PG (ref 26–35)
MCHC RBC AUTO-ENTMCNC: 32.3 % (ref 32–34.5)
MCV RBC AUTO: 86.2 FL (ref 80–99.9)
MONOCYTES # BLD: 0.66 E9/L (ref 0.1–0.95)
MONOCYTES NFR BLD: 10.3 % (ref 2–12)
NEUTROPHILS # BLD: 5.02 E9/L (ref 1.8–7.3)
NEUTS SEG NFR BLD: 78.6 % (ref 43–80)
PLATELET # BLD AUTO: 169 E9/L (ref 130–450)
PMV BLD AUTO: 10.3 FL (ref 7–12)
POTASSIUM SERPL-SCNC: 4.5 MMOL/L (ref 3.5–5)
PROT SERPL-MCNC: 6.8 G/DL (ref 6.4–8.3)
RBC # BLD AUTO: 5.14 E12/L (ref 3.8–5.8)
REASON FOR REJECTION: NORMAL
REJECTED TEST: NORMAL
SARS-COV-2 RDRP RESP QL NAA+PROBE: NOT DETECTED
SODIUM SERPL-SCNC: 139 MMOL/L (ref 132–146)
TROPONIN, HIGH SENSITIVITY: 13 NG/L (ref 0–11)
WBC # BLD: 6.4 E9/L (ref 4.5–11.5)

## 2023-03-25 PROCEDURE — 93005 ELECTROCARDIOGRAM TRACING: CPT | Performed by: STUDENT IN AN ORGANIZED HEALTH CARE EDUCATION/TRAINING PROGRAM

## 2023-03-25 PROCEDURE — 80053 COMPREHEN METABOLIC PANEL: CPT

## 2023-03-25 PROCEDURE — 6360000004 HC RX CONTRAST MEDICATION: Performed by: RADIOLOGY

## 2023-03-25 PROCEDURE — 6360000002 HC RX W HCPCS: Performed by: EMERGENCY MEDICINE

## 2023-03-25 PROCEDURE — 96375 TX/PRO/DX INJ NEW DRUG ADDON: CPT

## 2023-03-25 PROCEDURE — 6360000002 HC RX W HCPCS: Performed by: STUDENT IN AN ORGANIZED HEALTH CARE EDUCATION/TRAINING PROGRAM

## 2023-03-25 PROCEDURE — 6370000000 HC RX 637 (ALT 250 FOR IP): Performed by: EMERGENCY MEDICINE

## 2023-03-25 PROCEDURE — 85025 COMPLETE CBC W/AUTO DIFF WBC: CPT

## 2023-03-25 PROCEDURE — 36415 COLL VENOUS BLD VENIPUNCTURE: CPT

## 2023-03-25 PROCEDURE — 2580000003 HC RX 258: Performed by: STUDENT IN AN ORGANIZED HEALTH CARE EDUCATION/TRAINING PROGRAM

## 2023-03-25 PROCEDURE — 71045 X-RAY EXAM CHEST 1 VIEW: CPT

## 2023-03-25 PROCEDURE — 87635 SARS-COV-2 COVID-19 AMP PRB: CPT

## 2023-03-25 PROCEDURE — 83880 ASSAY OF NATRIURETIC PEPTIDE: CPT

## 2023-03-25 PROCEDURE — 96365 THER/PROPH/DIAG IV INF INIT: CPT

## 2023-03-25 PROCEDURE — 2500000003 HC RX 250 WO HCPCS: Performed by: EMERGENCY MEDICINE

## 2023-03-25 PROCEDURE — 96368 THER/DIAG CONCURRENT INF: CPT

## 2023-03-25 PROCEDURE — 71275 CT ANGIOGRAPHY CHEST: CPT

## 2023-03-25 PROCEDURE — 84484 ASSAY OF TROPONIN QUANT: CPT

## 2023-03-25 PROCEDURE — 87502 INFLUENZA DNA AMP PROBE: CPT

## 2023-03-25 PROCEDURE — 2580000003 HC RX 258: Performed by: EMERGENCY MEDICINE

## 2023-03-25 PROCEDURE — 1200000000 HC SEMI PRIVATE

## 2023-03-25 PROCEDURE — 94664 DEMO&/EVAL PT USE INHALER: CPT

## 2023-03-25 PROCEDURE — 99285 EMERGENCY DEPT VISIT HI MDM: CPT

## 2023-03-25 RX ORDER — SODIUM CHLORIDE FOR INHALATION 0.9 %
3 VIAL, NEBULIZER (ML) INHALATION ONCE
Status: COMPLETED | OUTPATIENT
Start: 2023-03-25 | End: 2023-03-25

## 2023-03-25 RX ORDER — 0.9 % SODIUM CHLORIDE 0.9 %
500 INTRAVENOUS SOLUTION INTRAVENOUS ONCE
Status: COMPLETED | OUTPATIENT
Start: 2023-03-25 | End: 2023-03-25

## 2023-03-25 RX ORDER — MAGNESIUM SULFATE IN WATER 40 MG/ML
2000 INJECTION, SOLUTION INTRAVENOUS ONCE
Status: COMPLETED | OUTPATIENT
Start: 2023-03-25 | End: 2023-03-25

## 2023-03-25 RX ORDER — IPRATROPIUM BROMIDE AND ALBUTEROL SULFATE 2.5; .5 MG/3ML; MG/3ML
3 SOLUTION RESPIRATORY (INHALATION) ONCE
Status: DISCONTINUED | OUTPATIENT
Start: 2023-03-25 | End: 2023-03-25

## 2023-03-25 RX ORDER — METHYLPREDNISOLONE SODIUM SUCCINATE 125 MG/2ML
60 INJECTION, POWDER, LYOPHILIZED, FOR SOLUTION INTRAMUSCULAR; INTRAVENOUS ONCE
Status: COMPLETED | OUTPATIENT
Start: 2023-03-25 | End: 2023-03-25

## 2023-03-25 RX ORDER — LEVALBUTEROL 1.25 MG/.5ML
1.25 SOLUTION, CONCENTRATE RESPIRATORY (INHALATION) ONCE
Status: COMPLETED | OUTPATIENT
Start: 2023-03-25 | End: 2023-03-25

## 2023-03-25 RX ORDER — DILTIAZEM HYDROCHLORIDE 5 MG/ML
15 INJECTION INTRAVENOUS ONCE
Status: COMPLETED | OUTPATIENT
Start: 2023-03-25 | End: 2023-03-25

## 2023-03-25 RX ADMIN — MAGNESIUM SULFATE HEPTAHYDRATE 2000 MG: 40 INJECTION, SOLUTION INTRAVENOUS at 21:40

## 2023-03-25 RX ADMIN — LEVALBUTEROL 1.25 MG: 1.25 SOLUTION, CONCENTRATE RESPIRATORY (INHALATION) at 21:05

## 2023-03-25 RX ADMIN — CALCIUM GLUCONATE 1000 MG: 98 INJECTION, SOLUTION INTRAVENOUS at 21:44

## 2023-03-25 RX ADMIN — ISODIUM CHLORIDE 3 ML: 0.03 SOLUTION RESPIRATORY (INHALATION) at 21:05

## 2023-03-25 RX ADMIN — SODIUM CHLORIDE 5 MG/HR: 900 INJECTION, SOLUTION INTRAVENOUS at 21:30

## 2023-03-25 RX ADMIN — SODIUM CHLORIDE 500 ML: 9 INJECTION, SOLUTION INTRAVENOUS at 21:15

## 2023-03-25 RX ADMIN — DILTIAZEM HYDROCHLORIDE 15 MG: 5 INJECTION INTRAVENOUS at 21:14

## 2023-03-25 RX ADMIN — METHYLPREDNISOLONE SODIUM SUCCINATE 60 MG: 125 INJECTION, POWDER, FOR SOLUTION INTRAMUSCULAR; INTRAVENOUS at 21:24

## 2023-03-25 RX ADMIN — IOPAMIDOL 75 ML: 755 INJECTION, SOLUTION INTRAVENOUS at 22:48

## 2023-03-25 ASSESSMENT — PAIN - FUNCTIONAL ASSESSMENT: PAIN_FUNCTIONAL_ASSESSMENT: NONE - DENIES PAIN

## 2023-03-25 NOTE — Clinical Note
Discharge Plan[de-identified] Other/[facility])   Telemetry/Cardiac Monitoring Required?: Yes

## 2023-03-26 LAB
ANION GAP SERPL CALCULATED.3IONS-SCNC: 13 MMOL/L (ref 7–16)
BUN SERPL-MCNC: 26 MG/DL (ref 6–23)
CALCIUM SERPL-MCNC: 8.4 MG/DL (ref 8.6–10.2)
CHLORIDE SERPL-SCNC: 104 MMOL/L (ref 98–107)
CO2 SERPL-SCNC: 21 MMOL/L (ref 22–29)
CREAT SERPL-MCNC: 1.1 MG/DL (ref 0.7–1.2)
EKG ATRIAL RATE: 156 BPM
EKG Q-T INTERVAL: 322 MS
EKG QRS DURATION: 86 MS
EKG QTC CALCULATION (BAZETT): 503 MS
EKG R AXIS: 61 DEGREES
EKG T AXIS: -36 DEGREES
EKG VENTRICULAR RATE: 147 BPM
GLUCOSE SERPL-MCNC: 157 MG/DL (ref 74–99)
POTASSIUM SERPL-SCNC: 3.7 MMOL/L (ref 3.5–5)
SODIUM SERPL-SCNC: 138 MMOL/L (ref 132–146)
TSH SERPL-MCNC: 0.7 UIU/ML (ref 0.27–4.2)

## 2023-03-26 PROCEDURE — 2500000003 HC RX 250 WO HCPCS: Performed by: INTERNAL MEDICINE

## 2023-03-26 PROCEDURE — 99223 1ST HOSP IP/OBS HIGH 75: CPT | Performed by: INTERNAL MEDICINE

## 2023-03-26 PROCEDURE — 6370000000 HC RX 637 (ALT 250 FOR IP): Performed by: FAMILY MEDICINE

## 2023-03-26 PROCEDURE — 6360000002 HC RX W HCPCS: Performed by: INTERNAL MEDICINE

## 2023-03-26 PROCEDURE — 80048 BASIC METABOLIC PNL TOTAL CA: CPT

## 2023-03-26 PROCEDURE — 2580000003 HC RX 258: Performed by: FAMILY MEDICINE

## 2023-03-26 PROCEDURE — 6370000000 HC RX 637 (ALT 250 FOR IP): Performed by: INTERNAL MEDICINE

## 2023-03-26 PROCEDURE — 93010 ELECTROCARDIOGRAM REPORT: CPT | Performed by: INTERNAL MEDICINE

## 2023-03-26 PROCEDURE — 1200000000 HC SEMI PRIVATE

## 2023-03-26 PROCEDURE — 2700000000 HC OXYGEN THERAPY PER DAY

## 2023-03-26 PROCEDURE — 6370000000 HC RX 637 (ALT 250 FOR IP): Performed by: UROLOGY

## 2023-03-26 PROCEDURE — 84443 ASSAY THYROID STIM HORMONE: CPT

## 2023-03-26 PROCEDURE — 2580000003 HC RX 258: Performed by: INTERNAL MEDICINE

## 2023-03-26 PROCEDURE — 36415 COLL VENOUS BLD VENIPUNCTURE: CPT

## 2023-03-26 RX ORDER — M-VIT,TX,IRON,MINS/CALC/FOLIC 27MG-0.4MG
1 TABLET ORAL DAILY
Status: DISCONTINUED | OUTPATIENT
Start: 2023-03-26 | End: 2023-03-30 | Stop reason: HOSPADM

## 2023-03-26 RX ORDER — DILTIAZEM HYDROCHLORIDE 120 MG/1
120 CAPSULE, COATED, EXTENDED RELEASE ORAL 2 TIMES DAILY
Status: DISCONTINUED | OUTPATIENT
Start: 2023-03-26 | End: 2023-03-29

## 2023-03-26 RX ORDER — BETHANECHOL CHLORIDE 25 MG/1
25 TABLET ORAL 3 TIMES DAILY
Status: DISCONTINUED | OUTPATIENT
Start: 2023-03-26 | End: 2023-03-30 | Stop reason: HOSPADM

## 2023-03-26 RX ORDER — LOPERAMIDE HYDROCHLORIDE 2 MG/1
2 CAPSULE ORAL 2 TIMES DAILY
Status: ON HOLD | COMMUNITY
End: 2023-03-30 | Stop reason: HOSPADM

## 2023-03-26 RX ORDER — SODIUM CHLORIDE 9 MG/ML
INJECTION, SOLUTION INTRAVENOUS PRN
Status: DISCONTINUED | OUTPATIENT
Start: 2023-03-26 | End: 2023-03-30 | Stop reason: HOSPADM

## 2023-03-26 RX ORDER — POTASSIUM CHLORIDE 20 MEQ/1
20 TABLET, EXTENDED RELEASE ORAL ONCE
Status: COMPLETED | OUTPATIENT
Start: 2023-03-26 | End: 2023-03-26

## 2023-03-26 RX ORDER — FLUOROMETHOLONE 0.1 %
1 SUSPENSION, DROPS(FINAL DOSAGE FORM)(ML) OPHTHALMIC (EYE) 4 TIMES DAILY
Status: DISCONTINUED | OUTPATIENT
Start: 2023-03-26 | End: 2023-03-30 | Stop reason: HOSPADM

## 2023-03-26 RX ORDER — DIGOXIN 0.25 MG/ML
250 INJECTION INTRAMUSCULAR; INTRAVENOUS ONCE
Status: COMPLETED | OUTPATIENT
Start: 2023-03-26 | End: 2023-03-26

## 2023-03-26 RX ORDER — METOPROLOL SUCCINATE 100 MG/1
100 TABLET, EXTENDED RELEASE ORAL 2 TIMES DAILY
Status: DISCONTINUED | OUTPATIENT
Start: 2023-03-26 | End: 2023-03-30 | Stop reason: HOSPADM

## 2023-03-26 RX ORDER — FAMOTIDINE 20 MG/1
20 TABLET, FILM COATED ORAL 2 TIMES DAILY
Status: DISCONTINUED | OUTPATIENT
Start: 2023-03-26 | End: 2023-03-30 | Stop reason: HOSPADM

## 2023-03-26 RX ORDER — ACETAMINOPHEN 325 MG/1
650 TABLET ORAL EVERY 6 HOURS PRN
Status: DISCONTINUED | OUTPATIENT
Start: 2023-03-26 | End: 2023-03-30 | Stop reason: HOSPADM

## 2023-03-26 RX ORDER — SODIUM CHLORIDE 0.9 % (FLUSH) 0.9 %
5-40 SYRINGE (ML) INJECTION PRN
Status: DISCONTINUED | OUTPATIENT
Start: 2023-03-26 | End: 2023-03-30 | Stop reason: HOSPADM

## 2023-03-26 RX ORDER — ONDANSETRON 4 MG/1
4 TABLET, ORALLY DISINTEGRATING ORAL EVERY 8 HOURS PRN
Status: DISCONTINUED | OUTPATIENT
Start: 2023-03-26 | End: 2023-03-26

## 2023-03-26 RX ORDER — ONDANSETRON 2 MG/ML
4 INJECTION INTRAMUSCULAR; INTRAVENOUS EVERY 6 HOURS PRN
Status: DISCONTINUED | OUTPATIENT
Start: 2023-03-26 | End: 2023-03-26

## 2023-03-26 RX ORDER — CALCIUM CARBONATE 200(500)MG
1 TABLET,CHEWABLE ORAL EVERY 6 HOURS PRN
Status: DISCONTINUED | OUTPATIENT
Start: 2023-03-26 | End: 2023-03-30 | Stop reason: HOSPADM

## 2023-03-26 RX ORDER — TAMSULOSIN HYDROCHLORIDE 0.4 MG/1
0.4 CAPSULE ORAL NIGHTLY
Status: DISCONTINUED | OUTPATIENT
Start: 2023-03-26 | End: 2023-03-30 | Stop reason: HOSPADM

## 2023-03-26 RX ORDER — SODIUM CHLORIDE 0.9 % (FLUSH) 0.9 %
5-40 SYRINGE (ML) INJECTION EVERY 12 HOURS SCHEDULED
Status: DISCONTINUED | OUTPATIENT
Start: 2023-03-26 | End: 2023-03-30 | Stop reason: HOSPADM

## 2023-03-26 RX ORDER — POLYETHYLENE GLYCOL 3350 17 G/17G
17 POWDER, FOR SOLUTION ORAL DAILY PRN
Status: DISCONTINUED | OUTPATIENT
Start: 2023-03-26 | End: 2023-03-30 | Stop reason: HOSPADM

## 2023-03-26 RX ORDER — ACETAMINOPHEN 650 MG/1
650 SUPPOSITORY RECTAL EVERY 6 HOURS PRN
Status: DISCONTINUED | OUTPATIENT
Start: 2023-03-26 | End: 2023-03-30 | Stop reason: HOSPADM

## 2023-03-26 RX ADMIN — FLUOROMETHOLONE 1 DROP: 1 SOLUTION/ DROPS OPHTHALMIC at 08:13

## 2023-03-26 RX ADMIN — FLUOROMETHOLONE 1 DROP: 1 SOLUTION/ DROPS OPHTHALMIC at 20:57

## 2023-03-26 RX ADMIN — APIXABAN 5 MG: 5 TABLET, FILM COATED ORAL at 20:57

## 2023-03-26 RX ADMIN — BETHANECHOL CHLORIDE 25 MG: 25 TABLET ORAL at 15:07

## 2023-03-26 RX ADMIN — BETHANECHOL CHLORIDE 25 MG: 25 TABLET ORAL at 20:57

## 2023-03-26 RX ADMIN — FAMOTIDINE 20 MG: 20 TABLET ORAL at 08:13

## 2023-03-26 RX ADMIN — MULTIPLE VITAMINS W/ MINERALS TAB 1 TABLET: TAB at 08:13

## 2023-03-26 RX ADMIN — SODIUM CHLORIDE, PRESERVATIVE FREE 10 ML: 5 INJECTION INTRAVENOUS at 20:57

## 2023-03-26 RX ADMIN — APIXABAN 5 MG: 5 TABLET, FILM COATED ORAL at 08:13

## 2023-03-26 RX ADMIN — DILTIAZEM HYDROCHLORIDE 120 MG: 120 CAPSULE, COATED, EXTENDED RELEASE ORAL at 21:01

## 2023-03-26 RX ADMIN — DIGOXIN 250 MCG: 0.25 INJECTION INTRAMUSCULAR; INTRAVENOUS at 08:13

## 2023-03-26 RX ADMIN — METOPROLOL SUCCINATE 100 MG: 100 TABLET, EXTENDED RELEASE ORAL at 20:57

## 2023-03-26 RX ADMIN — FAMOTIDINE 20 MG: 20 TABLET ORAL at 20:57

## 2023-03-26 RX ADMIN — POTASSIUM CHLORIDE 20 MEQ: 1500 TABLET, EXTENDED RELEASE ORAL at 10:23

## 2023-03-26 RX ADMIN — FLUOROMETHOLONE 1 DROP: 1 SOLUTION/ DROPS OPHTHALMIC at 16:53

## 2023-03-26 RX ADMIN — SODIUM CHLORIDE 12.5 MG/HR: 900 INJECTION, SOLUTION INTRAVENOUS at 15:11

## 2023-03-26 RX ADMIN — METOPROLOL SUCCINATE 100 MG: 100 TABLET, EXTENDED RELEASE ORAL at 08:13

## 2023-03-26 RX ADMIN — FAMOTIDINE 20 MG: 20 TABLET ORAL at 01:51

## 2023-03-26 RX ADMIN — TAMSULOSIN HYDROCHLORIDE 0.4 MG: 0.4 CAPSULE ORAL at 20:57

## 2023-03-26 RX ADMIN — FLUOROMETHOLONE 1 DROP: 1 SOLUTION/ DROPS OPHTHALMIC at 13:33

## 2023-03-26 RX ADMIN — DILTIAZEM HYDROCHLORIDE 120 MG: 120 CAPSULE, COATED, EXTENDED RELEASE ORAL at 08:13

## 2023-03-26 ASSESSMENT — PAIN SCALES - GENERAL
PAINLEVEL_OUTOF10: 0
PAINLEVEL_OUTOF10: 0

## 2023-03-26 NOTE — H&P
03/26/2023 01:55 AM     Radiology Review:  CXR with no acute changes    ASSESSMENT AND PLAN:    Patient Active Problem List    Diagnosis Date Noted    Atrial fibrillation with rapid ventricular response ([address] 11/01/2022    PAD (peripheral artery disease) ([address] 11/02/2022    On apixaban therapy 11/02/2022    PAF (paroxysmal atrial fibrillation) ([facility])     Moderate protein-calorie malnutrition ([address] 04/14/2022    Atrial fibrillation with RVR (HCC)     Urinary tract infection associated with indwelling urethral catheter ([address]     Intra-abdominal infection     COURTNEY (acute kidney injury) ([address]     Septic shock ([facility])     Bacteremia     Abdominal wall abscess 04/07/2022    PSVT (paroxysmal supraventricular tachycardia) ([address]     Primary hypertension     Benign neoplasm of cecum 03/22/2022    S/P right hemicolectomy 03/22/2022    Gastrointestinal bleed 01/25/2022    GI bleed 01/24/2022    Atrial flutter ([address] 12/19/2020    Rectal cancer ([address] 01/23/2018    Retinal detachment of left eye with multiple breaks 10/03/2016         Admit the patient. Continue with cardizem drip. Consult cardiology for treatment of a fib with RVR.

## 2023-03-26 NOTE — PLAN OF CARE
Problem: Skin/Tissue Integrity  Goal: Absence of new skin breakdown  Description: 1. Monitor for areas of redness and/or skin breakdown  2. Assess vascular access sites hourly  3. Every 4-6 hours minimum:  Change oxygen saturation probe site  4. Every 4-6 hours:  If on nasal continuous positive airway pressure, respiratory therapy assess nares and determine need for appliance change or resting period.   3/26/2023 1017 by Germán Gil RN  Outcome: Progressing  3/26/2023 0350 by Bard Batsheva RN  Outcome: Progressing     Problem: Safety - Adult  Goal: Free from fall injury  3/26/2023 1017 by Germán Gil RN  Outcome: Progressing  3/26/2023 0350 by Bard Batsheva RN  Outcome: Progressing     Problem: Respiratory - Adult  Goal: Achieves optimal ventilation and oxygenation  3/26/2023 1017 by Germán Gil RN  Outcome: Progressing  3/26/2023 0350 by Bard Batsheva RN  Outcome: Progressing     Problem: Cardiovascular - Adult  Goal: Maintains optimal cardiac output and hemodynamic stability  3/26/2023 1017 by Germán Gil RN  Outcome: Progressing  3/26/2023 0350 by Bard Batsheva RN  Outcome: Progressing  Goal: Absence of cardiac dysrhythmias or at baseline  3/26/2023 1017 by Germán Gil RN  Outcome: Progressing  3/26/2023 0350 by Bard Batsheva RN  Outcome: Progressing     Problem: Cardiovascular - Adult  Goal: Absence of cardiac dysrhythmias or at baseline  3/26/2023 1017 by Germán Gil RN  Outcome: Progressing  3/26/2023 0350 by Bard Batsheva RN  Outcome: Progressing     Problem: Skin/Tissue Integrity - Adult  Goal: Skin integrity remains intact  3/26/2023 1017 by Germán Gil RN  Outcome: Progressing  3/26/2023 0350 by Bard Batsheva RN  Outcome: Progressing

## 2023-03-26 NOTE — CONSULTS
Inpatient CARDIOLOGY Consultation        Reason for Consult:   A Fib with RVR     Date of Consultation: 3/26/2023    Patient previously known to Dr. Paula Mckeon: 69year old with history of PAD s/p multiple DCCVs-on Eliquis, PAD, rectal cancer and hypertension presented to ER for shortness of breath and tachycardia. Patient states that he has had gradually worsening shortness of breath for the last few days. He states his symptoms have been moderate in severity, since onset. He states that symptoms are worsened with exertion he denies any relieving factors. Patient also notes that he has had intermittent episodes of heart palpitations were felt as though his heart was racing. Admitted for A fib with RVR. Started on iv Cardizem. Cardiology consulted for A Fib with RVR. Patient states he is compliant with all of his home medications including his blood thinner. Patient currently breathing better. No fevers, chills; No nausea, vomit; No abdominal pain, constipation or diarrhea. REVIEW OF SYSTEMS:   Review of rest of 12 systems negative except as mentioned in HPI. Constitutional: Denies fatigue, fevers, chills   Eyes: Denies visual changes or drainage  ENT: Denies headaches or hearing loss. No sore throat. Cardiovascular: Denies chest pain, pressure. No lower extremity swelling. No orthopnea  Respiratory: Denies HEARD, cough, no hemoptysis   Gastrointestinal: Denies nausea, vomiting, hematemesis or melena    Genitourinary: Denies urgency, dysuria or hematuria. Musculoskeletal: Denies gait disturbance, weakness   Integumentary: Denies rash, hives or pruritis   Neurological: Denies dizziness, headaches or seizures. No numbness or tingling  Psychiatric: Denies anxiety or depression.   Endocrine: Denies temperature intolerance   Hematologic/Lymphatic: Denies abnormal bruising

## 2023-03-26 NOTE — PLAN OF CARE
Problem: Skin/Tissue Integrity  Goal: Absence of new skin breakdown  Description: 1. Monitor for areas of redness and/or skin breakdown  2. Assess vascular access sites hourly  3. Every 4-6 hours minimum:  Change oxygen saturation probe site  4. Every 4-6 hours:  If on nasal continuous positive airway pressure, respiratory therapy assess nares and determine need for appliance change or resting period.   Outcome: Progressing     Problem: Safety - Adult  Goal: Free from fall injury  Outcome: Progressing     Problem: Respiratory - Adult  Goal: Achieves optimal ventilation and oxygenation  Outcome: Progressing     Problem: Cardiovascular - Adult  Goal: Maintains optimal cardiac output and hemodynamic stability  Outcome: Progressing  Goal: Absence of cardiac dysrhythmias or at baseline  Outcome: Progressing     Problem: Skin/Tissue Integrity - Adult  Goal: Skin integrity remains intact  Outcome: Progressing

## 2023-03-26 NOTE — ED PROVIDER NOTES
COLONOSCOPY N/A 2/14/2022    COLONOSCOPY POLYPECTOMY SNARE/COLD BIOPSY performed by Dyan Conley MD at [address] Left 10/03/2016    pars plana vitrectomy, retinal detachment repair, laser gas/fluid exchange    EYE SURGERY Bilateral 2002?     bilat cataracts w lens implants    EYE SURGERY Right ?    retinal detachment    HEMICOLECTOMY Right 3/22/2022    LAPAROSCOPIC RIGHT HEMICOLECTOMY performed by Dyan Conley MD at [address] N/A 8/47/4639    ILEOSTOMY OPEN TAKEDOWN performed by Dyan Conley MD at [facility]       Previous Medications    APIXABAN (ELIQUIS) 5 MG TABS TABLET    Take 5 mg by mouth 2 times daily    CALCIUM CARBONATE 500 MG CHEW    Take 1 tablet by mouth every 6 hours as needed (Heartburn)    DILTIAZEM (CARDIZEM CD) 120 MG EXTENDED RELEASE CAPSULE    Take 1 capsule by mouth every evening    FLUOROMETHOLONE (FML) 0.1 % OPHTHALMIC SUSPENSION    Place 1 drop into both eyes 4 times daily    METOPROLOL SUCCINATE (TOPROL XL) 100 MG EXTENDED RELEASE TABLET    Take 1 tablet by mouth 2 times daily    MULTIPLE VITAMINS-MINERALS (THERAPEUTIC MULTIVITAMIN-MINERALS) TABLET    Take 1 tablet by mouth daily       ALLERGIES     Amiodarone, Cefepime, Clindamycin/lincomycin, and Pcn [penicillins]    FAMILYHISTORY       Family History   Problem Relation Age of Onset    Diabetes Mother     High Blood Pressure Mother     High Cholesterol Mother     Heart Attack Mother     Dementia Mother     Diabetes Father     Glaucoma Father     High Cholesterol Father     Pacemaker Father     Diabetes Sister     Thyroid Disease Sister     High Cholesterol Sister     Heart Attack Brother     Diabetes Sister     High Cholesterol Sister     Thyroid Disease Sister     Diabetes Sister     High Cholesterol Sister     Thyroid Disease Sister         SOCIAL HISTORY       Social History     Tobacco Use    Smoking status: Former     Packs/day: 2.00     Years: 35.00

## 2023-03-26 NOTE — ED NOTES
ED to Inpatient Handoff Report    Notified floor that electronic handoff available and patient ready for transport to room [phone]. Safety Risks: Hearing problems    Patient in Restraints: no    Constant Observer or Patient : no    Telemetry Monitoring Ordered :Yes      Cardiac Rhythm: A fib RVR    Order to transfer to unit without monitor:NO    Last MEWS: 3 Time completed: 0000    Vitals:    03/25/23 2058 03/25/23 2150 03/25/23 2331 03/25/23 2356   BP:  (!) 138/93 (!) 135/108 (!) 135/108   Pulse: (!) 155 (!) 116  (!) 116   Resp:  24  20   Temp:  98.5 °F (36.9 °C)  98.5 °F (36.9 °C)   TempSrc:  Oral  Oral   SpO2:  92%  92%   Weight:       Height:           Opportunity for questions and clarification was provided.        Sandra Son RN  03/26/23 0001

## 2023-03-26 NOTE — CONSULTS
[address] [place] [address], [address] [place] [address]  [phone]    INPATIENT CONSULTATION RECORD FOR  3/26/2023      REASON FOR CONSULTATION: Urinary retention/BPH/Possible neurogenic bladder      HISTORY OF PRESENT ILLNESS:      The patient is a 69 y.o. male patient who was admitted with shortness of breath and tachycardia. He is feeling better currently. He is known to our practice. He was last seen on 7/6/2022. He has a history of BPH and urinary retention. He failed several voiding trials and subsequently underwent a TURP on 6/28/2022. This showed 31 g of benign prostate tissue. A PSA level is pending. Most recent abdominal CT scan on 5/12/2022 showed bilateral renal cysts with resolution of the previously noted bilateral hydronephrosis, previous right hemicolectomy and small bilateral pleural effusions. A bladder scan PVR was 712 cc. A Valles was replaced today. He does not recall having the catheter placed. He was recently started back on Flomax and Urecholine as well. He feels he had been voiding well up until recently.   He denies hematuria, dysuria, UTIs      Past Medical History:   Diagnosis Date    Employs prosthetic leg     left    History of atrial fibrillation     [place] (hard of hearing)     uses bilat hearing aides    Hx of AKA (above knee amputation), left ([address]     Hx of necrotizing fasciitis 2011    left leg    Hypertension     Rectal bleeding     Rectal cancer ([address] 12/2017    rectal         Past Surgical History:   Procedure Laterality Date    ABDOMEN SURGERY  03/20/2018    ileostomy open take down    ABOVE KNEE AMPUTATION  2011    left; hx flesh eating bacteria    CARDIOVERSION  12/2020    COLON SURGERY  01/23/2018    laprascopic low anterior colon resection  take down splenic fexure   ileostomy    COLONOSCOPY  10/21/2011    COLONOSCOPY N/A 2/14/2022    COLONOSCOPY POLYPECTOMY SNARE/COLD BIOPSY performed by Cornelia Monique MD at [address] Left 10/03/2016    sanjay chen

## 2023-03-27 LAB
ANION GAP SERPL CALCULATED.3IONS-SCNC: 11 MMOL/L (ref 7–16)
BUN SERPL-MCNC: 41 MG/DL (ref 6–23)
CALCIUM SERPL-MCNC: 8.9 MG/DL (ref 8.6–10.2)
CHLORIDE SERPL-SCNC: 105 MMOL/L (ref 98–107)
CO2 SERPL-SCNC: 23 MMOL/L (ref 22–29)
CREAT SERPL-MCNC: 1.3 MG/DL (ref 0.7–1.2)
GLUCOSE SERPL-MCNC: 127 MG/DL (ref 74–99)
POTASSIUM SERPL-SCNC: 3.8 MMOL/L (ref 3.5–5)
PSA SERPL-MCNC: 3.03 NG/ML (ref 0–4)
SODIUM SERPL-SCNC: 139 MMOL/L (ref 132–146)

## 2023-03-27 PROCEDURE — 36415 COLL VENOUS BLD VENIPUNCTURE: CPT

## 2023-03-27 PROCEDURE — 2580000003 HC RX 258: Performed by: INTERNAL MEDICINE

## 2023-03-27 PROCEDURE — 80048 BASIC METABOLIC PNL TOTAL CA: CPT

## 2023-03-27 PROCEDURE — 84153 ASSAY OF PSA TOTAL: CPT

## 2023-03-27 PROCEDURE — 1200000000 HC SEMI PRIVATE

## 2023-03-27 PROCEDURE — 6370000000 HC RX 637 (ALT 250 FOR IP): Performed by: UROLOGY

## 2023-03-27 PROCEDURE — 2500000003 HC RX 250 WO HCPCS: Performed by: INTERNAL MEDICINE

## 2023-03-27 PROCEDURE — 99233 SBSQ HOSP IP/OBS HIGH 50: CPT | Performed by: INTERNAL MEDICINE

## 2023-03-27 PROCEDURE — 6370000000 HC RX 637 (ALT 250 FOR IP): Performed by: INTERNAL MEDICINE

## 2023-03-27 PROCEDURE — 6370000000 HC RX 637 (ALT 250 FOR IP): Performed by: FAMILY MEDICINE

## 2023-03-27 PROCEDURE — 2700000000 HC OXYGEN THERAPY PER DAY

## 2023-03-27 PROCEDURE — 2580000003 HC RX 258: Performed by: FAMILY MEDICINE

## 2023-03-27 RX ORDER — POTASSIUM CHLORIDE 20 MEQ/1
20 TABLET, EXTENDED RELEASE ORAL ONCE
Status: COMPLETED | OUTPATIENT
Start: 2023-03-27 | End: 2023-03-27

## 2023-03-27 RX ADMIN — FAMOTIDINE 20 MG: 20 TABLET ORAL at 19:46

## 2023-03-27 RX ADMIN — METOPROLOL SUCCINATE 100 MG: 100 TABLET, EXTENDED RELEASE ORAL at 19:46

## 2023-03-27 RX ADMIN — SODIUM CHLORIDE, PRESERVATIVE FREE 10 ML: 5 INJECTION INTRAVENOUS at 19:47

## 2023-03-27 RX ADMIN — FLUOROMETHOLONE 1 DROP: 1 SOLUTION/ DROPS OPHTHALMIC at 17:56

## 2023-03-27 RX ADMIN — FLUOROMETHOLONE 1 DROP: 1 SOLUTION/ DROPS OPHTHALMIC at 08:45

## 2023-03-27 RX ADMIN — DILTIAZEM HYDROCHLORIDE 120 MG: 120 CAPSULE, COATED, EXTENDED RELEASE ORAL at 08:45

## 2023-03-27 RX ADMIN — METOPROLOL SUCCINATE 100 MG: 100 TABLET, EXTENDED RELEASE ORAL at 08:45

## 2023-03-27 RX ADMIN — BETHANECHOL CHLORIDE 25 MG: 25 TABLET ORAL at 08:45

## 2023-03-27 RX ADMIN — APIXABAN 5 MG: 5 TABLET, FILM COATED ORAL at 19:46

## 2023-03-27 RX ADMIN — BETHANECHOL CHLORIDE 25 MG: 25 TABLET ORAL at 19:46

## 2023-03-27 RX ADMIN — DILTIAZEM HYDROCHLORIDE 120 MG: 120 CAPSULE, COATED, EXTENDED RELEASE ORAL at 19:46

## 2023-03-27 RX ADMIN — FLUOROMETHOLONE 1 DROP: 1 SOLUTION/ DROPS OPHTHALMIC at 14:59

## 2023-03-27 RX ADMIN — APIXABAN 5 MG: 5 TABLET, FILM COATED ORAL at 08:45

## 2023-03-27 RX ADMIN — FLUOROMETHOLONE 1 DROP: 1 SOLUTION/ DROPS OPHTHALMIC at 19:46

## 2023-03-27 RX ADMIN — TAMSULOSIN HYDROCHLORIDE 0.4 MG: 0.4 CAPSULE ORAL at 19:46

## 2023-03-27 RX ADMIN — FAMOTIDINE 20 MG: 20 TABLET ORAL at 08:44

## 2023-03-27 RX ADMIN — MULTIPLE VITAMINS W/ MINERALS TAB 1 TABLET: TAB at 08:44

## 2023-03-27 RX ADMIN — POTASSIUM CHLORIDE 20 MEQ: 1500 TABLET, EXTENDED RELEASE ORAL at 08:44

## 2023-03-27 RX ADMIN — SODIUM CHLORIDE, PRESERVATIVE FREE 10 ML: 5 INJECTION INTRAVENOUS at 08:45

## 2023-03-27 RX ADMIN — BETHANECHOL CHLORIDE 25 MG: 25 TABLET ORAL at 15:01

## 2023-03-27 RX ADMIN — SODIUM CHLORIDE 5 MG/HR: 900 INJECTION, SOLUTION INTRAVENOUS at 11:11

## 2023-03-27 RX ADMIN — SODIUM CHLORIDE 10 MG/HR: 900 INJECTION, SOLUTION INTRAVENOUS at 01:13

## 2023-03-27 ASSESSMENT — PAIN SCALES - GENERAL
PAINLEVEL_OUTOF10: 0

## 2023-03-27 NOTE — PLAN OF CARE
Problem: Skin/Tissue Integrity  Goal: Absence of new skin breakdown  Description: 1. Monitor for areas of redness and/or skin breakdown  2. Assess vascular access sites hourly  3. Every 4-6 hours minimum:  Change oxygen saturation probe site  4. Every 4-6 hours:  If on nasal continuous positive airway pressure, respiratory therapy assess nares and determine need for appliance change or resting period.   Outcome: Progressing     Problem: Safety - Adult  Goal: Free from fall injury  Outcome: Progressing     Problem: Respiratory - Adult  Goal: Achieves optimal ventilation and oxygenation  Outcome: Progressing     Problem: Cardiovascular - Adult  Goal: Maintains optimal cardiac output and hemodynamic stability  Outcome: Progressing  Goal: Absence of cardiac dysrhythmias or at baseline  Outcome: Progressing     Problem: Skin/Tissue Integrity - Adult  Goal: Skin integrity remains intact  Outcome: Progressing     Problem: Pain  Goal: Verbalizes/displays adequate comfort level or baseline comfort level  Outcome: Progressing

## 2023-03-27 NOTE — ACP (ADVANCE CARE PLANNING)
Advance Care Planning   Healthcare Decision Maker:    Primary Decision Maker: Severiano Coyer Brother/Sister - [phone]    Secondary Decision Maker: Lilia Quinones - Brother/Sister - [phone]    Click here to complete Healthcare Decision Makers including selection of the Healthcare Decision Maker Relationship (ie \"Primary\"). Today we documented Decision Maker(s) consistent with Legal Next of Kin hierarchy.

## 2023-03-27 NOTE — PLAN OF CARE
Problem: Skin/Tissue Integrity  Goal: Absence of new skin breakdown  Description: 1. Monitor for areas of redness and/or skin breakdown  2. Assess vascular access sites hourly  3. Every 4-6 hours minimum:  Change oxygen saturation probe site  4. Every 4-6 hours:  If on nasal continuous positive airway pressure, respiratory therapy assess nares and determine need for appliance change or resting period.   3/27/2023 1033 by Nino Villasenor RN  Outcome: Progressing  3/27/2023 0144 by Gustavo Naranjo RN  Outcome: Progressing     Problem: Safety - Adult  Goal: Free from fall injury  3/27/2023 1033 by Nino Villasenor RN  Outcome: Progressing  3/27/2023 0144 by Gustavo Naranjo RN  Outcome: Progressing     Problem: Respiratory - Adult  Goal: Achieves optimal ventilation and oxygenation  3/27/2023 1033 by Nino Villasenor RN  Outcome: Progressing  3/27/2023 0144 by Gustavo Naranjo RN  Outcome: Progressing     Problem: Cardiovascular - Adult  Goal: Maintains optimal cardiac output and hemodynamic stability  3/27/2023 1033 by Nino Villasenor RN  Outcome: Progressing  3/27/2023 0144 by Gustavo Naranjo RN  Outcome: Progressing     Problem: Cardiovascular - Adult  Goal: Absence of cardiac dysrhythmias or at baseline  3/27/2023 1033 by Nino Villasenor RN  Outcome: Progressing  3/27/2023 0144 by Gustavo Naranjo RN  Outcome: Progressing

## 2023-03-28 LAB
ANION GAP SERPL CALCULATED.3IONS-SCNC: 11 MMOL/L (ref 7–16)
BUN SERPL-MCNC: 39 MG/DL (ref 6–23)
CALCIUM SERPL-MCNC: 7.8 MG/DL (ref 8.6–10.2)
CHLORIDE SERPL-SCNC: 105 MMOL/L (ref 98–107)
CO2 SERPL-SCNC: 20 MMOL/L (ref 22–29)
CREAT SERPL-MCNC: 1.1 MG/DL (ref 0.7–1.2)
GLUCOSE SERPL-MCNC: 99 MG/DL (ref 74–99)
POTASSIUM SERPL-SCNC: 4.4 MMOL/L (ref 3.5–5)
SODIUM SERPL-SCNC: 136 MMOL/L (ref 132–146)

## 2023-03-28 PROCEDURE — 80048 BASIC METABOLIC PNL TOTAL CA: CPT

## 2023-03-28 PROCEDURE — 6370000000 HC RX 637 (ALT 250 FOR IP): Performed by: UROLOGY

## 2023-03-28 PROCEDURE — 2580000003 HC RX 258: Performed by: FAMILY MEDICINE

## 2023-03-28 PROCEDURE — 6370000000 HC RX 637 (ALT 250 FOR IP): Performed by: FAMILY MEDICINE

## 2023-03-28 PROCEDURE — 6360000002 HC RX W HCPCS: Performed by: INTERNAL MEDICINE

## 2023-03-28 PROCEDURE — 36415 COLL VENOUS BLD VENIPUNCTURE: CPT

## 2023-03-28 PROCEDURE — 6370000000 HC RX 637 (ALT 250 FOR IP): Performed by: INTERNAL MEDICINE

## 2023-03-28 PROCEDURE — 1200000000 HC SEMI PRIVATE

## 2023-03-28 PROCEDURE — 99232 SBSQ HOSP IP/OBS MODERATE 35: CPT | Performed by: INTERNAL MEDICINE

## 2023-03-28 RX ORDER — DIGOXIN 0.25 MG/ML
250 INJECTION INTRAMUSCULAR; INTRAVENOUS ONCE
Status: COMPLETED | OUTPATIENT
Start: 2023-03-28 | End: 2023-03-28

## 2023-03-28 RX ADMIN — BETHANECHOL CHLORIDE 25 MG: 25 TABLET ORAL at 08:20

## 2023-03-28 RX ADMIN — BETHANECHOL CHLORIDE 25 MG: 25 TABLET ORAL at 15:08

## 2023-03-28 RX ADMIN — FLUOROMETHOLONE 1 DROP: 1 SOLUTION/ DROPS OPHTHALMIC at 20:43

## 2023-03-28 RX ADMIN — METOPROLOL SUCCINATE 100 MG: 100 TABLET, EXTENDED RELEASE ORAL at 20:43

## 2023-03-28 RX ADMIN — DIGOXIN 250 MCG: 250 INJECTION, SOLUTION INTRAMUSCULAR; INTRAVENOUS at 12:45

## 2023-03-28 RX ADMIN — FAMOTIDINE 20 MG: 20 TABLET ORAL at 20:43

## 2023-03-28 RX ADMIN — ACETAMINOPHEN 650 MG: 325 TABLET ORAL at 20:44

## 2023-03-28 RX ADMIN — SODIUM CHLORIDE, PRESERVATIVE FREE 10 ML: 5 INJECTION INTRAVENOUS at 08:21

## 2023-03-28 RX ADMIN — APIXABAN 5 MG: 5 TABLET, FILM COATED ORAL at 20:43

## 2023-03-28 RX ADMIN — DILTIAZEM HYDROCHLORIDE 120 MG: 120 CAPSULE, COATED, EXTENDED RELEASE ORAL at 20:43

## 2023-03-28 RX ADMIN — BETHANECHOL CHLORIDE 25 MG: 25 TABLET ORAL at 20:43

## 2023-03-28 RX ADMIN — FLUOROMETHOLONE 1 DROP: 1 SOLUTION/ DROPS OPHTHALMIC at 08:20

## 2023-03-28 RX ADMIN — FAMOTIDINE 20 MG: 20 TABLET ORAL at 08:20

## 2023-03-28 RX ADMIN — FLUOROMETHOLONE 1 DROP: 1 SOLUTION/ DROPS OPHTHALMIC at 12:44

## 2023-03-28 RX ADMIN — TAMSULOSIN HYDROCHLORIDE 0.4 MG: 0.4 CAPSULE ORAL at 20:43

## 2023-03-28 RX ADMIN — SODIUM CHLORIDE, PRESERVATIVE FREE 10 ML: 5 INJECTION INTRAVENOUS at 20:45

## 2023-03-28 RX ADMIN — MULTIPLE VITAMINS W/ MINERALS TAB 1 TABLET: TAB at 08:20

## 2023-03-28 RX ADMIN — DILTIAZEM HYDROCHLORIDE 120 MG: 120 CAPSULE, COATED, EXTENDED RELEASE ORAL at 08:20

## 2023-03-28 RX ADMIN — FLUOROMETHOLONE 1 DROP: 1 SOLUTION/ DROPS OPHTHALMIC at 15:08

## 2023-03-28 RX ADMIN — METOPROLOL SUCCINATE 100 MG: 100 TABLET, EXTENDED RELEASE ORAL at 08:20

## 2023-03-28 RX ADMIN — APIXABAN 5 MG: 5 TABLET, FILM COATED ORAL at 08:20

## 2023-03-28 ASSESSMENT — PAIN SCALES - GENERAL
PAINLEVEL_OUTOF10: 0

## 2023-03-28 NOTE — PLAN OF CARE
Problem: Skin/Tissue Integrity  Goal: Absence of new skin breakdown  Description: 1. Monitor for areas of redness and/or skin breakdown  2. Assess vascular access sites hourly  3. Every 4-6 hours minimum:  Change oxygen saturation probe site  4. Every 4-6 hours:  If on nasal continuous positive airway pressure, respiratory therapy assess nares and determine need for appliance change or resting period.   3/28/2023 0919 by Regan Noel RN  Outcome: Progressing  3/28/2023 0049 by Abiel Green RN  Outcome: Progressing     Problem: Safety - Adult  Goal: Free from fall injury  3/28/2023 0919 by Regan Noel RN  Outcome: Progressing  3/28/2023 0049 by Abiel Green RN  Outcome: Progressing     Problem: Respiratory - Adult  Goal: Achieves optimal ventilation and oxygenation  3/28/2023 0919 by Regan Noel RN  Outcome: Progressing  3/28/2023 0049 by Abiel Green RN  Outcome: Progressing     Problem: Cardiovascular - Adult  Goal: Maintains optimal cardiac output and hemodynamic stability  3/28/2023 0919 by Regan Noel RN  Outcome: Progressing  3/28/2023 0049 by Abiel Green RN  Outcome: Progressing     Problem: Cardiovascular - Adult  Goal: Absence of cardiac dysrhythmias or at baseline  3/28/2023 0919 by Regan Noel RN  Outcome: Progressing  3/28/2023 0049 by Abiel Green RN  Outcome: Progressing

## 2023-03-29 PROCEDURE — 1200000000 HC SEMI PRIVATE

## 2023-03-29 PROCEDURE — 2580000003 HC RX 258: Performed by: FAMILY MEDICINE

## 2023-03-29 PROCEDURE — 6370000000 HC RX 637 (ALT 250 FOR IP): Performed by: FAMILY MEDICINE

## 2023-03-29 PROCEDURE — 99233 SBSQ HOSP IP/OBS HIGH 50: CPT | Performed by: INTERNAL MEDICINE

## 2023-03-29 PROCEDURE — 6370000000 HC RX 637 (ALT 250 FOR IP): Performed by: UROLOGY

## 2023-03-29 RX ORDER — DILTIAZEM HYDROCHLORIDE 180 MG/1
180 CAPSULE, COATED, EXTENDED RELEASE ORAL 2 TIMES DAILY
Status: DISCONTINUED | OUTPATIENT
Start: 2023-03-29 | End: 2023-03-30 | Stop reason: HOSPADM

## 2023-03-29 RX ADMIN — METOPROLOL SUCCINATE 100 MG: 100 TABLET, EXTENDED RELEASE ORAL at 20:25

## 2023-03-29 RX ADMIN — TAMSULOSIN HYDROCHLORIDE 0.4 MG: 0.4 CAPSULE ORAL at 20:25

## 2023-03-29 RX ADMIN — BETHANECHOL CHLORIDE 25 MG: 25 TABLET ORAL at 10:19

## 2023-03-29 RX ADMIN — DILTIAZEM HYDROCHLORIDE 180 MG: 180 CAPSULE, COATED, EXTENDED RELEASE ORAL at 10:19

## 2023-03-29 RX ADMIN — SODIUM CHLORIDE, PRESERVATIVE FREE 10 ML: 5 INJECTION INTRAVENOUS at 20:25

## 2023-03-29 RX ADMIN — FLUOROMETHOLONE 1 DROP: 1 SOLUTION/ DROPS OPHTHALMIC at 13:58

## 2023-03-29 RX ADMIN — FLUOROMETHOLONE 1 DROP: 1 SOLUTION/ DROPS OPHTHALMIC at 17:32

## 2023-03-29 RX ADMIN — FAMOTIDINE 20 MG: 20 TABLET ORAL at 10:19

## 2023-03-29 RX ADMIN — FAMOTIDINE 20 MG: 20 TABLET ORAL at 20:25

## 2023-03-29 RX ADMIN — FLUOROMETHOLONE 1 DROP: 1 SOLUTION/ DROPS OPHTHALMIC at 20:26

## 2023-03-29 RX ADMIN — APIXABAN 5 MG: 5 TABLET, FILM COATED ORAL at 10:19

## 2023-03-29 RX ADMIN — MULTIPLE VITAMINS W/ MINERALS TAB 1 TABLET: TAB at 10:18

## 2023-03-29 RX ADMIN — SODIUM CHLORIDE, PRESERVATIVE FREE 10 ML: 5 INJECTION INTRAVENOUS at 10:26

## 2023-03-29 RX ADMIN — DILTIAZEM HYDROCHLORIDE 180 MG: 180 CAPSULE, COATED, EXTENDED RELEASE ORAL at 20:25

## 2023-03-29 RX ADMIN — APIXABAN 5 MG: 5 TABLET, FILM COATED ORAL at 20:25

## 2023-03-29 RX ADMIN — BETHANECHOL CHLORIDE 25 MG: 25 TABLET ORAL at 20:25

## 2023-03-29 RX ADMIN — METOPROLOL SUCCINATE 100 MG: 100 TABLET, EXTENDED RELEASE ORAL at 10:19

## 2023-03-29 RX ADMIN — FLUOROMETHOLONE 1 DROP: 1 SOLUTION/ DROPS OPHTHALMIC at 10:20

## 2023-03-29 RX ADMIN — BETHANECHOL CHLORIDE 25 MG: 25 TABLET ORAL at 13:58

## 2023-03-29 ASSESSMENT — PAIN SCALES - GENERAL
PAINLEVEL_OUTOF10: 0

## 2023-03-29 NOTE — CARE COORDINATION
Social Work / Discharge Planning : SW followed up with patient and sister to discuss goals at discharge. Patient verified plan is still HOME. Sister can transport. Both declined any HOME needs. Patient may go with a soto. SW asked about HHC but both  politely declined and can manage soto care on their own. Patient is OFF 02 and on RA. No needs for SW. Await discharge when appropriate. SW to follow.  Electronically signed by TAMI Graham on 3/29/23 at 10:27 AM EDT
Equipment at home    Barriers to discharge: None    Additional Case Management Notes: The Plan for Transition of Care is related to the following treatment goals of COPD exacerbation ([facility] [address] [J44.1]  Atrial fibrillation with rapid ventricular response ([facility] [address] [I83.72]    IF APPLICABLE: The Patient and/or patient representative Mayur Park and his family were provided with a choice of provider and agrees with the discharge plan.  Freedom of choice list with basic dialogue that supports the patient's individualized plan of care/goals and shares the quality data associated with the providers was provided to:     Patient Representative Name:   Sister    The Patient and/or Patient Representative Agree with the Discharge Plan?  yes    Nilson Pina [place]  Case Management Department  Ph: [phone] Fax: [phone]

## 2023-03-29 NOTE — DISCHARGE INSTRUCTIONS
check the surrounding skin for signs of infection. Look for things like pus and irritated, swollen, red, or tender skin around the catheter. Be careful with your drainage bag  Always keep the drainage bag below the level of your bladder. This will help keep urine from flowing back into your bladder. Check often to see that urine is flowing through the catheter into the drainage bag. Empty the drainage bag when it is half full. This will keep it from overflowing or backing up. When you empty the drainage bag, do not let the tubing or drain spout touch anything. Keep the cap that comes with the tubing, and cover the tip of the tubing when not in use. Be careful with your catheter  Do not unhook the catheter from the drain tube until you are ready to change the tubing and bag. That could let germs get into the tube. Make sure that the catheter tubing does not get twisted or kinked. Do not tug or pull on the catheter. And make sure that the drainage bag does not drag or pull on the catheter. Do not put powder or lotion on the skin around the catheter. Talk with your doctor about your options for sexual intercourse while wearing a catheter. How do you empty the bag? If your doctor has asked you to keep a record, write down the amount of urine in the bag before you empty it. Wash your hands before and after you touch the bag. Remove the drain spout from its sleeve at the bottom of the drainage bag. Open the valve on the drain spout. Let the urine flow out into the toilet or a container. Be careful not to let the tubing or drain spout touch anything. After you empty the bag, close the valve. Then put the drain spout back into its sleeve at the bottom of the collection bag. How do you switch to a bedside bag for overnight use? Wash your hands before and after you handle the bags. Empty the leg bag that is attached to the tubing and catheter.   Put a clean towel under the tubing attached to the leg

## 2023-03-30 ENCOUNTER — ANESTHESIA EVENT (OUTPATIENT)
Dept: INPATIENT UNIT | Age: 68
DRG: 309 | End: 2023-03-30
Payer: MEDICARE

## 2023-03-30 ENCOUNTER — APPOINTMENT (OUTPATIENT)
Dept: INPATIENT UNIT | Age: 68
DRG: 309 | End: 2023-03-30
Payer: MEDICARE

## 2023-03-30 ENCOUNTER — ANESTHESIA (OUTPATIENT)
Dept: INPATIENT UNIT | Age: 68
DRG: 309 | End: 2023-03-30
Payer: MEDICARE

## 2023-03-30 VITALS
SYSTOLIC BLOOD PRESSURE: 122 MMHG | TEMPERATURE: 97.8 F | HEART RATE: 57 BPM | DIASTOLIC BLOOD PRESSURE: 86 MMHG | WEIGHT: 177.4 LBS | RESPIRATION RATE: 18 BRPM | BODY MASS INDEX: 22.77 KG/M2 | HEIGHT: 74 IN | OXYGEN SATURATION: 98 %

## 2023-03-30 PROCEDURE — 6360000002 HC RX W HCPCS

## 2023-03-30 PROCEDURE — 2700000000 HC OXYGEN THERAPY PER DAY

## 2023-03-30 PROCEDURE — 92960 CARDIOVERSION ELECTRIC EXT: CPT

## 2023-03-30 PROCEDURE — 3700000001 HC ADD 15 MINUTES (ANESTHESIA)

## 2023-03-30 PROCEDURE — 6370000000 HC RX 637 (ALT 250 FOR IP): Performed by: UROLOGY

## 2023-03-30 PROCEDURE — 6370000000 HC RX 637 (ALT 250 FOR IP): Performed by: FAMILY MEDICINE

## 2023-03-30 PROCEDURE — 5A2204Z RESTORATION OF CARDIAC RHYTHM, SINGLE: ICD-10-PCS | Performed by: INTERNAL MEDICINE

## 2023-03-30 PROCEDURE — 7100000010 HC PHASE II RECOVERY - FIRST 15 MIN

## 2023-03-30 PROCEDURE — 99232 SBSQ HOSP IP/OBS MODERATE 35: CPT | Performed by: INTERNAL MEDICINE

## 2023-03-30 PROCEDURE — 7100000011 HC PHASE II RECOVERY - ADDTL 15 MIN

## 2023-03-30 PROCEDURE — 2580000003 HC RX 258

## 2023-03-30 PROCEDURE — 3700000000 HC ANESTHESIA ATTENDED CARE

## 2023-03-30 PROCEDURE — 2580000003 HC RX 258: Performed by: FAMILY MEDICINE

## 2023-03-30 RX ORDER — BETHANECHOL CHLORIDE 25 MG/1
25 TABLET ORAL 3 TIMES DAILY
Qty: 90 TABLET | Refills: 3 | Status: SHIPPED | OUTPATIENT
Start: 2023-03-30

## 2023-03-30 RX ORDER — DILTIAZEM HYDROCHLORIDE 180 MG/1
180 CAPSULE, COATED, EXTENDED RELEASE ORAL 2 TIMES DAILY
Qty: 30 CAPSULE | Refills: 3 | Status: SHIPPED | OUTPATIENT
Start: 2023-03-30

## 2023-03-30 RX ORDER — PROPOFOL 10 MG/ML
INJECTION, EMULSION INTRAVENOUS PRN
Status: DISCONTINUED | OUTPATIENT
Start: 2023-03-30 | End: 2023-03-30 | Stop reason: SDUPTHER

## 2023-03-30 RX ORDER — SODIUM CHLORIDE 9 MG/ML
INJECTION, SOLUTION INTRAVENOUS CONTINUOUS PRN
Status: DISCONTINUED | OUTPATIENT
Start: 2023-03-30 | End: 2023-03-30 | Stop reason: SDUPTHER

## 2023-03-30 RX ORDER — TAMSULOSIN HYDROCHLORIDE 0.4 MG/1
0.4 CAPSULE ORAL NIGHTLY
Qty: 30 CAPSULE | Refills: 3 | Status: SHIPPED | OUTPATIENT
Start: 2023-03-30

## 2023-03-30 RX ADMIN — BETHANECHOL CHLORIDE 25 MG: 25 TABLET ORAL at 08:11

## 2023-03-30 RX ADMIN — MULTIPLE VITAMINS W/ MINERALS TAB 1 TABLET: TAB at 08:11

## 2023-03-30 RX ADMIN — SODIUM CHLORIDE: 9 INJECTION, SOLUTION INTRAVENOUS at 08:50

## 2023-03-30 RX ADMIN — FLUOROMETHOLONE 1 DROP: 1 SOLUTION/ DROPS OPHTHALMIC at 08:12

## 2023-03-30 RX ADMIN — SODIUM CHLORIDE, PRESERVATIVE FREE 10 ML: 5 INJECTION INTRAVENOUS at 08:12

## 2023-03-30 RX ADMIN — DILTIAZEM HYDROCHLORIDE 180 MG: 180 CAPSULE, COATED, EXTENDED RELEASE ORAL at 08:11

## 2023-03-30 RX ADMIN — PROPOFOL 100 MG: 10 INJECTION, EMULSION INTRAVENOUS at 09:07

## 2023-03-30 RX ADMIN — METOPROLOL SUCCINATE 100 MG: 100 TABLET, EXTENDED RELEASE ORAL at 08:11

## 2023-03-30 RX ADMIN — FAMOTIDINE 20 MG: 20 TABLET ORAL at 08:11

## 2023-03-30 RX ADMIN — APIXABAN 5 MG: 5 TABLET, FILM COATED ORAL at 08:13

## 2023-03-30 ASSESSMENT — PAIN SCALES - GENERAL: PAINLEVEL_OUTOF10: 0

## 2023-03-30 ASSESSMENT — LIFESTYLE VARIABLES: SMOKING_STATUS: 0

## 2023-03-30 ASSESSMENT — ENCOUNTER SYMPTOMS: SHORTNESS OF BREATH: 1

## 2023-03-30 NOTE — OP NOTE
Operative Note      Patient: Jonatan Holliday.   YOB: 1955  MRN: [id number]    Date of Procedure: 3/30/2023     Pre-Op Diagnosis: Atrial Fibrillation     Post-Op Diagnosis:  Atria Fibrillation     Procedure: Cardioversion x 1 (200J )     Anesthesia: [facility]     Surgeons/Assistants: Dr Vanna Hamilton     Complications: None     Full report dictated # [id number]    Electronically signed by Son Hill MD on 3/30/2023 at 9:36 AM

## 2023-03-30 NOTE — ANESTHESIA PRE PROCEDURE
Department of Anesthesiology  Preprocedure Note       Name:  Levi Lawson. Age:  69 y.o.  :  1955                                          MRN:  [id number]         Date:  3/30/2023      Surgeon: * No surgeons listed *    Procedure: CARDIOVERSION    Medications prior to admission:   Prior to Admission medications    Medication Sig Start Date End Date Taking? Authorizing Provider   loperamide (IMODIUM) 2 MG capsule Take 2 mg by mouth in the morning and 2 mg in the evening. Historical Provider, MD   dilTIAZem (CARDIZEM CD) 120 MG extended release capsule Take 1 capsule by mouth every evening 22   Christi Yang MD   fluorometholone (FML) 0.1 % ophthalmic suspension Place 1 drop into both eyes 4 times daily  Patient not taking: Reported on 3/26/2023 10/28/22   Historical Provider, MD   Calcium Carbonate 500 MG CHEW Take 1 tablet by mouth every 6 hours as needed (Heartburn)  Patient not taking: Reported on 3/26/2023    Historical Provider, MD   Multiple Vitamins-Minerals (THERAPEUTIC MULTIVITAMIN-MINERALS) tablet Take 1 tablet by mouth daily    Historical Provider, MD   metoprolol succinate (TOPROL XL) 100 MG extended release tablet Take 1 tablet by mouth 2 times daily 4/15/22   Christi Yang MD   apixaban (ELIQUIS) 5 MG TABS tablet Take 5 mg by mouth 2 times daily    Historical Provider, MD       Current medications:    No current facility-administered medications for this visit. No current outpatient medications on file.      Facility-Administered Medications Ordered in Other Visits   Medication Dose Route Frequency Provider Last Rate Last Admin    dilTIAZem (CARDIZEM CD) extended release capsule 180 mg  180 mg Oral BID Clayton Shore MD   180 mg at 23 7852    apixaban (ELIQUIS) tablet 5 mg  5 mg Oral BID Clayton Shore MD   5 mg at 23 0813    calcium carbonate (TUMS) chewable tablet 500 mg  1 tablet Oral Q6H PRN Clayton Shore MD        fluorometholone (FML)
28.1 04/07/2022 03:00 PM       HCG (If Applicable): No results found for: PREGTESTUR, PREGSERUM, HCG, HCGQUANT     ABGs:   Lab Results   Component Value Date/Time    H8YJVDNB 95.3 01/10/2011 05:50 AM        Type & Screen (If Applicable):  No results found for: LABABO, LABRH    Drug/Infectious Status (If Applicable):  No results found for: HIV, HEPCAB    COVID-19 Screening (If Applicable):   Lab Results   Component Value Date/Time    COVID19 Not Detected 03/25/2023 09:07 PM    COVID19 Not Detected 04/19/2022 07:00 AM       ECHO 11/3/22  Findings      Left Ventricle   Normal left ventricular size and systolic function. Ejection fraction is visually estimated at 55%. Right Ventricle   Normal right ventricular size and function. Left Atrium   Dilated left atrium. No evidence of thrombus within left atrium or appendage. The interatrial septum appears intact. Right Atrium   Dilated right atrium. No evidence of thrombus or mass in the right atrium. Mitral Valve   Structurally normal mitral valve. No evidence of mitral valve stenosis. Mild mitral regurgitation. Tricuspid Valve   The tricuspid valve appears structurally normal.   Physiologic and/or trace tricuspid regurgitation. Aortic Valve   Structurally normal aortic valve. The aortic valve is trileaflet. No hemodynamically significant aortic stenosis is present. Trace aortic valve regurgitation. Pulmonic Valve   The pulmonic valve was not well visualized. Pericardial Effusion   No evidence of pericardial effusion. Conclusions      Summary   Difficult esophageal imaging windows. Ejection fraction is visually estimated at 55%. No evidence of thrombus within left atrium or appendage.       Signature      ----------------------------------------------------------------   Electronically signed by Og Valdes MD(Interpreting   physician) on 11/03/2022 12:16 PM      Anesthesia Evaluation  Patient summary

## 2023-03-30 NOTE — ANESTHESIA POSTPROCEDURE EVALUATION
Department of Anesthesiology  Postprocedure Note    Patient: Deonna Ryan MRN: [id number]  YOB: 1955  Date of evaluation: 3/30/2023      Procedure Summary     Date: 03/30/23 Room / Location: [facility] Stage I    Anesthesia Start: 4686 Anesthesia Stop:     Procedure: CARDIOVERSION Diagnosis:     Scheduled Providers:  Responsible Provider: Ilda Trevino MD    Anesthesia Type: MAC ASA Status: 3          Anesthesia Type: No value filed.     Jolene Phase I:      Jolene Phase II:        Anesthesia Post Evaluation    Patient location during evaluation: PACU  Patient participation: waiting for patient participation  Level of consciousness: sleepy but conscious  Airway patency: patent  Nausea & Vomiting: no nausea and no vomiting  Complications: no  Cardiovascular status: hemodynamically stable  Respiratory status: acceptable  Hydration status: euvolemic          Ilda Trevino MD  3/30/2023

## 2023-03-30 NOTE — PLAN OF CARE
Problem: Skin/Tissue Integrity  Goal: Absence of new skin breakdown  Description: 1. Monitor for areas of redness and/or skin breakdown  2. Assess vascular access sites hourly  3. Every 4-6 hours minimum:  Change oxygen saturation probe site  4. Every 4-6 hours:  If on nasal continuous positive airway pressure, respiratory therapy assess nares and determine need for appliance change or resting period.   Outcome: Progressing     Problem: Safety - Adult  Goal: Free from fall injury  Outcome: Progressing     Problem: Respiratory - Adult  Goal: Achieves optimal ventilation and oxygenation  Outcome: Progressing     Problem: Cardiovascular - Adult  Goal: Maintains optimal cardiac output and hemodynamic stability  Outcome: Progressing  Goal: Absence of cardiac dysrhythmias or at baseline  Outcome: Progressing     Problem: Skin/Tissue Integrity - Adult  Goal: Skin integrity remains intact  Outcome: Progressing     Problem: Pain  Goal: Verbalizes/displays adequate comfort level or baseline comfort level  Outcome: Progressing     Problem: ABCDS Injury Assessment  Goal: Absence of physical injury  Outcome: Progressing

## 2023-03-30 NOTE — DISCHARGE SUMMARY
times daily     therapeutic multivitamin-minerals tablet            STOP taking these medications      fluorometholone 0.1 % ophthalmic suspension  Commonly known as: FML     loperamide 2 MG capsule  Commonly known as: IMODIUM               Where to Get Your Medications        These medications were sent to [address] [place] [address] [phone]  [facility], [address] [place] [address]      Phone: [phone]   bethanechol 25 MG tablet  dilTIAZem 180 MG extended release capsule  tamsulosin 0.4 MG capsule       Activity: activity as tolerated  Diet: cardiac diet    Follow-up with primary care physician in 2 weeks. F/U with Dr. Mario Mcguire in 2 weeks also. Consider EP referral for ablation.       Signed:  Concha Pat MD  3/30/2023  12:51 PM

## 2023-03-30 NOTE — PROGRESS NOTES
Admit Date: 3/25/2023    Subjective:     Patient is sleepy this morning, no complaints of pain, feels tired. Scheduled Meds:   potassium chloride  20 mEq Oral Once    apixaban  5 mg Oral BID    fluorometholone  1 drop Both Eyes 4x Daily    metoprolol succinate  100 mg Oral BID    therapeutic multivitamin-minerals  1 tablet Oral Daily    sodium chloride flush  5-40 mL IntraVENous 2 times per day    famotidine  20 mg Oral BID    dilTIAZem  120 mg Oral BID    tamsulosin  0.4 mg Oral Nightly    bethanechol  25 mg Oral TID     Continuous Infusions:   sodium chloride      dilTIAZem 7.5 mg/hr (03/27/23 0322)     PRN Meds:calcium carbonate, sodium chloride flush, sodium chloride, polyethylene glycol, acetaminophen **OR** acetaminophen      Objective:     I/O last 3 completed shifts:   In: 100 [IV Piggyback:100]  Out: 650 [Urine:650]  I/O this shift:  In: -   Out: 650 [Urine:650]  /72   Pulse 88   Temp 97.8 °F (36.6 °C) (Oral)   Resp 20   Ht 6' 2\" (1.88 m)   Wt 169 lb (76.7 kg)   SpO2 94%   BMI 21.70 kg/m²     LUNGS:  No increased work of breathing, good air exchange, clear to auscultation bilaterally, no crackles or wheezing  CARDIOVASCULAR:  Irregularly irregular rhythm, no murmurs, no gallops, no rubs  ABDOMEN:  flat, soft, non-tender  MUSCULOSKELETAL:  no cyanosis, no edema, no clubbing    Data Review  CBC:   Recent Labs     03/25/23 2107   WBC 6.4   HGB 14.3        BMP:    Recent Labs     03/25/23  2107 03/26/23  0155 03/27/23  0417    138 139   K 4.5 3.7 3.8    104 105   CO2 23 21* 23   BUN 27* 26* 41*   CREATININE 1.2 1.1 1.3*   GLUCOSE 149* 157* 127*     Coagulation:   Lab Results   Component Value Date/Time    INR 1.6 04/12/2022 04:58 AM    APTT 28.1 04/07/2022 03:00 PM     Cardiac markers:   Lab Results   Component Value Date/Time    CKMB <0.2 03/18/2011 12:50 PM     Lab Results   Component Value Date    LABPROT 0.1 03/23/2022    LABPROT 0.1 03/23/2022    LABALBU 3.8 03/25/2023
Admit Date: 3/25/2023    Subjective:     Patient states he is feeling better. Scheduled Meds:   dilTIAZem  180 mg Oral BID    apixaban  5 mg Oral BID    fluorometholone  1 drop Both Eyes 4x Daily    metoprolol succinate  100 mg Oral BID    therapeutic multivitamin-minerals  1 tablet Oral Daily    sodium chloride flush  5-40 mL IntraVENous 2 times per day    famotidine  20 mg Oral BID    tamsulosin  0.4 mg Oral Nightly    bethanechol  25 mg Oral TID     Continuous Infusions:   sodium chloride       PRN Meds:calcium carbonate, sodium chloride flush, sodium chloride, polyethylene glycol, acetaminophen **OR** acetaminophen      Objective:     I/O last 3 completed shifts:  In: -   Out: 3200 [Urine:3200]  No intake/output data recorded. /88   Pulse 88   Temp 98.2 °F (36.8 °C) (Oral)   Resp 18   Ht 6' 2\" (1.88 m)   Wt 181 lb (82.1 kg)   SpO2 93%   BMI 23.24 kg/m²     LUNGS:  No increased work of breathing, good air exchange, clear to auscultation bilaterally, no crackles or wheezing  CARDIOVASCULAR:  irregularly irregular rhythm with no murmurs, no gallops, no rubs. Rate now varying 100-130. ABDOMEN:  flat, soft, non-tender  MUSCULOSKELETAL:  no cyanosis, no edema, no clubbing    Data Review  CBC: No results for input(s): WBC, HGB, PLT in the last 72 hours.   BMP:    Recent Labs     03/27/23  0417 03/28/23  0200    136   K 3.8 4.4    105   CO2 23 20*   BUN 41* 39*   CREATININE 1.3* 1.1   GLUCOSE 127* 99     Coagulation:   Lab Results   Component Value Date/Time    INR 1.6 04/12/2022 04:58 AM    APTT 28.1 04/07/2022 03:00 PM     Cardiac markers:   Lab Results   Component Value Date/Time    CKMB <0.2 03/18/2011 12:50 PM     Lab Results   Component Value Date    LABPROT 0.1 03/23/2022    LABPROT 0.1 03/23/2022    LABALBU 3.8 03/25/2023     Lab Results   Component Value Date    ALT 22 03/25/2023    AST 55 (H) 03/25/2023    ALKPHOS 99 03/25/2023    BILITOT 0.9 03/25/2023         Assessment:
CLINICAL PHARMACY NOTE: MEDS TO BEDS    Total # of Prescriptions Filled: 2   The following medications were delivered to the patient:  Diltiazem 180   Tamsulosin 04 mg    Additional Documentation:
Call placed to Dr. Margaux Frias re: new consult. Call received from Dr. Margaux Frias, pt to be sent home with soto cath and will be seen as outpatient.
Continuing to monitor heart rate and blood pressures. Heart rate fluctuates high to low often, blood pressure stable at this time. Daughter voices concerns regarding possible discharge today, however no discharge orders received as of yet. Will continue to monitor patient.
Dr Ilene Bergman notified of ok for d/c per cardiology.  Request made for d/c order
Dr. Tracee Villeda notified that patient had a full breakfast one hour ago. Cardioversion will be cancelled. Dr. Johanna Mclaughlin notified and he wishes for the procedure to be done 3/30/23. Surgery scheduling notified. Wenceslao Cornejo RN.       Addendum:    Cardioversion scheduled for 9:30am tomorrow, 3/30/23    Electronically signed by Yolette Martinez MD on 3/29/2023 at 10:12 AM
[facility]        CARDIOLOGY                 INPATIENT PROGRESS NOTE          PATIENT SEEN IN FOLLOW UP FOR: Yajaira Paredes 112 Day: [address] [facility]. is a 69year old patient known to Dr. Robin Tee: Denies any CP or SOB, No palpitations or dizzy    ROS: Review of rest of 10 systems negative except as mentioned above    OBJECTIVE: No acute distress. See Assessment     Diagnostics:       Telemetry:  Reviewed    ОЛЬГА 11/2022   Summary   Difficult esophageal imaging windows. Ejection fraction is visually estimated at 55%. No evidence of thrombus within left atrium or appendage. 2D Echo: 5/11/2022   Summary   Left ventricular internal dimensions were normal in diastole and systole. Borderline concentric left ventricular hypertrophy. No regional wall motion abnormalities seen. Normal left ventricular ejection fraction. EF 60%   The left atrium is mildly dilated. Moderately enlarged right atrium size. The aortic valve appears mildly sclerotic. Physiologic and/or trace tricuspid regurgitation. There is a trivial circumferential pericardial effusion noted. Intake/Output Summary (Last 24 hours) at 3/29/2023 0920  Last data filed at 3/29/2023 0301  Gross per 24 hour   Intake --   Output 2900 ml   Net -2900 ml       Labs:   CBC:   No results for input(s): WBC, HGB, HCT, PLT in the last 72 hours. BMP:   Recent Labs     03/27/23  0417 03/28/23  0200    136   K 3.8 4.4   CO2 23 20*   BUN 41* 39*   CREATININE 1.3* 1.1   LABGLOM 59 >60   CALCIUM 8.9 7.8*     Mag: No results for input(s): MG in the last 72 hours. Phos: No results for input(s): PHOS in the last 72 hours. TSH:   No results for input(s): TSH in the last 72 hours. HgA1c:     BNP: No results for input(s): BNP in the last 72 hours. PT/INR: No results for input(s): PROTIME, INR in the last 72 hours. APTT:No results for input(s): APTT in the last 72 hours.   CARDIAC
Ok to discharge home after 11:30am today    F/u by Dr Adilson Reeves, [facility], 2 weeks    Katie Rice MD  3/30/2023  9:38 AM
Patient attempt to ambulate to bathroom on his own and pulled out IV site. Attempted to reinsert a new IV site and was unable to flush site.  Consult placed for IV team.
Per Dr. Sam Parikh, place soto catheter.
Per Rain with urology, someone will be up to see the pt shortly and determine if soto can be removed likely though it will be in the AM of 3/28. They will provide orders as appropriate.
Pharmacy Note    An order for Zofran has been discontinued for this patient due to the risk of QT prolongation. If an antiemetic is indicated for your patient, please consider use of Tigan or Compazine. Current QTc = 503 ms  Please contact the Pharmacy with any questions.   GUICHO Chacon [facility]  3/26/2023  1:07 AM
Pt stated they haven't voided since yesterday afternoon. Bladder scanned 712 mL. Notified Dr. Leonila Yoo answering service, awaiting callback.
ALKPHOS 99 03/25/2023    BILITOT 0.9 03/25/2023         Assessment:     Patient Active Problem List    Diagnosis Date Noted    Atrial fibrillation with rapid ventricular response ([address] 11/01/2022    PAD (peripheral artery disease) ([address] 11/02/2022    On apixaban therapy 11/02/2022    PAF (paroxysmal atrial fibrillation) ([facility])     Moderate protein-calorie malnutrition ([address] 04/14/2022    Atrial fibrillation with RVR (HCC)     Urinary tract infection associated with indwelling urethral catheter ([address]     Intra-abdominal infection     COURTNEY (acute kidney injury) ([address]     Septic shock ([facility])     Bacteremia     Abdominal wall abscess 04/07/2022    PSVT (paroxysmal supraventricular tachycardia) ([address]     Primary hypertension     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 May discharge home then when deemed ok with cardiology. Urology to advise regarding management of trial of voiding.
BILITOT 0.9 03/25/2023         Assessment:     Patient Active Problem List    Diagnosis Date Noted    Atrial fibrillation with rapid ventricular response ([address] 11/01/2022    PAD (peripheral artery disease) ([address] 11/02/2022    On apixaban therapy 11/02/2022    PAF (paroxysmal atrial fibrillation) ([facility])     Moderate protein-calorie malnutrition ([address] 04/14/2022    Atrial fibrillation with RVR ([facility])     Urinary tract infection associated with indwelling urethral catheter ([address]     Intra-abdominal infection     COURTNEY (acute kidney injury) ([address]     Septic shock ([facility])     Bacteremia     Abdominal wall abscess 04/07/2022    PSVT (paroxysmal supraventricular tachycardia) ([address]     Primary hypertension     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 Urology to advise regarding timing of trial of voiding.
ENZYMES:  No results for input(s): Olivia Machado in the last 72 hours. FASTING LIPID PANEL:  Lab Results   Component Value Date/Time    CHOL 114 2020 10:00 AM    HDL 41 2020 10:00 AM    LDLCALC 58 2020 10:00 AM    TRIG 76 2020 10:00 AM     LIVER PROFILE:  No results for input(s): AST, ALT, LABALBU in the last 72 hours. Current Inpatient Medications:   dilTIAZem  180 mg Oral BID    apixaban  5 mg Oral BID    fluorometholone  1 drop Both Eyes 4x Daily    metoprolol succinate  100 mg Oral BID    therapeutic multivitamin-minerals  1 tablet Oral Daily    sodium chloride flush  5-40 mL IntraVENous 2 times per day    famotidine  20 mg Oral BID    tamsulosin  0.4 mg Oral Nightly    bethanechol  25 mg Oral TID       IV Infusions (if any):   sodium chloride           PHYSICAL EXAM:     CONSTITUTIONAL:   BP (!) 142/89   Pulse 71   Temp 97.8 °F (36.6 °C) (Axillary)   Resp 18   Ht 6' 2\" (1.88 m)   Wt 177 lb 6.4 oz (80.5 kg)   SpO2 93%   BMI 22.78 kg/m²   Pulse  Av.5  Min: 61  Max: 92  Systolic (43MAD), PVA:003 , Min:112 , BYW:316    Diastolic (26MOL), KIY:15, Min:66, Max:99    In general, this is a well developed, well nourished who appears stated age. awake, alert, cooperative, no apparent distress    CONST:  Well developed, appears stated age. Awake, alert and no apparent distress. HEENT:   Head- Normocephalic  Eyes- Conjunctivae pink, no icterus  Throat- Oral mucosa moist  Neck-  No stridor, no jugular venous distention. No carotid bruit. CHEST: Chest symmetrical and non-tender to palpation. No accessory muscle use  RESPIRATORY: Lung sounds - Few rhonchi  CARDIOVASCULAR:     Heart Palpation- No thrills. Heart Ausculation- Irregularly irregular rate and rhythm, 1/6 systolic murmur. No s3 or rub   EXT: No lower extremity edema. Distal pulses palpable, no cyanosis - Left AKA   ABDOMEN: Soft, non-tender to light palpation. Bowel sounds present.    MS: n/a  : Deferred  RECTAL:
ENZYMES:  Recent Labs     23  2231   TROPHS 13*     FASTING LIPID PANEL:  Lab Results   Component Value Date/Time    CHOL 114 2020 10:00 AM    HDL 41 2020 10:00 AM    LDLCALC 58 2020 10:00 AM    TRIG 76 2020 10:00 AM     LIVER PROFILE:  Recent Labs     23  2107   AST 55*   ALT 22   LABALBU 3.8       Current Inpatient Medications:   apixaban  5 mg Oral BID    fluorometholone  1 drop Both Eyes 4x Daily    metoprolol succinate  100 mg Oral BID    therapeutic multivitamin-minerals  1 tablet Oral Daily    sodium chloride flush  5-40 mL IntraVENous 2 times per day    famotidine  20 mg Oral BID    dilTIAZem  120 mg Oral BID    tamsulosin  0.4 mg Oral Nightly    bethanechol  25 mg Oral TID       IV Infusions (if any):   sodium chloride      dilTIAZem 2.5 mg/hr (23 0822)         PHYSICAL EXAM:     CONSTITUTIONAL:   /80   Pulse 95   Temp 96.8 °F (36 °C) (Axillary)   Resp 20   Ht 6' 2\" (1.88 m)   Wt 171 lb 1 oz (77.6 kg)   SpO2 93%   BMI 21.96 kg/m²   Pulse  Av.2  Min: 62  Max: 855  Systolic (50PID), EAB:942 , Min:115 , OAK:204    Diastolic (38LJI), EFH:10, Min:69, Max:87    In general, this is a well developed, well nourished who appears stated age. awake, alert, cooperative, no apparent distress    CONST:  Well developed, appears stated age. Awake, alert and no apparent distress. HEENT:   Head- Normocephalic  Eyes- Conjunctivae pink, no icterus  Throat- Oral mucosa moist  Neck-  No stridor, no jugular venous distention. No carotid bruit. CHEST: Chest symmetrical and non-tender to palpation. No accessory muscle use  RESPIRATORY: Lung sounds - Few rhonchi  CARDIOVASCULAR:     Heart Inspection-OK  Heart Palpation- No thrills. Heart Ausculation- Irregularly irregular rate and rhythm, 1/6 systolic murmur. No s3 or rub   EXT: No lower extremity edema. Distal pulses palpable, no cyanosis - Left AKA   ABDOMEN: Soft, non-tender to light palpation.  Bowel sounds
make sure he is emptying  He will need OP follow up for urocuff     Jacquelin Tineo, CRISTI - CNP   [facility]  Urology
light palpation. Bowel sounds present. No abdominal bruit  MS: n/a  : Deferred  RECTAL: Deferred  SKIN: Warm and dry   NEURO / PSYCH: Oriented to person, place; Good mood and affect. IMPRESSION / RECOMMENDATIONS:     paroxysmal Atrial fibrillation with rapid ventricular response (HCC) - s/p multiple DCCVs - Rate control with BB and Cardizem, wean iv Cardizem. In needed increase Cardizem to 180mg BID - Monitor BP; Continue Eliquis for  [address]. Later Out-pt referral to [facility] EP for AAD/Ablation     Essential HTN  - Controlled     Chronic HFpEF  - No edema no rales ; Likely from Tachycardia; Diuretics as needed. PAD (peripheral artery disease) (HCC) -  s/p Left AKA - Per Dr Ilene Bergman     Hx of Rectal CA -  s/p Right hemicolectomy                 If BP and HR stable, home tomorrow        F/u with Dr Alexis Ochoa 2 weeks after discharge    Above recommendations discussed with him and his wife.       Electronically signed by Waqar Mccain MD on 3/27/2023 at 8:26 AM  [facility]) Cardiology

## 2023-03-31 NOTE — OP NOTE
[address] [address]                                OPERATIVE REPORT    PATIENT NAME: Stefanie Blanc                :        1955  MED REC NO:   [id number]                            ROOM:       3177  ACCOUNT NO:   [de-identified]                           ADMIT DATE: 2023  PROVIDER:     Leigh Maldonado MD    DATE OF PROCEDURE:  2023    PROCEDURE PERFORMED:  DC cardioversion x1. PREOPERATIVE DIAGNOSIS:  Atrial fibrillation. POSTPROCEDURE DIAGNOSIS:  Atrial fibrillation. SEDATION:  LMA sedation. IMMEDIATE COMPLICATIONS:  None. CLINICAL HISTORY:  The patient was scheduled for DC cardioversion due to  his paroxysmal atrial fibrillation with RVR. Risks, benefits, and  alternatives were discussed and informed consent was obtained. The  patient was on uninterrupted oral anticoagulation for previous four  weeks. DESCRIPTION OF PROCEDURE:  The patient was brought to the PACU area. Pulse oximetry, blood pressure, and heart rhythm monitors were applied. Anterior and posterior cardioversion pads were applied. After sedation,  the patient received 200 joules of synchronized biphasic direct current  and was converted to sinus rhythm and maintained in sinus rhythm. Postprocedure, the patient was alert with no focal neurological deficit. CONCLUSION:  Successful conversion of atrial flutter to sinus rhythm  with single attempt of 200 joules of synchronized biphasic direct  current.         Rochelle Mcguire MD    D: 2023 9:36:17       T: 2023 10:47:33     [email or web address]  Job#: [id number]     Doc#: [id number]    CC:  MD Marco Carlos MD

## 2023-04-04 ENCOUNTER — OFFICE VISIT (OUTPATIENT)
Dept: CARDIOLOGY CLINIC | Age: 68
End: 2023-04-04

## 2023-04-04 VITALS
HEIGHT: 74 IN | DIASTOLIC BLOOD PRESSURE: 70 MMHG | BODY MASS INDEX: 22.69 KG/M2 | SYSTOLIC BLOOD PRESSURE: 122 MMHG | HEART RATE: 66 BPM | WEIGHT: 176.8 LBS

## 2023-04-04 DIAGNOSIS — I48.92 ATRIAL FLUTTER, UNSPECIFIED TYPE (HCC): Primary | ICD-10-CM

## 2023-04-04 DIAGNOSIS — R94.31 ABNORMAL EKG: ICD-10-CM

## 2023-04-04 RX ORDER — DILTIAZEM HYDROCHLORIDE 180 MG/1
180 CAPSULE, COATED, EXTENDED RELEASE ORAL 2 TIMES DAILY
Qty: 60 CAPSULE | Refills: 11 | Status: SHIPPED | OUTPATIENT
Start: 2023-04-04

## 2023-04-04 NOTE — PROGRESS NOTES
[facility]    EYE SURGERY Left 10/03/2016    pars plana vitrectomy, retinal detachment repair, laser gas/fluid exchange    EYE SURGERY Bilateral ?     bilat cataracts w lens implants    EYE SURGERY Right ?    retinal detachment    HEMICOLECTOMY Right 3/22/2022    LAPAROSCOPIC RIGHT HEMICOLECTOMY performed by Derik Wilde MD at [address] [place] [address] N/A 3/20/2018    ILEOSTOMY OPEN TAKEDOWN performed by Derik Wilde MD at [facility]       Family History:  Family History   Problem Relation Age of Onset    Diabetes Mother     High Blood Pressure Mother     High Cholesterol Mother     Heart Attack Mother     Dementia Mother     Diabetes Father     Glaucoma Father     High Cholesterol Father     Pacemaker Father     Diabetes Sister     Thyroid Disease Sister     High Cholesterol Sister     Heart Attack Brother     Diabetes Sister     High Cholesterol Sister     Thyroid Disease Sister     Diabetes Sister     High Cholesterol Sister     Thyroid Disease Sister        Social History:  Social History     Socioeconomic History    Marital status: Single     Spouse name: Not on file    Number of children: Not on file    Years of education: Not on file    Highest education level: Not on file   Occupational History    Not on file   Tobacco Use    Smoking status: Former     Packs/day: 2.00     Years: 35.00     Pack years: 70.00     Types: Cigarettes     Start date: 1974     Quit date: 2010     Years since quittin.3    Smokeless tobacco: Never   Vaping Use    Vaping Use: Never used   Substance and Sexual Activity    Alcohol use: No    Drug use: No    Sexual activity: Not on file   Other Topics Concern    Not on file   Social History Narrative    Not on file     Social Determinants of Health     Financial Resource Strain: Not on file   Food Insecurity: Not on file   Transportation Needs: Not on file   Physical Activity: Not on file   Stress: Not on file   Social Connections: Not

## 2023-04-04 NOTE — PATIENT INSTRUCTIONS
Continue Eliquis 5 mg p.o. twice daily for anticoagulation. Will continue Toprol- mg p.o. twice daily along with a Cardizem  mg p.o. twice daily for rate control. We will refer him to EP service due to recurrent episodes of A-fib and consideration of antiarrhythmic therapy versus ablation. We will schedule him for for Lexiscan nuclear stress test to rule out ischemia prior to initiating  antiarrhythmic therapy  He was advised to drink at least 64 oz of fluids a day. Follow-up with me in 3 months.

## 2023-04-19 ENCOUNTER — TELEPHONE (OUTPATIENT)
Dept: PRIMARY CARE CLINIC | Age: 68
End: 2023-04-19

## 2023-04-19 NOTE — TELEPHONE ENCOUNTER
Pt requesting to establish w/you and was referred by his sister, Lita Vo, who is a current pt of yours. Accept?     Call back # barb - [phone]

## 2023-05-04 ENCOUNTER — HOSPITAL ENCOUNTER (INPATIENT)
Age: 68
LOS: 1 days | Discharge: HOME | End: 2023-05-05
Attending: EMERGENCY MEDICINE | Admitting: STUDENT IN AN ORGANIZED HEALTH CARE EDUCATION/TRAINING PROGRAM
Payer: MEDICARE

## 2023-05-04 ENCOUNTER — OFFICE VISIT (OUTPATIENT)
Dept: CARDIOLOGY CLINIC | Age: 68
End: 2023-05-04
Payer: MEDICARE

## 2023-05-04 ENCOUNTER — APPOINTMENT (OUTPATIENT)
Dept: GENERAL RADIOLOGY | Age: 68
End: 2023-05-04
Payer: MEDICARE

## 2023-05-04 VITALS
RESPIRATION RATE: 14 BRPM | SYSTOLIC BLOOD PRESSURE: 102 MMHG | HEIGHT: 74 IN | HEART RATE: 127 BPM | WEIGHT: 172 LBS | DIASTOLIC BLOOD PRESSURE: 82 MMHG | BODY MASS INDEX: 22.07 KG/M2

## 2023-05-04 DIAGNOSIS — I48.91 ATRIAL FIBRILLATION WITH RAPID VENTRICULAR RESPONSE (HCC): Primary | ICD-10-CM

## 2023-05-04 DIAGNOSIS — R00.0 TACHYCARDIA: Primary | ICD-10-CM

## 2023-05-04 PROBLEM — L02.211 ABDOMINAL WALL ABSCESS: Status: RESOLVED | Noted: 2022-04-07 | Resolved: 2023-05-04

## 2023-05-04 PROBLEM — K92.2 GI BLEED: Status: RESOLVED | Noted: 2022-01-24 | Resolved: 2023-05-04

## 2023-05-04 PROBLEM — K92.2 GASTROINTESTINAL BLEED: Status: RESOLVED | Noted: 2022-01-25 | Resolved: 2023-05-04

## 2023-05-04 LAB
ANION GAP SERPL CALCULATED.3IONS-SCNC: 12 MMOL/L (ref 7–16)
BUN SERPL-MCNC: 18 MG/DL (ref 6–23)
CALCIUM SERPL-MCNC: 9.8 MG/DL (ref 8.6–10.2)
CHLORIDE SERPL-SCNC: 100 MMOL/L (ref 98–107)
CO2 SERPL-SCNC: 25 MMOL/L (ref 22–29)
CREAT SERPL-MCNC: 1.6 MG/DL (ref 0.7–1.2)
EKG ATRIAL RATE: 124 BPM
EKG P-R INTERVAL: 134 MS
EKG Q-T INTERVAL: 364 MS
EKG QRS DURATION: 86 MS
EKG QTC CALCULATION (BAZETT): 522 MS
EKG R AXIS: 58 DEGREES
EKG T AXIS: -60 DEGREES
EKG VENTRICULAR RATE: 124 BPM
ERYTHROCYTE [DISTWIDTH] IN BLOOD BY AUTOMATED COUNT: 13.5 FL (ref 11.5–15)
GLUCOSE SERPL-MCNC: 126 MG/DL (ref 74–99)
HCT VFR BLD AUTO: 46 % (ref 37–54)
HGB BLD-MCNC: 14.5 G/DL (ref 12.5–16.5)
MCH RBC QN AUTO: 26.9 PG (ref 26–35)
MCHC RBC AUTO-ENTMCNC: 31.5 % (ref 32–34.5)
MCV RBC AUTO: 85.3 FL (ref 80–99.9)
PLATELET # BLD AUTO: 221 E9/L (ref 130–450)
PMV BLD AUTO: 9.6 FL (ref 7–12)
POTASSIUM SERPL-SCNC: 4.2 MMOL/L (ref 3.5–5)
RBC # BLD AUTO: 5.39 E12/L (ref 3.8–5.8)
SODIUM SERPL-SCNC: 137 MMOL/L (ref 132–146)
TROPONIN, HIGH SENSITIVITY: 13 NG/L (ref 0–11)
WBC # BLD: 7.7 E9/L (ref 4.5–11.5)

## 2023-05-04 PROCEDURE — 2580000003 HC RX 258: Performed by: INTERNAL MEDICINE

## 2023-05-04 PROCEDURE — 3078F DIAST BP <80 MM HG: CPT | Performed by: INTERNAL MEDICINE

## 2023-05-04 PROCEDURE — 97165 OT EVAL LOW COMPLEX 30 MIN: CPT

## 2023-05-04 PROCEDURE — 71045 X-RAY EXAM CHEST 1 VIEW: CPT

## 2023-05-04 PROCEDURE — 93005 ELECTROCARDIOGRAM TRACING: CPT | Performed by: EMERGENCY MEDICINE

## 2023-05-04 PROCEDURE — 96360 HYDRATION IV INFUSION INIT: CPT

## 2023-05-04 PROCEDURE — G0378 HOSPITAL OBSERVATION PER HR: HCPCS

## 2023-05-04 PROCEDURE — 99285 EMERGENCY DEPT VISIT HI MDM: CPT

## 2023-05-04 PROCEDURE — 99214 OFFICE O/P EST MOD 30 MIN: CPT | Performed by: INTERNAL MEDICINE

## 2023-05-04 PROCEDURE — 85027 COMPLETE CBC AUTOMATED: CPT

## 2023-05-04 PROCEDURE — 3074F SYST BP LT 130 MM HG: CPT | Performed by: INTERNAL MEDICINE

## 2023-05-04 PROCEDURE — 80048 BASIC METABOLIC PNL TOTAL CA: CPT

## 2023-05-04 PROCEDURE — 2580000003 HC RX 258: Performed by: EMERGENCY MEDICINE

## 2023-05-04 PROCEDURE — 93010 ELECTROCARDIOGRAM REPORT: CPT | Performed by: INTERNAL MEDICINE

## 2023-05-04 PROCEDURE — 1123F ACP DISCUSS/DSCN MKR DOCD: CPT | Performed by: INTERNAL MEDICINE

## 2023-05-04 PROCEDURE — 84484 ASSAY OF TROPONIN QUANT: CPT

## 2023-05-04 PROCEDURE — 6370000000 HC RX 637 (ALT 250 FOR IP): Performed by: INTERNAL MEDICINE

## 2023-05-04 PROCEDURE — 93000 ELECTROCARDIOGRAM COMPLETE: CPT | Performed by: INTERNAL MEDICINE

## 2023-05-04 RX ORDER — DILTIAZEM HYDROCHLORIDE 5 MG/ML
10 INJECTION INTRAVENOUS
Status: ACTIVE | OUTPATIENT
Start: 2023-05-04 | End: 2023-05-05

## 2023-05-04 RX ORDER — SODIUM CHLORIDE 9 MG/ML
INJECTION, SOLUTION INTRAVENOUS CONTINUOUS
Status: DISCONTINUED | OUTPATIENT
Start: 2023-05-04 | End: 2023-05-05 | Stop reason: HOSPADM

## 2023-05-04 RX ORDER — 0.9 % SODIUM CHLORIDE 0.9 %
500 INTRAVENOUS SOLUTION INTRAVENOUS ONCE
Status: DISCONTINUED | OUTPATIENT
Start: 2023-05-04 | End: 2023-05-04

## 2023-05-04 RX ORDER — SODIUM CHLORIDE 0.9 % (FLUSH) 0.9 %
10 SYRINGE (ML) INJECTION EVERY 12 HOURS SCHEDULED
Status: DISCONTINUED | OUTPATIENT
Start: 2023-05-04 | End: 2023-05-05 | Stop reason: HOSPADM

## 2023-05-04 RX ORDER — 0.9 % SODIUM CHLORIDE 0.9 %
1000 INTRAVENOUS SOLUTION INTRAVENOUS ONCE
Status: COMPLETED | OUTPATIENT
Start: 2023-05-04 | End: 2023-05-04

## 2023-05-04 RX ORDER — NITROGLYCERIN 0.4 MG/1
0.8 TABLET SUBLINGUAL ONCE
Status: COMPLETED | OUTPATIENT
Start: 2023-05-04 | End: 2023-05-05

## 2023-05-04 RX ORDER — SENNA PLUS 8.6 MG/1
1 TABLET ORAL DAILY PRN
Status: DISCONTINUED | OUTPATIENT
Start: 2023-05-04 | End: 2023-05-05 | Stop reason: HOSPADM

## 2023-05-04 RX ORDER — ACETAMINOPHEN 325 MG/1
650 TABLET ORAL EVERY 6 HOURS PRN
Status: DISCONTINUED | OUTPATIENT
Start: 2023-05-04 | End: 2023-05-05 | Stop reason: HOSPADM

## 2023-05-04 RX ORDER — BETHANECHOL CHLORIDE 25 MG/1
25 TABLET ORAL 3 TIMES DAILY
Status: DISCONTINUED | OUTPATIENT
Start: 2023-05-04 | End: 2023-05-05 | Stop reason: HOSPADM

## 2023-05-04 RX ORDER — TAMSULOSIN HYDROCHLORIDE 0.4 MG/1
0.4 CAPSULE ORAL NIGHTLY
Status: DISCONTINUED | OUTPATIENT
Start: 2023-05-04 | End: 2023-05-05 | Stop reason: HOSPADM

## 2023-05-04 RX ORDER — METOPROLOL TARTRATE 50 MG/1
100 TABLET, FILM COATED ORAL
Status: ACTIVE | OUTPATIENT
Start: 2023-05-04 | End: 2023-05-05

## 2023-05-04 RX ORDER — METOPROLOL SUCCINATE 25 MG/1
100 TABLET, EXTENDED RELEASE ORAL 2 TIMES DAILY
Status: DISCONTINUED | OUTPATIENT
Start: 2023-05-04 | End: 2023-05-05 | Stop reason: HOSPADM

## 2023-05-04 RX ORDER — SODIUM CHLORIDE 0.9 % (FLUSH) 0.9 %
10 SYRINGE (ML) INJECTION PRN
Status: DISCONTINUED | OUTPATIENT
Start: 2023-05-04 | End: 2023-05-05 | Stop reason: HOSPADM

## 2023-05-04 RX ORDER — METOPROLOL TARTRATE 5 MG/5ML
5 INJECTION INTRAVENOUS EVERY 5 MIN PRN
Status: DISCONTINUED | OUTPATIENT
Start: 2023-05-04 | End: 2023-05-05 | Stop reason: HOSPADM

## 2023-05-04 RX ORDER — POLYVINYL ALCOHOL 14 MG/ML
1 SOLUTION/ DROPS OPHTHALMIC 2 TIMES DAILY
Status: DISCONTINUED | OUTPATIENT
Start: 2023-05-04 | End: 2023-05-05 | Stop reason: HOSPADM

## 2023-05-04 RX ORDER — POTASSIUM CHLORIDE 7.45 MG/ML
10 INJECTION INTRAVENOUS PRN
Status: DISCONTINUED | OUTPATIENT
Start: 2023-05-04 | End: 2023-05-05 | Stop reason: HOSPADM

## 2023-05-04 RX ORDER — ONDANSETRON 4 MG/1
4 TABLET, ORALLY DISINTEGRATING ORAL EVERY 8 HOURS PRN
Status: DISCONTINUED | OUTPATIENT
Start: 2023-05-04 | End: 2023-05-04

## 2023-05-04 RX ORDER — ACETAMINOPHEN 650 MG/1
650 SUPPOSITORY RECTAL EVERY 6 HOURS PRN
Status: DISCONTINUED | OUTPATIENT
Start: 2023-05-04 | End: 2023-05-05 | Stop reason: HOSPADM

## 2023-05-04 RX ORDER — DILTIAZEM HYDROCHLORIDE 180 MG/1
180 CAPSULE, COATED, EXTENDED RELEASE ORAL 2 TIMES DAILY
Status: DISCONTINUED | OUTPATIENT
Start: 2023-05-04 | End: 2023-05-05 | Stop reason: HOSPADM

## 2023-05-04 RX ORDER — LANOLIN ALCOHOL/MO/W.PET/CERES
1000 CREAM (GRAM) TOPICAL EVERY MORNING
COMMUNITY

## 2023-05-04 RX ORDER — MAGNESIUM SULFATE IN WATER 40 MG/ML
2000 INJECTION, SOLUTION INTRAVENOUS PRN
Status: DISCONTINUED | OUTPATIENT
Start: 2023-05-04 | End: 2023-05-05 | Stop reason: HOSPADM

## 2023-05-04 RX ORDER — ONDANSETRON 2 MG/ML
4 INJECTION INTRAMUSCULAR; INTRAVENOUS EVERY 6 HOURS PRN
Status: DISCONTINUED | OUTPATIENT
Start: 2023-05-04 | End: 2023-05-04

## 2023-05-04 RX ORDER — SODIUM CHLORIDE 9 MG/ML
INJECTION, SOLUTION INTRAVENOUS PRN
Status: DISCONTINUED | OUTPATIENT
Start: 2023-05-04 | End: 2023-05-05 | Stop reason: HOSPADM

## 2023-05-04 RX ORDER — CALCIUM CARBONATE 200(500)MG
1 TABLET,CHEWABLE ORAL EVERY 6 HOURS PRN
Status: DISCONTINUED | OUTPATIENT
Start: 2023-05-04 | End: 2023-05-05 | Stop reason: HOSPADM

## 2023-05-04 RX ORDER — POTASSIUM CHLORIDE 20 MEQ/1
40 TABLET, EXTENDED RELEASE ORAL PRN
Status: DISCONTINUED | OUTPATIENT
Start: 2023-05-04 | End: 2023-05-05 | Stop reason: HOSPADM

## 2023-05-04 RX ORDER — 0.9 % SODIUM CHLORIDE 0.9 %
1000 INTRAVENOUS SOLUTION INTRAVENOUS ONCE
Status: COMPLETED | OUTPATIENT
Start: 2023-05-05 | End: 2023-05-05

## 2023-05-04 RX ORDER — METOPROLOL TARTRATE 50 MG/1
50 TABLET, FILM COATED ORAL
Status: ACTIVE | OUTPATIENT
Start: 2023-05-04 | End: 2023-05-05

## 2023-05-04 RX ADMIN — SODIUM CHLORIDE, PRESERVATIVE FREE 10 ML: 5 INJECTION INTRAVENOUS at 19:42

## 2023-05-04 RX ADMIN — POLYVINYL ALCOHOL 1 DROP: 14 SOLUTION/ DROPS OPHTHALMIC at 19:42

## 2023-05-04 RX ADMIN — BETHANECHOL CHLORIDE 25 MG: 25 TABLET ORAL at 19:42

## 2023-05-04 RX ADMIN — TAMSULOSIN HYDROCHLORIDE 0.4 MG: 0.4 CAPSULE ORAL at 19:42

## 2023-05-04 RX ADMIN — APIXABAN 5 MG: 5 TABLET, FILM COATED ORAL at 19:42

## 2023-05-04 RX ADMIN — METOPROLOL SUCCINATE 100 MG: 25 TABLET, EXTENDED RELEASE ORAL at 19:41

## 2023-05-04 RX ADMIN — DILTIAZEM HYDROCHLORIDE 180 MG: 180 CAPSULE, COATED, EXTENDED RELEASE ORAL at 19:42

## 2023-05-04 RX ADMIN — SODIUM CHLORIDE: 9 INJECTION, SOLUTION INTRAVENOUS at 15:56

## 2023-05-04 RX ADMIN — SODIUM CHLORIDE 1000 ML: 9 INJECTION, SOLUTION INTRAVENOUS at 12:26

## 2023-05-04 ASSESSMENT — PAIN - FUNCTIONAL ASSESSMENT
PAIN_FUNCTIONAL_ASSESSMENT: NONE - DENIES PAIN

## 2023-05-04 ASSESSMENT — LIFESTYLE VARIABLES
HOW OFTEN DO YOU HAVE A DRINK CONTAINING ALCOHOL: NEVER
HOW MANY STANDARD DRINKS CONTAINING ALCOHOL DO YOU HAVE ON A TYPICAL DAY: PATIENT DOES NOT DRINK

## 2023-05-04 NOTE — CONSULTS
For consult note please see office note dated today 5/4/2023 from Dr Cam Ayers. Patient will be seen tomorrow by Dr. Marni Chan  Electronically signed by Duc Blue.  FAVIO Morales on 5/4/2023 at 3:38 PM

## 2023-05-04 NOTE — ED NOTES
ED to Inpatient Handoff Report    Notified adriane that electronic handoff available and patient ready for transport to room 625. Safety Risks: Hearing problems and Risk of falls    Patient in Restraints: no    Constant Observer or Patient : no    Telemetry Monitoring Ordered :Yes           Order to transfer to unit without monitor:NO    Last MEWS: 2 Time completed: 1348    Vitals:    05/04/23 1155 05/04/23 1226 05/04/23 1348   BP: 92/74 88/68 109/81   Pulse: (!) 125 (!) 123 (!) 119   Resp: 16 14 14   Temp:  97.7 °F (36.5 °C) 97.7 °F (36.5 °C)   TempSrc:  Oral Oral   SpO2: 98% 94% 96%   Weight: 170 lb (77.1 kg)     Height: 6' 2\" (1.88 m)         Opportunity for questions and clarification was provided.         Cuong Springer RN  05/04/23 6057

## 2023-05-04 NOTE — PROGRESS NOTES
Spoke with Jamal (ext [phone]) in CT regarding coronary CTA to be done 5/5/2023 AFTER DCCV (scheduled @ noon). Electronically signed by Lisseth Fulton.  FAVIO Hernandez on 5/4/2023 at 3:01 PM

## 2023-05-04 NOTE — PROGRESS NOTES
Spoke with floor RN. Patient needs to have 20 diffusics IV or 18G in his right [facility] for procedure. Patient to be NPO at 0000.

## 2023-05-04 NOTE — ED PROVIDER NOTES
process. ------------------------- NURSING NOTES AND VITALS REVIEWED ---------------------------   The nursing notes within the ED encounter and vital signs as below have been reviewed. BP 88/68   Pulse (!) 123   Temp 97.7 °F (36.5 °C) (Oral)   Resp 14   Ht 6' 2\" (1.88 m)   Wt 170 lb (77.1 kg)   SpO2 94%   BMI 21.83 kg/m²   Oxygen Saturation Interpretation: Normal      ---------------------------------------------------PHYSICAL EXAM--------------------------------------      Constitutional/General: Alert and oriented x3, well appearing, non toxic in NAD  Head: NC/AT  Eyes: PERRL, EOMI  Nose:  Nares patent. No congestion or discharge noted. Ears:  TM's intact without erythema or perforation. External canal without swelling  Mouth: Oropharynx clear, handling secretions, no trismus  Neck: Supple, full ROM, no meningeal signs  Pulmonary: Lungs Clear to auscultation bilaterally. No rales or rhonchi or wheezes noted. No retractions. Cardiovascular: Tachycardia with regular rhythm  Chest:  No tenderness, deformity or crepitus  Abdomen: Soft, non tender, non distended, normal bowel sounds  Back:  No tenderness to palpation on the cervical or thoracic or lumbar spine  Extremities: Moves all extremities x 4. Warm and well perfused  Skin: warm and dry without rash  Neurologic: GCS 15, no focal acute neurological deficit  Psych: Normal Affect    EKG:  Sinus tachycardia with ventricular rate of 124. IL interval, QRS duration and QT interval within normal range. Normal axis. No ST segment abnormalities to suggest acute ischemia.      ------------------------------ ED COURSE/MEDICAL DECISION MAKING----------------------  Medications   0.9 % sodium chloride bolus (1,000 mLs IntraVENous New Bag 5/4/23 1226)            Medical Decision Making:    Remains persistently tachycardic. No distress and no complaints.   At the request of cardiology, internal medicine was consulted and will place the patient

## 2023-05-04 NOTE — CARE COORDINATION
Internal Medicine On-call Care Coordination Note    I was called by the ED physician because they recommended admission for this patient and I cover their PCP. The history as I understand it after discussion with the ED physician is as follows: He presented to the hospital for possible atrial flutter  He was tachycardic but according to the ED and sinus tachycardia  Cardiology wanted him To be monitored overnight on telemetry floor    I placed admission orders. Including:    Cardiology consultation  Gentle IVF    Either Dr. Hayde Cummins or Dr. Katie Acevedo, or our coverage will see the patient tomorrow for H&P.     Electronically signed by Eunice Ashford DO on 5/4/2023 at 2:32 PM

## 2023-05-04 NOTE — PROGRESS NOTES
Database initiated. Patient is A&O from home. States he uses a cane to ambulate with his LLE prosthesis and is RA at baseline.

## 2023-05-04 NOTE — PROGRESS NOTES
OCCUPATIONAL THERAPY INITIAL EVALUATION    [facility] [address] [facility]   [address], [address]        Date:2023                                                  Patient Name: Solis Wilkes. MRN: [id number]    : 1955    Room: [address] [place], [place] [address]     Evaluating OT: Senia January OTR/L #[id number]    Referring Provider: Mignon Aguirre DO    Specific Provider Orders/Date: OT Eval and Treat, 23     Diagnosis:   1. Tachycardia         Surgery: None       Pertinent Medical History:       Past Medical History:   Diagnosis Date    Employs prosthetic leg     left    History of atrial fibrillation     [place] (hard of hearing)     uses bilat hearing aides    Hx of AKA (above knee amputation), left ([address]     Hx of necrotizing fasciitis     left leg    Hypertension     Rectal bleeding     Rectal cancer ([facility] [address] 2017    rectal         Past Surgical History:   Procedure Laterality Date    ABDOMEN SURGERY  2018    ileostomy open take down    ABOVE KNEE AMPUTATION      left; hx flesh eating bacteria    CARDIOVERSION  2020    COLON SURGERY  2018    laprascopic low anterior colon resection  take down splenic fexure   ileostomy    COLONOSCOPY  10/21/2011    COLONOSCOPY N/A 2022    COLONOSCOPY POLYPECTOMY SNARE/COLD BIOPSY performed by Robe Menjivar MD at [address] Left 10/03/2016    pars plana vitrectomy, retinal detachment repair, laser gas/fluid exchange    EYE SURGERY Bilateral ?     bilat cataracts w lens implants    EYE SURGERY Right ?    retinal detachment    HEMICOLECTOMY Right 3/22/2022    LAPAROSCOPIC RIGHT HEMICOLECTOMY performed by Robe Menjivar MD at [address] [place] [address] N/A 3/20/2018    ILEOSTOMY OPEN TAKEDOWN performed by Robe Menjivar MD at [facility] OR      Precautions:  Fall Risk, left above knee amputation, wears prosthetic      Assessment of current deficits    [x]

## 2023-05-04 NOTE — PATIENT INSTRUCTIONS
Patient sent to ER for further management of atrial flutter with RVR and also coronary cTA  to rule out obstructive CAD due to abnormal stress test.   Continue Eliquis 5 mg p.o. twice daily for anticoagulation. Will continue Toprol- mg p.o. twice daily along with a Cardizem  mg p.o. twice daily for rate control. Follow up with EP service due to recurrent episodes of A-fib as scheduled. Lexiscan nuclear stress test results reviewed. He was advised to drink at least 64 oz of fluids a day. Follow-up with me in 6  weeks.

## 2023-05-04 NOTE — PROGRESS NOTES
OUTPATIENT CARDIOLOGY FOLLOW-UP    Name: Aj Rudolph. Age: 69 y.o. Primary Care Physician: Phil Muller MD    Date of Service: 5/4/2023    Chief Complaint:   Chief Complaint   Patient presents with    Atrial Flutter    Abnormal Test Results       Interim History:   Mr. Danilo Alvarez is a 64-year-old  male with history of hypertension, rectal cancer diagnosed March 2018 underwent anterior posterior resection, followed by colostomy and colostomy reversal in March 2020, history of left lower extremity amputation and 2011 secondary to fasciitis, history of retinal detachment status post repair, right inguinal hernia repair who presented to the hospital with tachycardia. He was seen as a new consult on 12/20/2020 for new onset atrial fibrillation RVR. He underwent ОЛЬГА guided cardioversion on 12/20/2020 and was successfully converted to sinus rhythm. He was initiated on Eliquis for anticoagulation and metoprolol for rate control and was discharged home. He is tolerating Eliquis without any bleeding complications. He is compliant with medications, as well as salt and fluid intake. He does not take any over-the-counter arthritis medications. He  was hospitalized from 4/7/2022 to 4/15/2022 with a urine tract infection and sepsis. He was diagnosed with a Klebsiella pneumonia sepsis and also atrial fibrillation/flutter and underwent cardioversion and on 4/15/2022 and was successfully converted to sinus rhythm. Following hospital discharge, patient went into rehab and underwent rehab. He was seen in the office on 4/423, since his last visit, he has not had any hospitalizations or ER visits. Patient was hospitalized from 3/25/2023 to 3/30/2023 with A-fib with RVR and also had a urinary obstruction secondary to BPH with outlet obstruction. Patient underwent cardioversion on 3/25/2023 and was successfully converted to normal sinus rhythm and remains in sinus rhythm.   He was initiated

## 2023-05-05 ENCOUNTER — ANESTHESIA (OUTPATIENT)
Dept: INPATIENT UNIT | Age: 68
End: 2023-05-05
Payer: MEDICARE

## 2023-05-05 ENCOUNTER — APPOINTMENT (OUTPATIENT)
Dept: INPATIENT UNIT | Age: 68
End: 2023-05-05
Payer: MEDICARE

## 2023-05-05 ENCOUNTER — APPOINTMENT (OUTPATIENT)
Dept: CT IMAGING | Age: 68
End: 2023-05-05
Payer: MEDICARE

## 2023-05-05 ENCOUNTER — ANESTHESIA EVENT (OUTPATIENT)
Dept: INPATIENT UNIT | Age: 68
End: 2023-05-05
Payer: MEDICARE

## 2023-05-05 VITALS
HEIGHT: 74 IN | BODY MASS INDEX: 21.82 KG/M2 | RESPIRATION RATE: 18 BRPM | TEMPERATURE: 97.8 F | WEIGHT: 170 LBS | SYSTOLIC BLOOD PRESSURE: 118 MMHG | HEART RATE: 55 BPM | OXYGEN SATURATION: 96 % | DIASTOLIC BLOOD PRESSURE: 70 MMHG

## 2023-05-05 LAB
ALBUMIN SERPL-MCNC: 3.2 G/DL (ref 3.5–5.2)
ALP SERPL-CCNC: 74 U/L (ref 40–129)
ALT SERPL-CCNC: 9 U/L (ref 0–40)
ANION GAP SERPL CALCULATED.3IONS-SCNC: 7 MMOL/L (ref 7–16)
AST SERPL-CCNC: 14 U/L (ref 0–39)
BASOPHILS # BLD: 0.04 E9/L (ref 0–0.2)
BASOPHILS NFR BLD: 0.7 % (ref 0–2)
BILIRUB SERPL-MCNC: 0.8 MG/DL (ref 0–1.2)
BUN SERPL-MCNC: 17 MG/DL (ref 6–23)
CALCIUM SERPL-MCNC: 8.2 MG/DL (ref 8.6–10.2)
CHLORIDE SERPL-SCNC: 106 MMOL/L (ref 98–107)
CO2 SERPL-SCNC: 24 MMOL/L (ref 22–29)
CREAT SERPL-MCNC: 1.3 MG/DL (ref 0.7–1.2)
EKG ATRIAL RATE: 54 BPM
EKG P-R INTERVAL: 206 MS
EKG Q-T INTERVAL: 460 MS
EKG QRS DURATION: 80 MS
EKG QTC CALCULATION (BAZETT): 436 MS
EKG R AXIS: 148 DEGREES
EKG T AXIS: 162 DEGREES
EKG VENTRICULAR RATE: 54 BPM
EOSINOPHIL # BLD: 0.19 E9/L (ref 0.05–0.5)
EOSINOPHIL NFR BLD: 3.3 % (ref 0–6)
ERYTHROCYTE [DISTWIDTH] IN BLOOD BY AUTOMATED COUNT: 13.5 FL (ref 11.5–15)
GLUCOSE SERPL-MCNC: 96 MG/DL (ref 74–99)
HCT VFR BLD AUTO: 37.3 % (ref 37–54)
HGB BLD-MCNC: 11.8 G/DL (ref 12.5–16.5)
IMM GRANULOCYTES # BLD: 0.01 E9/L
IMM GRANULOCYTES NFR BLD: 0.2 % (ref 0–5)
LYMPHOCYTES # BLD: 1.67 E9/L (ref 1.5–4)
LYMPHOCYTES NFR BLD: 28.9 % (ref 20–42)
MAGNESIUM SERPL-MCNC: 2 MG/DL (ref 1.6–2.6)
MCH RBC QN AUTO: 26.8 PG (ref 26–35)
MCHC RBC AUTO-ENTMCNC: 31.6 % (ref 32–34.5)
MCV RBC AUTO: 84.8 FL (ref 80–99.9)
MONOCYTES # BLD: 0.78 E9/L (ref 0.1–0.95)
MONOCYTES NFR BLD: 13.5 % (ref 2–12)
NEUTROPHILS # BLD: 3.09 E9/L (ref 1.8–7.3)
NEUTS SEG NFR BLD: 53.4 % (ref 43–80)
PLATELET # BLD AUTO: 149 E9/L (ref 130–450)
PMV BLD AUTO: 8.9 FL (ref 7–12)
POTASSIUM SERPL-SCNC: 3.4 MMOL/L (ref 3.5–5)
PROT SERPL-MCNC: 5.5 G/DL (ref 6.4–8.3)
RBC # BLD AUTO: 4.4 E12/L (ref 3.8–5.8)
SODIUM SERPL-SCNC: 137 MMOL/L (ref 132–146)
WBC # BLD: 5.8 E9/L (ref 4.5–11.5)

## 2023-05-05 PROCEDURE — 75574 CT ANGIO HRT W/3D IMAGE: CPT | Performed by: INTERNAL MEDICINE

## 2023-05-05 PROCEDURE — 6370000000 HC RX 637 (ALT 250 FOR IP): Performed by: INTERNAL MEDICINE

## 2023-05-05 PROCEDURE — 92960 CARDIOVERSION ELECTRIC EXT: CPT | Performed by: INTERNAL MEDICINE

## 2023-05-05 PROCEDURE — 3700000000 HC ANESTHESIA ATTENDED CARE

## 2023-05-05 PROCEDURE — 7100000010 HC PHASE II RECOVERY - FIRST 15 MIN

## 2023-05-05 PROCEDURE — 6360000002 HC RX W HCPCS

## 2023-05-05 PROCEDURE — 36415 COLL VENOUS BLD VENIPUNCTURE: CPT

## 2023-05-05 PROCEDURE — 2580000003 HC RX 258: Performed by: NURSE PRACTITIONER

## 2023-05-05 PROCEDURE — 99223 1ST HOSP IP/OBS HIGH 75: CPT | Performed by: INTERNAL MEDICINE

## 2023-05-05 PROCEDURE — 6360000004 HC RX CONTRAST MEDICATION: Performed by: RADIOLOGY

## 2023-05-05 PROCEDURE — 75574 CT ANGIO HRT W/3D IMAGE: CPT

## 2023-05-05 PROCEDURE — 83735 ASSAY OF MAGNESIUM: CPT

## 2023-05-05 PROCEDURE — 6370000000 HC RX 637 (ALT 250 FOR IP): Performed by: NURSE PRACTITIONER

## 2023-05-05 PROCEDURE — 2060000000 HC ICU INTERMEDIATE R&B

## 2023-05-05 PROCEDURE — 80053 COMPREHEN METABOLIC PANEL: CPT

## 2023-05-05 PROCEDURE — 7100000011 HC PHASE II RECOVERY - ADDTL 15 MIN

## 2023-05-05 PROCEDURE — 93005 ELECTROCARDIOGRAM TRACING: CPT | Performed by: INTERNAL MEDICINE

## 2023-05-05 PROCEDURE — 93010 ELECTROCARDIOGRAM REPORT: CPT | Performed by: INTERNAL MEDICINE

## 2023-05-05 PROCEDURE — 2580000003 HC RX 258: Performed by: INTERNAL MEDICINE

## 2023-05-05 PROCEDURE — 92960 CARDIOVERSION ELECTRIC EXT: CPT

## 2023-05-05 PROCEDURE — 5A2204Z RESTORATION OF CARDIAC RHYTHM, SINGLE: ICD-10-PCS | Performed by: INTERNAL MEDICINE

## 2023-05-05 PROCEDURE — 2500000003 HC RX 250 WO HCPCS

## 2023-05-05 PROCEDURE — 3700000001 HC ADD 15 MINUTES (ANESTHESIA)

## 2023-05-05 PROCEDURE — 85025 COMPLETE CBC W/AUTO DIFF WBC: CPT

## 2023-05-05 RX ORDER — ATORVASTATIN CALCIUM 40 MG/1
40 TABLET, FILM COATED ORAL DAILY
Qty: 30 TABLET | Refills: 3 | Status: SHIPPED | OUTPATIENT
Start: 2023-05-05

## 2023-05-05 RX ORDER — PROPOFOL 10 MG/ML
INJECTION, EMULSION INTRAVENOUS PRN
Status: DISCONTINUED | OUTPATIENT
Start: 2023-05-05 | End: 2023-05-05 | Stop reason: SDUPTHER

## 2023-05-05 RX ORDER — ASPIRIN 81 MG/1
81 TABLET ORAL DAILY
Qty: 90 TABLET | Refills: 1 | Status: SHIPPED | OUTPATIENT
Start: 2023-05-05

## 2023-05-05 RX ORDER — LIDOCAINE HYDROCHLORIDE 10 MG/ML
INJECTION, SOLUTION EPIDURAL; INFILTRATION; INTRACAUDAL; PERINEURAL PRN
Status: DISCONTINUED | OUTPATIENT
Start: 2023-05-05 | End: 2023-05-05 | Stop reason: SDUPTHER

## 2023-05-05 RX ADMIN — LIDOCAINE HYDROCHLORIDE 20 MG: 10 INJECTION, SOLUTION EPIDURAL; INFILTRATION; INTRACAUDAL; PERINEURAL at 09:54

## 2023-05-05 RX ADMIN — NITROGLYCERIN 0.8 MG: 0.4 TABLET, ORALLY DISINTEGRATING SUBLINGUAL at 13:22

## 2023-05-05 RX ADMIN — BETHANECHOL CHLORIDE 25 MG: 25 TABLET ORAL at 08:01

## 2023-05-05 RX ADMIN — PROPOFOL 100 MG: 10 INJECTION, EMULSION INTRAVENOUS at 09:54

## 2023-05-05 RX ADMIN — IOPAMIDOL 81 ML: 755 INJECTION, SOLUTION INTRAVENOUS at 13:34

## 2023-05-05 RX ADMIN — SODIUM CHLORIDE 1000 ML: 9 INJECTION, SOLUTION INTRAVENOUS at 11:26

## 2023-05-05 RX ADMIN — SODIUM CHLORIDE, PRESERVATIVE FREE 10 ML: 5 INJECTION INTRAVENOUS at 08:01

## 2023-05-05 RX ADMIN — SODIUM CHLORIDE: 9 INJECTION, SOLUTION INTRAVENOUS at 05:18

## 2023-05-05 RX ADMIN — DILTIAZEM HYDROCHLORIDE 180 MG: 180 CAPSULE, COATED, EXTENDED RELEASE ORAL at 08:01

## 2023-05-05 RX ADMIN — METOPROLOL SUCCINATE 100 MG: 25 TABLET, EXTENDED RELEASE ORAL at 08:01

## 2023-05-05 RX ADMIN — POLYVINYL ALCOHOL 1 DROP: 14 SOLUTION/ DROPS OPHTHALMIC at 08:02

## 2023-05-05 RX ADMIN — APIXABAN 5 MG: 5 TABLET, FILM COATED ORAL at 08:01

## 2023-05-05 ASSESSMENT — PAIN - FUNCTIONAL ASSESSMENT: PAIN_FUNCTIONAL_ASSESSMENT: NONE - DENIES PAIN

## 2023-05-05 NOTE — CONSULTS
CHIEF COMPLAINT: Afib with rvr    HISTORY OF PRESENT ILLNESS:     Mr. Wade King is a 56-year-old  male with history of hypertension, rectal cancer diagnosed March 2018 underwent anterior posterior resection, followed by colostomy and colostomy reversal in March 2020, history of left lower extremity amputation and 2011 secondary to fasciitis, history of retinal detachment status post repair, right inguinal hernia repair who presented to the hospital with tachycardia. He was seen as a new consult on 12/20/2020 for new onset atrial fibrillation RVR. He underwent ОЛЬГА guided cardioversion on 12/20/2020 and was successfully converted to sinus rhythm. He was initiated on Eliquis for anticoagulation and metoprolol for rate control and was discharged home. He is tolerating Eliquis without any bleeding complications. He is compliant with medications, as well as salt and fluid intake. He does not take any over-the-counter arthritis medications. He  was hospitalized from 4/7/2022 to 4/15/2022 with a urine tract infection and sepsis. He was diagnosed with a Klebsiella pneumonia sepsis and also atrial fibrillation/flutter and underwent cardioversion and on 4/15/2022 and was successfully converted to sinus rhythm. Following hospital discharge, patient went into rehab and underwent rehab. Patient was hospitalized from 3/25/2023 to 3/30/2023 with A-fib with RVR and also had a urinary obstruction secondary to BPH with outlet obstruction. Patient underwent cardioversion on 3/25/2023 and was successfully converted to normal sinus rhythm and remains in sinus rhythm. He was initiated on Cardizem  mg p.o. twice daily. He is still taking Toprol- mg p.o. twice daily. Patient was evaluated by the urology service for bladder outlet obstruction and was initiated on Flomax 0.4 mg p.o. daily and bethanechol 25 mg p.o. 3 times a day.      He lives at home by himself, his daughter helps him in his day to

## 2023-05-05 NOTE — PROGRESS NOTES
Patient Name: Kennedi Chin. Medical Record Number: [id number]  Date: 5/5/2023   Time: 10:13 AM   Room/Bed: 25/0625-A  Cardioversion Procedure Note  Indication: atrial fibrillation    Consent: The patient was counseled regarding the procedure, its indications, risks, potential complications and alternatives, and any questions were answered. Consent was obtained to proceed. Pre-Medication: none    Procedure: The patient was placed in the supine position and the chest area was exposed. The cardioversion pads were applied in the standard manner and configuration. Attempt #1: The defibrillator was set on the synchronous mode and charged to 200 joules. A charge was then delivered which resulted in conversion to sinus bradycardia. Attempt #2: Not necessary    Attempt #3: Not necessary    The patient tolerated the procedure well.     Complications: None    Electronically Signed by: Kostas Ballesteros RN

## 2023-05-05 NOTE — PLAN OF CARE
Problem: ABCDS Injury Assessment  Goal: Absence of physical injury  Outcome: Completed     Problem: Discharge Planning  Goal: Discharge to home or other facility with appropriate resources  Outcome: Completed     Problem: Pain  Goal: Verbalizes/displays adequate comfort level or baseline comfort level  Outcome: Completed     Problem: Safety - Adult  Goal: Free from fall injury  Outcome: Completed

## 2023-05-05 NOTE — OP NOTE
Operative Note      Patient: Reginald Wiseman YOB: 1955  MRN: [id number]    Date of Procedure: 5/5/2023    Cardioversion: Consent for operation or procedure: Risks and benefits discussed with patient and consent obtained    Diagnosis: Atrial fibrillation. EBL: 0 ml    The appropriate time-out procedure was performed including proper identification of the patient, physician, procedure, documentation, and there were no safety issues identified. The patient participated actively in this. After sedation was achieved, the patient  was placed in the supine position and hands free patches were placed on his chest in the AP position. 1 shock was provided at, 200 Joules with successful restoration of normal sinus rhythm. Repeat EKG confirms return to sinus rhythm. Complications: The patient tolerated the procedure well without complications.     Nhi Parikh MD  Cardiologist  Cardiology, [facility]) Physicians      Electronically signed by Nhi Parikh MD on 5/5/2023 at 9:57 AM

## 2023-05-05 NOTE — CARE COORDINATION
Social Work/Discharge Planning:  Social Work consult noted. Met with patient and completed initial assessment. Explained Social Work role and discussed transition of care/discharge planning. He will have a ОЛЬГА today. He lives with his mother and sister in  one story house with three steps to enter. PTA he uses a quad cane. Has a left prosthetic leg. He denies any history with hhc or snf. His PCP is Dr. Ryan Arias. He can not recall his pharmacy. Patient states plan is home at discharge and denies any home care needs at this time. Will continue to follow. Case Management Assessment  Initial Evaluation    Date/Time of Evaluation: 5/5/2023 1:25 PM  Assessment Completed by: TAMI Ryder    If patient is discharged prior to next notation, then this note serves as note for discharge by case management. Patient Name: Harrison Torres YOB: 1955  Diagnosis: Tachycardia [R00.0]                   Date / Time: 5/4/2023 11:55 AM    Patient Admission Status: Inpatient   Readmission Risk (Low < 19, Mod (19-27), High > 27): Readmission Risk Score: 19.1    Current PCP: Easton Rowe MD  PCP verified by CM? Yes (Dr. Alejandro Branham MD)    Chart Reviewed: Yes      History Provided by: Patient  Patient Orientation: Alert and Oriented    Patient Cognition: Alert    Hospitalization in the last 30 days (Readmission):  No    If yes, Readmission Assessment in  Navigator will be completed.     Advance Directives:      Code Status: Full Code   Patient's Primary Decision Maker is: Legal Next of Kin    Primary Decision MakerArtis Galeazzi - Brother/Sister - [phone]    Secondary Decision Maker: Floyd Dunn - Brother/Sister - [phone]    Discharge Planning:    Patient lives with: Alone Type of Home: House  Primary Care Giver: Self  Patient Support Systems include: Family Members   Current Financial resources:    Current community resources:    Current

## 2023-05-05 NOTE — PROGRESS NOTES
Patient taken to cat scan for coronary cta. Placed on ct table, all monitors connected to the patient. Scanning started @ 0967 . Nitro given @ 22 [id number]. Procedure completed @ .4840 Patient taken off of ct table and transferred back to bed 625. Report given to floor RN.

## 2023-05-05 NOTE — ANESTHESIA PRE PROCEDURE
10/03/2016    pars plana vitrectomy, retinal detachment repair, laser gas/fluid exchange    EYE SURGERY Bilateral 2002? bilat cataracts w lens implants    EYE SURGERY Right ?    retinal detachment    HEMICOLECTOMY Right 3/22/2022    LAPAROSCOPIC RIGHT HEMICOLECTOMY performed by Nida Mcdowell MD at [address] [place] [address] N/A 3/20/2018    ILEOSTOMY OPEN TAKEDOWN performed by Nida Mcdowell MD at [address] [place] [address] History:    Social History     Tobacco Use    Smoking status: Former     Packs/day: 2.00     Years: 35.00     Pack years: 70.00     Types: Cigarettes     Start date: 1974     Quit date: 2010     Years since quittin.4    Smokeless tobacco: Never   Substance Use Topics    Alcohol use: No                                Counseling given: Not Answered      Vital Signs (Current):   Vitals:    23 1858 23 2318 23 0308 23 0702   BP: 104/68 95/67 107/76 107/75   Pulse: (!) 119 (!) 117 (!) 118 (!) 121   Resp: 18 18 18 18   Temp: 36.6 °C (97.9 °F) 36.7 °C (98.1 °F) 36.8 °C (98.3 °F) 36.9 °C (98.5 °F)   TempSrc: Oral Oral Oral Oral   SpO2: 94% 93% 91% 92%   Weight:       Height:                                                  BP Readings from Last 3 Encounters:   23 107/75   23 102/82   23 120/70       NPO Status:                                                                                 BMI:   Wt Readings from Last 3 Encounters:   23 170 lb (77.1 kg)   23 172 lb (78 kg)   23 175 lb (79.4 kg)     Body mass index is 21.83 kg/m².     CBC:   Lab Results   Component Value Date/Time    WBC 5.8 2023 02:00 AM    RBC 4.40 2023 02:00 AM    HGB 11.8 2023 02:00 AM    HCT 37.3 2023 02:00 AM    MCV 84.8 2023 02:00 AM    RDW 13.5 2023 02:00 AM     2023 02:00 AM       CMP:   Lab Results   Component Value Date/Time     2023 02:00 AM    K 3.4 2023

## 2023-05-05 NOTE — PLAN OF CARE
Problem: ABCDS Injury Assessment  Goal: Absence of physical injury  5/4/2023 2222 by Meera Vazquez RN  Outcome: Progressing  5/4/2023 1546 by Kristi Machuca RN  Outcome: Progressing     Problem: Discharge Planning  Goal: Discharge to home or other facility with appropriate resources  5/4/2023 2222 by Meera Vazquez RN  Outcome: Progressing  5/4/2023 1546 by Kristi Machuca RN  Outcome: Progressing  Flowsheets (Taken 5/4/2023 1341 by Wei Desai RN)  Discharge to home or other facility with appropriate resources: Refer to discharge planning if patient needs post-hospital services based on physician order or complex needs related to functional status, cognitive ability or social support system     Problem: Pain  Goal: Verbalizes/displays adequate comfort level or baseline comfort level  Outcome: Progressing     Problem: Safety - Adult  Goal: Free from fall injury  Outcome: Progressing

## 2023-05-05 NOTE — ANESTHESIA POSTPROCEDURE EVALUATION
Department of Anesthesiology  Postprocedure Note    Patient: Roni Gloria MRN: [id number]  YOB: 1955  Date of evaluation: 5/5/2023      Procedure Summary     Date: 05/05/23 Room / Location: [facility] Stage I    Anesthesia Start: 6831 Anesthesia Stop: 7927    Procedure: CARDIOVERSION Diagnosis:     Scheduled Providers:  Responsible Provider: Arlette Lesches, DO    Anesthesia Type: MAC ASA Status: 3          Anesthesia Type: No value filed.     Jolene Phase I:      Jolene Phase II: Jolene Score: 10      Anesthesia Post Evaluation    Patient location during evaluation: PACU  Patient participation: complete - patient participated  Level of consciousness: awake and alert  Pain score: 0  Airway patency: patent  Nausea & Vomiting: no nausea  Complications: no  Cardiovascular status: blood pressure returned to baseline  Respiratory status: acceptable  Hydration status: euvolemic

## 2023-05-05 NOTE — ACP (ADVANCE CARE PLANNING)
Advance Care Planning   Healthcare Decision Maker:    Primary Decision Maker: [place] Chiquis Brother/Sister - [phone]    Secondary Decision Maker: Louise Rodriguez - Brother/Sister - [phone]    Click here to complete Healthcare Decision Makers including selection of the Healthcare Decision Maker Relationship (ie \"Primary\"). Today we documented Decision Maker(s) consistent with Legal Next of Kin hierarchy.

## 2023-05-05 NOTE — PROGRESS NOTES
Nursing Transfer Note    Data:  Summary of patients progress: S/P direct current cardioversion  Reason for transfer: inpatient hospital stay    Action:  Explained reason for transfer to Patient. Report given to: DESTINY Weiss, using RN Handoff Navigator.   Mode of transportation: bed    Response:  RN Recommendations: See notes/MAR/orders

## 2023-05-05 NOTE — H&P
Internal Medicine History & Physical     Name: Paul Sloan. : 1955  Chief Complaint: Irregular Heart Beat (Sent in by cardiology to be admitted, pt was in a-flutter)  Primary Care Physician: Rosario Prescott MD  Admission date: 2023  Date of service: 2023     History of Present Akiko Nj is a 69y.o. year old male who presented with a chief complaint of irregular heart rhythm, abnormal stress test     Patient whom is a poor historian presented to the hospital upon the recommendation of his cardiologist for an abnormal heart rhythm I.e. atrial flutter with rvr and also an abnormal stress test showing reversible ischemia. He was taken for a successful cardioversion today with Dr Arlyn Velez. He is scheduled for a CTA coronary today although during attempts at slowing his HR for the imaging his blood pressure did drop so it was pushed back some. When I saw him he was comfortable and in NAD. He had no complaints of chest pain shortness of breath or any other complaint. Discussed with nursing staff.        Past Medical History:   Diagnosis Date    Employs prosthetic leg     left    History of atrial fibrillation     [place] (hard of hearing)     uses bilat hearing aides    Hx of AKA (above knee amputation), left ([facility])     Hx of necrotizing fasciitis     left leg    Hypertension     Rectal bleeding     Rectal cancer ([address] 2017    rectal       Past Surgical History:   Procedure Laterality Date    ABDOMEN SURGERY  2018    ileostomy open take down    ABOVE KNEE AMPUTATION      left; hx flesh eating bacteria    CARDIOVERSION  2020    COLON SURGERY  2018    laprascopic low anterior colon resection  take down splenic fexure   ileostomy    COLONOSCOPY  10/21/2011    COLONOSCOPY N/A 2022    COLONOSCOPY POLYPECTOMY SNARE/COLD BIOPSY performed by Arjun Terrell MD at [address] Left 10/03/2016    pars plana vitrectomy, retinal detachment repair, laser

## 2023-05-08 ENCOUNTER — TELEPHONE (OUTPATIENT)
Dept: ADMINISTRATIVE | Age: 68
End: 2023-05-08

## 2023-05-08 LAB
EKG ATRIAL RATE: 53 BPM
EKG P AXIS: 60 DEGREES
EKG P-R INTERVAL: 202 MS
EKG Q-T INTERVAL: 436 MS
EKG QRS DURATION: 86 MS
EKG QTC CALCULATION (BAZETT): 409 MS
EKG R AXIS: 44 DEGREES
EKG T AXIS: 31 DEGREES
EKG VENTRICULAR RATE: 53 BPM

## 2023-05-08 NOTE — TELEPHONE ENCOUNTER
Pt's sister Iliana Contreras called for HFU apt/timing with Dr. Dayami Choudhary dx Afib RVR; discharged on: 5/5/23 - states he is supposed to be scheduled for a cath.

## 2023-05-09 NOTE — TELEPHONE ENCOUNTER
Patient's sister Keith Randolph) notified of Dr. Kennedy's recommendation. F/U scheduled for 5/22/23 at 3:40 p.m. Sister would like to know if patient is okay to proceed with colonoscopy on 5/19/23.

## 2023-05-09 NOTE — DISCHARGE SUMMARY
The patient was discharged on the same day as history and physical.  Please see history and physical as well as imaging studies, consult and evaluations, and nurse's notes.     Electronically signed by Dafne Turcios MD on 5/9/2023 at 9:25 AM

## 2023-05-11 ENCOUNTER — OFFICE VISIT (OUTPATIENT)
Dept: PRIMARY CARE CLINIC | Age: 68
End: 2023-05-11
Payer: MEDICARE

## 2023-05-11 VITALS
BODY MASS INDEX: 21.94 KG/M2 | DIASTOLIC BLOOD PRESSURE: 64 MMHG | SYSTOLIC BLOOD PRESSURE: 116 MMHG | HEIGHT: 74 IN | HEART RATE: 67 BPM | OXYGEN SATURATION: 94 % | WEIGHT: 171 LBS | RESPIRATION RATE: 16 BRPM

## 2023-05-11 DIAGNOSIS — I10 PRIMARY HYPERTENSION: Primary | ICD-10-CM

## 2023-05-11 DIAGNOSIS — I25.10 CORONARY ARTERY DISEASE INVOLVING NATIVE CORONARY ARTERY OF NATIVE HEART WITHOUT ANGINA PECTORIS: ICD-10-CM

## 2023-05-11 DIAGNOSIS — I73.9 PAD (PERIPHERAL ARTERY DISEASE) (HCC): Chronic | ICD-10-CM

## 2023-05-11 DIAGNOSIS — I48.0 PAF (PAROXYSMAL ATRIAL FIBRILLATION) (HCC): Chronic | ICD-10-CM

## 2023-05-11 PROBLEM — R00.0 TACHYCARDIA: Status: RESOLVED | Noted: 2023-05-04 | Resolved: 2023-05-11

## 2023-05-11 PROBLEM — E44.0 MODERATE PROTEIN-CALORIE MALNUTRITION (HCC): Status: RESOLVED | Noted: 2022-04-14 | Resolved: 2023-05-11

## 2023-05-11 PROBLEM — C20 RECTAL CANCER (HCC): Chronic | Status: RESOLVED | Noted: 2018-01-23 | Resolved: 2023-05-11

## 2023-05-11 PROBLEM — I48.92 ATRIAL FLUTTER (HCC): Status: RESOLVED | Noted: 2020-12-19 | Resolved: 2023-05-11

## 2023-05-11 PROCEDURE — 1123F ACP DISCUSS/DSCN MKR DOCD: CPT | Performed by: FAMILY MEDICINE

## 2023-05-11 PROCEDURE — 3078F DIAST BP <80 MM HG: CPT | Performed by: FAMILY MEDICINE

## 2023-05-11 PROCEDURE — 3074F SYST BP LT 130 MM HG: CPT | Performed by: FAMILY MEDICINE

## 2023-05-11 PROCEDURE — 99203 OFFICE O/P NEW LOW 30 MIN: CPT | Performed by: FAMILY MEDICINE

## 2023-05-11 RX ORDER — BETHANECHOL CHLORIDE 25 MG/1
25 TABLET ORAL 3 TIMES DAILY
Qty: 270 TABLET | Refills: 3 | Status: SHIPPED | OUTPATIENT
Start: 2023-05-11

## 2023-05-11 SDOH — ECONOMIC STABILITY: FOOD INSECURITY: WITHIN THE PAST 12 MONTHS, THE FOOD YOU BOUGHT JUST DIDN'T LAST AND YOU DIDN'T HAVE MONEY TO GET MORE.: PATIENT DECLINED

## 2023-05-11 SDOH — ECONOMIC STABILITY: INCOME INSECURITY: HOW HARD IS IT FOR YOU TO PAY FOR THE VERY BASICS LIKE FOOD, HOUSING, MEDICAL CARE, AND HEATING?: PATIENT DECLINED

## 2023-05-11 SDOH — ECONOMIC STABILITY: FOOD INSECURITY: WITHIN THE PAST 12 MONTHS, YOU WORRIED THAT YOUR FOOD WOULD RUN OUT BEFORE YOU GOT MONEY TO BUY MORE.: PATIENT DECLINED

## 2023-05-11 SDOH — ECONOMIC STABILITY: HOUSING INSECURITY
IN THE LAST 12 MONTHS, WAS THERE A TIME WHEN YOU DID NOT HAVE A STEADY PLACE TO SLEEP OR SLEPT IN A SHELTER (INCLUDING NOW)?: PATIENT REFUSED

## 2023-05-11 ASSESSMENT — ENCOUNTER SYMPTOMS
CHEST TIGHTNESS: 0
APNEA: 0
EYE REDNESS: 0
RHINORRHEA: 0
WHEEZING: 0
SINUS PRESSURE: 0
CHANGE IN BOWEL HABIT: 0
COUGH: 0
BACK PAIN: 0
DIARRHEA: 0
EYE PAIN: 0
CONSTIPATION: 0
SORE THROAT: 0
VOMITING: 0
VISUAL CHANGE: 0
ABDOMINAL PAIN: 0
NAUSEA: 0
BLOOD IN STOOL: 0
COLOR CHANGE: 0
EYE ITCHING: 0
SHORTNESS OF BREATH: 0

## 2023-05-11 ASSESSMENT — PATIENT HEALTH QUESTIONNAIRE - PHQ9
SUM OF ALL RESPONSES TO PHQ QUESTIONS 1-9: 0
SUM OF ALL RESPONSES TO PHQ9 QUESTIONS 1 & 2: 0
1. LITTLE INTEREST OR PLEASURE IN DOING THINGS: 0
SUM OF ALL RESPONSES TO PHQ QUESTIONS 1-9: 0
2. FEELING DOWN, DEPRESSED OR HOPELESS: 0
SUM OF ALL RESPONSES TO PHQ QUESTIONS 1-9: 0
SUM OF ALL RESPONSES TO PHQ QUESTIONS 1-9: 0

## 2023-05-11 NOTE — PROGRESS NOTES
Chief Complaint:     Chief Complaint   Patient presents with    Established New Doctor    Heart Problem    Coronary Artery Disease    Hypertension    Hyperlipidemia         Heart Problem  This is a new problem. The current episode started more than 1 month ago. The problem occurs daily. The problem has been unchanged. Pertinent negatives include no abdominal pain, arthralgias, change in bowel habit, chest pain, chills, congestion, coughing, diaphoresis, fatigue, fever, headaches, joint swelling, myalgias, nausea, neck pain, numbness, rash, sore throat, urinary symptoms, vertigo, visual change, vomiting or weakness. Nothing aggravates the symptoms. He has tried nothing for the symptoms. The treatment provided no relief. Coronary Artery Disease  Presents for follow-up visit. Pertinent negatives include no chest pain, chest tightness, dizziness, leg swelling, palpitations or shortness of breath. Risk factors include hyperlipidemia. The symptoms have been stable. Compliance with diet is good. Compliance with exercise is good. Compliance with medications is good. Hypertension  This is a chronic problem. The current episode started more than 1 year ago. The problem is unchanged. The problem is controlled. Pertinent negatives include no chest pain, headaches, neck pain, palpitations or shortness of breath. There are no associated agents to hypertension. Risk factors for coronary artery disease include dyslipidemia and male gender. Past treatments include calcium channel blockers and beta blockers. The current treatment provides significant improvement. There is no history of kidney disease, CAD/MI, CVA or PVD. There is no history of a hypertension causing med, pheochromocytoma, renovascular disease, sleep apnea or a thyroid problem. Hyperlipidemia  This is a chronic problem. The current episode started more than 1 year ago. The problem is controlled.  Pertinent negatives include no chest pain, myalgias or shortness of

## 2023-05-22 ENCOUNTER — OFFICE VISIT (OUTPATIENT)
Dept: CARDIOLOGY CLINIC | Age: 68
End: 2023-05-22
Payer: MEDICARE

## 2023-05-22 VITALS
WEIGHT: 175.1 LBS | HEIGHT: 74 IN | DIASTOLIC BLOOD PRESSURE: 64 MMHG | HEART RATE: 49 BPM | RESPIRATION RATE: 18 BRPM | BODY MASS INDEX: 22.47 KG/M2 | SYSTOLIC BLOOD PRESSURE: 120 MMHG

## 2023-05-22 DIAGNOSIS — I48.91 ATRIAL FIBRILLATION WITH RAPID VENTRICULAR RESPONSE (HCC): Primary | ICD-10-CM

## 2023-05-22 PROCEDURE — 3078F DIAST BP <80 MM HG: CPT | Performed by: INTERNAL MEDICINE

## 2023-05-22 PROCEDURE — 93000 ELECTROCARDIOGRAM COMPLETE: CPT | Performed by: INTERNAL MEDICINE

## 2023-05-22 PROCEDURE — 99214 OFFICE O/P EST MOD 30 MIN: CPT | Performed by: INTERNAL MEDICINE

## 2023-05-22 PROCEDURE — 3074F SYST BP LT 130 MM HG: CPT | Performed by: INTERNAL MEDICINE

## 2023-05-22 PROCEDURE — 1123F ACP DISCUSS/DSCN MKR DOCD: CPT | Performed by: INTERNAL MEDICINE

## 2023-05-22 NOTE — PROGRESS NOTES
OUTPATIENT CARDIOLOGY FOLLOW-UP    Name: Lisbeth Nixon. Age: 69 y.o. Primary Care Physician: Julio Cesar Bullock DO    Date of Service: 5/22/2023    Chief Complaint:   Chief Complaint   Patient presents with    Atrial Fibrillation       Interim History:   Mr. Julianna Shah is a 68-year-old  male with history of hypertension, rectal cancer diagnosed March 2018 underwent anterior posterior resection, followed by colostomy and colostomy reversal in March 2020, history of left lower extremity amputation and 2011 secondary to fasciitis, history of retinal detachment status post repair, right inguinal hernia repair who presented to the hospital with tachycardia. He was seen as a new consult on 12/20/2020 for new onset atrial fibrillation RVR. He underwent ОЛЬГА guided cardioversion on 12/20/2020 and was successfully converted to sinus rhythm. He was initiated on Eliquis for anticoagulation and metoprolol for rate control and was discharged home. He is tolerating Eliquis without any bleeding complications. He is compliant with medications, as well as salt and fluid intake. He does not take any over-the-counter arthritis medications. He  was hospitalized from 4/7/2022 to 4/15/2022 with a urine tract infection and sepsis. He was diagnosed with a Klebsiella pneumonia sepsis and also atrial fibrillation/flutter and underwent cardioversion and on 4/15/2022 and was successfully converted to sinus rhythm. Following hospital discharge, patient went into rehab and underwent rehab. He was seen in the office on 5/4/2023 since his last visit, he was hospitalized from 5/4/2023 to 5/5/2023. Patient underwent elective cardioversion and converted to sinus rhythm. Patient also had a coronary CTA with a calcium score which showed severe proximal LAD stenosis with extensive coronary calcification. He still gets winded with minimal exertion without any chest pain.   Has not had any hospitalizations or ER

## 2023-05-22 NOTE — PATIENT INSTRUCTIONS
Coronary CTA results reviewed and discussed with the patient and his sister Susie Lopez, will plan for cardiac cath in 2 weeks after 6/9/23. AUC score 7 and indication 35  Continue Eliquis 5 mg p.o. twice daily for anticoagulation. Decrease  Toprol-XL to 50 mg mg p.o. twice daily due to bradycardia  and continue Cardizem  mg p.o. twice daily for rate control. Follow up with EP service due to recurrent episodes of A-fib as scheduled. Continue ASA and Atorvastatin  He was advised to drink at least 64 oz of fluids a day. Follow-up with me in 3  months .

## 2023-05-24 ENCOUNTER — TELEPHONE (OUTPATIENT)
Dept: CARDIOLOGY CLINIC | Age: 68
End: 2023-05-24

## 2023-05-24 NOTE — TELEPHONE ENCOUNTER
Per Dr. Navin Horton, patient needs cardiac cath re: abn stress test and abn coronary CTA. Scheduled for 6/15/23. Prior auth pending.     Cardiac Cath (27885)  Abn stress (R94.39)  Abn coronary CTA (R93.1)

## 2023-05-25 DIAGNOSIS — R94.39 ABNORMAL STRESS TEST: Primary | ICD-10-CM

## 2023-05-25 DIAGNOSIS — R06.02 SOB (SHORTNESS OF BREATH): ICD-10-CM

## 2023-05-25 DIAGNOSIS — R93.1 ABNORMAL FINDINGS DIAGNOSTIC IMAGING OF HEART AND CORONARY CIRCULATION: ICD-10-CM

## 2023-05-25 DIAGNOSIS — Z01.818 PREOP TESTING: ICD-10-CM

## 2023-06-09 ENCOUNTER — OFFICE VISIT (OUTPATIENT)
Dept: NON INVASIVE DIAGNOSTICS | Age: 68
End: 2023-06-09
Payer: MEDICARE

## 2023-06-09 VITALS
BODY MASS INDEX: 22.72 KG/M2 | SYSTOLIC BLOOD PRESSURE: 114 MMHG | WEIGHT: 177 LBS | HEART RATE: 51 BPM | HEIGHT: 74 IN | RESPIRATION RATE: 16 BRPM | DIASTOLIC BLOOD PRESSURE: 62 MMHG

## 2023-06-09 DIAGNOSIS — I47.1 PSVT (PAROXYSMAL SUPRAVENTRICULAR TACHYCARDIA) (HCC): ICD-10-CM

## 2023-06-09 DIAGNOSIS — I48.0 PAF (PAROXYSMAL ATRIAL FIBRILLATION) (HCC): Chronic | ICD-10-CM

## 2023-06-09 DIAGNOSIS — I48.92 ATRIAL FLUTTER, UNSPECIFIED TYPE (HCC): Primary | ICD-10-CM

## 2023-06-09 DIAGNOSIS — R94.31 EKG ABNORMALITIES: ICD-10-CM

## 2023-06-09 PROCEDURE — 3078F DIAST BP <80 MM HG: CPT | Performed by: INTERNAL MEDICINE

## 2023-06-09 PROCEDURE — 3074F SYST BP LT 130 MM HG: CPT | Performed by: INTERNAL MEDICINE

## 2023-06-09 PROCEDURE — 99205 OFFICE O/P NEW HI 60 MIN: CPT | Performed by: INTERNAL MEDICINE

## 2023-06-09 PROCEDURE — 93000 ELECTROCARDIOGRAM COMPLETE: CPT | Performed by: INTERNAL MEDICINE

## 2023-06-09 PROCEDURE — 1123F ACP DISCUSS/DSCN MKR DOCD: CPT | Performed by: INTERNAL MEDICINE

## 2023-06-09 NOTE — PROGRESS NOTES
Patient was seen today and a 14 day Zio XT was placed. Monitor was ordered by Dr Chasity Gilliam . The monitor was applied, instructions were given to the patient. Patient stated understanding and gave verbalize readback.    Monitor company: [employer]   Serial number: [id number]      Electronically signed by Elsy Torrez MA on 6/9/2023 at 1:34 PM
calculate for the following reasons: The valid total cholesterol range is 130 to 320 mg/dL      I have independently reviewed all of the ECGs and rhythm strips per above     Assessment/Plan: This is a 69 y.o. male with a history of   Patient Active Problem List   Diagnosis    Retinal detachment of left eye with multiple breaks    Benign neoplasm of cecum    S/P right hemicolectomy    PSVT (paroxysmal supraventricular tachycardia) (HCC)    Primary hypertension    PAF (paroxysmal atrial fibrillation) (HCC)    PAD (peripheral artery disease) ([address]    On apixaban therapy    Coronary artery disease involving native coronary artery of native heart without angina pectoris    who presents with persistent AF. 1. Persistent atrial fibrillation  - Newly diagnosed in April 2022  - [id number] = 2 (HTN, age)  - Current OAC regiment includes Eliquis  - Current medical therapy includes Toprol XL and Cardizem  - Thus far has had 3 cardioversions and the last one was 5/23/2023.  - Previous attempts at rhythm control have been DC-cardioversion  - Became unresponsive with Amiodarone. - For rhythm control we will have to wait [facility] which is scheduled for 6/15/2023. Once the [facility] is completed would consider AAD therapy versus AF ablation versus pacemaker implantation with maximization of medical therapy. AAD choice limited by CAD and CKD except Dronedarone.  - Education on importance of well controlled HTN (goal BP < 130/80), adequate weight control (goal BMI of < 27), physical activity consisting of moderate cardiopulmonary exercise up to a goal of 250 min/wk, daily compliance with CPAP in treating sleep apnea, smoking cessation and limited ETOH intake. 2. Coronary artery disease  - Abnormal stress test, coronary CTA showed severe LAD stenosis with severe plaque burden. - [facility] is scheduled on 6/15/23  - On Toprol XL, Lipitor and Eliquis    3. Paroxysmal supraventricular tachycardia  - On Toprol XL and Cardizem.     4.

## 2023-06-15 ENCOUNTER — HOSPITAL ENCOUNTER (OUTPATIENT)
Dept: CARDIAC CATH/INVASIVE PROCEDURES | Age: 68
Discharge: HOME | End: 2023-06-15
Attending: INTERNAL MEDICINE | Admitting: INTERNAL MEDICINE
Payer: MEDICARE

## 2023-06-15 VITALS
HEART RATE: 49 BPM | RESPIRATION RATE: 18 BRPM | HEIGHT: 74 IN | DIASTOLIC BLOOD PRESSURE: 72 MMHG | SYSTOLIC BLOOD PRESSURE: 159 MMHG | WEIGHT: 175 LBS | OXYGEN SATURATION: 98 % | BODY MASS INDEX: 22.46 KG/M2 | TEMPERATURE: 97.9 F

## 2023-06-15 PROBLEM — Z98.890 S/P CARDIAC CATH: Status: ACTIVE | Noted: 2023-06-15

## 2023-06-15 LAB
ABO + RH BLD: NORMAL
BLD GP AB SCN SERPL QL: NORMAL
POC ACT LR: 275 SECONDS

## 2023-06-15 PROCEDURE — C1769 GUIDE WIRE: HCPCS

## 2023-06-15 PROCEDURE — 2500000003 HC RX 250 WO HCPCS

## 2023-06-15 PROCEDURE — 6360000002 HC RX W HCPCS

## 2023-06-15 PROCEDURE — 2580000003 HC RX 258: Performed by: INTERNAL MEDICINE

## 2023-06-15 PROCEDURE — 86850 RBC ANTIBODY SCREEN: CPT

## 2023-06-15 PROCEDURE — 2709999900 HC NON-CHARGEABLE SUPPLY

## 2023-06-15 PROCEDURE — 86900 BLOOD TYPING SEROLOGIC ABO: CPT

## 2023-06-15 PROCEDURE — 93454 CORONARY ARTERY ANGIO S&I: CPT

## 2023-06-15 PROCEDURE — C1887 CATHETER, GUIDING: HCPCS

## 2023-06-15 PROCEDURE — 36415 COLL VENOUS BLD VENIPUNCTURE: CPT

## 2023-06-15 PROCEDURE — C1894 INTRO/SHEATH, NON-LASER: HCPCS

## 2023-06-15 PROCEDURE — 86901 BLOOD TYPING SEROLOGIC RH(D): CPT

## 2023-06-15 PROCEDURE — 93571 IV DOP VEL&/PRESS C FLO 1ST: CPT

## 2023-06-15 PROCEDURE — 6370000000 HC RX 637 (ALT 250 FOR IP): Performed by: INTERNAL MEDICINE

## 2023-06-15 PROCEDURE — 85347 COAGULATION TIME ACTIVATED: CPT

## 2023-06-15 RX ORDER — SODIUM CHLORIDE 0.9 % (FLUSH) 0.9 %
5-40 SYRINGE (ML) INJECTION EVERY 12 HOURS SCHEDULED
Status: DISCONTINUED | OUTPATIENT
Start: 2023-06-15 | End: 2023-06-15 | Stop reason: HOSPADM

## 2023-06-15 RX ORDER — SODIUM CHLORIDE 0.9 % (FLUSH) 0.9 %
5-40 SYRINGE (ML) INJECTION PRN
Status: DISCONTINUED | OUTPATIENT
Start: 2023-06-15 | End: 2023-06-15 | Stop reason: HOSPADM

## 2023-06-15 RX ORDER — ASPIRIN 325 MG
325 TABLET ORAL DAILY
Status: DISCONTINUED | OUTPATIENT
Start: 2023-06-15 | End: 2023-06-15 | Stop reason: ALTCHOICE

## 2023-06-15 RX ORDER — SODIUM CHLORIDE 9 MG/ML
INJECTION, SOLUTION INTRAVENOUS CONTINUOUS
Status: DISCONTINUED | OUTPATIENT
Start: 2023-06-15 | End: 2023-06-15 | Stop reason: HOSPADM

## 2023-06-15 RX ORDER — SODIUM CHLORIDE 9 MG/ML
INJECTION, SOLUTION INTRAVENOUS PRN
Status: DISCONTINUED | OUTPATIENT
Start: 2023-06-15 | End: 2023-06-15 | Stop reason: HOSPADM

## 2023-06-15 RX ORDER — ACETAMINOPHEN 325 MG/1
650 TABLET ORAL EVERY 4 HOURS PRN
Status: DISCONTINUED | OUTPATIENT
Start: 2023-06-15 | End: 2023-06-15 | Stop reason: HOSPADM

## 2023-06-15 RX ADMIN — SODIUM CHLORIDE, PRESERVATIVE FREE 10 ML: 5 INJECTION INTRAVENOUS at 10:25

## 2023-06-15 RX ADMIN — ASPIRIN 325 MG: 325 TABLET ORAL at 07:20

## 2023-06-15 RX ADMIN — SODIUM CHLORIDE: 9 INJECTION, SOLUTION INTRAVENOUS at 10:26

## 2023-06-15 NOTE — PROGRESS NOTES
Vascband weaned and removed. Site soft, stable, no bleeding or hematoma. 2x2 guaze and opsite applied. 2+ pulses. Patient instructed on post cath wrist limitations. Patient verbalized understanding.

## 2023-06-15 NOTE — PROGRESS NOTES
Bed rest up. Patient ambulated without any issues. IV's removed. Heart monitor cleaned and returned to nurses station. Patient awaiting transport for discharge.

## 2023-06-15 NOTE — PROCEDURES
[address]                  [address] [place],  [facility]                            CARDIAC CATHETERIZATION    PATIENT NAME: Pam Vang                :        1955  MED REC NO:   [id number]                            ROOM:       6327  ACCOUNT NO:   [de-identified]                           ADMIT DATE: 06/15/2023  PROVIDER:     Luis Menard MD    DATE OF PROCEDURE:  06/15/2023    PROCEDURES PERFORMED:  1. Coronary angiography. 2.  IFR of mid left circumflex artery. 3.  Conscious sedation using Versed and fentanyl. Indication #35, score of 7. INDICATION FOR PROCEDURE:  The patient is a 20-year-old male with a  history of atrial fibrillation who has been having shortness of breath,  has mild abnormal stress test, had abnormal coronary CTA. The patient  was referred to cardiac catheterization for further evaluation. DESCRIPTION OF PROCEDURE:  After the appropriate informed consent, the  right wrist area was prepped and draped in the usual sterile fashion. A  timeout was completed. The right wrist area was locally anesthetized  with 2 mL of 2% lidocaine. A 21-gauge needle was used to access the  right radial artery. A 6-Mauritanian introducer sheath was used to cannulate  the right radial artery. The 6-Mauritanian JL-3.5 and 6-Mauritanian JR-4  catheters were used for coronary angiography in multiple projections. ANGIOGRAPHIC FINDINGS:  The left main coronary artery arises normally from the left sinus of  Valsalva. It is a large vessel with 10% distal disease. It bifurcates  into left anterior descending artery and left circumflex artery. The left anterior descending artery is a large vessel starts with large  diameter, however, it tapers off significantly after the first diagonal  branch. The LAD has 30% proximal disease. The rest of the vessel has  no significant CAD.   The first diagonal branch is a large vessel without  any

## 2023-06-27 RX ORDER — ATORVASTATIN CALCIUM 40 MG/1
40 TABLET, FILM COATED ORAL DAILY
Qty: 90 TABLET | Refills: 3 | Status: SHIPPED | OUTPATIENT
Start: 2023-06-27

## 2023-06-30 ENCOUNTER — OFFICE VISIT (OUTPATIENT)
Dept: CARDIOLOGY CLINIC | Age: 68
End: 2023-06-30
Payer: MEDICARE

## 2023-06-30 VITALS
HEART RATE: 59 BPM | HEIGHT: 74 IN | DIASTOLIC BLOOD PRESSURE: 64 MMHG | BODY MASS INDEX: 22.86 KG/M2 | SYSTOLIC BLOOD PRESSURE: 126 MMHG | RESPIRATION RATE: 18 BRPM | WEIGHT: 178.1 LBS

## 2023-06-30 DIAGNOSIS — I48.0 PAF (PAROXYSMAL ATRIAL FIBRILLATION) (HCC): Primary | Chronic | ICD-10-CM

## 2023-06-30 PROCEDURE — 99214 OFFICE O/P EST MOD 30 MIN: CPT | Performed by: INTERNAL MEDICINE

## 2023-06-30 PROCEDURE — 3078F DIAST BP <80 MM HG: CPT | Performed by: INTERNAL MEDICINE

## 2023-06-30 PROCEDURE — 93000 ELECTROCARDIOGRAM COMPLETE: CPT | Performed by: INTERNAL MEDICINE

## 2023-06-30 PROCEDURE — 1123F ACP DISCUSS/DSCN MKR DOCD: CPT | Performed by: INTERNAL MEDICINE

## 2023-06-30 PROCEDURE — 3074F SYST BP LT 130 MM HG: CPT | Performed by: INTERNAL MEDICINE

## 2023-06-30 RX ORDER — METOPROLOL SUCCINATE 50 MG/1
50 TABLET, EXTENDED RELEASE ORAL 2 TIMES DAILY
COMMUNITY

## 2023-07-13 ENCOUNTER — TELEPHONE (OUTPATIENT)
Dept: NON INVASIVE DIAGNOSTICS | Age: 68
End: 2023-07-13

## 2023-07-13 DIAGNOSIS — R94.31 EKG ABNORMALITIES: ICD-10-CM

## 2023-07-13 DIAGNOSIS — I48.0 PAF (PAROXYSMAL ATRIAL FIBRILLATION) (HCC): Chronic | ICD-10-CM

## 2023-07-13 NOTE — TELEPHONE ENCOUNTER
----- Message from Faisal Bustamante MD sent at 7/13/2023  9:56 AM EDT -----  Cardiac monitor showed 1 run of Ventricular Tachycardia occurred lasting 5 beats. 33  Supraventricular Tachycardia runs occurred, the run with the longest lasting 11 beats. Isolated SVEs were occasional (1.6%, 48623). Isolated VEs were rare (<1.0%). No PAF. No pause or AV block. No triggered events or symptoms reported. LHC showed no significant CAD. Options of treatment are observe on current treatment versus Dronedarone versus AF ablation versus pacemaker implantation and maximization medical therapy.  Thanks.    ----- Message -----  From: Yumiko Flores MA  Sent: 7/13/2023   9:04 AM EDT  To: Faisal Bustamante MD

## 2023-07-13 NOTE — TELEPHONE ENCOUNTER
Pt notified of results and verbalized understanding. Patient is scheduled for ov on 09/22/23 @ 11:00 AM w/CK and will discuss further at that time.      Electronically signed by Kavon Ham MA on 7/13/2023 at 1:49 PM

## 2023-07-18 ENCOUNTER — TELEPHONE (OUTPATIENT)
Dept: CARDIOLOGY CLINIC | Age: 68
End: 2023-07-18

## 2023-07-18 NOTE — TELEPHONE ENCOUNTER
Юлия Aguirre at Dr. Knowles Nita office notified of Dr. Kennedy's risk assessment/recommendations. Юлия Aguirre with notify patient's sister of recommendations.

## 2023-07-18 NOTE — TELEPHONE ENCOUNTER
Patient's revised cardiac risk index is low (0), class I risk, 0.4% risk of major perioperative cardiac events. Patient has underlying paroxysmal atrial fibrillation on Eliquis for anticoagulation. Patient denies any active cardiac symptoms including chest pain, decompensated heart failure, uncontrolled arrhythmias or obstructive valve lesions. Patient had a recent cardiac cath which showed mild nonobstructive CAD. Patient is stable from the cardiology standpoint and proceed with colonoscopy as scheduled. Hold Eliquis for 48 hours prior to colonoscopy and restart 24 hours after the procedure if there is no concern for bleeding. Continue current dose of Toprol perioperatively.

## 2023-07-18 NOTE — TELEPHONE ENCOUNTER
Patient needs cardiac clearance for colonoscopy by Dr. Freddy Ludwig. Patient seen in our office on 6/30/23. Recommended to hold Eliquis.

## 2023-08-11 ENCOUNTER — OFFICE VISIT (OUTPATIENT)
Dept: PRIMARY CARE CLINIC | Age: 68
End: 2023-08-11
Payer: MEDICARE

## 2023-08-11 VITALS
HEART RATE: 62 BPM | WEIGHT: 180 LBS | OXYGEN SATURATION: 94 % | HEIGHT: 74 IN | BODY MASS INDEX: 23.1 KG/M2 | RESPIRATION RATE: 18 BRPM | TEMPERATURE: 98.5 F | SYSTOLIC BLOOD PRESSURE: 132 MMHG | DIASTOLIC BLOOD PRESSURE: 76 MMHG

## 2023-08-11 DIAGNOSIS — I73.9 PAD (PERIPHERAL ARTERY DISEASE) (HCC): ICD-10-CM

## 2023-08-11 DIAGNOSIS — Z00.00 INITIAL MEDICARE ANNUAL WELLNESS VISIT: Primary | ICD-10-CM

## 2023-08-11 DIAGNOSIS — I48.0 PAF (PAROXYSMAL ATRIAL FIBRILLATION) (HCC): ICD-10-CM

## 2023-08-11 DIAGNOSIS — I10 PRIMARY HYPERTENSION: ICD-10-CM

## 2023-08-11 DIAGNOSIS — I25.10 CORONARY ARTERY DISEASE INVOLVING NATIVE CORONARY ARTERY OF NATIVE HEART WITHOUT ANGINA PECTORIS: ICD-10-CM

## 2023-08-11 PROCEDURE — 3075F SYST BP GE 130 - 139MM HG: CPT | Performed by: FAMILY MEDICINE

## 2023-08-11 PROCEDURE — G0438 PPPS, INITIAL VISIT: HCPCS | Performed by: FAMILY MEDICINE

## 2023-08-11 PROCEDURE — 1123F ACP DISCUSS/DSCN MKR DOCD: CPT | Performed by: FAMILY MEDICINE

## 2023-08-11 PROCEDURE — 3078F DIAST BP <80 MM HG: CPT | Performed by: FAMILY MEDICINE

## 2023-08-11 ASSESSMENT — PATIENT HEALTH QUESTIONNAIRE - PHQ9
SUM OF ALL RESPONSES TO PHQ QUESTIONS 1-9: 0
SUM OF ALL RESPONSES TO PHQ9 QUESTIONS 1 & 2: 0
SUM OF ALL RESPONSES TO PHQ QUESTIONS 1-9: 0
1. LITTLE INTEREST OR PLEASURE IN DOING THINGS: 0
2. FEELING DOWN, DEPRESSED OR HOPELESS: 0

## 2023-08-11 NOTE — PROGRESS NOTES
Medicare Annual Wellness Visit    Dipak Feliciano is here for Medicare AWV, Hypertension, and Heart Problem    Assessment & Plan   Initial Medicare annual wellness visit    Recommendations for Preventive Services Due: see orders and patient instructions/AVS.  Recommended screening schedule for the next 5-10 years is provided to the patient in written form: see Patient Instructions/AVS.     Return for Medicare Annual Wellness Visit in 1 year. Subjective   The following acute and/or chronic problems were also addressed today:  CAD, Afib, HTN, Lipids    Patient's complete Health Risk Assessment and screening values have been reviewed and are found in Flowsheets. The following problems were reviewed today and where indicated follow up appointments were made and/or referrals ordered. Positive Risk Factor Screenings with Interventions:                  Dentist Screen:  Have you seen the dentist within the past year?: (!) No    Intervention:  Patient declines any further evaluation or treatment       ADL's:   Patient reports needing help with:  Select all that apply: (!) Transportation  Interventions:  Patient declined any further interventions or treatment    Advanced Directives:  Do you have a Living Will?: (!) No    Intervention:  has NO advanced directive - not interested in additional information      CV Risk Counseling:  Patient was asked about his current diet and exercise habits, and personalized advice was provided regarding recommended lifestyle changes. Patient's individual cardiovascular disease risk factors, including advanced age (> 54 for men, > 72 for women), dyslipidemia, hypertension, and male gender, were discussed, as well as the likely benefits of lifestyle changes.  Based upon patient's motivation to change his behavior, the following plan was agreed upon to work toward lowering cardiovascular disease risk: low saturated fat, low cholesterol diet and increase physical activity, as

## 2023-08-11 NOTE — PATIENT INSTRUCTIONS
assessments and screenings as appropriate. After reviewing your medical record and screening and assessments performed today your provider may have ordered immunizations, labs, imaging, and/or referrals for you. A list of these orders (if applicable) as well as your Preventive Care list are included within your After Visit Summary for your review. Other Preventive Recommendations:    A preventive eye exam performed by an eye specialist is recommended every 1-2 years to screen for glaucoma; cataracts, macular degeneration, and other eye disorders. A preventive dental visit is recommended every 6 months. Try to get at least 150 minutes of exercise per week or 10,000 steps per day on a pedometer . Order or download the FREE \"Exercise & Physical Activity: Your Everyday Guide\" from [employer]. Call 6-480.458.3239 or search [employer] online. You need 7875-8541 mg of calcium and 5762-0811 IU of vitamin D per day. It is possible to meet your calcium requirement with diet alone, but a vitamin D supplement is usually necessary to meet this goal.  When exposed to the sun, use a sunscreen that protects against both UVA and UVB radiation with an SPF of 30 or greater. Reapply every 2 to 3 hours or after sweating, drying off with a towel, or swimming. Always wear a seat belt when traveling in a car. Always wear a helmet when riding a bicycle or motorcycle.

## 2023-08-14 DIAGNOSIS — I10 PRIMARY HYPERTENSION: ICD-10-CM

## 2023-08-14 LAB
ALBUMIN SERPL-MCNC: 4.1 G/DL (ref 3.5–5.2)
ALP BLD-CCNC: 116 U/L (ref 40–129)
ALT SERPL-CCNC: 13 U/L (ref 0–40)
ANION GAP SERPL CALCULATED.3IONS-SCNC: 12 MMOL/L (ref 7–16)
AST SERPL-CCNC: 21 U/L (ref 0–39)
BILIRUB SERPL-MCNC: 0.8 MG/DL (ref 0–1.2)
BUN BLDV-MCNC: 16 MG/DL (ref 6–23)
CALCIUM SERPL-MCNC: 9.1 MG/DL (ref 8.6–10.2)
CHLORIDE BLD-SCNC: 103 MMOL/L (ref 98–107)
CHOLESTEROL: 126 MG/DL
CO2: 27 MMOL/L (ref 22–29)
CREAT SERPL-MCNC: 1.4 MG/DL (ref 0.7–1.2)
GFR SERPL CREATININE-BSD FRML MDRD: 55 ML/MIN/1.73M2
GLUCOSE BLD-MCNC: 104 MG/DL (ref 74–99)
HDLC SERPL-MCNC: 70 MG/DL
LDL CHOLESTEROL: 42 MG/DL
POTASSIUM SERPL-SCNC: 4.6 MMOL/L (ref 3.5–5)
SODIUM BLD-SCNC: 142 MMOL/L (ref 132–146)
TOTAL PROTEIN: 6.6 G/DL (ref 6.4–8.3)
TRIGL SERPL-MCNC: 68 MG/DL
VLDLC SERPL CALC-MCNC: 14 MG/DL

## 2023-08-15 NOTE — PROGRESS NOTES
[address] PRE-ADMISSION TESTING INSTRUCTIONS      ARRIVAL INSTRUCTIONS:  [x] Parking the day of Surgery is located in the Main Entrance lot. Upon entering the main door make an immediate right to the surgery reception desk. [x] Bring photo ID and insurance card    [] Bring in a copy of Living will or Durable Power of  papers. [x] Please be sure to arrange for responsible adult to provide transportation to and from the hospital    [x] Please arrange for responsible adult to be with you for the 24 hour period post procedure due to having anesthesia    [x] If you awake am of surgery not feeling well or have temperature >100 please call [phone]    GENERAL INSTRUCTIONS:    [x] Nothing by mouth after midnight, including gum, candy, mints or water    [x] You may brush your teeth, but do not swallow any water    [x] Take medications as instructed with 1-2 oz of water    [x] Stop herbal supplements and vitamins 5 days prior to procedure    [x] Follow preop dosing of blood thinners per physician instructions    [] Take 1/2 dose of evening insulin, but no insulin after midnight    [] No oral diabetic medications after midnight    [] If diabetic and have low blood sugar or feel symptomatic, take 1-2oz apple juice only    [] Bring inhalers day of surgery    [] Bring C-PAP/ Bi-Pap day of surgery    [] Bring urine specimen day of surgery    [x] Shower or bath with soap, lather and rinse well, AM of Surgery, no lotion, powders or creams to surgical site    [x] Follow bowel prep as instructed per surgeon    [x] No tobacco products within 24 hours of surgery     [x] No alcohol or illegal drug use within 24 hours of surgery.      Jewelry, body piercing's, eyeglasses, contact lenses a[x]nd dentures are not permitted into surgery (bring cases)      [x] Please do not wear any nail polish, make up or hair products on the day of surgery    [x] You can expect a call the business day prior to

## 2023-08-15 NOTE — PROGRESS NOTES
Dr Andre Eckert notified pt with Hx A fib cardioverted March and May 2023. Cardiac clearance received from cardiology Dr Darren Mcneill. Dr Caron Gu note  and Nya Garcia monitor results  from 7/13/23 in  [facility] discusses recommended treatment options, Major Charlton from office states they do not clear pts for surgery/procedures but that Dr Don Douglas aware pt scheduled for colonoscopy and no conflicts having procedure.

## 2023-08-17 ENCOUNTER — PREP FOR PROCEDURE (OUTPATIENT)
Dept: SURGERY | Age: 68
End: 2023-08-17

## 2023-08-17 RX ORDER — SODIUM CHLORIDE 9 MG/ML
INJECTION, SOLUTION INTRAVENOUS CONTINUOUS
Status: CANCELLED | OUTPATIENT
Start: 2023-08-17

## 2023-08-17 NOTE — H&P
Date:   03/15/23[facility]  Name: Lezlie Paget             : 1955 Sex: M  Age: 69 yrs  Acct#:  [id number]           Patient was referred by Flaca Martinez MD.  Patient's primary care provider is Flaca Martinez MD.  CC:  Colonoscopy    HPI: 55-year-old male well known to me. Previous low anterior resection for rectal cancer with loop ileostomy. Previous right colectomy for large unresectable polyp. Doing well    Meds Prior to Visit:  Hydrazine Sulfate     Metoprolol Succinate ER  25 mg   Cardizem CD     Loperamide HCL     Eliquis        Allergies:  Penicillin, Cefaclor        Wt Prior: 189lb as of 19    Exam:  Constitution:  Non-toxic, no acute distress  Heart :  RRR  Lungs CTA bilaterally  Abdomen: Nondistended, well-healed incisions, nontender, no hernias  Extremeties: No rash, cyanosis, or jaundice         Assessment #1: Hx Z86.010 Personal history of colonic polyps   Care Plan:              Comments       :  Colonoscopy scheduled          Time spent reviewing records, discussing findings, answering questions, reviewing laboratory studies, radiologic exams, and other diagnostics, and reviewing the diagnostic and therapeutic plan: 20 minutes    Voice recognition software was used in the preparation of this document. Despite all efforts to prevent them, transcription errors may have occurred.   Seen by:

## 2023-08-18 ENCOUNTER — HOSPITAL ENCOUNTER (OUTPATIENT)
Age: 68
Setting detail: OUTPATIENT SURGERY
Discharge: HOME | End: 2023-08-18
Attending: SURGERY | Admitting: SURGERY
Payer: MEDICARE

## 2023-08-18 ENCOUNTER — ANESTHESIA (OUTPATIENT)
Dept: ENDOSCOPY | Age: 68
End: 2023-08-18
Payer: MEDICARE

## 2023-08-18 ENCOUNTER — ANESTHESIA EVENT (OUTPATIENT)
Dept: ENDOSCOPY | Age: 68
End: 2023-08-18
Payer: MEDICARE

## 2023-08-18 VITALS
SYSTOLIC BLOOD PRESSURE: 149 MMHG | WEIGHT: 172 LBS | BODY MASS INDEX: 22.07 KG/M2 | OXYGEN SATURATION: 96 % | HEART RATE: 66 BPM | TEMPERATURE: 97.2 F | RESPIRATION RATE: 18 BRPM | HEIGHT: 74 IN | DIASTOLIC BLOOD PRESSURE: 83 MMHG

## 2023-08-18 DIAGNOSIS — I48.92 ATRIAL FLUTTER, UNSPECIFIED TYPE (HCC): ICD-10-CM

## 2023-08-18 PROCEDURE — 7100000011 HC PHASE II RECOVERY - ADDTL 15 MIN: Performed by: SURGERY

## 2023-08-18 PROCEDURE — 6360000002 HC RX W HCPCS: Performed by: NURSE ANESTHETIST, CERTIFIED REGISTERED

## 2023-08-18 PROCEDURE — 3700000000 HC ANESTHESIA ATTENDED CARE: Performed by: SURGERY

## 2023-08-18 PROCEDURE — 2709999900 HC NON-CHARGEABLE SUPPLY: Performed by: SURGERY

## 2023-08-18 PROCEDURE — 3609027000 HC COLONOSCOPY: Performed by: SURGERY

## 2023-08-18 PROCEDURE — 2580000003 HC RX 258: Performed by: NURSE ANESTHETIST, CERTIFIED REGISTERED

## 2023-08-18 PROCEDURE — 3700000001 HC ADD 15 MINUTES (ANESTHESIA): Performed by: SURGERY

## 2023-08-18 PROCEDURE — 7100000010 HC PHASE II RECOVERY - FIRST 15 MIN: Performed by: SURGERY

## 2023-08-18 RX ORDER — PROPOFOL 10 MG/ML
INJECTION, EMULSION INTRAVENOUS PRN
Status: DISCONTINUED | OUTPATIENT
Start: 2023-08-18 | End: 2023-08-18 | Stop reason: SDUPTHER

## 2023-08-18 RX ORDER — SODIUM CHLORIDE 9 MG/ML
INJECTION, SOLUTION INTRAVENOUS CONTINUOUS PRN
Status: DISCONTINUED | OUTPATIENT
Start: 2023-08-18 | End: 2023-08-18 | Stop reason: SDUPTHER

## 2023-08-18 RX ORDER — SODIUM CHLORIDE 9 MG/ML
INJECTION, SOLUTION INTRAVENOUS CONTINUOUS
Status: DISCONTINUED | OUTPATIENT
Start: 2023-08-18 | End: 2023-08-18 | Stop reason: HOSPADM

## 2023-08-18 RX ADMIN — SODIUM CHLORIDE: 9 INJECTION, SOLUTION INTRAVENOUS at 06:55

## 2023-08-18 RX ADMIN — PROPOFOL 140 MG: 10 INJECTION, EMULSION INTRAVENOUS at 07:07

## 2023-08-18 ASSESSMENT — PAIN SCALES - GENERAL: PAINLEVEL_OUTOF10: 0

## 2023-08-18 ASSESSMENT — PAIN DESCRIPTION - LOCATION: LOCATION: ABDOMEN

## 2023-08-18 ASSESSMENT — PAIN - FUNCTIONAL ASSESSMENT: PAIN_FUNCTIONAL_ASSESSMENT: 0-10

## 2023-08-18 ASSESSMENT — COPD QUESTIONNAIRES: CAT_SEVERITY: MILD

## 2023-08-18 ASSESSMENT — LIFESTYLE VARIABLES: SMOKING_STATUS: 0

## 2023-08-18 NOTE — ANESTHESIA POSTPROCEDURE EVALUATION
Department of Anesthesiology  Postprocedure Note    Patient: David Thrasher MRN: [id number]  YOB: 1955  Date of evaluation: 8/18/2023      Procedure Summary     Date: 08/18/23 Room / Location: [facility] / [facility]    Anesthesia Start: 5920 Anesthesia Stop: 7507    Procedure: COLORECTAL CANCER SCREENING, NOT HIGH RISK Diagnosis:       History of colon polyps      (History of colon polyps [Z86.010])    Surgeons: Stanley Pina MD Responsible Provider: Ttii Badillo MD    Anesthesia Type: MAC ASA Status: 3          Anesthesia Type: No value filed.     Jolene Phase I: Jolene Score: 10    Jolene Phase II:        Anesthesia Post Evaluation    Patient location during evaluation: PACU  Patient participation: complete - patient participated  Level of consciousness: awake  Airway patency: patent  Nausea & Vomiting: no nausea and no vomiting  Complications: no  Cardiovascular status: hemodynamically stable  Respiratory status: acceptable  Hydration status: euvolemic  Pain management: adequate

## 2023-08-18 NOTE — H&P
[address] [facility]  Name: Faiza Parrish             : 1955 Sex: M  Age: 69 yrs  Acct#:  [id number]              Patient was referred by Ramy Edgar MD.  Patient's primary care provider is Ramy Edgar MD.  CC:  Colonoscopy     HPI: 68-year-old male well known to me. Previous low anterior resection for rectal cancer with loop ileostomy. Previous right colectomy for large unresectable polyp. Doing well     Meds Prior to Visit:  Hydrazine Sulfate     Metoprolol Succinate ER  25 mg   Cardizem CD     Loperamide HCL     Eliquis         Allergies:  Penicillin, Cefaclor     Review of Systems:  General ROS: Negative  Hematological and Lymphatic ROS: Negative  Respiratory ROS: Negative  Cardiovascular ROS: Negative  Gastrointestinal ROS: Negative  Genito-Urinary ROS: Negative  Musculoskeletal ROS: Negative       Wt Prior: 189lb as of 19     Exam:  Constitution:  Non-toxic, no acute distress  Heart :  RRR  Lungs CTA bilaterally  Abdomen: Nondistended, well-healed incisions, nontender, no hernias  Extremeties: No rash, cyanosis, or jaundice                    Assessment #1: Hx Z86.010 Personal history of colonic polyps   Care Plan:              Comments       :  Colonoscopy scheduled                                  Time spent reviewing records, discussing findings, answering questions, reviewing laboratory studies, radiologic exams, and other diagnostics, and reviewing the diagnostic and therapeutic plan: 20 minutes     Voice recognition software was used in the preparation of this document. Despite all efforts to prevent them, transcription errors may have occurred.   Seen by:

## 2023-08-18 NOTE — OP NOTE
Charleen Coh. YOB: 1955  [id number]    Pre-operative Diagnosis: Personal history of both colon polyps and colorectal cancer    Post-operative Diagnosis: Normal remaining colon    Procedure: Colonoscopy     Anesthesia: LMAC    Surgeon: Iram Oden MD    Assistant: None    Estimated Blood Loss: none    Complications: none    Specimens: None    Procedure:   Pt was taken to the endoscopy suite and placed on the table in a left lateral decubitus position. LMAC anesthesia was administered. A digital rectal examination revealed no gross blood, normal tone, no mass was palpated. A lubricated colonoscope was inserted and advanced to the ileocolonic anastomosis. This was widely patent. No evidence of pathology. The scope was slowly withdrawn. The remaining transverse colon, splenic flexure, descending colon, sigmoid colon were all normal.  There was a widely patent anastomosis between the sigmoid and the mid rectum at approximately 10 cm. No evidence of pathology. Retroflexed view of the rectal vault was normal.   The colon was deflated. The scope was removed. The patient tolerated the procedure well.     Impression: Normal remaining colon      Plan:   Repeat 5 years due to personal history    Nasra Milian MD, MD,  8/18/2023, 6:54 AM

## 2023-08-21 RX ORDER — DILTIAZEM HYDROCHLORIDE 180 MG/1
180 CAPSULE, COATED, EXTENDED RELEASE ORAL 2 TIMES DAILY
Qty: 180 CAPSULE | Refills: 1 | Status: SHIPPED | OUTPATIENT
Start: 2023-08-21

## 2023-09-20 ENCOUNTER — OFFICE VISIT (OUTPATIENT)
Dept: FAMILY MEDICINE CLINIC | Age: 68
End: 2023-09-20
Payer: MEDICARE

## 2023-09-20 VITALS
BODY MASS INDEX: 23.34 KG/M2 | HEART RATE: 75 BPM | OXYGEN SATURATION: 97 % | TEMPERATURE: 97.4 F | DIASTOLIC BLOOD PRESSURE: 74 MMHG | SYSTOLIC BLOOD PRESSURE: 130 MMHG | WEIGHT: 181.8 LBS

## 2023-09-20 DIAGNOSIS — I50.9 HEART FAILURE, UNSPECIFIED HF CHRONICITY, UNSPECIFIED HEART FAILURE TYPE (HCC): ICD-10-CM

## 2023-09-20 DIAGNOSIS — R22.41 LOCALIZED SWELLING OF RIGHT LOWER LEG: Primary | ICD-10-CM

## 2023-09-20 DIAGNOSIS — R22.41 LOCALIZED SWELLING OF RIGHT LOWER LEG: ICD-10-CM

## 2023-09-20 LAB
ABSOLUTE IMMATURE GRANULOCYTE: <0.03 K/UL (ref 0–0.58)
ALBUMIN SERPL-MCNC: 3.8 G/DL (ref 3.5–5.2)
ALP BLD-CCNC: 109 U/L (ref 40–129)
ALT SERPL-CCNC: 12 U/L (ref 0–40)
ANION GAP SERPL CALCULATED.3IONS-SCNC: 10 MMOL/L (ref 7–16)
AST SERPL-CCNC: 21 U/L (ref 0–39)
BASOPHILS ABSOLUTE: 0.04 K/UL (ref 0–0.2)
BASOPHILS RELATIVE PERCENT: 1 % (ref 0–2)
BILIRUB SERPL-MCNC: 1 MG/DL (ref 0–1.2)
BILIRUBIN DIRECT: <0.2 MG/DL (ref 0–0.3)
BILIRUBIN, INDIRECT: ABNORMAL MG/DL (ref 0–1)
BUN BLDV-MCNC: 17 MG/DL (ref 6–23)
CALCIUM SERPL-MCNC: 9 MG/DL (ref 8.6–10.2)
CHLORIDE BLD-SCNC: 103 MMOL/L (ref 98–107)
CO2: 28 MMOL/L (ref 22–29)
CREAT SERPL-MCNC: 1.3 MG/DL (ref 0.7–1.2)
D DIMER: 625 NG/ML DDU (ref 0–232)
EOSINOPHILS ABSOLUTE: 0.08 K/UL (ref 0.05–0.5)
EOSINOPHILS RELATIVE PERCENT: 1 % (ref 0–6)
GFR SERPL CREATININE-BSD FRML MDRD: >60 ML/MIN/1.73M2
GLUCOSE BLD-MCNC: 86 MG/DL (ref 74–99)
HCT VFR BLD CALC: 41.8 % (ref 37–54)
HEMOGLOBIN: 13.1 G/DL (ref 12.5–16.5)
IMMATURE GRANULOCYTES: 0 % (ref 0–5)
LYMPHOCYTES ABSOLUTE: 1.24 K/UL (ref 1.5–4)
LYMPHOCYTES RELATIVE PERCENT: 17 % (ref 20–42)
MCH RBC QN AUTO: 27.9 PG (ref 26–35)
MCHC RBC AUTO-ENTMCNC: 31.3 G/DL (ref 32–34.5)
MCV RBC AUTO: 89.1 FL (ref 80–99.9)
MONOCYTES ABSOLUTE: 0.82 K/UL (ref 0.1–0.95)
MONOCYTES RELATIVE PERCENT: 11 % (ref 2–12)
NEUTROPHILS ABSOLUTE: 5.2 K/UL (ref 1.8–7.3)
NEUTROPHILS RELATIVE PERCENT: 70 % (ref 43–80)
PDW BLD-RTO: 13 % (ref 11.5–15)
PLATELET # BLD: 216 K/UL (ref 130–450)
PMV BLD AUTO: 9.6 FL (ref 7–12)
POTASSIUM SERPL-SCNC: 3.9 MMOL/L (ref 3.5–5)
PRO-BNP: 301 PG/ML (ref 0–125)
RBC # BLD: 4.69 M/UL (ref 3.8–5.8)
SODIUM BLD-SCNC: 141 MMOL/L (ref 132–146)
TOTAL PROTEIN: 6.6 G/DL (ref 6.4–8.3)
WBC # BLD: 7.4 K/UL (ref 4.5–11.5)

## 2023-09-20 PROCEDURE — 3075F SYST BP GE 130 - 139MM HG: CPT | Performed by: FAMILY MEDICINE

## 2023-09-20 PROCEDURE — 3078F DIAST BP <80 MM HG: CPT | Performed by: FAMILY MEDICINE

## 2023-09-20 PROCEDURE — 99214 OFFICE O/P EST MOD 30 MIN: CPT | Performed by: FAMILY MEDICINE

## 2023-09-20 PROCEDURE — 1123F ACP DISCUSS/DSCN MKR DOCD: CPT | Performed by: FAMILY MEDICINE

## 2023-09-20 RX ORDER — FUROSEMIDE 20 MG/1
20 TABLET ORAL DAILY
Qty: 60 TABLET | Refills: 3 | Status: SHIPPED | OUTPATIENT
Start: 2023-09-20

## 2023-09-20 RX ORDER — SULFAMETHOXAZOLE AND TRIMETHOPRIM 800; 160 MG/1; MG/1
1 TABLET ORAL 2 TIMES DAILY
Qty: 14 TABLET | Refills: 0 | Status: SHIPPED | OUTPATIENT
Start: 2023-09-20 | End: 2023-09-27

## 2023-09-20 ASSESSMENT — ENCOUNTER SYMPTOMS
VOMITING: 0
BLOOD IN STOOL: 0
SORE THROAT: 0
BACK PAIN: 0
COUGH: 0
CONSTIPATION: 0
SHORTNESS OF BREATH: 0
PHOTOPHOBIA: 0
ABDOMINAL PAIN: 0
DIARRHEA: 0
NAUSEA: 0

## 2023-09-21 NOTE — PROGRESS NOTES
limited by CAD and CKD except Dronedarone. He opts for Dronedarone. - Re-education on importance of well controlled HTN (goal BP < 130/80), adequate weight control (goal BMI of < 27), physical activity consisting of moderate cardiopulmonary exercise up to a goal of 250 min/wk, daily compliance with CPAP in treating sleep apnea, smoking cessation and limited ETOH intake. 2. Coronary artery disease  - Abnormal stress test, coronary CTA showed severe LAD stenosis with severe plaque burden. - [facility] is scheduled on 6/15/23  - On Toprol XL, Lipitor and Eliquis    3. Paroxysmal supraventricular tachycardia  - On Toprol XL and Cardizem. 4. Hypertension  - Well controlled at this office visit. - Currently on Toprol and Cardizem    5. Hyperlipidemia  - On Lpitor    6. Rectal cancer  - Diagnosed March 2018 underwent anterior posterior resection, ollowed by colostomy and colostomy reversal in March 2020    7. History of left lower extremity amputation   - In 2011 secondary to fasciitis    8. History of retinal detachment   - Status post repair and right inguinal hernia repair. 9. BPH  - On Flomax. 10. Right lower extremity swollen  - Plan for ED evaluation after this visit. Recommendations:  He opts for starting on Dronedarone. Will start Dronedarone 400 mg BID and check EKG in 1 week. Stop Cardizem CD due to drug interaction with Dronedarone. Continue Toprol XL 50 mg BID and will titrate dose up as HR tolerated. .   Continue Eliquis 5 mg BID. Life style modifications as above. Follow up in 3 months or sooner PRN. Encouraged the patient to call the office for any questions or concerns. I have spent a total of 40 minutes with the patient and the family reviewing the above stated recommendations.   And a total of >50% of that time involved face-to-face time providing counseling and or coordination of care with the other providers, preparation for the clinic visit, reviewing records/tests,

## 2023-09-22 ENCOUNTER — HOSPITAL ENCOUNTER (EMERGENCY)
Age: 68
Discharge: HOME | End: 2023-09-22
Payer: MEDICARE

## 2023-09-22 ENCOUNTER — APPOINTMENT (OUTPATIENT)
Dept: GENERAL RADIOLOGY | Age: 68
End: 2023-09-22
Payer: MEDICARE

## 2023-09-22 ENCOUNTER — OFFICE VISIT (OUTPATIENT)
Dept: NON INVASIVE DIAGNOSTICS | Age: 68
End: 2023-09-22
Payer: MEDICARE

## 2023-09-22 ENCOUNTER — APPOINTMENT (OUTPATIENT)
Dept: ULTRASOUND IMAGING | Age: 68
End: 2023-09-22
Payer: MEDICARE

## 2023-09-22 VITALS
HEART RATE: 66 BPM | TEMPERATURE: 97.8 F | SYSTOLIC BLOOD PRESSURE: 98 MMHG | DIASTOLIC BLOOD PRESSURE: 65 MMHG | WEIGHT: 180 LBS | OXYGEN SATURATION: 99 % | BODY MASS INDEX: 23.1 KG/M2 | HEIGHT: 74 IN | RESPIRATION RATE: 16 BRPM

## 2023-09-22 VITALS
HEART RATE: 64 BPM | WEIGHT: 185 LBS | SYSTOLIC BLOOD PRESSURE: 122 MMHG | HEIGHT: 72 IN | DIASTOLIC BLOOD PRESSURE: 76 MMHG | BODY MASS INDEX: 25.06 KG/M2

## 2023-09-22 DIAGNOSIS — I48.0 PAF (PAROXYSMAL ATRIAL FIBRILLATION) (HCC): Primary | ICD-10-CM

## 2023-09-22 DIAGNOSIS — M79.604 RIGHT LEG PAIN: Primary | ICD-10-CM

## 2023-09-22 DIAGNOSIS — M71.22 BAKER'S CYST OF KNEE, LEFT: ICD-10-CM

## 2023-09-22 PROCEDURE — 73552 X-RAY EXAM OF FEMUR 2/>: CPT

## 2023-09-22 PROCEDURE — 93000 ELECTROCARDIOGRAM COMPLETE: CPT | Performed by: INTERNAL MEDICINE

## 2023-09-22 PROCEDURE — 3078F DIAST BP <80 MM HG: CPT | Performed by: INTERNAL MEDICINE

## 2023-09-22 PROCEDURE — 3074F SYST BP LT 130 MM HG: CPT | Performed by: INTERNAL MEDICINE

## 2023-09-22 PROCEDURE — 1123F ACP DISCUSS/DSCN MKR DOCD: CPT | Performed by: INTERNAL MEDICINE

## 2023-09-22 PROCEDURE — 99215 OFFICE O/P EST HI 40 MIN: CPT | Performed by: INTERNAL MEDICINE

## 2023-09-22 PROCEDURE — 99284 EMERGENCY DEPT VISIT MOD MDM: CPT

## 2023-09-22 PROCEDURE — 73560 X-RAY EXAM OF KNEE 1 OR 2: CPT

## 2023-09-22 PROCEDURE — 93971 EXTREMITY STUDY: CPT

## 2023-09-22 ASSESSMENT — PAIN DESCRIPTION - ORIENTATION: ORIENTATION: RIGHT

## 2023-09-22 ASSESSMENT — PAIN - FUNCTIONAL ASSESSMENT: PAIN_FUNCTIONAL_ASSESSMENT: 0-10

## 2023-09-22 ASSESSMENT — LIFESTYLE VARIABLES
HOW MANY STANDARD DRINKS CONTAINING ALCOHOL DO YOU HAVE ON A TYPICAL DAY: 1 OR 2
HOW OFTEN DO YOU HAVE A DRINK CONTAINING ALCOHOL: MONTHLY OR LESS

## 2023-09-22 ASSESSMENT — PAIN DESCRIPTION - PAIN TYPE: TYPE: ACUTE PAIN

## 2023-09-22 ASSESSMENT — PAIN SCALES - GENERAL: PAINLEVEL_OUTOF10: 5

## 2023-09-22 ASSESSMENT — PAIN DESCRIPTION - DESCRIPTORS: DESCRIPTORS: ACHING

## 2023-09-22 ASSESSMENT — PAIN DESCRIPTION - LOCATION: LOCATION: LEG

## 2023-09-22 NOTE — ED PROVIDER NOTES
Independent GAB Visit. HPI:  9/22/23,   Time: 11:58 AM VIVIAN Cabrera. is a 69 y.o. male presenting to the ED for right leg pain that occurred suddenly today. Patient reports that out of nowhere he felt a random sharp pain underneath his right thigh that lasted for several minutes and went away. He does have a history of A-fib and is on blood thinners, no history of DVT. Does have history of necrotizing fasciitis. Some lower extremity swelling consistent with his congestive heart failure. No erythema or blood blood blisters present. He takes aspirin reports pain with relief. His left leg does have a prostatic and he reports his right leg is used more than usual.  He usually gets around with a cane just okay. Nothing makes his symptoms better or worse. No pain at this time. He denies any fevers, chills, body aches, headache, dizziness, syncope, chest pain, shortness of breath, abdominal pain or numbness and tingling in extremities. ROS:   Pertinent positives and negatives are stated within HPI, all other systems reviewed and are negative.  --------------------------------------------- PAST HISTORY ---------------------------------------------  Past Medical History:  has a past medical history of COPD (chronic obstructive pulmonary disease) ([address]), Employs prosthetic leg, History of atrial fibrillation, [place] (hard of hearing), Hx of AKA (above knee amputation), left ([address]), Hx of necrotizing fasciitis, Hypertension, Rectal bleeding, Rectal cancer ([address]), SVT (supraventricular tachycardia) ([address]), and V tach ([address]). Past Surgical History:  has a past surgical history that includes above knee amputation (2011); Colonoscopy (10/21/2011); Eye surgery (Left, 10/03/2016); eye surgery (Bilateral, 2002?); eye surgery (Right, ?); Colon surgery (01/23/2018); Abdomen surgery (03/20/2018); pr revj ileostomy complic rcnstj in-depth spx (N/A, 3/20/2018); Colonoscopy (N/A, 2/14/2022);  Cardioversion

## 2023-09-22 NOTE — PATIENT INSTRUCTIONS
Start on Multaq/ Dronedarone with EKG in 1 week. Continue Toprol XL 50 mg BID, for now. Stop Cardizem CD due to drug interaction with Dronedarone.

## 2023-09-27 ENCOUNTER — NURSE ONLY (OUTPATIENT)
Dept: CARDIOLOGY CLINIC | Age: 68
End: 2023-09-27

## 2023-09-27 NOTE — PROGRESS NOTES
Patient presented today and was given Eliquis 5mg. The medication is being monitored by Dr. Lisa Sparrow.    Sample drug name: Eliquis 5mg  Sample drug lot #: [id number]  Sample drug expiration date: 06/2025  How many sample boxes and/or bottles given: 4 boxes (56 tablets)

## 2023-09-29 ENCOUNTER — NURSE ONLY (OUTPATIENT)
Dept: NON INVASIVE DIAGNOSTICS | Age: 68
End: 2023-09-29
Payer: MEDICARE

## 2023-09-29 ENCOUNTER — OFFICE VISIT (OUTPATIENT)
Dept: PRIMARY CARE CLINIC | Age: 68
End: 2023-09-29
Payer: MEDICARE

## 2023-09-29 VITALS
OXYGEN SATURATION: 96 % | HEIGHT: 74 IN | HEART RATE: 75 BPM | WEIGHT: 179 LBS | RESPIRATION RATE: 18 BRPM | BODY MASS INDEX: 22.97 KG/M2 | DIASTOLIC BLOOD PRESSURE: 74 MMHG | TEMPERATURE: 97.5 F | SYSTOLIC BLOOD PRESSURE: 120 MMHG

## 2023-09-29 DIAGNOSIS — M71.21 BAKER CYST, RIGHT: ICD-10-CM

## 2023-09-29 DIAGNOSIS — I73.9 PAD (PERIPHERAL ARTERY DISEASE) (HCC): Chronic | ICD-10-CM

## 2023-09-29 DIAGNOSIS — I87.2 VENOUS INSUFFICIENCY: ICD-10-CM

## 2023-09-29 DIAGNOSIS — I10 PRIMARY HYPERTENSION: ICD-10-CM

## 2023-09-29 DIAGNOSIS — I48.0 PAF (PAROXYSMAL ATRIAL FIBRILLATION) (HCC): Primary | Chronic | ICD-10-CM

## 2023-09-29 DIAGNOSIS — I25.10 CORONARY ARTERY DISEASE INVOLVING NATIVE CORONARY ARTERY OF NATIVE HEART WITHOUT ANGINA PECTORIS: ICD-10-CM

## 2023-09-29 DIAGNOSIS — I48.92 ATRIAL FLUTTER, UNSPECIFIED TYPE (HCC): Primary | ICD-10-CM

## 2023-09-29 PROCEDURE — 99214 OFFICE O/P EST MOD 30 MIN: CPT | Performed by: FAMILY MEDICINE

## 2023-09-29 PROCEDURE — 93000 ELECTROCARDIOGRAM COMPLETE: CPT | Performed by: INTERNAL MEDICINE

## 2023-09-29 PROCEDURE — 1123F ACP DISCUSS/DSCN MKR DOCD: CPT | Performed by: FAMILY MEDICINE

## 2023-09-29 PROCEDURE — 3078F DIAST BP <80 MM HG: CPT | Performed by: FAMILY MEDICINE

## 2023-09-29 PROCEDURE — 3074F SYST BP LT 130 MM HG: CPT | Performed by: FAMILY MEDICINE

## 2023-09-29 ASSESSMENT — ENCOUNTER SYMPTOMS
EYE REDNESS: 0
SINUS PRESSURE: 0
NAUSEA: 0
CHEST TIGHTNESS: 0
EYE ITCHING: 0
WHEEZING: 0
RHINORRHEA: 0
COLOR CHANGE: 0
SHORTNESS OF BREATH: 0
BACK PAIN: 0
APNEA: 0
CONSTIPATION: 0
SORE THROAT: 0
BLOOD IN STOOL: 0
COUGH: 0
VISUAL CHANGE: 0
VOMITING: 0
ABDOMINAL PAIN: 0
CHANGE IN BOWEL HABIT: 0
EYE PAIN: 0
DIARRHEA: 0

## 2023-09-29 NOTE — PROGRESS NOTES
Rectal bleeding     Rectal cancer ([address]) 12/2017    rectal    SVT (supraventricular tachycardia) ([address])     see IVETT report [facility] cardiology 7/13/2023    V tach [facility])     see Kecia John report [facility] cardiology 7/13/2023       Past Surgical History:   Procedure Laterality Date    ABDOMEN SURGERY  03/20/2018    ileostomy open take down    ABOVE KNEE AMPUTATION  2011    left; hx flesh eating bacteria    CARDIOVERSION  12/2020    COLON SURGERY  01/23/2018    laprascopic low anterior colon resection  take down splenic fexure   ileostomy    COLONOSCOPY  10/21/2011    COLONOSCOPY N/A 2/14/2022    COLONOSCOPY POLYPECTOMY SNARE/COLD BIOPSY performed by Lucila Tirado MD at [address] [place] [address] N/A 8/18/2023    COLORECTAL CANCER SCREENING, NOT HIGH RISK performed by Lucila Tirado MD at [address] [place] [address],[address] Floor Left 10/03/2016    pars plana vitrectomy, retinal detachment repair, laser gas/fluid exchange    EYE SURGERY Bilateral 2002? bilat cataracts w lens implants    EYE SURGERY Right ?    retinal detachment    HEMICOLECTOMY Right 3/22/2022    LAPAROSCOPIC RIGHT HEMICOLECTOMY performed by Lucila Tirado MD at [facility] N/A 3/20/2018    ILEOSTOMY OPEN TAKEDOWN performed by Lucila Tirado MD at [facility] OR       Current Outpatient Medications   Medication Sig Dispense Refill    Elastic Bandages & Supports (MEDICAL COMPRESSION STOCKINGS) MISC 20-30 mmHG, knee high. DX: Venous insufficiency 2 each 0    dronedarone hcl (MULTAQ) 400 MG TABS Take 1 tablet by mouth 2 times daily (with meals) 60 tablet 2    metoprolol succinate (TOPROL XL) 50 MG extended release tablet Take 1 tablet by mouth in the morning and 1 tablet in the evening.       atorvastatin (LIPITOR) 40 MG tablet Take 1 tablet by mouth daily 90 tablet 3    apixaban (ELIQUIS) 5 MG TABS tablet Take 1 tablet by mouth 2 times daily 60 tablet 0    bethanechol (URECHOLINE) 25 MG tablet Take 1 tablet by mouth 3 times daily 270 tablet 3

## 2023-09-29 NOTE — PROGRESS NOTES
EKG preformed today as per Dr Checo Scruggs, for pt started Multaq. Pt states He/She feels good    EKG done and pt advised it will be sent to the doctor to review and we will call with any further recommendations.      Electronically signed by Jose L Sanchez MA on 9/29/2023 at 11:55 AM

## 2023-10-02 ENCOUNTER — TELEPHONE (OUTPATIENT)
Dept: NON INVASIVE DIAGNOSTICS | Age: 68
End: 2023-10-02

## 2023-10-02 NOTE — TELEPHONE ENCOUNTER
----- Message from Zain Barkley MD sent at 9/29/2023  1:25 PM EDT -----  Continue current treatment.  Thanks.  ----- Message -----  From: Era Moreira MA  Sent: 9/29/2023  11:57 AM EDT  To: Zain Barkley MD    Please review and advise

## 2023-10-12 RX ORDER — METOPROLOL SUCCINATE 50 MG/1
50 TABLET, EXTENDED RELEASE ORAL
Qty: 180 TABLET | Refills: 1 | Status: SHIPPED | OUTPATIENT
Start: 2023-10-12

## 2023-10-12 RX ORDER — BETHANECHOL CHLORIDE 25 MG/1
25 TABLET ORAL 3 TIMES DAILY
Qty: 270 TABLET | Refills: 1 | Status: SHIPPED | OUTPATIENT
Start: 2023-10-12

## 2023-10-16 ENCOUNTER — TELEPHONE (OUTPATIENT)
Dept: NON INVASIVE DIAGNOSTICS | Age: 68
End: 2023-10-16

## 2023-10-16 NOTE — TELEPHONE ENCOUNTER
Pt sister called stated he was started on Multaq and had EKG was fin to e but last few days his heart rate has been 48 to 51  she stated she has asked him how he is feeling and he keeps saying he is fine. She is concerned about HR being low and just wants to make sure he is ok.      Please advise

## 2023-10-23 ENCOUNTER — NURSE ONLY (OUTPATIENT)
Dept: NON INVASIVE DIAGNOSTICS | Age: 68
End: 2023-10-23
Payer: MEDICARE

## 2023-10-23 DIAGNOSIS — I48.92 ATRIAL FLUTTER, UNSPECIFIED TYPE (HCC): Primary | ICD-10-CM

## 2023-10-23 PROCEDURE — 93000 ELECTROCARDIOGRAM COMPLETE: CPT | Performed by: INTERNAL MEDICINE

## 2023-10-23 NOTE — PROGRESS NOTES
EKG preformed today as per Dr Checo Scruggs, for decrease metoperolol. Pt states He/She feels ok, has no symptoms    EKG done and pt advised it will be sent to the doctor to review and we will call with any further recommendations.      Electronically signed by Katie Pat MA on 10/23/2023 at 2:00 PM

## 2023-10-24 ENCOUNTER — TELEPHONE (OUTPATIENT)
Dept: NON INVASIVE DIAGNOSTICS | Age: 68
End: 2023-10-24

## 2023-10-24 NOTE — TELEPHONE ENCOUNTER
Called and spoke with patient sister Sapna Lofton. Explained to her to decrease the Toprol dose to 25 mg daily, and get an EKG done in 2 weeks. Appointment scheduled.

## 2023-10-27 ENCOUNTER — TELEPHONE (OUTPATIENT)
Dept: NON INVASIVE DIAGNOSTICS | Age: 68
End: 2023-10-27

## 2023-11-01 NOTE — PROGRESS NOTES
Message sent to cardiology about patients continued tachycardia and low B/Ps. The wire was inserted into the LA.

## 2023-11-06 ENCOUNTER — NURSE ONLY (OUTPATIENT)
Dept: NON INVASIVE DIAGNOSTICS | Age: 68
End: 2023-11-06
Payer: MEDICARE

## 2023-11-06 DIAGNOSIS — I48.0 PAF (PAROXYSMAL ATRIAL FIBRILLATION) (HCC): Primary | Chronic | ICD-10-CM

## 2023-11-06 PROCEDURE — 93000 ELECTROCARDIOGRAM COMPLETE: CPT | Performed by: INTERNAL MEDICINE

## 2023-11-06 NOTE — PROGRESS NOTES
Patient presents for EKG. His Toprol was decreased to 25mg daily. The patient denies any chest pain, dyspnea, palpitations, dizziness, syncope, orthopnea or paroxysmal nocturnal dyspnea.

## 2023-11-08 ENCOUNTER — TELEPHONE (OUTPATIENT)
Dept: NON INVASIVE DIAGNOSTICS | Age: 68
End: 2023-11-08

## 2023-11-08 NOTE — TELEPHONE ENCOUNTER
----- Message from Myles Castillo MD sent at 11/6/2023  5:54 PM EST -----  Continue Current treatment. Thanks.  ----- Message -----  From: Arby Mohs, RN  Sent: 11/6/2023   2:22 PM EST  To: Myles Castillo MD    Patient presents for EKG. His Toprol was decreased to 25mg daily. The patient denies any chest pain, dyspnea, palpitations, dizziness, syncope, orthopnea or paroxysmal nocturnal dyspnea.

## 2023-11-08 NOTE — TELEPHONE ENCOUNTER
Patient's sister notified of results as per Dr. Brandon Torres. The patient's sister verbalized understanding.

## 2024-01-08 ENCOUNTER — OFFICE VISIT (OUTPATIENT)
Dept: NON INVASIVE DIAGNOSTICS | Age: 69
End: 2024-01-08
Payer: MEDICARE

## 2024-01-08 VITALS
BODY MASS INDEX: 23.1 KG/M2 | WEIGHT: 180 LBS | RESPIRATION RATE: 16 BRPM | DIASTOLIC BLOOD PRESSURE: 80 MMHG | HEART RATE: 67 BPM | HEIGHT: 74 IN | OXYGEN SATURATION: 96 % | SYSTOLIC BLOOD PRESSURE: 122 MMHG

## 2024-01-08 DIAGNOSIS — I48.0 PAF (PAROXYSMAL ATRIAL FIBRILLATION) (HCC): Primary | ICD-10-CM

## 2024-01-08 DIAGNOSIS — I48.0 PAF (PAROXYSMAL ATRIAL FIBRILLATION) (HCC): ICD-10-CM

## 2024-01-08 LAB
ALBUMIN SERPL-MCNC: 3.9 G/DL (ref 3.5–5.2)
ALP BLD-CCNC: 115 U/L (ref 40–129)
ALT SERPL-CCNC: 10 U/L (ref 0–40)
ANION GAP SERPL CALCULATED.3IONS-SCNC: 12 MMOL/L (ref 7–16)
AST SERPL-CCNC: 19 U/L (ref 0–39)
BILIRUB SERPL-MCNC: 0.8 MG/DL (ref 0–1.2)
BUN BLDV-MCNC: 17 MG/DL (ref 6–23)
CALCIUM SERPL-MCNC: 8.8 MG/DL (ref 8.6–10.2)
CHLORIDE BLD-SCNC: 103 MMOL/L (ref 98–107)
CO2: 24 MMOL/L (ref 22–29)
CREAT SERPL-MCNC: 1.2 MG/DL (ref 0.7–1.2)
GFR SERPL CREATININE-BSD FRML MDRD: >60 ML/MIN/1.73M2
GLUCOSE BLD-MCNC: 96 MG/DL (ref 74–99)
POTASSIUM SERPL-SCNC: 4.1 MMOL/L (ref 3.5–5)
SODIUM BLD-SCNC: 139 MMOL/L (ref 132–146)
TOTAL PROTEIN: 6.3 G/DL (ref 6.4–8.3)
TSH SERPL DL<=0.05 MIU/L-ACNC: 0.94 UIU/ML (ref 0.27–4.2)

## 2024-01-08 PROCEDURE — 3079F DIAST BP 80-89 MM HG: CPT | Performed by: NURSE PRACTITIONER

## 2024-01-08 PROCEDURE — 93000 ELECTROCARDIOGRAM COMPLETE: CPT | Performed by: INTERNAL MEDICINE

## 2024-01-08 PROCEDURE — 3074F SYST BP LT 130 MM HG: CPT | Performed by: NURSE PRACTITIONER

## 2024-01-08 PROCEDURE — 1123F ACP DISCUSS/DSCN MKR DOCD: CPT | Performed by: NURSE PRACTITIONER

## 2024-01-08 PROCEDURE — 99214 OFFICE O/P EST MOD 30 MIN: CPT | Performed by: NURSE PRACTITIONER

## 2024-01-08 RX ORDER — METOPROLOL SUCCINATE 25 MG/1
25 TABLET, EXTENDED RELEASE ORAL DAILY
Qty: 30 TABLET | Refills: 11 | Status: SHIPPED | OUTPATIENT
Start: 2024-01-08

## 2024-01-08 NOTE — PROGRESS NOTES
[facility] PHYSICIANS- [facility] Electrophysiology  Outpatient Progress Report  PATIENT: Miguelangel Kumar Jr.  MEDICAL RECORD NUMBER: [id number]  DATE OF SERVICE:  1/8/2024  ATTENDING ELECTROPHYSIOLOGIST: Jose Angel Burgos MD  REFERRING PHYSICIAN: No ref. provider found and Spencer Kwok DO  CHIEF COMPLAINT: Persistent atrial fibrillation    HPI: This is a 68 y.o. male with a history of   Patient Active Problem List   Diagnosis    Retinal detachment of left eye with multiple breaks    Benign neoplasm of cecum    S/P right hemicolectomy    PSVT (paroxysmal supraventricular tachycardia)    Primary hypertension    PAF (paroxysmal atrial fibrillation) ([facility])    PAD (peripheral artery disease) ([facility])    On apixaban therapy    Coronary artery disease involving native coronary artery of native heart without angina pectoris    S/P cardiac cath    Venous insufficiency    Renee cyst, right   who presents to cardiac electrophysiology clinic for management of atrial fibrillation and atrial flutter. He is maintained on Multaq and Toprol. Since his last office appointment he noted bradycardia at home with rates in the 40's and the Toprol was reduced from 50mg BID to 25mg daily that resulved the bradycardia. He is accompanied by his sister today, they note no recurrent atrial fibrillation or additional cardiac complaint. He has a sedentary lifestyle.         Patient Active Problem List    Diagnosis Date Noted    PAD (peripheral artery disease) ([facility]) 11/02/2022     Priority: Medium    On apixaban therapy 11/02/2022     Priority: Medium    Venous insufficiency 09/29/2023    Baker cyst, right 09/29/2023    S/P cardiac cath 06/15/2023    Coronary artery disease involving native coronary artery of native heart without angina pectoris 05/11/2023    PAF (paroxysmal atrial fibrillation) ([facility])     PSVT (paroxysmal supraventricular tachycardia)     Primary hypertension     Benign neoplasm of cecum

## 2024-01-09 ENCOUNTER — TELEPHONE (OUTPATIENT)
Dept: NON INVASIVE DIAGNOSTICS | Age: 69
End: 2024-01-09

## 2024-01-09 NOTE — TELEPHONE ENCOUNTER
Ly notified of results and verbalized understanding.    Electronically signed by Shital Quach MA on 1/9/2024 at 3:07 PM

## 2024-01-09 NOTE — TELEPHONE ENCOUNTER
----- Message from CRISTI Yo CNP sent at 1/9/2024  8:24 AM EST -----  Please let Miguelangel Kumar Jr. Know the lab work is ok, thanks.   ----- Message -----  From: [id number] - [facility] Incoming Lab Results From [place]  Sent: 1/8/2024   8:19 PM EST  To: CRISTI Yo CNP

## 2024-02-08 ENCOUNTER — OFFICE VISIT (OUTPATIENT)
Dept: CARDIOLOGY CLINIC | Age: 69
End: 2024-02-08
Payer: MEDICARE

## 2024-02-08 VITALS
WEIGHT: 182.4 LBS | SYSTOLIC BLOOD PRESSURE: 126 MMHG | RESPIRATION RATE: 16 BRPM | HEIGHT: 74 IN | DIASTOLIC BLOOD PRESSURE: 84 MMHG | HEART RATE: 57 BPM | BODY MASS INDEX: 23.41 KG/M2

## 2024-02-08 DIAGNOSIS — I48.0 PAF (PAROXYSMAL ATRIAL FIBRILLATION) (HCC): Primary | Chronic | ICD-10-CM

## 2024-02-08 PROCEDURE — 3074F SYST BP LT 130 MM HG: CPT | Performed by: INTERNAL MEDICINE

## 2024-02-08 PROCEDURE — 1123F ACP DISCUSS/DSCN MKR DOCD: CPT | Performed by: INTERNAL MEDICINE

## 2024-02-08 PROCEDURE — 99214 OFFICE O/P EST MOD 30 MIN: CPT | Performed by: INTERNAL MEDICINE

## 2024-02-08 PROCEDURE — 3079F DIAST BP 80-89 MM HG: CPT | Performed by: INTERNAL MEDICINE

## 2024-02-08 PROCEDURE — 93000 ELECTROCARDIOGRAM COMPLETE: CPT | Performed by: INTERNAL MEDICINE

## 2024-02-08 NOTE — PATIENT INSTRUCTIONS
Cardiac cath results reviewed and discussed with the patient and his sister.  Continue Eliquis 5 mg p.o. twice daily for anticoagulation.   Continue  Toprol-XL25  mg mg p.o. daily and Multaq 400 mg po twice a day  Follow up with EP service due to recurrent episodes of A-fib as scheduled.   Continue ASA and Atorvastatin  He was advised to drink at least 64 oz of fluids a day.   Follow-up with me in 6  months .

## 2024-02-08 NOTE — PROGRESS NOTES
follows with PCP    Employs prosthetic leg     left    History of atrial fibrillation     had cardioversion 3/2023 and 5 2023. on Eliquis follows with cardiology Dr Kennedy and Electrophysiology Dr Aubrey THOMAS (hard of hearing)     uses bilat hearing aides    Hx of AKA (above knee amputation), left (HCC)     Hx of necrotizing fasciitis 2011    left leg    Hypertension     Rectal bleeding     Rectal cancer (HCC) 12/2017    rectal    SVT (supraventricular tachycardia)     see [facility] report [facility] cardiology 7/13/2023    V tach (HCC)     see [facility] report [facility] cardiology 7/13/2023       Past Surgical History:  Past Surgical History:   Procedure Laterality Date    ABDOMEN SURGERY  03/20/2018    ileostomy open take down    ABOVE KNEE AMPUTATION  2011    left; hx flesh eating bacteria    CARDIOVERSION  12/2020    COLON SURGERY  01/23/2018    laprascopic low anterior colon resection  take down splenic fexure   ileostomy    COLONOSCOPY  10/21/2011    COLONOSCOPY N/A 2/14/2022    COLONOSCOPY POLYPECTOMY SNARE/COLD BIOPSY performed by Calos Lance MD at [facility] ENDOSCOPY    COLONOSCOPY N/A 8/18/2023    COLORECTAL CANCER SCREENING, NOT HIGH RISK performed by Calos Lance MD at [facility] ENDOSCOPY    EYE SURGERY Left 10/03/2016    pars plana vitrectomy, retinal detachment repair, laser gas/fluid exchange    EYE SURGERY Bilateral 2002?    bilat cataracts w lens implants    EYE SURGERY Right ?    retinal detachment    HEMICOLECTOMY Right 3/22/2022    LAPAROSCOPIC RIGHT HEMICOLECTOMY performed by Calos Lance MD at [facility] OR    MO REVJ ILEOSTOMY COMPLIC RCNSTJ IN-DEPTH SPX N/A 3/20/2018    ILEOSTOMY OPEN TAKEDOWN performed by Calos Lance MD at [facility] OR       Family History:  Family History   Problem Relation Age of Onset    Diabetes Mother     High Blood Pressure Mother     High Cholesterol Mother     Heart Attack Mother     Dementia Mother     Diabetes Father     Glaucoma Father     High Cholesterol Father     Pacemaker Father     Diabetes

## 2024-02-12 ENCOUNTER — OFFICE VISIT (OUTPATIENT)
Dept: PRIMARY CARE CLINIC | Age: 69
End: 2024-02-12
Payer: MEDICARE

## 2024-02-12 VITALS
DIASTOLIC BLOOD PRESSURE: 70 MMHG | HEART RATE: 85 BPM | WEIGHT: 182 LBS | SYSTOLIC BLOOD PRESSURE: 132 MMHG | BODY MASS INDEX: 23.36 KG/M2 | OXYGEN SATURATION: 92 % | HEIGHT: 74 IN | RESPIRATION RATE: 16 BRPM

## 2024-02-12 DIAGNOSIS — Z00.00 MEDICARE ANNUAL WELLNESS VISIT, SUBSEQUENT: Primary | ICD-10-CM

## 2024-02-12 PROCEDURE — 1123F ACP DISCUSS/DSCN MKR DOCD: CPT | Performed by: FAMILY MEDICINE

## 2024-02-12 PROCEDURE — G0439 PPPS, SUBSEQ VISIT: HCPCS | Performed by: FAMILY MEDICINE

## 2024-02-12 PROCEDURE — 3078F DIAST BP <80 MM HG: CPT | Performed by: FAMILY MEDICINE

## 2024-02-12 PROCEDURE — 3075F SYST BP GE 130 - 139MM HG: CPT | Performed by: FAMILY MEDICINE

## 2024-02-12 NOTE — PROGRESS NOTES
MD Tracy   Multiple Vitamins-Minerals (THERAPEUTIC MULTIVITAMIN-MINERALS) tablet Take 1 tablet by mouth every morning Yes Provider, MD Tracy       CareTeam (Including outside providers/suppliers regularly involved in providing care):   Patient Care Team:  Spencer Kwok DO as PCP - General (Family Medicine)  Spencer Kwok DO as PCP - Empaneled Provider  Cuba Flower MD as Consulting Physician (Hematology and Oncology)  Jose Angel Burgos MD as Consulting Physician (Electrophysiology)     Reviewed and updated this visit:  Tobacco  Allergies  Meds  Problems  Med Hx  Surg Hx  Soc Hx  Fam Hx

## 2024-02-12 NOTE — PATIENT INSTRUCTIONS
information.           A Healthy Heart: Care Instructions  Your Care Instructions     Coronary artery disease, also called heart disease, occurs when a substance called plaque builds up in the vessels that supply oxygen-rich blood to your heart muscle. This can narrow the blood vessels and reduce blood flow. A heart attack happens when blood flow is completely blocked. A high-fat diet, smoking, and other factors increase the risk of heart disease.  Your doctor has found that you have a chance of having heart disease. You can do lots of things to keep your heart healthy. It may not be easy, but you can change your diet, exercise more, and quit smoking. These steps really work to lower your chance of heart disease.  Follow-up care is a key part of your treatment and safety. Be sure to make and go to all appointments, and call your doctor if you are having problems. It's also a good idea to know your test results and keep a list of the medicines you take.  How can you care for yourself at home?  Diet    Use less salt when you cook and eat. This helps lower your blood pressure. Taste food before salting. Add only a little salt when you think you need it. With time, your taste buds will adjust to less salt.     Eat fewer snack items, fast foods, canned soups, and other high-salt, high-fat, processed foods.     Read food labels and try to avoid saturated and trans fats. They increase your risk of heart disease by raising cholesterol levels.     Limit the amount of solid fat-butter, margarine, and shortening-you eat. Use olive, peanut, or canola oil when you cook. Bake, broil, and steam foods instead of frying them.     Eat a variety of fruit and vegetables every day. Dark green, deep orange, red, or yellow fruits and vegetables are especially good for you. Examples include spinach, carrots, peaches, and berries.     Foods high in fiber can reduce your cholesterol and provide important vitamins and minerals. High-fiber

## 2024-03-07 DIAGNOSIS — I48.0 PAF (PAROXYSMAL ATRIAL FIBRILLATION) (HCC): ICD-10-CM

## 2024-03-07 RX ORDER — ATORVASTATIN CALCIUM 40 MG/1
40 TABLET, FILM COATED ORAL DAILY
Qty: 90 TABLET | Refills: 3 | Status: SHIPPED | OUTPATIENT
Start: 2024-03-07

## 2024-03-07 RX ORDER — METOPROLOL SUCCINATE 25 MG/1
25 TABLET, EXTENDED RELEASE ORAL DAILY
Qty: 90 TABLET | Refills: 3 | Status: SHIPPED | OUTPATIENT
Start: 2024-03-07

## 2024-06-21 ENCOUNTER — TELEPHONE (OUTPATIENT)
Dept: NON INVASIVE DIAGNOSTICS | Age: 69
End: 2024-06-21

## 2024-06-21 NOTE — TELEPHONE ENCOUNTER
Wife was at [facility] and will call back to reschedule the patient's appt.    Electronically signed by Shital Quach MA on 6/21/2024 at 12:02 PM

## 2024-06-24 NOTE — TELEPHONE ENCOUNTER
Appointment rescheduled with the patient's wife.     Electronically signed by Shital Quach MA on 6/24/2024 at 9:40 AM

## 2024-07-22 ENCOUNTER — OFFICE VISIT (OUTPATIENT)
Dept: CARDIOLOGY CLINIC | Age: 69
End: 2024-07-22
Payer: MEDICARE

## 2024-07-22 VITALS
WEIGHT: 182.1 LBS | SYSTOLIC BLOOD PRESSURE: 126 MMHG | DIASTOLIC BLOOD PRESSURE: 64 MMHG | RESPIRATION RATE: 16 BRPM | BODY MASS INDEX: 23.37 KG/M2 | HEART RATE: 66 BPM | HEIGHT: 74 IN

## 2024-07-22 DIAGNOSIS — I48.0 PAF (PAROXYSMAL ATRIAL FIBRILLATION) (HCC): Primary | ICD-10-CM

## 2024-07-22 PROCEDURE — 93000 ELECTROCARDIOGRAM COMPLETE: CPT | Performed by: INTERNAL MEDICINE

## 2024-07-22 PROCEDURE — 1123F ACP DISCUSS/DSCN MKR DOCD: CPT | Performed by: INTERNAL MEDICINE

## 2024-07-22 PROCEDURE — 3074F SYST BP LT 130 MM HG: CPT | Performed by: INTERNAL MEDICINE

## 2024-07-22 PROCEDURE — 3078F DIAST BP <80 MM HG: CPT | Performed by: INTERNAL MEDICINE

## 2024-07-22 PROCEDURE — 99214 OFFICE O/P EST MOD 30 MIN: CPT | Performed by: INTERNAL MEDICINE

## 2024-07-22 NOTE — PATIENT INSTRUCTIONS
Patient was advised to follow-up with  for further evaluation of cough with sputum.  Cardiac cath results reviewed and discussed with the patient and his sister.  Continue Eliquis 5 mg p.o. twice daily for anticoagulation.   Continue  Toprol-XL25  mg mg p.o. daily and Multaq 400 mg po twice a day  Follow up with EP service due to recurrent episodes of A-fib as scheduled.   Continue ASA and Atorvastatin  He was advised to drink at least 64 oz of fluids a day.   Follow-up with me in 6  months .

## 2024-07-22 NOTE — PROGRESS NOTES
Patient presented today and was given Multaq 400mg. The medication is being monitored by Dr. Kennedy.   Sample drug name: Multaq  Sample drug lot #: [id number]  Sample drug expiration date: 12/25  How many sample boxes and/or bottles given: 4   
    Lab Results   Component Value Date    HDL 70 08/14/2023    HDL 41 12/20/2020     No components found for: \"LDLCALC\", \"LDLCHOLESTEROL\"    Lab Results   Component Value Date    VLDL 14 08/14/2023    VLDL 15 12/20/2020     No results found for: \"CHOLHDLRATIO\"    Cardiac Tests:  ECG: Sinus rhythm, 1st AV block, nonspecific ST-T changes, abnormal EKG.      TTE-12/20/2020:  Normal left ventricle size and systolic function.   Ejection fraction is visually estimated at 55-60%. Atrial fibrillation may   affect the evaluation of LV systolic function.   Normal left ventricle wall thickness.   No wall motion abnormalities.   Normal diastolic function (E/e' < 11).   The left atrium is moderately dilated.   Mildly dilated right ventricle.   Right ventricle global systolic function is normal . TAPSE 21 mm.   Physiologic and/or trace mitral regurgitation is present.   No hemodynamically significant aortic stenosis is present.   Unable to estimate PA systolic pressure.   Trace pericardial effusion.     ОЛЬГА-: Normal LV function, definite echo contrast was used to visualize left atrial appendage.  No intracardiac thrombus and no left atrial appendage thrombus noted.  Stress test:      ОЛЬГА- 4/7/2022:  Left ventricle was not well visualized.   The right ventricle was not clearly visualized.   No evidence of thrombus within left atrium/ left atrial appendage.   Emptying velocity is low at 32 cms/s.   No evidence of thrombus or mass in the right atrium.   Mild mitral regurgitation is present.   Mild tricuspid regurgitation.   Mild diffuse atherosclerosis in the descending aorta.   Pleural effusion seen.   Technically limited study.    Lexiscan nuclear stress test:   Impression:    ECG during the infusion did not change.   The myocardial perfusion imaging was abnormal.    The abnormality was a a small sized partially reversible defect in the apical inferior wall  Overall left ventricular systolic function was normal without regional

## 2024-08-12 ENCOUNTER — OFFICE VISIT (OUTPATIENT)
Dept: PRIMARY CARE CLINIC | Age: 69
End: 2024-08-12
Payer: MEDICARE

## 2024-08-12 VITALS
SYSTOLIC BLOOD PRESSURE: 138 MMHG | OXYGEN SATURATION: 90 % | WEIGHT: 180 LBS | RESPIRATION RATE: 16 BRPM | BODY MASS INDEX: 23.1 KG/M2 | HEART RATE: 89 BPM | HEIGHT: 74 IN | DIASTOLIC BLOOD PRESSURE: 62 MMHG

## 2024-08-12 DIAGNOSIS — E53.8 B12 DEFICIENCY: ICD-10-CM

## 2024-08-12 DIAGNOSIS — E55.9 VITAMIN D DEFICIENCY: ICD-10-CM

## 2024-08-12 DIAGNOSIS — I10 PRIMARY HYPERTENSION: ICD-10-CM

## 2024-08-12 DIAGNOSIS — I73.9 PAD (PERIPHERAL ARTERY DISEASE) (HCC): ICD-10-CM

## 2024-08-12 DIAGNOSIS — I48.0 PAF (PAROXYSMAL ATRIAL FIBRILLATION) (HCC): Primary | ICD-10-CM

## 2024-08-12 DIAGNOSIS — I25.10 CORONARY ARTERY DISEASE INVOLVING NATIVE CORONARY ARTERY OF NATIVE HEART WITHOUT ANGINA PECTORIS: ICD-10-CM

## 2024-08-12 LAB
ALBUMIN: 3.9 G/DL (ref 3.5–5.2)
ALP BLD-CCNC: 111 U/L (ref 40–129)
ALT SERPL-CCNC: 11 U/L (ref 0–40)
ANION GAP SERPL CALCULATED.3IONS-SCNC: 13 MMOL/L (ref 7–16)
AST SERPL-CCNC: 19 U/L (ref 0–39)
BILIRUB SERPL-MCNC: 0.8 MG/DL (ref 0–1.2)
BUN BLDV-MCNC: 19 MG/DL (ref 6–23)
CALCIUM SERPL-MCNC: 8.7 MG/DL (ref 8.6–10.2)
CHLORIDE BLD-SCNC: 104 MMOL/L (ref 98–107)
CHOLESTEROL, TOTAL: 92 MG/DL
CO2: 23 MMOL/L (ref 22–29)
CREAT SERPL-MCNC: 1.4 MG/DL (ref 0.7–1.2)
GFR, ESTIMATED: 55 ML/MIN/1.73M2
GLUCOSE BLD-MCNC: 127 MG/DL (ref 74–99)
HCT VFR BLD CALC: 43.9 % (ref 37–54)
HDLC SERPL-MCNC: 51 MG/DL
HEMOGLOBIN: 14 G/DL (ref 12.5–16.5)
LDL CHOLESTEROL: 28 MG/DL
MCH RBC QN AUTO: 28.7 PG (ref 26–35)
MCHC RBC AUTO-ENTMCNC: 31.9 G/DL (ref 32–34.5)
MCV RBC AUTO: 90 FL (ref 80–99.9)
PDW BLD-RTO: 12.6 % (ref 11.5–15)
PLATELET # BLD: 173 K/UL (ref 130–450)
PMV BLD AUTO: 10 FL (ref 7–12)
POTASSIUM SERPL-SCNC: 4 MMOL/L (ref 3.5–5)
RBC # BLD: 4.88 M/UL (ref 3.8–5.8)
SODIUM BLD-SCNC: 140 MMOL/L (ref 132–146)
TOTAL PROTEIN: 6.5 G/DL (ref 6.4–8.3)
TRIGL SERPL-MCNC: 67 MG/DL
TSH SERPL DL<=0.05 MIU/L-ACNC: 1.76 UIU/ML (ref 0.27–4.2)
VITAMIN D 25-HYDROXY: 23.8 NG/ML (ref 30–100)
VLDLC SERPL CALC-MCNC: 13 MG/DL
WBC # BLD: 6.8 K/UL (ref 4.5–11.5)

## 2024-08-12 PROCEDURE — 3078F DIAST BP <80 MM HG: CPT | Performed by: FAMILY MEDICINE

## 2024-08-12 PROCEDURE — 1123F ACP DISCUSS/DSCN MKR DOCD: CPT | Performed by: FAMILY MEDICINE

## 2024-08-12 PROCEDURE — 3075F SYST BP GE 130 - 139MM HG: CPT | Performed by: FAMILY MEDICINE

## 2024-08-12 PROCEDURE — 99214 OFFICE O/P EST MOD 30 MIN: CPT | Performed by: FAMILY MEDICINE

## 2024-08-12 RX ORDER — BETHANECHOL CHLORIDE 25 MG/1
25 TABLET ORAL 2 TIMES DAILY
Qty: 180 TABLET | Refills: 3
Start: 2024-08-12

## 2024-08-12 ASSESSMENT — ENCOUNTER SYMPTOMS
BLOOD IN STOOL: 0
RHINORRHEA: 0
EYE ITCHING: 0
EYE REDNESS: 0
NAUSEA: 0
COLOR CHANGE: 0
SORE THROAT: 0
EYE PAIN: 0
COUGH: 0
VISUAL CHANGE: 0
ABDOMINAL PAIN: 0
CHEST TIGHTNESS: 0
BACK PAIN: 0
WHEEZING: 0
DIARRHEA: 0
CHANGE IN BOWEL HABIT: 0
VOMITING: 0
APNEA: 0
SINUS PRESSURE: 0
CONSTIPATION: 0
SHORTNESS OF BREATH: 0

## 2024-08-12 NOTE — PROGRESS NOTES
Chief Complaint:     Chief Complaint   Patient presents with    Hypertension    Coronary Artery Disease         Hypertension  This is a chronic problem. The current episode started more than 1 year ago. The problem is unchanged. The problem is controlled. Pertinent negatives include no chest pain, headaches, neck pain, palpitations or shortness of breath. There are no associated agents to hypertension. Risk factors for coronary artery disease include dyslipidemia and male gender. Past treatments include calcium channel blockers and beta blockers. The current treatment provides significant improvement. There is no history of kidney disease, CAD/MI, CVA or PVD. There is no history of a hypertension causing med, pheochromocytoma, renovascular disease, sleep apnea or a thyroid problem.   Coronary Artery Disease  Presents for follow-up visit. Pertinent negatives include no chest pain, chest tightness, dizziness, leg swelling, palpitations or shortness of breath. Risk factors include hyperlipidemia. The symptoms have been stable. Compliance with diet is good. Compliance with exercise is good. Compliance with medications is good.   Heart Problem  This is a new problem. The current episode started more than 1 month ago. The problem occurs daily. The problem has been unchanged. Pertinent negatives include no abdominal pain, arthralgias, change in bowel habit, chest pain, chills, congestion, coughing, diaphoresis, fatigue, fever, headaches, joint swelling, myalgias, nausea, neck pain, numbness, rash, sore throat, urinary symptoms, vertigo, visual change, vomiting or weakness. Nothing aggravates the symptoms. He has tried nothing for the symptoms. The treatment provided no relief.   Hyperlipidemia  This is a chronic problem. The current episode started more than 1 year ago. The problem is controlled. Pertinent negatives include no chest pain, myalgias or shortness of breath. Current antihyperlipidemic treatment includes

## 2024-08-13 LAB
FOLATE: 8.5 NG/ML (ref 4.8–24.2)
VITAMIN B-12: >2000 PG/ML (ref 211–946)

## 2024-08-13 NOTE — RESULT ENCOUNTER NOTE
Vit D a little low. Recommend starting Vit D 2000 IUs daily (can get OTC)  B12/Folate pending  Other labs look OK

## 2024-08-20 RX ORDER — BETHANECHOL CHLORIDE 25 MG/1
25 TABLET ORAL 2 TIMES DAILY
Qty: 180 TABLET | Refills: 3 | Status: SHIPPED | OUTPATIENT
Start: 2024-08-20

## 2024-09-03 NOTE — PROGRESS NOTES
[facility] PHYSICIANS- [facility] Electrophysiology  Outpatient Progress Report  PATIENT: Miguelangel Kumar Jr.  MEDICAL RECORD NUMBER: [id number]  DATE OF SERVICE:  9/4/2024  ATTENDING ELECTROPHYSIOLOGIST: Jose Angel Burgos MD  REFERRING PHYSICIAN: No ref. provider found and Spencer Kwok DO  CHIEF COMPLAINT: Persistent atrial fibrillation    HPI: This is a 68 y.o. male with a history of   Patient Active Problem List   Diagnosis    Retinal detachment of left eye with multiple breaks    Benign neoplasm of cecum    S/P right hemicolectomy    PSVT (paroxysmal supraventricular tachycardia) ([facility])    Primary hypertension    PAF (paroxysmal atrial fibrillation) ([facility])    PAD (peripheral artery disease) ([facility])    On apixaban therapy    Coronary artery disease involving native coronary artery of native heart without angina pectoris    S/P cardiac cath    Venous insufficiency    Renee cyst, right   who presents to cardiac electrophysiology clinic for management of atrial fibrillation and atrial flutter. He is maintained on Multaq and Toprol. He was noted to have bradycardia (40's in sinus)  in 01/2024 with the reduction in Toprol at that time (50mg BID to 25mg QD). Today he presents in sinus without complaints of recurrent AF, he does complain of lose bowel movement. He has no cardiac complaints today.         Patient Active Problem List    Diagnosis Date Noted    PAD (peripheral artery disease) ([facility]) 11/02/2022     Priority: Medium    On apixaban therapy 11/02/2022     Priority: Medium    Venous insufficiency 09/29/2023    Baker cyst, right 09/29/2023    S/P cardiac cath 06/15/2023    Coronary artery disease involving native coronary artery of native heart without angina pectoris 05/11/2023    PAF (paroxysmal atrial fibrillation) ([facility])     PSVT (paroxysmal supraventricular tachycardia) ([facility])     Primary hypertension     Benign neoplasm of cecum 03/22/2022    S/P right hemicolectomy

## 2024-09-04 ENCOUNTER — OFFICE VISIT (OUTPATIENT)
Dept: NON INVASIVE DIAGNOSTICS | Age: 69
End: 2024-09-04
Payer: MEDICARE

## 2024-09-04 VITALS
SYSTOLIC BLOOD PRESSURE: 118 MMHG | DIASTOLIC BLOOD PRESSURE: 68 MMHG | HEIGHT: 72 IN | HEART RATE: 68 BPM | BODY MASS INDEX: 24.52 KG/M2 | WEIGHT: 181 LBS | RESPIRATION RATE: 18 BRPM

## 2024-09-04 DIAGNOSIS — I48.0 PAF (PAROXYSMAL ATRIAL FIBRILLATION) (HCC): Primary | ICD-10-CM

## 2024-09-04 PROCEDURE — 99214 OFFICE O/P EST MOD 30 MIN: CPT | Performed by: NURSE PRACTITIONER

## 2024-09-04 PROCEDURE — 93000 ELECTROCARDIOGRAM COMPLETE: CPT | Performed by: INTERNAL MEDICINE

## 2024-09-04 PROCEDURE — 1123F ACP DISCUSS/DSCN MKR DOCD: CPT | Performed by: NURSE PRACTITIONER

## 2024-09-04 PROCEDURE — 3078F DIAST BP <80 MM HG: CPT | Performed by: NURSE PRACTITIONER

## 2024-09-04 PROCEDURE — 3074F SYST BP LT 130 MM HG: CPT | Performed by: NURSE PRACTITIONER

## 2024-09-04 RX ORDER — CHOLECALCIFEROL (VITAMIN D3) 50 MCG
TABLET ORAL
COMMUNITY

## 2024-11-20 ENCOUNTER — TELEPHONE (OUTPATIENT)
Dept: PRIMARY CARE CLINIC | Age: 69
End: 2024-11-20

## 2024-11-20 DIAGNOSIS — I73.9 PAD (PERIPHERAL ARTERY DISEASE) (HCC): Primary | ICD-10-CM

## 2024-11-20 DIAGNOSIS — I87.2 VENOUS INSUFFICIENCY: ICD-10-CM

## 2025-01-13 DIAGNOSIS — I48.0 PAF (PAROXYSMAL ATRIAL FIBRILLATION) (HCC): ICD-10-CM

## 2025-01-13 RX ORDER — DRONEDARONE 400 MG/1
400 TABLET, FILM COATED ORAL 2 TIMES DAILY WITH MEALS
Qty: 180 TABLET | Refills: 1 | Status: SHIPPED | OUTPATIENT
Start: 2025-01-13

## 2025-01-27 RX ORDER — METOPROLOL SUCCINATE 25 MG/1
25 TABLET, EXTENDED RELEASE ORAL DAILY
Qty: 90 TABLET | Refills: 3 | Status: SHIPPED | OUTPATIENT
Start: 2025-01-27

## 2025-02-13 ENCOUNTER — OFFICE VISIT (OUTPATIENT)
Dept: PRIMARY CARE CLINIC | Age: 70
End: 2025-02-13

## 2025-02-13 VITALS
OXYGEN SATURATION: 95 % | HEIGHT: 72 IN | SYSTOLIC BLOOD PRESSURE: 132 MMHG | DIASTOLIC BLOOD PRESSURE: 80 MMHG | BODY MASS INDEX: 24.92 KG/M2 | WEIGHT: 184 LBS | RESPIRATION RATE: 18 BRPM | HEART RATE: 72 BPM | TEMPERATURE: 97.9 F

## 2025-02-13 DIAGNOSIS — I50.9 HEART FAILURE, UNSPECIFIED HF CHRONICITY, UNSPECIFIED HEART FAILURE TYPE (HCC): ICD-10-CM

## 2025-02-13 DIAGNOSIS — Z87.891 PERSONAL HISTORY OF TOBACCO USE: ICD-10-CM

## 2025-02-13 DIAGNOSIS — J44.1 COPD EXACERBATION (HCC): ICD-10-CM

## 2025-02-13 DIAGNOSIS — I10 PRIMARY HYPERTENSION: ICD-10-CM

## 2025-02-13 DIAGNOSIS — Z00.00 MEDICARE ANNUAL WELLNESS VISIT, SUBSEQUENT: Primary | ICD-10-CM

## 2025-02-13 DIAGNOSIS — I48.0 PAF (PAROXYSMAL ATRIAL FIBRILLATION) (HCC): ICD-10-CM

## 2025-02-13 DIAGNOSIS — Z12.5 SCREENING FOR MALIGNANT NEOPLASM OF PROSTATE: ICD-10-CM

## 2025-02-13 DIAGNOSIS — Z89.612 STATUS POST ABOVE-KNEE AMPUTATION OF LEFT LOWER EXTREMITY (HCC): ICD-10-CM

## 2025-02-13 LAB
ALBUMIN: 4 G/DL (ref 3.5–5.2)
ALP BLD-CCNC: 109 U/L (ref 40–129)
ALT SERPL-CCNC: 11 U/L (ref 0–40)
ANION GAP SERPL CALCULATED.3IONS-SCNC: 18 MMOL/L (ref 7–16)
AST SERPL-CCNC: 22 U/L (ref 0–39)
BILIRUB SERPL-MCNC: 1.2 MG/DL (ref 0–1.2)
BUN BLDV-MCNC: 17 MG/DL (ref 6–23)
CALCIUM SERPL-MCNC: 8.9 MG/DL (ref 8.6–10.2)
CHLORIDE BLD-SCNC: 100 MMOL/L (ref 98–107)
CHOLESTEROL, TOTAL: 108 MG/DL
CO2: 24 MMOL/L (ref 22–29)
CREAT SERPL-MCNC: 1.4 MG/DL (ref 0.7–1.2)
GFR, ESTIMATED: 54 ML/MIN/1.73M2
GLUCOSE BLD-MCNC: 94 MG/DL (ref 74–99)
HCT VFR BLD CALC: 44.8 % (ref 37–54)
HDLC SERPL-MCNC: 61 MG/DL
HEMOGLOBIN: 14.2 G/DL (ref 12.5–16.5)
LDL CHOLESTEROL: 33 MG/DL
MCH RBC QN AUTO: 28.1 PG (ref 26–35)
MCHC RBC AUTO-ENTMCNC: 31.7 G/DL (ref 32–34.5)
MCV RBC AUTO: 88.5 FL (ref 80–99.9)
PDW BLD-RTO: 13.2 % (ref 11.5–15)
PLATELET # BLD: 199 K/UL (ref 130–450)
PMV BLD AUTO: 10.2 FL (ref 7–12)
POTASSIUM SERPL-SCNC: 3.8 MMOL/L (ref 3.5–5)
PROSTATE SPECIFIC ANTIGEN: 3.42 NG/ML (ref 0–4)
RBC # BLD: 5.06 M/UL (ref 3.8–5.8)
SODIUM BLD-SCNC: 142 MMOL/L (ref 132–146)
TOTAL PROTEIN: 6.6 G/DL (ref 6.4–8.3)
TRIGL SERPL-MCNC: 69 MG/DL
TSH SERPL DL<=0.05 MIU/L-ACNC: 1.39 UIU/ML (ref 0.27–4.2)
VLDLC SERPL CALC-MCNC: 14 MG/DL
WBC # BLD: 6.8 K/UL (ref 4.5–11.5)

## 2025-02-13 SDOH — ECONOMIC STABILITY: FOOD INSECURITY: WITHIN THE PAST 12 MONTHS, THE FOOD YOU BOUGHT JUST DIDN'T LAST AND YOU DIDN'T HAVE MONEY TO GET MORE.: NEVER TRUE

## 2025-02-13 SDOH — ECONOMIC STABILITY: FOOD INSECURITY: WITHIN THE PAST 12 MONTHS, YOU WORRIED THAT YOUR FOOD WOULD RUN OUT BEFORE YOU GOT MONEY TO BUY MORE.: NEVER TRUE

## 2025-02-13 ASSESSMENT — PATIENT HEALTH QUESTIONNAIRE - PHQ9
SUM OF ALL RESPONSES TO PHQ QUESTIONS 1-9: 0
2. FEELING DOWN, DEPRESSED OR HOPELESS: NOT AT ALL
SUM OF ALL RESPONSES TO PHQ QUESTIONS 1-9: 0
1. LITTLE INTEREST OR PLEASURE IN DOING THINGS: NOT AT ALL
SUM OF ALL RESPONSES TO PHQ9 QUESTIONS 1 & 2: 0
SUM OF ALL RESPONSES TO PHQ QUESTIONS 1-9: 0
SUM OF ALL RESPONSES TO PHQ QUESTIONS 1-9: 0

## 2025-02-13 ASSESSMENT — ENCOUNTER SYMPTOMS
CHANGE IN BOWEL HABIT: 0
SORE THROAT: 0
WHEEZING: 0
COUGH: 0
COLOR CHANGE: 0
VOMITING: 0
DIARRHEA: 0
CONSTIPATION: 0
NAUSEA: 0
BACK PAIN: 0
EYE PAIN: 0
ABDOMINAL PAIN: 0
EYE ITCHING: 0
VISUAL CHANGE: 0
EYE REDNESS: 0
BLOOD IN STOOL: 0
SHORTNESS OF BREATH: 0
CHEST TIGHTNESS: 0
SINUS PRESSURE: 0
APNEA: 0
RHINORRHEA: 0

## 2025-02-13 ASSESSMENT — COPD QUESTIONNAIRES: COPD: 1

## 2025-02-13 NOTE — PROGRESS NOTES
Chief Complaint:     Chief Complaint   Patient presents with    Medicare AWV    COPD    Heart Problem    Coronary Artery Disease    Hypertension         COPD  There is no cough, shortness of breath or wheezing. Pertinent negatives include no appetite change, chest pain, ear pain, fever, headaches, myalgias, rhinorrhea or sore throat.   Heart Problem  This is a new problem. The current episode started more than 1 month ago. The problem occurs daily. The problem has been unchanged. Pertinent negatives include no abdominal pain, arthralgias, change in bowel habit, chest pain, chills, congestion, coughing, diaphoresis, fatigue, fever, headaches, joint swelling, myalgias, nausea, neck pain, numbness, rash, sore throat, urinary symptoms, vertigo, visual change, vomiting or weakness. Nothing aggravates the symptoms. He has tried nothing for the symptoms. The treatment provided no relief.   Coronary Artery Disease  Presents for follow-up visit. Pertinent negatives include no chest pain, chest tightness, dizziness, leg swelling, palpitations or shortness of breath. The symptoms have been stable. Compliance with diet is good. Compliance with exercise is good. Compliance with medications is good.   Hypertension  This is a chronic problem. The current episode started more than 1 year ago. The problem is unchanged. The problem is controlled. Pertinent negatives include no chest pain, headaches, neck pain, palpitations or shortness of breath. There are no associated agents to hypertension. Risk factors for coronary artery disease include dyslipidemia and male gender. Past treatments include calcium channel blockers and beta blockers. The current treatment provides significant improvement. There is no history of kidney disease, CAD/MI, CVA or PVD. There is no history of a hypertension causing med, pheochromocytoma, renovascular disease, sleep apnea or a thyroid problem.   Hyperlipidemia  This is a chronic problem. The current

## 2025-02-13 NOTE — PROGRESS NOTES
Medicare Annual Wellness Visit    Miguelangel Kumar Jr. is here for Medicare AWV, COPD, Heart Problem, Coronary Artery Disease, and Hypertension    Assessment & Plan   Medicare annual wellness visit, subsequent       Return for Medicare Annual Wellness Visit in 1 year.     Subjective   The following acute and/or chronic problems were also addressed today:  COPD, CAD, HTN    Patient's complete Health Risk Assessment and screening values have been reviewed and are found in Flowsheets. The following problems were reviewed today and where indicated follow up appointments were made and/or referrals ordered.    Positive Risk Factor Screenings with Interventions:              Inactivity:  On average, how many days per week do you engage in moderate to strenuous exercise (like a brisk walk)?: 0 days (!) Abnormal  On average, how many minutes do you engage in exercise at this level?: 0 min  Interventions:  Patient declined any further interventions or treatment      Dentist Screen:  Have you seen the dentist within the past year?: (!) No    Intervention:  Advised to schedule with their dentist    Hearing Screen:  Do you or your family notice any trouble with your hearing that hasn't been managed with hearing aids?: (!) Yes    Interventions:  Patient declines any further evaluation or treatment      ADL's:   Patient reports needing help with:  Select all that apply: (!) Laundry, Housekeeping, Shopping, Banking/Finances, Food Preparation, Transportation, Taking Medications  Interventions:  Patient declined any further interventions or treatment    Advanced Directives:  Do you have a Living Will?: (!) No    Intervention:  has NO advanced directive - not interested in additional information       Lung Cancer Screening:  LDCT Screening: Discussed with patient the benefits and harms of screening, follow-up diagnostic testing, over-diagnosis, false positive rate, and total radiation exposure. Counseled on the importance of

## 2025-02-13 NOTE — PATIENT INSTRUCTIONS
work and home life. It can make you feel lonely or depressed. You may feel that you have lost your independence. But hearing aids and other devices can help you hear better and feel connected to others.  Follow-up care is a key part of your treatment and safety. Be sure to make and go to all appointments, and call your doctor if you are having problems. It's also a good idea to know your test results and keep a list of the medicines you take.  How can you care for yourself at home?  Avoid loud noises whenever possible. This helps keep your hearing from getting worse.  Always wear hearing protection around loud noises.  Wear a hearing aid as directed.  A professional can help you pick a hearing aid that will work best for you.  You can also get hearing aids over the counter for mild to moderate hearing loss.  Have hearing tests as your doctor suggests. They can show whether your hearing has changed. Your hearing aid may need to be adjusted.  Use other devices as needed. These may include:  Telephone amplifiers and hearing aids that can connect to a television, stereo, radio, or microphone.  Devices that use lights or vibrations. These alert you to the doorbell, a ringing telephone, or a baby monitor.  Television closed-captioning. This shows the words at the bottom of the screen. Most new TVs can do this.  TTY (text telephone). This lets you type messages back and forth on the telephone instead of talking or listening. These devices are also called TDD. When messages are typed on the keyboard, they are sent over the phone line to a receiving TTY. The message is shown on a monitor.  Use text messaging, social media, and email if it is hard for you to communicate by telephone.  Try to learn a listening technique called speechreading. It is not lipreading. You pay attention to people's gestures, expressions, posture, and tone of voice. These clues can help you understand what a person is saying. Face the person you are

## 2025-02-24 RX ORDER — ATORVASTATIN CALCIUM 40 MG/1
40 TABLET, FILM COATED ORAL DAILY
Qty: 90 TABLET | Refills: 3 | Status: SHIPPED | OUTPATIENT
Start: 2025-02-24

## 2025-03-17 ENCOUNTER — HOSPITAL ENCOUNTER (OUTPATIENT)
Dept: CT IMAGING | Age: 70
Discharge: HOME | End: 2025-03-19
Attending: FAMILY MEDICINE
Payer: MEDICARE

## 2025-03-17 ENCOUNTER — RESULTS FOLLOW-UP (OUTPATIENT)
Dept: CT IMAGING | Age: 70
End: 2025-03-17

## 2025-03-17 DIAGNOSIS — R91.1 LUNG NODULE: Primary | ICD-10-CM

## 2025-03-17 DIAGNOSIS — Z87.891 PERSONAL HISTORY OF TOBACCO USE: ICD-10-CM

## 2025-03-17 PROCEDURE — 71271 CT THORAX LUNG CANCER SCR C-: CPT

## 2025-03-20 NOTE — PROGRESS NOTES
4/12/2023:  Procedure:       Pharmacologic stress testing was performed with regadenoson 0.4 mg for 15 seconds.  The heart rate was 71 at baseline and chandra to 83 beats during the infusion.  This corresponds to 54% of maximum predicted heart rate.  The blood pressure at baseline was 120/70 and blood pressure at the end of infusion was 106/62.  Blood pressure response was normal during the stress procedure.      The patient tolerated the infusion well.     ECG during the infusion did not change.     IMAGING: Myocardial perfusion imaging was performed at rest 30-35 minutes following the intravenous injection of 10.0 mCi of (Tc-Sestamibi) followed by 10 ml of Normal Saline.  As per infusion protocol, the patient was injected intravenously with 29.4 mCi of (Tc-Sestamibi) followed by 10 ml of Normal Saline.  Gated post-stress tomographic imaging was performed 20-25 minutes after stress.      FINDINGS: The overall quality of the study was fair.      Left ventricular cavity size was noted to be normal.     Rotational analog analysis demonstrated no patient motion or abnormal extracardiac radioactivity.       A mild defect was present in the apical inferior wall(s) that was  small sized by quantification.       The resting images reveal partial reversibility.      Gated SPECT left ventricular ejection fraction was calculated to be 54%, with normal myocardial thickening and wall motion.     Impression:    ECG during the infusion did not change.   The myocardial perfusion imaging was abnormal.    The abnormality was a a small sized partially reversible defect in the apical inferior wall  Overall left ventricular systolic function was normal without regional wall motion abnormalities.  Low risk general pharmacologic stress test.      Coronary CTA 5/5/23:  The left main: Artery has 10 to 20% stenosis in the midsegment.     The LAD has 70 to 80% stenosis at the ostial level with high risk plaque  followed by 50 to 60% stenosis.

## 2025-03-21 ENCOUNTER — OFFICE VISIT (OUTPATIENT)
Dept: NON INVASIVE DIAGNOSTICS | Age: 70
End: 2025-03-21
Payer: MEDICARE

## 2025-03-21 VITALS
HEART RATE: 58 BPM | HEIGHT: 72 IN | BODY MASS INDEX: 24.11 KG/M2 | SYSTOLIC BLOOD PRESSURE: 136 MMHG | TEMPERATURE: 97.9 F | OXYGEN SATURATION: 97 % | WEIGHT: 178 LBS | RESPIRATION RATE: 18 BRPM | DIASTOLIC BLOOD PRESSURE: 80 MMHG

## 2025-03-21 DIAGNOSIS — I48.0 PAF (PAROXYSMAL ATRIAL FIBRILLATION) (HCC): Primary | ICD-10-CM

## 2025-03-21 PROCEDURE — 93000 ELECTROCARDIOGRAM COMPLETE: CPT | Performed by: INTERNAL MEDICINE

## 2025-03-21 PROCEDURE — 1159F MED LIST DOCD IN RCRD: CPT | Performed by: INTERNAL MEDICINE

## 2025-03-21 PROCEDURE — 3079F DIAST BP 80-89 MM HG: CPT | Performed by: INTERNAL MEDICINE

## 2025-03-21 PROCEDURE — 99215 OFFICE O/P EST HI 40 MIN: CPT | Performed by: INTERNAL MEDICINE

## 2025-03-21 PROCEDURE — 3075F SYST BP GE 130 - 139MM HG: CPT | Performed by: INTERNAL MEDICINE

## 2025-03-21 PROCEDURE — 1160F RVW MEDS BY RX/DR IN RCRD: CPT | Performed by: INTERNAL MEDICINE

## 2025-03-21 PROCEDURE — 1123F ACP DISCUSS/DSCN MKR DOCD: CPT | Performed by: INTERNAL MEDICINE

## 2025-03-27 ENCOUNTER — OFFICE VISIT (OUTPATIENT)
Dept: CARDIOLOGY CLINIC | Age: 70
End: 2025-03-27
Payer: MEDICARE

## 2025-03-27 VITALS
BODY MASS INDEX: 24.35 KG/M2 | SYSTOLIC BLOOD PRESSURE: 132 MMHG | OXYGEN SATURATION: 93 % | DIASTOLIC BLOOD PRESSURE: 68 MMHG | HEIGHT: 72 IN | HEART RATE: 55 BPM | TEMPERATURE: 97.3 F | WEIGHT: 179.8 LBS | RESPIRATION RATE: 18 BRPM

## 2025-03-27 DIAGNOSIS — I48.0 PAF (PAROXYSMAL ATRIAL FIBRILLATION) (HCC): Primary | ICD-10-CM

## 2025-03-27 DIAGNOSIS — I50.32 CHRONIC HEART FAILURE WITH PRESERVED EJECTION FRACTION (HFPEF) (HCC): ICD-10-CM

## 2025-03-27 PROCEDURE — 1123F ACP DISCUSS/DSCN MKR DOCD: CPT | Performed by: INTERNAL MEDICINE

## 2025-03-27 PROCEDURE — 3078F DIAST BP <80 MM HG: CPT | Performed by: INTERNAL MEDICINE

## 2025-03-27 PROCEDURE — 1160F RVW MEDS BY RX/DR IN RCRD: CPT | Performed by: INTERNAL MEDICINE

## 2025-03-27 PROCEDURE — 99214 OFFICE O/P EST MOD 30 MIN: CPT | Performed by: INTERNAL MEDICINE

## 2025-03-27 PROCEDURE — 93000 ELECTROCARDIOGRAM COMPLETE: CPT | Performed by: INTERNAL MEDICINE

## 2025-03-27 PROCEDURE — 3075F SYST BP GE 130 - 139MM HG: CPT | Performed by: INTERNAL MEDICINE

## 2025-03-27 PROCEDURE — 1159F MED LIST DOCD IN RCRD: CPT | Performed by: INTERNAL MEDICINE

## 2025-03-27 PROCEDURE — G2211 COMPLEX E/M VISIT ADD ON: HCPCS | Performed by: INTERNAL MEDICINE

## 2025-03-27 RX ORDER — ATORVASTATIN CALCIUM 40 MG/1
40 TABLET, FILM COATED ORAL DAILY
Qty: 90 TABLET | Refills: 3 | Status: SHIPPED | OUTPATIENT
Start: 2025-03-27

## 2025-03-27 NOTE — PROGRESS NOTES
OUTPATIENT CARDIOLOGY FOLLOW-UP    Name: Miguelangel Kumar Jr.    Age: 69 y.o.    Primary Care Physician: Spencer Kwok DO    Date of Service: 3/27/2025    Chief Complaint:   Chief Complaint   Patient presents with    6 Month Follow-Up    Medication Refill     Refill Lipitor       Interim History:   Mr. 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  He was seen as a new consult on 12/20/2020 for new onset atrial fibrillation RVR.  He underwent ОЛЬГА guided cardioversion on 12/20/2020 and was successfully converted to sinus rhythm.  He was initiated on Eliquis for anticoagulation and metoprolol for rate control and was discharged home.  He is tolerating Eliquis without any bleeding complications. He is compliant with medications, as well as salt and fluid intake.  He does not take any over-the-counter arthritis medications.  He  was hospitalized from 4/7/2022 to 4/15/2022 with a urine tract infection and sepsis.  He was diagnosed with a Klebsiella pneumonia sepsis and also atrial fibrillation/flutter and underwent cardioversion and on 4/15/2022 and was successfully converted to sinus rhythm.  Following hospital discharge, patient went into rehab and underwent rehab.      He underwent heart cath which showed no obstructive CAD. He was hospitalized from 5/4/2023 to 5/5/2023.  Patient underwent elective cardioversion and converted to sinus rhythm.  Patient also had a coronary CTA with a calcium score which showed severe proximal LAD stenosis with extensive coronary calcification with blooming artifacts.      Patient was hospitalized from 3/25/2023 to

## 2025-06-06 NOTE — CARE COORDINATION
Social work / Discharge Planning:       Patient is active with [facility]  and will need orders to resume home care prior to discharge. Patient does not have home oxygen and currently back on room air. Per IDR, plan is for CT abdomen today. Social work will continue to follow to assist with discharge planning.    Electronically signed by TAMI James on 5/12/2022 at 9:20 AM (4) walks frequently

## 2025-06-23 DIAGNOSIS — I48.0 PAF (PAROXYSMAL ATRIAL FIBRILLATION) (HCC): ICD-10-CM

## 2025-06-23 RX ORDER — DRONEDARONE 400 MG/1
400 TABLET, FILM COATED ORAL 2 TIMES DAILY WITH MEALS
Qty: 180 TABLET | Refills: 1 | Status: SHIPPED | OUTPATIENT
Start: 2025-06-23

## 2025-06-27 ENCOUNTER — HOSPITAL ENCOUNTER (OUTPATIENT)
Dept: CT IMAGING | Age: 70
Discharge: HOME | End: 2025-06-29
Attending: INTERNAL MEDICINE
Payer: MEDICARE

## 2025-06-27 DIAGNOSIS — R91.1 PULMONARY NODULE: ICD-10-CM

## 2025-06-27 PROCEDURE — 71250 CT THORAX DX C-: CPT

## 2025-06-30 ENCOUNTER — TELEPHONE (OUTPATIENT)
Dept: PRIMARY CARE CLINIC | Age: 70
End: 2025-06-30

## 2025-06-30 NOTE — TELEPHONE ENCOUNTER
Sister calling on behalf of patient. Patient is needing a new  for prosthetic leg.   Needing new order faxed to [facility]   Fax. [phone]

## 2025-07-02 ENCOUNTER — TELEPHONE (OUTPATIENT)
Dept: NON INVASIVE DIAGNOSTICS | Age: 70
End: 2025-07-02

## 2025-07-02 ENCOUNTER — HOSPITAL ENCOUNTER (EMERGENCY)
Age: 70
Discharge: HOME | End: 2025-07-02
Attending: STUDENT IN AN ORGANIZED HEALTH CARE EDUCATION/TRAINING PROGRAM
Payer: MEDICARE

## 2025-07-02 ENCOUNTER — APPOINTMENT (OUTPATIENT)
Dept: GENERAL RADIOLOGY | Age: 70
End: 2025-07-02
Payer: MEDICARE

## 2025-07-02 VITALS
BODY MASS INDEX: 22.99 KG/M2 | WEIGHT: 179.1 LBS | TEMPERATURE: 97.9 F | DIASTOLIC BLOOD PRESSURE: 82 MMHG | HEART RATE: 62 BPM | HEIGHT: 74 IN | RESPIRATION RATE: 19 BRPM | SYSTOLIC BLOOD PRESSURE: 114 MMHG | OXYGEN SATURATION: 98 %

## 2025-07-02 DIAGNOSIS — I48.91 ATRIAL FIBRILLATION WITH RVR (HCC): Primary | ICD-10-CM

## 2025-07-02 LAB
ALBUMIN SERPL-MCNC: 3.6 G/DL (ref 3.5–5.2)
ALP SERPL-CCNC: 78 U/L (ref 40–129)
ALT SERPL-CCNC: 15 U/L (ref 0–50)
ANION GAP SERPL CALCULATED.3IONS-SCNC: 12 MMOL/L (ref 7–16)
AST SERPL-CCNC: 22 U/L (ref 0–50)
BASOPHILS # BLD: 0.04 K/UL (ref 0–0.2)
BASOPHILS NFR BLD: 1 % (ref 0–2)
BILIRUB SERPL-MCNC: 1 MG/DL (ref 0–1.2)
BNP SERPL-MCNC: 6160 PG/ML (ref 0–125)
BUN SERPL-MCNC: 18 MG/DL (ref 8–23)
CALCIUM SERPL-MCNC: 8.6 MG/DL (ref 8.8–10.2)
CHLORIDE SERPL-SCNC: 104 MMOL/L (ref 98–107)
CO2 SERPL-SCNC: 25 MMOL/L (ref 22–29)
CREAT SERPL-MCNC: 1.4 MG/DL (ref 0.7–1.2)
EKG ATRIAL RATE: 138 BPM
EKG Q-T INTERVAL: 316 MS
EKG QRS DURATION: 86 MS
EKG QTC CALCULATION (BAZETT): 477 MS
EKG R AXIS: 70 DEGREES
EKG T AXIS: -79 DEGREES
EKG VENTRICULAR RATE: 137 BPM
EOSINOPHIL # BLD: 0.21 K/UL (ref 0.05–0.5)
EOSINOPHILS RELATIVE PERCENT: 3 % (ref 0–6)
ERYTHROCYTE [DISTWIDTH] IN BLOOD BY AUTOMATED COUNT: 13.6 % (ref 11.5–15)
GFR, ESTIMATED: 53 ML/MIN/1.73M2
GLUCOSE SERPL-MCNC: 75 MG/DL (ref 74–99)
HCT VFR BLD AUTO: 41.9 % (ref 37–54)
HGB BLD-MCNC: 13.8 G/DL (ref 12.5–16.5)
IMM GRANULOCYTES # BLD AUTO: <0.03 K/UL (ref 0–0.58)
IMM GRANULOCYTES NFR BLD: 0 % (ref 0–5)
LYMPHOCYTES NFR BLD: 1.93 K/UL (ref 1.5–4)
LYMPHOCYTES RELATIVE PERCENT: 25 % (ref 20–42)
MAGNESIUM SERPL-MCNC: 2.2 MG/DL (ref 1.6–2.4)
MCH RBC QN AUTO: 28.3 PG (ref 26–35)
MCHC RBC AUTO-ENTMCNC: 32.9 G/DL (ref 32–34.5)
MCV RBC AUTO: 85.9 FL (ref 80–99.9)
MONOCYTES NFR BLD: 1.37 K/UL (ref 0.1–0.95)
MONOCYTES NFR BLD: 18 % (ref 2–12)
NEUTROPHILS NFR BLD: 53 % (ref 43–80)
NEUTS SEG NFR BLD: 4.09 K/UL (ref 1.8–7.3)
PLATELET # BLD AUTO: 208 K/UL (ref 130–450)
PMV BLD AUTO: 10 FL (ref 7–12)
POTASSIUM SERPL-SCNC: 3.2 MMOL/L (ref 3.5–5.1)
PROT SERPL-MCNC: 6 G/DL (ref 6.4–8.3)
RBC # BLD AUTO: 4.88 M/UL (ref 3.8–5.8)
SODIUM SERPL-SCNC: 141 MMOL/L (ref 136–145)
TSH SERPL DL<=0.05 MIU/L-ACNC: 1.49 UIU/ML (ref 0.27–4.2)
WBC OTHER # BLD: 7.7 K/UL (ref 4.5–11.5)

## 2025-07-02 PROCEDURE — 83880 ASSAY OF NATRIURETIC PEPTIDE: CPT

## 2025-07-02 PROCEDURE — 85025 COMPLETE CBC W/AUTO DIFF WBC: CPT

## 2025-07-02 PROCEDURE — 2580000003 HC RX 258: Performed by: STUDENT IN AN ORGANIZED HEALTH CARE EDUCATION/TRAINING PROGRAM

## 2025-07-02 PROCEDURE — 96376 TX/PRO/DX INJ SAME DRUG ADON: CPT

## 2025-07-02 PROCEDURE — 96366 THER/PROPH/DIAG IV INF ADDON: CPT

## 2025-07-02 PROCEDURE — 71045 X-RAY EXAM CHEST 1 VIEW: CPT

## 2025-07-02 PROCEDURE — 93005 ELECTROCARDIOGRAM TRACING: CPT | Performed by: STUDENT IN AN ORGANIZED HEALTH CARE EDUCATION/TRAINING PROGRAM

## 2025-07-02 PROCEDURE — 84443 ASSAY THYROID STIM HORMONE: CPT

## 2025-07-02 PROCEDURE — 80053 COMPREHEN METABOLIC PANEL: CPT

## 2025-07-02 PROCEDURE — 83735 ASSAY OF MAGNESIUM: CPT

## 2025-07-02 PROCEDURE — 2500000003 HC RX 250 WO HCPCS: Performed by: STUDENT IN AN ORGANIZED HEALTH CARE EDUCATION/TRAINING PROGRAM

## 2025-07-02 PROCEDURE — 96367 TX/PROPH/DG ADDL SEQ IV INF: CPT

## 2025-07-02 PROCEDURE — 96361 HYDRATE IV INFUSION ADD-ON: CPT

## 2025-07-02 PROCEDURE — 99285 EMERGENCY DEPT VISIT HI MDM: CPT

## 2025-07-02 PROCEDURE — 96365 THER/PROPH/DIAG IV INF INIT: CPT

## 2025-07-02 PROCEDURE — 6370000000 HC RX 637 (ALT 250 FOR IP): Performed by: STUDENT IN AN ORGANIZED HEALTH CARE EDUCATION/TRAINING PROGRAM

## 2025-07-02 RX ORDER — DILTIAZEM HYDROCHLORIDE 5 MG/ML
10 INJECTION INTRAVENOUS ONCE
Status: COMPLETED | OUTPATIENT
Start: 2025-07-02 | End: 2025-07-02

## 2025-07-02 RX ORDER — POTASSIUM CHLORIDE 1500 MG/1
40 TABLET, EXTENDED RELEASE ORAL ONCE
Status: COMPLETED | OUTPATIENT
Start: 2025-07-02 | End: 2025-07-02

## 2025-07-02 RX ORDER — 0.9 % SODIUM CHLORIDE 0.9 %
1000 INTRAVENOUS SOLUTION INTRAVENOUS ONCE
Status: COMPLETED | OUTPATIENT
Start: 2025-07-02 | End: 2025-07-02

## 2025-07-02 RX ADMIN — DILTIAZEM HYDROCHLORIDE 10 MG: 5 INJECTION, SOLUTION INTRAVENOUS at 19:37

## 2025-07-02 RX ADMIN — DILTIAZEM HYDROCHLORIDE 2.5 MG/HR: 5 INJECTION, SOLUTION INTRAVENOUS at 19:48

## 2025-07-02 RX ADMIN — POTASSIUM CHLORIDE 40 MEQ: 1500 TABLET, EXTENDED RELEASE ORAL at 15:54

## 2025-07-02 RX ADMIN — SODIUM CHLORIDE 1000 ML: 0.9 INJECTION, SOLUTION INTRAVENOUS at 14:39

## 2025-07-02 RX ADMIN — DILTIAZEM HYDROCHLORIDE 10 MG: 5 INJECTION, SOLUTION INTRAVENOUS at 17:29

## 2025-07-02 ASSESSMENT — ENCOUNTER SYMPTOMS
SHORTNESS OF BREATH: 0
BACK PAIN: 0
EYE REDNESS: 0
DIARRHEA: 0
COUGH: 0
SINUS PRESSURE: 0
EYE PAIN: 0
SORE THROAT: 0
ABDOMINAL PAIN: 0
NAUSEA: 0
VOMITING: 0
EYE DISCHARGE: 0
WHEEZING: 0

## 2025-07-02 ASSESSMENT — PAIN - FUNCTIONAL ASSESSMENT: PAIN_FUNCTIONAL_ASSESSMENT: NONE - DENIES PAIN

## 2025-07-02 NOTE — TELEPHONE ENCOUNTER
Ly (sister) notified and verbalized understanding.     Electronically signed by Shital Quach MA on 7/2/2025 at 1:29 PM

## 2025-07-02 NOTE — TELEPHONE ENCOUNTER
Ly (sister) called due to the patient having an elevated heart rate that started last night. Last night his heart rate range was  bpm. Today it has been around 130 bpm for over an hour now. The patient is a little clammy but no other symptoms. Message sent to NP for advisement.     Electronically signed by Shital Quach MA on 7/2/2025 at 1:11 PM

## 2025-07-02 NOTE — TELEPHONE ENCOUNTER
----- Message from Clau WINTER sent at 7/2/2025  1:22 PM EDT -----  Regarding: RE: Elevated heart rate  It is hard to assess over the phone with only a heart rate. But he is likely back out of rhythm.  This can happen for many reasons.  If he is ill, dehydrated or has some other stressors going on in his life.  If this continues or he has other symptoms I would send the patient to the hospital.  If he is clammy it may be indicative of an illness and he should be seen by PCP or urgent care/emergency room.  ----- Message -----  From: Shital Quach MA  Sent: 7/2/2025   1:12 PM EDT  To: CRISTI Arguelles - CNP  Subject: Elevated heart rate                              Ly (sister) called due to the patient having an elevated heart rate that started last night. Last night his heart rate range was  bpm. Today it has been around 130 bpm for over an hour now. The patient is a little clammy but no other symptoms. Please advise.     Electronically signed by Shital Quach MA on 7/2/2025 at 1:12 PM

## 2025-07-02 NOTE — ED PROVIDER NOTES
Department of Emergency Medicine   ED  Provider Note  Admit Date/RoomTime: 7/2/2025  2:19 PM  ED Room: 28/28              [facility]     Miguelangel Kumar Jr. is a 69 y.o. male with a PMHx significant for atrial fibrillation (on Eliquis and Multac), COPD, AKA left, HTN who presents for evaluation of elevated heart rate, beginning prior to arrival. The complaint has been persistent, moderate in severity, and worsened by nothing.   The patient's sister is at bedside helping to give history and information. Notes that he has history of atrial fibrillation. Follows with Dr. Kennedy and Lisa. He has been rate controled well for almost two years now.   No changes in medications or missed doses as of late.      Review of Systems   Constitutional:  Negative for chills and fever.   HENT:  Negative for ear pain, sinus pressure and sore throat.    Eyes:  Negative for pain, discharge and redness.   Respiratory:  Negative for cough, shortness of breath and wheezing.    Cardiovascular:  Negative for chest pain.   Gastrointestinal:  Negative for abdominal pain, diarrhea, nausea and vomiting.   Genitourinary:  Negative for dysuria and frequency.   Musculoskeletal:  Negative for arthralgias and back pain.   Skin:  Negative for rash and wound.   Neurological:  Negative for weakness and headaches.   Hematological:  Negative for adenopathy.   All other systems reviewed and are negative.       Physical Exam  Vitals and nursing note reviewed.   Constitutional:       General: He is not in acute distress.     Appearance: Normal appearance. He is well-developed. He is not ill-appearing.   HENT:      Head: Normocephalic and atraumatic.      Right Ear: External ear normal.      Left Ear: External ear normal.   Eyes:      General:         Right eye: No discharge.         Left eye: No discharge.      Extraocular Movements: Extraocular movements intact.      Conjunctiva/sclera: Conjunctivae normal.   Cardiovascular:      Rate and Rhythm:

## 2025-07-02 NOTE — ED NOTES
Pt ambulated through department with cane with no difficulty. Pulse ox ranged from 94%-97% and heart rate between 103-110 bpm.

## 2025-07-02 NOTE — TELEPHONE ENCOUNTER
Resolved in another encounter.     Electronically signed by Shital Quach MA on 7/2/2025 at 1:30 PM

## 2025-07-03 NOTE — DISCHARGE INSTRUCTIONS
Please return to the ER for any new or worsening symptoms  If prescribed, please be sure to  your prescriptions from the pharmacy  Please follow-up with Primary care provider and Cardiology as instructed    XR CHEST PORTABLE   Final Result   No acute process.

## 2025-07-04 LAB
EKG ATRIAL RATE: 58 BPM
EKG P AXIS: 31 DEGREES
EKG P-R INTERVAL: 188 MS
EKG Q-T INTERVAL: 470 MS
EKG QRS DURATION: 84 MS
EKG QTC CALCULATION (BAZETT): 461 MS
EKG R AXIS: 74 DEGREES
EKG T AXIS: 16 DEGREES
EKG VENTRICULAR RATE: 58 BPM

## 2025-07-04 PROCEDURE — 93010 ELECTROCARDIOGRAM REPORT: CPT | Performed by: INTERNAL MEDICINE

## 2025-07-08 NOTE — TELEPHONE ENCOUNTER
Opened by: Joo Gaviria DO on Mon Jun 30, 2025 11:00 AM   Pended by: Joo Gaviria DO on Mon Jun 30, 2025 11:02 AM   Doned by: Joo Gaviria DO on Mon Jun 30, 2025 11:02 AM     End of Report

## 2025-07-09 LAB
EKG ATRIAL RATE: 138 BPM
EKG Q-T INTERVAL: 316 MS
EKG QRS DURATION: 86 MS
EKG QTC CALCULATION (BAZETT): 477 MS
EKG R AXIS: 70 DEGREES
EKG T AXIS: -79 DEGREES
EKG VENTRICULAR RATE: 137 BPM

## 2025-07-15 ENCOUNTER — OFFICE VISIT (OUTPATIENT)
Dept: PRIMARY CARE CLINIC | Age: 70
End: 2025-07-15
Payer: MEDICARE

## 2025-07-15 VITALS
BODY MASS INDEX: 22.07 KG/M2 | WEIGHT: 172 LBS | DIASTOLIC BLOOD PRESSURE: 82 MMHG | RESPIRATION RATE: 18 BRPM | SYSTOLIC BLOOD PRESSURE: 138 MMHG | TEMPERATURE: 98.7 F | OXYGEN SATURATION: 93 % | HEIGHT: 74 IN | HEART RATE: 64 BPM

## 2025-07-15 DIAGNOSIS — I10 PRIMARY HYPERTENSION: ICD-10-CM

## 2025-07-15 DIAGNOSIS — I25.10 CORONARY ARTERY DISEASE INVOLVING NATIVE CORONARY ARTERY OF NATIVE HEART WITHOUT ANGINA PECTORIS: Primary | ICD-10-CM

## 2025-07-15 DIAGNOSIS — I47.10 PSVT (PAROXYSMAL SUPRAVENTRICULAR TACHYCARDIA): ICD-10-CM

## 2025-07-15 DIAGNOSIS — I50.9 HEART FAILURE, UNSPECIFIED HF CHRONICITY, UNSPECIFIED HEART FAILURE TYPE (HCC): ICD-10-CM

## 2025-07-15 DIAGNOSIS — E87.6 HYPOKALEMIA: ICD-10-CM

## 2025-07-15 DIAGNOSIS — I48.0 PAF (PAROXYSMAL ATRIAL FIBRILLATION) (HCC): Chronic | ICD-10-CM

## 2025-07-15 DIAGNOSIS — Z79.01 ON APIXABAN THERAPY: Chronic | ICD-10-CM

## 2025-07-15 PROBLEM — J44.1 COPD EXACERBATION (HCC): Status: RESOLVED | Noted: 2025-02-13 | Resolved: 2025-07-15

## 2025-07-15 LAB
ANION GAP SERPL CALCULATED.3IONS-SCNC: 10 MMOL/L (ref 7–16)
BUN BLDV-MCNC: 18 MG/DL (ref 8–23)
CALCIUM SERPL-MCNC: 8.5 MG/DL (ref 8.8–10.2)
CHLORIDE BLD-SCNC: 106 MMOL/L (ref 98–107)
CO2: 25 MMOL/L (ref 22–29)
CREAT SERPL-MCNC: 1.3 MG/DL (ref 0.7–1.2)
GFR, ESTIMATED: 59 ML/MIN/1.73M2
GLUCOSE BLD-MCNC: 100 MG/DL (ref 74–99)
HCT VFR BLD CALC: 38.6 % (ref 37–54)
HEMOGLOBIN: 12.6 G/DL (ref 12.5–16.5)
MAGNESIUM: 2.2 MG/DL (ref 1.6–2.4)
MCH RBC QN AUTO: 28.8 PG (ref 26–35)
MCHC RBC AUTO-ENTMCNC: 32.6 G/DL (ref 32–34.5)
MCV RBC AUTO: 88.3 FL (ref 80–99.9)
PDW BLD-RTO: 13.6 % (ref 11.5–15)
PLATELET # BLD: 184 K/UL (ref 130–450)
PMV BLD AUTO: 10.2 FL (ref 7–12)
POTASSIUM SERPL-SCNC: 3.8 MMOL/L (ref 3.5–5.1)
RBC # BLD: 4.37 M/UL (ref 3.8–5.8)
SODIUM BLD-SCNC: 141 MMOL/L (ref 136–145)
TSH SERPL DL<=0.05 MIU/L-ACNC: 1.48 UIU/ML (ref 0.27–4.2)
WBC # BLD: 6.5 K/UL (ref 4.5–11.5)

## 2025-07-15 PROCEDURE — 1160F RVW MEDS BY RX/DR IN RCRD: CPT | Performed by: FAMILY MEDICINE

## 2025-07-15 PROCEDURE — 1159F MED LIST DOCD IN RCRD: CPT | Performed by: FAMILY MEDICINE

## 2025-07-15 PROCEDURE — 1123F ACP DISCUSS/DSCN MKR DOCD: CPT | Performed by: FAMILY MEDICINE

## 2025-07-15 PROCEDURE — 3079F DIAST BP 80-89 MM HG: CPT | Performed by: FAMILY MEDICINE

## 2025-07-15 PROCEDURE — 99214 OFFICE O/P EST MOD 30 MIN: CPT | Performed by: FAMILY MEDICINE

## 2025-07-15 PROCEDURE — 3075F SYST BP GE 130 - 139MM HG: CPT | Performed by: FAMILY MEDICINE

## 2025-07-15 ASSESSMENT — ENCOUNTER SYMPTOMS
COUGH: 0
EYE ITCHING: 0
VISUAL CHANGE: 0
CHEST TIGHTNESS: 0
CONSTIPATION: 0
WHEEZING: 0
VOMITING: 0
CHANGE IN BOWEL HABIT: 0
EYE PAIN: 0
SHORTNESS OF BREATH: 0
NAUSEA: 0
BACK PAIN: 0
BLOOD IN STOOL: 0
APNEA: 0
DIARRHEA: 0
ABDOMINAL PAIN: 0
EYE REDNESS: 0
COLOR CHANGE: 0
RHINORRHEA: 0
SORE THROAT: 0
SINUS PRESSURE: 0

## 2025-07-15 ASSESSMENT — COPD QUESTIONNAIRES: COPD: 1

## 2025-07-15 NOTE — PROGRESS NOTES
Chief Complaint:     Chief Complaint   Patient presents with    Follow-Up from Hospital    Hypertension    Coronary Artery Disease         Hypertension  This is a chronic problem. The current episode started more than 1 year ago. The problem is unchanged. The problem is controlled. Pertinent negatives include no chest pain, headaches, neck pain, palpitations or shortness of breath. There are no associated agents to hypertension. Risk factors for coronary artery disease include dyslipidemia and male gender. Past treatments include calcium channel blockers and beta blockers. The current treatment provides significant improvement. There is no history of kidney disease, CAD/MI, CVA or PVD. There is no history of a hypertension causing med, pheochromocytoma, renovascular disease, sleep apnea or a thyroid problem.   Coronary Artery Disease  Presents for follow-up visit. Pertinent negatives include no chest pain, chest tightness, dizziness, leg swelling, palpitations or shortness of breath. The symptoms have been stable. Compliance with diet is good. Compliance with exercise is good. Compliance with medications is good.   COPD  There is no cough, shortness of breath or wheezing. Pertinent negatives include no appetite change, chest pain, ear pain, fever, headaches, myalgias, rhinorrhea or sore throat.   Heart Problem  This is a new problem. The current episode started more than 1 month ago. The problem occurs daily. The problem has been unchanged. Pertinent negatives include no abdominal pain, arthralgias, change in bowel habit, chest pain, chills, congestion, coughing, diaphoresis, fatigue, fever, headaches, joint swelling, myalgias, nausea, neck pain, numbness, rash, sore throat, urinary symptoms, vertigo, visual change, vomiting or weakness. Nothing aggravates the symptoms. He has tried nothing for the symptoms. The treatment provided no relief.   Hyperlipidemia  This is a chronic problem. The current episode started

## (undated) DEVICE — SOLUTION IV IRRIG POUR BRL 0.9% SODIUM CHL 2F7124

## (undated) DEVICE — TRAP POLYP ETRAP

## (undated) DEVICE — LAPAROSCOPE 30DEG 5 AND 10MM

## (undated) DEVICE — READY WET SKIN SCRUB TRAY-LF: Brand: MEDLINE INDUSTRIES, INC.

## (undated) DEVICE — GAUZE,SPONGE,4"X4",8PLY,STRL,LF,10/TRAY: Brand: MEDLINE

## (undated) DEVICE — GOWN,SIRUS,FABRNF,L,20/CS: Brand: MEDLINE

## (undated) DEVICE — GRADUATE TRIANG MEASURE 1000ML BLK PRNT

## (undated) DEVICE — DRAPE,CHEST,FENES,15X10,STERIL: Brand: MEDLINE

## (undated) DEVICE — INSUFFLATION NEEDLE TO ESTABLISH PNEUMOPERITONEUM.: Brand: INSUFFLATION NEEDLE

## (undated) DEVICE — ACCESS PLATFORM FOR MINIMALLY INVASIVE SURGERY.: Brand: GELPORT® LAPAROSCOPIC  SYSTEM

## (undated) DEVICE — INTENDED FOR TISSUE SEPARATION, AND OTHER PROCEDURES THAT REQUIRE A SHARP SURGICAL BLADE TO PUNCTURE OR CUT.: Brand: BARD-PARKER ® STAINLESS STEEL BLADES

## (undated) DEVICE — SYRINGE 20ML LL S/C 50

## (undated) DEVICE — PENCIL ES L3M BTTN SWCH HOLSTER W/ BLDE ELECTRD EDGE

## (undated) DEVICE — STAPLER INT L75MM CUT LN L73MM STPL LN L77MM BLU B FRM 8

## (undated) DEVICE — INSTRUMENT CLAMP TOWEL LARGE REUSABLE

## (undated) DEVICE — DOUBLE BASIN SET: Brand: MEDLINE INDUSTRIES, INC.

## (undated) DEVICE — RELOAD STPL L75MM OPN H3.8MM CLS 1.5MM WIRE DIA0.2MM REG

## (undated) DEVICE — SOLUTION IV IRRIG WATER 1000ML POUR BRL 2F7114

## (undated) DEVICE — ELECTRODE PT RET AD L9FT HI MOIST COND ADH HYDRGEL CORDED

## (undated) DEVICE — CAMERA STRYKER 1488 HD GEN

## (undated) DEVICE — PACK PROCEDURE SURG GEN CUST

## (undated) DEVICE — SPONGE LAP W18XL18IN WHT COT 4 PLY FLD STRUNG RADPQ DISP ST

## (undated) DEVICE — YANKAUER,POOLE TIP,STERILE,50/CS: Brand: MEDLINE

## (undated) DEVICE — GLOVE ORANGE PI 7 1/2   MSG9075

## (undated) DEVICE — BAG: SPONGE CT 10.25X32 2.0ML BLU 250/CS: Brand: MEDICAL ACTION INDUSTRIES

## (undated) DEVICE — SET INSTRUMENT LAP I

## (undated) DEVICE — Z DISCONTINUED USE 2716239 STAPLER INT STPL 51MM CUT LN L40MM STD TISS CRV CUT CR40B

## (undated) DEVICE — PAD,ABDOMINAL,5"X9",ST,LF,25/BX: Brand: MEDLINE INDUSTRIES, INC.

## (undated) DEVICE — SUTURE VIC + 4-0 27 IN SH VIO VCP315H

## (undated) DEVICE — GOWN,SIRUS,FABRNF,XL,20/CS: Brand: MEDLINE

## (undated) DEVICE — SPONGE GZ W4XL4IN RAYON POLY FILL CVR W/ NONWOVEN FAB

## (undated) DEVICE — KIT,ANTI FOG,W/SPONGE & FLUID,SOFT PACK: Brand: MEDLINE

## (undated) DEVICE — CHLORAPREP 26ML ORANGE

## (undated) DEVICE — PAD,NON-ADHERENT,3X8,STERILE,LF,1/PK: Brand: MEDLINE

## (undated) DEVICE — LAPAROSCOPIC SCISSORS: Brand: EPIX LAPAROSCOPIC SCISSORS

## (undated) DEVICE — 4-PORT MANIFOLD: Brand: NEPTUNE 2

## (undated) DEVICE — INSTRUMENT POUCH: Brand: DEROYAL

## (undated) DEVICE — Z INACTIVE USE 2855131 SPONGE GZ W4XL4IN RAYON POLY FILL CVR W/ NONWOVEN FAB

## (undated) DEVICE — COVER HNDL LT DISP

## (undated) DEVICE — POOLE SUCTION HANDLE: Brand: CARDINAL HEALTH

## (undated) DEVICE — SEALER ENDOSCP L37CM NANO COAT BLNT TIP LAP DIV

## (undated) DEVICE — BLADE CLIPPER GEN PURP NS

## (undated) DEVICE — GRADUATE

## (undated) DEVICE — TOWEL,OR,DSP,ST,BLUE,STD,6/PK,12PK/CS: Brand: MEDLINE

## (undated) DEVICE — SNARE ENDOSCP L240CM LOOP W13MM SHTH DIA2.4MM SM OVL FLX